# Patient Record
Sex: MALE | Race: WHITE | NOT HISPANIC OR LATINO | Employment: OTHER | URBAN - METROPOLITAN AREA
[De-identification: names, ages, dates, MRNs, and addresses within clinical notes are randomized per-mention and may not be internally consistent; named-entity substitution may affect disease eponyms.]

---

## 2017-01-12 ENCOUNTER — HOSPITAL ENCOUNTER (OUTPATIENT)
Dept: SLEEP MEDICINE | Facility: HOSPITAL | Age: 77
Discharge: HOME | End: 2017-01-12
Attending: INTERNAL MEDICINE | Admitting: INTERNAL MEDICINE

## 2017-01-30 ENCOUNTER — CONVERSION ENCOUNTER (OUTPATIENT)
Dept: CARDIOLOGY | Facility: CLINIC | Age: 77
End: 2017-01-30

## 2017-02-02 ENCOUNTER — HOSPITAL ENCOUNTER (OUTPATIENT)
Dept: ONCOLOGY | Facility: CLINIC | Age: 77
Discharge: HOME | End: 2017-02-02
Attending: NURSE PRACTITIONER | Admitting: NURSE PRACTITIONER

## 2017-02-02 LAB
ALBUMIN SERPL-MCNC: 3.3 G/DL (ref 3.5–4.8)
ALBUMIN/GLOB SERPL: 1.1 {RATIO} (ref 1–1.7)
ALP SERPL-CCNC: 56 IU/L (ref 32–91)
ALT SERPL-CCNC: 25 IU/L (ref 17–63)
ANION GAP SERPL CALC-SCNC: 11.2 MMOL/L (ref 10–20)
AST SERPL-CCNC: 24 IU/L (ref 15–41)
BILIRUB SERPL-MCNC: 0.6 MG/DL (ref 0.3–1.2)
BUN SERPL-MCNC: 25 MG/DL (ref 8–20)
BUN/CREAT SERPL: 20.8 (ref 6.2–20.3)
CALCIUM SERPL-MCNC: 9.6 MG/DL (ref 8.9–10.3)
CHLORIDE SERPL-SCNC: 110 MMOL/L (ref 101–111)
CONV CO2: 22 MMOL/L (ref 22–32)
CONV TOTAL PROTEIN: 6.3 G/DL (ref 6.1–7.9)
CREAT UR-MCNC: 1.2 MG/DL (ref 0.7–1.2)
GLOBULIN UR ELPH-MCNC: 3 G/DL (ref 2.5–3.8)
GLUCOSE SERPL-MCNC: 145 MG/DL (ref 65–99)
POTASSIUM SERPL-SCNC: 4.2 MMOL/L (ref 3.6–5.1)
SODIUM SERPL-SCNC: 139 MMOL/L (ref 136–144)

## 2017-04-05 ENCOUNTER — HOSPITAL ENCOUNTER (OUTPATIENT)
Dept: ONCOLOGY | Facility: CLINIC | Age: 77
Discharge: HOME | End: 2017-04-05
Attending: INTERNAL MEDICINE | Admitting: INTERNAL MEDICINE

## 2017-04-14 ENCOUNTER — HOSPITAL ENCOUNTER (OUTPATIENT)
Dept: GENERAL RADIOLOGY | Facility: HOSPITAL | Age: 77
Discharge: HOME | End: 2017-04-14
Attending: INTERNAL MEDICINE | Admitting: INTERNAL MEDICINE

## 2017-05-01 ENCOUNTER — CONVERSION ENCOUNTER (OUTPATIENT)
Dept: CARDIOLOGY | Facility: CLINIC | Age: 77
End: 2017-05-01

## 2017-07-06 ENCOUNTER — HOSPITAL ENCOUNTER (OUTPATIENT)
Dept: ONCOLOGY | Facility: CLINIC | Age: 77
Discharge: HOME | End: 2017-07-06
Attending: NURSE PRACTITIONER | Admitting: NURSE PRACTITIONER

## 2017-08-30 ENCOUNTER — HOSPITAL ENCOUNTER (OUTPATIENT)
Dept: SLEEP MEDICINE | Facility: HOSPITAL | Age: 77
Discharge: HOME | End: 2017-08-30
Attending: INTERNAL MEDICINE | Admitting: INTERNAL MEDICINE

## 2017-10-04 ENCOUNTER — HOSPITAL ENCOUNTER (OUTPATIENT)
Dept: ONCOLOGY | Facility: HOSPITAL | Age: 77
Discharge: HOME | End: 2017-10-04
Attending: INTERNAL MEDICINE | Admitting: INTERNAL MEDICINE

## 2017-10-04 ENCOUNTER — CLINICAL SUPPORT (OUTPATIENT)
Dept: ONCOLOGY | Facility: HOSPITAL | Age: 77
End: 2017-10-04

## 2017-10-04 ENCOUNTER — HOSPITAL ENCOUNTER (OUTPATIENT)
Dept: ONCOLOGY | Facility: CLINIC | Age: 77
Setting detail: INFUSION SERIES
Discharge: HOME | End: 2017-10-04
Attending: INTERNAL MEDICINE | Admitting: INTERNAL MEDICINE

## 2017-10-04 LAB
ALBUMIN SERPL-MCNC: 3.5 G/DL (ref 3.5–4.8)
ALBUMIN/GLOB SERPL: 1.4 {RATIO} (ref 1–1.7)
ALP SERPL-CCNC: 53 IU/L (ref 32–91)
ALT SERPL-CCNC: 25 IU/L (ref 17–63)
ANION GAP SERPL CALC-SCNC: 10.4 MMOL/L (ref 10–20)
AST SERPL-CCNC: 27 IU/L (ref 15–41)
BILIRUB SERPL-MCNC: 0.6 MG/DL (ref 0.3–1.2)
BUN SERPL-MCNC: 25 MG/DL (ref 8–20)
BUN/CREAT SERPL: 20.8 (ref 6.2–20.3)
CALCIUM SERPL-MCNC: 9.3 MG/DL (ref 8.9–10.3)
CHLORIDE SERPL-SCNC: 107 MMOL/L (ref 101–111)
CONV CO2: 25 MMOL/L (ref 22–32)
CONV TOTAL PROTEIN: 6 G/DL (ref 6.1–7.9)
CREAT UR-MCNC: 1.2 MG/DL (ref 0.7–1.2)
GLOBULIN UR ELPH-MCNC: 2.5 G/DL (ref 2.5–3.8)
GLUCOSE SERPL-MCNC: 156 MG/DL (ref 65–99)
POTASSIUM SERPL-SCNC: 4.4 MMOL/L (ref 3.6–5.1)
SODIUM SERPL-SCNC: 138 MMOL/L (ref 136–144)

## 2017-10-04 NOTE — PROGRESS NOTES
PATIENTS ONCOLOGY RECORD LOCATED IN [facility]      Subjective     Name:  PILI VARGHESE     Date:  10/04/2017  Address:  [address] [place] [address]  Home: [phone]  :  1940 AGE:  77 y.o.        RECORDS OBTAINED:  Patients Oncology Record is located in [facility]

## 2018-02-06 ENCOUNTER — CLINICAL SUPPORT (OUTPATIENT)
Dept: ONCOLOGY | Facility: HOSPITAL | Age: 78
End: 2018-02-06

## 2018-02-06 ENCOUNTER — HOSPITAL ENCOUNTER (OUTPATIENT)
Dept: ONCOLOGY | Facility: CLINIC | Age: 78
Setting detail: INFUSION SERIES
Discharge: HOME | End: 2018-02-06
Attending: NURSE PRACTITIONER | Admitting: NURSE PRACTITIONER

## 2018-02-06 NOTE — PROGRESS NOTES
PATIENTS ONCOLOGY RECORD LOCATED IN [facility]      Subjective     Name:  PILI VARGHESE     Date:  2018  Address:  [address] [place] [address]  Home: [phone]  :  1940 AGE:  78 y.o.        RECORDS OBTAINED:  Patients Oncology Record is located in [facility]

## 2018-03-15 ENCOUNTER — CONVERSION ENCOUNTER (OUTPATIENT)
Dept: CARDIOLOGY | Facility: CLINIC | Age: 78
End: 2018-03-15

## 2018-04-05 ENCOUNTER — HOSPITAL ENCOUNTER (OUTPATIENT)
Dept: CARDIOLOGY | Facility: HOSPITAL | Age: 78
Discharge: HOME | End: 2018-04-05
Attending: INTERNAL MEDICINE | Admitting: INTERNAL MEDICINE

## 2018-04-05 ENCOUNTER — CONVERSION ENCOUNTER (OUTPATIENT)
Dept: CARDIOLOGY | Facility: CLINIC | Age: 78
End: 2018-04-05

## 2018-04-09 ENCOUNTER — HOSPITAL ENCOUNTER (OUTPATIENT)
Dept: PREADMISSION TESTING | Facility: HOSPITAL | Age: 78
Discharge: HOME | End: 2018-04-09
Attending: INTERNAL MEDICINE | Admitting: INTERNAL MEDICINE

## 2018-04-09 LAB
ANION GAP SERPL CALC-SCNC: 12.3 MMOL/L (ref 10–20)
BASOPHILS # BLD AUTO: 0.1 10*3/UL (ref 0–0.2)
BASOPHILS NFR BLD AUTO: 1 % (ref 0–2)
BUN SERPL-MCNC: 24 MG/DL (ref 8–20)
BUN/CREAT SERPL: 20 (ref 6.2–20.3)
CALCIUM SERPL-MCNC: 9.2 MG/DL (ref 8.9–10.3)
CHLORIDE SERPL-SCNC: 108 MMOL/L (ref 101–111)
CHOLEST SERPL-MCNC: 141 MG/DL
CHOLEST/HDLC SERPL: 2.6 {RATIO}
CONV CO2: 23 MMOL/L (ref 22–32)
CONV LDL CHOLESTEROL DIRECT: 68 MG/DL (ref 0–100)
CREAT UR-MCNC: 1.2 MG/DL (ref 0.7–1.2)
DIFFERENTIAL METHOD BLD: (no result)
EOSINOPHIL # BLD AUTO: 0.1 10*3/UL (ref 0–0.3)
EOSINOPHIL # BLD AUTO: 2 % (ref 0–3)
ERYTHROCYTE [DISTWIDTH] IN BLOOD BY AUTOMATED COUNT: 13.3 % (ref 11.5–14.5)
GLUCOSE SERPL-MCNC: 149 MG/DL (ref 65–99)
HCT VFR BLD AUTO: 42.9 % (ref 40–54)
HDLC SERPL-MCNC: 54 MG/DL
HGB BLD-MCNC: 14.3 G/DL (ref 14–18)
INR PPP: 1.1
LDLC/HDLC SERPL: 1.3 {RATIO}
LIPID INTERPRETATION: NORMAL
LYMPHOCYTES # BLD AUTO: 1.3 10*3/UL (ref 0.8–4.8)
LYMPHOCYTES NFR BLD AUTO: 29 % (ref 18–42)
MCH RBC QN AUTO: 33.3 PG (ref 26–32)
MCHC RBC AUTO-ENTMCNC: 33.4 G/DL (ref 32–36)
MCV RBC AUTO: 99.8 FL (ref 80–94)
MONOCYTES # BLD AUTO: 0.4 10*3/UL (ref 0.1–1.3)
MONOCYTES NFR BLD AUTO: 9 % (ref 2–11)
NEUTROPHILS # BLD AUTO: 2.6 10*3/UL (ref 2.3–8.6)
NEUTROPHILS NFR BLD AUTO: 59 % (ref 50–75)
NRBC BLD AUTO-RTO: 0 /100{WBCS}
NRBC/RBC NFR BLD MANUAL: 0 10*3/UL
PLATELET # BLD AUTO: 200 10*3/UL (ref 150–450)
PMV BLD AUTO: 8.8 FL (ref 7.4–10.4)
POTASSIUM SERPL-SCNC: 4.3 MMOL/L (ref 3.6–5.1)
PROTHROMBIN TIME: 11.7 SEC (ref 9.6–11.7)
RBC # BLD AUTO: 4.3 10*6/UL (ref 4.6–6)
SODIUM SERPL-SCNC: 139 MMOL/L (ref 136–144)
TRIGL SERPL-MCNC: 78 MG/DL
VLDLC SERPL CALC-MCNC: 19.7 MG/DL
WBC # BLD AUTO: 4.4 10*3/UL (ref 4.5–11.5)

## 2018-04-25 ENCOUNTER — CONVERSION ENCOUNTER (OUTPATIENT)
Dept: CARDIOLOGY | Facility: CLINIC | Age: 78
End: 2018-04-25

## 2018-05-09 ENCOUNTER — HOSPITAL ENCOUNTER (OUTPATIENT)
Dept: RESPIRATORY THERAPY | Facility: HOSPITAL | Age: 78
Discharge: HOME | End: 2018-05-09
Attending: INTERNAL MEDICINE | Admitting: INTERNAL MEDICINE

## 2018-05-13 ENCOUNTER — HOSPITAL ENCOUNTER (OUTPATIENT)
Dept: SLEEP MEDICINE | Facility: HOSPITAL | Age: 78
Discharge: HOME | End: 2018-05-13
Attending: INTERNAL MEDICINE | Admitting: INTERNAL MEDICINE

## 2018-06-05 ENCOUNTER — CLINICAL SUPPORT (OUTPATIENT)
Dept: ONCOLOGY | Facility: HOSPITAL | Age: 78
End: 2018-06-05

## 2018-06-05 ENCOUNTER — HOSPITAL ENCOUNTER (OUTPATIENT)
Dept: ONCOLOGY | Facility: CLINIC | Age: 78
Setting detail: INFUSION SERIES
Discharge: HOME | End: 2018-06-05
Attending: INTERNAL MEDICINE | Admitting: INTERNAL MEDICINE

## 2018-06-05 ENCOUNTER — HOSPITAL ENCOUNTER (OUTPATIENT)
Dept: ONCOLOGY | Facility: HOSPITAL | Age: 78
Discharge: HOME | End: 2018-06-05
Attending: INTERNAL MEDICINE | Admitting: INTERNAL MEDICINE

## 2018-06-05 LAB
IRON SATN MFR SERPL: 17 % (ref 20–50)
IRON SERPL-MCNC: 66 UG/DL (ref 45–182)
IRON SERPL-MCNC: 66 UG/DL (ref 45–182)
MAGNESIUM UR-MCNC: 6.24 % (ref 0.5–1.5)
RETICS/RBC NFR MANUAL: 0.18 10*6/UL
TIBC SERPL-MCNC: 384 UG/DL (ref 228–428)

## 2018-06-06 LAB
ALBUMIN SERPL-MCNC: 3.1 G/DL (ref 3.5–4.8)
ALPHA1 GLOB FLD ELPH-MCNC: 0.2 GM/DL (ref 0.1–0.4)
ALPHA2 GLOB SERPL ELPH-MCNC: 0.7 GM/DL (ref 0.5–1)
B-GLOBULIN SERPL ELPH-MCNC: 0.9 GM/DL (ref 0.7–1.4)
CONV TOTAL PROTEIN: 5.7 G/DL (ref 6.1–7.9)
GAMMA GLOB SERPL ELPH-MCNC: 0.8 GM/DL (ref 0.6–1.6)
HAPTOGLOB SERPL-MCNC: 105 MG/DL (ref 36–195)
INSULIN SERPL-ACNC: ABNORMAL U[IU]/ML

## 2018-06-07 ENCOUNTER — HOSPITAL ENCOUNTER (OUTPATIENT)
Dept: RESPIRATORY THERAPY | Facility: HOSPITAL | Age: 78
Discharge: HOME | End: 2018-06-07
Attending: INTERNAL MEDICINE | Admitting: INTERNAL MEDICINE

## 2018-06-11 ENCOUNTER — HOSPITAL ENCOUNTER (OUTPATIENT)
Dept: ONCOLOGY | Facility: CLINIC | Age: 78
Setting detail: INFUSION SERIES
Discharge: HOME | End: 2018-06-11
Attending: INTERNAL MEDICINE | Admitting: INTERNAL MEDICINE

## 2018-06-11 ENCOUNTER — CLINICAL SUPPORT (OUTPATIENT)
Dept: ONCOLOGY | Facility: HOSPITAL | Age: 78
End: 2018-06-11

## 2018-06-11 NOTE — PROGRESS NOTES
PATIENTS ONCOLOGY RECORD LOCATED IN [facility]      Subjective     Name:  PILI VARGHESE     Date:  2018  Address:  [address] [place] [address]  Home: [phone]  :  1940 AGE:  78 y.o.        RECORDS OBTAINED:  Patients Oncology Record is located in [facility]

## 2018-06-14 ENCOUNTER — HOSPITAL ENCOUNTER (OUTPATIENT)
Dept: RESPIRATORY THERAPY | Facility: HOSPITAL | Age: 78
Discharge: HOME | End: 2018-06-14
Attending: INTERNAL MEDICINE | Admitting: INTERNAL MEDICINE

## 2018-07-05 ENCOUNTER — HOSPITAL ENCOUNTER (OUTPATIENT)
Dept: ONCOLOGY | Facility: CLINIC | Age: 78
Setting detail: INFUSION SERIES
Discharge: HOME | End: 2018-07-05
Attending: INTERNAL MEDICINE | Admitting: INTERNAL MEDICINE

## 2018-07-05 ENCOUNTER — HOSPITAL ENCOUNTER (OUTPATIENT)
Dept: ONCOLOGY | Facility: HOSPITAL | Age: 78
Discharge: HOME | End: 2018-07-05
Attending: NURSE PRACTITIONER | Admitting: NURSE PRACTITIONER

## 2018-07-05 ENCOUNTER — CLINICAL SUPPORT (OUTPATIENT)
Dept: ONCOLOGY | Facility: HOSPITAL | Age: 78
End: 2018-07-05

## 2018-07-05 LAB
ALBUMIN SERPL-MCNC: 3.3 G/DL (ref 3.5–4.8)
ALBUMIN/GLOB SERPL: 1.1 {RATIO} (ref 1–1.7)
ALP SERPL-CCNC: 52 IU/L (ref 32–91)
ALT SERPL-CCNC: 16 IU/L (ref 17–63)
ANION GAP SERPL CALC-SCNC: 11.9 MMOL/L (ref 10–20)
AST SERPL-CCNC: 21 IU/L (ref 15–41)
BILIRUB SERPL-MCNC: 0.4 MG/DL (ref 0.3–1.2)
BUN SERPL-MCNC: 22 MG/DL (ref 8–20)
BUN/CREAT SERPL: 18.3 (ref 6.2–20.3)
CALCIUM SERPL-MCNC: 9.2 MG/DL (ref 8.9–10.3)
CHLORIDE SERPL-SCNC: 108 MMOL/L (ref 101–111)
CONV CO2: 22 MMOL/L (ref 22–32)
CONV TOTAL PROTEIN: 6.4 G/DL (ref 6.1–7.9)
CREAT UR-MCNC: 1.2 MG/DL (ref 0.7–1.2)
GLOBULIN UR ELPH-MCNC: 3.1 G/DL (ref 2.5–3.8)
GLUCOSE SERPL-MCNC: 163 MG/DL (ref 65–99)
POTASSIUM SERPL-SCNC: 3.9 MMOL/L (ref 3.6–5.1)
SODIUM SERPL-SCNC: 138 MMOL/L (ref 136–144)

## 2018-07-05 NOTE — PROGRESS NOTES
PATIENTS ONCOLOGY RECORD LOCATED IN [facility]      Subjective     Name:  PILI VARGHESE     Date:  2018  Address:  [address] [place] [address]  Home: [phone]  :  1940 AGE:  78 y.o.        RECORDS OBTAINED:  Patients Oncology Record is located in [facility]

## 2018-08-01 ENCOUNTER — HOSPITAL ENCOUNTER (OUTPATIENT)
Dept: OTHER | Facility: HOSPITAL | Age: 78
Setting detail: SPECIMEN
Discharge: HOME | End: 2018-08-01
Attending: INTERNAL MEDICINE | Admitting: INTERNAL MEDICINE

## 2018-08-01 ENCOUNTER — OFFICE (AMBULATORY)
Dept: URBAN - METROPOLITAN AREA PATHOLOGY 4 | Facility: PATHOLOGY | Age: 78
End: 2018-08-01

## 2018-08-01 ENCOUNTER — ON CAMPUS - OUTPATIENT (AMBULATORY)
Dept: URBAN - METROPOLITAN AREA HOSPITAL 2 | Facility: HOSPITAL | Age: 78
End: 2018-08-01

## 2018-08-01 VITALS
SYSTOLIC BLOOD PRESSURE: 128 MMHG | RESPIRATION RATE: 18 BRPM | OXYGEN SATURATION: 97 % | SYSTOLIC BLOOD PRESSURE: 134 MMHG | OXYGEN SATURATION: 96 % | DIASTOLIC BLOOD PRESSURE: 94 MMHG | RESPIRATION RATE: 20 BRPM | HEART RATE: 60 BPM | HEART RATE: 68 BPM | OXYGEN SATURATION: 95 % | SYSTOLIC BLOOD PRESSURE: 174 MMHG | SYSTOLIC BLOOD PRESSURE: 152 MMHG | SYSTOLIC BLOOD PRESSURE: 172 MMHG | SYSTOLIC BLOOD PRESSURE: 120 MMHG | DIASTOLIC BLOOD PRESSURE: 64 MMHG | TEMPERATURE: 96.9 F | HEART RATE: 59 BPM | SYSTOLIC BLOOD PRESSURE: 129 MMHG | RESPIRATION RATE: 16 BRPM | RESPIRATION RATE: 15 BRPM | HEART RATE: 63 BPM | DIASTOLIC BLOOD PRESSURE: 68 MMHG | RESPIRATION RATE: 17 BRPM | DIASTOLIC BLOOD PRESSURE: 81 MMHG | DIASTOLIC BLOOD PRESSURE: 79 MMHG | DIASTOLIC BLOOD PRESSURE: 66 MMHG | OXYGEN SATURATION: 98 % | OXYGEN SATURATION: 94 % | HEART RATE: 67 BPM | SYSTOLIC BLOOD PRESSURE: 138 MMHG | DIASTOLIC BLOOD PRESSURE: 67 MMHG | HEIGHT: 66 IN | RESPIRATION RATE: 68 BRPM | HEART RATE: 69 BPM | OXYGEN SATURATION: 100 % | WEIGHT: 237.2 LBS | HEART RATE: 64 BPM

## 2018-08-01 DIAGNOSIS — D12.2 BENIGN NEOPLASM OF ASCENDING COLON: ICD-10-CM

## 2018-08-01 DIAGNOSIS — K62.1 RECTAL POLYP: ICD-10-CM

## 2018-08-01 DIAGNOSIS — K57.30 DIVERTICULOSIS OF LARGE INTESTINE WITHOUT PERFORATION OR ABS: ICD-10-CM

## 2018-08-01 DIAGNOSIS — K64.8 OTHER HEMORRHOIDS: ICD-10-CM

## 2018-08-01 DIAGNOSIS — D12.5 BENIGN NEOPLASM OF SIGMOID COLON: ICD-10-CM

## 2018-08-01 DIAGNOSIS — Z12.11 ENCOUNTER FOR SCREENING FOR MALIGNANT NEOPLASM OF COLON: ICD-10-CM

## 2018-08-01 LAB
GI HISTOLOGY: A. UNSPECIFIED: (no result)
GI HISTOLOGY: B. UNSPECIFIED: (no result)
GI HISTOLOGY: C. UNSPECIFIED: (no result)
GI HISTOLOGY: PDF REPORT: (no result)

## 2018-08-01 PROCEDURE — 88305 TISSUE EXAM BY PATHOLOGIST: CPT | Mod: 26 | Performed by: INTERNAL MEDICINE

## 2018-08-01 PROCEDURE — 45385 COLONOSCOPY W/LESION REMOVAL: CPT | Mod: PT | Performed by: INTERNAL MEDICINE

## 2018-08-01 RX ADMIN — PROPOFOL: 10 INJECTION, EMULSION INTRAVENOUS at 09:51

## 2018-08-02 ENCOUNTER — HOSPITAL ENCOUNTER (OUTPATIENT)
Dept: ONCOLOGY | Facility: CLINIC | Age: 78
Setting detail: INFUSION SERIES
Discharge: HOME | End: 2018-08-02
Attending: NURSE PRACTITIONER | Admitting: NURSE PRACTITIONER

## 2018-08-02 ENCOUNTER — CLINICAL SUPPORT (OUTPATIENT)
Dept: ONCOLOGY | Facility: HOSPITAL | Age: 78
End: 2018-08-02

## 2018-08-02 NOTE — PROGRESS NOTES
PATIENTS ONCOLOGY RECORD LOCATED IN [facility]      Subjective     Name:  PILI VARGHESE     Date:  2018  Address:  [address] [place] [address]  Home: [phone]  :  1940 AGE:  78 y.o.        RECORDS OBTAINED:  Patients Oncology Record is located in [facility]

## 2018-08-07 ENCOUNTER — HOSPITAL ENCOUNTER (OUTPATIENT)
Dept: CARDIOLOGY | Facility: HOSPITAL | Age: 78
Discharge: HOME | End: 2018-08-07
Attending: NURSE PRACTITIONER | Admitting: NURSE PRACTITIONER

## 2018-09-13 ENCOUNTER — HOSPITAL ENCOUNTER (OUTPATIENT)
Dept: RESPIRATORY THERAPY | Facility: HOSPITAL | Age: 78
Discharge: HOME | End: 2018-09-13
Attending: INTERNAL MEDICINE | Admitting: INTERNAL MEDICINE

## 2018-10-16 ENCOUNTER — CLINICAL SUPPORT (OUTPATIENT)
Dept: ONCOLOGY | Facility: HOSPITAL | Age: 78
End: 2018-10-16

## 2018-10-16 ENCOUNTER — HOSPITAL ENCOUNTER (OUTPATIENT)
Dept: ONCOLOGY | Facility: HOSPITAL | Age: 78
Discharge: HOME | End: 2018-10-16
Attending: INTERNAL MEDICINE | Admitting: INTERNAL MEDICINE

## 2018-10-16 ENCOUNTER — HOSPITAL ENCOUNTER (OUTPATIENT)
Dept: ONCOLOGY | Facility: CLINIC | Age: 78
Setting detail: INFUSION SERIES
Discharge: HOME | End: 2018-10-16
Attending: INTERNAL MEDICINE | Admitting: INTERNAL MEDICINE

## 2018-10-16 NOTE — PROGRESS NOTES
PATIENTS ONCOLOGY RECORD LOCATED IN [facility]      Subjective     Name:  PILI VARGHESE     Date:  10/16/2018  Address:  [address] [place] [address]  Home: [phone]  :  1940 AGE:  78 y.o.        RECORDS OBTAINED:  Patients Oncology Record is located in [facility]   Pt arrives to the ED cc of left arm tingling that began approximately 2 weeks ago. Pt deneis any numbness or tingling anywhere else in the body. Pt also reports seeing \"a brown, dark void\" out of the left eye. Pt reports that the visual changes are intermittent, denies any visual changes as of now. Pt was brought in by daughter's due to home health care nurse's recommendation. Pt is not happy to be here.

## 2018-10-17 LAB
ALBUMIN SERPL-MCNC: 3.2 G/DL (ref 3.5–4.8)
ALBUMIN/GLOB SERPL: 1 {RATIO} (ref 1–1.7)
ALP SERPL-CCNC: 51 IU/L (ref 32–91)
ALT SERPL-CCNC: 17 IU/L (ref 17–63)
ANION GAP SERPL CALC-SCNC: 13.8 MMOL/L (ref 10–20)
AST SERPL-CCNC: 24 IU/L (ref 15–41)
BILIRUB SERPL-MCNC: 0.7 MG/DL (ref 0.3–1.2)
BUN SERPL-MCNC: 26 MG/DL (ref 8–20)
BUN/CREAT SERPL: 16.3 (ref 6.2–20.3)
CALCIUM SERPL-MCNC: 9 MG/DL (ref 8.9–10.3)
CHLORIDE SERPL-SCNC: 106 MMOL/L (ref 101–111)
CONV CO2: 21 MMOL/L (ref 22–32)
CONV TOTAL PROTEIN: 6.4 G/DL (ref 6.1–7.9)
CREAT UR-MCNC: 1.6 MG/DL (ref 0.7–1.2)
FERRITIN SERPL-MCNC: 123 NG/ML (ref 24–336)
GLOBULIN UR ELPH-MCNC: 3.2 G/DL (ref 2.5–3.8)
GLUCOSE SERPL-MCNC: 137 MG/DL (ref 65–99)
POTASSIUM SERPL-SCNC: 4.8 MMOL/L (ref 3.6–5.1)
SODIUM SERPL-SCNC: 136 MMOL/L (ref 136–144)

## 2018-10-22 ENCOUNTER — CONVERSION ENCOUNTER (OUTPATIENT)
Dept: CARDIOLOGY | Facility: CLINIC | Age: 78
End: 2018-10-22

## 2018-12-04 ENCOUNTER — HOSPITAL ENCOUNTER (OUTPATIENT)
Dept: LAB | Facility: HOSPITAL | Age: 78
Discharge: HOME | End: 2018-12-04
Attending: FAMILY MEDICINE | Admitting: FAMILY MEDICINE

## 2018-12-04 LAB
INR PPP: 5.9 (ref 2–3)
PROTHROMBIN TIME: 58.5 SEC (ref 19.4–28.5)

## 2019-01-15 ENCOUNTER — HOSPITAL ENCOUNTER (OUTPATIENT)
Dept: ONCOLOGY | Facility: CLINIC | Age: 79
Setting detail: INFUSION SERIES
Discharge: HOME | End: 2019-01-15
Attending: NURSE PRACTITIONER | Admitting: NURSE PRACTITIONER

## 2019-01-15 ENCOUNTER — CLINICAL SUPPORT (OUTPATIENT)
Dept: ONCOLOGY | Facility: HOSPITAL | Age: 79
End: 2019-01-15

## 2019-01-15 NOTE — PROGRESS NOTES
PATIENTS ONCOLOGY RECORD LOCATED IN [facility]      Subjective     Name:  PILI VARGHESE     Date:  01/15/2019  Address:  [address] [place] [address] [place] [facility]  Home: [unfilled]  :  1940 AGE:  78 y.o.        RECORDS OBTAINED:  Patients Oncology Record is located in [facility]

## 2019-04-23 ENCOUNTER — CLINICAL SUPPORT (OUTPATIENT)
Dept: ONCOLOGY | Facility: HOSPITAL | Age: 79
End: 2019-04-23

## 2019-04-23 ENCOUNTER — CONVERSION ENCOUNTER (OUTPATIENT)
Dept: CARDIOLOGY | Facility: CLINIC | Age: 79
End: 2019-04-23

## 2019-04-23 ENCOUNTER — HOSPITAL ENCOUNTER (OUTPATIENT)
Dept: ONCOLOGY | Facility: HOSPITAL | Age: 79
Discharge: HOME | End: 2019-04-23
Attending: INTERNAL MEDICINE | Admitting: INTERNAL MEDICINE

## 2019-04-23 ENCOUNTER — HOSPITAL ENCOUNTER (OUTPATIENT)
Dept: ONCOLOGY | Facility: CLINIC | Age: 79
Setting detail: INFUSION SERIES
Discharge: HOME | End: 2019-04-23
Attending: INTERNAL MEDICINE | Admitting: INTERNAL MEDICINE

## 2019-04-23 NOTE — PROGRESS NOTES
PATIENTS ONCOLOGY RECORD LOCATED IN [facility]      Subjective     Name:  PILI VARGHESE     Date:  2019  Address:  [address] [place] [address] [place] [facility]  Home: [unfilled]  :  1940 AGE:  79 y.o.        RECORDS OBTAINED:  Patients Oncology Record is located in [facility]

## 2019-04-24 ENCOUNTER — HOSPITAL ENCOUNTER (OUTPATIENT)
Dept: ONCOLOGY | Facility: CLINIC | Age: 79
Setting detail: INFUSION SERIES
Discharge: HOME | End: 2019-04-24
Attending: INTERNAL MEDICINE | Admitting: INTERNAL MEDICINE

## 2019-04-24 ENCOUNTER — CLINICAL SUPPORT (OUTPATIENT)
Dept: ONCOLOGY | Facility: HOSPITAL | Age: 79
End: 2019-04-24

## 2019-04-24 NOTE — PROGRESS NOTES
PATIENTS ONCOLOGY RECORD LOCATED IN [facility]      Subjective     Name:  PILI VARGHESE     Date:  2019  Address:  [address] [place] [address] [place] [facility]  Home: [unfilled]  :  1940 AGE:  79 y.o.        RECORDS OBTAINED:  Patients Oncology Record is located in [facility]

## 2019-04-26 ENCOUNTER — CLINICAL SUPPORT (OUTPATIENT)
Dept: ONCOLOGY | Facility: HOSPITAL | Age: 79
End: 2019-04-26

## 2019-04-26 ENCOUNTER — HOSPITAL ENCOUNTER (OUTPATIENT)
Dept: ONCOLOGY | Facility: CLINIC | Age: 79
Setting detail: INFUSION SERIES
Discharge: HOME | End: 2019-04-26
Attending: INTERNAL MEDICINE | Admitting: INTERNAL MEDICINE

## 2019-04-26 NOTE — PROGRESS NOTES
PATIENTS ONCOLOGY RECORD LOCATED IN [facility]      Subjective     Name:  PILI VARGHESE     Date:  2019  Address:  [address] [place] [address] [place] [facility]  Home: [unfilled]  :  1940 AGE:  79 y.o.        RECORDS OBTAINED:  Patients Oncology Record is located in [facility]

## 2019-04-30 ENCOUNTER — HOSPITAL ENCOUNTER (OUTPATIENT)
Dept: RESPIRATORY THERAPY | Facility: HOSPITAL | Age: 79
Discharge: HOME | End: 2019-04-30
Attending: NURSE PRACTITIONER | Admitting: NURSE PRACTITIONER

## 2019-05-02 ENCOUNTER — CLINICAL SUPPORT (OUTPATIENT)
Dept: ONCOLOGY | Facility: HOSPITAL | Age: 79
End: 2019-05-02

## 2019-05-02 ENCOUNTER — HOSPITAL ENCOUNTER (OUTPATIENT)
Dept: ONCOLOGY | Facility: HOSPITAL | Age: 79
Discharge: HOME | End: 2019-05-02
Attending: INTERNAL MEDICINE | Admitting: INTERNAL MEDICINE

## 2019-05-02 ENCOUNTER — HOSPITAL ENCOUNTER (OUTPATIENT)
Dept: ONCOLOGY | Facility: CLINIC | Age: 79
Setting detail: INFUSION SERIES
Discharge: HOME | End: 2019-05-02
Attending: INTERNAL MEDICINE | Admitting: INTERNAL MEDICINE

## 2019-05-02 NOTE — PROGRESS NOTES
PATIENTS ONCOLOGY RECORD LOCATED IN [facility]      Subjective     Name:  PILI VARGHESE     Date:  2019  Address:  [address] [place] [address] [place] [facility]  Home: [unfilled]  :  1940 AGE:  79 y.o.        RECORDS OBTAINED:  Patients Oncology Record is located in [facility]

## 2019-05-09 ENCOUNTER — CLINICAL SUPPORT (OUTPATIENT)
Dept: ONCOLOGY | Facility: HOSPITAL | Age: 79
End: 2019-05-09

## 2019-05-09 ENCOUNTER — HOSPITAL ENCOUNTER (OUTPATIENT)
Dept: ONCOLOGY | Facility: CLINIC | Age: 79
Setting detail: INFUSION SERIES
Discharge: HOME | End: 2019-05-09
Attending: INTERNAL MEDICINE | Admitting: INTERNAL MEDICINE

## 2019-05-09 NOTE — PROGRESS NOTES
PATIENTS ONCOLOGY RECORD LOCATED IN [facility]      Subjective     Name:  PILI VARGHESE     Date:  2019  Address:  [address] [place] [address] [place] [facility]  Home: [unfilled]  :  1940 AGE:  79 y.o.        RECORDS OBTAINED:  Patients Oncology Record is located in [facility]

## 2019-05-15 ENCOUNTER — CLINICAL SUPPORT (OUTPATIENT)
Dept: ONCOLOGY | Facility: HOSPITAL | Age: 79
End: 2019-05-15

## 2019-05-15 ENCOUNTER — HOSPITAL ENCOUNTER (OUTPATIENT)
Dept: ONCOLOGY | Facility: HOSPITAL | Age: 79
Discharge: HOME | End: 2019-05-15
Attending: NURSE PRACTITIONER | Admitting: NURSE PRACTITIONER

## 2019-05-15 ENCOUNTER — HOSPITAL ENCOUNTER (OUTPATIENT)
Dept: ONCOLOGY | Facility: CLINIC | Age: 79
Setting detail: INFUSION SERIES
Discharge: HOME | End: 2019-05-15
Attending: NURSE PRACTITIONER | Admitting: NURSE PRACTITIONER

## 2019-05-15 LAB
INR PPP: 3.8
PROTHROMBIN TIME: 36.8 SEC (ref 9.6–11.7)

## 2019-05-15 NOTE — PROGRESS NOTES
PATIENTS ONCOLOGY RECORD LOCATED IN [facility]      Subjective     Name:  PILI VARGHESE     Date:  05/15/2019  Address:  [address] [place] [address] [place] [facility]  Home: [unfilled]  :  1940 AGE:  79 y.o.        RECORDS OBTAINED:  Patients Oncology Record is located in [facility]

## 2019-05-17 LAB
IGA1 MFR SER: 131 MG/DL (ref 50–400)
IGG1 SER-MCNC: 561 MG/DL (ref 600–1500)
IGM SERPL-MCNC: 48 MG/DL (ref 40–300)

## 2019-05-30 ENCOUNTER — CLINICAL SUPPORT (OUTPATIENT)
Dept: ONCOLOGY | Facility: HOSPITAL | Age: 79
End: 2019-05-30

## 2019-05-30 ENCOUNTER — HOSPITAL ENCOUNTER (OUTPATIENT)
Dept: ONCOLOGY | Facility: CLINIC | Age: 79
Setting detail: INFUSION SERIES
Discharge: HOME | End: 2019-05-30
Attending: INTERNAL MEDICINE | Admitting: INTERNAL MEDICINE

## 2019-05-30 NOTE — PROGRESS NOTES
PATIENTS ONCOLOGY RECORD LOCATED IN [facility]      Subjective     Name:  PILI VARGHESE     Date:  2019  Address:  [address] [place] [address] [place] [facility]  Home: [unfilled]  :  1940 AGE:  79 y.o.        RECORDS OBTAINED:  Patients Oncology Record is located in [facility]

## 2019-05-31 ENCOUNTER — HOSPITAL ENCOUNTER (OUTPATIENT)
Dept: ONCOLOGY | Facility: CLINIC | Age: 79
Setting detail: INFUSION SERIES
Discharge: HOME | End: 2019-05-31
Attending: INTERNAL MEDICINE | Admitting: INTERNAL MEDICINE

## 2019-05-31 ENCOUNTER — CLINICAL SUPPORT (OUTPATIENT)
Dept: ONCOLOGY | Facility: HOSPITAL | Age: 79
End: 2019-05-31

## 2019-05-31 NOTE — PROGRESS NOTES
PATIENTS ONCOLOGY RECORD LOCATED IN [facility]      Subjective     Name:  PILI VARGHESE     Date:  2019  Address:  [address] [place] [address] [place] [facility]  Home: [unfilled]  :  1940 AGE:  79 y.o.        RECORDS OBTAINED:  Patients Oncology Record is located in [facility]

## 2019-06-04 VITALS
SYSTOLIC BLOOD PRESSURE: 116 MMHG | DIASTOLIC BLOOD PRESSURE: 80 MMHG | HEART RATE: 76 BPM | OXYGEN SATURATION: 96 % | WEIGHT: 238 LBS | HEART RATE: 66 BPM | SYSTOLIC BLOOD PRESSURE: 187 MMHG | WEIGHT: 239.25 LBS | BODY MASS INDEX: 37.85 KG/M2 | SYSTOLIC BLOOD PRESSURE: 127 MMHG | DIASTOLIC BLOOD PRESSURE: 95 MMHG | DIASTOLIC BLOOD PRESSURE: 83 MMHG | DIASTOLIC BLOOD PRESSURE: 79 MMHG | WEIGHT: 234 LBS | WEIGHT: 232 LBS | SYSTOLIC BLOOD PRESSURE: 169 MMHG | BODY MASS INDEX: 39.22 KG/M2 | HEART RATE: 70 BPM | HEART RATE: 64 BPM | SYSTOLIC BLOOD PRESSURE: 149 MMHG | DIASTOLIC BLOOD PRESSURE: 71 MMHG | OXYGEN SATURATION: 98 % | OXYGEN SATURATION: 97 % | WEIGHT: 234.5 LBS | HEART RATE: 72 BPM | WEIGHT: 243 LBS | OXYGEN SATURATION: 94 % | BODY MASS INDEX: 37.45 KG/M2 | OXYGEN SATURATION: 96 % | SYSTOLIC BLOOD PRESSURE: 160 MMHG | DIASTOLIC BLOOD PRESSURE: 77 MMHG | WEIGHT: 236 LBS | HEART RATE: 76 BPM | DIASTOLIC BLOOD PRESSURE: 65 MMHG | SYSTOLIC BLOOD PRESSURE: 144 MMHG | BODY MASS INDEX: 38.41 KG/M2 | BODY MASS INDEX: 37.77 KG/M2 | BODY MASS INDEX: 38.62 KG/M2 | HEART RATE: 79 BPM | BODY MASS INDEX: 38.09 KG/M2

## 2019-06-07 ENCOUNTER — HOSPITAL ENCOUNTER (OUTPATIENT)
Dept: ONCOLOGY | Facility: CLINIC | Age: 79
Setting detail: INFUSION SERIES
Discharge: HOME | End: 2019-06-07
Attending: INTERNAL MEDICINE | Admitting: INTERNAL MEDICINE

## 2019-06-07 ENCOUNTER — HOSPITAL ENCOUNTER (OUTPATIENT)
Dept: ONCOLOGY | Facility: HOSPITAL | Age: 79
Discharge: HOME | End: 2019-06-07
Attending: INTERNAL MEDICINE | Admitting: INTERNAL MEDICINE

## 2019-06-07 LAB
ALBUMIN SERPL-MCNC: 2.7 G/DL (ref 3.5–4.8)
ALBUMIN/GLOB SERPL: 0.6 {RATIO} (ref 1–1.7)
ALP SERPL-CCNC: 43 IU/L (ref 32–91)
ALT SERPL-CCNC: 25 IU/L (ref 17–63)
ANION GAP SERPL CALC-SCNC: 9.8 MMOL/L (ref 10–20)
AST SERPL-CCNC: 22 IU/L (ref 15–41)
BILIRUB SERPL-MCNC: 0.8 MG/DL (ref 0.3–1.2)
BUN SERPL-MCNC: 41 MG/DL (ref 8–20)
BUN/CREAT SERPL: 37.3 (ref 6.2–20.3)
CALCIUM SERPL-MCNC: 9.6 MG/DL (ref 8.9–10.3)
CHLORIDE SERPL-SCNC: 109 MMOL/L (ref 101–111)
CONV CO2: 21 MMOL/L (ref 22–32)
CONV TOTAL PROTEIN: 7.6 G/DL (ref 6.1–7.9)
CREAT UR-MCNC: 1.1 MG/DL (ref 0.7–1.2)
GLOBULIN UR ELPH-MCNC: 4.9 G/DL (ref 2.5–3.8)
GLUCOSE SERPL-MCNC: 58 MG/DL (ref 65–99)
POTASSIUM SERPL-SCNC: 3.8 MMOL/L (ref 3.6–5.1)
SODIUM SERPL-SCNC: 136 MMOL/L (ref 136–144)

## 2019-06-21 ENCOUNTER — LAB (OUTPATIENT)
Dept: LAB | Facility: HOSPITAL | Age: 79
End: 2019-06-21

## 2019-06-21 DIAGNOSIS — IMO0001: Primary | ICD-10-CM

## 2019-06-21 DIAGNOSIS — IMO0001: ICD-10-CM

## 2019-06-21 LAB
BASOPHILS # BLD AUTO: 0.02 10*3/MM3 (ref 0–0.2)
BASOPHILS NFR BLD AUTO: 0.3 % (ref 0–1.5)
DEPRECATED RDW RBC AUTO: 51.2 FL (ref 37–54)
EOSINOPHIL # BLD AUTO: 0.08 10*3/MM3 (ref 0–0.4)
EOSINOPHIL NFR BLD AUTO: 1.1 % (ref 0.3–6.2)
ERYTHROCYTE [DISTWIDTH] IN BLOOD BY AUTOMATED COUNT: 14.3 % (ref 12.3–15.4)
HCT VFR BLD AUTO: 39 % (ref 37.5–51)
HGB BLD-MCNC: 12.7 G/DL (ref 13–17.7)
LYMPHOCYTES # BLD AUTO: 1.12 10*3/MM3 (ref 0.7–3.1)
LYMPHOCYTES NFR BLD AUTO: 15.1 % (ref 19.6–45.3)
MCH RBC QN AUTO: 32.7 PG (ref 26.6–33)
MCHC RBC AUTO-ENTMCNC: 32.6 G/DL (ref 31.5–35.7)
MCV RBC AUTO: 100.5 FL (ref 79–97)
MONOCYTES # BLD AUTO: 0.61 10*3/MM3 (ref 0.1–0.9)
MONOCYTES NFR BLD AUTO: 8.2 % (ref 5–12)
NEUTROPHILS # BLD AUTO: 5.58 10*3/MM3 (ref 1.7–7)
NEUTROPHILS NFR BLD AUTO: 75.3 % (ref 42.7–76)
PLATELET # BLD AUTO: 194 10*3/MM3 (ref 140–450)
PMV BLD AUTO: 10.3 FL (ref 6–12)
RBC # BLD AUTO: 3.88 10*6/MM3 (ref 4.14–5.8)
WBC NRBC COR # BLD: 7.41 10*3/MM3 (ref 3.4–10.8)

## 2019-06-21 PROCEDURE — 36415 COLL VENOUS BLD VENIPUNCTURE: CPT

## 2019-06-21 PROCEDURE — 85025 COMPLETE CBC W/AUTO DIFF WBC: CPT

## 2019-07-12 PROBLEM — C91.Z0: Status: ACTIVE | Noted: 2019-07-12

## 2019-07-18 ENCOUNTER — APPOINTMENT (OUTPATIENT)
Dept: LAB | Facility: HOSPITAL | Age: 79
End: 2019-07-18

## 2019-07-18 ENCOUNTER — OFFICE VISIT (OUTPATIENT)
Dept: ONCOLOGY | Facility: CLINIC | Age: 79
End: 2019-07-18

## 2019-07-18 VITALS
DIASTOLIC BLOOD PRESSURE: 77 MMHG | BODY MASS INDEX: 37.12 KG/M2 | HEART RATE: 91 BPM | SYSTOLIC BLOOD PRESSURE: 144 MMHG | TEMPERATURE: 98.2 F | WEIGHT: 231 LBS | RESPIRATION RATE: 18 BRPM | HEIGHT: 66 IN

## 2019-07-18 DIAGNOSIS — I26.99 BILATERAL PULMONARY EMBOLISM (HCC): ICD-10-CM

## 2019-07-18 DIAGNOSIS — C91.Z0 T-CELL CHRONIC LYMPHOCYTIC LEUKEMIA (HCC): ICD-10-CM

## 2019-07-18 DIAGNOSIS — J40 BRONCHITIS: ICD-10-CM

## 2019-07-18 DIAGNOSIS — N18.30 CKD (CHRONIC KIDNEY DISEASE), STAGE III (HCC): ICD-10-CM

## 2019-07-18 DIAGNOSIS — R79.1 ELEVATED FACTOR VIII LEVEL: ICD-10-CM

## 2019-07-18 DIAGNOSIS — D80.1 HYPOGAMMAGLOBULINEMIA (HCC): ICD-10-CM

## 2019-07-18 DIAGNOSIS — D69.3 CHRONIC ITP (IDIOPATHIC THROMBOCYTOPENIA) (HCC): Primary | ICD-10-CM

## 2019-07-18 DIAGNOSIS — D46.Z OTHER MYELODYSPLASTIC SYNDROMES (HCC): ICD-10-CM

## 2019-07-18 DIAGNOSIS — M06.9 RHEUMATOID ARTHRITIS INVOLVING MULTIPLE SITES, UNSPECIFIED RHEUMATOID FACTOR PRESENCE: ICD-10-CM

## 2019-07-18 LAB
BASOPHILS # BLD AUTO: 0.03 10*3/MM3 (ref 0–0.2)
BASOPHILS NFR BLD AUTO: 0.4 % (ref 0–1.5)
DEPRECATED RDW RBC AUTO: 52.7 FL (ref 37–54)
EOSINOPHIL # BLD AUTO: 0.12 10*3/MM3 (ref 0–0.4)
EOSINOPHIL NFR BLD AUTO: 1.5 % (ref 0.3–6.2)
ERYTHROCYTE [DISTWIDTH] IN BLOOD BY AUTOMATED COUNT: 14.7 % (ref 12.3–15.4)
HCT VFR BLD AUTO: 41.1 % (ref 37.5–51)
HGB BLD-MCNC: 13.7 G/DL (ref 13–17.7)
LYMPHOCYTES # BLD AUTO: 1.5 10*3/MM3 (ref 0.7–3.1)
LYMPHOCYTES NFR BLD AUTO: 18.6 % (ref 19.6–45.3)
MCH RBC QN AUTO: 33.4 PG (ref 26.6–33)
MCHC RBC AUTO-ENTMCNC: 33.3 G/DL (ref 31.5–35.7)
MCV RBC AUTO: 100.2 FL (ref 79–97)
MONOCYTES # BLD AUTO: 0.81 10*3/MM3 (ref 0.1–0.9)
MONOCYTES NFR BLD AUTO: 10.1 % (ref 5–12)
NEUTROPHILS # BLD AUTO: 5.59 10*3/MM3 (ref 1.7–7)
NEUTROPHILS NFR BLD AUTO: 69.4 % (ref 42.7–76)
PLATELET # BLD AUTO: 180 10*3/MM3 (ref 140–450)
PMV BLD AUTO: 10.7 FL (ref 6–12)
RBC # BLD AUTO: 4.1 10*6/MM3 (ref 4.14–5.8)
WBC NRBC COR # BLD: 8.05 10*3/MM3 (ref 3.4–10.8)

## 2019-07-18 PROCEDURE — 85025 COMPLETE CBC W/AUTO DIFF WBC: CPT | Performed by: INTERNAL MEDICINE

## 2019-07-18 PROCEDURE — 99214 OFFICE O/P EST MOD 30 MIN: CPT | Performed by: INTERNAL MEDICINE

## 2019-07-18 PROCEDURE — 36415 COLL VENOUS BLD VENIPUNCTURE: CPT | Performed by: INTERNAL MEDICINE

## 2019-07-18 RX ORDER — BUDESONIDE AND FORMOTEROL FUMARATE DIHYDRATE 160; 4.5 UG/1; UG/1
2 AEROSOL RESPIRATORY (INHALATION)
COMMUNITY

## 2019-07-18 RX ORDER — CHLORAL HYDRATE 500 MG
1200 CAPSULE ORAL
COMMUNITY
End: 2019-09-08 | Stop reason: HOSPADM

## 2019-07-18 RX ORDER — SPIRONOLACTONE 25 MG/1
25 TABLET ORAL DAILY
Refills: 3 | COMMUNITY
Start: 2019-04-19

## 2019-07-18 RX ORDER — MULTIVIT-MIN/FOLIC/VIT K/LYCOP 400-300MCG
TABLET ORAL
COMMUNITY
Start: 2018-03-15 | End: 2019-09-06

## 2019-07-18 RX ORDER — PREDNISONE 20 MG/1
10 TABLET ORAL
Refills: 1 | COMMUNITY
Start: 2019-06-13 | End: 2019-09-06

## 2019-07-18 RX ORDER — METFORMIN HYDROCHLORIDE 500 MG/1
1000 TABLET, EXTENDED RELEASE ORAL NIGHTLY
COMMUNITY
Start: 2019-07-17 | End: 2019-12-12

## 2019-07-18 RX ORDER — WARFARIN SODIUM 5 MG/1
5 TABLET ORAL 2 TIMES WEEKLY
Refills: 3 | COMMUNITY
Start: 2019-04-20 | End: 2019-09-08 | Stop reason: HOSPADM

## 2019-07-18 RX ORDER — FENOFIBRATE 160 MG/1
160 TABLET ORAL DAILY
Refills: 3 | COMMUNITY
Start: 2019-05-23

## 2019-07-18 RX ORDER — TRIAMCINOLONE ACETONIDE 1 MG/G
CREAM TOPICAL
COMMUNITY
Start: 2019-07-17 | End: 2019-09-06

## 2019-07-18 RX ORDER — IPRATROPIUM BROMIDE AND ALBUTEROL SULFATE 2.5; .5 MG/3ML; MG/3ML
3 SOLUTION RESPIRATORY (INHALATION) EVERY 4 HOURS PRN
COMMUNITY

## 2019-07-18 RX ORDER — ATORVASTATIN CALCIUM 20 MG/1
20 TABLET, FILM COATED ORAL DAILY
Refills: 3 | COMMUNITY
Start: 2019-06-13

## 2019-07-18 RX ORDER — GLIPIZIDE 5 MG/1
TABLET ORAL
Refills: 3 | COMMUNITY
Start: 2019-06-13 | End: 2019-09-06

## 2019-07-18 RX ORDER — PANTOPRAZOLE SODIUM 40 MG/1
40 TABLET, DELAYED RELEASE ORAL DAILY
Refills: 1 | COMMUNITY
Start: 2019-06-06

## 2019-07-18 RX ORDER — LOSARTAN POTASSIUM 100 MG/1
100 TABLET ORAL DAILY
Refills: 3 | COMMUNITY
Start: 2019-05-25

## 2019-07-18 RX ORDER — ACETAMINOPHEN 650 MG/1
SUPPOSITORY RECTAL 2 TIMES DAILY
COMMUNITY
Start: 2016-06-13 | End: 2019-09-06

## 2019-07-18 RX ORDER — PETROLATUM,WHITE/LANOLIN
OINTMENT (GRAM) TOPICAL
COMMUNITY
Start: 2016-06-13 | End: 2019-09-06

## 2019-07-18 RX ORDER — DOXYCYCLINE HYCLATE 100 MG
100 TABLET ORAL 2 TIMES DAILY
Refills: 0 | COMMUNITY
Start: 2019-07-10 | End: 2019-09-06

## 2019-07-18 RX ORDER — CIPROFLOXACIN 500 MG/1
500 TABLET, FILM COATED ORAL 2 TIMES DAILY
Qty: 14 TABLET | Refills: 0 | Status: SHIPPED | OUTPATIENT
Start: 2019-07-18 | End: 2019-09-06

## 2019-07-18 NOTE — PROGRESS NOTES
Hematology/Oncology Outpatient Follow Up    PATIENT NAME:Praneeth Nam  :1940  MRN: [id number]  PRIMARY CARE PHYSICIAN: Guilherme Jason MD  REFERRING PHYSICIAN: Guilherme Jason MD    Chief Complaint   Patient presents with   • Follow-up     for T-cell LGL leukemia        HISTORY OF PRESENT ILLNESS:   1. 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  • The patient has a history of rheumatoid arthritis, hypertension and diabetes mellitus and was admitted to the hospital with right lower extremity cellulitis in 2005.  He was found to have an absolute neutropenia and hematology consultation was called.  Review of hospital records reveal him to have had neutropenia and hematology dating back to 2003 and monocytosis dating back to 2002.  He was on methotrexate and Gold for his rheumatoid arthritis longtime ago, but had not taken anything prior to his hospitalization.  He saw Dr. Jason for a regular follow up and was feeling well, but the following day, five days prior to hospital admission, started feeling ill and run down and developed hyperemia of his right lower extremity with chills.  He was seen by Dr. Stein and given cephalosporin and his leg did not get better, so he was admitted to the hospital.  • 05 - Biopsy revealed normocellular bone marrow with suggestion of lymphoid Infiltrate.  Flow cytometry analysis, T-cell gene rearrangement indicates conal T-cell process consistent with leukemia of large granular lymphocytes (CD 3+), cytogenetics normal.  • 05 - Iron 40 (L), ferritin 385 (H), TIBC 215 (L), EBV IgG positive titer 1:160, EBV IgM negative.  Blood culture with no growth after five days.  B12 was 639 (N), folate 13.1 (N), CMV IgG and IgM were normal, LAP 75 (N), PNH normal.  • 8/10/05 - Patient started on treatment with Neupogen 300 mcg sub q Monday through Friday.  • 05 - Neupogen increased to 480 mcg.  • 05 - Neupogen  decreased to 480 mcg Monday and Thursday.  • 12/15/05 - Neupogen decreased to 480 mcg weekly.  • 7/13/06 - Peripheral blood T-cell gene rearrangement result of no clonal T-cell gene rearrangement detected.  • 8/10/06 - Bone marrow aspiration and biopsy with results of flow cytometry reveals residual population of large granular lymphocytes.  • 1/25/07 - Hold Neupogen whenever ANC > 3.5.  • 5/16/07 - Chest x-ray result of no active lung disease and thoracic degenerative change.  • 8/20/07 - Chest x-ray result of mild obstructive airway disease with scattered areas of minor parenchymal scarring and fibrosis.  • 8/23/07 - T-cell gene rearrangement, insufficient DNA extracted to perform Southern blot analysis.  Equivocal TCR gene rearrangement analysis by PCR.  • 11/1/07 - Sed rate (N) at 5.  • 1/14/08 - Peripheral blood T-cell receptor gene rearrangement analysis.  In both the PCR and South blot assays somewhat discreet bands are present in a polyclonal background.  The PCR banding pattern is somewhat similar to that seen in a previously analyzed specimen with equivocal TCR PCR results.  • 3/06/08 - Bone marrow aspiration and biopsy revealed normocellular bone marrow reduced 1+ iron stores, leukemia of large granular lymphocytes identified by flow cytometry.  Cytogenetics (N).  Flow cytometry immunophenotyping revealed a residual population of the patient’s leukemia of large granular lymphocytes.  This Immunophenotype is identical to what was detected in the previous study of bone marrow from 8/10/08.  • 4/3/08 - Neupogen discontinued.  The patient to start on methotrexate 20 mg p.o. weekly and Folate 2 mg p.o. q.d.  • 4/7/08 - Pulmonary function analysis showed mild restriction improved after dilatation.  • 7/3/08 - T-cell receptor gene rearrangement, equivocal TCR gene rearrangement analysis by PCR no clonal TCR gene rearrangement was detected by Southern blot analysis.  • 7/11/08 - Echocardiogram with ejection  fraction of 54%.  • 12/1/08 - T-cell gene rearrangement, negative.  • 3/2/09 - Bone marrow aspirate with residual leukemia of large granular lymphocytes, CD3+, identified by flow cytometric analysis.  Normal iron stores.  • 4/6/09 - Patient to discontinue methotrexate and folic acid due to normal counts.  • 6/1/09 - Chest x-ray with obscuration of the posterior margin of one or both hemidiaphragms, which could represent atelectasis or infiltrate.  • 6/9/09 - CT scan of the chest positive for pulmonary embolus in the left lower lobe.  Mild right basilar infiltrate.  • 6/10/09 - Venous duplex normal.  • 6/11/09 - Chest x-ray with mild density in the left lung base improved from 6/1/09.  Patient started on Biaxin 500 mg b.i.d. for 10 days and Omnicef 300 mg b.i.d. for 10 days by Dr. Thomas.  • 8/10/09 - T-cell gene rearrangement, PCR on peripheral blood, negative.  • 12/28/09 - Chest x-ray with increased bronchial markings.  Arthritic changes of the T-spine.  • 3/15/10 - Bone marrow aspirate smears, normal.  Bone marrow aspirate clot with aggregates of mononuclear cells suspicious for residual leukemia.  Flow cytometry with residual population of cells, which are present in the patient's leukemia of large granular lymphocytes (DD3 positive) and quantitate at approximately 1.3% of total events.  Cytogenetics normal.  • 5/17/10 - Hemoglobin 15.6.  • 11/30/10 - Hemoglobin 14.3.  • 2/2/11 - TCR gene rearrangement negative.  Chest x-ray no active lung disease.  • 9/11/11 - Immunofixation urine no monoclonal proteins seen in urine.  • 12/5/11 - TCR gene rearrangement negative.  • 7/10/12 - TCR gene rearrangement negative.  • 6/17/13 - Hemoglobin 15.0, WBC 4.80, platelet count is 176,000.  T-cell gene rearrangement negative.  No clonal T-cell gene arrangement is detected.  • 6/26/14 - Hemoglobin 14.7, WBC 6.39, platelet count 150,000.  T-cell gene rearrangement by PCR revealed inconclusive for a clonal T-cell gene  rearrangement.  • 10/22/14 - patient was started on methotrexate by Dr. Peter on for the arthritis.  He had ordered lab. SPEP showed the increase in albumin may be due to insufficient protein, interferential malabsorption, impaired synthesis, protein losing enteropathy, nephrotic syndrome, and various other disorders.  No monoclonal immunoglobulin detected.  NAIMA positive.  NAIMA titer 1:160 (H), CRP 2.66 (H)  • 2/23/15 - Chest x-ray with bilateral basilar densities and emphysema.   • 3/30/15 - hemoglobin 14.0, WBC 4.62, platelet count 180,000. T-cell gene rearrangement inconclusive.   • 7/27/15 - T-cell gene rearrangement by PCR positive for clonal T-cell gene rearrangement.   • 10/20/15 - Chest x-ray performed while patient in the hospital revealed asymmetric right apical opacity. CT of the abdomen and pelvis with dilated loops of jejunum, diverticulosis, cholelithiasis, small to moderate sized hernia.   • 11/25/15 - Comprehensive metabolic panel with glucose 183 (65-99), creatinine 1.4 (0.7-1.2).     • 3/23/16 - WBC 5.33, hemoglobin 14.8, platelet count 163,000 with 61% neutrophils, 22% lymphocytes, 13% monocytes, 5% eosinophils. Discussed T-cell gene rearrangement results with the patient. He claims not to be taking the Methotrexate weekly, but only as needed for his arthritis. I asked him to start taking it a dose of 20 mg weekly along with Folate 1 mg p.o. daily. T-cell gene rearrangement was inconclusive for clonal T-cell gene rearrangement.   • 3/24/16 - Chest x-ray revealed a right apical opacity which had cleared since the prior exam and likely represented atelectasis or positioning change.   • 5/4/16 - Pulmonary function studies performed revealed mild obstructive lung disease with good response to bronchodilators.   • 6/8/16 - WBC 5.1, ANC 3.2, hemoglobin 13.6, MCV 99.0 and platelet count 163,000.   • 7/5/16 - Comprehensive metabolic panel with no significant abnormality.   • 9/6/16 - Discussed use of  Methotrexate with patient. He is on it for his arthritis and I have told him that he can come off it as his counts are good and the gene rearrangement is still positive, so the Methotrexate may not be making a difference, but I will leave the decision to him and Dr. Peter. Methotrexate discontinued by Dr. Peter.   • 12/1/16 - WBC 6.2 with 66% neutrophils, 21% lymphocytes, 9% monocytes, hemoglobin 14.9, platelet count 172,000. T-cell gene rearrangement inconclusive as one or two distinct peaks were observed within the normal polyclonal size range but were below the peak cutoff for defined positivity.   • 2/2/17 - Comprehensive metabolic panel with no significant abnormality. Creatinine 1.2 (0.7-1.2)   • 4/14/17 - Chest x-ray with persistent basilar density suggestive of residual scar or atelectasis.   • 5/1/17 - Patient seen in followup by Dr. Peter with decision to observe for a period of time as the patient was asymptomatic and followup in the future to evaluate for further treatment.   • 7/6/17 - WBC 4.9 with 56% neutrophils, 25% lymphocytes, 11% monocytes, hemoglobin 14.6, platelet count 170,000.   • 10/4/17 - WBC 5.6 with 63% neutrophils, 21% lymphocytes, 10% monocytes, hemoglobin 14, .9, platelet count 166,000. Patient claims to have started back on Methotrexate for arthritis through Dr. Peter in July 2017. Peripheral blood flow cytometry negative for acute leukemia or lymphoproliferative disorder. T-cell gene rearrangement by PCR negative - no clonal T-cell gene rearrangement detected.   • 6/5/18 - T-cell gene rearrangement by PCR negative.   • 10/16/18 - Patient claims not to be taking Methotrexate at present.   • 4/23/19 - WBC 5.4 with 57% neutrophils, 26% lymphocytes, 12% monocytes, hemoglobin 13.7, platelet count 11,000.  Patient asked to hold Aspirin and Warfarin while scheduling bone marrow biopsy. Prescription written for Prednisone 100 mg p.o. daily. T-cell PCR performed on bone marrow  inconclusive for a clonal T-cell rearrangement. Cytogenetics were normal, 46 XY.  • 5/15/19 - IgA 131 (), IgG 561 (600-1500), IgM 48 ().       2. Thrombocytopenia diagnosis established 8/23/05 and coagulopathy diagnosis established July 2008. 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   • 8/1/05 - CMV IgG 42, positive, CMV IgM was negative, LAP 75 (N), vitamin B12 was 639 (N), folate 13.1 (N).  • 8/23/05 - Platelet count 90,000 (L).  • 2/7/08 - Platelet count 137,000 (L).  • 10/1/08 - Corrected.  • 4/23/19 - Platelet count 11,000. Patient started on Prednisone 100 mg p.o. daily. Sternal bone marrow aspiration with cellular marrow with maturing trilineage hematopoiesis, megakaryocytic hyperplasia with cytologic atypia, adequate storage iron with absent ringed sideroblasts. Flow cytometry with no evidence of an abnormal cell type. Immunohistochemistry with no significant lymphoid infiltrate or increase in blasts. Megakaryocytes increased in number. [employer] NGS MDS panel sequencing detected. 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   • 4/26/19 - Platelet count 77,000. Prednisone dose decreased to 80 mg p.o. daily with 20 mg weekly taper initiation. Aspirin 81 mg daily resumed.   • 5/2/19 - Platelets 92,000. Prednisone taper continued. PT 50.7 (H), INR 4.1 (H), PTT 33.3 seconds (23.9-36.9). PT INR faxed to Dr. Jason. Platelet antibody screen positive for group 2b/3A and group 1a/2a. EBV IgG 369 (H), IgM negative. CMV IgG 13.1 (H), IgM 0.6 negative. CMP remarkable for a BUN of 40 (H). Ordered to increase oral fluids. B12 of 323 (N),  (N), folic acid 17.9 (N).   • 5/10/19 - Platelet count 31,000. Patient taking Prednisone 40 mg daily. Dose was increased to 60 mg p.o. daily.   • 5/15/19 - IV IG 1 g/kg IV daily x2 ordered for persistent thrombocytopenia. Platelet count 47,000 on 60 mg Prednisone p.o.  daily. Prednisone was continued until followup. Weekly CBC’s continued.   • 5/30/19 - IV IgG 1 g/kg given on 5/30/19 and 5/31/19 with platelet count of 114,000 on 5/30/19.   • 6/7/19 - Patient on 40 mg of Prednisone daily. Platelet count 150,000. Asked to drop to 20 mg daily.  • 7/18/19 platelet count 180,000 on prednisone 10 mg every other day.  Asked him to decrease prednisone to 5 mg every other day.  3. 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  • 7/3/08 - Hemoglobin 12.5, platelet count 128,000.  RBC folate (N) at 300, retic count (H) at 2.33, vitamin B12 (N) at 281, iron (N) at 56, ferritin (N) 302, haptoglobin (N) 128, TIBC (N) at 419, iron saturation (L) at 13. Platelet count 128,000. PT (N) at 11.0, INR (N) 1.0, PTT (L) <21.0.   • 10/1/08 - Corrected.  • 12/5/11 - Retic count 2.4 (N), iron 83 (N), TIBC 436 (H), iron sat % 19 (L), ferritin 338 (N) vitamin B12 291 (N), folate 675 (N), haptoglobin 200 (N).  • 12/23/11 -  (N).  • 6/26/14 - hemoglobin 14.7  • 10/22/14 - patient was started on methotrexate by Dr. Peter on for the arthritis.  He had ordered lab. SPEP showed the increase in albumin may be due to insufficient protein, interferential malabsorption, impaired synthesis, protein losing enteropathy, nephrotic syndrome, and various other disorders.  No monoclonal immunoglobulin detected.  NAIMA positive.  NAIMA titer 1:160 (H), CRP 2.66 (H)  • 10/4/16 - Comprehensive metabolic panel with creatinine 1.3 (0.7-1.2).    • 7/6/17 - Hemoglobin 14.6, hematocrit 43.9, MCV 98.7, WBC 4.9 and platelet count 170,000.   • 6/5/18 - Patient has not taken Methotrexate dose for one month.   • 6/6/18 - Ferrous sulfate 325 mg p.o. two times a day ordered.   • 6/12/18 - Stool heme positive on stool cards.  Protonix 40 mg tablet take one tablet daily prescribed.  Referred to [facility].  Last colonoscopy in 2005.    • 8/1/18 - Colonoscopy at [facility] by Kishor Beard M.D. revealed mild  diverticulosis of descending and sigmoid colon, internal hemorrhoids and 3-5 mm polyps in the ascending sigmoid colon and rectum. Pathology on ascending colon polyp revealed fragments of tubular adenoma with a few fragments exhibiting a benign lymphoid aggregate. Pathology on sigmoid colon polyp revealed fragments of tubular adenoma with focal superficial acute inflammation and rectal polyp biopsy revealed a hyperplastic polyp. Repeat colonoscopy was recommended in five years.   • 8/2/18 - Hemoglobin 13.6, MCV 98.8. Patient tolerating Ferrous Sulfate 325 mg p.o. b.i.d. without noted side effects. Orders written to continue this at present and orders written to obtain colonoscopy report performed at [facility] 8/1/18.   • 10/16/18 - Patient asked to reduce Ferrous Sulfate to 325 mg p.o. daily. Ferritin 123 ().   • 1/15/19 - Hemoglobin 14.5, MCV 98.4. Ferrous Sulfate discontinued.    4.   Acute bilateral pulmonary emboli diagnosed May 2018.    • 5/21/18 - Patient hospitalized at [facility] between 5/21/18 and 5/23/18 with several week history of exertional shortness of breath increasing on the day of admission. He had had a recent left heart catheterization that was normal. CT chest PE protocol revealed bilateral acute pulmonary embolic disease. Hematology consultation was not obtained. Emphysema and mild interstitial basilar fibrotic changes, mild dependent atelectasis in the lower lobes, cholelithiasis, fatty infiltration of the liver and small hiatal hernia were noted also. His hemoglobin was 14.5 on admission and 12.9 on discharge. D-dimer was 4.8 (0.17-0.59). PT PTT were 12.4 and 22.7 on admission. Creatinine 1.2 (0.7-1.2). The patient was started on a Heparin drip and then switched to Xarelto 15 mg b.i.d. for 21 days then 20 mg daily.     • 6/5/18 - Anti-phosphatidylserine IgG 4 (N), IgM 25 (H).  Factor VIII activity 145 (H).  AT  (H).  Protein C 122 (N).  Protein S 173 (H).  Anti-cardiolipin antibody and  anti-beta-2 glycoprotein all normal.  Factor V Leiden gene mutation not detected.  Prothrombin gene mutation not detected.    • 8/2/18 - PT PTT 16.5 and 32.2 seconds. Lupus anticoagulant inconclusive. Serum homocysteine 12.5 (<15).    • 8/7/18 - Bilateral lower extremity venous Doppler report with no evidence of any deep venous thrombosis or SVT. Lymph node noted in the left groin and in the right groin.   • 10/16/18 - Factor VIII activity 136 ().   • 1/15/19 - Patient reports he is no longer taking Xarelto and is on Coumadin with INR’s followed by Dr. Jason. Blood pressure 188/78 with patient reporting elevated blood pressures at home. Patient instructed to contact Dr. Jason for adjustments in his medications. Antiphospholipid antibodies negative.   • 5/2/19 - Factor VIII level 235 (H).  PT 50.7, INR 4.1 (H). PT INR faxed to Dr. Jason.     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, Stage II   • Hypercholesterolemia    • Hypertension    • 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   • Neutropenia (CMS/HCC) 11/2003   • MITZI (obstructive sleep apnea) 2018   • Psoriasis 2000   • Renal cyst, left    • Rheumatoid arthritis (CMS/HCC)    • Thrombocytopenia (CMS/HCC) 08/23/2005       Past Surgical History:   Procedure Laterality Date   • SKIN LESION EXCISION      back and groin-benign         Current Outpatient Medications:   •  acetaminophen (TYLENOL) 650 MG suppository, 2 (Two) Times a Day., Disp: , Rfl:   •  aspirin  81 MG tablet, ASPIRIN 81 MG ORAL TABLET, Disp: , Rfl:   •  atorvastatin (LIPITOR) 20 MG tablet, Take  by mouth Daily., Disp: , Rfl: 3  •  budesonide-formoterol (SYMBICORT) 160-4.5 MCG/ACT inhaler, Inhale 2 puffs 2 (Two) Times a Day., Disp: , Rfl:   •  doxycycline (VIBRAMYICN) 100 MG tablet, Take 100 mg by mouth 2 (Two) Times a Day., Disp: , Rfl: 0  •  fenofibrate 160 MG tablet, Take  by mouth Daily., Disp: , Rfl: 3  •  Fexofenadine HCl (ALLEGRA ALLERGY PO), Take 10 mg by mouth., Disp: , Rfl:   •  glipiZIDE (GLUCOTROL) 5 MG tablet, TK 1 T PO BID B MEALS, Disp: , Rfl: 3  •  Glucosamine Sulfate 1000 MG capsule, GLUCOSAMINE SULFATE 1000 MG CAPS, Disp: , Rfl:   •  ipratropium-albuterol (DUO-NEB) 0.5-2.5 mg/3 ml nebulizer, Take 3 mL by nebulization Every 4 (Four) Hours As Needed for Wheezing., Disp: , Rfl:   •  losartan (COZAAR) 100 MG tablet, Take  by mouth Daily., Disp: , Rfl: 3  •  metFORMIN ER (GLUCOPHAGE-XR) 500 MG 24 hr tablet, , Disp: , Rfl:   •  Mirabegron ER (MYRBETRIQ) 50 MG tablet sustained-release 24 hour 24 hr tablet, Take 50 mg by mouth Daily., Disp: , Rfl:   •  Multiple Vitamins-Minerals (ONE DAILY MULTIVITAMIN ADULT) tablet, ONE DAILY MULTIVITAMIN ADULT TABS, Disp: , Rfl:   •  Omega-3 Fatty Acids (FISH OIL) 1000 MG capsule capsule, Take 1,200 mg by mouth Daily With Breakfast., Disp: , Rfl:   •  pantoprazole (PROTONIX) 40 MG EC tablet, Take  by mouth Daily., Disp: , Rfl: 1  •  predniSONE (DELTASONE) 20 MG tablet, 10 mg., Disp: , Rfl: 1  •  spironolactone (ALDACTONE) 25 MG tablet, Take  by mouth Daily., Disp: , Rfl: 3  •  triamcinolone (KENALOG) 0.1 % cream, , Disp: , Rfl:   •  warfarin (COUMADIN) 5 MG tablet, Take  by mouth Daily., Disp: , Rfl: 3    Allergies   Allergen Reactions   • Penicillin V Potassium Swelling       Family History   Problem Relation Age of Onset   • Lung cancer Brother         age unknown       Cancer-related family history includes Lung cancer in his brother.    Social History      Tobacco Use   • Smoking status: Former Smoker   • Tobacco comment: stopped smoking in 1995   Substance Use Topics   • Alcohol use: Yes     Comment: drinks one beer per month   • Drug use: No       I have reviewed the history of present illness, past medical history, family history, social history, lab results, all notes and other records since the patient was last seen on 6/7/19.    SUBJECTIVE: Patient is here for follow up of T-cell LGL leukemia, bilateral pulmonary emboli and elevated factor VIII. Reports that he has had congestion, wheezing, runny nose and cough. Started coughing up some clear to white phlegm. Had a fever the other day. Seen NP and Dr. Jason's office. Was started on Doxycycline and has couple days left of it, but does not feel any better.     Female accompanied patient today.    GABRIELA Sal present during office visit.         REVIEW OF SYSTEMS:  Review of Systems   Constitutional: Negative for chills and fever.   HENT: Positive for congestion. Negative for ear pain, mouth sores, nosebleeds and sore throat.         Runny nose.    Eyes: Negative for photophobia and visual disturbance.   Respiratory: Positive for cough ( clear to white phelgm production ) and wheezing. Negative for stridor.    Cardiovascular: Negative for chest pain and palpitations.   Gastrointestinal: Negative for abdominal pain, diarrhea, nausea and vomiting.   Endocrine: Negative for cold intolerance and heat intolerance.   Genitourinary: Negative for dysuria and hematuria.   Musculoskeletal: Negative for joint swelling and neck stiffness.   Skin: Negative for color change and rash.   Neurological: Negative for seizures and syncope.   Hematological: Negative for adenopathy.        No obvious bleeding   Psychiatric/Behavioral: Negative for agitation, confusion and hallucinations.       OBJECTIVE:    Vitals:    07/18/19 1202   BP: 144/77   Pulse: 91   Resp: 18   Temp: 98.2 °F (36.8 °C)   Weight: 105 kg (231 lb)  "  Height: 167.6 cm (66\")   PainSc:   8   PainLoc: Generalized  Comment: cold and congestion, and cough       ECOG  (0) Fully active, able to carry on all predisease performance without restriction    Physical Exam   Constitutional: He is oriented to person, place, and time. No distress.   HENT:   Head: Normocephalic and atraumatic.   Dentures.    Eyes: Conjunctivae and EOM are normal. Right eye exhibits no discharge. Left eye exhibits no discharge. No scleral icterus.   Neck: Normal range of motion. Neck supple. No thyromegaly present.   Cardiovascular: Normal rate, regular rhythm and normal heart sounds. Exam reveals no gallop and no friction rub.   Pulmonary/Chest: Effort normal. No stridor. No respiratory distress. He has no wheezes.   Some rhonchi in the bases.    Abdominal: Soft. Bowel sounds are normal. He exhibits no mass. There is no tenderness. There is no rebound and no guarding.   Obese.    Musculoskeletal: Normal range of motion. He exhibits no tenderness.   Arthritic changes to the joints.    Lymphadenopathy:     He has no cervical adenopathy.   Neurological: He is alert and oriented to person, place, and time. He exhibits normal muscle tone.   Skin: Skin is warm. No rash noted. He is not diaphoretic. No erythema.   Ecchymoses upper extremities.    Psychiatric: He has a normal mood and affect. His behavior is normal.   Nursing note and vitals reviewed.      RECENT LABS  WBC   Date Value Ref Range Status   07/18/2019 8.05 3.40 - 10.80 10*3/mm3 Final     RBC   Date Value Ref Range Status   07/18/2019 4.10 (L) 4.14 - 5.80 10*6/mm3 Final     Hemoglobin   Date Value Ref Range Status   07/18/2019 13.7 13.0 - 17.7 g/dL Final     Hematocrit   Date Value Ref Range Status   07/18/2019 41.1 37.5 - 51.0 % Final     MCV   Date Value Ref Range Status   07/18/2019 100.2 (H) 79.0 - 97.0 fL Final     MCH   Date Value Ref Range Status   07/18/2019 33.4 (H) 26.6 - 33.0 pg Final     MCHC   Date Value Ref Range Status "   07/18/2019 33.3 31.5 - 35.7 g/dL Final     RDW   Date Value Ref Range Status   07/18/2019 14.7 12.3 - 15.4 % Final     RDW-SD   Date Value Ref Range Status   07/18/2019 52.7 37.0 - 54.0 fl Final     MPV   Date Value Ref Range Status   07/18/2019 10.7 6.0 - 12.0 fL Final     Platelets   Date Value Ref Range Status   07/18/2019 180 140 - 450 10*3/mm3 Final     Neutrophil %   Date Value Ref Range Status   07/18/2019 69.4 42.7 - 76.0 % Final     Lymphocyte %   Date Value Ref Range Status   07/18/2019 18.6 (L) 19.6 - 45.3 % Final     Monocyte %   Date Value Ref Range Status   07/18/2019 10.1 5.0 - 12.0 % Final     Eosinophil %   Date Value Ref Range Status   07/18/2019 1.5 0.3 - 6.2 % Final     Basophil %   Date Value Ref Range Status   07/18/2019 0.4 0.0 - 1.5 % Final     Neutrophils, Absolute   Date Value Ref Range Status   07/18/2019 5.59 1.70 - 7.00 10*3/mm3 Final     Lymphocytes, Absolute   Date Value Ref Range Status   07/18/2019 1.50 0.70 - 3.10 10*3/mm3 Final     Monocytes, Absolute   Date Value Ref Range Status   07/18/2019 0.81 0.10 - 0.90 10*3/mm3 Final     Eosinophils, Absolute   Date Value Ref Range Status   07/18/2019 0.12 0.00 - 0.40 10*3/mm3 Final     Basophils, Absolute   Date Value Ref Range Status   07/18/2019 0.03 0.00 - 0.20 10*3/mm3 Final     nRBC   Date Value Ref Range Status   05/22/2018 0 0 /100[WBCs] Final       Lab Results   Component Value Date    GLUCOSE 58 (C) 06/07/2019    BUN 41 (H) 06/07/2019    CREATININE 1.1 06/07/2019    EGFRIFNONA 78 11/03/2016    EGFRIFAFRI 68 11/03/2016    BCR 37.3 (H) 06/07/2019    K 3.8 06/07/2019    CO2 21 (L) 06/07/2019    CALCIUM 9.6 06/07/2019    ALBUMIN 2.7 (L) 06/07/2019    LABIL2 0.6 (L) 06/07/2019    AST 22 06/07/2019    ALT 25 06/07/2019         Assessment/Plan     T-cell large granular lymphocytic leukemia   - CBC & Differential  - CBC Auto Differential  - Comprehensive Metabolic Panel    Chronic ITP (idiopathic thrombocytopenia)  (CMS/HCC)    Rheumatoid arthritis involving multiple sites, unspecified rheumatoid factor presence (CMS/HCC)    CKD (chronic kidney disease), stage III (CMS/HCC)    Bilateral pulmonary embolism (CMS/HCC)    Elevated factor VIII level    Other myelodysplastic syndromes (CMS/HCC)    Hypogammaglobulinemia (CMS/HCC)    Bronchitis  - ciprofloxacin (CIPRO) 500 MG tablet      ASSESSMENT:  Patient complains of congestion going on for about 3 weeks.  He got doxycycline from Dr. Jason but now is starting to cough with sputum production.  On examination he does have rhonchi in his lungs and I have prescribed him Cipro 500 mg p.o. twice daily for 1 week.  His platelet count is doing really good on 10 mg of prednisone every other day.  Dr. Jason has asked him to increase his prednisone and then cut down on a tapering fashion soon.  I told him to eventually go down to 5 mg every other day.  He continues on Coumadin with INR monitoring through Dr. Jason.  He is up-to-date on testing of his factor VIII and immunoglobulin levels.      PLAN:  After he completes Dr. Jason's recommendations on Prednisone he is to decrease Prednisone to 5 mg po every other day.   Cipro 500 mg 1 po BID for 7 days.            I have reviewed labs results, imaging, vitals, and medications with the patient today. Will follow up in 1 month with NP.     Patient verbalized understanding and is in agreement of the above plan.    I have reviewed and validated the information above.   Oli Helton M.D., F.A.C.P.        Much of the above report is an electronic transcription//translation of the spoken language to printed text using Dragon Software. As such, the subtleties and finesse of the spoken language may permit erroneous, or at times, nonsensical words or phrases to be inadvertently transcribed; thus changes may be made at a later date to rectify these errors.

## 2019-09-06 ENCOUNTER — APPOINTMENT (OUTPATIENT)
Dept: GENERAL RADIOLOGY | Facility: HOSPITAL | Age: 79
End: 2019-09-06

## 2019-09-06 ENCOUNTER — APPOINTMENT (OUTPATIENT)
Dept: MRI IMAGING | Facility: HOSPITAL | Age: 79
End: 2019-09-06

## 2019-09-06 ENCOUNTER — HOSPITAL ENCOUNTER (INPATIENT)
Facility: HOSPITAL | Age: 79
LOS: 1 days | Discharge: HOME | End: 2019-09-08
Attending: EMERGENCY MEDICINE | Admitting: INTERNAL MEDICINE

## 2019-09-06 ENCOUNTER — APPOINTMENT (OUTPATIENT)
Dept: CT IMAGING | Facility: HOSPITAL | Age: 79
End: 2019-09-06

## 2019-09-06 DIAGNOSIS — R47.01 APHASIA: Primary | ICD-10-CM

## 2019-09-06 DIAGNOSIS — R51.9 NONINTRACTABLE HEADACHE, UNSPECIFIED CHRONICITY PATTERN, UNSPECIFIED HEADACHE TYPE: ICD-10-CM

## 2019-09-06 DIAGNOSIS — R27.0 ATAXIA OF RIGHT UPPER EXTREMITY: ICD-10-CM

## 2019-09-06 DIAGNOSIS — I62.01 NONTRAUMATIC ACUTE SUBDURAL HEMORRHAGE (HCC): ICD-10-CM

## 2019-09-06 LAB
ABO GROUP BLD: NORMAL
ALBUMIN SERPL-MCNC: 3 G/DL (ref 3.5–4.8)
ALBUMIN/GLOB SERPL: 1.1 G/DL (ref 1–1.7)
ALP SERPL-CCNC: 40 U/L (ref 32–91)
ALT SERPL W P-5'-P-CCNC: 17 U/L (ref 17–63)
ANION GAP SERPL CALCULATED.3IONS-SCNC: 15 MMOL/L (ref 5–15)
AST SERPL-CCNC: 28 U/L (ref 15–41)
BASOPHILS # BLD AUTO: 0.1 10*3/MM3 (ref 0–0.2)
BASOPHILS NFR BLD AUTO: 1.3 % (ref 0–1.5)
BILIRUB SERPL-MCNC: 0.8 MG/DL (ref 0.3–1.2)
BLD GP AB SCN SERPL QL: NEGATIVE
BUN BLD-MCNC: 19 MG/DL (ref 8–20)
BUN/CREAT SERPL: 17.3 (ref 6.2–20.3)
CALCIUM SPEC-SCNC: 8.5 MG/DL (ref 8.9–10.3)
CHLORIDE SERPL-SCNC: 107 MMOL/L (ref 101–111)
CO2 SERPL-SCNC: 20 MMOL/L (ref 22–32)
CREAT BLD-MCNC: 1.1 MG/DL (ref 0.7–1.2)
DEPRECATED RDW RBC AUTO: 46.8 FL (ref 37–54)
EOSINOPHIL # BLD AUTO: 0.1 10*3/MM3 (ref 0–0.4)
EOSINOPHIL NFR BLD AUTO: 1.5 % (ref 0.3–6.2)
ERYTHROCYTE [DISTWIDTH] IN BLOOD BY AUTOMATED COUNT: 13.4 % (ref 12.3–15.4)
GFR SERPL CREATININE-BSD FRML MDRD: 65 ML/MIN/1.73
GLOBULIN UR ELPH-MCNC: 2.7 GM/DL (ref 2.5–3.8)
GLUCOSE BLD-MCNC: 139 MG/DL (ref 65–99)
GLUCOSE BLDC GLUCOMTR-MCNC: 126 MG/DL (ref 70–105)
GLUCOSE BLDC GLUCOMTR-MCNC: 133 MG/DL (ref 70–105)
GLUCOSE BLDC GLUCOMTR-MCNC: 154 MG/DL (ref 70–105)
HCT VFR BLD AUTO: 38.1 % (ref 37.5–51)
HGB BLD-MCNC: 12.9 G/DL (ref 13–17.7)
HOLD SPECIMEN: NORMAL
HOLD SPECIMEN: NORMAL
INR PPP: 1.69 (ref 2–3)
LYMPHOCYTES # BLD AUTO: 0.9 10*3/MM3 (ref 0.7–3.1)
LYMPHOCYTES NFR BLD AUTO: 14.3 % (ref 19.6–45.3)
MCH RBC QN AUTO: 33.7 PG (ref 26.6–33)
MCHC RBC AUTO-ENTMCNC: 33.9 G/DL (ref 31.5–35.7)
MCV RBC AUTO: 99.4 FL (ref 79–97)
MONOCYTES # BLD AUTO: 0.7 10*3/MM3 (ref 0.1–0.9)
MONOCYTES NFR BLD AUTO: 11.4 % (ref 5–12)
NEUTROPHILS # BLD AUTO: 4.4 10*3/MM3 (ref 1.7–7)
NEUTROPHILS NFR BLD AUTO: 71.5 % (ref 42.7–76)
NRBC BLD AUTO-RTO: 0 /100 WBC (ref 0–0.2)
PLATELET # BLD AUTO: 58 10*3/MM3 (ref 140–450)
PMV BLD AUTO: 8.6 FL (ref 6–12)
POTASSIUM BLD-SCNC: 4 MMOL/L (ref 3.6–5.1)
PROT SERPL-MCNC: 5.7 G/DL (ref 6.1–7.9)
PROTHROMBIN TIME: 16.4 SECONDS (ref 19.4–28.5)
RBC # BLD AUTO: 3.83 10*6/MM3 (ref 4.14–5.8)
RH BLD: POSITIVE
SODIUM BLD-SCNC: 138 MMOL/L (ref 136–144)
T&S EXPIRATION DATE: NORMAL
WBC NRBC COR # BLD: 6.1 10*3/MM3 (ref 3.4–10.8)
WHOLE BLOOD HOLD SPECIMEN: NORMAL
WHOLE BLOOD HOLD SPECIMEN: NORMAL

## 2019-09-06 PROCEDURE — 99285 EMERGENCY DEPT VISIT HI MDM: CPT

## 2019-09-06 PROCEDURE — 82962 GLUCOSE BLOOD TEST: CPT

## 2019-09-06 PROCEDURE — 99221 1ST HOSP IP/OBS SF/LOW 40: CPT | Performed by: PSYCHIATRY & NEUROLOGY

## 2019-09-06 PROCEDURE — 70496 CT ANGIOGRAPHY HEAD: CPT

## 2019-09-06 PROCEDURE — 86901 BLOOD TYPING SEROLOGIC RH(D): CPT | Performed by: EMERGENCY MEDICINE

## 2019-09-06 PROCEDURE — G0378 HOSPITAL OBSERVATION PER HR: HCPCS

## 2019-09-06 PROCEDURE — 99221 1ST HOSP IP/OBS SF/LOW 40: CPT | Performed by: PHYSICIAN ASSISTANT

## 2019-09-06 PROCEDURE — 86900 BLOOD TYPING SEROLOGIC ABO: CPT

## 2019-09-06 PROCEDURE — 71045 X-RAY EXAM CHEST 1 VIEW: CPT

## 2019-09-06 PROCEDURE — 70551 MRI BRAIN STEM W/O DYE: CPT

## 2019-09-06 PROCEDURE — 70450 CT HEAD/BRAIN W/O DYE: CPT

## 2019-09-06 PROCEDURE — 0 IOPAMIDOL PER 1 ML: Performed by: INTERNAL MEDICINE

## 2019-09-06 PROCEDURE — 30283B1 TRANSFUSION OF NONAUTOLOGOUS 4-FACTOR PROTHROMBIN COMPLEX CONCENTRATE INTO VEIN, PERCUTANEOUS APPROACH: ICD-10-PCS | Performed by: INTERNAL MEDICINE

## 2019-09-06 PROCEDURE — 86901 BLOOD TYPING SEROLOGIC RH(D): CPT

## 2019-09-06 PROCEDURE — C9132 KCENTRA, PER I.U.: HCPCS | Performed by: EMERGENCY MEDICINE

## 2019-09-06 PROCEDURE — 92610 EVALUATE SWALLOWING FUNCTION: CPT

## 2019-09-06 PROCEDURE — 93005 ELECTROCARDIOGRAM TRACING: CPT | Performed by: EMERGENCY MEDICINE

## 2019-09-06 PROCEDURE — 70498 CT ANGIOGRAPHY NECK: CPT

## 2019-09-06 PROCEDURE — 85025 COMPLETE CBC W/AUTO DIFF WBC: CPT | Performed by: EMERGENCY MEDICINE

## 2019-09-06 PROCEDURE — 85610 PROTHROMBIN TIME: CPT | Performed by: EMERGENCY MEDICINE

## 2019-09-06 PROCEDURE — 86900 BLOOD TYPING SEROLOGIC ABO: CPT | Performed by: EMERGENCY MEDICINE

## 2019-09-06 PROCEDURE — 25010000002 VITAMIN K1 PER 1 MG: Performed by: EMERGENCY MEDICINE

## 2019-09-06 PROCEDURE — 80053 COMPREHEN METABOLIC PANEL: CPT | Performed by: EMERGENCY MEDICINE

## 2019-09-06 PROCEDURE — 25010000002 PROTHROMBIN COMPLEX CONC HUMAN 1000 UNITS KIT: Performed by: EMERGENCY MEDICINE

## 2019-09-06 PROCEDURE — 86850 RBC ANTIBODY SCREEN: CPT | Performed by: EMERGENCY MEDICINE

## 2019-09-06 RX ORDER — NITROGLYCERIN 0.4 MG/1
0.4 TABLET SUBLINGUAL
Status: CANCELLED | OUTPATIENT
Start: 2019-09-06 | End: 2019-09-06

## 2019-09-06 RX ORDER — SODIUM CHLORIDE 0.9 % (FLUSH) 0.9 %
10 SYRINGE (ML) INJECTION AS NEEDED
Status: DISCONTINUED | OUTPATIENT
Start: 2019-09-06 | End: 2019-09-08 | Stop reason: HOSPADM

## 2019-09-06 RX ORDER — WARFARIN SODIUM 2.5 MG/1
2.5 TABLET ORAL
COMMUNITY
End: 2019-09-08 | Stop reason: HOSPADM

## 2019-09-06 RX ORDER — SODIUM CHLORIDE 0.9 % (FLUSH) 0.9 %
3 SYRINGE (ML) INJECTION EVERY 12 HOURS SCHEDULED
Status: CANCELLED | OUTPATIENT
Start: 2019-09-06

## 2019-09-06 RX ORDER — METOPROLOL TARTRATE 50 MG/1
50 TABLET, FILM COATED ORAL ONCE AS NEEDED
Status: CANCELLED | OUTPATIENT
Start: 2019-09-06

## 2019-09-06 RX ORDER — ASPIRIN 81 MG/1
81 TABLET, CHEWABLE ORAL DAILY
COMMUNITY
End: 2019-09-08 | Stop reason: HOSPADM

## 2019-09-06 RX ORDER — SODIUM CHLORIDE 0.9 % (FLUSH) 0.9 %
10 SYRINGE (ML) INJECTION AS NEEDED
Status: CANCELLED | OUTPATIENT
Start: 2019-09-06

## 2019-09-06 RX ORDER — GLIPIZIDE 5 MG/1
5 TABLET ORAL
COMMUNITY

## 2019-09-06 RX ORDER — SODIUM CHLORIDE 0.9 % (FLUSH) 0.9 %
10 SYRINGE (ML) INJECTION EVERY 12 HOURS SCHEDULED
Status: DISCONTINUED | OUTPATIENT
Start: 2019-09-06 | End: 2019-09-08 | Stop reason: HOSPADM

## 2019-09-06 RX ORDER — ONDANSETRON 4 MG/1
4 TABLET, FILM COATED ORAL EVERY 6 HOURS PRN
Status: DISCONTINUED | OUTPATIENT
Start: 2019-09-06 | End: 2019-09-08 | Stop reason: HOSPADM

## 2019-09-06 RX ORDER — ALUMINA, MAGNESIA, AND SIMETHICONE 2400; 2400; 240 MG/30ML; MG/30ML; MG/30ML
15 SUSPENSION ORAL EVERY 6 HOURS PRN
Status: DISCONTINUED | OUTPATIENT
Start: 2019-09-06 | End: 2019-09-08 | Stop reason: HOSPADM

## 2019-09-06 RX ORDER — FAMOTIDINE 10 MG/ML
20 INJECTION, SOLUTION INTRAVENOUS EVERY 12 HOURS SCHEDULED
Status: DISCONTINUED | OUTPATIENT
Start: 2019-09-06 | End: 2019-09-08 | Stop reason: HOSPADM

## 2019-09-06 RX ORDER — DEXTROSE MONOHYDRATE 25 G/50ML
25 INJECTION, SOLUTION INTRAVENOUS
Status: DISCONTINUED | OUTPATIENT
Start: 2019-09-06 | End: 2019-09-08 | Stop reason: HOSPADM

## 2019-09-06 RX ORDER — ONDANSETRON 2 MG/ML
4 INJECTION INTRAMUSCULAR; INTRAVENOUS EVERY 6 HOURS PRN
Status: DISCONTINUED | OUTPATIENT
Start: 2019-09-06 | End: 2019-09-08 | Stop reason: HOSPADM

## 2019-09-06 RX ORDER — MULTIPLE VITAMINS W/ MINERALS TAB 9MG-400MCG
1 TAB ORAL DAILY
COMMUNITY

## 2019-09-06 RX ORDER — SODIUM PHOSPHATE,MONO-DIBASIC 19G-7G/118
1 ENEMA (ML) RECTAL DAILY
COMMUNITY
End: 2021-07-13

## 2019-09-06 RX ORDER — LIDOCAINE HYDROCHLORIDE 10 MG/ML
5 INJECTION, SOLUTION EPIDURAL; INFILTRATION; INTRACAUDAL; PERINEURAL AS NEEDED
Status: CANCELLED | OUTPATIENT
Start: 2019-09-06

## 2019-09-06 RX ORDER — NICOTINE POLACRILEX 4 MG
15 LOZENGE BUCCAL
Status: DISCONTINUED | OUTPATIENT
Start: 2019-09-06 | End: 2019-09-08 | Stop reason: HOSPADM

## 2019-09-06 RX ORDER — BISACODYL 10 MG
10 SUPPOSITORY, RECTAL RECTAL DAILY PRN
Status: DISCONTINUED | OUTPATIENT
Start: 2019-09-06 | End: 2019-09-08 | Stop reason: HOSPADM

## 2019-09-06 RX ORDER — METOPROLOL TARTRATE 50 MG/1
100 TABLET, FILM COATED ORAL ONCE AS NEEDED
Status: CANCELLED | OUTPATIENT
Start: 2019-09-06

## 2019-09-06 RX ADMIN — Medication 10 ML: at 20:59

## 2019-09-06 RX ADMIN — Medication 2228 UNITS: at 13:50

## 2019-09-06 RX ADMIN — FAMOTIDINE 20 MG: 10 INJECTION, SOLUTION INTRAVENOUS at 20:57

## 2019-09-06 RX ADMIN — FAMOTIDINE 20 MG: 10 INJECTION, SOLUTION INTRAVENOUS at 14:50

## 2019-09-06 RX ADMIN — PHYTONADIONE 10 MG: 10 INJECTION, EMULSION INTRAMUSCULAR; INTRAVENOUS; SUBCUTANEOUS at 14:30

## 2019-09-06 RX ADMIN — IOPAMIDOL 100 ML: 755 INJECTION, SOLUTION INTRAVENOUS at 20:45

## 2019-09-07 ENCOUNTER — APPOINTMENT (OUTPATIENT)
Dept: CARDIOLOGY | Facility: HOSPITAL | Age: 79
End: 2019-09-07

## 2019-09-07 ENCOUNTER — HOSPITAL ENCOUNTER (INPATIENT)
Dept: CARDIOLOGY | Facility: HOSPITAL | Age: 79
Discharge: HOME | End: 2019-09-07

## 2019-09-07 VITALS
WEIGHT: 227 LBS | HEIGHT: 67 IN | DIASTOLIC BLOOD PRESSURE: 67 MMHG | BODY MASS INDEX: 35.63 KG/M2 | SYSTOLIC BLOOD PRESSURE: 143 MMHG

## 2019-09-07 DIAGNOSIS — C91.Z0 T-CELL CHRONIC LYMPHOCYTIC LEUKEMIA (HCC): ICD-10-CM

## 2019-09-07 LAB
ALBUMIN SERPL-MCNC: 2.7 G/DL (ref 3.5–4.8)
ALBUMIN/GLOB SERPL: 1.2 G/DL (ref 1–1.7)
ALP SERPL-CCNC: 42 U/L (ref 32–91)
ALT SERPL W P-5'-P-CCNC: 17 U/L (ref 17–63)
ANION GAP SERPL CALCULATED.3IONS-SCNC: 13.7 MMOL/L (ref 5–15)
ANION GAP SERPL CALCULATED.3IONS-SCNC: 14.6 MMOL/L (ref 5–15)
ARTICHOKE IGE QN: 82 MG/DL (ref 0–100)
AST SERPL-CCNC: 22 U/L (ref 15–41)
BASOPHILS # BLD AUTO: 0 10*3/MM3 (ref 0–0.2)
BASOPHILS # BLD AUTO: 0 10*3/MM3 (ref 0–0.2)
BASOPHILS NFR BLD AUTO: 0.6 % (ref 0–1.5)
BASOPHILS NFR BLD AUTO: 0.7 % (ref 0–1.5)
BH CV ECHO MEAS - AO MAX PG (FULL): 0.84 MMHG
BH CV ECHO MEAS - AO MAX PG: 3 MMHG
BH CV ECHO MEAS - AO MEAN PG (FULL): 1.1 MMHG
BH CV ECHO MEAS - AO MEAN PG: 2.1 MMHG
BH CV ECHO MEAS - AO ROOT AREA (BSA CORRECTED): 1.6
BH CV ECHO MEAS - AO ROOT AREA: 9.4 CM^2
BH CV ECHO MEAS - AO ROOT DIAM: 3.5 CM
BH CV ECHO MEAS - AO V2 MAX: 86.2 CM/SEC
BH CV ECHO MEAS - AO V2 MEAN: 69.7 CM/SEC
BH CV ECHO MEAS - AO V2 VTI: 17.5 CM
BH CV ECHO MEAS - AORTIC HR: 88.1 BPM
BH CV ECHO MEAS - AORTIC R-R: 0.68 SEC
BH CV ECHO MEAS - AVA(I,A): 3.7 CM^2
BH CV ECHO MEAS - AVA(I,D): 3.7 CM^2
BH CV ECHO MEAS - AVA(V,A): 4 CM^2
BH CV ECHO MEAS - AVA(V,D): 4 CM^2
BH CV ECHO MEAS - BSA(HAYCOCK): 2.2 M^2
BH CV ECHO MEAS - BSA: 2.1 M^2
BH CV ECHO MEAS - BZI_BMI: 36.6 KILOGRAMS/M^2
BH CV ECHO MEAS - BZI_METRIC_HEIGHT: 167.6 CM
BH CV ECHO MEAS - BZI_METRIC_WEIGHT: 103 KG
BH CV ECHO MEAS - CI(AO): 6.9 L/MIN/M^2
BH CV ECHO MEAS - CI(LVOT): 2.7 L/MIN/M^2
BH CV ECHO MEAS - CO(AO): 14.5 L/MIN
BH CV ECHO MEAS - CO(LVOT): 5.7 L/MIN
BH CV ECHO MEAS - EDV(CUBED): 95.3 ML
BH CV ECHO MEAS - EDV(MOD-SP4): 154.5 ML
BH CV ECHO MEAS - EDV(TEICH): 95.7 ML
BH CV ECHO MEAS - EF(CUBED): 67.6 %
BH CV ECHO MEAS - EF(MOD-BP): 65 %
BH CV ECHO MEAS - EF(MOD-SP4): 64.5 %
BH CV ECHO MEAS - EF(TEICH): 59.2 %
BH CV ECHO MEAS - ESV(CUBED): 30.8 ML
BH CV ECHO MEAS - ESV(MOD-SP4): 54.9 ML
BH CV ECHO MEAS - ESV(TEICH): 39 ML
BH CV ECHO MEAS - FS: 31.3 %
BH CV ECHO MEAS - IVS/LVPW: 1
BH CV ECHO MEAS - IVSD: 1.1 CM
BH CV ECHO MEAS - LA DIMENSION(2D): 3.7 CM
BH CV ECHO MEAS - LV DIASTOLIC VOL/BSA (35-75): 73.2 ML/M^2
BH CV ECHO MEAS - LV MASS(C)D: 168.3 GRAMS
BH CV ECHO MEAS - LV MASS(C)DI: 79.7 GRAMS/M^2
BH CV ECHO MEAS - LV MAX PG: 2.2 MMHG
BH CV ECHO MEAS - LV MEAN PG: 1.1 MMHG
BH CV ECHO MEAS - LV SYSTOLIC VOL/BSA (12-30): 26 ML/M^2
BH CV ECHO MEAS - LV V1 MAX: 74.3 CM/SEC
BH CV ECHO MEAS - LV V1 MEAN: 47.8 CM/SEC
BH CV ECHO MEAS - LV V1 VTI: 13.9 CM
BH CV ECHO MEAS - LVIDD: 4.6 CM
BH CV ECHO MEAS - LVIDS: 3.1 CM
BH CV ECHO MEAS - LVOT AREA: 4.6 CM^2
BH CV ECHO MEAS - LVOT DIAM: 2.4 CM
BH CV ECHO MEAS - LVPWD: 1 CM
BH CV ECHO MEAS - MV DEC TIME: 0.15 SEC
BH CV ECHO MEAS - MV E MAX VEL: 115.2 CM/SEC
BH CV ECHO MEAS - MV MAX PG: 5.7 MMHG
BH CV ECHO MEAS - MV MEAN PG: 2.6 MMHG
BH CV ECHO MEAS - MV V2 MAX: 119.6 CM/SEC
BH CV ECHO MEAS - MV V2 MEAN: 73.8 CM/SEC
BH CV ECHO MEAS - MV V2 VTI: 18.8 CM
BH CV ECHO MEAS - MVA(VTI): 3.4 CM^2
BH CV ECHO MEAS - PA MAX PG (FULL): -0.41 MMHG
BH CV ECHO MEAS - PA MAX PG: 3.4 MMHG
BH CV ECHO MEAS - PA V2 MAX: 91.8 CM/SEC
BH CV ECHO MEAS - RV MAX PG: 3.8 MMHG
BH CV ECHO MEAS - RV MEAN PG: 1.8 MMHG
BH CV ECHO MEAS - RV V1 MAX: 97.2 CM/SEC
BH CV ECHO MEAS - RV V1 MEAN: 62.1 CM/SEC
BH CV ECHO MEAS - RV V1 VTI: 18.7 CM
BH CV ECHO MEAS - SI(AO): 78.2 ML/M^2
BH CV ECHO MEAS - SI(CUBED): 30.5 ML/M^2
BH CV ECHO MEAS - SI(LVOT): 30.5 ML/M^2
BH CV ECHO MEAS - SI(MOD-SP4): 47.2 ML/M^2
BH CV ECHO MEAS - SI(TEICH): 26.9 ML/M^2
BH CV ECHO MEAS - SV(AO): 165 ML
BH CV ECHO MEAS - SV(CUBED): 64.4 ML
BH CV ECHO MEAS - SV(LVOT): 64.3 ML
BH CV ECHO MEAS - SV(MOD-SP4): 99.6 ML
BH CV ECHO MEAS - SV(TEICH): 56.7 ML
BILIRUB SERPL-MCNC: 0.6 MG/DL (ref 0.3–1.2)
BUN BLD-MCNC: 13 MG/DL (ref 8–20)
BUN BLD-MCNC: 14 MG/DL (ref 8–20)
BUN/CREAT SERPL: 11.8 (ref 6.2–20.3)
BUN/CREAT SERPL: 12.7 (ref 6.2–20.3)
CALCIUM SPEC-SCNC: 8.5 MG/DL (ref 8.9–10.3)
CALCIUM SPEC-SCNC: 8.6 MG/DL (ref 8.9–10.3)
CHLORIDE SERPL-SCNC: 105 MMOL/L (ref 101–111)
CHLORIDE SERPL-SCNC: 107 MMOL/L (ref 101–111)
CHOLEST SERPL-MCNC: 137 MG/DL
CO2 SERPL-SCNC: 21 MMOL/L (ref 22–32)
CO2 SERPL-SCNC: 22 MMOL/L (ref 22–32)
CREAT BLD-MCNC: 1.1 MG/DL (ref 0.7–1.2)
CREAT BLD-MCNC: 1.1 MG/DL (ref 0.7–1.2)
DEPRECATED RDW RBC AUTO: 45.5 FL (ref 37–54)
DEPRECATED RDW RBC AUTO: 46.4 FL (ref 37–54)
EOSINOPHIL # BLD AUTO: 0.1 10*3/MM3 (ref 0–0.4)
EOSINOPHIL # BLD AUTO: 0.1 10*3/MM3 (ref 0–0.4)
EOSINOPHIL NFR BLD AUTO: 1.7 % (ref 0.3–6.2)
EOSINOPHIL NFR BLD AUTO: 2.5 % (ref 0.3–6.2)
ERYTHROCYTE [DISTWIDTH] IN BLOOD BY AUTOMATED COUNT: 12.9 % (ref 12.3–15.4)
ERYTHROCYTE [DISTWIDTH] IN BLOOD BY AUTOMATED COUNT: 13 % (ref 12.3–15.4)
FOLATE SERPL-MCNC: 22.3 NG/ML (ref 5.9–24.8)
GFR SERPL CREATININE-BSD FRML MDRD: 65 ML/MIN/1.73
GFR SERPL CREATININE-BSD FRML MDRD: 65 ML/MIN/1.73
GLOBULIN UR ELPH-MCNC: 2.3 GM/DL (ref 2.5–3.8)
GLUCOSE BLD-MCNC: 144 MG/DL (ref 65–99)
GLUCOSE BLD-MCNC: 147 MG/DL (ref 65–99)
GLUCOSE BLDC GLUCOMTR-MCNC: 131 MG/DL (ref 70–105)
GLUCOSE BLDC GLUCOMTR-MCNC: 164 MG/DL (ref 70–105)
GLUCOSE BLDC GLUCOMTR-MCNC: 225 MG/DL (ref 70–105)
HCT VFR BLD AUTO: 38.1 % (ref 37.5–51)
HCT VFR BLD AUTO: 38.2 % (ref 37.5–51)
HDLC SERPL QL: 3.61
HDLC SERPL-MCNC: 38 MG/DL
HGB BLD-MCNC: 12.7 G/DL (ref 13–17.7)
HGB BLD-MCNC: 13 G/DL (ref 13–17.7)
LDLC/HDLC SERPL: 1.87 {RATIO}
LYMPHOCYTES # BLD AUTO: 0.8 10*3/MM3 (ref 0.7–3.1)
LYMPHOCYTES # BLD AUTO: 1 10*3/MM3 (ref 0.7–3.1)
LYMPHOCYTES NFR BLD AUTO: 14.6 % (ref 19.6–45.3)
LYMPHOCYTES NFR BLD AUTO: 14.6 % (ref 19.6–45.3)
MAGNESIUM SERPL-MCNC: 1.3 MG/DL (ref 1.8–2.5)
MCH RBC QN AUTO: 33.2 PG (ref 26.6–33)
MCH RBC QN AUTO: 33.7 PG (ref 26.6–33)
MCHC RBC AUTO-ENTMCNC: 33.2 G/DL (ref 31.5–35.7)
MCHC RBC AUTO-ENTMCNC: 34 G/DL (ref 31.5–35.7)
MCV RBC AUTO: 99.1 FL (ref 79–97)
MCV RBC AUTO: 99.8 FL (ref 79–97)
MONOCYTES # BLD AUTO: 0.5 10*3/MM3 (ref 0.1–0.9)
MONOCYTES # BLD AUTO: 0.6 10*3/MM3 (ref 0.1–0.9)
MONOCYTES NFR BLD AUTO: 10.4 % (ref 5–12)
MONOCYTES NFR BLD AUTO: 8.8 % (ref 5–12)
NEUTROPHILS # BLD AUTO: 3.8 10*3/MM3 (ref 1.7–7)
NEUTROPHILS # BLD AUTO: 4.9 10*3/MM3 (ref 1.7–7)
NEUTROPHILS NFR BLD AUTO: 71.8 % (ref 42.7–76)
NEUTROPHILS NFR BLD AUTO: 74.3 % (ref 42.7–76)
NRBC BLD AUTO-RTO: 0 /100 WBC (ref 0–0.2)
NRBC BLD AUTO-RTO: 0 /100 WBC (ref 0–0.2)
PLATELET # BLD AUTO: 65 10*3/MM3 (ref 140–450)
PLATELET # BLD AUTO: 70 10*3/MM3 (ref 140–450)
PMV BLD AUTO: 9.5 FL (ref 6–12)
PMV BLD AUTO: 9.7 FL (ref 6–12)
POTASSIUM BLD-SCNC: 3.6 MMOL/L (ref 3.6–5.1)
POTASSIUM BLD-SCNC: 3.7 MMOL/L (ref 3.6–5.1)
PROT SERPL-MCNC: 5 G/DL (ref 6.1–7.9)
RBC # BLD AUTO: 3.83 10*6/MM3 (ref 4.14–5.8)
RBC # BLD AUTO: 3.85 10*6/MM3 (ref 4.14–5.8)
SODIUM BLD-SCNC: 137 MMOL/L (ref 136–144)
SODIUM BLD-SCNC: 139 MMOL/L (ref 136–144)
TRIGL SERPL-MCNC: 140 MG/DL
VIT B12 BLD-MCNC: 623 PG/ML (ref 180–914)
VLDLC SERPL-MCNC: 28 MG/DL
WBC NRBC COR # BLD: 5.3 10*3/MM3 (ref 3.4–10.8)
WBC NRBC COR # BLD: 6.6 10*3/MM3 (ref 3.4–10.8)

## 2019-09-07 PROCEDURE — 99232 SBSQ HOSP IP/OBS MODERATE 35: CPT | Performed by: NEUROLOGICAL SURGERY

## 2019-09-07 PROCEDURE — 82962 GLUCOSE BLOOD TEST: CPT

## 2019-09-07 PROCEDURE — 99232 SBSQ HOSP IP/OBS MODERATE 35: CPT | Performed by: PSYCHIATRY & NEUROLOGY

## 2019-09-07 PROCEDURE — 63710000001 INSULIN LISPRO (HUMAN) PER 5 UNITS: Performed by: NURSE PRACTITIONER

## 2019-09-07 PROCEDURE — 80053 COMPREHEN METABOLIC PANEL: CPT | Performed by: NURSE PRACTITIONER

## 2019-09-07 PROCEDURE — 99222 1ST HOSP IP/OBS MODERATE 55: CPT | Performed by: INTERNAL MEDICINE

## 2019-09-07 PROCEDURE — 80061 LIPID PANEL: CPT | Performed by: NURSE PRACTITIONER

## 2019-09-07 PROCEDURE — 93306 TTE W/DOPPLER COMPLETE: CPT

## 2019-09-07 PROCEDURE — 83735 ASSAY OF MAGNESIUM: CPT | Performed by: NURSE PRACTITIONER

## 2019-09-07 PROCEDURE — 85025 COMPLETE CBC W/AUTO DIFF WBC: CPT

## 2019-09-07 PROCEDURE — 97166 OT EVAL MOD COMPLEX 45 MIN: CPT

## 2019-09-07 PROCEDURE — 82746 ASSAY OF FOLIC ACID SERUM: CPT | Performed by: PSYCHIATRY & NEUROLOGY

## 2019-09-07 PROCEDURE — 82607 VITAMIN B-12: CPT | Performed by: PSYCHIATRY & NEUROLOGY

## 2019-09-07 PROCEDURE — 85025 COMPLETE CBC W/AUTO DIFF WBC: CPT | Performed by: NURSE PRACTITIONER

## 2019-09-07 PROCEDURE — 93306 TTE W/DOPPLER COMPLETE: CPT | Performed by: INTERNAL MEDICINE

## 2019-09-07 PROCEDURE — 97535 SELF CARE MNGMENT TRAINING: CPT

## 2019-09-07 RX ORDER — LEVETIRACETAM 500 MG/1
500 TABLET ORAL EVERY 12 HOURS SCHEDULED
Status: DISCONTINUED | OUTPATIENT
Start: 2019-09-07 | End: 2019-09-08

## 2019-09-07 RX ORDER — ATORVASTATIN CALCIUM 40 MG/1
40 TABLET, FILM COATED ORAL NIGHTLY
Status: DISCONTINUED | OUTPATIENT
Start: 2019-09-07 | End: 2019-09-08 | Stop reason: HOSPADM

## 2019-09-07 RX ADMIN — INSULIN LISPRO 2 UNITS: 100 INJECTION, SOLUTION INTRAVENOUS; SUBCUTANEOUS at 12:22

## 2019-09-07 RX ADMIN — ATORVASTATIN CALCIUM 40 MG: 40 TABLET, FILM COATED ORAL at 21:29

## 2019-09-07 RX ADMIN — Medication 10 ML: at 21:31

## 2019-09-07 RX ADMIN — LEVETIRACETAM 500 MG: 500 TABLET, FILM COATED ORAL at 11:20

## 2019-09-07 RX ADMIN — Medication 10 ML: at 08:16

## 2019-09-07 RX ADMIN — LEVETIRACETAM 500 MG: 500 TABLET, FILM COATED ORAL at 21:30

## 2019-09-07 RX ADMIN — FAMOTIDINE 20 MG: 10 INJECTION, SOLUTION INTRAVENOUS at 08:16

## 2019-09-07 RX ADMIN — FAMOTIDINE 20 MG: 10 INJECTION, SOLUTION INTRAVENOUS at 21:29

## 2019-09-07 RX ADMIN — INSULIN LISPRO 2 UNITS: 100 INJECTION, SOLUTION INTRAVENOUS; SUBCUTANEOUS at 02:48

## 2019-09-08 VITALS
WEIGHT: 227.07 LBS | OXYGEN SATURATION: 100 % | BODY MASS INDEX: 36.49 KG/M2 | SYSTOLIC BLOOD PRESSURE: 145 MMHG | HEART RATE: 101 BPM | DIASTOLIC BLOOD PRESSURE: 76 MMHG | HEIGHT: 66 IN | RESPIRATION RATE: 20 BRPM | TEMPERATURE: 98.5 F

## 2019-09-08 PROBLEM — R47.01 APHASIA: Status: RESOLVED | Noted: 2019-09-06 | Resolved: 2019-09-08

## 2019-09-08 PROBLEM — I62.00 SUBDURAL HEMATOMA, NONTRAUMATIC: Status: ACTIVE | Noted: 2019-09-08

## 2019-09-08 LAB
ANION GAP SERPL CALCULATED.3IONS-SCNC: 12.8 MMOL/L (ref 5–15)
BACTERIA UR QL AUTO: ABNORMAL /HPF
BASOPHILS # BLD AUTO: 0 10*3/MM3 (ref 0–0.2)
BASOPHILS NFR BLD AUTO: 0.5 % (ref 0–1.5)
BILIRUB UR QL STRIP: NEGATIVE
BUN BLD-MCNC: 18 MG/DL (ref 8–20)
BUN/CREAT SERPL: 13.8 (ref 6.2–20.3)
CALCIUM SPEC-SCNC: 8.3 MG/DL (ref 8.9–10.3)
CHLORIDE SERPL-SCNC: 103 MMOL/L (ref 101–111)
CLARITY UR: CLEAR
CO2 SERPL-SCNC: 23 MMOL/L (ref 22–32)
COLOR UR: YELLOW
CREAT BLD-MCNC: 1.3 MG/DL (ref 0.7–1.2)
DEPRECATED RDW RBC AUTO: 45.1 FL (ref 37–54)
EOSINOPHIL # BLD AUTO: 0.2 10*3/MM3 (ref 0–0.4)
EOSINOPHIL NFR BLD AUTO: 3 % (ref 0.3–6.2)
ERYTHROCYTE [DISTWIDTH] IN BLOOD BY AUTOMATED COUNT: 12.9 % (ref 12.3–15.4)
GFR SERPL CREATININE-BSD FRML MDRD: 53 ML/MIN/1.73
GLUCOSE BLD-MCNC: 168 MG/DL (ref 65–99)
GLUCOSE BLDC GLUCOMTR-MCNC: 167 MG/DL (ref 70–105)
GLUCOSE BLDC GLUCOMTR-MCNC: 171 MG/DL (ref 70–105)
GLUCOSE BLDC GLUCOMTR-MCNC: 186 MG/DL (ref 70–105)
GLUCOSE UR STRIP-MCNC: NEGATIVE MG/DL
HCT VFR BLD AUTO: 36.6 % (ref 37.5–51)
HGB BLD-MCNC: 12.3 G/DL (ref 13–17.7)
HGB UR QL STRIP.AUTO: ABNORMAL
HYALINE CASTS UR QL AUTO: ABNORMAL /LPF
KETONES UR QL STRIP: NEGATIVE
LEUKOCYTE ESTERASE UR QL STRIP.AUTO: NEGATIVE
LYMPHOCYTES # BLD AUTO: 0.7 10*3/MM3 (ref 0.7–3.1)
LYMPHOCYTES NFR BLD AUTO: 12.2 % (ref 19.6–45.3)
MAGNESIUM SERPL-MCNC: 1.5 MG/DL (ref 1.8–2.5)
MCH RBC QN AUTO: 33.4 PG (ref 26.6–33)
MCHC RBC AUTO-ENTMCNC: 33.7 G/DL (ref 31.5–35.7)
MCV RBC AUTO: 99.2 FL (ref 79–97)
MONOCYTES # BLD AUTO: 0.5 10*3/MM3 (ref 0.1–0.9)
MONOCYTES NFR BLD AUTO: 8.9 % (ref 5–12)
NEUTROPHILS # BLD AUTO: 4.3 10*3/MM3 (ref 1.7–7)
NEUTROPHILS NFR BLD AUTO: 75.4 % (ref 42.7–76)
NITRITE UR QL STRIP: NEGATIVE
NRBC BLD AUTO-RTO: 0 /100 WBC (ref 0–0.2)
PH UR STRIP.AUTO: 5.5 [PH] (ref 5–8)
PLATELET # BLD AUTO: 72 10*3/MM3 (ref 140–450)
PMV BLD AUTO: 9.5 FL (ref 6–12)
POTASSIUM BLD-SCNC: 3.8 MMOL/L (ref 3.6–5.1)
PROT UR QL STRIP: NEGATIVE
RBC # BLD AUTO: 3.69 10*6/MM3 (ref 4.14–5.8)
RBC # UR: ABNORMAL /HPF
REF LAB TEST METHOD: ABNORMAL
SODIUM BLD-SCNC: 135 MMOL/L (ref 136–144)
SP GR UR STRIP: 1.02 (ref 1–1.03)
SQUAMOUS #/AREA URNS HPF: ABNORMAL /HPF
UROBILINOGEN UR QL STRIP: ABNORMAL
WBC NRBC COR # BLD: 5.8 10*3/MM3 (ref 3.4–10.8)
WBC UR QL AUTO: ABNORMAL /HPF

## 2019-09-08 PROCEDURE — 83735 ASSAY OF MAGNESIUM: CPT | Performed by: NURSE PRACTITIONER

## 2019-09-08 PROCEDURE — 97116 GAIT TRAINING THERAPY: CPT

## 2019-09-08 PROCEDURE — 85025 COMPLETE CBC W/AUTO DIFF WBC: CPT | Performed by: NURSE PRACTITIONER

## 2019-09-08 PROCEDURE — 81001 URINALYSIS AUTO W/SCOPE: CPT | Performed by: NURSE PRACTITIONER

## 2019-09-08 PROCEDURE — 80048 BASIC METABOLIC PNL TOTAL CA: CPT | Performed by: NURSE PRACTITIONER

## 2019-09-08 PROCEDURE — 63710000001 INSULIN LISPRO (HUMAN) PER 5 UNITS: Performed by: INTERNAL MEDICINE

## 2019-09-08 PROCEDURE — 99239 HOSP IP/OBS DSCHRG MGMT >30: CPT | Performed by: INTERNAL MEDICINE

## 2019-09-08 PROCEDURE — 82962 GLUCOSE BLOOD TEST: CPT

## 2019-09-08 PROCEDURE — 97162 PT EVAL MOD COMPLEX 30 MIN: CPT

## 2019-09-08 RX ORDER — LEVETIRACETAM 500 MG/1
1000 TABLET ORAL 2 TIMES DAILY
Status: COMPLETED | OUTPATIENT
Start: 2019-09-08 | End: 2019-09-08

## 2019-09-08 RX ORDER — LEVETIRACETAM 500 MG/1
500 TABLET ORAL EVERY 12 HOURS SCHEDULED
Qty: 60 TABLET | Refills: 0 | Status: SHIPPED | OUTPATIENT
Start: 2019-09-08 | End: 2019-10-08

## 2019-09-08 RX ADMIN — Medication 10 ML: at 08:57

## 2019-09-08 RX ADMIN — LEVETIRACETAM 500 MG: 500 TABLET, FILM COATED ORAL at 08:57

## 2019-09-08 RX ADMIN — LEVETIRACETAM 1000 MG: 500 TABLET ORAL at 16:53

## 2019-09-08 RX ADMIN — INSULIN LISPRO 2 UNITS: 100 INJECTION, SOLUTION INTRAVENOUS; SUBCUTANEOUS at 08:57

## 2019-09-08 RX ADMIN — FAMOTIDINE 20 MG: 10 INJECTION, SOLUTION INTRAVENOUS at 08:57

## 2019-09-08 RX ADMIN — INSULIN LISPRO 2 UNITS: 100 INJECTION, SOLUTION INTRAVENOUS; SUBCUTANEOUS at 11:55

## 2019-09-09 ENCOUNTER — READMISSION MANAGEMENT (OUTPATIENT)
Dept: CALL CENTER | Facility: HOSPITAL | Age: 79
End: 2019-09-09

## 2019-09-09 NOTE — OUTREACH NOTE
Prep Survey      Responses   Facility patient discharged from?  [facility]   Is patient eligible?  Yes   Discharge diagnosis  nontraumatic subdural hematoma, on cumadin   Does the patient have one of the following disease processes/diagnoses(primary or secondary)?  Other   Does the patient have Home health ordered?  Yes   What is the Home health agency?   [facility]   Is there a DME ordered?  No   Prep survey completed?  Yes          Felicitas Orozco RN

## 2019-09-10 ENCOUNTER — READMISSION MANAGEMENT (OUTPATIENT)
Dept: CALL CENTER | Facility: HOSPITAL | Age: 79
End: 2019-09-10

## 2019-09-10 NOTE — OUTREACH NOTE
Medical Week 1 Survey      Responses   Facility patient discharged from?  [facility]   Does the patient have one of the following disease processes/diagnoses(primary or secondary)?  Other   Is there a successful TCM telephone encounter documented?  No   Week 1 attempt successful?  No   Call start time  1437   Rescheduled  Revoked   Revoke  Other transitional program [PATIENT REPORTS THAT HE IS CURRENTLY IN ONE OF THE [facility] AND WAS ADMITTED THERE YESTERDAY. ]          Audra Kim LPN

## 2019-10-22 ENCOUNTER — OFFICE VISIT (OUTPATIENT)
Dept: CARDIOLOGY | Facility: CLINIC | Age: 79
End: 2019-10-22

## 2019-10-22 VITALS
SYSTOLIC BLOOD PRESSURE: 123 MMHG | WEIGHT: 212.75 LBS | OXYGEN SATURATION: 95 % | HEART RATE: 89 BPM | BODY MASS INDEX: 33.32 KG/M2 | DIASTOLIC BLOOD PRESSURE: 81 MMHG

## 2019-10-22 DIAGNOSIS — I10 ESSENTIAL HYPERTENSION: ICD-10-CM

## 2019-10-22 DIAGNOSIS — E78.5 DYSLIPIDEMIA: ICD-10-CM

## 2019-10-22 DIAGNOSIS — I45.10 RIGHT BUNDLE BRANCH BLOCK: Primary | ICD-10-CM

## 2019-10-22 PROCEDURE — 99213 OFFICE O/P EST LOW 20 MIN: CPT | Performed by: INTERNAL MEDICINE

## 2019-10-22 RX ORDER — CEFUROXIME AXETIL 500 MG/1
500 TABLET ORAL 2 TIMES DAILY
COMMUNITY
End: 2019-11-21

## 2019-10-22 RX ORDER — CIPROFLOXACIN 500 MG/1
TABLET, FILM COATED ORAL
Refills: 0 | COMMUNITY
Start: 2019-10-01 | End: 2019-11-21

## 2019-10-22 NOTE — PROGRESS NOTES
Encounter Date:10/22/2019  Last seen 4/3/2019      Patient ID: Praneeth Nam is a 79 y.o. male.    Chief Complaint:  Follow-up  Dilated aorta  Shortness of breath  Hypertension  Diabetes  Right bundle branch block    History of Present Illness  Since patient was seen patient had bronchitis pneumonia and stroke.  Doing better now.  Since then, the patient has been without any chest discomfort ,shortness of breath, palpitations, dizziness or syncope.  Denies having any headache ,abdominal pain ,nausea, vomiting , diarrhea constipation, loss of weight or loss of appetite.  Denies having any excessive bruising ,hematuria or blood in the stool.    Review of all systems negative except as indicated  Assessment and Plan     ]]]]]]]]]]]]]]]]]]   impression  ==========   - shortness of breath with exertion and extreme fatigue. -improved     Cardiac catheterization 04/11/2018 revealed normal left ventricular function normal coronary  arteries and no evidence for pulmonary hypertension was present.     - diffusely dilated ascending aorta     Echocardiogram showed left atrial and right ventricle enlargement prominent aorta.  Normal left ventricular function with ejection fraction of 60% 04/05/2018.  Stacy scan Cardiolite test is abnormal with significant inferior ischemia and apical hypokinesis on the gated SPECT images 04/05/2018.     -  Chronic right bundle-branch block     - diabetes hypertension LGL leukemia rheumatoid arthritis COPD CKD 3     - history of prostate carcinoma.  Status post radiation     - former smoker     - allergic to penicillin  ==========  Plan  ==========   patient is not having any angina pectoris or congestive heart failure  Recent hospitalization records were reviewed and summarized as above.  Medications were reviewed and updated.    Followup in the office in 6 months.  Further plan will depend on patient's progress.  [[[[[[[[[[[[[[[[[[[[             Diagnosis Plan   1. Right bundle branch block      2. Essential hypertension     3. Dyslipidemia     LAB RESULTS (LAST 7 DAYS)    CBC        BMP        CMP         BNP        TROPONIN        CoAg        Creatinine Clearance  CrCl cannot be calculated (Patient's most recent lab result is older than the maximum 30 days allowed.).    ABG        Radiology  No radiology results for the last day                The following portions of the patient's history were reviewed and updated as appropriate: allergies, current medications, past family history, past medical history, past social history, past surgical history and problem list.    Review of Systems   Constitution: Positive for malaise/fatigue.   Cardiovascular: Negative for chest pain, leg swelling, palpitations and syncope.   Respiratory: Positive for shortness of breath.    Skin: Negative for rash.   Gastrointestinal: Negative for nausea and vomiting.   Neurological: Positive for dizziness and light-headedness. Negative for numbness.         Current Outpatient Medications:   •  atorvastatin (LIPITOR) 20 MG tablet, Take 20 mg by mouth Daily., Disp: , Rfl: 3  •  budesonide-formoterol (SYMBICORT) 160-4.5 MCG/ACT inhaler, Inhale 2 puffs 2 (Two) Times a Day., Disp: , Rfl:   •  cefuroxime (CEFTIN) 500 MG tablet, Take 500 mg by mouth 2 (Two) Times a Day., Disp: , Rfl:   •  fenofibrate 160 MG tablet, Take 160 mg by mouth Daily., Disp: , Rfl: 3  •  glipiZIDE (GLUCOTROL) 5 MG tablet, Take 5 mg by mouth 2 (Two) Times a Day Before Meals., Disp: , Rfl:   •  glucosamine-chondroitin 500-400 MG capsule capsule, Take 1 capsule by mouth Daily., Disp: , Rfl:   •  metFORMIN ER (GLUCOPHAGE-XR) 500 MG 24 hr tablet, Take 1,000 mg by mouth Every Night., Disp: , Rfl:   •  Multiple Vitamins-Minerals (MULTIVITAMIN WITH MINERALS) tablet tablet, Take 1 tablet by mouth Daily., Disp: , Rfl:   •  pantoprazole (PROTONIX) 40 MG EC tablet, Take 40 mg by mouth Daily., Disp: , Rfl: 1  •  spironolactone (ALDACTONE) 25 MG tablet, Take 25 mg by mouth  Daily., Disp: , Rfl: 3  •  ciprofloxacin (CIPRO) 500 MG tablet, TK 1 T PO Q 12 H, Disp: , Rfl: 0  •  ipratropium-albuterol (DUO-NEB) 0.5-2.5 mg/3 ml nebulizer, Take 3 mL by nebulization Every 4 (Four) Hours As Needed for Wheezing., Disp: , Rfl:   •  losartan (COZAAR) 100 MG tablet, Take 100 mg by mouth Daily., Disp: , Rfl: 3  •  Mirabegron ER (MYRBETRIQ) 50 MG tablet sustained-release 24 hour 24 hr tablet, Take 50 mg by mouth Daily., Disp: , Rfl:     Allergies   Allergen Reactions   • Penicillin V Potassium Swelling   • Latex Hives       Family History   Problem Relation Age of Onset   • Lung cancer Brother         age unknown       Past Surgical History:   Procedure Laterality Date   • SKIN LESION EXCISION      back and groin-benign       Past Medical History:   Diagnosis Date   • Acute pulmonary embolism (CMS/HCC) 05/2018    bilateral   • Arthritis     bilateral knees and ankles   • CKD (chronic kidney disease) 03/02/2009   • Coagulopathy (CMS/[facility]) 07/2008   • COPD (chronic obstructive pulmonary disease) (CMS/[facility])    • Diabetes mellitus (CMS/[facility])    • 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   • History of pneumonia 02/2015    hospitalized with community-acquired pneumonia, possibly viral    • History of prostate cancer 10/2009    [place] 6, Stage II   • Hypercholesterolemia    • Hypertension    • 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   • Neutropenia (CMS/[facility]) 11/2003   • MITZI (obstructive sleep apnea) 2018   • Psoriasis 2000   • Renal cyst, left    • Rheumatoid arthritis (CMS/[facility])    • Thrombocytopenia (CMS/[facility]) 08/23/2005       Family History   Problem Relation Age of Onset   • Lung cancer Brother         age unknown       Social History     Socioeconomic History   • Marital status:      Spouse name: Not on file   • Number of children: Not on file   • Years of education: Not on file   • Highest education level: Not on file   Tobacco Use   • Smoking status:  Former Smoker   • Smokeless tobacco: Never Used   • Tobacco comment: stopped smoking in 1995   Substance and Sexual Activity   • Alcohol use: No     Frequency: Never     Comment: drinks one beer per month   • Drug use: No         Procedures      Objective:       Physical Exam    /81 (BP Location: Left arm, Patient Position: Sitting, Cuff Size: Adult)   Pulse 89   Wt 96.5 kg (212 lb 12 oz)   SpO2 95%   BMI 33.32 kg/m²   The patient is alert, oriented and in no distress.    Vital signs as noted above.    Head and neck revealed no carotid bruits or jugular venous distension.  No thyromegaly or lymphadenopathy is present.    Lungs clear.  No wheezing.  Breath sounds are normal bilaterally.    Heart normal first and second heart sounds.  No murmur..  No pericardial rub is present.  No gallop is present.    Abdomen soft and nontender.  No organomegaly is present.    Extremities revealed good peripheral pulses without any pedal edema.    Skin warm and dry.    Musculoskeletal system is grossly normal.    CNS grossly normal.

## 2019-10-29 ENCOUNTER — TELEPHONE (OUTPATIENT)
Dept: ONCOLOGY | Facility: CLINIC | Age: 79
End: 2019-10-29

## 2019-10-29 NOTE — TELEPHONE ENCOUNTER
Pt's daughter left message that Mr. Nam has a new diag of lung cancer and needed to be seen ASAP.  Returned call, scheduled patient for 10/30 at 8:15.

## 2019-10-29 NOTE — PROGRESS NOTES
Hematology/Oncology Outpatient Follow Up    PATIENT NAME:Praneeth Nam  :1940  MRN: [id number]  PRIMARY CARE PHYSICIAN: Guilherme Jason MD  REFERRING PHYSICIAN: Guilherme Jason MD    Chief Complaint   Patient presents with   • Follow-up     for T-cell LGL leukemia, bilateral pulmonary emboli and elevated factor VIII      HISTORY OF PRESENT ILLNESS:   1. 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  • The patient has a history of rheumatoid arthritis, hypertension and diabetes mellitus and was admitted to the hospital with right lower extremity cellulitis in 2005.  He was found to have an absolute neutropenia and hematology consultation was called.  Review of hospital records reveal him to have had neutropenia and hematology dating back to 2003 and monocytosis dating back to 2002.  He was on methotrexate and Gold for his rheumatoid arthritis longtime ago, but had not taken anything prior to his hospitalization.  He saw Dr. Jason for a regular follow up and was feeling well, but the following day, five days prior to hospital admission, started feeling ill and run down and developed hyperemia of his right lower extremity with chills.  He was seen by Dr. Stein and given cephalosporin and his leg did not get better, so he was admitted to the hospital.  • 05 - Biopsy revealed normocellular bone marrow with suggestion of lymphoid Infiltrate.  Flow cytometry analysis, T-cell gene rearrangement indicates conal T-cell process consistent with leukemia of large granular lymphocytes (CD 3+), cytogenetics normal.  • 05 - Iron 40 (L), ferritin 385 (H), TIBC 215 (L), EBV IgG positive titer 1:160, EBV IgM negative.  Blood culture with no growth after five days.  B12 was 639 (N), folate 13.1 (N), CMV IgG and IgM were normal, LAP 75 (N), PNH normal.  • 8/10/05 - Patient started on treatment with Neupogen 300 mcg sub q Monday through Friday.  • 05 -  Neupogen increased to 480 mcg.  • 9/20/05 - Neupogen decreased to 480 mcg Monday and Thursday.  • 12/15/05 - Neupogen decreased to 480 mcg weekly.  • 7/13/06 - Peripheral blood T-cell gene rearrangement result of no clonal T-cell gene rearrangement detected.  • 8/10/06 - Bone marrow aspiration and biopsy with results of flow cytometry reveals residual population of large granular lymphocytes.  • 1/25/07 - Hold Neupogen whenever ANC > 3.5.  • 5/16/07 - Chest x-ray result of no active lung disease and thoracic degenerative change.  • 8/20/07 - Chest x-ray result of mild obstructive airway disease with scattered areas of minor parenchymal scarring and fibrosis.  • 8/23/07 - T-cell gene rearrangement, insufficient DNA extracted to perform Southern blot analysis.  Equivocal TCR gene rearrangement analysis by PCR.  • 11/1/07 - Sed rate (N) at 5.  • 1/14/08 - Peripheral blood T-cell receptor gene rearrangement analysis.  In both the PCR and South blot assays somewhat discreet bands are present in a polyclonal background.  The PCR banding pattern is somewhat similar to that seen in a previously analyzed specimen with equivocal TCR PCR results.  • 3/06/08 - Bone marrow aspiration and biopsy revealed normocellular bone marrow reduced 1+ iron stores, leukemia of large granular lymphocytes identified by flow cytometry.  Cytogenetics (N).  Flow cytometry immunophenotyping revealed a residual population of the patient’s leukemia of large granular lymphocytes.  This Immunophenotype is identical to what was detected in the previous study of bone marrow from 8/10/08.  • 4/3/08 - Neupogen discontinued.  The patient to start on methotrexate 20 mg p.o. weekly and Folate 2 mg p.o. q.d.  • 4/7/08 - Pulmonary function analysis showed mild restriction improved after dilatation.  • 7/3/08 - T-cell receptor gene rearrangement, equivocal TCR gene rearrangement analysis by PCR no clonal TCR gene rearrangement was detected by Southern blot  analysis.  • 7/11/08 - Echocardiogram with ejection fraction of 54%.  • 12/1/08 - T-cell gene rearrangement, negative.  • 3/2/09 - Bone marrow aspirate with residual leukemia of large granular lymphocytes, CD3+, identified by flow cytometric analysis.  Normal iron stores.  • 4/6/09 - Patient to discontinue methotrexate and folic acid due to normal counts.  • 6/1/09 - Chest x-ray with obscuration of the posterior margin of one or both hemidiaphragms, which could represent atelectasis or infiltrate.  • 6/9/09 - CT scan of the chest positive for pulmonary embolus in the left lower lobe.  Mild right basilar infiltrate.  • 6/10/09 - Venous duplex normal.  • 6/11/09 - Chest x-ray with mild density in the left lung base improved from 6/1/09.  Patient started on Biaxin 500 mg b.i.d. for 10 days and Omnicef 300 mg b.i.d. for 10 days by Dr. Thomas.  • 8/10/09 - T-cell gene rearrangement, PCR on peripheral blood, negative.  • 12/28/09 - Chest x-ray with increased bronchial markings.  Arthritic changes of the T-spine.  • 3/15/10 - Bone marrow aspirate smears, normal.  Bone marrow aspirate clot with aggregates of mononuclear cells suspicious for residual leukemia.  Flow cytometry with residual population of cells, which are present in the patient's leukemia of large granular lymphocytes (DD3 positive) and quantitate at approximately 1.3% of total events.  Cytogenetics normal.  • 5/17/10 - Hemoglobin 15.6.  • 11/30/10 - Hemoglobin 14.3.  • 2/2/11 - TCR gene rearrangement negative.  Chest x-ray no active lung disease.  • 9/11/11 - Immunofixation urine no monoclonal proteins seen in urine.  • 12/5/11 - TCR gene rearrangement negative.  • 7/10/12 - TCR gene rearrangement negative.  • 6/17/13 - Hemoglobin 15.0, WBC 4.80, platelet count is 176,000.  T-cell gene rearrangement negative.  No clonal T-cell gene arrangement is detected.  • 6/26/14 - Hemoglobin 14.7, WBC 6.39, platelet count 150,000.  T-cell gene rearrangement by PCR  revealed inconclusive for a clonal T-cell gene rearrangement.  • 10/22/14 - patient was started on methotrexate by Dr. Peter on for the arthritis.  He had ordered lab. SPEP showed the increase in albumin may be due to insufficient protein, interferential malabsorption, impaired synthesis, protein losing enteropathy, nephrotic syndrome, and various other disorders.  No monoclonal immunoglobulin detected.  NAIMA positive.  NAIMA titer 1:160 (H), CRP 2.66 (H)  • 2/23/15 - Chest x-ray with bilateral basilar densities and emphysema.   • 3/30/15 - hemoglobin 14.0, WBC 4.62, platelet count 180,000. T-cell gene rearrangement inconclusive.   • 7/27/15 - T-cell gene rearrangement by PCR positive for clonal T-cell gene rearrangement.   • 10/20/15 - Chest x-ray performed while patient in the hospital revealed asymmetric right apical opacity. CT of the abdomen and pelvis with dilated loops of jejunum, diverticulosis, cholelithiasis, small to moderate sized hernia.   • 11/25/15 - Comprehensive metabolic panel with glucose 183 (65-99), creatinine 1.4 (0.7-1.2).     • 3/23/16 - WBC 5.33, hemoglobin 14.8, platelet count 163,000 with 61% neutrophils, 22% lymphocytes, 13% monocytes, 5% eosinophils. Discussed T-cell gene rearrangement results with the patient. He claims not to be taking the Methotrexate weekly, but only as needed for his arthritis. I asked him to start taking it a dose of 20 mg weekly along with Folate 1 mg p.o. daily. T-cell gene rearrangement was inconclusive for clonal T-cell gene rearrangement.   • 3/24/16 - Chest x-ray revealed a right apical opacity which had cleared since the prior exam and likely represented atelectasis or positioning change.   • 5/4/16 - Pulmonary function studies performed revealed mild obstructive lung disease with good response to bronchodilators.   • 6/8/16 - WBC 5.1, ANC 3.2, hemoglobin 13.6, MCV 99.0 and platelet count 163,000.   • 7/5/16 - Comprehensive metabolic panel with no significant  abnormality.   • 9/6/16 - Discussed use of Methotrexate with patient. He is on it for his arthritis and I have told him that he can come off it as his counts are good and the gene rearrangement is still positive, so the Methotrexate may not be making a difference, but I will leave the decision to him and Dr. Peter. Methotrexate discontinued by Dr. Peter.   • 12/1/16 - WBC 6.2 with 66% neutrophils, 21% lymphocytes, 9% monocytes, hemoglobin 14.9, platelet count 172,000. T-cell gene rearrangement inconclusive as one or two distinct peaks were observed within the normal polyclonal size range but were below the peak cutoff for defined positivity.   • 2/2/17 - Comprehensive metabolic panel with no significant abnormality. Creatinine 1.2 (0.7-1.2)   • 4/14/17 - Chest x-ray with persistent basilar density suggestive of residual scar or atelectasis.   • 5/1/17 - Patient seen in followup by Dr. Peter with decision to observe for a period of time as the patient was asymptomatic and followup in the future to evaluate for further treatment.   • 7/6/17 - WBC 4.9 with 56% neutrophils, 25% lymphocytes, 11% monocytes, hemoglobin 14.6, platelet count 170,000.   • 10/4/17 - WBC 5.6 with 63% neutrophils, 21% lymphocytes, 10% monocytes, hemoglobin 14, .9, platelet count 166,000. Patient claims to have started back on Methotrexate for arthritis through Dr. Peter in July 2017. Peripheral blood flow cytometry negative for acute leukemia or lymphoproliferative disorder. T-cell gene rearrangement by PCR negative - no clonal T-cell gene rearrangement detected.   • 6/5/18 - T-cell gene rearrangement by PCR negative.   • 10/16/18 - Patient claims not to be taking Methotrexate at present.   • 4/23/19 - WBC 5.4 with 57% neutrophils, 26% lymphocytes, 12% monocytes, hemoglobin 13.7, platelet count 11,000.  Patient asked to hold Aspirin and Warfarin while scheduling bone marrow biopsy. Prescription written for Prednisone 100 mg p.o. daily.  T-cell PCR performed on bone marrow inconclusive for a clonal T-cell rearrangement.  • 5/15/19 - IgA 131 (), IgG 561 (600-1500), IgM 48 ().       2.   Thrombocytopenia diagnosis established 8/23/05 and coagulopathy diagnosis        established July 2008. 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   • 8/1/05 - CMV IgG 42, positive, CMV IgM was negative, LAP 75 (N), vitamin B12 was 639 (N), folate 13.1 (N).  • 8/23/05 - Platelet count 90,000 (L).  • 2/7/08 - Platelet count 137,000 (L).  • 10/1/08 - Corrected.  • 4/23/19 - Platelet count 11,000. Patient started on Prednisone 100 mg p.o. daily. Sternal bone marrow aspiration with cellular marrow with maturing trilineage hematopoiesis, megakaryocytic hyperplasia with cytologic atypia, adequate storage iron with absent ringed sideroblasts. Flow cytometry with no evidence of an abnormal cell type. Immunohistochemistry with no significant lymphoid infiltrate or increase in blasts. Megakaryocytes increased in number. [employer] NGS MDS panel sequencing detected. 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  Cytogenetics were normal, 46 XY.  • 4/26/19 - Platelet count 77,000. Prednisone dose decreased to 80 mg p.o. daily with 20 mg weekly taper initiation. Aspirin 81 mg daily resumed.   • 5/2/19 - Platelets 92,000. Prednisone taper continued. PT 50.7 (H), INR 4.1 (H), PTT 33.3 seconds (23.9-36.9). PT INR faxed to Dr. Jason. Platelet antibody screen positive for group 2b/3A and group 1a/2a. EBV IgG 369 (H), IgM negative. CMV IgG 13.1 (H), IgM 0.6 negative. CMP remarkable for a BUN of 40 (H). Ordered to increase oral fluids. B12 of 323 (N),  (N), folic acid 17.9 (N).   • 5/10/19 - Platelet count 31,000. Patient taking Prednisone 40 mg daily. Dose was increased to 60 mg p.o. daily.   • 5/15/19 - IV IG 1 g/kg IV daily x2 ordered for persistent thrombocytopenia.  Platelet count 47,000 on 60 mg Prednisone p.o. daily. Prednisone was continued until followup. Weekly CBC’s continued.   • 5/30/19 - IV IgG 1 g/kg given on 5/30/19 and 5/31/19 with platelet count of 114,000 on 5/30/19.   • 6/7/19 - Patient on 40 mg of Prednisone daily. Platelet count 150,000. Asked to drop to 20 mg daily.  • 7/18/19 platelet count 180,000 on prednisone 10 mg every other day.  Asked him to decrease prednisone to 5 mg every other day.  • 10/28/2019 patient seen in the emergency room at [facility] with a persistent cough and CT chest revealing possible mass in left lower lobe.  Patient treated with Levaquin and prednisone 60 mg p.o. daily for 10 days as he was not on any prednisone at that time.  Platelet count was 183,000.  3. 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  • 7/3/08 - Hemoglobin 12.5, platelet count 128,000.  RBC folate (N) at 300, retic count (H) at 2.33, vitamin B12 (N) at 281, iron (N) at 56, ferritin (N) 302, haptoglobin (N) 128, TIBC (N) at 419, iron saturation (L) at 13. Platelet count 128,000. PT (N) at 11.0, INR (N) 1.0, PTT (L) <21.0.   • 10/1/08 - Corrected.  • 12/5/11 - Retic count 2.4 (N), iron 83 (N), TIBC 436 (H), iron sat % 19 (L), ferritin 338 (N) vitamin B12 291 (N), folate 675 (N), haptoglobin 200 (N).  • 12/23/11 -  (N).  • 6/26/14 - hemoglobin 14.7  • 10/22/14 - patient was started on methotrexate by Dr. Peter on for the arthritis.  He had ordered lab. SPEP showed the increase in albumin may be due to insufficient protein, interferential malabsorption, impaired synthesis, protein losing enteropathy, nephrotic syndrome, and various other disorders.  No monoclonal immunoglobulin detected.  NAIMA positive.  NAIMA titer 1:160 (H), CRP 2.66 (H)  • 10/4/16 - Comprehensive metabolic panel with creatinine 1.3 (0.7-1.2).    • 7/6/17 - Hemoglobin 14.6, hematocrit 43.9, MCV 98.7, WBC 4.9 and platelet count 170,000.    • 6/5/18 - Patient has not taken Methotrexate dose for one month.   • 6/6/18 - Ferrous sulfate 325 mg p.o. two times a day ordered.   • 6/12/18 - Stool heme positive on stool cards.  Protonix 40 mg tablet take one tablet daily prescribed.  Referred to [facility].  Last colonoscopy in 2005.    • 8/1/18 - Colonoscopy at [facility] by Kishor Beard M.D. revealed mild diverticulosis of descending and sigmoid colon, internal hemorrhoids and 3-5 mm polyps in the ascending sigmoid colon and rectum. Pathology on ascending colon polyp revealed fragments of tubular adenoma with a few fragments exhibiting a benign lymphoid aggregate. Pathology on sigmoid colon polyp revealed fragments of tubular adenoma with focal superficial acute inflammation and rectal polyp biopsy revealed a hyperplastic polyp. Repeat colonoscopy was recommended in five years.   • 8/2/18 - Hemoglobin 13.6, MCV 98.8. Patient tolerating Ferrous Sulfate 325 mg p.o. b.i.d. without noted side effects. Orders written to continue this at present and orders written to obtain colonoscopy report performed at [facility] 8/1/18.   • 10/16/18 - Patient asked to reduce Ferrous Sulfate to 325 mg p.o. daily. Ferritin 123 ().   • 1/15/19 - Hemoglobin 14.5, MCV 98.4. Ferrous Sulfate discontinued.    4.   Acute bilateral pulmonary emboli diagnosed May 2018.    • 5/21/18 - Patient hospitalized at [facility] between 5/21/18 and 5/23/18 with several week history of exertional shortness of breath increasing on the day of admission. He had had a recent left heart catheterization that was normal. CT chest PE protocol revealed bilateral acute pulmonary embolic disease. Hematology consultation was not obtained. Emphysema and mild interstitial basilar fibrotic changes, mild dependent atelectasis in the lower lobes, cholelithiasis, fatty infiltration of the liver and small hiatal hernia were noted also. His hemoglobin was 14.5 on admission and 12.9 on discharge. D-dimer was 4.8 (0.17-0.59). PT PTT were  12.4 and 22.7 on admission. Creatinine 1.2 (0.7-1.2). The patient was started on a Heparin drip and then switched to Xarelto 15 mg b.i.d. for 21 days then 20 mg daily.     • 6/5/18 - Anti-phosphatidylserine IgG 4 (N), IgM 25 (H).  Factor VIII activity 145 (H).  AT  (H).  Protein C 122 (N).  Protein S 173 (H).  Anti-cardiolipin antibody and anti-beta-2 glycoprotein all normal.  Factor V Leiden gene mutation not detected.  Prothrombin gene mutation not detected.    • 8/2/18 - PT PTT 16.5 and 32.2 seconds. Lupus anticoagulant inconclusive. Serum homocysteine 12.5 (<15).    • 8/7/18 - Bilateral lower extremity venous Doppler report with no evidence of any deep venous thrombosis or SVT. Lymph node noted in the left groin and in the right groin.   • 10/16/18 - Factor VIII activity 136 ().   • 1/15/19 - Patient reports he is no longer taking Xarelto and is on Coumadin with INR’s followed by Dr. Jason. Blood pressure 188/78 with patient reporting elevated blood pressures at home. Patient instructed to contact Dr. Jason for adjustments in his medications. Antiphospholipid antibodies negative.   • 5/2/19 - Factor VIII level 235 (H).  PT 50.7, INR 4.1 (H). PT INR faxed to Dr. Jason.  • 9/6/2019 patient was hospitalized at [facility] for 2 days with a nontraumatic subdural hematoma.  • 9/9/2019 patient hospitalized at [facility] with right facial droop with MRI brain revealing acute/subacute left cerebral hemisphere subdural hemorrhage and subarachnoid hemorrhage.  Atrophy and ischemic white matter disease was present.  Small punctate remote hemorrhage in the left temporal lobe was present.  Patient taken off warfarin and aspirin.  · 10/12/19 - Chest x-ray showed no significant changes compared to the previous study. Redemonstration of infiltrates within the bilateral lower lobes with a small left sided effusion likely related to pneumonia. No acute osseous abnormality.   · 10/28/19 - CT PE protocol showed no  definite pulmonary embolism identified. There is effacement of the proximal segmental branches of the left lower lobe pulmonary artery likely due to a mass or lymph node present within this region. The defect appears extraluminal and not definitely intraluminal to suggest a pulmonary embolism. Patchy airspace disease is present within the left lower lobe with a questionable cavitary lesion which may represent an underlying malignancy. There is a small left sided pleural effusion. Mild patchy airspace changes are also present within the lung bases bilaterally which may represent superimposed pneumonia.        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, Stage II   • Hypercholesterolemia    • Hypertension    • Large granular lymphocytic leukemia (CMS/[facility]) 08/2005    T-Cell Large granular lymphocytic leukemia   • Neutropenia (CMS/[facility]) 11/2003   • MITZI (obstructive sleep apnea) 2018   • Psoriasis 2000   • Renal cyst, left    • Rheumatoid arthritis (CMS/HCC)    • Thrombocytopenia (CMS/HCC) 08/23/2005       Past Surgical History:   Procedure Laterality Date   • SKIN LESION EXCISION      back and groin-benign         Current Outpatient Medications:   •  atorvastatin (LIPITOR) 20 MG tablet, Take 20 mg by mouth Daily., Disp: , Rfl: 3  •  budesonide-formoterol (SYMBICORT) 160-4.5 MCG/ACT inhaler, Inhale 2 puffs 2 (Two) Times a Day., Disp: , Rfl:   •  ciprofloxacin (CIPRO) 500 MG tablet, TK 1 T PO Q 12 H, Disp: , Rfl: 0  •  fenofibrate 160 MG tablet, Take 160 mg by mouth Daily., Disp:  , Rfl: 3  •  glipiZIDE (GLUCOTROL) 5 MG tablet, Take 5 mg by mouth 2 (Two) Times a Day Before Meals., Disp: , Rfl:   •  glucosamine-chondroitin 500-400 MG capsule capsule, Take 1 capsule by mouth Daily., Disp: , Rfl:   •  ipratropium-albuterol (DUO-NEB) 0.5-2.5 mg/3 ml nebulizer, Take 3 mL by nebulization Every 4 (Four) Hours As Needed for Wheezing., Disp: , Rfl:   •  levETIRAcetam (KEPPRA) 500 MG tablet, Take 500 mg by mouth 2 (Two) Times a Day., Disp: , Rfl:   •  losartan (COZAAR) 100 MG tablet, Take 100 mg by mouth Daily., Disp: , Rfl: 3  •  metFORMIN ER (GLUCOPHAGE-XR) 500 MG 24 hr tablet, Take 1,000 mg by mouth Every Night., Disp: , Rfl:   •  Multiple Vitamins-Minerals (MULTIVITAMIN WITH MINERALS) tablet tablet, Take 1 tablet by mouth Daily., Disp: , Rfl:   •  pantoprazole (PROTONIX) 40 MG EC tablet, Take 40 mg by mouth Daily., Disp: , Rfl: 1  •  pentoxifylline (TRENtal) 400 MG CR tablet, Take 400 mg by mouth 3 (Three) Times a Day With Meals., Disp: , Rfl:   •  predniSONE (DELTASONE) 20 MG tablet, Take 20 mg by mouth Daily., Disp: , Rfl:   •  spironolactone (ALDACTONE) 25 MG tablet, Take 25 mg by mouth Daily., Disp: , Rfl: 3  •  cefuroxime (CEFTIN) 500 MG tablet, Take 500 mg by mouth 2 (Two) Times a Day., Disp: , Rfl:   •  Mirabegron ER (MYRBETRIQ) 50 MG tablet sustained-release 24 hour 24 hr tablet, Take 50 mg by mouth Daily., Disp: , Rfl:     Allergies   Allergen Reactions   • Penicillin V Potassium Swelling   • Latex Hives       Family History   Problem Relation Age of Onset   • Lung cancer Brother         age unknown       Cancer-related family history includes Lung cancer in his brother.    Social History     Tobacco Use   • Smoking status: Former Smoker   • Smokeless tobacco: Never Used   • Tobacco comment: stopped smoking in 1995   Substance Use Topics   • Alcohol use: No     Frequency: Never     Comment: drinks one beer per month   • Drug use: No       I have reviewed the history of present illness,  "past medical history, family history, social history, lab results, all notes and other records since the patient was last seen on 19.    SUBJECTIVE: Patient is here for follow up of T-cell LGL leukemia, bilateral pulmonary emboli and elevated factor VIII. Reports that he has been in three different hospitals and a nursing home since he was last here. Had a stroke and has had pneumonia. Is not on any blood thinners now. Was just started on Prednisone 60 mg daily by doctor at [facility]. Has lost about 20 lbs. Has a cough and is SOA.     Two females accompanied patient today.   GABRIELA Sal present during office visit.       REVIEW OF SYSTEMS:  Review of Systems   Constitutional: Positive for unexpected weight change ( Has lost 20 lbs. ). Negative for chills and fever.   HENT: Negative for ear pain, mouth sores, nosebleeds and sore throat.    Eyes: Negative for photophobia and visual disturbance.   Respiratory: Positive for cough and shortness of breath. Negative for wheezing and stridor.    Cardiovascular: Negative for chest pain and palpitations.   Gastrointestinal: Negative for abdominal pain, diarrhea, nausea and vomiting.   Endocrine: Negative for cold intolerance and heat intolerance.   Genitourinary: Negative for dysuria and hematuria.   Musculoskeletal: Negative for joint swelling and neck stiffness.   Skin: Negative for color change and rash.   Neurological: Negative for seizures and syncope.   Hematological: Negative for adenopathy.        No obvious bleeding   Psychiatric/Behavioral: Negative for agitation, confusion and hallucinations.       OBJECTIVE:    Vitals:    10/30/19 1420   BP: 135/74   Pulse: 96   Resp: 20   Temp: 97.9 °F (36.6 °C)   Weight: 98.2 kg (216 lb 9.6 oz)   Height: 167.6 cm (66\")   PainSc: 0-No pain       ECO    Physical Exam   Constitutional: He is oriented to person, place, and time. No distress.   HENT:   Head: Normocephalic and atraumatic.   Dentures. " Male pattern baldness.    Eyes: Conjunctivae and EOM are normal. Right eye exhibits no discharge. Left eye exhibits no discharge. No scleral icterus.   Neck: Normal range of motion. Neck supple. No thyromegaly present.   Cardiovascular: Normal rate, regular rhythm and normal heart sounds. Exam reveals no gallop and no friction rub.   Pulmonary/Chest: Effort normal. No stridor. No respiratory distress. He has no wheezes.   Bilateral rhonchi.    Abdominal: Soft. Bowel sounds are normal. He exhibits no mass. There is no tenderness. There is no rebound and no guarding.   Obese.    Musculoskeletal: Normal range of motion. He exhibits no tenderness.   Arthritic changes to the joints.    Lymphadenopathy:     He has no cervical adenopathy.   Neurological: He is alert and oriented to person, place, and time. He exhibits normal muscle tone.   Skin: Skin is warm. No rash noted. He is not diaphoretic. No erythema.   Scattered ecchymoses.    Psychiatric: He has a normal mood and affect. His behavior is normal.   Nursing note and vitals reviewed.      RECENT LABS  WBC   Date Value Ref Range Status   10/30/2019 7.19 3.40 - 10.80 10*3/mm3 Final     RBC   Date Value Ref Range Status   10/30/2019 3.84 (L) 4.14 - 5.80 10*6/mm3 Final     Hemoglobin   Date Value Ref Range Status   10/30/2019 11.9 (L) 13.0 - 17.7 g/dL Final     Hematocrit   Date Value Ref Range Status   10/30/2019 36.8 (L) 37.5 - 51.0 % Final     MCV   Date Value Ref Range Status   10/30/2019 95.8 79.0 - 97.0 fL Final     MCH   Date Value Ref Range Status   10/30/2019 31.0 26.6 - 33.0 pg Final     MCHC   Date Value Ref Range Status   10/30/2019 32.3 31.5 - 35.7 g/dL Final     RDW   Date Value Ref Range Status   10/30/2019 14.4 12.3 - 15.4 % Final     RDW-SD   Date Value Ref Range Status   10/30/2019 48.6 37.0 - 54.0 fl Final     MPV   Date Value Ref Range Status   10/30/2019 11.5 6.0 - 12.0 fL Final     Platelets   Date Value Ref Range Status   10/30/2019 190 140 - 450  10*3/mm3 Final     Neutrophil %   Date Value Ref Range Status   10/30/2019 92.4 (H) 42.7 - 76.0 % Final     Lymphocyte %   Date Value Ref Range Status   10/30/2019 5.6 (L) 19.6 - 45.3 % Final     Monocyte %   Date Value Ref Range Status   10/30/2019 1.8 (L) 5.0 - 12.0 % Final     Eosinophil %   Date Value Ref Range Status   10/30/2019 0.1 (L) 0.3 - 6.2 % Final     Basophil %   Date Value Ref Range Status   10/30/2019 0.1 0.0 - 1.5 % Final     Neutrophils, Absolute   Date Value Ref Range Status   10/30/2019 6.64 1.70 - 7.00 10*3/mm3 Final     Lymphocytes, Absolute   Date Value Ref Range Status   10/30/2019 0.40 (L) 0.70 - 3.10 10*3/mm3 Final     Monocytes, Absolute   Date Value Ref Range Status   10/30/2019 0.13 0.10 - 0.90 10*3/mm3 Final     Eosinophils, Absolute   Date Value Ref Range Status   10/30/2019 0.01 0.00 - 0.40 10*3/mm3 Final     Basophils, Absolute   Date Value Ref Range Status   10/30/2019 0.01 0.00 - 0.20 10*3/mm3 Final     nRBC   Date Value Ref Range Status   09/08/2019 0.0 0.0 - 0.2 /100 WBC Final       Lab Results   Component Value Date    GLUCOSE 168 (H) 09/08/2019    BUN 28 (H) 09/12/2019    CREATININE 1.4 09/12/2019    EGFRIFNONA 53 (L) 09/08/2019    EGFRIFAFRI 68 11/03/2016    BCR 19.9 09/12/2019    K 4.1 09/12/2019    CO2 23 09/12/2019    CALCIUM 9.0 09/12/2019    ALBUMIN 3.7 09/09/2019    LABIL2 1.2 09/09/2019    AST 25 09/09/2019    ALT 20 09/09/2019         Assessment/Plan     T-cell large granular lymphocytic leukemia   - CBC & Differential  - CBC Auto Differential  - [facility] Requisition ([facility] Only)    Chronic ITP (idiopathic thrombocytopenia) (CMS/HCC)    Rheumatoid arthritis involving multiple sites, unspecified rheumatoid factor presence (CMS/HCC)    CKD (chronic kidney disease), stage III (CMS/HCC)    Bilateral pulmonary embolism (CMS/HCC)    Elevated factor VIII level    Other myelodysplastic syndromes (CMS/HCC)    Hypogammaglobulinemia (CMS/HCC)  - IgG  - IgM  - IgA    Subdural  hematoma, nontraumatic (CMS/HCC)    Cavitating mass of lower lobe of left lung  - NM Pet Skull Base To Mid Thigh    Other nonspecific abnormal finding of lung field   - NM Pet Skull Base To Mid Thigh      ASSESSMENT:  The patient claims to have gone to a rehab center after his subdural hemorrhage and was taken off warfarin and aspirin.  He claims to have developed a pneumonia and bronchitis after that, eventually leading to ER visit at [facility] 2 days ago where there was a question of a mass in the left lower lobe lung on CT chest PE protocol.      PLAN:  Schedule PET scan.    T-cell gene rearrangement and immunoglobulins next visit.         I have reviewed lab results, imaging, vitals and medications with the patient today. Will follow up in 3 weeks.     Patient verbalized understanding and is in agreement of the above plan.    I have reviewed and validated the information above.   Oli Helton M.D., F.A.C.P.

## 2019-10-30 ENCOUNTER — APPOINTMENT (OUTPATIENT)
Dept: LAB | Facility: HOSPITAL | Age: 79
End: 2019-10-30

## 2019-10-30 ENCOUNTER — OFFICE VISIT (OUTPATIENT)
Dept: ONCOLOGY | Facility: CLINIC | Age: 79
End: 2019-10-30

## 2019-10-30 VITALS
RESPIRATION RATE: 20 BRPM | HEIGHT: 66 IN | HEART RATE: 96 BPM | WEIGHT: 216.6 LBS | SYSTOLIC BLOOD PRESSURE: 135 MMHG | BODY MASS INDEX: 34.81 KG/M2 | DIASTOLIC BLOOD PRESSURE: 74 MMHG | TEMPERATURE: 97.9 F

## 2019-10-30 DIAGNOSIS — J98.4 CAVITATING MASS OF LOWER LOBE OF LEFT LUNG: ICD-10-CM

## 2019-10-30 DIAGNOSIS — N18.30 CKD (CHRONIC KIDNEY DISEASE), STAGE III (HCC): ICD-10-CM

## 2019-10-30 DIAGNOSIS — D69.3 CHRONIC ITP (IDIOPATHIC THROMBOCYTOPENIA) (HCC): ICD-10-CM

## 2019-10-30 DIAGNOSIS — I62.00 SUBDURAL HEMATOMA, NONTRAUMATIC (HCC): ICD-10-CM

## 2019-10-30 DIAGNOSIS — R79.1 ELEVATED FACTOR VIII LEVEL: ICD-10-CM

## 2019-10-30 DIAGNOSIS — M06.9 RHEUMATOID ARTHRITIS INVOLVING MULTIPLE SITES, UNSPECIFIED RHEUMATOID FACTOR PRESENCE: ICD-10-CM

## 2019-10-30 DIAGNOSIS — D80.1 HYPOGAMMAGLOBULINEMIA (HCC): ICD-10-CM

## 2019-10-30 DIAGNOSIS — I26.99 BILATERAL PULMONARY EMBOLISM (HCC): ICD-10-CM

## 2019-10-30 DIAGNOSIS — R91.8 OTHER NONSPECIFIC ABNORMAL FINDING OF LUNG FIELD: ICD-10-CM

## 2019-10-30 DIAGNOSIS — C91.Z0 T-CELL CHRONIC LYMPHOCYTIC LEUKEMIA (HCC): Primary | ICD-10-CM

## 2019-10-30 DIAGNOSIS — D46.Z OTHER MYELODYSPLASTIC SYNDROMES (HCC): ICD-10-CM

## 2019-10-30 LAB
BASOPHILS # BLD AUTO: 0.01 10*3/MM3 (ref 0–0.2)
BASOPHILS NFR BLD AUTO: 0.1 % (ref 0–1.5)
DEPRECATED RDW RBC AUTO: 48.6 FL (ref 37–54)
EOSINOPHIL # BLD AUTO: 0.01 10*3/MM3 (ref 0–0.4)
EOSINOPHIL NFR BLD AUTO: 0.1 % (ref 0.3–6.2)
ERYTHROCYTE [DISTWIDTH] IN BLOOD BY AUTOMATED COUNT: 14.4 % (ref 12.3–15.4)
HCT VFR BLD AUTO: 36.8 % (ref 37.5–51)
HGB BLD-MCNC: 11.9 G/DL (ref 13–17.7)
LYMPHOCYTES # BLD AUTO: 0.4 10*3/MM3 (ref 0.7–3.1)
LYMPHOCYTES NFR BLD AUTO: 5.6 % (ref 19.6–45.3)
MCH RBC QN AUTO: 31 PG (ref 26.6–33)
MCHC RBC AUTO-ENTMCNC: 32.3 G/DL (ref 31.5–35.7)
MCV RBC AUTO: 95.8 FL (ref 79–97)
MONOCYTES # BLD AUTO: 0.13 10*3/MM3 (ref 0.1–0.9)
MONOCYTES NFR BLD AUTO: 1.8 % (ref 5–12)
NEUTROPHILS # BLD AUTO: 6.64 10*3/MM3 (ref 1.7–7)
NEUTROPHILS NFR BLD AUTO: 92.4 % (ref 42.7–76)
PLATELET # BLD AUTO: 190 10*3/MM3 (ref 140–450)
PMV BLD AUTO: 11.5 FL (ref 6–12)
RBC # BLD AUTO: 3.84 10*6/MM3 (ref 4.14–5.8)
WBC NRBC COR # BLD: 7.19 10*3/MM3 (ref 3.4–10.8)

## 2019-10-30 PROCEDURE — 36415 COLL VENOUS BLD VENIPUNCTURE: CPT | Performed by: INTERNAL MEDICINE

## 2019-10-30 PROCEDURE — 85025 COMPLETE CBC W/AUTO DIFF WBC: CPT | Performed by: INTERNAL MEDICINE

## 2019-10-30 PROCEDURE — 99214 OFFICE O/P EST MOD 30 MIN: CPT | Performed by: INTERNAL MEDICINE

## 2019-10-30 RX ORDER — LEVETIRACETAM 500 MG/1
500 TABLET ORAL 2 TIMES DAILY
COMMUNITY
End: 2019-12-12

## 2019-10-30 RX ORDER — PENTOXIFYLLINE 400 MG/1
400 TABLET, EXTENDED RELEASE ORAL
COMMUNITY
End: 2021-07-13

## 2019-10-30 RX ORDER — PREDNISONE 20 MG/1
20 TABLET ORAL DAILY
COMMUNITY
End: 2019-11-21

## 2019-11-15 ENCOUNTER — HOSPITAL ENCOUNTER (OUTPATIENT)
Dept: OTHER | Facility: HOSPITAL | Age: 79
Discharge: HOME | End: 2019-11-15

## 2019-11-15 ENCOUNTER — HOSPITAL ENCOUNTER (OUTPATIENT)
Dept: PET IMAGING | Facility: HOSPITAL | Age: 79
Discharge: HOME | End: 2019-11-15

## 2019-11-15 DIAGNOSIS — C80.1 CARCINOMA (HCC): ICD-10-CM

## 2019-11-20 ENCOUNTER — RESULTS ENCOUNTER (OUTPATIENT)
Dept: ONCOLOGY | Facility: CLINIC | Age: 79
End: 2019-11-20

## 2019-11-20 ENCOUNTER — TELEPHONE (OUTPATIENT)
Dept: ONCOLOGY | Facility: CLINIC | Age: 79
End: 2019-11-20

## 2019-11-20 ENCOUNTER — HOSPITAL ENCOUNTER (OUTPATIENT)
Dept: PET IMAGING | Facility: HOSPITAL | Age: 79
Discharge: HOME | End: 2019-11-20
Admitting: INTERNAL MEDICINE

## 2019-11-20 DIAGNOSIS — J98.4 CAVITATING MASS OF LOWER LOBE OF LEFT LUNG: ICD-10-CM

## 2019-11-20 DIAGNOSIS — R91.8 OTHER NONSPECIFIC ABNORMAL FINDING OF LUNG FIELD: ICD-10-CM

## 2019-11-20 DIAGNOSIS — C91.Z0 T-CELL CHRONIC LYMPHOCYTIC LEUKEMIA (HCC): ICD-10-CM

## 2019-11-20 LAB — GLUCOSE BLDC GLUCOMTR-MCNC: 132 MG/DL (ref 70–105)

## 2019-11-20 PROCEDURE — 0 FLUDEOXYGLUCOSE F18 SOLUTION: Performed by: INTERNAL MEDICINE

## 2019-11-20 PROCEDURE — A9552 F18 FDG: HCPCS | Performed by: INTERNAL MEDICINE

## 2019-11-20 PROCEDURE — 78815 PET IMAGE W/CT SKULL-THIGH: CPT

## 2019-11-20 PROCEDURE — 82962 GLUCOSE BLOOD TEST: CPT

## 2019-11-20 RX ADMIN — FLUDEOXYGLUCOSE F18 1 DOSE: 300 INJECTION INTRAVENOUS at 13:55

## 2019-11-20 NOTE — TELEPHONE ENCOUNTER
Mr. Nam left message to be called.  Returned call, clarified appt times for today and tomorrow and who provider was he would see.

## 2019-11-21 ENCOUNTER — OFFICE VISIT (OUTPATIENT)
Dept: ONCOLOGY | Facility: CLINIC | Age: 79
End: 2019-11-21

## 2019-11-21 ENCOUNTER — RESULTS ENCOUNTER (OUTPATIENT)
Dept: ONCOLOGY | Facility: CLINIC | Age: 79
End: 2019-11-21

## 2019-11-21 ENCOUNTER — APPOINTMENT (OUTPATIENT)
Dept: LAB | Facility: HOSPITAL | Age: 79
End: 2019-11-21

## 2019-11-21 ENCOUNTER — APPOINTMENT (OUTPATIENT)
Dept: ONCOLOGY | Facility: CLINIC | Age: 79
End: 2019-11-21

## 2019-11-21 VITALS
SYSTOLIC BLOOD PRESSURE: 153 MMHG | BODY MASS INDEX: 35.23 KG/M2 | HEIGHT: 66 IN | RESPIRATION RATE: 16 BRPM | DIASTOLIC BLOOD PRESSURE: 75 MMHG | WEIGHT: 219.2 LBS | HEART RATE: 79 BPM | TEMPERATURE: 98 F

## 2019-11-21 DIAGNOSIS — C91.Z0 T-CELL CHRONIC LYMPHOCYTIC LEUKEMIA (HCC): Primary | ICD-10-CM

## 2019-11-21 DIAGNOSIS — D80.1 HYPOGAMMAGLOBULINEMIA (HCC): ICD-10-CM

## 2019-11-21 DIAGNOSIS — C91.Z0 T-CELL CHRONIC LYMPHOCYTIC LEUKEMIA (HCC): ICD-10-CM

## 2019-11-21 DIAGNOSIS — J98.4 CAVITATING MASS OF LOWER LOBE OF LEFT LUNG: Primary | ICD-10-CM

## 2019-11-21 LAB
BASOPHILS # BLD AUTO: 0.02 10*3/MM3 (ref 0–0.2)
BASOPHILS NFR BLD AUTO: 0.5 % (ref 0–1.5)
DEPRECATED RDW RBC AUTO: 54.5 FL (ref 37–54)
EOSINOPHIL # BLD AUTO: 0.32 10*3/MM3 (ref 0–0.4)
EOSINOPHIL NFR BLD AUTO: 7.5 % (ref 0.3–6.2)
ERYTHROCYTE [DISTWIDTH] IN BLOOD BY AUTOMATED COUNT: 16 % (ref 12.3–15.4)
HCT VFR BLD AUTO: 35.3 % (ref 37.5–51)
HGB BLD-MCNC: 11.3 G/DL (ref 13–17.7)
IGA1 MFR SER: 114 MG/DL (ref 70–400)
IGG1 SER-MCNC: 596 MG/DL (ref 700–1600)
IGM SERPL-MCNC: 44 MG/DL (ref 40–230)
LYMPHOCYTES # BLD AUTO: 0.76 10*3/MM3 (ref 0.7–3.1)
LYMPHOCYTES NFR BLD AUTO: 17.9 % (ref 19.6–45.3)
MCH RBC QN AUTO: 30.8 PG (ref 26.6–33)
MCHC RBC AUTO-ENTMCNC: 32 G/DL (ref 31.5–35.7)
MCV RBC AUTO: 96.2 FL (ref 79–97)
MONOCYTES # BLD AUTO: 0.64 10*3/MM3 (ref 0.1–0.9)
MONOCYTES NFR BLD AUTO: 15.1 % (ref 5–12)
NEUTROPHILS # BLD AUTO: 2.51 10*3/MM3 (ref 1.7–7)
NEUTROPHILS NFR BLD AUTO: 59 % (ref 42.7–76)
PLATELET # BLD AUTO: 204 10*3/MM3 (ref 140–450)
PMV BLD AUTO: 11 FL (ref 6–12)
RBC # BLD AUTO: 3.67 10*6/MM3 (ref 4.14–5.8)
WBC NRBC COR # BLD: 4.25 10*3/MM3 (ref 3.4–10.8)

## 2019-11-21 PROCEDURE — 82784 ASSAY IGA/IGD/IGG/IGM EACH: CPT | Performed by: INTERNAL MEDICINE

## 2019-11-21 PROCEDURE — 36415 COLL VENOUS BLD VENIPUNCTURE: CPT | Performed by: NURSE PRACTITIONER

## 2019-11-21 PROCEDURE — 85025 COMPLETE CBC W/AUTO DIFF WBC: CPT | Performed by: NURSE PRACTITIONER

## 2019-11-21 PROCEDURE — 99213 OFFICE O/P EST LOW 20 MIN: CPT | Performed by: NURSE PRACTITIONER

## 2019-11-21 NOTE — PROGRESS NOTES
Hematology/Oncology Outpatient Follow Up    PATIENT NAME:Praneeth Nam  :1940  MRN: [id number]  PRIMARY CARE PHYSICIAN: Guilherme Jason MD  REFERRING PHYSICIAN: Guilherme Jason MD    Chief Complaint   Patient presents with   • Follow-up     T-cell large granular lymphocytic (LGL) leukemia   • Follow-up     Lung mass      HISTORY OF PRESENT ILLNESS:   1. 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  • The patient has a history of rheumatoid arthritis, hypertension and diabetes mellitus and was admitted to the hospital with right lower extremity cellulitis in 2005.  He was found to have an absolute neutropenia and hematology consultation was called.  Review of hospital records reveal him to have had neutropenia and hematology dating back to 2003 and monocytosis dating back to 2002.  He was on methotrexate and Gold for his rheumatoid arthritis longtime ago, but had not taken anything prior to his hospitalization.  He saw Dr. Jason for a regular follow up and was feeling well, but the following day, five days prior to hospital admission, started feeling ill and run down and developed hyperemia of his right lower extremity with chills.  He was seen by Dr. Stein and given cephalosporin and his leg did not get better, so he was admitted to the hospital.  • 05 - Biopsy revealed normocellular bone marrow with suggestion of lymphoid Infiltrate.  Flow cytometry analysis, T-cell gene rearrangement indicates conal T-cell process consistent with leukemia of large granular lymphocytes (CD 3+), cytogenetics normal.  • 05 - Iron 40 (L), ferritin 385 (H), TIBC 215 (L), EBV IgG positive titer 1:160, EBV IgM negative.  Blood culture with no growth after five days.  B12 was 639 (N), folate 13.1 (N), CMV IgG and IgM were normal, LAP 75 (N), PNH normal.  • 8/10/05 - Patient started on treatment with Neupogen 300 mcg sub q Monday through Friday.  • 05 -  Neupogen increased to 480 mcg.  • 9/20/05 - Neupogen decreased to 480 mcg Monday and Thursday.  • 12/15/05 - Neupogen decreased to 480 mcg weekly.  • 7/13/06 - Peripheral blood T-cell gene rearrangement result of no clonal T-cell gene rearrangement detected.  • 8/10/06 - Bone marrow aspiration and biopsy with results of flow cytometry reveals residual population of large granular lymphocytes.  • 1/25/07 - Hold Neupogen whenever ANC > 3.5.  • 5/16/07 - Chest x-ray result of no active lung disease and thoracic degenerative change.  • 8/20/07 - Chest x-ray result of mild obstructive airway disease with scattered areas of minor parenchymal scarring and fibrosis.  • 8/23/07 - T-cell gene rearrangement, insufficient DNA extracted to perform Southern blot analysis.  Equivocal TCR gene rearrangement analysis by PCR.  • 11/1/07 - Sed rate (N) at 5.  • 1/14/08 - Peripheral blood T-cell receptor gene rearrangement analysis.  In both the PCR and South blot assays somewhat discreet bands are present in a polyclonal background.  The PCR banding pattern is somewhat similar to that seen in a previously analyzed specimen with equivocal TCR PCR results.  • 3/06/08 - Bone marrow aspiration and biopsy revealed normocellular bone marrow reduced 1+ iron stores, leukemia of large granular lymphocytes identified by flow cytometry.  Cytogenetics (N).  Flow cytometry immunophenotyping revealed a residual population of the patient’s leukemia of large granular lymphocytes.  This Immunophenotype is identical to what was detected in the previous study of bone marrow from 8/10/08.  • 4/3/08 - Neupogen discontinued.  The patient to start on methotrexate 20 mg p.o. weekly and Folate 2 mg p.o. q.d.  • 4/7/08 - Pulmonary function analysis showed mild restriction improved after dilatation.  • 7/3/08 - T-cell receptor gene rearrangement, equivocal TCR gene rearrangement analysis by PCR no clonal TCR gene rearrangement was detected by Southern blot  analysis.  • 7/11/08 - Echocardiogram with ejection fraction of 54%.  • 12/1/08 - T-cell gene rearrangement, negative.  • 3/2/09 - Bone marrow aspirate with residual leukemia of large granular lymphocytes, CD3+, identified by flow cytometric analysis.  Normal iron stores.  • 4/6/09 - Patient to discontinue methotrexate and folic acid due to normal counts.  • 6/1/09 - Chest x-ray with obscuration of the posterior margin of one or both hemidiaphragms, which could represent atelectasis or infiltrate.  • 6/9/09 - CT scan of the chest positive for pulmonary embolus in the left lower lobe.  Mild right basilar infiltrate.  • 6/10/09 - Venous duplex normal.  • 6/11/09 - Chest x-ray with mild density in the left lung base improved from 6/1/09.  Patient started on Biaxin 500 mg b.i.d. for 10 days and Omnicef 300 mg b.i.d. for 10 days by Dr. Thomas.  • 8/10/09 - T-cell gene rearrangement, PCR on peripheral blood, negative.  • 12/28/09 - Chest x-ray with increased bronchial markings.  Arthritic changes of the T-spine.  • 3/15/10 - Bone marrow aspirate smears, normal.  Bone marrow aspirate clot with aggregates of mononuclear cells suspicious for residual leukemia.  Flow cytometry with residual population of cells, which are present in the patient's leukemia of large granular lymphocytes (DD3 positive) and quantitate at approximately 1.3% of total events.  Cytogenetics normal.  • 5/17/10 - Hemoglobin 15.6.  • 11/30/10 - Hemoglobin 14.3.  • 2/2/11 - TCR gene rearrangement negative.  Chest x-ray no active lung disease.  • 9/11/11 - Immunofixation urine no monoclonal proteins seen in urine.  • 12/5/11 - TCR gene rearrangement negative.  • 7/10/12 - TCR gene rearrangement negative.  • 6/17/13 - Hemoglobin 15.0, WBC 4.80, platelet count is 176,000.  T-cell gene rearrangement negative.  No clonal T-cell gene arrangement is detected.  • 6/26/14 - Hemoglobin 14.7, WBC 6.39, platelet count 150,000.  T-cell gene rearrangement by PCR  revealed inconclusive for a clonal T-cell gene rearrangement.  • 10/22/14 - patient was started on methotrexate by Dr. Peter on for the arthritis.  He had ordered lab. SPEP showed the increase in albumin may be due to insufficient protein, interferential malabsorption, impaired synthesis, protein losing enteropathy, nephrotic syndrome, and various other disorders.  No monoclonal immunoglobulin detected.  NAIMA positive.  NAIMA titer 1:160 (H), CRP 2.66 (H)  • 2/23/15 - Chest x-ray with bilateral basilar densities and emphysema.   • 3/30/15 - hemoglobin 14.0, WBC 4.62, platelet count 180,000. T-cell gene rearrangement inconclusive.   • 7/27/15 - T-cell gene rearrangement by PCR positive for clonal T-cell gene rearrangement.   • 10/20/15 - Chest x-ray performed while patient in the hospital revealed asymmetric right apical opacity. CT of the abdomen and pelvis with dilated loops of jejunum, diverticulosis, cholelithiasis, small to moderate sized hernia.   • 11/25/15 - Comprehensive metabolic panel with glucose 183 (65-99), creatinine 1.4 (0.7-1.2).     • 3/23/16 - WBC 5.33, hemoglobin 14.8, platelet count 163,000 with 61% neutrophils, 22% lymphocytes, 13% monocytes, 5% eosinophils. Discussed T-cell gene rearrangement results with the patient. He claims not to be taking the Methotrexate weekly, but only as needed for his arthritis. I asked him to start taking it a dose of 20 mg weekly along with Folate 1 mg p.o. daily. T-cell gene rearrangement was inconclusive for clonal T-cell gene rearrangement.   • 3/24/16 - Chest x-ray revealed a right apical opacity which had cleared since the prior exam and likely represented atelectasis or positioning change.   • 5/4/16 - Pulmonary function studies performed revealed mild obstructive lung disease with good response to bronchodilators.   • 6/8/16 - WBC 5.1, ANC 3.2, hemoglobin 13.6, MCV 99.0 and platelet count 163,000.   • 7/5/16 - Comprehensive metabolic panel with no significant  abnormality.   • 9/6/16 - Discussed use of Methotrexate with patient. He is on it for his arthritis and I have told him that he can come off it as his counts are good and the gene rearrangement is still positive, so the Methotrexate may not be making a difference, but I will leave the decision to him and Dr. Peter. Methotrexate discontinued by Dr. Peter.   • 12/1/16 - WBC 6.2 with 66% neutrophils, 21% lymphocytes, 9% monocytes, hemoglobin 14.9, platelet count 172,000. T-cell gene rearrangement inconclusive as one or two distinct peaks were observed within the normal polyclonal size range but were below the peak cutoff for defined positivity.   • 2/2/17 - Comprehensive metabolic panel with no significant abnormality. Creatinine 1.2 (0.7-1.2)   • 4/14/17 - Chest x-ray with persistent basilar density suggestive of residual scar or atelectasis.   • 5/1/17 - Patient seen in followup by Dr. Peter with decision to observe for a period of time as the patient was asymptomatic and followup in the future to evaluate for further treatment.   • 7/6/17 - WBC 4.9 with 56% neutrophils, 25% lymphocytes, 11% monocytes, hemoglobin 14.6, platelet count 170,000.   • 10/4/17 - WBC 5.6 with 63% neutrophils, 21% lymphocytes, 10% monocytes, hemoglobin 14, .9, platelet count 166,000. Patient claims to have started back on Methotrexate for arthritis through Dr. Peter in July 2017. Peripheral blood flow cytometry negative for acute leukemia or lymphoproliferative disorder. T-cell gene rearrangement by PCR negative - no clonal T-cell gene rearrangement detected.   • 6/5/18 - T-cell gene rearrangement by PCR negative.   • 10/16/18 - Patient claims not to be taking Methotrexate at present.   • 4/23/19 - WBC 5.4 with 57% neutrophils, 26% lymphocytes, 12% monocytes, hemoglobin 13.7, platelet count 11,000.  Patient asked to hold Aspirin and Warfarin while scheduling bone marrow biopsy. Prescription written for Prednisone 100 mg p.o. daily.  T-cell PCR performed on bone marrow inconclusive for a clonal T-cell rearrangement.  • 5/15/19 - IgA 131 (), IgG 561 (600-1500), IgM 48 ().         2.   Thrombocytopenia diagnosis established 8/23/05 and coagulopathy diagnosis        established July 2008. 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   • 8/1/05 - CMV IgG 42, positive, CMV IgM was negative, LAP 75 (N), vitamin B12 was 639 (N), folate 13.1 (N).  • 8/23/05 - Platelet count 90,000 (L).  • 2/7/08 - Platelet count 137,000 (L).  • 10/1/08 - Corrected.  • 4/23/19 - Platelet count 11,000. Patient started on Prednisone 100 mg p.o. daily. Sternal bone marrow aspiration with cellular marrow with maturing trilineage hematopoiesis, megakaryocytic hyperplasia with cytologic atypia, adequate storage iron with absent ringed sideroblasts. Flow cytometry with no evidence of an abnormal cell type. Immunohistochemistry with no significant lymphoid infiltrate or increase in blasts. Megakaryocytes increased in number. [employer] NGS MDS panel sequencing detected. 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  Cytogenetics were normal, 46 XY.  • 4/26/19 - Platelet count 77,000. Prednisone dose decreased to 80 mg p.o. daily with 20 mg weekly taper initiation. Aspirin 81 mg daily resumed.   • 5/2/19 - Platelets 92,000. Prednisone taper continued. PT 50.7 (H), INR 4.1 (H), PTT 33.3 seconds (23.9-36.9). PT INR faxed to Dr. Jason. Platelet antibody screen positive for group 2b/3A and group 1a/2a. EBV IgG 369 (H), IgM negative. CMV IgG 13.1 (H), IgM 0.6 negative. CMP remarkable for a BUN of 40 (H). Ordered to increase oral fluids. B12 of 323 (N),  (N), folic acid 17.9 (N).   • 5/10/19 - Platelet count 31,000. Patient taking prednisone 40 mg daily. Dose was increased to 60 mg p.o. daily.   • 5/15/19 - IV IG 1 g/kg IV daily x2 ordered for persistent thrombocytopenia.  Platelet count 47,000 on 60 mg Prednisone p.o. daily. Prednisone was continued until followup. Weekly CBC’s continued.   • 5/30/19 - IV IgG 1 g/kg given on 5/30/19 and 5/31/19 with platelet count of 114,000 on 5/30/19.   • 6/7/19 - Patient on 40 mg of Prednisone daily. Platelet count 150,000. Asked to drop to 20 mg daily.  • 7/18/19 platelet count 180,000 on prednisone 10 mg every other day.  Asked him to decrease prednisone to 5 mg every other day.  • 10/28/2019 patient seen in the emergency room at [facility] with a persistent cough and CT chest revealing possible mass in left lower lobe.  Patient treated with Levaquin and prednisone 60 mg p.o. daily for 10 days as he was not on any prednisone at that time.  Platelet count was 183,000.    3. 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  • 7/3/08 - Hemoglobin 12.5, platelet count 128,000.  RBC folate (N) at 300, retic count (H) at 2.33, vitamin B12 (N) at 281, iron (N) at 56, ferritin (N) 302, haptoglobin (N) 128, TIBC (N) at 419, iron saturation (L) at 13. Platelet count 128,000. PT (N) at 11.0, INR (N) 1.0, PTT (L) <21.0.   • 10/1/08 - Corrected.  • 12/5/11 - Retic count 2.4 (N), iron 83 (N), TIBC 436 (H), iron sat % 19 (L), ferritin 338 (N) vitamin B12 291 (N), folate 675 (N), haptoglobin 200 (N).  • 12/23/11 -  (N).  • 6/26/14 - hemoglobin 14.7  • 10/22/14 - patient was started on methotrexate by Dr. Peter on for the arthritis.  He had ordered lab. SPEP showed the increase in albumin may be due to insufficient protein, interferential malabsorption, impaired synthesis, protein losing enteropathy, nephrotic syndrome, and various other disorders.  No monoclonal immunoglobulin detected.  NAIMA positive.  NAIMA titer 1:160 (H), CRP 2.66 (H)  • 10/4/16 - Comprehensive metabolic panel with creatinine 1.3 (0.7-1.2).    • 7/6/17 - Hemoglobin 14.6, hematocrit 43.9, MCV 98.7, WBC 4.9 and platelet count 170,000.    • 6/5/18 - Patient has not taken methotrexate dose for one month.   • 6/6/18 - Ferrous sulfate 325 mg p.o. two times a day ordered.   • 6/12/18 - Stool heme positive on stool cards.  Protonix 40 mg tablet take one tablet daily prescribed.  Referred to [facility].  Last colonoscopy in 2005.    • 8/1/18 - Colonoscopy at [facility] by Kishor Beard M.D. revealed mild diverticulosis of descending and sigmoid colon, internal hemorrhoids and 3-5 mm polyps in the ascending sigmoid colon and rectum. Pathology on ascending colon polyp revealed fragments of tubular adenoma with a few fragments exhibiting a benign lymphoid aggregate. Pathology on sigmoid colon polyp revealed fragments of tubular adenoma with focal superficial acute inflammation and rectal polyp biopsy revealed a hyperplastic polyp. Repeat colonoscopy was recommended in five years.   • 8/2/18 - Hemoglobin 13.6, MCV 98.8. Patient tolerating ferrous sulfate 325 mg p.o. b.i.d. without noted side effects. Orders written to continue this at present and orders written to obtain colonoscopy report performed at [facility] 8/1/18.   • 10/16/18 - Patient asked to reduce ferrous sulfate to 325 mg p.o. daily. Ferritin 123 ().   • 1/15/19 - Hemoglobin 14.5, MCV 98.4. ferrous sulfate discontinued.      4.   Acute bilateral pulmonary emboli diagnosed May 2018.    • 5/21/18 - Patient hospitalized at [facility] between 5/21/18 and 5/23/18 with several week history of exertional shortness of breath increasing on the day of admission. He had had a recent left heart catheterization that was normal. CT chest PE protocol revealed bilateral acute pulmonary embolic disease. Hematology consultation was not obtained. Emphysema and mild interstitial basilar fibrotic changes, mild dependent atelectasis in the lower lobes, cholelithiasis, fatty infiltration of the liver and small hiatal hernia were noted also. His hemoglobin was 14.5 on admission and 12.9 on discharge. D-dimer was 4.8 (0.17-0.59). PT PTT  were 12.4 and 22.7 on admission. Creatinine 1.2 (0.7-1.2). The patient was started on a Heparin drip and then switched to Xarelto 15 mg b.i.d. for 21 days then 20 mg daily.     • 6/5/18 - Anti-phosphatidylserine IgG 4 (N), IgM 25 (H).  Factor VIII activity 145 (H).  AT  (H).  Protein C 122 (N).  Protein S 173 (H).  Anti-cardiolipin antibody and anti-beta-2 glycoprotein all normal.  Factor V Leiden gene mutation not detected.  Prothrombin gene mutation not detected.    • 8/2/18 - PT PTT 16.5 and 32.2 seconds. Lupus anticoagulant inconclusive. Serum homocysteine 12.5 (<15).    • 8/7/18 - Bilateral lower extremity venous Doppler report with no evidence of any deep venous thrombosis or SVT. Lymph node noted in the left groin and in the right groin.   • 10/16/18 - Factor VIII activity 136 ().   • 1/15/19 - Patient reports he is no longer taking Xarelto and is on Coumadin with INRs followed by Dr. Jason. Blood pressure 188/78 with patient reporting elevated blood pressures at home. Patient instructed to contact Dr. Jason for adjustments in his medications. Antiphospholipid antibodies negative.   • 5/2/19 - Factor VIII level 235 (H).  PT 50.7, INR 4.1 (H). PT INR faxed to Dr. Jason.  • 9/6/2019 patient was hospitalized at [facility] for 2 days with a nontraumatic subdural hematoma.  • 9/9/2019 patient hospitalized at [facility] with right facial droop with MRI brain revealing acute/subacute left cerebral hemisphere subdural hemorrhage and subarachnoid hemorrhage.  Atrophy and ischemic white matter disease was present.  Small punctate remote hemorrhage in the left temporal lobe was present.  Patient taken off warfarin and aspirin.  · 10/12/19 - Chest x-ray showed no significant changes compared to the previous study. Redemonstration of infiltrates within the bilateral lower lobes with a small left sided effusion likely related to pneumonia. No acute osseous abnormality.     5. Lung nodule, left lower lobe:  diagnosed October 2019  · 10/28/19 - [facility] - CT PE protocol showed no definite pulmonary embolism identified. There is effacement of the proximal segmental branches of the left lower lobe pulmonary artery likely due to a mass or lymph node present within this region. The defect appears extraluminal and not definitely intraluminal to suggest a pulmonary embolism. Patchy airspace disease is present within the left lower lobe with a questionable cavitary lesion which may represent an underlying malignancy. There is a small left sided pleural effusion. Mild patchy airspace changes are also present within the lung bases bilaterally which may represent superimposed pneumonia.   · 11/20/19 PET/CT .  Mild hypermetabolic uptake within the larynx which is probably physiologic. No abnormal hypermetabolic activity seen within the chest, abdomen,or pelvis  2 cm noncalcified nodule within the left lower lobe which is not hypermetabolic.   Moderate left pleural effusion with bibasilar airspace disease favored to be secondary to atelectasis.  Cholelithiasis.   Colonic diverticulosis but no evidence of diverticulitis.       Past Medical History:   Diagnosis Date   • Acute pulmonary embolism (CMS/[facility]) 05/2018    bilateral   • Arthritis     bilateral knees and ankles   • CKD (chronic kidney disease) 03/02/2009   • Coagulopathy (CMS/[facility]) 07/2008   • COPD (chronic obstructive pulmonary disease) (CMS/[facility])    • Diabetes mellitus (CMS/[facility])    • 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   • History of pneumonia 02/2015    hospitalized with community-acquired pneumonia, possibly viral    • History of prostate cancer 10/2009    [place] 6, Stage II   • Hypercholesterolemia    • Hypertension    • Large granular lymphocytic leukemia (CMS/[facility]) 08/2005    T-Cell Large granular lymphocytic leukemia   • Neutropenia (CMS/[facility]) 11/2003   • MITZI (obstructive sleep apnea) 2018   • Psoriasis 2000   • Renal cyst, left    • Rheumatoid arthritis (CMS/[facility])    • Thrombocytopenia  (CMS/[facility]) 08/23/2005       Past Surgical History:   Procedure Laterality Date   • SKIN LESION EXCISION      back and groin-benign         Current Outpatient Medications:   •  atorvastatin (LIPITOR) 20 MG tablet, Take 20 mg by mouth Daily., Disp: , Rfl: 3  •  budesonide-formoterol (SYMBICORT) 160-4.5 MCG/ACT inhaler, Inhale 2 puffs 2 (Two) Times a Day., Disp: , Rfl:   •  fenofibrate 160 MG tablet, Take 160 mg by mouth Daily., Disp: , Rfl: 3  •  glipiZIDE (GLUCOTROL) 5 MG tablet, Take 5 mg by mouth 2 (Two) Times a Day Before Meals., Disp: , Rfl:   •  glucosamine-chondroitin 500-400 MG capsule capsule, Take 1 capsule by mouth Daily., Disp: , Rfl:   •  ipratropium-albuterol (DUO-NEB) 0.5-2.5 mg/3 ml nebulizer, Take 3 mL by nebulization Every 4 (Four) Hours As Needed for Wheezing., Disp: , Rfl:   •  losartan (COZAAR) 100 MG tablet, Take 100 mg by mouth Daily., Disp: , Rfl: 3  •  Multiple Vitamins-Minerals (MULTIVITAMIN WITH MINERALS) tablet tablet, Take 1 tablet by mouth Daily., Disp: , Rfl:   •  pantoprazole (PROTONIX) 40 MG EC tablet, Take 40 mg by mouth Daily., Disp: , Rfl: 1  •  pentoxifylline (TRENtal) 400 MG CR tablet, Take 400 mg by mouth 3 (Three) Times a Day With Meals., Disp: , Rfl:   •  spironolactone (ALDACTONE) 25 MG tablet, Take 25 mg by mouth Daily., Disp: , Rfl: 3  •  levETIRAcetam (KEPPRA) 500 MG tablet, Take 500 mg by mouth 2 (Two) Times a Day., Disp: , Rfl:   •  metFORMIN ER (GLUCOPHAGE-XR) 500 MG 24 hr tablet, Take 1,000 mg by mouth Every Night., Disp: , Rfl:   •  Mirabegron ER (MYRBETRIQ) 50 MG tablet sustained-release 24 hour 24 hr tablet, Take 50 mg by mouth Daily., Disp: , Rfl:   No current facility-administered medications for this visit.     Allergies   Allergen Reactions   • Penicillin V Potassium Swelling   • Latex Hives       Family History   Problem Relation Age of Onset   • Lung cancer Brother         age unknown       Cancer-related family history includes Lung cancer in his  brother.    Social History     Tobacco Use   • Smoking status: Former Smoker   • Smokeless tobacco: Never Used   • Tobacco comment: stopped smoking in 1995   Substance Use Topics   • Alcohol use: No     Frequency: Never     Comment: drinks one beer per month   • Drug use: No       I have reviewed the history of present illness, past medical history, family history, social history, lab results, all notes and other records since the patient was last seen on 10/30/19.    SUBJECTIVE:   The patient presented for a scheduled follow up appointment accompanied by his wife. He had some left chest pain which resolved. He follows with Dr. Dias for cardiac issues. He had recovered from a stroke and has had pneumonia. Is not on any blood thinners now. He was off prednisone.  Appetite was decreased.  He had a PET/ CT scan and he was here to review the results        REVIEW OF SYSTEMS:  Review of Systems   Constitutional: Positive for appetite change. Negative for chills, fever and unexpected weight change.   HENT: Negative for ear pain, mouth sores, nosebleeds and sore throat.    Eyes: Negative for photophobia and visual disturbance.   Respiratory: Positive for shortness of breath ( with exertion ). Negative for cough, wheezing and stridor.    Cardiovascular: Positive for chest pain ( yesterday ). Negative for palpitations.   Gastrointestinal: Negative for abdominal pain, diarrhea, nausea and vomiting.   Endocrine: Negative for cold intolerance and heat intolerance.   Genitourinary: Negative for dysuria and hematuria.   Musculoskeletal: Negative for joint swelling and neck stiffness.   Skin: Negative for color change and rash.   Neurological: Negative for dizziness, seizures, syncope and light-headedness.   Hematological: Negative for adenopathy.        No obvious bleeding   Psychiatric/Behavioral: Negative for agitation, confusion and hallucinations.     OBJECTIVE:    Vitals:    11/21/19 1321   BP: 153/75   Pulse: 79   Resp: 16  "  Temp: 98 °F (36.7 °C)   Weight: 99.4 kg (219 lb 3.2 oz)   Height: 167.6 cm (66\")   PainSc:   2   PainLoc: Generalized     ECOG:   Eastern Cooperative Oncology Group (ECOG, [place], World Health Organization) performance scale  0 Fully active; no performance restrictions.  1 Strenuous physical activity restricted; fully ambulatory and able to carry out light work.  2 Capable of all self-care but unable to carry out any work activities. Up and about >50% of waking hours.  3 Capable of only limited self-care; confined to bed or chair >50% of waking hours.  4 Completely disabled; cannot carry out any self-care; totally confined to bed or chair.    Physical Exam   Constitutional: He is oriented to person, place, and time. He appears well-developed and well-nourished. No distress.   obese   HENT:   Head: Normocephalic and atraumatic.   Mouth/Throat: He has dentures.   Male pattern baldness.    Eyes: Conjunctivae and EOM are normal. Right eye exhibits no discharge. Left eye exhibits no discharge. No scleral icterus.   Neck: Normal range of motion. Neck supple. No thyromegaly present.   Cardiovascular: Normal rate, regular rhythm and normal heart sounds. Exam reveals no gallop and no friction rub.   Pulmonary/Chest: Effort normal and breath sounds normal. No stridor. No respiratory distress. He has no wheezes.   Abdominal: Soft. Bowel sounds are normal. He exhibits no mass. There is no tenderness. There is no rebound and no guarding.   Musculoskeletal: Normal range of motion. He exhibits no tenderness.   Arthritic changes to the joints.    Lymphadenopathy:     He has no cervical adenopathy.   Neurological: He is alert and oriented to person, place, and time. He exhibits normal muscle tone.   Skin: Skin is warm and dry. Ecchymosis ( scattered) noted. No rash noted. He is not diaphoretic. No erythema.   Psychiatric: He has a normal mood and affect. His behavior is normal.   Nursing note and vitals reviewed.      RECENT " LABS  WBC   Date Value Ref Range Status   10/30/2019 7.19 3.40 - 10.80 10*3/mm3 Final     RBC   Date Value Ref Range Status   10/30/2019 3.84 (L) 4.14 - 5.80 10*6/mm3 Final     Hemoglobin   Date Value Ref Range Status   10/30/2019 11.9 (L) 13.0 - 17.7 g/dL Final     Hematocrit   Date Value Ref Range Status   10/30/2019 36.8 (L) 37.5 - 51.0 % Final     MCV   Date Value Ref Range Status   10/30/2019 95.8 79.0 - 97.0 fL Final     MCH   Date Value Ref Range Status   10/30/2019 31.0 26.6 - 33.0 pg Final     MCHC   Date Value Ref Range Status   10/30/2019 32.3 31.5 - 35.7 g/dL Final     RDW   Date Value Ref Range Status   10/30/2019 14.4 12.3 - 15.4 % Final     RDW-SD   Date Value Ref Range Status   10/30/2019 48.6 37.0 - 54.0 fl Final     MPV   Date Value Ref Range Status   10/30/2019 11.5 6.0 - 12.0 fL Final     Platelets   Date Value Ref Range Status   10/30/2019 190 140 - 450 10*3/mm3 Final     Neutrophil %   Date Value Ref Range Status   10/30/2019 92.4 (H) 42.7 - 76.0 % Final     Lymphocyte %   Date Value Ref Range Status   10/30/2019 5.6 (L) 19.6 - 45.3 % Final     Monocyte %   Date Value Ref Range Status   10/30/2019 1.8 (L) 5.0 - 12.0 % Final     Eosinophil %   Date Value Ref Range Status   10/30/2019 0.1 (L) 0.3 - 6.2 % Final     Basophil %   Date Value Ref Range Status   10/30/2019 0.1 0.0 - 1.5 % Final     Neutrophils, Absolute   Date Value Ref Range Status   10/30/2019 6.64 1.70 - 7.00 10*3/mm3 Final     Lymphocytes, Absolute   Date Value Ref Range Status   10/30/2019 0.40 (L) 0.70 - 3.10 10*3/mm3 Final     Monocytes, Absolute   Date Value Ref Range Status   10/30/2019 0.13 0.10 - 0.90 10*3/mm3 Final     Eosinophils, Absolute   Date Value Ref Range Status   10/30/2019 0.01 0.00 - 0.40 10*3/mm3 Final     Basophils, Absolute   Date Value Ref Range Status   10/30/2019 0.01 0.00 - 0.20 10*3/mm3 Final     nRBC   Date Value Ref Range Status   09/08/2019 0.0 0.0 - 0.2 /100 WBC Final       Lab Results   Component  Value Date    GLUCOSE 168 (H) 09/08/2019    BUN 28 (H) 09/12/2019    CREATININE 1.4 09/12/2019    EGFRIFNONA 53 (L) 09/08/2019    EGFRIFAFRI 68 11/03/2016    BCR 19.9 09/12/2019    K 4.1 09/12/2019    CO2 23 09/12/2019    CALCIUM 9.0 09/12/2019    ALBUMIN 3.7 09/09/2019    LABIL2 1.2 09/09/2019    AST 25 09/09/2019    ALT 20 09/09/2019     Assessment/Plan   ASSESSMENT:  1. Question of a mass in the left lower lobe lung on CT chest PE protocol. Not hypermetabolic on PET/CT scan.   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 Thrombocytopenia  4. ITP  5. History of bilateral pulmonary emboli    PLAN:  1. CBC, T-cell gene rearrangement, and immunoglobulins next visit  2. Refer to Dr. Drake to evaluate lung abnormality on PET/CT scan   3. RTC in 1 month with MD          I have reviewed lab results, imaging of PET/CT scan, vitals and medications with the patient today. Will follow up in 1 month with Dr. Anne.      Viewed results of PET/CT scan in detail.  The lung nodule is not hypermetabolic on PET/CT scan; however, needs additional work-up.  Patient has been referred to Dr. Rakan Jacobs for further evaluation for consideration of biopsy.     Patient verbalized understanding and is in agreement of the above plan.    I have reviewed and validated the information above.     Patient information and plan of care was reviewed with Dr. Anne.     Leola Garcia, APRN  11/21/19  1:39 PM

## 2019-12-05 ENCOUNTER — OFFICE VISIT (OUTPATIENT)
Dept: SURGERY | Facility: CLINIC | Age: 79
End: 2019-12-05

## 2019-12-05 VITALS
SYSTOLIC BLOOD PRESSURE: 177 MMHG | OXYGEN SATURATION: 97 % | DIASTOLIC BLOOD PRESSURE: 91 MMHG | WEIGHT: 220 LBS | BODY MASS INDEX: 35.36 KG/M2 | HEART RATE: 81 BPM | TEMPERATURE: 96.9 F | HEIGHT: 66 IN

## 2019-12-05 DIAGNOSIS — J43.1 PANLOBULAR EMPHYSEMA (HCC): ICD-10-CM

## 2019-12-05 DIAGNOSIS — R29.90 STROKE-LIKE SYMPTOMS: ICD-10-CM

## 2019-12-05 DIAGNOSIS — D46.Z OTHER MYELODYSPLASTIC SYNDROMES (HCC): ICD-10-CM

## 2019-12-05 DIAGNOSIS — J98.4 CAVITATING MASS OF LOWER LOBE OF LEFT LUNG: Primary | ICD-10-CM

## 2019-12-05 DIAGNOSIS — R06.02 EXERTIONAL SHORTNESS OF BREATH: ICD-10-CM

## 2019-12-05 DIAGNOSIS — M05.9 SEROPOSITIVE RHEUMATOID ARTHRITIS (HCC): ICD-10-CM

## 2019-12-05 DIAGNOSIS — C91.Z0 T-CELL CHRONIC LYMPHOCYTIC LEUKEMIA (HCC): ICD-10-CM

## 2019-12-05 DIAGNOSIS — I62.00 SUBDURAL HEMATOMA, NONTRAUMATIC (HCC): ICD-10-CM

## 2019-12-05 DIAGNOSIS — N18.30 CKD (CHRONIC KIDNEY DISEASE), STAGE III (HCC): ICD-10-CM

## 2019-12-05 PROBLEM — R94.39 ABNORMAL CARDIOVASCULAR STRESS TEST: Status: ACTIVE | Noted: 2018-04-05

## 2019-12-05 PROBLEM — E11.9 TYPE 2 DIABETES MELLITUS (HCC): Status: ACTIVE | Noted: 2017-05-01

## 2019-12-05 PROBLEM — Z86.711 HISTORY OF PULMONARY EMBOLISM: Status: ACTIVE | Noted: 2017-05-01

## 2019-12-05 PROBLEM — R42 DIZZINESS: Status: ACTIVE | Noted: 2018-03-15

## 2019-12-05 PROBLEM — I44.0 AV BLOCK, 1ST DEGREE: Status: ACTIVE | Noted: 2019-04-23

## 2019-12-05 PROBLEM — I26.99 BILATERAL PULMONARY EMBOLISM: Status: ACTIVE | Noted: 2018-01-01

## 2019-12-05 PROBLEM — E78.5 DYSLIPIDEMIA: Status: ACTIVE | Noted: 2018-03-15

## 2019-12-05 PROBLEM — G47.33 OSA (OBSTRUCTIVE SLEEP APNEA): Status: ACTIVE | Noted: 2019-12-05

## 2019-12-05 PROBLEM — R53.83 FATIGUE: Status: ACTIVE | Noted: 2018-03-15

## 2019-12-05 PROBLEM — I45.10 RBBB: Status: ACTIVE | Noted: 2019-04-23

## 2019-12-05 PROBLEM — I10 HYPERTENSION: Status: ACTIVE | Noted: 2018-03-15

## 2019-12-05 PROCEDURE — 99205 OFFICE O/P NEW HI 60 MIN: CPT | Performed by: THORACIC SURGERY (CARDIOTHORACIC VASCULAR SURGERY)

## 2019-12-05 NOTE — PROGRESS NOTES
Subjective   Chief Complaint   Patient presents with   • Lung Nodule     NEW PATIENT REFERRAL ONCOLOGY LUNG MASS   Praneeth Nam is a 79 y.o. male.      History of Present Illness  This patient presents with a small left-sided pleural effusion in association with a possibly cavitary nodule in the left lower lobe.  The patient presented in October with rapidly progressive shortness of breath sense of congestion left-sided pleuritic chest pain and a generalized feeling of illness.  He went to the emergency room where a CT scan was performed.  I personally examined the CT scan and the following PET CT scan.  I have reviewed the radiology results of both.  There is a small posterior pleural effusion and a non-PET active nodule within the left lower lobe.  There is no abnormal PET activity within the chest.  The patient has a history of T-cell large granular lymphocytic leukemia first diagnosed in 2005.  He has had extensive treatment for this in the past.  He also has rheumatoid arthritis.  His longstanding thrombocytopenia thought to be ITP.  He has longstanding anemia.  In May 2018 he was diagnosed with acute bilateral pulmonary emboli.  Of note the patient had a PE protocol CT scan in October and it was negative for pulmonary embolism.  The patient does not have any fevers chills or night sweats.  His recent medical history is also notable for subdural hematoma.  He was hospitalized and underwent rehabilitation.  He subsequently in the early fall developed a pneumonia.  I wonder whether the pneumonia has left this residual finding in the left chest.    The following portions of the patient's history were reviewed and updated as appropriate: allergies, current medications, past family history, past medical history, past social history, past surgical history and problem list.    I have personally reviewed the following radiographs and reviewed the radiology reports.  My independent finds are   see above           I  have reviewed the hospital record and reviewed the accompanying physician notes.  I have discussed the case with the following physicians: I reviewed the extensive medical oncology records and the results of which are reported in the history of present illness.    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, Stage II   • Hypercholesterolemia    • Hypertension    • Large granular lymphocytic leukemia (CMS/[facility]) 08/2005    T-Cell Large granular lymphocytic leukemia   • Neutropenia (CMS/[facility]) 11/2003   • MITZI (obstructive sleep apnea) 2018   • Psoriasis 2000   • Renal cyst, left    • Rheumatoid arthritis (CMS/[facility])    • Stroke (cerebrum) (CMS/[facility])    • Thrombocytopenia (CMS/[facility]) 08/23/2005     Social History     Social History Narrative   • Not on file     Social History     Tobacco Use   Smoking Status Former Smoker   Smokeless Tobacco Never Used   Tobacco Comment    stopped smoking in 1995     Family History   Problem Relation Age of Onset   • Lung cancer Brother         age unknown   • Heart disease Brother    • Stroke Mother    • Heart disease Father    • Heart disease Sister        Current Outpatient Medications:   •  atorvastatin (LIPITOR) 20 MG tablet, Take 20 mg by mouth Daily., Disp: , Rfl: 3  •  budesonide-formoterol (SYMBICORT) 160-4.5 MCG/ACT inhaler, Inhale 2 puffs 2 (Two) Times a Day., Disp: , Rfl:   •  fenofibrate 160 MG tablet, Take 160 mg by mouth Daily., Disp: , Rfl: 3  •  glipiZIDE (GLUCOTROL) 5 MG tablet, Take 5 mg by mouth 2 (Two) Times a Day Before Meals.,  "Disp: , Rfl:   •  glucosamine-chondroitin 500-400 MG capsule capsule, Take 1 capsule by mouth Daily., Disp: , Rfl:   •  ipratropium-albuterol (DUO-NEB) 0.5-2.5 mg/3 ml nebulizer, Take 3 mL by nebulization Every 4 (Four) Hours As Needed for Wheezing., Disp: , Rfl:   •  losartan (COZAAR) 100 MG tablet, Take 100 mg by mouth Daily., Disp: , Rfl: 3  •  Multiple Vitamins-Minerals (MULTIVITAMIN WITH MINERALS) tablet tablet, Take 1 tablet by mouth Daily., Disp: , Rfl:   •  pantoprazole (PROTONIX) 40 MG EC tablet, Take 40 mg by mouth Daily., Disp: , Rfl: 1  •  pentoxifylline (TRENtal) 400 MG CR tablet, Take 400 mg by mouth 3 (Three) Times a Day With Meals., Disp: , Rfl:   •  spironolactone (ALDACTONE) 25 MG tablet, Take 25 mg by mouth Daily., Disp: , Rfl: 3  •  levETIRAcetam (KEPPRA) 500 MG tablet, Take 500 mg by mouth 2 (Two) Times a Day., Disp: , Rfl:   •  metFORMIN ER (GLUCOPHAGE-XR) 500 MG 24 hr tablet, Take 1,000 mg by mouth Every Night., Disp: , Rfl:   •  Mirabegron ER (MYRBETRIQ) 50 MG tablet sustained-release 24 hour 24 hr tablet, Take 50 mg by mouth Daily., Disp: , Rfl:       REVIEW OF SYSTEMS  All systems have been reviewed.  Positive for shortness of breath and chest pain as described above.  Otherwise all other systems are negative.  Results are scanned into the chart.    Objective   /91   Pulse 81   Temp 96.9 °F (36.1 °C) (Oral)   Ht 167.6 cm (66\")   Wt 99.8 kg (220 lb)   SpO2 97%   BMI 35.51 kg/m²   Physical Exam   Constitutional: He is oriented to person, place, and time. No distress.   Elderly somewhat frail-appearing gentleman moderately obese in no distress.   HENT:   Head: Normocephalic and atraumatic.   Right Ear: External ear normal.   Left Ear: External ear normal.   Mouth/Throat: Oropharynx is clear and moist. No oropharyngeal exudate.   Multiple scalp seborrheic keratoses.  No malignancy noted.   Eyes: Conjunctivae and EOM are normal. Pupils are equal, round, and reactive to light. Right " eye exhibits no discharge. Left eye exhibits no discharge.   Neck: Normal range of motion. Neck supple. No JVD present. No tracheal deviation present. No thyromegaly present.   Cardiovascular: Normal rate.   Very distant heart sounds.  PMI not displaced.  No jugular venous distention.   Pulmonary/Chest: Effort normal.   Decreased breath sounds at the left base and dull to percussion at the left base consistent with pleural effusion.   Abdominal: Soft. Bowel sounds are normal. He exhibits no distension. There is no tenderness. There is no guarding.   Musculoskeletal: Normal range of motion. He exhibits no edema, tenderness or deformity.   Neurological: He is alert and oriented to person, place, and time. No cranial nerve deficit. He exhibits normal muscle tone.   Skin: Skin is warm. Capillary refill takes less than 2 seconds. No rash noted. He is not diaphoretic. No erythema.   Psychiatric: He has a normal mood and affect. His behavior is normal. Thought content normal.   Vitals reviewed.     Ulnar deviation of both hands thin skin subdermal bruising    Assessment/Plan   #1 left lower pleural effusion #2 pulmonary infiltrate-possible small nodule with cavitation #3 elderly and frail #4 rheumatoid arthritis with ulnar deviation of the hands  5.  History of pulmonary embolism  6.  History of leukemia  7.  History of anemia thrombocytopenia    Plan repeat CT scan in January  This patient has multiple comorbidities significant medical problems and is recently recovered from a subdural hematoma and recently recovered from pneumonia.  I think most likely the radiographic findings reflect recovery from pneumonia.  Although he has residual diminished breath sounds and dullness at the left base I think giving the patient a month to month and a half to recover is in order.  He is not symptomatic.  He is feeling better than he has in the last couple of months.  I recommend no intervention at this time.  Thoracentesis was  discussed with the patient but given the absolute PET negative findings and his asymptomatic status we will abstain  Procedures    Garett Drake MD  12/5/2019  2:48 PM

## 2019-12-07 ENCOUNTER — HOSPITAL ENCOUNTER (OUTPATIENT)
Dept: OTHER | Facility: HOSPITAL | Age: 79
Discharge: HOME | End: 2019-12-07

## 2019-12-07 DIAGNOSIS — Z00.6 EXAMINATION FOR NORMAL COMPARISON OR CONTROL IN CLINICAL RESEARCH: ICD-10-CM

## 2019-12-12 ENCOUNTER — OFFICE VISIT (OUTPATIENT)
Dept: ONCOLOGY | Facility: CLINIC | Age: 79
End: 2019-12-12

## 2019-12-12 ENCOUNTER — APPOINTMENT (OUTPATIENT)
Dept: LAB | Facility: HOSPITAL | Age: 79
End: 2019-12-12

## 2019-12-12 VITALS
TEMPERATURE: 97.5 F | WEIGHT: 218 LBS | BODY MASS INDEX: 35.03 KG/M2 | HEIGHT: 66 IN | DIASTOLIC BLOOD PRESSURE: 69 MMHG | RESPIRATION RATE: 20 BRPM | HEART RATE: 96 BPM | SYSTOLIC BLOOD PRESSURE: 119 MMHG

## 2019-12-12 DIAGNOSIS — R91.1 LUNG NODULE: ICD-10-CM

## 2019-12-12 DIAGNOSIS — R76.8 ELEVATED ANTINUCLEAR ANTIBODY (ANA) LEVEL: Primary | ICD-10-CM

## 2019-12-12 DIAGNOSIS — C91.Z0 T-CELL CHRONIC LYMPHOCYTIC LEUKEMIA (HCC): ICD-10-CM

## 2019-12-12 DIAGNOSIS — D64.9 ANEMIA, UNSPECIFIED TYPE: ICD-10-CM

## 2019-12-12 DIAGNOSIS — M06.9 RHEUMATOID ARTHRITIS INVOLVING MULTIPLE SITES, UNSPECIFIED RHEUMATOID FACTOR PRESENCE: ICD-10-CM

## 2019-12-12 LAB
BASOPHILS # BLD AUTO: 0.02 10*3/MM3 (ref 0–0.2)
BASOPHILS NFR BLD AUTO: 0.3 % (ref 0–1.5)
DEPRECATED RDW RBC AUTO: 53.3 FL (ref 37–54)
EOSINOPHIL # BLD AUTO: 0.64 10*3/MM3 (ref 0–0.4)
EOSINOPHIL NFR BLD AUTO: 10.4 % (ref 0.3–6.2)
ERYTHROCYTE [DISTWIDTH] IN BLOOD BY AUTOMATED COUNT: 15.8 % (ref 12.3–15.4)
HCT VFR BLD AUTO: 37.6 % (ref 37.5–51)
HGB BLD-MCNC: 12.2 G/DL (ref 13–17.7)
LYMPHOCYTES # BLD AUTO: 0.81 10*3/MM3 (ref 0.7–3.1)
LYMPHOCYTES NFR BLD AUTO: 13.1 % (ref 19.6–45.3)
MCH RBC QN AUTO: 30.6 PG (ref 26.6–33)
MCHC RBC AUTO-ENTMCNC: 32.4 G/DL (ref 31.5–35.7)
MCV RBC AUTO: 94.2 FL (ref 79–97)
MONOCYTES # BLD AUTO: 0.76 10*3/MM3 (ref 0.1–0.9)
MONOCYTES NFR BLD AUTO: 12.3 % (ref 5–12)
NEUTROPHILS # BLD AUTO: 3.95 10*3/MM3 (ref 1.7–7)
NEUTROPHILS NFR BLD AUTO: 63.9 % (ref 42.7–76)
PLATELET # BLD AUTO: 202 10*3/MM3 (ref 140–450)
PMV BLD AUTO: 11.2 FL (ref 6–12)
RBC # BLD AUTO: 3.99 10*6/MM3 (ref 4.14–5.8)
WBC NRBC COR # BLD: 6.18 10*3/MM3 (ref 3.4–10.8)

## 2019-12-12 PROCEDURE — 99215 OFFICE O/P EST HI 40 MIN: CPT | Performed by: INTERNAL MEDICINE

## 2019-12-12 PROCEDURE — 85025 COMPLETE CBC W/AUTO DIFF WBC: CPT | Performed by: INTERNAL MEDICINE

## 2019-12-12 PROCEDURE — 36415 COLL VENOUS BLD VENIPUNCTURE: CPT | Performed by: INTERNAL MEDICINE

## 2019-12-12 RX ORDER — CIPROFLOXACIN 500 MG/1
TABLET, FILM COATED ORAL
Refills: 0 | COMMUNITY
Start: 2019-12-09 | End: 2021-04-13

## 2019-12-12 RX ORDER — HYDROXYCHLOROQUINE SULFATE 200 MG/1
TABLET, FILM COATED ORAL
Refills: 0 | COMMUNITY
Start: 2019-12-09

## 2019-12-12 NOTE — PROGRESS NOTES
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  · The patient has a history of rheumatoid arthritis, hypertension and diabetes mellitus and was admitted to the hospital with right lower extremity cellulitis in 2005.  He was found to have an absolute neutropenia and hematology consultation was called.  Review of hospital records reveal him to have had neutropenia and hematology dating back to 2003 and monocytosis dating back to 2002.  He was on methotrexate and Gold for his rheumatoid arthritis longtime ago, but had not taken anything prior to his hospitalization.  He saw Dr. Jason for a regular follow up and was feeling well, but the following day, five days prior to hospital admission, started feeling ill and run down and developed hyperemia of his right lower extremity with chills.  He was seen by Dr. Stein and given cephalosporin and his leg did not get better, so he was admitted to the hospital.  · 05 - Biopsy revealed normocellular bone marrow with suggestion of lymphoid Infiltrate.  Flow cytometry analysis, T-cell gene rearrangement indicates conal T-cell process consistent with leukemia of large granular lymphocytes (CD 3+), cytogenetics normal.  · 05 - Iron 40 (L), ferritin 385 (H), TIBC 215 (L), EBV IgG positive titer 1:160, EBV IgM negative.  Blood culture with no growth after five days.  B12 was 639 (N), folate 13.1 (N), CMV IgG and IgM were normal, LAP 75 (N), PNH normal.  · 8/10/05 - Patient started on treatment with Neupogen 300 mcg sub q Monday through Friday.  · 05 - Neupogen increased to 480  mcg.  · 9/20/05 - Neupogen decreased to 480 mcg Monday and Thursday.  · 12/15/05 - Neupogen decreased to 480 mcg weekly.  · 7/13/06 - Peripheral blood T-cell gene rearrangement result of no clonal T-cell gene rearrangement detected.  · 8/10/06 - Bone marrow aspiration and biopsy with results of flow cytometry reveals residual population of large granular lymphocytes.  · 1/25/07 - Hold Neupogen whenever ANC > 3.5.  · 5/16/07 - Chest x-ray result of no active lung disease and thoracic degenerative change.  · 8/20/07 - Chest x-ray result of mild obstructive airway disease with scattered areas of minor parenchymal scarring and fibrosis.  · 8/23/07 - T-cell gene rearrangement, insufficient DNA extracted to perform Southern blot analysis.  Equivocal TCR gene rearrangement analysis by PCR.  · 11/1/07 - Sed rate (N) at 5.  · 1/14/08 - Peripheral blood T-cell receptor gene rearrangement analysis.  In both the PCR and [place] blot assays somewhat discreet bands are present in a polyclonal background.  The PCR banding pattern is somewhat similar to that seen in a previously analyzed specimen with equivocal TCR PCR results.  · 3/06/08 - Bone marrow aspiration and biopsy revealed normocellular bone marrow reduced 1+ iron stores, leukemia of large granular lymphocytes identified by flow cytometry.  Cytogenetics (N).  Flow cytometry immunophenotyping revealed a residual population of the patient’s leukemia of large granular lymphocytes.  This Immunophenotype is identical to what was detected in the previous study of bone marrow from 8/10/08.  · 4/3/08 - Neupogen discontinued.  The patient to start on methotrexate 20 mg p.o. weekly and Folate 2 mg p.o. q.d.  · 4/7/08 - Pulmonary function analysis showed mild restriction improved after dilatation.  · 7/3/08 - T-cell receptor gene rearrangement, equivocal TCR gene rearrangement analysis by PCR no clonal TCR gene rearrangement was detected by Southern blot analysis.  · 7/11/08 -  Echocardiogram with ejection fraction of 54%.  · 12/1/08 - T-cell gene rearrangement, negative.  · 3/2/09 - Bone marrow aspirate with residual leukemia of large granular lymphocytes, CD3+, identified by flow cytometric analysis.  Normal iron stores.  · 4/6/09 - Patient to discontinue methotrexate and folic acid due to normal counts.  · 6/1/09 - Chest x-ray with obscuration of the posterior margin of one or both hemidiaphragms, which could represent atelectasis or infiltrate.  · 6/9/09 - CT scan of the chest positive for pulmonary embolus in the left lower lobe.  Mild right basilar infiltrate.  · 6/10/09 - Venous duplex normal.  · 6/11/09 - Chest x-ray with mild density in the left lung base improved from 6/1/09.  Patient started on Biaxin 500 mg b.i.d. for 10 days and Omnicef 300 mg b.i.d. for 10 days by Dr. Thomas.  · 8/10/09 - T-cell gene rearrangement, PCR on peripheral blood, negative.  · 12/28/09 - Chest x-ray with increased bronchial markings.  Arthritic changes of the T-spine.  · 3/15/10 - Bone marrow aspirate smears, normal.  Bone marrow aspirate clot with aggregates of mononuclear cells suspicious for residual leukemia.  Flow cytometry with residual population of cells, which are present in the patient's leukemia of large granular lymphocytes (DD3 positive) and quantitate at approximately 1.3% of total events.  Cytogenetics normal.  · 5/17/10 - Hemoglobin 15.6.  · 11/30/10 - Hemoglobin 14.3.  · 2/2/11 - TCR gene rearrangement negative.  Chest x-ray no active lung disease.  · 9/11/11 - Immunofixation urine no monoclonal proteins seen in urine.  · 12/5/11 - TCR gene rearrangement negative.  · 7/10/12 - TCR gene rearrangement negative.  · 6/17/13 - Hemoglobin 15.0, WBC 4.80, platelet count is 176,000.  T-cell gene rearrangement negative.  No clonal T-cell gene arrangement is detected.  · 6/26/14 - Hemoglobin 14.7, WBC 6.39, platelet count 150,000.  T-cell gene rearrangement by PCR revealed inconclusive for a  clonal T-cell gene rearrangement.  · 10/22/14 - patient was started on methotrexate by Dr. Peter on for the arthritis.  He had ordered lab. SPEP showed the increase in albumin may be due to insufficient protein, interferential malabsorption, impaired synthesis, protein losing enteropathy, nephrotic syndrome, and various other disorders.  No monoclonal immunoglobulin detected.  NAIMA positive.  NAIMA titer 1:160 (H), CRP 2.66 (H)  · 2/23/15 - Chest x-ray with bilateral basilar densities and emphysema.   · 3/30/15 - hemoglobin 14.0, WBC 4.62, platelet count 180,000. T-cell gene rearrangement inconclusive.   · 7/27/15 - T-cell gene rearrangement by PCR positive for clonal T-cell gene rearrangement.   · 10/20/15 - Chest x-ray performed while patient in the hospital revealed asymmetric right apical opacity. CT of the abdomen and pelvis with dilated loops of jejunum, diverticulosis, cholelithiasis, small to moderate sized hernia.   · 11/25/15 - Comprehensive metabolic panel with glucose 183 (65-99), creatinine 1.4 (0.7-1.2).     · 3/23/16 - WBC 5.33, hemoglobin 14.8, platelet count 163,000 with 61% neutrophils, 22% lymphocytes, 13% monocytes, 5% eosinophils. Discussed T-cell gene rearrangement results with the patient. He claims not to be taking the Methotrexate weekly, but only as needed for his arthritis. I asked him to start taking it a dose of 20 mg weekly along with Folate 1 mg p.o. daily. T-cell gene rearrangement was inconclusive for clonal T-cell gene rearrangement.   · 3/24/16 - Chest x-ray revealed a right apical opacity which had cleared since the prior exam and likely represented atelectasis or positioning change.   · 5/4/16 - Pulmonary function studies performed revealed mild obstructive lung disease with good response to bronchodilators.   · 6/8/16 - WBC 5.1, ANC 3.2, hemoglobin 13.6, MCV 99.0 and platelet count 163,000.   · 7/5/16 - Comprehensive metabolic panel with no significant abnormality.   · 9/6/16 -  Discussed use of Methotrexate with patient. He is on it for his arthritis and I have told him that he can come off it as his counts are good and the gene rearrangement is still positive, so the Methotrexate may not be making a difference, but I will leave the decision to him and Dr. Peter. Methotrexate discontinued by Dr. Peter.   · 12/1/16 - WBC 6.2 with 66% neutrophils, 21% lymphocytes, 9% monocytes, hemoglobin 14.9, platelet count 172,000. T-cell gene rearrangement inconclusive as one or two distinct peaks were observed within the normal polyclonal size range but were below the peak cutoff for defined positivity.   · 2/2/17 - Comprehensive metabolic panel with no significant abnormality. Creatinine 1.2 (0.7-1.2)   · 4/14/17 - Chest x-ray with persistent basilar density suggestive of residual scar or atelectasis.   · 5/1/17 - Patient seen in followup by Dr. Peter with decision to observe for a period of time as the patient was asymptomatic and followup in the future to evaluate for further treatment.   · 7/6/17 - WBC 4.9 with 56% neutrophils, 25% lymphocytes, 11% monocytes, hemoglobin 14.6, platelet count 170,000.   · 10/4/17 - WBC 5.6 with 63% neutrophils, 21% lymphocytes, 10% monocytes, hemoglobin 14, .9, platelet count 166,000. Patient claims to have started back on Methotrexate for arthritis through Dr. Peter in July 2017. Peripheral blood flow cytometry negative for acute leukemia or lymphoproliferative disorder. T-cell gene rearrangement by PCR negative - no clonal T-cell gene rearrangement detected.   · 6/5/18 - T-cell gene rearrangement by PCR negative.   · 10/16/18 - Patient claims not to be taking Methotrexate at present.   · 4/23/19 - WBC 5.4 with 57% neutrophils, 26% lymphocytes, 12% monocytes, hemoglobin 13.7, platelet count 11,000.  Patient asked to hold Aspirin and Warfarin while scheduling bone marrow biopsy. Prescription written for Prednisone 100 mg p.o. daily. T-cell PCR performed on bone  marrow inconclusive for a clonal T-cell rearrangement.  · 5/15/19 - IgA 131 (), IgG 561 (600-1500), IgM 48 ().    · 11/21/2019 T-cell gene rearrangement testing by PCR: Positive.  Clonal T-cell gene rearrangement noted.  Clonal T-cell population is not synonymous with T-cell lymphoproliferative disorder.,  IgG 596 low, IgM 44 normal, IgA 114,  · 10/28/2019 WBC 5.2, hemoglobin 12, platelets 183, MCV 97  · 11/20/2019 PET/CT scan: Mild hypermetabolic uptake within the larynx which is probably physiologic.  No abnormal hypermetabolic activity seen within the chest abdomen and pelvis.  2 cm noncalcified nodule within the left lower lobe which is not hypermetabolic.  Moderate left pleural effusion with bibasilar airspace disease favored to be from atelectasis.  Cholelithiasis.     · 12/12/2019 WBC 6.1, hemoglobin 12.2, MCV 94.2, platelets 202   · 11/20/2019 PET/CT scan: Mild hypermetabolic uptake within the larynx which is physiologic.  No abnormal activity in the chest abdomen or pelvis.  2 cm noncalcified nodule within the left lower lobe is not hypermetabolic.  Moderate left pleural effusion with bibasilar airspace disease favored to be secondary to atelectasis.  Colonic diverticulosis.    2.   Thrombocytopenia diagnosis established 8/23/05 and coagulopathy diagnosis        established July 2008. 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   · 8/1/05 - CMV IgG 42, positive, CMV IgM was negative, LAP 75 (N), vitamin B12 was 639 (N), folate 13.1 (N).  · 8/23/05 - Platelet count 90,000 (L).  · 2/7/08 - Platelet count 137,000 (L).  · 10/1/08 - Corrected.  · 4/23/19 - Platelet count 11,000. Patient started on Prednisone 100 mg p.o. daily. Sternal bone marrow aspiration with cellular marrow with maturing trilineage hematopoiesis, megakaryocytic hyperplasia with cytologic atypia, adequate storage iron with absent ringed sideroblasts. Flow cytometry with no evidence of an abnormal cell type. Immunohistochemistry with no  significant lymphoid infiltrate or increase in blasts. Megakaryocytes increased in number. [employer] NGS MDS panel sequencing detected. 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  Cytogenetics were normal, 46 XY.  · 4/26/19 - Platelet count 77,000. Prednisone dose decreased to 80 mg p.o. daily with 20 mg weekly taper initiation. Aspirin 81 mg daily resumed.   · 5/2/19 - Platelets 92,000. Prednisone taper continued. PT 50.7 (H), INR 4.1 (H), PTT 33.3 seconds (23.9-36.9). PT INR faxed to Dr. Jason. Platelet antibody screen positive for group 2b/3A and group 1a/2a. EBV IgG 369 (H), IgM negative. CMV IgG 13.1 (H), IgM 0.6 negative. CMP remarkable for a BUN of 40 (H). Ordered to increase oral fluids. B12 of 323 (N),  (N), folic acid 17.9 (N).   · 5/10/19 - Platelet count 31,000. Patient taking Prednisone 40 mg daily. Dose was increased to 60 mg p.o. daily.   · 5/15/19 - IV IG 1 g/kg IV daily x2 ordered for persistent thrombocytopenia. Platelet count 47,000 on 60 mg Prednisone p.o. daily. Prednisone was continued until followup. Weekly CBC’s continued.   · 5/30/19 - IV IgG 1 g/kg given on 5/30/19 and 5/31/19 with platelet count of 114,000 on 5/30/19.   · 6/7/19 - Patient on 40 mg of Prednisone daily. Platelet count 150,000. Asked to drop to 20 mg daily.  · 7/18/19 platelet count 180,000 on prednisone 10 mg every other day.  Asked him to decrease prednisone to 5 mg every other day.  · 10/28/2019 patient seen in the emergency room at [facility] with a persistent cough and CT chest revealing possible mass in left lower lobe.  Patient treated with Levaquin and prednisone 60 mg p.o. daily for 10 days as he was not on any prednisone at that time.  Platelet count was 183,000.  3.         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  · 7/3/08 - Hemoglobin  12.5, platelet count 128,000.  RBC folate (N) at 300, retic count (H) at 2.33, vitamin B12 (N) at 281, iron (N) at 56, ferritin (N) 302, haptoglobin (N) 128, TIBC (N) at 419, iron saturation (L) at 13. Platelet count 128,000. PT (N) at 11.0, INR (N) 1.0, PTT (L) <21.0.   · 10/1/08 - Corrected.  · 12/5/11 - Retic count 2.4 (N), iron 83 (N), TIBC 436 (H), iron sat % 19 (L), ferritin 338 (N) vitamin B12 291 (N), folate 675 (N), haptoglobin 200 (N).  · 12/23/11 -  (N).  · 6/26/14 - hemoglobin 14.7  · 10/22/14 - patient was started on methotrexate by Dr. Peter on for the arthritis.  He had ordered lab. SPEP showed the increase in albumin may be due to insufficient protein, interferential malabsorption, impaired synthesis, protein losing enteropathy, nephrotic syndrome, and various other disorders.  No monoclonal immunoglobulin detected.  NAIMA positive.  NAIMA titer 1:160 (H), CRP 2.66 (H)  · 10/4/16 - Comprehensive metabolic panel with creatinine 1.3 (0.7-1.2).    · 7/6/17 - Hemoglobin 14.6, hematocrit 43.9, MCV 98.7, WBC 4.9 and platelet count 170,000.   · 6/5/18 - Patient has not taken Methotrexate dose for one month.   · 6/6/18 - Ferrous sulfate 325 mg p.o. two times a day ordered.   · 6/12/18 - Stool heme positive on stool cards.  Protonix 40 mg tablet take one tablet daily prescribed.  Referred to [facility].  Last colonoscopy in 2005.    · 8/1/18 - Colonoscopy at [facility] by Kishor Beard M.D. revealed mild diverticulosis of descending and sigmoid colon, internal hemorrhoids and 3-5 mm polyps in the ascending sigmoid colon and rectum. Pathology on ascending colon polyp revealed fragments of tubular adenoma with a few fragments exhibiting a benign lymphoid aggregate. Pathology on sigmoid colon polyp revealed fragments of tubular adenoma with focal superficial acute inflammation and rectal polyp biopsy revealed a hyperplastic polyp. Repeat colonoscopy was recommended in five years.   · 8/2/18 - Hemoglobin 13.6, MCV 98.8.  Patient tolerating Ferrous Sulfate 325 mg p.o. b.i.d. without noted side effects. Orders written to continue this at present and orders written to obtain colonoscopy report performed at [facility] 8/1/18.   · 10/16/18 - Patient asked to reduce Ferrous Sulfate to 325 mg p.o. daily. Ferritin 123 ().   · 1/15/19 - Hemoglobin 14.5, MCV 98.4. Ferrous Sulfate discontinued.    4.   Acute bilateral pulmonary emboli diagnosed May 2018.    · 5/21/18 - Patient hospitalized at [facility] between 5/21/18 and 5/23/18 with several week history of exertional shortness of breath increasing on the day of admission. He had had a recent left heart catheterization that was normal. CT chest PE protocol revealed bilateral acute pulmonary embolic disease. Hematology consultation was not obtained. Emphysema and mild interstitial basilar fibrotic changes, mild dependent atelectasis in the lower lobes, cholelithiasis, fatty infiltration of the liver and small hiatal hernia were noted also. His hemoglobin was 14.5 on admission and 12.9 on discharge. D-dimer was 4.8 (0.17-0.59). PT PTT were 12.4 and 22.7 on admission. Creatinine 1.2 (0.7-1.2). The patient was started on a Heparin drip and then switched to Xarelto 15 mg b.i.d. for 21 days then 20 mg daily.     · 6/5/18 - Anti-phosphatidylserine IgG 4 (N), IgM 25 (H).  Factor VIII activity 145 (H).  AT  (H).  Protein C 122 (N).  Protein S 173 (H).  Anti-cardiolipin antibody and anti-beta-2 glycoprotein all normal.  Factor V Leiden gene mutation not detected.  Prothrombin gene mutation not detected.    · 8/2/18 - PT PTT 16.5 and 32.2 seconds. Lupus anticoagulant inconclusive. Serum homocysteine 12.5 (<15).    · 8/7/18 - Bilateral lower extremity venous Doppler report with no evidence of any deep venous thrombosis or SVT. Lymph node noted in the left groin and in the right groin.   · 10/16/18 - Factor VIII activity 136 ().   · 1/15/19 - Patient reports he is no longer taking Xarelto and is on  Coumadin with INR’s followed by Dr. Jason. Blood pressure 188/78 with patient reporting elevated blood pressures at home. Patient instructed to contact Dr. Jason for adjustments in his medications. Antiphospholipid antibodies negative.   · 5/2/19 - Factor VIII level 235 (H).  PT 50.7, INR 4.1 (H). PT INR faxed to Dr. Jason.  · 9/6/2019 patient was hospitalized at [facility] for 2 days with a nontraumatic subdural hematoma.  · 9/9/2019 patient hospitalized at [facility] with right facial droop with MRI brain revealing acute/subacute left cerebral hemisphere subdural hemorrhage and subarachnoid hemorrhage.  Atrophy and ischemic white matter disease was present.  Small punctate remote hemorrhage in the left temporal lobe was present.  Patient taken off warfarin and aspirin.  · 10/12/19 - Chest x-ray showed no significant changes compared to the previous study. Redemonstration of infiltrates within the bilateral lower lobes with a small left sided effusion likely related to pneumonia. No acute osseous abnormality.   · 10/28/19 - CT PE protocol showed no definite pulmonary embolism identified. There is effacement of the proximal segmental branches of the left lower lobe pulmonary artery likely due to a mass or lymph node present within this region. The defect appears extraluminal and not definitely intraluminal to suggest a pulmonary embolism. Patchy airspace disease is present within the left lower lobe with a questionable cavitary lesion which may represent an underlying malignancy. There is a small left sided pleural effusion. Mild patchy airspace changes are also present within the lung bases bilaterally which may represent superimposed pneumonia.       5.  Left lower lobe pulmonary nodule 2 cm noncalcified: PET scan does not show hypermetabolic activity.          Subjective:12/12/19  Here for a follow up. He has Rheumatoid arthritis and take plaquenil for this. He does not take steroids at this time. He states that  his hands are swollen at this time.  He has seen Dr. Drake recently and he has ordered a CT scan. He states that.Dr. Drake does not want to do a biopsy at this time.  Does not report excessive bruising or bleeding.  Is not on any methotrexate at this time.  Has a history of CKD but has not followed up with any nephrologist recently        The following portions of the patient's history were reviewed and updated as appropriate: allergies, current medications, past family history, past medical history, past social history, past surgical history and problem list.         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, Stage II   • Hypercholesterolemia    • Hypertension    • Large granular lymphocytic leukemia (CMS/[facility]) 08/2005    T-Cell Large granular lymphocytic leukemia   • Neutropenia (CMS/[facility]) 11/2003   • MITZI (obstructive sleep apnea) 2018   • Psoriasis 2000   • Renal cyst, left    • Rheumatoid arthritis (CMS/[facility])    • Stroke (cerebrum) (CMS/[facility])    • Thrombocytopenia (CMS/[facility]) 08/23/2005       Past Surgical History:   Procedure Laterality Date   • SKIN LESION EXCISION      back and groin-benign         Current Outpatient Medications:   •  atorvastatin (LIPITOR) 20 MG tablet, Take 20 mg by mouth Daily., Disp: , Rfl: 3  •  budesonide-formoterol (SYMBICORT) 160-4.5 MCG/ACT inhaler, Inhale 2 puffs 2 (Two) Times a Day., Disp: , Rfl:   •  ciprofloxacin (CIPRO) 500 MG tablet, TK 1 T PO  Q 12 H, Disp: , Rfl: 0  •  fenofibrate 160 MG tablet, Take 160 mg by mouth Daily., Disp: ,  Rfl: 3  •  glipiZIDE (GLUCOTROL) 5 MG tablet, Take 5 mg by mouth 2 (Two) Times a Day Before Meals., Disp: , Rfl:   •  glucosamine-chondroitin 500-400 MG capsule capsule, Take 1 capsule by mouth Daily., Disp: , Rfl:   •  hydroxychloroquine (PLAQUENIL) 200 MG tablet, TK 2 TS PO ONCE DAILY, Disp: , Rfl: 0  •  ipratropium-albuterol (DUO-NEB) 0.5-2.5 mg/3 ml nebulizer, Take 3 mL by nebulization Every 4 (Four) Hours As Needed for Wheezing., Disp: , Rfl:   •  losartan (COZAAR) 100 MG tablet, Take 100 mg by mouth Daily., Disp: , Rfl: 3  •  Multiple Vitamins-Minerals (MULTIVITAMIN WITH MINERALS) tablet tablet, Take 1 tablet by mouth Daily., Disp: , Rfl:   •  pantoprazole (PROTONIX) 40 MG EC tablet, Take 40 mg by mouth Daily., Disp: , Rfl: 1  •  pentoxifylline (TRENtal) 400 MG CR tablet, Take 400 mg by mouth 3 (Three) Times a Day With Meals., Disp: , Rfl:   •  spironolactone (ALDACTONE) 25 MG tablet, Take 25 mg by mouth Daily., Disp: , Rfl: 3  •  levETIRAcetam (KEPPRA) 500 MG tablet, Take 500 mg by mouth 2 (Two) Times a Day., Disp: , Rfl:   •  metFORMIN ER (GLUCOPHAGE-XR) 500 MG 24 hr tablet, Take 1,000 mg by mouth Every Night., Disp: , Rfl:   •  Mirabegron ER (MYRBETRIQ) 50 MG tablet sustained-release 24 hour 24 hr tablet, Take 50 mg by mouth Daily., Disp: , Rfl:     Allergies   Allergen Reactions   • Penicillin V Potassium Swelling   • Latex Hives       Family History   Problem Relation Age of Onset   • Lung cancer Brother         age unknown   • Heart disease Brother    • Stroke Mother    • Heart disease Father    • Heart disease Sister        Cancer-related family history includes Lung cancer in his brother.    Social History     Tobacco Use   • Smoking status: Former Smoker   • Smokeless tobacco: Never Used   • Tobacco comment: stopped smoking in 1995   Substance Use Topics   • Alcohol use: No     Frequency: Never   • Drug use: No           I have reviewed the history of present illness, past medical history, family  "history, social history, lab results, all notes and other records since the patient was last seen on  11/21/2019.    ROS:   Review of Systems   Constitutional: Negative for chills and fever.   HENT: Negative for ear pain, mouth sores, nosebleeds and sore throat.    Eyes: Negative for photophobia and visual disturbance.   Respiratory: Positive for cough and shortness of breath. Negative for wheezing and stridor.    Cardiovascular: Negative for chest pain and palpitations.   Gastrointestinal: Negative for abdominal pain, diarrhea, nausea and vomiting.   Endocrine: Negative for cold intolerance and heat intolerance.   Genitourinary: Negative for dysuria and hematuria.   Musculoskeletal: Positive for joint swelling (bilateral hands swollen due to RA). Negative for neck stiffness.   Skin: Negative for color change and rash.   Neurological: Negative for seizures and syncope.   Hematological: Negative for adenopathy.        No obvious bleeding   Psychiatric/Behavioral: Negative for agitation, confusion and hallucinations.       Objective:  Vital signs:  Vitals:    12/12/19 1333   BP: 119/69   Pulse: 96   Resp: 20   Temp: 97.5 °F (36.4 °C)   Weight: 98.9 kg (218 lb)   Height: 167.6 cm (66\")   PainSc:   8   PainLoc: Back  Comment: All over body     ECOG  (2) Ambulatory and capable of self care, unable to carry out work activity, up and about > 50% or waking hours    Physical Exam:   Physical Exam   Constitutional: He is oriented to person, place, and time. He appears well-developed and well-nourished. No distress.   Obese male   HENT:   Head: Normocephalic and atraumatic.   Eyes: Conjunctivae and EOM are normal. Right eye exhibits no discharge. Left eye exhibits no discharge. No scleral icterus.   Neck: Normal range of motion. Neck supple. No thyromegaly present.   Cardiovascular: Normal rate, regular rhythm and normal heart sounds. Exam reveals no gallop and no friction rub.   Pulmonary/Chest: Effort normal. No stridor. No " respiratory distress. He has no wheezes. He has rales.   Abdominal: Soft. Bowel sounds are normal. He exhibits no mass. There is no tenderness. There is no rebound and no guarding.   Musculoskeletal: Normal range of motion. He exhibits tenderness and deformity.   Multiple joint deformities due to rheumatoid arthritis.  Especially in the hands   Lymphadenopathy:     He has no cervical adenopathy.   Neurological: He is alert and oriented to person, place, and time. He exhibits normal muscle tone.   Skin: Skin is warm. No rash noted. He is not diaphoretic. No erythema.   Psychiatric: He has a normal mood and affect. His behavior is normal.   Nursing note and vitals reviewed.            Lab Results - Last 18 Months   Lab Units 12/12/19  1338 11/21/19  1332 10/30/19  1430   WBC 10*3/mm3 6.18 4.25 7.19   HEMOGLOBIN g/dL 12.2* 11.3* 11.9*   HEMATOCRIT % 37.6 35.3* 36.8*   PLATELETS 10*3/mm3 202 204 190   MCV fL 94.2 96.2 95.8     Lab Results - Last 18 Months   Lab Units 09/12/19  1100 09/11/19  1321 09/09/19  1204 09/08/19  0428 09/07/19  1315 09/07/19  0341 09/06/19  1231   SODIUM mmol/L 142 141 141 135* 139 137 138   POTASSIUM mmol/L 4.1 4.1 4.1 3.8 3.7 3.6 4.0   CHLORIDE mmol/L 107 107 107 103 107 105 107   TOTAL CO2 mmol/L 23 23 23  --   --   --   --    CO2 mmol/L  --   --   --  23.0 22.0 21.0* 20.0*   BUN mg/dL 28* 30* 24* 18 13 14 19   CREATININE mg/dL 1.4 1.9* 1.1 1.30* 1.10 1.10 1.10   CALCIUM mg/dL 9.0 8.8 9.4 8.3* 8.6* 8.5* 8.5*   BILIRUBIN mg/dL  --   --  0.6  --  0.6  --  0.8   ALK PHOS U/L  --   --  61  --  42  --  40   ALT (SGPT) U/L  --   --  20  --  17  --  17   AST (SGOT) U/L  --   --  25  --  22  --  28   GLUCOSE mg/dL  --   --   --  168* 144* 147* 139*       Lab Results   Component Value Date    GLUCOSE 168 (H) 09/08/2019    BUN 28 (H) 09/12/2019    CREATININE 1.4 09/12/2019    EGFRIFNONA 53 (L) 09/08/2019    EGFRIFAFRI 68 11/03/2016    BCR 19.9 09/12/2019    K 4.1 09/12/2019    CO2 23 09/12/2019     CALCIUM 9.0 09/12/2019    ALBUMIN 3.7 09/09/2019    LABIL2 1.2 09/09/2019    AST 25 09/09/2019    ALT 20 09/09/2019       Lab Results - Last 18 Months   Lab Units 09/09/19  1204 09/06/19  1231 05/15/19  0847   INR INR 1.2 1.69* 3.8*   APTT s 27.9  --   --        Lab Results   Component Value Date    IRON 66 06/05/2018    IRON 66 06/05/2018    TIBC 384 06/05/2018    FERRITIN 123 10/16/2018       Lab Results   Component Value Date    FOLATE 22.30 09/07/2019       No results found for: OCCULTBLD    Lab Results   Component Value Date    RETICCTPCT 6.24 (H) 06/05/2018       Lab Results   Component Value Date    BNHXLHPU75 623 09/07/2019     No results found for: SPEP, UPEP  No results found for: LDH, URICACID  No results found for: NAIMA, RF, SEDRATE  Lab Results   Component Value Date    HAPTOGLOBIN 105 06/05/2018     Lab Results   Component Value Date    PTT 27.9 09/09/2019    INR 1.2 09/09/2019     No results found for:   No results found for: CEA  No components found for: CA-19-9  No results found for: PSA      Assessment/Plan     Assessment:  T-cell large granular lymphocytic leukemia   - CBC & Differential  - CBC Auto Differential  - [facility] Requisition ([facility] Only)     Chronic ITP (idiopathic thrombocytopenia) (CMS/HCC)     Rheumatoid arthritis involving multiple sites, unspecified rheumatoid factor presence (CMS/HCC)     CKD (chronic kidney disease), stage III (CMS/HCC)     Bilateral pulmonary embolism (CMS/HCC)     Elevated factor VIII level     Other myelodysplastic syndromes (CMS/HCC)     Hypogammaglobulinemia (CMS/HCC)  - IgG  - IgM  - IgA     Subdural hematoma, nontraumatic (CMS/HCC)     Cavitating mass of lower lobe of left lung  - NM Pet Skull Base To Mid Thigh     Other nonspecific abnormal finding of lung field   - NM Pet Skull Base To Mid Thigh        ASSESSMENT:     1. Left lower lobe lung nodule on CT chest PE protocol. Not hypermetabolic on PET/CT scan.   Likely it could be a resolving pneumonia  or could be rheumatoid nodule  2. T-cell large granular lymphocytic (LGL) leukemia: likely related to RA.  Previously was treated with methotrexate.  Now he is off.  He is on Plaquenil. His LGL leukemia is stable.  No evidence of any neutropenia or thrombocytopenia.  Reviewed the result of the T-cell gene rearrangement testing from #2019.  3. Intermittent Thrombocytopenia:-   4. ITP  5. Anemia of CKD  6. History of bilateral pulmonary emboli: Has a history of elevated factor VIII level.      PLAN:  1. Patient will need repeat CT scan to monitor the left lower lobe lung nodule.  He will get it done through Dr. Drake.   2. His LGL leukemia is stable.  No evidence of any neutropenia or thrombocytopenia.  3. Follow patient back in 2 and half months with repeat CBC, iron levels, ESR.                    T-cell large granular lymphocytic leukemia   - CBC & Differential  - CBC Auto Differential  - CBC & Differential  - Ferritin  - Iron Profile  - Sedimentation Rate    Elevated antinuclear antibody (NAIMA) level  - Sedimentation Rate    Rheumatoid arthritis involving multiple sites, unspecified rheumatoid factor presence (CMS/HCC)  - Sedimentation Rate    Anemia, unspecified type  - CBC & Differential  - Ferritin  - Iron Profile    Orders Placed This Encounter   Procedures   • CBC Auto Differential   • Ferritin     Standing Status:   Future     Scheduling Instructions:      Please draw these labs prior to the next visit.   • Iron Profile     Standing Status:   Future     Scheduling Instructions:      Please draw these labs prior to the next visit.   • Sedimentation Rate   • CBC & Differential     Please draw these labs prior to the next visit.     Standing Status:   Future     Scheduling Instructions:      Please draw these labs prior to the next visit.     Order Specific Question:   Manual Differential     Answer:   No                   Thank you very much for providing the opportunity to participate in this patient’s  care. Please do not hesitate to call if there are any other questions.    I have reviewed all relevant labs results, outside reports, imaging, notes, vital signs, medications, and plan with the patient today.    Portions of the note have been scribed by medical assistant/Nurse.  I have reviewed and made changes accordingly.

## 2020-01-21 ENCOUNTER — OFFICE VISIT (OUTPATIENT)
Dept: SURGERY | Facility: CLINIC | Age: 80
End: 2020-01-21

## 2020-01-21 VITALS
BODY MASS INDEX: 35.19 KG/M2 | SYSTOLIC BLOOD PRESSURE: 160 MMHG | DIASTOLIC BLOOD PRESSURE: 76 MMHG | OXYGEN SATURATION: 97 % | WEIGHT: 218 LBS | HEART RATE: 78 BPM | TEMPERATURE: 96.6 F

## 2020-01-21 DIAGNOSIS — R91.1 LUNG NODULE: Primary | ICD-10-CM

## 2020-01-21 DIAGNOSIS — J90 PLEURAL EFFUSION: ICD-10-CM

## 2020-01-21 PROCEDURE — 99214 OFFICE O/P EST MOD 30 MIN: CPT | Performed by: THORACIC SURGERY (CARDIOTHORACIC VASCULAR SURGERY)

## 2020-01-21 NOTE — PROGRESS NOTES
Thoracic surgery progress note    Chief complaint follow-up of left lower lobe pulmonary nodule and pleural effusion    Patient returns to the office today having undergone a CT scan at [facility] on January 17.  I personally examined the scan and reviewed the radiology report.  The left lower lobe pulmonary nodule is slightly smaller.  The pleural effusion is also decreased.  The patient had a history of left lower lobe pneumonia.  The patient also underwent a PET CT scan that showed minimal activity within this area.  Overall the findings are most consistent with a benign etiology.    The patient is feeling better.  His energy level is improved.  His appetite is improved he is gaining weight.  He has no fevers chills or night sweats no hemoptysis no purulent sputum production no rib pain no pleuritic chest pain.    Review of systems no head neck chest or abdominal pain    Former smoker staying smoke-free    On physical examination he is well-appearing and in no distress.  Appears his stated age.  Lung fields are clear bilaterally.  There is no dullness to percussion at the base.  Cardiac exam is regular rate and rhythm.  No supraclavicular adenopathy.    Impression #1 left lower lobe pulmonary nodule likely benign  2.  Associated left pleural effusion getting smaller likely benign  3.  Elderly somewhat frail clinical status  4.  Former smoker staying smoke-free

## 2020-03-09 ENCOUNTER — TELEPHONE (OUTPATIENT)
Dept: ONCOLOGY | Facility: CLINIC | Age: 80
End: 2020-03-09

## 2020-03-09 NOTE — TELEPHONE ENCOUNTER
Pt would like to schedule his cancelled lab appt from 3/5. I do not see lab notes to receive a week before Dr Anne's visit. I also see lab/md appt with Dr Anne for 3/12. Would he be needing labs before this visit or was this changed for the day of the appt.       Pt Contact: [phone]

## 2020-03-11 NOTE — PROGRESS NOTES
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  · The patient has a history of rheumatoid arthritis, hypertension and diabetes mellitus and was admitted to the hospital with right lower extremity cellulitis in 2005.  He was found to have an absolute neutropenia and hematology consultation was called.  Review of hospital records reveal him to have had neutropenia and hematology dating back to 2003 and monocytosis dating back to 2002.  He was on methotrexate and Gold for his rheumatoid arthritis longtime ago, but had not taken anything prior to his hospitalization.  He saw Dr. Jason for a regular follow up and was feeling well, but the following day, five days prior to hospital admission, started feeling ill and run down and developed hyperemia of his right lower extremity with chills.  He was seen by Dr. Stein and given cephalosporin and his leg did not get better, so he was admitted to the hospital.  · 05 - Biopsy revealed normocellular bone marrow with suggestion of lymphoid Infiltrate.  Flow cytometry analysis, T-cell gene rearrangement indicates conal T-cell process consistent with leukemia of large granular lymphocytes (CD 3+), cytogenetics normal.  · 05 - Iron 40 (L), ferritin 385 (H), TIBC 215 (L), EBV IgG positive titer 1:160, EBV IgM negative.  Blood culture with no growth after five days.  B12 was 639 (N), folate 13.1 (N), CMV IgG and IgM were normal, LAP 75 (N), PNH normal.  · 8/10/05 - Patient started on treatment with Neupogen 300 mcg sub q Monday through Friday.  · 05 - Neupogen  increased to 480 mcg.  · 9/20/05 - Neupogen decreased to 480 mcg Monday and Thursday.  · 12/15/05 - Neupogen decreased to 480 mcg weekly.  · 7/13/06 - Peripheral blood T-cell gene rearrangement result of no clonal T-cell gene rearrangement detected.  · 8/10/06 - Bone marrow aspiration and biopsy with results of flow cytometry reveals residual population of large granular lymphocytes.  · 1/25/07 - Hold Neupogen whenever ANC > 3.5.  · 5/16/07 - Chest x-ray result of no active lung disease and thoracic degenerative change.  · 8/20/07 - Chest x-ray result of mild obstructive airway disease with scattered areas of minor parenchymal scarring and fibrosis.  · 8/23/07 - T-cell gene rearrangement, insufficient DNA extracted to perform Southern blot analysis.  Equivocal TCR gene rearrangement analysis by PCR.  · 11/1/07 - Sed rate (N) at 5.  · 1/14/08 - Peripheral blood T-cell receptor gene rearrangement analysis.  In both the PCR and [place] blot assays somewhat discreet bands are present in a polyclonal background.  The PCR banding pattern is somewhat similar to that seen in a previously analyzed specimen with equivocal TCR PCR results.  · 3/06/08 - Bone marrow aspiration and biopsy revealed normocellular bone marrow reduced 1+ iron stores, leukemia of large granular lymphocytes identified by flow cytometry.  Cytogenetics (N).  Flow cytometry immunophenotyping revealed a residual population of the patient’s leukemia of large granular lymphocytes.  This Immunophenotype is identical to what was detected in the previous study of bone marrow from 8/10/08.  · 4/3/08 - Neupogen discontinued.  The patient to start on methotrexate 20 mg p.o. weekly and Folate 2 mg p.o. q.d.  · 4/7/08 - Pulmonary function analysis showed mild restriction improved after dilatation.  · 7/3/08 - T-cell receptor gene rearrangement, equivocal TCR gene rearrangement analysis by PCR no clonal TCR gene rearrangement was detected by Southern blot  analysis.  · 7/11/08 - Echocardiogram with ejection fraction of 54%.  · 12/1/08 - T-cell gene rearrangement, negative.  · 3/2/09 - Bone marrow aspirate with residual leukemia of large granular lymphocytes, CD3+, identified by flow cytometric analysis.  Normal iron stores.  · 4/6/09 - Patient to discontinue methotrexate and folic acid due to normal counts.  · 6/1/09 - Chest x-ray with obscuration of the posterior margin of one or both hemidiaphragms, which could represent atelectasis or infiltrate.  · 6/9/09 - CT scan of the chest positive for pulmonary embolus in the left lower lobe.  Mild right basilar infiltrate.  · 6/10/09 - Venous duplex normal.  · 6/11/09 - Chest x-ray with mild density in the left lung base improved from 6/1/09.  Patient started on Biaxin 500 mg b.i.d. for 10 days and Omnicef 300 mg b.i.d. for 10 days by Dr. Thomas.  · 8/10/09 - T-cell gene rearrangement, PCR on peripheral blood, negative.  · 12/28/09 - Chest x-ray with increased bronchial markings.  Arthritic changes of the T-spine.  · 3/15/10 - Bone marrow aspirate smears, normal.  Bone marrow aspirate clot with aggregates of mononuclear cells suspicious for residual leukemia.  Flow cytometry with residual population of cells, which are present in the patient's leukemia of large granular lymphocytes (DD3 positive) and quantitate at approximately 1.3% of total events.  Cytogenetics normal.  · 5/17/10 - Hemoglobin 15.6.  · 11/30/10 - Hemoglobin 14.3.  · 2/2/11 - TCR gene rearrangement negative.  Chest x-ray no active lung disease.  · 9/11/11 - Immunofixation urine no monoclonal proteins seen in urine.  · 12/5/11 - TCR gene rearrangement negative.  · 7/10/12 - TCR gene rearrangement negative.  · 6/17/13 - Hemoglobin 15.0, WBC 4.80, platelet count is 176,000.  T-cell gene rearrangement negative.  No clonal T-cell gene arrangement is detected.  · 6/26/14 - Hemoglobin 14.7, WBC 6.39, platelet count 150,000.  T-cell gene rearrangement by PCR  revealed inconclusive for a clonal T-cell gene rearrangement.  · 10/22/14 - patient was started on methotrexate by Dr. Peter on for the arthritis.  He had ordered lab. SPEP showed the increase in albumin may be due to insufficient protein, interferential malabsorption, impaired synthesis, protein losing enteropathy, nephrotic syndrome, and various other disorders.  No monoclonal immunoglobulin detected.  NAIMA positive.  NAIMA titer 1:160 (H), CRP 2.66 (H)  · 2/23/15 - Chest x-ray with bilateral basilar densities and emphysema.   · 3/30/15 - hemoglobin 14.0, WBC 4.62, platelet count 180,000. T-cell gene rearrangement inconclusive.   · 7/27/15 - T-cell gene rearrangement by PCR positive for clonal T-cell gene rearrangement.   · 10/20/15 - Chest x-ray performed while patient in the hospital revealed asymmetric right apical opacity. CT of the abdomen and pelvis with dilated loops of jejunum, diverticulosis, cholelithiasis, small to moderate sized hernia.   · 11/25/15 - Comprehensive metabolic panel with glucose 183 (65-99), creatinine 1.4 (0.7-1.2).     · 3/23/16 - WBC 5.33, hemoglobin 14.8, platelet count 163,000 with 61% neutrophils, 22% lymphocytes, 13% monocytes, 5% eosinophils. Discussed T-cell gene rearrangement results with the patient. He claims not to be taking the Methotrexate weekly, but only as needed for his arthritis. I asked him to start taking it a dose of 20 mg weekly along with Folate 1 mg p.o. daily. T-cell gene rearrangement was inconclusive for clonal T-cell gene rearrangement.   · 3/24/16 - Chest x-ray revealed a right apical opacity which had cleared since the prior exam and likely represented atelectasis or positioning change.   · 5/4/16 - Pulmonary function studies performed revealed mild obstructive lung disease with good response to bronchodilators.   · 6/8/16 - WBC 5.1, ANC 3.2, hemoglobin 13.6, MCV 99.0 and platelet count 163,000.   · 7/5/16 - Comprehensive metabolic panel with no significant  abnormality.   · 9/6/16 - Discussed use of Methotrexate with patient. He is on it for his arthritis and I have told him that he can come off it as his counts are good and the gene rearrangement is still positive, so the Methotrexate may not be making a difference, but I will leave the decision to him and Dr. Peter. Methotrexate discontinued by Dr. Peter.   · 12/1/16 - WBC 6.2 with 66% neutrophils, 21% lymphocytes, 9% monocytes, hemoglobin 14.9, platelet count 172,000. T-cell gene rearrangement inconclusive as one or two distinct peaks were observed within the normal polyclonal size range but were below the peak cutoff for defined positivity.   · 2/2/17 - Comprehensive metabolic panel with no significant abnormality. Creatinine 1.2 (0.7-1.2)   · 4/14/17 - Chest x-ray with persistent basilar density suggestive of residual scar or atelectasis.   · 5/1/17 - Patient seen in followup by Dr. Peter with decision to observe for a period of time as the patient was asymptomatic and followup in the future to evaluate for further treatment.   · 7/6/17 - WBC 4.9 with 56% neutrophils, 25% lymphocytes, 11% monocytes, hemoglobin 14.6, platelet count 170,000.   · 10/4/17 - WBC 5.6 with 63% neutrophils, 21% lymphocytes, 10% monocytes, hemoglobin 14, .9, platelet count 166,000. Patient claims to have started back on Methotrexate for arthritis through Dr. Peter in July 2017. Peripheral blood flow cytometry negative for acute leukemia or lymphoproliferative disorder. T-cell gene rearrangement by PCR negative - no clonal T-cell gene rearrangement detected.   · 6/5/18 - T-cell gene rearrangement by PCR negative.   · 10/16/18 - Patient claims not to be taking Methotrexate at present.   · 4/23/19 - WBC 5.4 with 57% neutrophils, 26% lymphocytes, 12% monocytes, hemoglobin 13.7, platelet count 11,000.  Patient asked to hold Aspirin and Warfarin while scheduling bone marrow biopsy. Prescription written for Prednisone 100 mg p.o. daily.  T-cell PCR performed on bone marrow inconclusive for a clonal T-cell rearrangement.  · 5/15/19 - IgA 131 (), IgG 561 (600-1500), IgM 48 ().    · 11/21/2019 T-cell gene rearrangement testing by PCR: Positive.  Clonal T-cell gene rearrangement noted.  Clonal T-cell population is not synonymous with T-cell lymphoproliferative disorder.,  IgG 596 low, IgM 44 normal, IgA 114,  · 10/28/2019 WBC 5.2, hemoglobin 12, platelets 183, MCV 97  · 11/20/2019 PET/CT scan: Mild hypermetabolic uptake within the larynx which is probably physiologic.  No abnormal hypermetabolic activity seen within the chest abdomen and pelvis.  2 cm noncalcified nodule within the left lower lobe which is not hypermetabolic.  Moderate left pleural effusion with bibasilar airspace disease favored to be from atelectasis.  Cholelithiasis.     · 12/12/2019 WBC 6.1, hemoglobin 12.2, MCV 94.2, platelets 202   · 11/20/2019 PET/CT scan: Mild hypermetabolic uptake within the larynx which is physiologic.  No abnormal activity in the chest abdomen or pelvis.  2 cm noncalcified nodule within the left lower lobe is not hypermetabolic.  Moderate left pleural effusion with bibasilar airspace disease favored to be secondary to atelectasis.  Colonic diverticulosis.  · 1/17/2020: CT chest without contrast: Mass in the left lower lobe measures 17 mm.  It remains indeterminate based on CT appearance.  Smaller than October 2019.  Small pleural effusion smaller than prior study.    2.   Thrombocytopenia diagnosis established 8/23/05 and coagulopathy diagnosis        established July 2008. 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   · 8/1/05 - CMV IgG 42, positive, CMV IgM was negative, LAP 75 (N), vitamin B12 was 639 (N), folate 13.1 (N).  · 8/23/05 - Platelet count 90,000 (L).  · 2/7/08 - Platelet count 137,000 (L).  · 10/1/08 - Corrected.  · 4/23/19 - Platelet count 11,000. Patient started on Prednisone 100 mg p.o. daily. Sternal bone marrow aspiration with  cellular marrow with maturing trilineage hematopoiesis, megakaryocytic hyperplasia with cytologic atypia, adequate storage iron with absent ringed sideroblasts. Flow cytometry with no evidence of an abnormal cell type. Immunohistochemistry with no significant lymphoid infiltrate or increase in blasts. Megakaryocytes increased in number. [employer] NGS MDS panel sequencing detected. 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  Cytogenetics were normal, 46 XY.  · 4/26/19 - Platelet count 77,000. Prednisone dose decreased to 80 mg p.o. daily with 20 mg weekly taper initiation. Aspirin 81 mg daily resumed.   · 5/2/19 - Platelets 92,000. Prednisone taper continued. PT 50.7 (H), INR 4.1 (H), PTT 33.3 seconds (23.9-36.9). PT INR faxed to Dr. Jason. Platelet antibody screen positive for group 2b/3A and group 1a/2a. EBV IgG 369 (H), IgM negative. CMV IgG 13.1 (H), IgM 0.6 negative. CMP remarkable for a BUN of 40 (H). Ordered to increase oral fluids. B12 of 323 (N),  (N), folic acid 17.9 (N).   · 5/10/19 - Platelet count 31,000. Patient taking Prednisone 40 mg daily. Dose was increased to 60 mg p.o. daily.   · 5/15/19 - IV IG 1 g/kg IV daily x2 ordered for persistent thrombocytopenia. Platelet count 47,000 on 60 mg Prednisone p.o. daily. Prednisone was continued until followup. Weekly CBC’s continued.   · 5/30/19 - IV IgG 1 g/kg given on 5/30/19 and 5/31/19 with platelet count of 114,000 on 5/30/19.   · 6/7/19 - Patient on 40 mg of Prednisone daily. Platelet count 150,000. Asked to drop to 20 mg daily.  · 7/18/19 platelet count 180,000 on prednisone 10 mg every other day.  Asked him to decrease prednisone to 5 mg every other day.  · 10/28/2019 patient seen in the emergency room at [facility] with a persistent cough and CT chest revealing possible mass in left lower lobe.  Patient treated with Levaquin  and prednisone 60 mg p.o. daily for 10 days as he was not on any prednisone at that time.  Platelet count was 183,000.  3.         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  · 7/3/08 - Hemoglobin 12.5, platelet count 128,000.  RBC folate (N) at 300, retic count (H) at 2.33, vitamin B12 (N) at 281, iron (N) at 56, ferritin (N) 302, haptoglobin (N) 128, TIBC (N) at 419, iron saturation (L) at 13. Platelet count 128,000. PT (N) at 11.0, INR (N) 1.0, PTT (L) <21.0.   · 10/1/08 - Corrected.  · 12/5/11 - Retic count 2.4 (N), iron 83 (N), TIBC 436 (H), iron sat % 19 (L), ferritin 338 (N) vitamin B12 291 (N), folate 675 (N), haptoglobin 200 (N).  · 12/23/11 -  (N).  · 6/26/14 - hemoglobin 14.7  · 10/22/14 - patient was started on methotrexate by Dr. Peter on for the arthritis.  He had ordered lab. SPEP showed the increase in albumin may be due to insufficient protein, interferential malabsorption, impaired synthesis, protein losing enteropathy, nephrotic syndrome, and various other disorders.  No monoclonal immunoglobulin detected.  NAIMA positive.  NAIMA titer 1:160 (H), CRP 2.66 (H)  · 10/4/16 - Comprehensive metabolic panel with creatinine 1.3 (0.7-1.2).    · 7/6/17 - Hemoglobin 14.6, hematocrit 43.9, MCV 98.7, WBC 4.9 and platelet count 170,000.   · 6/5/18 - Patient has not taken Methotrexate dose for one month.   · 6/6/18 - Ferrous sulfate 325 mg p.o. two times a day ordered.   · 6/12/18 - Stool heme positive on stool cards.  Protonix 40 mg tablet take one tablet daily prescribed.  Referred to [facility].  Last colonoscopy in 2005.    · 8/1/18 - Colonoscopy at [facility] by Kishor Beard M.D. revealed mild diverticulosis of descending and sigmoid colon, internal hemorrhoids and 3-5 mm polyps in the ascending sigmoid colon and rectum. Pathology on ascending colon polyp revealed fragments of tubular adenoma with a few fragments exhibiting a benign lymphoid aggregate. Pathology on  sigmoid colon polyp revealed fragments of tubular adenoma with focal superficial acute inflammation and rectal polyp biopsy revealed a hyperplastic polyp. Repeat colonoscopy was recommended in five years.   · 8/2/18 - Hemoglobin 13.6, MCV 98.8. Patient tolerating Ferrous Sulfate 325 mg p.o. b.i.d. without noted side effects. Orders written to continue this at present and orders written to obtain colonoscopy report performed at [facility] 8/1/18.   · 10/16/18 - Patient asked to reduce Ferrous Sulfate to 325 mg p.o. daily. Ferritin 123 ().   · 1/15/19 - Hemoglobin 14.5, MCV 98.4. Ferrous Sulfate discontinued.    · 3/12/2020 iron 35 low, ferritin 186, iron saturation 8% low, TIBC 422, WBC 6.7, hemoglobin 14.2, MCV 93.2, platelets 197  4.   Acute bilateral pulmonary emboli diagnosed May 2018.    · 5/21/18 - Patient hospitalized at [facility] between 5/21/18 and 5/23/18 with several week history of exertional shortness of breath increasing on the day of admission. He had had a recent left heart catheterization that was normal. CT chest PE protocol revealed bilateral acute pulmonary embolic disease. Hematology consultation was not obtained. Emphysema and mild interstitial basilar fibrotic changes, mild dependent atelectasis in the lower lobes, cholelithiasis, fatty infiltration of the liver and small hiatal hernia were noted also. His hemoglobin was 14.5 on admission and 12.9 on discharge. D-dimer was 4.8 (0.17-0.59). PT PTT were 12.4 and 22.7 on admission. Creatinine 1.2 (0.7-1.2). The patient was started on a Heparin drip and then switched to Xarelto 15 mg b.i.d. for 21 days then 20 mg daily.     · 6/5/18 - Anti-phosphatidylserine IgG 4 (N), IgM 25 (H).  Factor VIII activity 145 (H).  AT  (H).  Protein C 122 (N).  Protein S 173 (H).  Anti-cardiolipin antibody and anti-beta-2 glycoprotein all normal.  Factor V Leiden gene mutation not detected.  Prothrombin gene mutation not detected.    · 8/2/18 - PT PTT 16.5 and 32.2  seconds. Lupus anticoagulant inconclusive. Serum homocysteine 12.5 (<15).    · 8/7/18 - Bilateral lower extremity venous Doppler report with no evidence of any deep venous thrombosis or SVT. Lymph node noted in the left groin and in the right groin.   · 10/16/18 - Factor VIII activity 136 ().   · 1/15/19 - Patient reports he is no longer taking Xarelto and is on Coumadin with INR’s followed by Dr. Jason. Blood pressure 188/78 with patient reporting elevated blood pressures at home. Patient instructed to contact Dr. Jason for adjustments in his medications. Antiphospholipid antibodies negative.   · 5/2/19 - Factor VIII level 235 (H).  PT 50.7, INR 4.1 (H). PT INR faxed to Dr. Jason.  · 9/6/2019 patient was hospitalized at [facility] for 2 days with a nontraumatic subdural hematoma.  · 9/9/2019 patient hospitalized at [facility] with right facial droop with MRI brain revealing acute/subacute left cerebral hemisphere subdural hemorrhage and subarachnoid hemorrhage.  Atrophy and ischemic white matter disease was present.  Small punctate remote hemorrhage in the left temporal lobe was present.  Patient taken off warfarin and aspirin.  · 10/12/19 - Chest x-ray showed no significant changes compared to the previous study. Redemonstration of infiltrates within the bilateral lower lobes with a small left sided effusion likely related to pneumonia. No acute osseous abnormality.   · 10/28/19 - CT PE protocol showed no definite pulmonary embolism identified. There is effacement of the proximal segmental branches of the left lower lobe pulmonary artery likely due to a mass or lymph node present within this region. The defect appears extraluminal and not definitely intraluminal to suggest a pulmonary embolism. Patchy airspace disease is present within the left lower lobe with a questionable cavitary lesion which may represent an underlying malignancy. There is a small left sided pleural effusion. Mild patchy airspace  changes are also present within the lung bases bilaterally which may represent superimposed pneumonia.       5.  Left lower lobe pulmonary nodule 2 cm noncalcified: PET scan does not show hypermetabolic activity.        Subjective:03/12/20  Here for a follow up lung nodule and ITP. He states he is feeling well at today's visit. He complains of some congestion X 2 months with yellow phlegm.  He currently takes plaquenil daily.  He has seen Dr. Drake recently.  Does not have any weight loss hemoptysis.  Does not report any bleeding issues.    The following portions of the patient's history were reviewed and updated as appropriate: allergies, current medications, past family history, past medical history, past social history, past surgical history and problem list.         Past Medical History:   Diagnosis Date   • Acute pulmonary embolism (CMS/HCC) 05/2018    bilateral   • Arthritis     bilateral knees and ankles   • CKD (chronic kidney disease) 03/02/2009   • Coagulopathy (CMS/[facility]) 07/2008   • COPD (chronic obstructive pulmonary disease) (CMS/[facility])    • Diabetes mellitus (CMS/[facility])    • 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   • History of pneumonia 02/2015    hospitalized with community-acquired pneumonia, possibly viral    • History of prostate cancer 10/2009    [place] 6, Stage II   • Hypercholesterolemia    • Hypertension    • 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   • Neutropenia (CMS/[facility]) 11/2003   • MITZI (obstructive sleep apnea) 2018   • Psoriasis 2000   • Renal cyst, left    • Rheumatoid arthritis (CMS/[facility])    • Stroke (cerebrum) (CMS/[facility])    • Thrombocytopenia (CMS/[facility]) 08/23/2005       Past Surgical History:   Procedure Laterality Date   • SKIN LESION EXCISION      back and groin-benign         Current Outpatient Medications:   •  atorvastatin (LIPITOR) 20 MG tablet, Take 20 mg by mouth Daily., Disp: , Rfl: 3  •  budesonide-formoterol (SYMBICORT) 160-4.5 MCG/ACT  inhaler, Inhale 2 puffs 2 (Two) Times a Day., Disp: , Rfl:   •  fenofibrate 160 MG tablet, Take 160 mg by mouth Daily., Disp: , Rfl: 3  •  glipiZIDE (GLUCOTROL) 5 MG tablet, Take 5 mg by mouth 2 (Two) Times a Day Before Meals., Disp: , Rfl:   •  glucosamine-chondroitin 500-400 MG capsule capsule, Take 1 capsule by mouth Daily., Disp: , Rfl:   •  hydroxychloroquine (PLAQUENIL) 200 MG tablet, TK 2 TS PO ONCE DAILY, Disp: , Rfl: 0  •  ipratropium-albuterol (DUO-NEB) 0.5-2.5 mg/3 ml nebulizer, Take 3 mL by nebulization Every 4 (Four) Hours As Needed for Wheezing., Disp: , Rfl:   •  losartan (COZAAR) 100 MG tablet, Take 100 mg by mouth Daily., Disp: , Rfl: 3  •  Multiple Vitamins-Minerals (MULTIVITAMIN WITH MINERALS) tablet tablet, Take 1 tablet by mouth Daily., Disp: , Rfl:   •  pantoprazole (PROTONIX) 40 MG EC tablet, Take 40 mg by mouth Daily., Disp: , Rfl: 1  •  pentoxifylline (TRENtal) 400 MG CR tablet, Take 400 mg by mouth 3 (Three) Times a Day With Meals., Disp: , Rfl:   •  spironolactone (ALDACTONE) 25 MG tablet, Take 25 mg by mouth Daily., Disp: , Rfl: 3  •  azithromycin (ZITHROMAX) 250 MG tablet, Take 2 tablets the first day, then 1 tablet daily for 4 days., Disp: 5 tablet, Rfl: 0  •  ciprofloxacin (CIPRO) 500 MG tablet, TK 1 T PO  Q 12 H, Disp: , Rfl: 0    Allergies   Allergen Reactions   • Penicillin V Potassium Swelling   • Latex Hives       Family History   Problem Relation Age of Onset   • Lung cancer Brother         age unknown   • Heart disease Brother    • Stroke Mother    • Heart disease Father    • Heart disease Sister        Cancer-related family history includes Lung cancer in his brother.    Social History     Tobacco Use   • Smoking status: Former Smoker   • Smokeless tobacco: Never Used   • Tobacco comment: stopped smoking in 1995   Substance Use Topics   • Alcohol use: No     Frequency: Never   • Drug use: No           I have reviewed the history of present illness, past medical history, family  "history, social history, lab results, all notes and other records since the patient was last seen on  11/21/2019.    ROS:   Review of Systems   Constitutional: Negative for chills and fever.   HENT: Positive for congestion. Negative for ear pain, mouth sores, nosebleeds and sore throat.    Eyes: Negative for photophobia and visual disturbance.   Respiratory: Positive for shortness of breath. Negative for cough, wheezing and stridor.    Cardiovascular: Negative for chest pain and palpitations.   Gastrointestinal: Negative for abdominal pain, diarrhea, nausea and vomiting.   Endocrine: Negative for cold intolerance and heat intolerance.   Genitourinary: Negative for dysuria and hematuria.   Musculoskeletal: Positive for joint swelling (bilateral hands swollen due to RA). Negative for neck stiffness.   Skin: Negative for color change and rash.   Neurological: Negative for seizures and syncope.   Hematological: Negative for adenopathy.        No obvious bleeding   Psychiatric/Behavioral: Negative for agitation, confusion and hallucinations.       Objective:  Vital signs:  Vitals:    03/12/20 1432   BP: 136/70   Pulse: 84   Resp: 18   Temp: 98 °F (36.7 °C)   Weight: 98.7 kg (217 lb 9.6 oz)   Height: 167.6 cm (66\")   PainSc: 0-No pain     ECOG  (2) Ambulatory and capable of self care, unable to carry out work activity, up and about > 50% or waking hours    Physical Exam:   Physical Exam   Constitutional: He is oriented to person, place, and time. He appears well-developed and well-nourished. No distress.   Obese male   HENT:   Head: Normocephalic and atraumatic.   Eyes: Conjunctivae and EOM are normal. Right eye exhibits no discharge. Left eye exhibits no discharge. No scleral icterus.   Neck: Normal range of motion. Neck supple. No thyromegaly present.   Cardiovascular: Normal rate, regular rhythm and normal heart sounds. Exam reveals no gallop and no friction rub.   Pulmonary/Chest: Effort normal. No stridor. No " respiratory distress. He has no wheezes. He has rales.   Abdominal: Soft. Bowel sounds are normal. He exhibits no mass. There is no tenderness. There is no rebound and no guarding.   Musculoskeletal: Normal range of motion. He exhibits tenderness and deformity.   Multiple joint deformities due to rheumatoid arthritis.  Especially in the hands   Lymphadenopathy:     He has no cervical adenopathy.   Neurological: He is alert and oriented to person, place, and time. He exhibits normal muscle tone.   Skin: Skin is warm. No rash noted. He is not diaphoretic. No erythema.   Psychiatric: He has a normal mood and affect. His behavior is normal.   Nursing note and vitals reviewed.            Lab Results - Last 18 Months   Lab Units 03/12/20  1343 12/12/19  1338 11/21/19  1332   WBC 10*3/mm3 6.73 6.18 4.25   HEMOGLOBIN g/dL 14.2 12.2* 11.3*   HEMATOCRIT % 42.8 37.6 35.3*   PLATELETS 10*3/mm3 197 202 204   MCV fL 93.2 94.2 96.2     Lab Results - Last 18 Months   Lab Units 09/12/19  1100 09/11/19  1321 09/09/19  1204 09/08/19  0428 09/07/19  1315 09/07/19  0341 09/06/19  1231   SODIUM mmol/L 142 141 141 135* 139 137 138   POTASSIUM mmol/L 4.1 4.1 4.1 3.8 3.7 3.6 4.0   CHLORIDE mmol/L 107 107 107 103 107 105 107   TOTAL CO2 mmol/L 23 23 23  --   --   --   --    CO2 mmol/L  --   --   --  23.0 22.0 21.0* 20.0*   BUN mg/dL 28* 30* 24* 18 13 14 19   CREATININE mg/dL 1.4 1.9* 1.1 1.30* 1.10 1.10 1.10   CALCIUM mg/dL 9.0 8.8 9.4 8.3* 8.6* 8.5* 8.5*   BILIRUBIN mg/dL  --   --  0.6  --  0.6  --  0.8   ALK PHOS U/L  --   --  61  --  42  --  40   ALT (SGPT) U/L  --   --  20  --  17  --  17   AST (SGOT) U/L  --   --  25  --  22  --  28   GLUCOSE mg/dL  --   --   --  168* 144* 147* 139*       Lab Results   Component Value Date    GLUCOSE 168 (H) 09/08/2019    BUN 28 (H) 09/12/2019    CREATININE 1.4 09/12/2019    EGFRIFNONA 53 (L) 09/08/2019    EGFRIFAFRI 68 11/03/2016    BCR 19.9 09/12/2019    K 4.1 09/12/2019    CO2 23 09/12/2019     CALCIUM 9.0 09/12/2019    ALBUMIN 3.7 09/09/2019    LABIL2 1.2 09/09/2019    AST 25 09/09/2019    ALT 20 09/09/2019       Lab Results - Last 18 Months   Lab Units 09/09/19  1204 09/06/19  1231 05/15/19  0847   INR INR 1.2 1.69* 3.8*   APTT s 27.9  --   --        Lab Results   Component Value Date    IRON 35 (L) 03/12/2020    TIBC 422 03/12/2020    FERRITIN 123 10/16/2018       Lab Results   Component Value Date    FOLATE 22.30 09/07/2019       No results found for: OCCULTBLD    Lab Results   Component Value Date    RETICCTPCT 6.24 (H) 06/05/2018       Lab Results   Component Value Date    IATGGAGK32 623 09/07/2019     No results found for: SPEP, UPEP  No results found for: LDH, URICACID  No results found for: NAIMA, RF, SEDRATE  Lab Results   Component Value Date    HAPTOGLOBIN 105 06/05/2018     Lab Results   Component Value Date    PTT 27.9 09/09/2019    INR 1.2 09/09/2019     No results found for:   No results found for: CEA  No components found for: CA-19-9  No results found for: PSA      Assessment/Plan     Assessment:  T-cell large granular lymphocytic leukemia   - CBC & Differential  - CBC Auto Differential  - [facility] Requisition ([facility] Only)     Chronic ITP (idiopathic thrombocytopenia) (CMS/HCC)     Rheumatoid arthritis involving multiple sites, unspecified rheumatoid factor presence (CMS/HCC)     CKD (chronic kidney disease), stage III (CMS/HCC)     Bilateral pulmonary embolism (CMS/HCC)     Elevated factor VIII level     Other myelodysplastic syndromes (CMS/HCC)     Hypogammaglobulinemia (CMS/HCC)  - IgG  - IgM  - IgA     Subdural hematoma, nontraumatic (CMS/HCC)     Cavitating mass of lower lobe of left lung  - NM Pet Skull Base To Mid Thigh     Other nonspecific abnormal finding of lung field   - NM Pet Skull Base To Mid Thigh        ASSESSMENT:     1. Left lower lobe lung nodule on CT chest PE protocol. Not hypermetabolic on PET/CT scan.   Likely it could be a resolving pneumonia or could be  rheumatoid nodule  2. T-cell large granular lymphocytic (LGL) leukemia: likely related to RA.  Previously was treated with methotrexate.  Now he is off.  He is on Plaquenil. His LGL leukemia is stable.  No evidence of any neutropenia or thrombocytopenia.  Reviewed the result of the T-cell gene rearrangement testing from #2019.  3. Intermittent Thrombocytopenia:-Platelets normal today  4. ITP: Resolved  5. History of anemia of CKD  6. History of bilateral pulmonary emboli: Has a history of elevated factor VIII level.      PLAN:  1. Lung nodule has remained stable.  Does not show any signs of malignancy.  2. Will check iron levels today.   3. White count and platelets remain stable.  4. His LGL leukemia is stable.  No evidence of any neutropenia or thrombocytopenia.  5. Follow patient back in 4 months with iron levels before visit.                    Upper respiratory tract infection, unspecified type  - azithromycin (ZITHROMAX) 250 MG tablet    Anemia, unspecified type  - CBC & Differential  - Ferritin  - Iron Profile    Orders Placed This Encounter   Procedures   • Ferritin     1 week prior tonext appt     Standing Status:   Future     Scheduling Instructions:      Please draw these labs prior to the next visit.   • Iron Profile     1 week prior tonext appt     Standing Status:   Future     Scheduling Instructions:      Please draw these labs prior to the next visit.   • CBC & Differential     Please draw these labs prior to the next visit.  1 week prior tonext appt     Standing Status:   Future     Scheduling Instructions:      Please draw these labs prior to the next visit.     Order Specific Question:   Manual Differential     Answer:   No                   Thank you very much for providing the opportunity to participate in this patient’s care. Please do not hesitate to call if there are any other questions.    I have reviewed all relevant labs results, outside reports, imaging, notes, vital signs, medications,  and plan with the patient today.    Portions of the note have been scribed by medical assistant/Nurse.  I have reviewed and made changes accordingly.

## 2020-03-12 ENCOUNTER — OFFICE VISIT (OUTPATIENT)
Dept: LAB | Facility: HOSPITAL | Age: 80
End: 2020-03-12

## 2020-03-12 ENCOUNTER — OFFICE VISIT (OUTPATIENT)
Dept: ONCOLOGY | Facility: CLINIC | Age: 80
End: 2020-03-12

## 2020-03-12 VITALS
BODY MASS INDEX: 34.97 KG/M2 | HEART RATE: 84 BPM | DIASTOLIC BLOOD PRESSURE: 70 MMHG | TEMPERATURE: 98 F | RESPIRATION RATE: 18 BRPM | WEIGHT: 217.6 LBS | SYSTOLIC BLOOD PRESSURE: 136 MMHG | HEIGHT: 66 IN

## 2020-03-12 DIAGNOSIS — D64.9 ANEMIA, UNSPECIFIED TYPE: ICD-10-CM

## 2020-03-12 DIAGNOSIS — C91.Z0 T-CELL CHRONIC LYMPHOCYTIC LEUKEMIA (HCC): ICD-10-CM

## 2020-03-12 DIAGNOSIS — J06.9 UPPER RESPIRATORY TRACT INFECTION, UNSPECIFIED TYPE: Primary | ICD-10-CM

## 2020-03-12 LAB
BASOPHILS # BLD AUTO: 0.03 10*3/MM3 (ref 0–0.2)
BASOPHILS NFR BLD AUTO: 0.4 % (ref 0–1.5)
DEPRECATED RDW RBC AUTO: 49.3 FL (ref 37–54)
EOSINOPHIL # BLD AUTO: 0.34 10*3/MM3 (ref 0–0.4)
EOSINOPHIL NFR BLD AUTO: 5.1 % (ref 0.3–6.2)
ERYTHROCYTE [DISTWIDTH] IN BLOOD BY AUTOMATED COUNT: 14.8 % (ref 12.3–15.4)
FERRITIN SERPL-MCNC: 186.5 NG/ML (ref 30–400)
HCT VFR BLD AUTO: 42.8 % (ref 37.5–51)
HGB BLD-MCNC: 14.2 G/DL (ref 13–17.7)
IRON 24H UR-MRATE: 35 MCG/DL (ref 59–158)
IRON SATN MFR SERPL: 8 % (ref 20–50)
LYMPHOCYTES # BLD AUTO: 1.21 10*3/MM3 (ref 0.7–3.1)
LYMPHOCYTES NFR BLD AUTO: 18 % (ref 19.6–45.3)
MCH RBC QN AUTO: 30.9 PG (ref 26.6–33)
MCHC RBC AUTO-ENTMCNC: 33.2 G/DL (ref 31.5–35.7)
MCV RBC AUTO: 93.2 FL (ref 79–97)
MONOCYTES # BLD AUTO: 0.82 10*3/MM3 (ref 0.1–0.9)
MONOCYTES NFR BLD AUTO: 12.2 % (ref 5–12)
NEUTROPHILS # BLD AUTO: 4.33 10*3/MM3 (ref 1.7–7)
NEUTROPHILS NFR BLD AUTO: 64.3 % (ref 42.7–76)
PLATELET # BLD AUTO: 197 10*3/MM3 (ref 140–450)
PMV BLD AUTO: 11.3 FL (ref 6–12)
RBC # BLD AUTO: 4.59 10*6/MM3 (ref 4.14–5.8)
TIBC SERPL-MCNC: 422 MCG/DL (ref 298–536)
TRANSFERRIN SERPL-MCNC: 283 MG/DL (ref 200–360)
WBC NRBC COR # BLD: 6.73 10*3/MM3 (ref 3.4–10.8)

## 2020-03-12 PROCEDURE — 36415 COLL VENOUS BLD VENIPUNCTURE: CPT

## 2020-03-12 PROCEDURE — 83540 ASSAY OF IRON: CPT | Performed by: NURSE PRACTITIONER

## 2020-03-12 PROCEDURE — 82728 ASSAY OF FERRITIN: CPT | Performed by: NURSE PRACTITIONER

## 2020-03-12 PROCEDURE — 84466 ASSAY OF TRANSFERRIN: CPT | Performed by: NURSE PRACTITIONER

## 2020-03-12 PROCEDURE — 85025 COMPLETE CBC W/AUTO DIFF WBC: CPT

## 2020-03-12 PROCEDURE — 99214 OFFICE O/P EST MOD 30 MIN: CPT | Performed by: INTERNAL MEDICINE

## 2020-03-12 RX ORDER — AZITHROMYCIN 250 MG/1
TABLET, FILM COATED ORAL
Qty: 5 TABLET | Refills: 0 | Status: SHIPPED | OUTPATIENT
Start: 2020-03-12 | End: 2021-04-13

## 2020-03-25 ENCOUNTER — TELEPHONE (OUTPATIENT)
Dept: ONCOLOGY | Facility: CLINIC | Age: 80
End: 2020-03-25

## 2020-03-25 DIAGNOSIS — D50.9 IRON DEFICIENCY ANEMIA, UNSPECIFIED IRON DEFICIENCY ANEMIA TYPE: Primary | ICD-10-CM

## 2020-03-25 RX ORDER — FERROUS GLUCONATE 324(37.5)
324 TABLET ORAL
Qty: 30 TABLET | Refills: 5 | Status: SHIPPED | OUTPATIENT
Start: 2020-03-25 | End: 2021-07-13

## 2020-03-25 NOTE — TELEPHONE ENCOUNTER
I called patient and informed him of low iron level and to start taking the rx of iron that I sent to [facility] pharmacy in [place]. Patient v/u.

## 2020-07-02 ENCOUNTER — LAB (OUTPATIENT)
Dept: LAB | Facility: HOSPITAL | Age: 80
End: 2020-07-02

## 2020-07-02 DIAGNOSIS — D64.9 ANEMIA, UNSPECIFIED TYPE: ICD-10-CM

## 2020-07-02 DIAGNOSIS — C91.Z0 T-CELL CHRONIC LYMPHOCYTIC LEUKEMIA (HCC): Primary | ICD-10-CM

## 2020-07-02 LAB
BASOPHILS # BLD AUTO: 0.03 10*3/MM3 (ref 0–0.2)
BASOPHILS NFR BLD AUTO: 0.3 % (ref 0–1.5)
DEPRECATED RDW RBC AUTO: 46.4 FL (ref 37–54)
EOSINOPHIL # BLD AUTO: 0.11 10*3/MM3 (ref 0–0.4)
EOSINOPHIL NFR BLD AUTO: 1.2 % (ref 0.3–6.2)
ERYTHROCYTE [DISTWIDTH] IN BLOOD BY AUTOMATED COUNT: 13 % (ref 12.3–15.4)
ERYTHROCYTE [SEDIMENTATION RATE] IN BLOOD: 7 MM/HR (ref 0–20)
FERRITIN SERPL-MCNC: 268.7 NG/ML (ref 30–400)
HCT VFR BLD AUTO: 44.8 % (ref 37.5–51)
HGB BLD-MCNC: 14.8 G/DL (ref 13–17.7)
IRON 24H UR-MRATE: 110 MCG/DL (ref 59–158)
IRON SATN MFR SERPL: 26 % (ref 20–50)
LYMPHOCYTES # BLD AUTO: 0.99 10*3/MM3 (ref 0.7–3.1)
LYMPHOCYTES NFR BLD AUTO: 11.2 % (ref 19.6–45.3)
MCH RBC QN AUTO: 32.7 PG (ref 26.6–33)
MCHC RBC AUTO-ENTMCNC: 33 G/DL (ref 31.5–35.7)
MCV RBC AUTO: 98.9 FL (ref 79–97)
MONOCYTES # BLD AUTO: 0.83 10*3/MM3 (ref 0.1–0.9)
MONOCYTES NFR BLD AUTO: 9.4 % (ref 5–12)
NEUTROPHILS NFR BLD AUTO: 6.9 10*3/MM3 (ref 1.7–7)
NEUTROPHILS NFR BLD AUTO: 77.9 % (ref 42.7–76)
PLATELET # BLD AUTO: 182 10*3/MM3 (ref 140–450)
PMV BLD AUTO: 11.3 FL (ref 6–12)
RBC # BLD AUTO: 4.53 10*6/MM3 (ref 4.14–5.8)
TIBC SERPL-MCNC: 419 MCG/DL (ref 298–536)
TRANSFERRIN SERPL-MCNC: 281 MG/DL (ref 200–360)
WBC # BLD AUTO: 8.86 10*3/MM3 (ref 3.4–10.8)

## 2020-07-02 PROCEDURE — 85025 COMPLETE CBC W/AUTO DIFF WBC: CPT

## 2020-07-02 PROCEDURE — 83540 ASSAY OF IRON: CPT

## 2020-07-02 PROCEDURE — 82728 ASSAY OF FERRITIN: CPT

## 2020-07-02 PROCEDURE — 85652 RBC SED RATE AUTOMATED: CPT

## 2020-07-02 PROCEDURE — 84466 ASSAY OF TRANSFERRIN: CPT

## 2020-07-02 PROCEDURE — 36415 COLL VENOUS BLD VENIPUNCTURE: CPT

## 2020-07-08 NOTE — PROGRESS NOTES
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  · The patient has a history of rheumatoid arthritis, hypertension and diabetes mellitus and was admitted to the hospital with right lower extremity cellulitis in 2005.  He was found to have an absolute neutropenia and hematology consultation was called.  Review of hospital records reveal him to have had neutropenia and hematology dating back to 2003 and monocytosis dating back to 2002.  He was on methotrexate and Gold for his rheumatoid arthritis longtime ago, but had not taken anything prior to his hospitalization.  He saw Dr. Jason for a regular follow up and was feeling well, but the following day, five days prior to hospital admission, started feeling ill and run down and developed hyperemia of his right lower extremity with chills.  He was seen by Dr. Stein and given cephalosporin and his leg did not get better, so he was admitted to the hospital.  · 05 - Biopsy revealed normocellular bone marrow with suggestion of lymphoid Infiltrate.  Flow cytometry analysis, T-cell gene rearrangement indicates conal T-cell process consistent with leukemia of large granular lymphocytes (CD 3+), cytogenetics normal.  · 05 - Iron 40 (L), ferritin 385 (H), TIBC 215 (L), EBV IgG positive titer 1:160, EBV IgM negative.  Blood culture with no growth after five days.  B12 was 639 (N), folate 13.1 (N), CMV IgG and IgM were normal, LAP 75 (N), PNH normal.  · 8/10/05 - Patient started on treatment with Neupogen 300 mcg sub q Monday through Friday.  · 05 -  Neupogen increased to 480 mcg.  · 9/20/05 - Neupogen decreased to 480 mcg Monday and Thursday.  · 12/15/05 - Neupogen decreased to 480 mcg weekly.  · 7/13/06 - Peripheral blood T-cell gene rearrangement result of no clonal T-cell gene rearrangement detected.  · 8/10/06 - Bone marrow aspiration and biopsy with results of flow cytometry reveals residual population of large granular lymphocytes.  · 1/25/07 - Hold Neupogen whenever ANC > 3.5.  · 5/16/07 - Chest x-ray result of no active lung disease and thoracic degenerative change.  · 8/20/07 - Chest x-ray result of mild obstructive airway disease with scattered areas of minor parenchymal scarring and fibrosis.  · 8/23/07 - T-cell gene rearrangement, insufficient DNA extracted to perform Southern blot analysis.  Equivocal TCR gene rearrangement analysis by PCR.  · 11/1/07 - Sed rate (N) at 5.  · 1/14/08 - Peripheral blood T-cell receptor gene rearrangement analysis.  In both the PCR and [place] blot assays somewhat discreet bands are present in a polyclonal background.  The PCR banding pattern is somewhat similar to that seen in a previously analyzed specimen with equivocal TCR PCR results.  · 3/06/08 - Bone marrow aspiration and biopsy revealed normocellular bone marrow reduced 1+ iron stores, leukemia of large granular lymphocytes identified by flow cytometry.  Cytogenetics (N).  Flow cytometry immunophenotyping revealed a residual population of the patient’s leukemia of large granular lymphocytes.  This Immunophenotype is identical to what was detected in the previous study of bone marrow from 8/10/08.  · 4/3/08 - Neupogen discontinued.  The patient to start on methotrexate 20 mg p.o. weekly and Folate 2 mg p.o. q.d.  · 4/7/08 - Pulmonary function analysis showed mild restriction improved after dilatation.  · 7/3/08 - T-cell receptor gene rearrangement, equivocal TCR gene rearrangement analysis by PCR no clonal TCR gene rearrangement was detected by Southern blot  analysis.  · 7/11/08 - Echocardiogram with ejection fraction of 54%.  · 12/1/08 - T-cell gene rearrangement, negative.  · 3/2/09 - Bone marrow aspirate with residual leukemia of large granular lymphocytes, CD3+, identified by flow cytometric analysis.  Normal iron stores.  · 4/6/09 - Patient to discontinue methotrexate and folic acid due to normal counts.  · 6/1/09 - Chest x-ray with obscuration of the posterior margin of one or both hemidiaphragms, which could represent atelectasis or infiltrate.  · 6/9/09 - CT scan of the chest positive for pulmonary embolus in the left lower lobe.  Mild right basilar infiltrate.  · 6/10/09 - Venous duplex normal.  · 6/11/09 - Chest x-ray with mild density in the left lung base improved from 6/1/09.  Patient started on Biaxin 500 mg b.i.d. for 10 days and Omnicef 300 mg b.i.d. for 10 days by Dr. Thomas.  · 8/10/09 - T-cell gene rearrangement, PCR on peripheral blood, negative.  · 12/28/09 - Chest x-ray with increased bronchial markings.  Arthritic changes of the T-spine.  · 3/15/10 - Bone marrow aspirate smears, normal.  Bone marrow aspirate clot with aggregates of mononuclear cells suspicious for residual leukemia.  Flow cytometry with residual population of cells, which are present in the patient's leukemia of large granular lymphocytes (DD3 positive) and quantitate at approximately 1.3% of total events.  Cytogenetics normal.  · 5/17/10 - Hemoglobin 15.6.  · 11/30/10 - Hemoglobin 14.3.  · 2/2/11 - TCR gene rearrangement negative.  Chest x-ray no active lung disease.  · 9/11/11 - Immunofixation urine no monoclonal proteins seen in urine.  · 12/5/11 - TCR gene rearrangement negative.  · 7/10/12 - TCR gene rearrangement negative.  · 6/17/13 - Hemoglobin 15.0, WBC 4.80, platelet count is 176,000.  T-cell gene rearrangement negative.  No clonal T-cell gene arrangement is detected.  · 6/26/14 - Hemoglobin 14.7, WBC 6.39, platelet count 150,000.  T-cell gene rearrangement by PCR  revealed inconclusive for a clonal T-cell gene rearrangement.  · 10/22/14 - patient was started on methotrexate by Dr. Peter on for the arthritis.  He had ordered lab. SPEP showed the increase in albumin may be due to insufficient protein, interferential malabsorption, impaired synthesis, protein losing enteropathy, nephrotic syndrome, and various other disorders.  No monoclonal immunoglobulin detected.  NAIMA positive.  NAIMA titer 1:160 (H), CRP 2.66 (H)  · 2/23/15 - Chest x-ray with bilateral basilar densities and emphysema.   · 3/30/15 - hemoglobin 14.0, WBC 4.62, platelet count 180,000. T-cell gene rearrangement inconclusive.   · 7/27/15 - T-cell gene rearrangement by PCR positive for clonal T-cell gene rearrangement.   · 10/20/15 - Chest x-ray performed while patient in the hospital revealed asymmetric right apical opacity. CT of the abdomen and pelvis with dilated loops of jejunum, diverticulosis, cholelithiasis, small to moderate sized hernia.   · 11/25/15 - Comprehensive metabolic panel with glucose 183 (65-99), creatinine 1.4 (0.7-1.2).     · 3/23/16 - WBC 5.33, hemoglobin 14.8, platelet count 163,000 with 61% neutrophils, 22% lymphocytes, 13% monocytes, 5% eosinophils. Discussed T-cell gene rearrangement results with the patient. He claims not to be taking the Methotrexate weekly, but only as needed for his arthritis. I asked him to start taking it a dose of 20 mg weekly along with Folate 1 mg p.o. daily. T-cell gene rearrangement was inconclusive for clonal T-cell gene rearrangement.   · 3/24/16 - Chest x-ray revealed a right apical opacity which had cleared since the prior exam and likely represented atelectasis or positioning change.   · 5/4/16 - Pulmonary function studies performed revealed mild obstructive lung disease with good response to bronchodilators.   · 6/8/16 - WBC 5.1, ANC 3.2, hemoglobin 13.6, MCV 99.0 and platelet count 163,000.   · 7/5/16 - Comprehensive metabolic panel with no significant  abnormality.   · 9/6/16 - Discussed use of Methotrexate with patient. He is on it for his arthritis and I have told him that he can come off it as his counts are good and the gene rearrangement is still positive, so the Methotrexate may not be making a difference, but I will leave the decision to him and Dr. Peter. Methotrexate discontinued by Dr. Peter.   · 12/1/16 - WBC 6.2 with 66% neutrophils, 21% lymphocytes, 9% monocytes, hemoglobin 14.9, platelet count 172,000. T-cell gene rearrangement inconclusive as one or two distinct peaks were observed within the normal polyclonal size range but were below the peak cutoff for defined positivity.   · 2/2/17 - Comprehensive metabolic panel with no significant abnormality. Creatinine 1.2 (0.7-1.2)   · 4/14/17 - Chest x-ray with persistent basilar density suggestive of residual scar or atelectasis.   · 5/1/17 - Patient seen in followup by Dr. Peter with decision to observe for a period of time as the patient was asymptomatic and followup in the future to evaluate for further treatment.   · 7/6/17 - WBC 4.9 with 56% neutrophils, 25% lymphocytes, 11% monocytes, hemoglobin 14.6, platelet count 170,000.   · 10/4/17 - WBC 5.6 with 63% neutrophils, 21% lymphocytes, 10% monocytes, hemoglobin 14, .9, platelet count 166,000. Patient claims to have started back on Methotrexate for arthritis through Dr. Peter in July 2017. Peripheral blood flow cytometry negative for acute leukemia or lymphoproliferative disorder. T-cell gene rearrangement by PCR negative - no clonal T-cell gene rearrangement detected.   · 6/5/18 - T-cell gene rearrangement by PCR negative.   · 10/16/18 - Patient claims not to be taking Methotrexate at present.   · 4/23/19 - WBC 5.4 with 57% neutrophils, 26% lymphocytes, 12% monocytes, hemoglobin 13.7, platelet count 11,000.  Patient asked to hold Aspirin and Warfarin while scheduling bone marrow biopsy. Prescription written for Prednisone 100 mg p.o. daily.  T-cell PCR performed on bone marrow inconclusive for a clonal T-cell rearrangement.  · 5/15/19 - IgA 131 (), IgG 561 (600-1500), IgM 48 ().    · 11/21/2019 T-cell gene rearrangement testing by PCR: Positive.  Clonal T-cell gene rearrangement noted.  Clonal T-cell population is not synonymous with T-cell lymphoproliferative disorder.,  IgG 596 low, IgM 44 normal, IgA 114,  · 10/28/2019 WBC 5.2, hemoglobin 12, platelets 183, MCV 97  · 11/20/2019 PET/CT scan: Mild hypermetabolic uptake within the larynx which is probably physiologic.  No abnormal hypermetabolic activity seen within the chest abdomen and pelvis.  2 cm noncalcified nodule within the left lower lobe which is not hypermetabolic.  Moderate left pleural effusion with bibasilar airspace disease favored to be from atelectasis.  Cholelithiasis.     · 12/12/2019 WBC 6.1, hemoglobin 12.2, MCV 94.2, platelets 202   · 11/20/2019 PET/CT scan: Mild hypermetabolic uptake within the larynx which is physiologic.  No abnormal activity in the chest abdomen or pelvis.  2 cm noncalcified nodule within the left lower lobe is not hypermetabolic.  Moderate left pleural effusion with bibasilar airspace disease favored to be secondary to atelectasis.  Colonic diverticulosis.  · 1/17/2020: CT chest without contrast: Mass in the left lower lobe measures 17 mm.  It remains indeterminate based on CT appearance.  Smaller than October 2019.  Small pleural effusion smaller than prior study.  · 7/7/2020: CT chest without contrast: Stable nodule is seen involving the left lung base which is slightly smaller compared to prior exam.  This may represent some residual nodular scarring.  Continued follow-up recommended.  Once this lesion has been stable over 2-year.  No further imaging would be recommended.  Interval decrease in size of the pleural thickening noted..  Emphysema noted.    2.   Thrombocytopenia diagnosis established 8/23/05 and coagulopathy diagnosis         established July 2008. 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   · 8/1/05 - CMV IgG 42, positive, CMV IgM was negative, LAP 75 (N), vitamin B12 was 639 (N), folate 13.1 (N).  · 8/23/05 - Platelet count 90,000 (L).  · 2/7/08 - Platelet count 137,000 (L).  · 10/1/08 - Corrected.  · 4/23/19 - Platelet count 11,000. Patient started on Prednisone 100 mg p.o. daily. Sternal bone marrow aspiration with cellular marrow with maturing trilineage hematopoiesis, megakaryocytic hyperplasia with cytologic atypia, adequate storage iron with absent ringed sideroblasts. Flow cytometry with no evidence of an abnormal cell type. Immunohistochemistry with no significant lymphoid infiltrate or increase in blasts. Megakaryocytes increased in number. [employer] NGS MDS panel sequencing detected. 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  Cytogenetics were normal, 46 XY.  · 4/26/19 - Platelet count 77,000. Prednisone dose decreased to 80 mg p.o. daily with 20 mg weekly taper initiation. Aspirin 81 mg daily resumed.   · 5/2/19 - Platelets 92,000. Prednisone taper continued. PT 50.7 (H), INR 4.1 (H), PTT 33.3 seconds (23.9-36.9). PT INR faxed to Dr. Jason. Platelet antibody screen positive for group 2b/3A and group 1a/2a. EBV IgG 369 (H), IgM negative. CMV IgG 13.1 (H), IgM 0.6 negative. CMP remarkable for a BUN of 40 (H). Ordered to increase oral fluids. B12 of 323 (N),  (N), folic acid 17.9 (N).   · 5/10/19 - Platelet count 31,000. Patient taking Prednisone 40 mg daily. Dose was increased to 60 mg p.o. daily.   · 5/15/19 - IV IG 1 g/kg IV daily x2 ordered for persistent thrombocytopenia. Platelet count 47,000 on 60 mg Prednisone p.o. daily. Prednisone was continued until followup. Weekly CBC’s continued.   · 5/30/19 - IV IgG 1 g/kg given on 5/30/19 and 5/31/19 with platelet count of 114,000 on 5/30/19.   · 6/7/19 - Patient on 40 mg  of Prednisone daily. Platelet count 150,000. Asked to drop to 20 mg daily.  · 7/18/19 platelet count 180,000 on prednisone 10 mg every other day.  Asked him to decrease prednisone to 5 mg every other day.  · 10/28/2019 patient seen in the emergency room at [facility] with a persistent cough and CT chest revealing possible mass in left lower lobe.  Patient treated with Levaquin and prednisone 60 mg p.o. daily for 10 days as he was not on any prednisone at that time.  Platelet count was 183,000.  3.         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  · 7/3/08 - Hemoglobin 12.5, platelet count 128,000.  RBC folate (N) at 300, retic count (H) at 2.33, vitamin B12 (N) at 281, iron (N) at 56, ferritin (N) 302, haptoglobin (N) 128, TIBC (N) at 419, iron saturation (L) at 13. Platelet count 128,000. PT (N) at 11.0, INR (N) 1.0, PTT (L) <21.0.   · 10/1/08 - Corrected.  · 12/5/11 - Retic count 2.4 (N), iron 83 (N), TIBC 436 (H), iron sat % 19 (L), ferritin 338 (N) vitamin B12 291 (N), folate 675 (N), haptoglobin 200 (N).  · 12/23/11 -  (N).  · 6/26/14 - hemoglobin 14.7  · 10/22/14 - patient was started on methotrexate by Dr. Peter on for the arthritis.  He had ordered lab. SPEP showed the increase in albumin may be due to insufficient protein, interferential malabsorption, impaired synthesis, protein losing enteropathy, nephrotic syndrome, and various other disorders.  No monoclonal immunoglobulin detected.  NAIMA positive.  NAIMA titer 1:160 (H), CRP 2.66 (H)  · 10/4/16 - Comprehensive metabolic panel with creatinine 1.3 (0.7-1.2).    · 7/6/17 - Hemoglobin 14.6, hematocrit 43.9, MCV 98.7, WBC 4.9 and platelet count 170,000.   · 6/5/18 - Patient has not taken Methotrexate dose for one month.   · 6/6/18 - Ferrous sulfate 325 mg p.o. two times a day ordered.   · 6/12/18 - Stool heme positive on stool cards.  Protonix 40 mg tablet take one tablet daily  prescribed.  Referred to [facility].  Last colonoscopy in 2005.    · 8/1/18 - Colonoscopy at [facility] by Kishor Beard M.D. revealed mild diverticulosis of descending and sigmoid colon, internal hemorrhoids and 3-5 mm polyps in the ascending sigmoid colon and rectum. Pathology on ascending colon polyp revealed fragments of tubular adenoma with a few fragments exhibiting a benign lymphoid aggregate. Pathology on sigmoid colon polyp revealed fragments of tubular adenoma with focal superficial acute inflammation and rectal polyp biopsy revealed a hyperplastic polyp. Repeat colonoscopy was recommended in five years.   · 8/2/18 - Hemoglobin 13.6, MCV 98.8. Patient tolerating Ferrous Sulfate 325 mg p.o. b.i.d. without noted side effects. Orders written to continue this at present and orders written to obtain colonoscopy report performed at [facility] 8/1/18.   · 10/16/18 - Patient asked to reduce Ferrous Sulfate to 325 mg p.o. daily. Ferritin 123 ().   · 1/15/19 - Hemoglobin 14.5, MCV 98.4. Ferrous Sulfate discontinued.    · 3/12/2020 iron 35 low, ferritin 186, iron saturation 8% low, TIBC 422, WBC 6.7, hemoglobin 14.2, MCV 93.2, platelets 197  · 3/12/2020: Iron 35 low, ferritin 186, iron saturation 8% low, TIBC 422, WBC 6.7, hemoglobin 14.2, platelets 197,  · 7/2/2020: Iron 110, ferritin 268, iron saturation 26%, TIBC 419, WBC 8.8, hemoglobin 14.8, platelets 182, MCV 98.9, ESR 7,  4.   Acute bilateral pulmonary emboli diagnosed May 2018.    · 5/21/18 - Patient hospitalized at [facility] between 5/21/18 and 5/23/18 with several week history of exertional shortness of breath increasing on the day of admission. He had had a recent left heart catheterization that was normal. CT chest PE protocol revealed bilateral acute pulmonary embolic disease. Hematology consultation was not obtained. Emphysema and mild interstitial basilar fibrotic changes, mild dependent atelectasis in the lower lobes, cholelithiasis, fatty infiltration of the liver and  small hiatal hernia were noted also. His hemoglobin was 14.5 on admission and 12.9 on discharge. D-dimer was 4.8 (0.17-0.59). PT PTT were 12.4 and 22.7 on admission. Creatinine 1.2 (0.7-1.2). The patient was started on a Heparin drip and then switched to Xarelto 15 mg b.i.d. for 21 days then 20 mg daily.     · 6/5/18 - Anti-phosphatidylserine IgG 4 (N), IgM 25 (H).  Factor VIII activity 145 (H).  AT  (H).  Protein C 122 (N).  Protein S 173 (H).  Anti-cardiolipin antibody and anti-beta-2 glycoprotein all normal.  Factor V Leiden gene mutation not detected.  Prothrombin gene mutation not detected.    · 8/2/18 - PT PTT 16.5 and 32.2 seconds. Lupus anticoagulant inconclusive. Serum homocysteine 12.5 (<15).    · 8/7/18 - Bilateral lower extremity venous Doppler report with no evidence of any deep venous thrombosis or SVT. Lymph node noted in the left groin and in the right groin.   · 10/16/18 - Factor VIII activity 136 ().   · 1/15/19 - Patient reports he is no longer taking Xarelto and is on Coumadin with INR’s followed by Dr. Jason. Blood pressure 188/78 with patient reporting elevated blood pressures at home. Patient instructed to contact Dr. Jason for adjustments in his medications. Antiphospholipid antibodies negative.   · 5/2/19 - Factor VIII level 235 (H).  PT 50.7, INR 4.1 (H). PT INR faxed to Dr. Jason.  · 9/6/2019 patient was hospitalized at [facility] for 2 days with a nontraumatic subdural hematoma.  · 9/9/2019 patient hospitalized at [facility] with right facial droop with MRI brain revealing acute/subacute left cerebral hemisphere subdural hemorrhage and subarachnoid hemorrhage.  Atrophy and ischemic white matter disease was present.  Small punctate remote hemorrhage in the left temporal lobe was present.  Patient taken off warfarin and aspirin.  · 10/12/19 - Chest x-ray showed no significant changes compared to the previous study. Redemonstration of infiltrates within the bilateral lower lobes  with a small left sided effusion likely related to pneumonia. No acute osseous abnormality.   · 10/28/19 - CT PE protocol showed no definite pulmonary embolism identified. There is effacement of the proximal segmental branches of the left lower lobe pulmonary artery likely due to a mass or lymph node present within this region. The defect appears extraluminal and not definitely intraluminal to suggest a pulmonary embolism. Patchy airspace disease is present within the left lower lobe with a questionable cavitary lesion which may represent an underlying malignancy. There is a small left sided pleural effusion. Mild patchy airspace changes are also present within the lung bases bilaterally which may represent superimposed pneumonia.       5.  Left lower lobe pulmonary nodule 2 cm noncalcified: PET scan does not show hypermetabolic activity.         Subjective:    Here for a follow up lung nodule and ITP. He states he is feeling well at today's visit.  He denies any flaring up of his rheumatoid arthritis.  He does not take any methotrexate..  He had a CT scan.  He currently takes plaquenil daily.    Does not have any weight loss or new stool here hemoptysis.  Does not report any bleeding issues.     The following portions of the patient's history were reviewed and updated as appropriate: allergies, current medications, past family history, past medical history, past social history, past surgical history and problem list.         Past Medical History:   Diagnosis Date   • Acute pulmonary embolism (CMS/Formerly [facility]) 05/2018    bilateral   • Arthritis     bilateral knees and ankles   • CKD (chronic kidney disease) 03/02/2009   • Coagulopathy (CMS/HCC) 07/2008   • COPD (chronic obstructive pulmonary disease) (CMS/Formerly [facility])    • Diabetes mellitus (CMS/Formerly [facility])    • 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   • History of pneumonia 02/2015    hospitalized with community-acquired pneumonia, possibly viral    • History of prostate cancer 10/2009    [place] 6,  Stage II   • Hypercholesterolemia    • Hypertension    • 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   • Neutropenia (CMS/HCC) 11/2003   • MITZI (obstructive sleep apnea) 2018   • Psoriasis 2000   • Renal cyst, left    • Rheumatoid arthritis (CMS/HCC)    • Stroke (cerebrum) (CMS/HCC)    • Thrombocytopenia (CMS/HCC) 08/23/2005       Past Surgical History:   Procedure Laterality Date   • SKIN LESION EXCISION      back and groin-benign         Current Outpatient Medications:   •  atorvastatin (LIPITOR) 20 MG tablet, Take 20 mg by mouth Daily., Disp: , Rfl: 3  •  azithromycin (ZITHROMAX) 250 MG tablet, Take 2 tablets the first day, then 1 tablet daily for 4 days., Disp: 5 tablet, Rfl: 0  •  budesonide-formoterol (SYMBICORT) 160-4.5 MCG/ACT inhaler, Inhale 2 puffs 2 (Two) Times a Day., Disp: , Rfl:   •  ciprofloxacin (CIPRO) 500 MG tablet, TK 1 T PO  Q 12 H, Disp: , Rfl: 0  •  fenofibrate 160 MG tablet, Take 160 mg by mouth Daily., Disp: , Rfl: 3  •  ferrous gluconate 324 (37.5 Fe) MG tablet tablet, Take 1 tablet by mouth Daily With Breakfast., Disp: 30 tablet, Rfl: 5  •  glipiZIDE (GLUCOTROL) 5 MG tablet, Take 5 mg by mouth 2 (Two) Times a Day Before Meals., Disp: , Rfl:   •  glucosamine-chondroitin 500-400 MG capsule capsule, Take 1 capsule by mouth Daily., Disp: , Rfl:   •  hydroxychloroquine (PLAQUENIL) 200 MG tablet, TK 2 TS PO ONCE DAILY, Disp: , Rfl: 0  •  ipratropium-albuterol (DUO-NEB) 0.5-2.5 mg/3 ml nebulizer, Take 3 mL by nebulization Every 4 (Four) Hours As Needed for Wheezing., Disp: , Rfl:   •  losartan (COZAAR) 100 MG tablet, Take 100 mg by mouth Daily., Disp: , Rfl: 3  •  Multiple Vitamins-Minerals (MULTIVITAMIN WITH MINERALS) tablet tablet, Take 1 tablet by mouth Daily., Disp: , Rfl:   •  pantoprazole (PROTONIX) 40 MG EC tablet, Take 40 mg by mouth Daily., Disp: , Rfl: 1  •  pentoxifylline (TRENtal) 400 MG CR tablet, Take 400 mg by mouth 3 (Three)  "Times a Day With Meals., Disp: , Rfl:   •  spironolactone (ALDACTONE) 25 MG tablet, Take 25 mg by mouth Daily., Disp: , Rfl: 3    Allergies   Allergen Reactions   • Penicillin V Potassium Swelling   • Latex Hives       Family History   Problem Relation Age of Onset   • Lung cancer Brother         age unknown   • Heart disease Brother    • Stroke Mother    • Heart disease Father    • Heart disease Sister        Cancer-related family history includes Lung cancer in his brother.    Social History     Tobacco Use   • Smoking status: Former Smoker   • Smokeless tobacco: Never Used   • Tobacco comment: stopped smoking in 1995   Substance Use Topics   • Alcohol use: No     Frequency: Never   • Drug use: No           I have reviewed the history of present illness, past medical history, family history, social history, lab results, all notes and other records since the patient was last seen on  11/21/2019.    ROS:   Review of Systems   Constitutional: Negative for activity change, chills and fever.   HENT: Negative for drooling, ear discharge, mouth sores, nosebleeds and sore throat.    Eyes: Negative for photophobia, pain, redness and visual disturbance.   Respiratory: Negative for cough, choking, shortness of breath and wheezing.    Cardiovascular: Negative for chest pain and palpitations.   Gastrointestinal: Negative for abdominal pain, diarrhea, nausea and vomiting.   Genitourinary: Negative for dysuria, flank pain, frequency and urgency.   Musculoskeletal: Negative for neck stiffness.   Skin: Negative for rash.   Neurological: Negative for seizures, syncope, speech difficulty and weakness.   Hematological: Negative for adenopathy.   Psychiatric/Behavioral: Negative for agitation, confusion and hallucinations.       Objective:  Vital signs:  Vitals:    07/09/20 1356   BP: 118/66   Pulse: 77   Resp: 16   Temp: 97.8 °F (36.6 °C)   Weight: 99.8 kg (220 lb)   Height: 167.6 cm (66\")   PainSc: 0-No pain     ECOG  (2) Ambulatory " and capable of self care, unable to carry out work activity, up and about > 50% or waking hours    Physical Exam:   Physical Exam   Constitutional: He is oriented to person, place, and time. He appears well-developed and well-nourished. No distress.   Obese male   HENT:   Head: Normocephalic and atraumatic.   Nose: Nose normal.   Eyes: Conjunctivae and EOM are normal. Right eye exhibits no discharge. Left eye exhibits no discharge. No scleral icterus.   Neck: Normal range of motion. Neck supple. No thyromegaly present.   Cardiovascular: Normal rate, regular rhythm and normal heart sounds. Exam reveals no gallop and no friction rub.   Pulmonary/Chest: Effort normal. No stridor. No respiratory distress. He has no wheezes. He has rales.   Abdominal: Soft. Bowel sounds are normal. He exhibits no mass. There is no tenderness. There is no rebound and no guarding.   Musculoskeletal: Normal range of motion. He exhibits deformity. He exhibits no tenderness.   Multiple joint deformities due to rheumatoid arthritis.  Especially in the hands   Lymphadenopathy:     He has no cervical adenopathy.   Neurological: He is alert and oriented to person, place, and time. He displays normal reflexes. No sensory deficit. He exhibits normal muscle tone.   Skin: Skin is warm. No rash noted. He is not diaphoretic. No erythema.   Psychiatric: He has a normal mood and affect. His behavior is normal.   Nursing note and vitals reviewed.            Lab Results - Last 18 Months   Lab Units 07/02/20  1351 03/12/20  1343 12/12/19  1338   WBC 10*3/mm3 8.86 6.73 6.18   HEMOGLOBIN g/dL 14.8 14.2 12.2*   HEMATOCRIT % 44.8 42.8 37.6   PLATELETS 10*3/mm3 182 197 202   MCV fL 98.9* 93.2 94.2     Lab Results - Last 18 Months   Lab Units 09/12/19  1100 09/11/19  1321 09/09/19  1204 09/08/19  0428 09/07/19  1315 09/07/19  0341 09/06/19  1231   SODIUM mmol/L 142 141 141 135* 139 137 138   POTASSIUM mmol/L 4.1 4.1 4.1 3.8 3.7 3.6 4.0   CHLORIDE mmol/L 107 107  107 103 107 105 107   TOTAL CO2 mmol/L 23 23 23  --   --   --   --    CO2 mmol/L  --   --   --  23.0 22.0 21.0* 20.0*   BUN mg/dL 28* 30* 24* 18 13 14 19   CREATININE mg/dL 1.4 1.9* 1.1 1.30* 1.10 1.10 1.10   CALCIUM mg/dL 9.0 8.8 9.4 8.3* 8.6* 8.5* 8.5*   BILIRUBIN mg/dL  --   --  0.6  --  0.6  --  0.8   ALK PHOS U/L  --   --  61  --  42  --  40   ALT (SGPT) U/L  --   --  20  --  17  --  17   AST (SGOT) U/L  --   --  25  --  22  --  28   GLUCOSE mg/dL  --   --   --  168* 144* 147* 139*       Lab Results   Component Value Date    GLUCOSE 168 (H) 09/08/2019    BUN 28 (H) 09/12/2019    CREATININE 1.4 09/12/2019    EGFRIFNONA 53 (L) 09/08/2019    EGFRIFAFRI 68 11/03/2016    BCR 19.9 09/12/2019    K 4.1 09/12/2019    CO2 23 09/12/2019    CALCIUM 9.0 09/12/2019    ALBUMIN 3.7 09/09/2019    LABIL2 1.2 09/09/2019    AST 25 09/09/2019    ALT 20 09/09/2019       Lab Results - Last 18 Months   Lab Units 09/09/19  1204 09/06/19  1231 05/15/19  0847   INR INR 1.2 1.69* 3.8*   APTT s 27.9  --   --        Lab Results   Component Value Date    IRON 110 07/02/2020    TIBC 419 07/02/2020    FERRITIN 268.70 07/02/2020       Lab Results   Component Value Date    FOLATE 22.30 09/07/2019       No results found for: OCCULTBLD    Lab Results   Component Value Date    RETICCTPCT 6.24 (H) 06/05/2018       Lab Results   Component Value Date    BHRUTTWC77 623 09/07/2019     No results found for: SPEP, UPEP  No results found for: LDH, URICACID  Lab Results   Component Value Date    SEDRATE 7 07/02/2020     Lab Results   Component Value Date    HAPTOGLOBIN 105 06/05/2018     Lab Results   Component Value Date    PTT 27.9 09/09/2019    INR 1.2 09/09/2019     No results found for:   No results found for: CEA  No components found for: CA-19-9  No results found for: PSA      Assessment/Plan     Assessment:  T-cell large granular lymphocytic leukemia   - CBC & Differential  - CBC Auto Differential  - [facility] Requisition ([facility] Only)     Chronic  ITP (idiopathic thrombocytopenia) (CMS/HCC)     Rheumatoid arthritis involving multiple sites, unspecified rheumatoid factor presence (CMS/HCC)     CKD (chronic kidney disease), stage III (CMS/HCC)     Bilateral pulmonary embolism (CMS/HCC)     Elevated factor VIII level     Other myelodysplastic syndromes (CMS/HCC)     Hypogammaglobulinemia (CMS/HCC)  - IgG  - IgM  - IgA     Subdural hematoma, nontraumatic (CMS/HCC)     Cavitating mass of lower lobe of left lung  - NM Pet Skull Base To Mid Thigh     Other nonspecific abnormal finding of lung field   - NM Pet Skull Base To Mid Thigh        ASSESSMENT:     1. Left lower lobe lung nodule on CT chest PE protocol. Not hypermetabolic on PET/CT scan.   Likely it could be a resolving pneumonia or could be rheumatoid nodule  2. T-cell large granular lymphocytic (LGL) leukemia: likely related to RA.  Previously was treated with methotrexate.  Now he is off.  He is on Plaquenil. His LGL leukemia is stable.  No evidence of any neutropenia or thrombocytopenia.  Reviewed the result of the T-cell gene rearrangement testing from November 2019.  3. Intermittent Thrombocytopenia:-Platelets normal today  4. ITP: Resolved  5. History of anemia of CKD  6. History of bilateral pulmonary emboli: Has a history of elevated factor VIII level.      PLAN:  1. Lung nodule has remained stable.  Does not show any signs of malignancy.  May need a follow-up in 1 year.  2. Continue oral iron  3. Follow-up in 6 months with repeat CBC and iron levels  4. His LGL leukemia is stable.  No evidence of any neutropenia or thrombocytopenia.            There are no diagnoses linked to this encounter.  No orders of the defined types were placed in this encounter.           Thank you very much for providing the opportunity to participate in this patient’s care. Please do not hesitate to call if there are any other questions.    I have reviewed all relevant labs results, outside reports, imaging, notes, vital  signs, medications, and plan with the patient today.    Portions of the note have been scribed by medical assistant/Nurse.  I have reviewed and made changes accordingly.        Electronically signed by Yogesh Anne MD, 07/09/20, 2:35 PM.

## 2020-07-09 ENCOUNTER — APPOINTMENT (OUTPATIENT)
Dept: LAB | Facility: HOSPITAL | Age: 80
End: 2020-07-09

## 2020-07-09 ENCOUNTER — OFFICE VISIT (OUTPATIENT)
Dept: SURGERY | Facility: CLINIC | Age: 80
End: 2020-07-09

## 2020-07-09 ENCOUNTER — OFFICE VISIT (OUTPATIENT)
Dept: ONCOLOGY | Facility: CLINIC | Age: 80
End: 2020-07-09

## 2020-07-09 VITALS
WEIGHT: 220 LBS | HEIGHT: 66 IN | BODY MASS INDEX: 35.36 KG/M2 | TEMPERATURE: 97.8 F | HEART RATE: 77 BPM | DIASTOLIC BLOOD PRESSURE: 66 MMHG | SYSTOLIC BLOOD PRESSURE: 118 MMHG | RESPIRATION RATE: 16 BRPM

## 2020-07-09 VITALS
SYSTOLIC BLOOD PRESSURE: 132 MMHG | WEIGHT: 219 LBS | HEIGHT: 66 IN | DIASTOLIC BLOOD PRESSURE: 81 MMHG | OXYGEN SATURATION: 96 % | BODY MASS INDEX: 35.2 KG/M2 | TEMPERATURE: 100 F | HEART RATE: 99 BPM

## 2020-07-09 DIAGNOSIS — D69.3 CHRONIC ITP (IDIOPATHIC THROMBOCYTOPENIA) (HCC): ICD-10-CM

## 2020-07-09 DIAGNOSIS — D63.0 ANEMIA IN NEOPLASTIC DISEASE: Primary | ICD-10-CM

## 2020-07-09 DIAGNOSIS — R91.1 LUNG NODULE: Primary | ICD-10-CM

## 2020-07-09 DIAGNOSIS — G47.33 OSA (OBSTRUCTIVE SLEEP APNEA): ICD-10-CM

## 2020-07-09 DIAGNOSIS — R06.02 EXERTIONAL SHORTNESS OF BREATH: ICD-10-CM

## 2020-07-09 DIAGNOSIS — I26.99 BILATERAL PULMONARY EMBOLISM (HCC): ICD-10-CM

## 2020-07-09 DIAGNOSIS — R91.1 LUNG NODULE: ICD-10-CM

## 2020-07-09 DIAGNOSIS — C91.Z0 T-CELL CHRONIC LYMPHOCYTIC LEUKEMIA (HCC): ICD-10-CM

## 2020-07-09 PROCEDURE — 99213 OFFICE O/P EST LOW 20 MIN: CPT | Performed by: THORACIC SURGERY (CARDIOTHORACIC VASCULAR SURGERY)

## 2020-07-09 PROCEDURE — 99214 OFFICE O/P EST MOD 30 MIN: CPT | Performed by: INTERNAL MEDICINE

## 2020-07-09 NOTE — PROGRESS NOTES
Thoracic surgery progress note    Chief complaint follow-up of left lower lobe pulmonary nodule    The patient returns with a follow-up CT scan 6 months after his last one.  I personally examined the scan and reviewed the radiology report.  I concur with the reading there is no change in the lower lobe pulmonary nodule present at the base of the lung.    Patient has no new fevers chills night sweats cough hemoptysis.  He has baseline shortness of breath  Unchanged.    On all systems review he notes loss of hearing erectile difficulties otherwise no other symptoms.    Symmetrical chest expansion.  No central or peripheral cyanosis.    Impression stable left lower lobe pulmonary nodule plan follow-up CT scan in 9 months  Patient has multiple coexisting problems that argue against an aggressive approach to this nodule he has obstructive sleep apnea history of pulmonary emboli and a history of COPD.  In addition his advancing age and high frailty index further argue against attempted biopsy.  Stable nodular configuration although greater than a centimeter still implies likely benign nature

## 2021-04-13 ENCOUNTER — OFFICE VISIT (OUTPATIENT)
Dept: SURGERY | Facility: CLINIC | Age: 81
End: 2021-04-13

## 2021-04-13 VITALS
BODY MASS INDEX: 37.45 KG/M2 | WEIGHT: 233 LBS | OXYGEN SATURATION: 97 % | HEIGHT: 66 IN | DIASTOLIC BLOOD PRESSURE: 89 MMHG | HEART RATE: 63 BPM | SYSTOLIC BLOOD PRESSURE: 152 MMHG | TEMPERATURE: 97.8 F

## 2021-04-13 DIAGNOSIS — R91.1 LEFT LOWER LOBE PULMONARY NODULE: Primary | ICD-10-CM

## 2021-04-13 PROCEDURE — 99214 OFFICE O/P EST MOD 30 MIN: CPT | Performed by: NURSE PRACTITIONER

## 2021-04-13 NOTE — PROGRESS NOTES
"Chief Complaint  Lung Nodule (9 month follow up ct chest)    Subjective          Praneeth Nam presents to [facility] THORACIC SURGERY  History of Present Illness  Mr. Nam is a pleasant 81-year-old gentleman who presents today for continued pulmonary surveillance for a left lower lobe pulmonary nodule detected in 2019.  He has a complex medical history including rheumatoid arthritis, leukemia, pulmonary emboli, COPD and former smoker. He does not wear supplemental oxygen at home.  He states he is able to complete his activities of daily living without difficulty.  He denies cough, dyspnea, hemoptysis, pleuritic pain, unintentional weight loss, fever, fatigue and hoarseness.      Objective   Vital Signs:   /89 (BP Location: Left arm, Patient Position: Sitting, Cuff Size: Adult)   Pulse 63   Temp 97.8 °F (36.6 °C) (Temporal)   Ht 167.6 cm (66\")   Wt 106 kg (233 lb)   SpO2 97%   BMI 37.61 kg/m²     Physical Exam  Constitutional:       Appearance: Normal appearance.   HENT:      Head: Normocephalic and atraumatic.      Nose: Nose normal.   Eyes:      Pupils: Pupils are equal, round, and reactive to light.   Cardiovascular:      Rate and Rhythm: Normal rate and regular rhythm.      Pulses: Normal pulses.   Pulmonary:      Effort: Pulmonary effort is normal. No respiratory distress.      Breath sounds: Decreased breath sounds present.   Musculoskeletal:         General: Normal range of motion.      Cervical back: Neck supple.   Skin:     General: Skin is warm and dry.   Neurological:      General: No focal deficit present.      Mental Status: He is alert and oriented to person, place, and time.   Psychiatric:         Mood and Affect: Mood normal.         Behavior: Behavior normal.         Thought Content: Thought content normal.        Result Review :   The following data was reviewed by: Nora Kenyon, BRIAN, APRN on 04/13/2021:    Data reviewed: Radiologic studies Chest CT           I " personally reviewed the chest CT dated 4/2/2021 which demonstrates a stable nodule to the left lower lobe measuring approximately 1 cm, decreased from 1.4 cm from CT chest on 7/7/2020.  A calcified granuloma adjacent to nodular density is seen. Pleural thickening to the left lung suggesting chronic scarring is also noted. No new infiltrates or masses are seen.  No suspicious mediastinal lymphadenopathy and no pleural effusion.      Assessment and Plan    Diagnoses and all orders for this visit:    1. Left lower lobe pulmonary nodule (Primary)  -     Cancel: CT Chest Without Contrast; Future  -     CT Chest Without Contrast; Future      Mr. Nam presents today for continued pulmonary surveillance regarding left lower lobe pulmonary nodule originally detected in November 2019.  Most recent chest CT indicates this nodule stable and is decreased in size compared to scan completed in July 2020.  A PET from 10/30/2019 indicates no hypermetabolic activity to this area.  He is in his usual state of health and has no new complaints today.  We will continue with pulmonary surveillance with a chest CT in 9 months with an office visit to follow-up to review the results.      I spent 25 minutes caring for Praneeth on this date of service. This time includes time spent by me in the following activities:preparing for the visit, reviewing tests, obtaining and/or reviewing a separately obtained history, performing a medically appropriate examination and/or evaluation , ordering medications, tests, or procedures, documenting information in the medical record, independently interpreting results and communicating that information with the patient/family/caregiver and care coordination  Follow Up   No follow-ups on file.  Patient was given instructions and counseling regarding his condition or for health maintenance advice. Please see specific information pulled into the AVS if appropriate.

## 2021-07-13 ENCOUNTER — OFFICE VISIT (OUTPATIENT)
Dept: CARDIOLOGY | Facility: CLINIC | Age: 81
End: 2021-07-13

## 2021-07-13 VITALS
SYSTOLIC BLOOD PRESSURE: 160 MMHG | WEIGHT: 235.5 LBS | HEIGHT: 66 IN | BODY MASS INDEX: 37.85 KG/M2 | OXYGEN SATURATION: 97 % | DIASTOLIC BLOOD PRESSURE: 70 MMHG | HEART RATE: 67 BPM

## 2021-07-13 DIAGNOSIS — I45.10 RIGHT BUNDLE BRANCH BLOCK: ICD-10-CM

## 2021-07-13 DIAGNOSIS — R06.02 SHORTNESS OF BREATH: Primary | ICD-10-CM

## 2021-07-13 DIAGNOSIS — I48.0 AF (PAROXYSMAL ATRIAL FIBRILLATION) (HCC): ICD-10-CM

## 2021-07-13 DIAGNOSIS — I10 ESSENTIAL HYPERTENSION: ICD-10-CM

## 2021-07-13 PROCEDURE — 93000 ELECTROCARDIOGRAM COMPLETE: CPT | Performed by: INTERNAL MEDICINE

## 2021-07-13 PROCEDURE — 99214 OFFICE O/P EST MOD 30 MIN: CPT | Performed by: INTERNAL MEDICINE

## 2021-07-13 RX ORDER — ASPIRIN 81 MG/1
81 TABLET ORAL DAILY
COMMUNITY

## 2021-07-13 RX ORDER — CHLORAL HYDRATE 500 MG
1000 CAPSULE ORAL
COMMUNITY
End: 2022-01-13

## 2021-07-13 RX ORDER — CETIRIZINE HCL 10 MG/1
1 CAPSULE ORAL DAILY
COMMUNITY

## 2021-07-13 NOTE — PROGRESS NOTES
Encounter Date:07/13/2021  Last seen 10/22/2019      Patient ID: Praneeth Nam is a 81 y.o. male.    Chief Complaint:  Dilated aorta  Shortness of breath  Hypertension  Diabetes  Right bundle branch block  Recent shortness of breath fatigue and lightheadedness     History of Present Illness  Recent shortness of breath fatigue and lightheadedness.  Denies having any chest discomfort.  Patient was last seen in the office 10/22/2019.    Since I have last seen, the patient has been without any chest discomfort, palpitations, dizziness or syncope.  Denies having any headache ,abdominal pain ,nausea, vomiting , diarrhea constipation, loss of weight or loss of appetite.  Denies having any excessive bruising ,hematuria or blood in the stool.    Review of all systems negative except as indicated.    Reviewed ROS.  Assessment and Plan      ]]]]]]]]]]]]]]]]]]   impression  ==========   - shortness of breath fatigue and lightheadedness.    -New onset atrial fibrillation with controlled rate.    -Chronic right bundle branch block cardiac catheterization 04/11/2018 revealed normal left ventricular function normal coronary  arteries and no evidence for pulmonary hypertension was present.     -History of diffusely dilated ascending aorta     Echocardiogram showed left atrial and right ventricle enlargement prominent aorta.  Normal left ventricular function with ejection fraction of 60% 04/05/2018.  Stacy scan Cardiolite test is abnormal with significant inferior ischemia and apical hypokinesis on the gated SPECT images 04/05/2018.     - diabetes hypertension LGL leukemia rheumatoid arthritis COPD CKD 3     - history of prostate carcinoma.  Status post radiation     - former smoker     - allergic to penicillin  ==========  Plan  ==========  Recent shortness of breath fatigue and lightheadedness.  EKG showed atrial fibrillation with right bundle branch block.  New onset atrial fibrillation probably causing the symptoms.  Rate is  well controlled.  Stress Cardiolite test.  Echocardiogram.  Patient has multiple risk factors.  Patient is not on any rate controlling drugs although her rate is well controlled.    Follow-up in the office on the same day.  Consideration would be given for anticoagulation and cardioversion if patient has persistent atrial fibrillation.    Medications were reviewed and updated.    Further plan will depend on patient's progress.  [[[[[[[[[[[[[[[[[[[[                Diagnosis Plan   1. Shortness of breath  Adult Transthoracic Echo Complete W/ Cont if Necessary Per Protocol    Stress Test With Myocardial Perfusion One Day   2. Right bundle branch block  Adult Transthoracic Echo Complete W/ Cont if Necessary Per Protocol    Stress Test With Myocardial Perfusion One Day   3. Essential hypertension  Adult Transthoracic Echo Complete W/ Cont if Necessary Per Protocol    Stress Test With Myocardial Perfusion One Day   4. AF (paroxysmal atrial fibrillation) (CMS/HCC)  Adult Transthoracic Echo Complete W/ Cont if Necessary Per Protocol    Stress Test With Myocardial Perfusion One Day   LAB RESULTS (LAST 7 DAYS)    CBC        BMP        CMP         BNP        TROPONIN        CoAg        Creatinine Clearance  CrCl cannot be calculated (Patient's most recent lab result is older than the maximum 30 days allowed.).    ABG        Radiology  No radiology results for the last day                The following portions of the patient's history were reviewed and updated as appropriate: allergies, current medications, past family history, past medical history, past social history, past surgical history and problem list.    Review of Systems   Constitutional: Positive for malaise/fatigue. Negative for fever.   HENT: Negative for congestion and hearing loss.    Eyes: Negative for double vision and visual disturbance.   Cardiovascular: Negative for chest pain, claudication, dyspnea on exertion, leg swelling and syncope.   Respiratory:  Positive for shortness of breath. Negative for cough.    Endocrine: Negative for cold intolerance.   Skin: Negative for color change and rash.   Musculoskeletal: Negative for arthritis and joint pain.   Gastrointestinal: Negative for abdominal pain and heartburn.   Genitourinary: Negative for hematuria.   Neurological: Positive for light-headedness. Negative for excessive daytime sleepiness and dizziness.   Psychiatric/Behavioral: Negative for depression. The patient is not nervous/anxious.    All other systems reviewed and are negative.        Current Outpatient Medications:   •  aspirin (aspirin) 81 MG EC tablet, Take 81 mg by mouth Daily., Disp: , Rfl:   •  atorvastatin (LIPITOR) 20 MG tablet, Take 20 mg by mouth Daily., Disp: , Rfl: 3  •  budesonide-formoterol (SYMBICORT) 160-4.5 MCG/ACT inhaler, Inhale 2 puffs 2 (Two) Times a Day., Disp: , Rfl:   •  Cetirizine HCl (Allergy Relief) 10 MG capsule, Take 1 capsule by mouth Daily., Disp: , Rfl:   •  fenofibrate 160 MG tablet, Take 160 mg by mouth Daily., Disp: , Rfl: 3  •  glipiZIDE (GLUCOTROL) 5 MG tablet, Take 5 mg by mouth 2 (Two) Times a Day Before Meals., Disp: , Rfl:   •  Glucosamine Sulfate 1000 MG tablet, Take 1 tablet by mouth Daily., Disp: , Rfl:   •  hydroxychloroquine (PLAQUENIL) 200 MG tablet, TK 2 TS PO ONCE DAILY, Disp: , Rfl: 0  •  ipratropium-albuterol (DUO-NEB) 0.5-2.5 mg/3 ml nebulizer, Take 3 mL by nebulization Every 4 (Four) Hours As Needed for Wheezing., Disp: , Rfl:   •  losartan (COZAAR) 100 MG tablet, Take 100 mg by mouth Daily., Disp: , Rfl: 3  •  Multiple Vitamins-Minerals (MULTIVITAMIN WITH MINERALS) tablet tablet, Take 1 tablet by mouth Daily., Disp: , Rfl:   •  Omega-3 Fatty Acids (fish oil) 1000 MG capsule capsule, Take 1,000 mg by mouth Daily With Breakfast., Disp: , Rfl:   •  pantoprazole (PROTONIX) 40 MG EC tablet, Take 40 mg by mouth Daily., Disp: , Rfl: 1  •  spironolactone (ALDACTONE) 25 MG tablet, Take 25 mg by mouth Daily.,  Disp: , Rfl: 3    Allergies   Allergen Reactions   • Penicillin V Potassium Swelling   • Latex Hives       Family History   Problem Relation Age of Onset   • Lung cancer Brother         age unknown   • Heart disease Brother    • Stroke Mother    • Heart disease Father    • Heart disease Sister        Past Surgical History:   Procedure Laterality Date   • SKIN LESION EXCISION      back and groin-benign       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, Stage II   • Hypercholesterolemia    • Hypertension    • 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   • Neutropenia (CMS/HCC) 11/2003   • MITZI (obstructive sleep apnea) 2018   • Psoriasis 2000   • Renal cyst, left    • Rheumatoid arthritis (CMS/HCC)    • Stroke (cerebrum) (CMS/HCC)    • Thrombocytopenia (CMS/HCC) 08/23/2005       Family History   Problem Relation Age of Onset   • Lung cancer Brother         age unknown   • Heart disease Brother    • Stroke Mother    • Heart disease Father    • Heart disease Sister        Social History     Socioeconomic History   • Marital status:      Spouse name: Not on file   • Number of children: Not on file   • Years of education: Not on file   • Highest education level: Not on file   Tobacco Use   • Smoking status: Former Smoker   • Smokeless tobacco: Never Used   • Tobacco comment: stopped smoking in 1995   Vaping Use   • Vaping Use: Never used   Substance and Sexual Activity   • Alcohol use: No   • Drug use: No  "          ECG 12 Lead    Date/Time: 7/13/2021 2:49 PM  Performed by: Misty Dias MD  Authorized by: Misty Dias MD   Comparison: compared with previous ECG   Comparison to previous ECG: Atrial fibrillation right bundle branch block 70/min nonspecific ST-T wave changes no ectopy compared to 9/6/2019 significant changes seen patient was in sinus rhythm                Objective:       Physical Exam    /70 (BP Location: Left arm, Patient Position: Sitting, Cuff Size: Large Adult)   Pulse 67   Ht 167.6 cm (66\")   Wt 107 kg (235 lb 8 oz)   SpO2 97%   BMI 38.01 kg/m²   The patient is alert, oriented and in no distress.    Vital signs as noted above.    Head and neck revealed no carotid bruits or jugular venous distension.  No thyromegaly or lymphadenopathy is present.    Lungs clear.  No wheezing.  Breath sounds are normal bilaterally.    Heart normal first and second heart sounds.  No murmur..  No pericardial rub is present.  No gallop is present.    Abdomen soft and nontender.  No organomegaly is present.    Extremities revealed good peripheral pulses without any pedal edema.    Skin warm and dry.    Musculoskeletal system is grossly normal.    CNS grossly normal.    Reviewed and unchanged from last visit.        "

## 2021-07-28 ENCOUNTER — HOSPITAL ENCOUNTER (OUTPATIENT)
Dept: CARDIOLOGY | Facility: HOSPITAL | Age: 81
Discharge: HOME | End: 2021-07-28

## 2021-07-28 ENCOUNTER — OFFICE VISIT (OUTPATIENT)
Dept: CARDIOLOGY | Facility: CLINIC | Age: 81
End: 2021-07-28

## 2021-07-28 VITALS
SYSTOLIC BLOOD PRESSURE: 130 MMHG | DIASTOLIC BLOOD PRESSURE: 72 MMHG | WEIGHT: 235 LBS | BODY MASS INDEX: 37.77 KG/M2 | HEIGHT: 66 IN | HEART RATE: 80 BPM

## 2021-07-28 VITALS
HEIGHT: 66 IN | WEIGHT: 235 LBS | SYSTOLIC BLOOD PRESSURE: 122 MMHG | DIASTOLIC BLOOD PRESSURE: 55 MMHG | BODY MASS INDEX: 37.77 KG/M2

## 2021-07-28 DIAGNOSIS — R06.02 SHORTNESS OF BREATH: ICD-10-CM

## 2021-07-28 DIAGNOSIS — I45.10 RIGHT BUNDLE BRANCH BLOCK: ICD-10-CM

## 2021-07-28 DIAGNOSIS — I10 ESSENTIAL HYPERTENSION: ICD-10-CM

## 2021-07-28 DIAGNOSIS — I48.0 AF (PAROXYSMAL ATRIAL FIBRILLATION) (HCC): ICD-10-CM

## 2021-07-28 DIAGNOSIS — I45.10 RIGHT BUNDLE BRANCH BLOCK: Primary | ICD-10-CM

## 2021-07-28 LAB
BH CV ECHO MEAS - ACS: 2.3 CM
BH CV ECHO MEAS - AI DEC SLOPE: 276.4 CM/SEC^2
BH CV ECHO MEAS - AI DEC TIME: 1.2 SEC
BH CV ECHO MEAS - AI MAX PG: 45.6 MMHG
BH CV ECHO MEAS - AI MAX VEL: 337.6 CM/SEC
BH CV ECHO MEAS - AI P1/2T: 357.8 MSEC
BH CV ECHO MEAS - AO MAX PG (FULL): 5.1 MMHG
BH CV ECHO MEAS - AO MAX PG: 6.9 MMHG
BH CV ECHO MEAS - AO MEAN PG (FULL): 2.1 MMHG
BH CV ECHO MEAS - AO MEAN PG: 3.1 MMHG
BH CV ECHO MEAS - AO ROOT AREA (BSA CORRECTED): 1.9
BH CV ECHO MEAS - AO ROOT AREA: 13.5 CM^2
BH CV ECHO MEAS - AO ROOT DIAM: 4.2 CM
BH CV ECHO MEAS - AO V2 MAX: 131.4 CM/SEC
BH CV ECHO MEAS - AO V2 MEAN: 83.8 CM/SEC
BH CV ECHO MEAS - AO V2 VTI: 23.8 CM
BH CV ECHO MEAS - ASC AORTA: 4.1 CM
BH CV ECHO MEAS - AVA(I,A): 2 CM^2
BH CV ECHO MEAS - AVA(I,D): 2 CM^2
BH CV ECHO MEAS - AVA(V,A): 1.9 CM^2
BH CV ECHO MEAS - AVA(V,D): 1.9 CM^2
BH CV ECHO MEAS - BSA(HAYCOCK): 2.3 M^2
BH CV ECHO MEAS - BSA: 2.1 M^2
BH CV ECHO MEAS - BZI_BMI: 37.9 KILOGRAMS/M^2
BH CV ECHO MEAS - BZI_METRIC_HEIGHT: 167.6 CM
BH CV ECHO MEAS - BZI_METRIC_WEIGHT: 106.6 KG
BH CV ECHO MEAS - EDV(CUBED): 124.7 ML
BH CV ECHO MEAS - EDV(MOD-SP2): 105.6 ML
BH CV ECHO MEAS - EDV(MOD-SP4): 118.9 ML
BH CV ECHO MEAS - EDV(TEICH): 118 ML
BH CV ECHO MEAS - EF(CUBED): 56.7 %
BH CV ECHO MEAS - EF(MOD-SP2): 45.4 %
BH CV ECHO MEAS - EF(MOD-SP4): 59.1 %
BH CV ECHO MEAS - EF(TEICH): 48.2 %
BH CV ECHO MEAS - ESV(CUBED): 53.9 ML
BH CV ECHO MEAS - ESV(MOD-SP2): 57.7 ML
BH CV ECHO MEAS - ESV(MOD-SP4): 48.7 ML
BH CV ECHO MEAS - ESV(TEICH): 61.1 ML
BH CV ECHO MEAS - FS: 24.4 %
BH CV ECHO MEAS - IVS/LVPW: 1.2
BH CV ECHO MEAS - IVSD: 1 CM
BH CV ECHO MEAS - LA DIMENSION: 4.4 CM
BH CV ECHO MEAS - LA/AO: 1.1
BH CV ECHO MEAS - LV DIASTOLIC VOL/BSA (35-75): 55.5 ML/M^2
BH CV ECHO MEAS - LV MASS(C)D: 165.7 GRAMS
BH CV ECHO MEAS - LV MASS(C)DI: 77.4 GRAMS/M^2
BH CV ECHO MEAS - LV MAX PG: 1.8 MMHG
BH CV ECHO MEAS - LV MEAN PG: 1 MMHG
BH CV ECHO MEAS - LV SYSTOLIC VOL/BSA (12-30): 22.7 ML/M^2
BH CV ECHO MEAS - LV V1 MAX: 66.4 CM/SEC
BH CV ECHO MEAS - LV V1 MEAN: 48.4 CM/SEC
BH CV ECHO MEAS - LV V1 VTI: 13.1 CM
BH CV ECHO MEAS - LVIDD: 5 CM
BH CV ECHO MEAS - LVIDS: 3.8 CM
BH CV ECHO MEAS - LVOT AREA: 3.7 CM^2
BH CV ECHO MEAS - LVOT DIAM: 2.2 CM
BH CV ECHO MEAS - LVPWD: 0.84 CM
BH CV ECHO MEAS - MV E MAX VEL: 154.6 CM/SEC
BH CV ECHO MEAS - MV MAX PG: 6.4 MMHG
BH CV ECHO MEAS - MV MEAN PG: 2.9 MMHG
BH CV ECHO MEAS - MV V2 MAX: 126.4 CM/SEC
BH CV ECHO MEAS - MV V2 MEAN: 79.4 CM/SEC
BH CV ECHO MEAS - MV V2 VTI: 27.2 CM
BH CV ECHO MEAS - MVA(VTI): 1.8 CM^2
BH CV ECHO MEAS - PA ACC TIME: 0.08 SEC
BH CV ECHO MEAS - PA MAX PG (FULL): 2.2 MMHG
BH CV ECHO MEAS - PA MAX PG: 4.6 MMHG
BH CV ECHO MEAS - PA PR(ACCEL): 44.7 MMHG
BH CV ECHO MEAS - PA V2 MAX: 107.6 CM/SEC
BH CV ECHO MEAS - PI END-D VEL: 171.7 CM/SEC
BH CV ECHO MEAS - PI MAX PG: 20.9 MMHG
BH CV ECHO MEAS - PI MAX VEL: 228.8 CM/SEC
BH CV ECHO MEAS - RAP SYSTOLE: 3 MMHG
BH CV ECHO MEAS - RV MAX PG: 2.4 MMHG
BH CV ECHO MEAS - RV MEAN PG: 1.2 MMHG
BH CV ECHO MEAS - RV V1 MAX: 77.5 CM/SEC
BH CV ECHO MEAS - RV V1 MEAN: 51.3 CM/SEC
BH CV ECHO MEAS - RV V1 VTI: 12.8 CM
BH CV ECHO MEAS - RVDD: 3.4 CM
BH CV ECHO MEAS - RVSP: 27.9 MMHG
BH CV ECHO MEAS - SI(AO): 150.3 ML/M^2
BH CV ECHO MEAS - SI(CUBED): 33 ML/M^2
BH CV ECHO MEAS - SI(LVOT): 22.4 ML/M^2
BH CV ECHO MEAS - SI(MOD-SP2): 22.4 ML/M^2
BH CV ECHO MEAS - SI(MOD-SP4): 32.8 ML/M^2
BH CV ECHO MEAS - SI(TEICH): 26.5 ML/M^2
BH CV ECHO MEAS - SV(AO): 321.9 ML
BH CV ECHO MEAS - SV(CUBED): 70.7 ML
BH CV ECHO MEAS - SV(LVOT): 48 ML
BH CV ECHO MEAS - SV(MOD-SP2): 47.9 ML
BH CV ECHO MEAS - SV(MOD-SP4): 70.2 ML
BH CV ECHO MEAS - SV(TEICH): 56.9 ML
BH CV ECHO MEAS - TR MAX VEL: 249.3 CM/SEC
BH CV REST NUCLEAR ISOTOPE DOSE: 10.9 MCI
BH CV STRESS COMMENTS STAGE 1: NORMAL
BH CV STRESS DOSE REGADENOSON STAGE 1: 0.4
BH CV STRESS DURATION MIN STAGE 1: 0
BH CV STRESS DURATION SEC STAGE 1: 10
BH CV STRESS NUCLEAR ISOTOPE DOSE: 32.5 MCI
BH CV STRESS PROTOCOL 1: NORMAL
BH CV STRESS RECOVERY BP: NORMAL MMHG
BH CV STRESS RECOVERY HR: 94 BPM
BH CV STRESS STAGE 1: 1
LV EF 2D ECHO EST: 60 %
MAXIMAL PREDICTED HEART RATE: 140 BPM
MAXIMAL PREDICTED HEART RATE: 140 BPM
STRESS BASELINE BP: NORMAL MMHG
STRESS BASELINE HR: 80 BPM
STRESS TARGET HR: 119 BPM
STRESS TARGET HR: 119 BPM

## 2021-07-28 PROCEDURE — 99214 OFFICE O/P EST MOD 30 MIN: CPT | Performed by: INTERNAL MEDICINE

## 2021-07-28 PROCEDURE — 78452 HT MUSCLE IMAGE SPECT MULT: CPT | Performed by: INTERNAL MEDICINE

## 2021-07-28 PROCEDURE — 0 TECHNETIUM SESTAMIBI: Performed by: INTERNAL MEDICINE

## 2021-07-28 PROCEDURE — 93306 TTE W/DOPPLER COMPLETE: CPT | Performed by: INTERNAL MEDICINE

## 2021-07-28 PROCEDURE — A9500 TC99M SESTAMIBI: HCPCS | Performed by: INTERNAL MEDICINE

## 2021-07-28 PROCEDURE — 93017 CV STRESS TEST TRACING ONLY: CPT

## 2021-07-28 PROCEDURE — 93018 CV STRESS TEST I&R ONLY: CPT | Performed by: INTERNAL MEDICINE

## 2021-07-28 PROCEDURE — 93306 TTE W/DOPPLER COMPLETE: CPT

## 2021-07-28 PROCEDURE — 93016 CV STRESS TEST SUPVJ ONLY: CPT | Performed by: INTERNAL MEDICINE

## 2021-07-28 PROCEDURE — 25010000002 REGADENOSON 0.4 MG/5ML SOLUTION: Performed by: INTERNAL MEDICINE

## 2021-07-28 PROCEDURE — 78452 HT MUSCLE IMAGE SPECT MULT: CPT

## 2021-07-28 RX ADMIN — TECHNETIUM TC 99M SESTAMIBI 1 DOSE: 1 INJECTION INTRAVENOUS at 12:41

## 2021-07-28 RX ADMIN — TECHNETIUM TC 99M SESTAMIBI 1 DOSE: 1 INJECTION INTRAVENOUS at 14:00

## 2021-07-28 RX ADMIN — REGADENOSON 0.4 MG: 0.08 INJECTION, SOLUTION INTRAVENOUS at 14:00

## 2021-07-28 NOTE — PROGRESS NOTES
Encounter Date:07/28/2021  Last seen 713 2021      Patient ID: Praneeth Nam is a 81 y.o. male.    Chief Complaint:  Dilated aorta  Shortness of breath  Hypertension  Diabetes  Right bundle branch block  Recent shortness of breath fatigue and lightheadedness     History of Present Illness  Patient recently was seen with following history.  Reviewed and updated.    Recent shortness of breath fatigue and lightheadedness.  Denies having any chest discomfort.  Patient was last seen in the office 10/22/2019.     Since I have last seen, the patient has been without any chest discomfort, palpitations, dizziness or syncope.  Denies having any headache ,abdominal pain ,nausea, vomiting , diarrhea constipation, loss of weight or loss of appetite.  Denies having any excessive bruising ,hematuria or blood in the stool.     Review of all systems negative except as indicated.     Reviewed ROS.  Assessment and Plan      ]]]]]]]]]]]]]]]]]]   impression  ==========   - shortness of breath fatigue and lightheadedness.  Echocardiogram-normal with ejection fraction of 60%-7/28/2021  Lexiscan Cardiolite test-mild inferior ischemia-7/28/2021     -New onset atrial fibrillation with controlled rate. Appears to be in sinus rhythm 7/28/2021     -Chronic right bundle branch block     cardiac catheterization 04/11/2018 revealed normal left ventricular function normal coronary  arteries and no evidence for pulmonary hypertension was present.     -History of diffusely dilated ascending aorta     Echocardiogram showed left atrial and right ventricle enlargement prominent aorta.  Normal left ventricular function with ejection fraction of 60% 04/05/2018.  Stacy scan Cardiolite test is abnormal with significant inferior ischemia and apical hypokinesis on the gated SPECT images 04/05/2018.     - diabetes hypertension LGL leukemia rheumatoid arthritis COPD CKD 3     - history of prostate carcinoma.  Status post radiation     - former smoker     -  allergic to penicillin  ==========  Plan  ==========  Recent shortness of breath fatigue and lightheadedness.  EKG showed atrial fibrillation with right bundle branch block.  New onset atrial fibrillation probably causing the symptoms.  Rate is well controlled.  Stress Cardiolite test.-Mild inferior ischemia as above  Echocardiogram.-Normal as above  Patient has multiple risk factors.  Patient is not on any rate controlling drugs although her rate is well controlled.     Patient appears to be in sinus rhythm with first-degree AV block.  No need for anticoagulation at this time.  Follow-up in the office in 6 weeks with EKG.  Consideration would be given for cardiac catheterization if patient has continued symptoms.    Medications were reviewed and updated.     Further plan will depend on patient's progress.  [[[[[[[[[[[[[[[[[[[[                               Diagnosis Plan   1. Right bundle branch block     2. Shortness of breath     3. AF (paroxysmal atrial fibrillation) (CMS/[facility])     LAB RESULTS (LAST 7 DAYS)    CBC        BMP        CMP         BNP        TROPONIN        CoAg        Creatinine Clearance  CrCl cannot be calculated (Patient's most recent lab result is older than the maximum 30 days allowed.).    ABG        Radiology  No radiology results for the last day                The following portions of the patient's history were reviewed and updated as appropriate: allergies, current medications, past family history, past medical history, past social history, past surgical history and problem list.    Review of Systems   Constitutional: Negative for malaise/fatigue.   Cardiovascular: Negative for chest pain, leg swelling, palpitations and syncope.   Respiratory: Negative for shortness of breath.    Skin: Negative for rash.   Gastrointestinal: Negative for nausea and vomiting.   Neurological: Negative for dizziness, light-headedness and numbness.         Current Outpatient Medications:   •  aspirin (aspirin)  81 MG EC tablet, Take 81 mg by mouth Daily., Disp: , Rfl:   •  atorvastatin (LIPITOR) 20 MG tablet, Take 20 mg by mouth Daily., Disp: , Rfl: 3  •  budesonide-formoterol (SYMBICORT) 160-4.5 MCG/ACT inhaler, Inhale 2 puffs 2 (Two) Times a Day., Disp: , Rfl:   •  Cetirizine HCl (Allergy Relief) 10 MG capsule, Take 1 capsule by mouth Daily., Disp: , Rfl:   •  fenofibrate 160 MG tablet, Take 160 mg by mouth Daily., Disp: , Rfl: 3  •  glipiZIDE (GLUCOTROL) 5 MG tablet, Take 5 mg by mouth 2 (Two) Times a Day Before Meals., Disp: , Rfl:   •  Glucosamine Sulfate 1000 MG tablet, Take 1 tablet by mouth Daily., Disp: , Rfl:   •  hydroxychloroquine (PLAQUENIL) 200 MG tablet, TK 2 TS PO ONCE DAILY, Disp: , Rfl: 0  •  ipratropium-albuterol (DUO-NEB) 0.5-2.5 mg/3 ml nebulizer, Take 3 mL by nebulization Every 4 (Four) Hours As Needed for Wheezing., Disp: , Rfl:   •  losartan (COZAAR) 100 MG tablet, Take 100 mg by mouth Daily., Disp: , Rfl: 3  •  Multiple Vitamins-Minerals (MULTIVITAMIN WITH MINERALS) tablet tablet, Take 1 tablet by mouth Daily., Disp: , Rfl:   •  Omega-3 Fatty Acids (fish oil) 1000 MG capsule capsule, Take 1,000 mg by mouth Daily With Breakfast., Disp: , Rfl:   •  pantoprazole (PROTONIX) 40 MG EC tablet, Take 40 mg by mouth Daily., Disp: , Rfl: 1  •  spironolactone (ALDACTONE) 25 MG tablet, Take 25 mg by mouth Daily., Disp: , Rfl: 3  No current facility-administered medications for this visit.    Allergies   Allergen Reactions   • Penicillin V Potassium Swelling   • Latex Hives       Family History   Problem Relation Age of Onset   • Lung cancer Brother         age unknown   • Heart disease Brother    • Stroke Mother    • Heart disease Father    • Heart disease Sister        Past Surgical History:   Procedure Laterality Date   • SKIN LESION EXCISION      back and groin-benign       Past Medical History:   Diagnosis Date   • Acute pulmonary embolism (CMS/HCC) 05/2018    bilateral   • Arthritis     bilateral knees and  "ankles   • CKD (chronic kidney disease) 03/02/2009   • Coagulopathy (CMS/[facility]) 07/2008   • COPD (chronic obstructive pulmonary disease) (CMS/[facility])    • Diabetes mellitus (CMS/[facility])    • 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   • History of pneumonia 02/2015    hospitalized with community-acquired pneumonia, possibly viral    • History of prostate cancer 10/2009    Glen 6, Stage II   • Hypercholesterolemia    • Hypertension    • 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   • Neutropenia (CMS/[facility]) 11/2003   • MITZI (obstructive sleep apnea) 2018   • Psoriasis 2000   • Renal cyst, left    • Rheumatoid arthritis (CMS/[facility])    • Stroke (cerebrum) (CMS/[facility])    • Thrombocytopenia (CMS/[facility]) 08/23/2005       Family History   Problem Relation Age of Onset   • Lung cancer Brother         age unknown   • Heart disease Brother    • Stroke Mother    • Heart disease Father    • Heart disease Sister        Social History     Socioeconomic History   • Marital status:      Spouse name: Not on file   • Number of children: Not on file   • Years of education: Not on file   • Highest education level: Not on file   Tobacco Use   • Smoking status: Former Smoker   • Smokeless tobacco: Never Used   • Tobacco comment: stopped smoking in 1995   Vaping Use   • Vaping Use: Never used   Substance and Sexual Activity   • Alcohol use: No   • Drug use: No         Procedures      Objective:       Physical Exam    /72   Pulse 80   Ht 167.6 cm (66\")   Wt 107 kg (235 lb)   BMI 37.93 kg/m²   The patient is alert, oriented and in no distress.    Vital signs as noted above. Exogenous obesity.    Head and neck revealed no carotid bruits or jugular venous distension.  No thyromegaly or lymphadenopathy is present.    Lungs clear.  No wheezing.  Breath sounds are normal bilaterally.    Heart normal first and second heart sounds.  No murmur..  No pericardial rub is present.  No gallop is " present.    Abdomen soft and nontender.  No organomegaly is present.    Extremities revealed good peripheral pulses without any pedal edema.    Skin warm and dry.    Musculoskeletal system is grossly normal.    CNS grossly normal.

## 2021-09-13 ENCOUNTER — OFFICE VISIT (OUTPATIENT)
Dept: CARDIOLOGY | Facility: CLINIC | Age: 81
End: 2021-09-13

## 2021-09-13 VITALS
HEIGHT: 66 IN | BODY MASS INDEX: 38.41 KG/M2 | SYSTOLIC BLOOD PRESSURE: 157 MMHG | HEART RATE: 64 BPM | WEIGHT: 239 LBS | OXYGEN SATURATION: 96 % | DIASTOLIC BLOOD PRESSURE: 85 MMHG

## 2021-09-13 DIAGNOSIS — I10 ESSENTIAL HYPERTENSION: ICD-10-CM

## 2021-09-13 DIAGNOSIS — R06.02 SHORTNESS OF BREATH: ICD-10-CM

## 2021-09-13 DIAGNOSIS — E78.5 DYSLIPIDEMIA: ICD-10-CM

## 2021-09-13 DIAGNOSIS — I45.10 RIGHT BUNDLE BRANCH BLOCK: Primary | ICD-10-CM

## 2021-09-13 DIAGNOSIS — I48.0 AF (PAROXYSMAL ATRIAL FIBRILLATION) (HCC): ICD-10-CM

## 2021-09-13 PROCEDURE — 93000 ELECTROCARDIOGRAM COMPLETE: CPT | Performed by: INTERNAL MEDICINE

## 2021-09-13 PROCEDURE — 99214 OFFICE O/P EST MOD 30 MIN: CPT | Performed by: INTERNAL MEDICINE

## 2021-09-13 NOTE — PROGRESS NOTES
Encounter Date:09/13/2021  Last seen 7/28/2021      Patient ID: Praneeth Nam is a 81 y.o. male.    Chief Complaint:  Dilated aorta  Shortness of breath  Hypertension  Diabetes  Right bundle branch block  Shortness of breath fatigue and lightheadedness     History of Present Illness  Since I have last seen, the patient has been without any chest discomfort ,shortness of breath, palpitations, dizziness or syncope.  Denies having any headache ,abdominal pain ,nausea, vomiting , diarrhea constipation, loss of weight or loss of appetite.  Denies having any excessive bruising ,hematuria or blood in the stool.    Review of all systems negative except as indicated.    Reviewed ROS.    Assessment and Plan      ]]]]]]]]]]]]]]]]]]   impression  ==========   - shortness of breath fatigue and lightheadedness.  Echocardiogram-normal with ejection fraction of 60%-7/28/2021  Lexiscan Cardiolite test-mild inferior ischemia-7/28/2021     -New onset atrial fibrillation with controlled rate. Appears to be in sinus rhythm 7/28/2021-9/13/2021     -Chronic right bundle branch block      cardiac catheterization 04/11/2018 revealed normal left ventricular function normal coronary  arteries and no evidence for pulmonary hypertension was present.     -History of diffusely dilated ascending aorta     Echocardiogram showed left atrial and right ventricle enlargement prominent aorta.  Normal left ventricular function with ejection fraction of 60% 04/05/2018.  Stacy scan Cardiolite test is abnormal with significant inferior ischemia and apical hypokinesis on the gated SPECT images 04/05/2018.     - diabetes hypertension LGL leukemia rheumatoid arthritis COPD CKD 3     - history of prostate carcinoma.  Status post radiation     - former smoker     - allergic to penicillin  ==========  Plan  ==========  Recent shortness of breath fatigue and lightheadedness.-Improved.    History of atrial fibrillation  Patient is in sinus rhythm with  first-degree AV block and premature atrial contractions.  Observe for any advanced AV block's.    Chronic right bundle branch block-asymptomatic.    Patient appears to be in sinus rhythm with first-degree AV block.  No need for anticoagulation at this time.    Follow-up in the office in 3 months with EKG.  Consideration would be given for cardiac catheterization if patient has continued symptoms.     Medications were reviewed and updated.     Further plan will depend on patient's progress.  [[[[[[[[[[[[[[[[[[[[                      Diagnosis Plan   1. Right bundle branch block     2. Essential hypertension     3. Shortness of breath     4. Dyslipidemia     5. AF (paroxysmal atrial fibrillation) (CMS/[facility])     LAB RESULTS (LAST 7 DAYS)    CBC        BMP        CMP         BNP        TROPONIN        CoAg        Creatinine Clearance  CrCl cannot be calculated (Patient's most recent lab result is older than the maximum 30 days allowed.).    ABG        Radiology  No radiology results for the last day                The following portions of the patient's history were reviewed and updated as appropriate: allergies, current medications, past family history, past medical history, past social history, past surgical history and problem list.    Review of Systems   Constitutional: Negative for malaise/fatigue.   Cardiovascular: Negative for chest pain, leg swelling, palpitations and syncope.   Respiratory: Positive for shortness of breath.    Skin: Negative for rash.   Gastrointestinal: Negative for nausea and vomiting.   Neurological: Negative for dizziness, light-headedness and numbness.   All other systems reviewed and are negative.        Current Outpatient Medications:   •  aspirin (aspirin) 81 MG EC tablet, Take 81 mg by mouth Daily., Disp: , Rfl:   •  atorvastatin (LIPITOR) 20 MG tablet, Take 20 mg by mouth Daily., Disp: , Rfl: 3  •  budesonide-formoterol (SYMBICORT) 160-4.5 MCG/ACT inhaler, Inhale 2 puffs 2 (Two) Times  a Day., Disp: , Rfl:   •  Cetirizine HCl (Allergy Relief) 10 MG capsule, Take 1 capsule by mouth Daily., Disp: , Rfl:   •  fenofibrate 160 MG tablet, Take 160 mg by mouth Daily., Disp: , Rfl: 3  •  glipiZIDE (GLUCOTROL) 5 MG tablet, Take 5 mg by mouth 2 (Two) Times a Day Before Meals., Disp: , Rfl:   •  Glucosamine Sulfate 1000 MG tablet, Take 1 tablet by mouth Daily., Disp: , Rfl:   •  hydroxychloroquine (PLAQUENIL) 200 MG tablet, TK 2 TS PO ONCE DAILY, Disp: , Rfl: 0  •  ipratropium-albuterol (DUO-NEB) 0.5-2.5 mg/3 ml nebulizer, Take 3 mL by nebulization Every 4 (Four) Hours As Needed for Wheezing., Disp: , Rfl:   •  losartan (COZAAR) 100 MG tablet, Take 100 mg by mouth Daily., Disp: , Rfl: 3  •  Multiple Vitamins-Minerals (MULTIVITAMIN WITH MINERALS) tablet tablet, Take 1 tablet by mouth Daily., Disp: , Rfl:   •  Omega-3 Fatty Acids (fish oil) 1000 MG capsule capsule, Take 1,000 mg by mouth Daily With Breakfast., Disp: , Rfl:   •  pantoprazole (PROTONIX) 40 MG EC tablet, Take 40 mg by mouth Daily., Disp: , Rfl: 1  •  spironolactone (ALDACTONE) 25 MG tablet, Take 25 mg by mouth Daily., Disp: , Rfl: 3    Allergies   Allergen Reactions   • Penicillin V Potassium Swelling   • Latex Hives       Family History   Problem Relation Age of Onset   • Lung cancer Brother         age unknown   • Heart disease Brother    • Stroke Mother    • Heart disease Father    • Heart disease Sister        Past Surgical History:   Procedure Laterality Date   • SKIN LESION EXCISION      back and groin-benign       Past Medical History:   Diagnosis Date   • Acute pulmonary embolism (CMS/HCC) 05/2018    bilateral   • Arthritis     bilateral knees and ankles   • CKD (chronic kidney disease) 03/02/2009   • Coagulopathy (CMS/HCC) 07/2008   • COPD (chronic obstructive pulmonary disease) (CMS/[facility])    • Diabetes mellitus (CMS/[facility])    • 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   • History of pneumonia 02/2015    hospitalized with community-acquired pneumonia,  "possibly viral    • History of prostate cancer 10/2009    Glen 6, Stage II   • Hypercholesterolemia    • Hypertension    • 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   • Neutropenia (CMS/HCC) 11/2003   • MITZI (obstructive sleep apnea) 2018   • Psoriasis 2000   • Renal cyst, left    • Rheumatoid arthritis (CMS/HCC)    • Stroke (cerebrum) (CMS/[facility])    • Thrombocytopenia (CMS/HCC) 08/23/2005       Family History   Problem Relation Age of Onset   • Lung cancer Brother         age unknown   • Heart disease Brother    • Stroke Mother    • Heart disease Father    • Heart disease Sister        Social History     Socioeconomic History   • Marital status:      Spouse name: Not on file   • Number of children: Not on file   • Years of education: Not on file   • Highest education level: Not on file   Tobacco Use   • Smoking status: Former Smoker   • Smokeless tobacco: Never Used   • Tobacco comment: stopped smoking in 1995   Vaping Use   • Vaping Use: Never used   Substance and Sexual Activity   • Alcohol use: No   • Drug use: No           ECG 12 Lead    Date/Time: 9/13/2021 2:45 PM  Performed by: Misty Dias MD  Authorized by: Misty Dias MD   Comparison: compared with previous ECG   Similar to previous ECG  Comparison to previous ECG: Sinus rhythm first-degree AV block right bundle branch block premature atrial contractions nonspecific ST-T wave changes compared to 7/13/2021 no significant changes seen                Objective:       Physical Exam    /85 (BP Location: Right arm, Patient Position: Sitting, Cuff Size: Large Adult)   Pulse 64   Ht 167.6 cm (66\")   Wt 108 kg (239 lb)   SpO2 96%   BMI 38.58 kg/m²   The patient is alert, oriented and in no distress.    Vital signs as noted above.    Head and neck revealed no carotid bruits or jugular venous distension.  No thyromegaly or lymphadenopathy is present.    Lungs clear.  No wheezing. "  Breath sounds are normal bilaterally.    Heart normal first and second heart sounds.  No murmur..  No pericardial rub is present.  No gallop is present.    Abdomen soft and nontender.  No organomegaly is present.    Extremities revealed good peripheral pulses without any pedal edema.    Skin warm and dry.    Musculoskeletal system is grossly normal.    CNS grossly normal.    Reviewed and unchanged from last visit.

## 2021-12-15 ENCOUNTER — OFFICE VISIT (OUTPATIENT)
Dept: CARDIOLOGY | Facility: CLINIC | Age: 81
End: 2021-12-15

## 2021-12-15 VITALS
HEIGHT: 66 IN | SYSTOLIC BLOOD PRESSURE: 130 MMHG | OXYGEN SATURATION: 98 % | WEIGHT: 229.75 LBS | DIASTOLIC BLOOD PRESSURE: 77 MMHG | HEART RATE: 81 BPM | BODY MASS INDEX: 36.92 KG/M2

## 2021-12-15 DIAGNOSIS — I45.10 RIGHT BUNDLE BRANCH BLOCK: Primary | ICD-10-CM

## 2021-12-15 DIAGNOSIS — R91.8 GROUND GLASS OPACITY PRESENT ON IMAGING OF LUNG: ICD-10-CM

## 2021-12-15 DIAGNOSIS — R06.02 SHORTNESS OF BREATH: ICD-10-CM

## 2021-12-15 DIAGNOSIS — I48.0 AF (PAROXYSMAL ATRIAL FIBRILLATION) (HCC): ICD-10-CM

## 2021-12-15 DIAGNOSIS — I10 ESSENTIAL HYPERTENSION: ICD-10-CM

## 2021-12-15 DIAGNOSIS — E78.5 DYSLIPIDEMIA: ICD-10-CM

## 2021-12-15 DIAGNOSIS — R91.1 LEFT LOWER LOBE PULMONARY NODULE: ICD-10-CM

## 2021-12-15 PROCEDURE — 99214 OFFICE O/P EST MOD 30 MIN: CPT | Performed by: INTERNAL MEDICINE

## 2021-12-15 PROCEDURE — 93000 ELECTROCARDIOGRAM COMPLETE: CPT | Performed by: INTERNAL MEDICINE

## 2021-12-15 RX ORDER — ALBUTEROL SULFATE 90 UG/1
AEROSOL, METERED RESPIRATORY (INHALATION)
COMMUNITY
Start: 2021-12-07

## 2021-12-15 NOTE — PROGRESS NOTES
Encounter Date:12/15/2021  Last seen 9/13/2021      Patient ID: Praneeth Nam is a 81 y.o. male.    Chief Complaint:  Dilated aorta  Shortness of breath  Hypertension  Diabetes  Right bundle branch block  Shortness of breath fatigue and lightheadedness     History of Present Illness  Patient has shortness of breath and is on oxygen.  Day.    Since I have last seen, the patient has been without any chest discomfort , visual shortness of breath, palpitations, dizziness or syncope.  Denies having any headache ,abdominal pain ,nausea, vomiting , diarrhea constipation, loss of weight or loss of appetite.  Denies having any excessive bruising ,hematuria or blood in the stool.    Review of all systems negative except as indicated.    Reviewed ROS.    Assessment and Plan      ]]]]]]]]]]]]]]]]]]   impression  ==========   - shortness of breath fatigue and lightheadedness.  Echocardiogram-normal with ejection fraction of 60%-7/28/2021  Lexiscan Cardiolite test-mild inferior ischemia-7/28/2021     -New onset atrial fibrillation with controlled rate. Appears to be in sinus rhythm 7/28/2021-9/13/2021     -Chronic right bundle branch block      cardiac catheterization 04/11/2018 revealed normal left ventricular function normal coronary  arteries and no evidence for pulmonary hypertension was present.     -History of diffusely dilated ascending aorta     Echocardiogram showed left atrial and right ventricle enlargement prominent aorta.  Normal left ventricular function with ejection fraction of 60% 04/05/2018.  Stacy scan Cardiolite test is abnormal with significant inferior ischemia and apical hypokinesis on the gated SPECT images 04/05/2018.     - diabetes hypertension LGL leukemia rheumatoid arthritis COPD CKD 3  Patient is on home oxygen.     - history of prostate carcinoma.  Status post radiation     - former smoker     - allergic to penicillin  ==========  Plan  ==========  We will shortness of breath fatigue and  lightheadedness.  COPD  Patient is on home oxygen.    History of atrial fibrillation  Patient is in sinus rhythm with first-degree AV block right bundle branch block-12/15/2021.     Chronic right bundle branch block-asymptomatic.     Patient is in sinus rhythm with first-degree AV block.  No need for anticoagulation at this time.     Follow-up in the office in  6 months with EKG.  At that     Medications were reviewed and updated.     Further plan will depend on patient's progress.  [[[[[[[[[[[[[[[[[[[[                  Diagnosis Plan   1. Right bundle branch block  ECG 12 Lead   2. Essential hypertension  ECG 12 Lead   3. Shortness of breath  ECG 12 Lead   4. Dyslipidemia  ECG 12 Lead   5. AF (paroxysmal atrial fibrillation) (HCC)  ECG 12 Lead   LAB RESULTS (LAST 7 DAYS)    CBC        BMP        CMP         BNP        TROPONIN        CoAg        Creatinine Clearance  CrCl cannot be calculated (Patient's most recent lab result is older than the maximum 30 days allowed.).    ABG        Radiology  No radiology results for the last day                The following portions of the patient's history were reviewed and updated as appropriate: allergies, current medications, past family history, past medical history, past social history, past surgical history and problem list.    Review of Systems   Constitutional: Positive for malaise/fatigue. Negative for fever.   HENT: Negative for ear pain and nosebleeds.    Eyes: Negative for blurred vision and double vision.   Cardiovascular: Negative for chest pain, dyspnea on exertion and palpitations.   Respiratory: Positive for shortness of breath. Negative for cough.    Skin: Negative for rash.   Musculoskeletal: Negative for joint pain.   Gastrointestinal: Negative for abdominal pain, nausea and vomiting.   Neurological: Positive for focal weakness. Negative for headaches.   Psychiatric/Behavioral: Negative for depression. The patient is not nervous/anxious.    All other systems  reviewed and are negative.        Current Outpatient Medications:   •  albuterol sulfate  (90 Base) MCG/ACT inhaler, , Disp: , Rfl:   •  aspirin (aspirin) 81 MG EC tablet, Take 81 mg by mouth Daily., Disp: , Rfl:   •  atorvastatin (LIPITOR) 20 MG tablet, Take 20 mg by mouth Daily., Disp: , Rfl: 3  •  budesonide-formoterol (SYMBICORT) 160-4.5 MCG/ACT inhaler, Inhale 2 puffs 2 (Two) Times a Day., Disp: , Rfl:   •  Cetirizine HCl (Allergy Relief) 10 MG capsule, Take 1 capsule by mouth Daily., Disp: , Rfl:   •  fenofibrate 160 MG tablet, Take 160 mg by mouth Daily., Disp: , Rfl: 3  •  glipiZIDE (GLUCOTROL) 5 MG tablet, Take 5 mg by mouth 2 (Two) Times a Day Before Meals., Disp: , Rfl:   •  Glucosamine Sulfate 1000 MG tablet, Take 1 tablet by mouth Daily., Disp: , Rfl:   •  hydroxychloroquine (PLAQUENIL) 200 MG tablet, TK 2 TS PO ONCE DAILY, Disp: , Rfl: 0  •  ipratropium-albuterol (DUO-NEB) 0.5-2.5 mg/3 ml nebulizer, Take 3 mL by nebulization Every 4 (Four) Hours As Needed for Wheezing., Disp: , Rfl:   •  losartan (COZAAR) 100 MG tablet, Take 100 mg by mouth Daily., Disp: , Rfl: 3  •  Multiple Vitamins-Minerals (MULTIVITAMIN WITH MINERALS) tablet tablet, Take 1 tablet by mouth Daily., Disp: , Rfl:   •  O2 (OXYGEN), Inhale 2 L/min Daily., Disp: , Rfl:   •  Omega-3 Fatty Acids (fish oil) 1000 MG capsule capsule, Take 1,000 mg by mouth Daily With Breakfast., Disp: , Rfl:   •  pantoprazole (PROTONIX) 40 MG EC tablet, Take 40 mg by mouth Daily., Disp: , Rfl: 1  •  spironolactone (ALDACTONE) 25 MG tablet, Take 25 mg by mouth Daily., Disp: , Rfl: 3    Allergies   Allergen Reactions   • Penicillin V Potassium Swelling   • Latex Hives       Family History   Problem Relation Age of Onset   • Lung cancer Brother         age unknown   • Heart disease Brother    • Stroke Mother    • Heart disease Father    • Heart disease Sister        Past Surgical History:   Procedure Laterality Date   • SKIN LESION EXCISION      back and  "groin-benign       Past Medical History:   Diagnosis Date   • Acute pulmonary embolism (HCC) 05/2018    bilateral   • Arthritis     bilateral knees and ankles   • CKD (chronic kidney disease) 03/02/2009   • Coagulopathy (HCC) 07/2008   • COPD (chronic obstructive pulmonary disease) (HCC)    • Diabetes mellitus (HCC)    • 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   • History of pneumonia 02/2015    hospitalized with community-acquired pneumonia, possibly viral    • History of prostate cancer 10/2009    [place] 6, Stage II   • Hypercholesterolemia    • Hypertension    • 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   • Neutropenia (HCC) 11/2003   • MITZI (obstructive sleep apnea) 2018   • Psoriasis 2000   • Renal cyst, left    • Rheumatoid arthritis (HCC)    • Stroke (cerebrum) (HCC)    • Thrombocytopenia (HCC) 08/23/2005       Family History   Problem Relation Age of Onset   • Lung cancer Brother         age unknown   • Heart disease Brother    • Stroke Mother    • Heart disease Father    • Heart disease Sister        Social History     Socioeconomic History   • Marital status:    Tobacco Use   • Smoking status: Former Smoker   • Smokeless tobacco: Never Used   • Tobacco comment: stopped smoking in 1995   Vaping Use   • Vaping Use: Never used   Substance and Sexual Activity   • Alcohol use: No   • Drug use: No   • Sexual activity: Defer           ECG 12 Lead    Date/Time: 12/15/2021 3:02 PM  Performed by: Misty Dias MD  Authorized by: Misty Dias MD   Comparison: compared with previous ECG   Similar to previous ECG  Comparison to previous ECG: Sinus rhythm right bundle branch block first-degree AV block 78/min nonspecific ST-T wave changes no ectopy no significant change from 9/13/2021                Objective:       Physical Exam    /77   Pulse 81   Ht 167.6 cm (66\")   Wt 104 kg (229 lb 12 oz)   SpO2 98%   BMI 37.08 kg/m²   The patient is alert, oriented " and in no distress.    Vital signs as noted above.    Head and neck revealed no carotid bruits or jugular venous distension.  No thyromegaly or lymphadenopathy is present.    Lungs clear.  No wheezing.  Breath sounds are normal bilaterally.    Heart normal first and second heart sounds.  No murmur..  No pericardial rub is present.  No gallop is present.    Abdomen soft and nontender.  No organomegaly is present.    Extremities revealed good peripheral pulses without any pedal edema.    Skin warm and dry.  Bruising.    Musculoskeletal system is grossly normal.    CNS grossly normal.    Reviewed and unchanged from last visit.

## 2022-01-13 ENCOUNTER — OFFICE VISIT (OUTPATIENT)
Dept: SURGERY | Facility: CLINIC | Age: 82
End: 2022-01-13

## 2022-01-13 VITALS
BODY MASS INDEX: 36 KG/M2 | WEIGHT: 224 LBS | TEMPERATURE: 98 F | HEART RATE: 88 BPM | DIASTOLIC BLOOD PRESSURE: 76 MMHG | SYSTOLIC BLOOD PRESSURE: 116 MMHG | HEIGHT: 66 IN | OXYGEN SATURATION: 99 %

## 2022-01-13 DIAGNOSIS — J43.1 PANLOBULAR EMPHYSEMA: ICD-10-CM

## 2022-01-13 DIAGNOSIS — R91.8 GROUND GLASS OPACITY PRESENT ON IMAGING OF LUNG: ICD-10-CM

## 2022-01-13 DIAGNOSIS — R91.1 LEFT LOWER LOBE PULMONARY NODULE: Primary | ICD-10-CM

## 2022-01-13 PROCEDURE — 99213 OFFICE O/P EST LOW 20 MIN: CPT | Performed by: THORACIC SURGERY (CARDIOTHORACIC VASCULAR SURGERY)

## 2022-01-13 RX ORDER — REVEFENACIN 175 UG/3ML
SOLUTION RESPIRATORY (INHALATION)
COMMUNITY

## 2022-01-14 NOTE — PROGRESS NOTES
"Chief Complaint  Lung Nodule (9 month follow up CT chest )    Subjective          Praneeth Nam presents to [facility] THORACIC SURGERY  History of Present Illness  Verbal consent obtained for recording.    Mr. Nam is a pleasant 81-year-old gentleman with a left lower lobe lung nodule who presents for surveillance.     Praneeth notes that he is doing well at this time. He reports dyspnea which has been present for 2 to 3 years, progressively worsening. He was started on oxygen, by his primary provider, but he does not feel much benefit from this. Antonio is not currently under the care of a pulmonologist. Dr. Jason is his primary care physician, and he is scheduled to follow up with him in 02/2022. Antonio is also experiencing increased lung congestion. He notes that this is slightly improved, but he was previously producing yellow and brown mucus with his cough.     He is also experiencing difficulty with ambulation. He attributes this to his age.     Antonio is COVID-19 vaccinated, including the booster.     Objective   Vital Signs:   /76 (BP Location: Left arm, Patient Position: Sitting, Cuff Size: Adult)   Pulse 88   Temp 98 °F (36.7 °C) (Infrared)   Ht 167.6 cm (66\")   Wt 102 kg (224 lb)   SpO2 99% Comment: 3 liters  BMI 36.15 kg/m²     Physical Exam  Vitals and nursing note reviewed.   Constitutional:       Appearance: He is well-developed.   HENT:      Head: Normocephalic and atraumatic.      Nose: Nose normal.   Eyes:      Conjunctiva/sclera: Conjunctivae normal.   Pulmonary:      Effort: Pulmonary effort is normal.   Musculoskeletal:         General: Normal range of motion.      Cervical back: Normal range of motion and neck supple.   Skin:     General: Skin is warm and dry.   Neurological:      Mental Status: He is alert and oriented to person, place, and time.   Psychiatric:         Behavior: Behavior normal.         Thought Content: Thought content normal.         Judgment: " Judgment normal.        Result Review :       Data reviewed: Radiologic studies I have independently reviewed the CT scan of the chest, performed on 01/04/2022, which demonstrates a stable, 1 cm, left, lower lobe lung nodule. There is a new, small, ground glass opacity in the left lower lobe. There is stable scarring bilaterally. There is mild-to-moderate emphysema. No mediastinal or hilar lymphadenopathy. No pleural pericardial effusion.          Assessment and Plan    Diagnoses and all orders for this visit:  Mr. Nam is a pleasant 82 yo gentleman with a stable lung nodule, new ground glass opacity and COPD.  Given the new ground glass opacities on his recent CT scan, he will need a short interval surveillance.  We will repeat the chest CT scan and follow up with those results in 3 months. I suspect that his increase mucus production is related to the ground glass opacities, but he has no other signs of an infection, so I do not think that he will benefit from antibiotic therapy.  I will refer him to Dr. Martínez for management of his oxygen dependent COPD.    1. Left lower lobe pulmonary nodule (Primary)  -     CT Chest Without Contrast; Future    2. Ground glass opacity present on imaging of lung  -     CT Chest Without Contrast; Future    3. Panlobular emphysema (HCC)  -     Ambulatory Referral to Pulmonology      I spent 20 minutes caring for Praneeth on this date of service. This time includes time spent by me in the following activities:preparing for the visit, reviewing tests, obtaining and/or reviewing a separately obtained history, performing a medically appropriate examination and/or evaluation , counseling and educating the patient/family/caregiver, ordering medications, tests, or procedures, referring and communicating with other health care professionals , documenting information in the medical record and independently interpreting results and communicating that information with the  patient/family/caregiver  Follow Up   No follow-ups on file.  Patient was given instructions and counseling regarding his condition or for health maintenance advice. Please see specific information pulled into the AVS if appropriate.     Transcribed from ambient dictation for Camille Nash MD by Flora Brice/Deysi Medel.  01/14/22   10:30 EST    Patient verbalized consent to the visit recording.

## 2022-04-05 ENCOUNTER — OFFICE VISIT (OUTPATIENT)
Dept: PULMONOLOGY | Facility: HOSPITAL | Age: 82
End: 2022-04-05

## 2022-04-05 VITALS
RESPIRATION RATE: 16 BRPM | SYSTOLIC BLOOD PRESSURE: 180 MMHG | HEART RATE: 70 BPM | BODY MASS INDEX: 36.96 KG/M2 | OXYGEN SATURATION: 98 % | DIASTOLIC BLOOD PRESSURE: 76 MMHG | WEIGHT: 230 LBS | HEIGHT: 66 IN

## 2022-04-05 DIAGNOSIS — J43.9 PULMONARY EMPHYSEMA, UNSPECIFIED EMPHYSEMA TYPE: Primary | ICD-10-CM

## 2022-04-05 PROCEDURE — G0463 HOSPITAL OUTPT CLINIC VISIT: HCPCS

## 2022-04-05 NOTE — PROGRESS NOTES
PULMONARY/ CRITICAL CARE/ SLEEP MEDICINE OUTPATIENT CONSULT/ FOLLOW UP NOTE        Patient Name:  Praneeth Nam    :  1940    Medical Record:  [id number]    PRIMARY CARE PHYSICIAN     Guilherme Jason MD    REASON FOR CONSULTATION    Praneeth Nam is a 82 y.o. male who is referred for consultation for COPD  REVIEW OF SYSTEMS    Constitutional:  Denies fever or chills   Eyes:  Denies change in visual acuity   HENT:  Denies nasal congestion or sore throat   Respiratory:  Denies cough or shortness of breath   Cardiovascular:  Denies chest pain or edema   GI:  Denies abdominal pain, nausea, vomiting, bloody stools or diarrhea   :  Denies dysuria   Musculoskeletal:  Denies back pain or joint pain   Integument:  Denies rash   Neurologic:  Denies headache, focal weakness or sensory changes   Endocrine:  Denies polyuria or polydipsia   Lymphatic:  Denies swollen glands   Psychiatric:  Denies depression or anxiety     MEDICAL HISTORY    Past Medical History:   Diagnosis Date   • Acute pulmonary embolism (HCC) 2018    bilateral   • Arthritis     bilateral knees and ankles   • CKD (chronic kidney disease) 2009   • Coagulopathy (HCC) 2008   • COPD (chronic obstructive pulmonary disease) ([facility])    • Diabetes mellitus ([facility])    • History of ITP 2019   • History of pneumonia 2015    hospitalized with community-acquired pneumonia, possibly viral    • History of prostate cancer 10/2009    [place] 6, Stage II   • Hypercholesterolemia    • Hypertension    • 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   • Neutropenia (HCC) 2003   • MITZI (obstructive sleep apnea) 2018   • Psoriasis    • Renal cyst, left    • Rheumatoid arthritis (HCC)    • Stroke (cerebrum) ([facility])    • Thrombocytopenia (HCC) 2005        SURGICAL HISTORY    Past Surgical History:   Procedure Laterality Date   • SKIN LESION EXCISION      back and groin-benign        FAMILY HISTORY     Family History   Problem Relation Age of Onset   • Lung cancer Brother         age unknown   • Heart disease Brother    • Stroke Mother    • Heart disease Father    • Heart disease Sister        SOCIAL HISTORY    Social History     Tobacco Use   • Smoking status: Former Smoker     Quit date:      Years since quittin.2   • Smokeless tobacco: Never Used   • Tobacco comment: stopped smoking in    Substance Use Topics   • Alcohol use: No        ALLERGIES    Allergies   Allergen Reactions   • Penicillin V Potassium Swelling   • Latex Hives         MEDICATIONS    Current Outpatient Medications on File Prior to Visit   Medication Sig Dispense Refill   • albuterol sulfate  (90 Base) MCG/ACT inhaler      • aspirin 81 MG EC tablet Take 81 mg by mouth Daily.     • atorvastatin (LIPITOR) 20 MG tablet Take 20 mg by mouth Daily.  3   • budesonide-formoterol (SYMBICORT) 160-4.5 MCG/ACT inhaler Inhale 2 puffs 2 (Two) Times a Day.     • Cetirizine HCl (Allergy Relief) 10 MG capsule Take 1 capsule by mouth Daily.     • fenofibrate 160 MG tablet Take 160 mg by mouth Daily.  3   • glipiZIDE (GLUCOTROL) 5 MG tablet Take 5 mg by mouth 2 (Two) Times a Day Before Meals.     • hydroxychloroquine (PLAQUENIL) 200 MG tablet TK 2 TS PO ONCE DAILY  0   • ipratropium-albuterol (DUO-NEB) 0.5-2.5 mg/3 ml nebulizer Take 3 mL by nebulization Every 4 (Four) Hours As Needed for Wheezing.     • losartan (COZAAR) 100 MG tablet Take 100 mg by mouth Daily.  3   • Multiple Vitamins-Minerals (MULTIVITAMIN WITH MINERALS) tablet tablet Take 1 tablet by mouth Daily.     • O2 (OXYGEN) Inhale 2 L/min Daily.     • pantoprazole (PROTONIX) 40 MG EC tablet Take 40 mg by mouth Daily.  1   • revefenacin (Yupelri) 175 MCG/3ML nebulizer solution Take  by nebulization Daily.     • spironolactone (ALDACTONE) 25 MG tablet Take 25 mg by mouth Daily.  3     No current facility-administered medications on file prior to visit.       PHYSICAL EXAM   "  Vitals:    04/05/22 1451   BP: 180/76   BP Location: Left arm   Patient Position: Sitting   Cuff Size: Adult   Pulse: 70   Resp: 16   SpO2: 98%   Weight: 104 kg (230 lb)   Height: 167.6 cm (66\")        Constitutional:  Well developed, well nourished, no acute distress, non-toxic appearance   Eyes:  PERRL, conjunctiva normal   HENT:  Atraumatic, external ears normal, nose normal, oropharynx moist, no pharyngeal exudates. mallampatti   Neck- normal range of motion, no tenderness, supple   Respiratory:  No respiratory distress, normal breath sounds, no rales, no wheezing   Cardiovascular:  Normal rate, normal rhythm, no murmurs, no gallops, no rubs   GI:  Soft, nondistended, normal bowel sounds, nontender, no organomegaly, no mass, no rebound, no guarding   :  No costovertebral angle tenderness   Musculoskeletal:  No edema, no tenderness, no deformities. Back- no tenderness  Integument:  Well hydrated, no rash   Lymphatic:  No lymphadenopathy noted   Neurologic:  Alert & oriented x 3, CN 2-12 normal, normal motor function, normal sensory function, no focal deficits noted   Psychiatric:  Speech and behavior appropriate     No radiology results for the last 90 days.   Results for orders placed during the hospital encounter of 07/28/21    Adult Transthoracic Echo Complete W/ Cont if Necessary Per Protocol    Interpretation Summary  · Estimated left ventricular EF = 60% Left ventricular systolic function is normal.    Indications  Shortness of breath    Technically satisfactory study.  Mitral valve is structurally normal.  Mild mitral regurgitation.  Tricuspid valve is structurally normal.  Aortic valve is structurally normal.  Pulmonic valve could not be well visualized.  No evidence for tricuspid or aortic regurgitation is seen by Doppler study.  Left atrium is normal in size.  Right atrium is normal in size.  Left ventricle is normal in size and contractility with ejection fraction of 60%.  Right ventricle is normal " in size.  Atrial septum is intact.  Aorta is normal.  No pericardial effusion or intracardiac thrombus is seen.    Impression  Structurally and functionally normal cardiac valves except for mild mitral regurgitation.  Left ventricular size and contractility is normal with ejection fraction of 60%.      ASSESSMENT & PLAN:      82-year-old male with history of COPD quit smoking 40 years ago     PFTs 614 12,018, FEV1 1.8 L which is 73% improved to 84% postbronchodilator, FEV1/FVC ratio 70, total lung capacity 170%, residual volume 193% diffusion capacity 53%    Nontraumatic left cerebral subdural hemorrhage    History of PE currently not on anticoagulation    Obstructive sleep apnea, currently on trilogy machine  Diabetes mellitus  Chronic kidney disease  Rheumatoid arthritis  Idiopathic thrombocytopenia  Hypertension  Myelodysplastic syndrome  Elevated factor VIII level    Plan    Patient scheduled for repeat CAT scan next week as a 3-month follow-up from January 2022 CT scan which showed left lower lobe 1 cm nodule  bronchodilators nebulizer  Oxygen continuous on 2 L  Trilogy machine during sleep and as needed during the day      This document has been electronically signed by  Sander Martínez MD  15:22 EDT

## 2022-04-14 ENCOUNTER — OFFICE VISIT (OUTPATIENT)
Dept: SURGERY | Facility: CLINIC | Age: 82
End: 2022-04-14

## 2022-04-14 VITALS
DIASTOLIC BLOOD PRESSURE: 83 MMHG | HEART RATE: 84 BPM | HEIGHT: 66 IN | BODY MASS INDEX: 36.96 KG/M2 | WEIGHT: 230 LBS | SYSTOLIC BLOOD PRESSURE: 147 MMHG | TEMPERATURE: 98.4 F | OXYGEN SATURATION: 99 %

## 2022-04-14 DIAGNOSIS — R91.1 LEFT LOWER LOBE PULMONARY NODULE: Primary | ICD-10-CM

## 2022-04-14 PROCEDURE — 99212 OFFICE O/P EST SF 10 MIN: CPT | Performed by: THORACIC SURGERY (CARDIOTHORACIC VASCULAR SURGERY)

## 2022-04-14 NOTE — PROGRESS NOTES
"Chief Complaint  Lung Nodule (3 month follow up CT chest )    Subjective          Praneeth Nam presents to [facility] THORACIC SURGERY  History of Present Illness     Mr. Nam is a very pleasant 82-year-old gentleman with a left lower lobe lung nodule who presents for surveillance.     He states his breathing has not gotten any better since his last visit. He reports he is on the same amount of oxygen. He states he still sees Dr. Martínez for pulmonary.     Objective   Vital Signs:   /83 (BP Location: Left arm, Patient Position: Sitting, Cuff Size: Adult)   Pulse 84   Temp 98.4 °F (36.9 °C) (Infrared)   Ht 167.6 cm (66\")   Wt 104 kg (230 lb)   SpO2 99%   BMI 37.12 kg/m²            Physical Exam  Vitals and nursing note reviewed.   Constitutional:       Appearance: He is well-developed.   HENT:      Head: Normocephalic and atraumatic.      Nose: Nose normal.   Eyes:      Conjunctiva/sclera: Conjunctivae normal.   Pulmonary:      Effort: Pulmonary effort is normal.   Musculoskeletal:         General: Normal range of motion.      Cervical back: Normal range of motion and neck supple.   Skin:     General: Skin is warm and dry.   Neurological:      Mental Status: He is alert and oriented to person, place, and time.   Psychiatric:         Behavior: Behavior normal.         Thought Content: Thought content normal.         Judgment: Judgment normal.        Result Review :              I have independently reviewed the CT of the chest performed on 04/11/2022, which demonstrates a stable 1 cm left lower lobe lung base nodule with scarring and pleural thickening. There are no new lung nodules. There is moderate emphysema. There is no pleural or pericardial effusion. There is no mediastinal or hilar or lymphadenopathy.     Assessment and Plan    Diagnoses and all orders for this visit:    1. Left lower lobe pulmonary nodule (Primary)  -     CT Chest Without Contrast; Future    Mr. Nam is a very " pleasant 82-year-old gentleman with a 1 cm left lower lobe lung nodule that is stable in size since 2020. The ground glass opacity that was seen on his last imagine has resolved, which is consistent with inflammation. Given he has a 1 cm nodule, he will need continued surveillance with a CT of the chest in 1 year.     I spent 15 minutes caring for Praneeth on this date of service. This time includes time spent by me in the following activities:preparing for the visit, reviewing tests, obtaining and/or reviewing a separately obtained history, performing a medically appropriate examination and/or evaluation , counseling and educating the patient/family/caregiver, ordering medications, tests, or procedures, documenting information in the medical record and independently interpreting results and communicating that information with the patient/family/caregiver  Follow Up   No follow-ups on file.  Patient was given instructions and counseling regarding his condition or for health maintenance advice. Please see specific information pulled into the AVS if appropriate.     Transcribed from ambient dictation for Camille Nash MD by Pearl Medina.  04/14/22   17:15 EDT    Patient verbalized consent to the visit recording.  I have personally performed the services described in this document as transcribed by the above individual, and it is both accurate and complete.  Pearl Medina  4/14/2022  17:15 EDT

## 2022-06-22 ENCOUNTER — OFFICE VISIT (OUTPATIENT)
Dept: CARDIOLOGY | Facility: CLINIC | Age: 82
End: 2022-06-22

## 2022-06-22 VITALS
BODY MASS INDEX: 36.32 KG/M2 | HEART RATE: 79 BPM | HEIGHT: 66 IN | OXYGEN SATURATION: 97 % | DIASTOLIC BLOOD PRESSURE: 66 MMHG | SYSTOLIC BLOOD PRESSURE: 128 MMHG | WEIGHT: 226 LBS

## 2022-06-22 DIAGNOSIS — I10 ESSENTIAL HYPERTENSION: ICD-10-CM

## 2022-06-22 DIAGNOSIS — I45.10 RIGHT BUNDLE BRANCH BLOCK: Primary | ICD-10-CM

## 2022-06-22 DIAGNOSIS — I48.0 AF (PAROXYSMAL ATRIAL FIBRILLATION): ICD-10-CM

## 2022-06-22 DIAGNOSIS — R06.02 SHORTNESS OF BREATH: ICD-10-CM

## 2022-06-22 DIAGNOSIS — E78.5 DYSLIPIDEMIA: ICD-10-CM

## 2022-06-22 PROCEDURE — 93000 ELECTROCARDIOGRAM COMPLETE: CPT | Performed by: INTERNAL MEDICINE

## 2022-06-22 PROCEDURE — 99214 OFFICE O/P EST MOD 30 MIN: CPT | Performed by: INTERNAL MEDICINE

## 2022-06-22 NOTE — PROGRESS NOTES
Encounter Date:06/22/2022  Last seen 12/15/2021      Patient ID: Praneeth Nam is a 82 y.o. male.    Chief Complaint:    Dilated aorta  Shortness of breath  Hypertension  Diabetes  Right bundle branch block  Shortness of breath fatigue and lightheadedness     History of Present Illness  Since I have last seen, the patient has been without any chest discomfort , unusual shortness of breath, palpitations, dizziness or syncope.  Denies having any headache ,abdominal pain ,nausea, vomiting , diarrhea constipation, loss of weight or loss of appetite.  Denies having any excessive bruising ,hematuria or blood in the stool.    Review of all systems negative except as indicated.    Reviewed ROS.    Assessment and Plan      ]]]]]]]]]]]]]]]]]]   impression  ==========   - shortness of breath fatigue and lightheadedness.  Echocardiogram-normal with ejection fraction of 60%-7/28/2021  Lexiscan Cardiolite test-mild inferior ischemia-7/28/2021     -New onset atrial fibrillation with controlled rate. Appears to be in sinus rhythm 7/28/2021-9/13/2021     -Chronic right bundle branch block      cardiac catheterization 04/11/2018 revealed normal left ventricular function normal coronary  arteries and no evidence for pulmonary hypertension was present.     -History of diffusely dilated ascending aorta     Echocardiogram showed left atrial and right ventricle enlargement prominent aorta.  Normal left ventricular function with ejection fraction of 60% 04/05/2018.  Stacy scan Cardiolite test is abnormal with significant inferior ischemia and apical hypokinesis on the gated SPECT images 04/05/2018.     - diabetes hypertension LGL leukemia rheumatoid arthritis COPD CKD 3  Patient is on home oxygen.     - history of prostate carcinoma.  Status post radiation     - former smoker     - allergic to penicillin  ==========  Plan  ==========  Shortness of breath fatigue and lightheadedness.-Stable  COPD  Patient is on home oxygen.     History of  atrial fibrillation  Patient is in sinus rhythm with first-degree AV block right bundle branch block-12/15/2021.  Sinus rhythm first-degree AV block right bundle branch block- 6/22/2022     Chronic right bundle branch block-asymptomatic.     Patient is in sinus rhythm with first-degree AV block.  No need for anticoagulation at this time.     Follow-up in the office in  6 months with EKG.     Medications were reviewed and updated.     Further plan will depend on patient's progress.  [[[[[[[[[[[[[[[[[[[[                  Diagnosis Plan   1. Right bundle branch block  ECG 12 Lead   2. Essential hypertension  ECG 12 Lead   3. Dyslipidemia  ECG 12 Lead   4. Shortness of breath  ECG 12 Lead   5. AF (paroxysmal atrial fibrillation) (HCC)  ECG 12 Lead   LAB RESULTS (LAST 7 DAYS)    CBC        BMP        CMP         BNP        TROPONIN        CoAg        Creatinine Clearance  CrCl cannot be calculated (Patient's most recent lab result is older than the maximum 30 days allowed.).    ABG        Radiology  No radiology results for the last day                The following portions of the patient's history were reviewed and updated as appropriate: allergies, current medications, past family history, past medical history, past social history, past surgical history and problem list.    Review of Systems   Constitutional: Negative for malaise/fatigue.   Cardiovascular: Negative for chest pain, leg swelling, palpitations and syncope.   Respiratory: Negative for shortness of breath.    Skin: Negative for rash.   Gastrointestinal: Negative for nausea and vomiting.   Neurological: Negative for dizziness, light-headedness and numbness.   All other systems reviewed and are negative.        Current Outpatient Medications:   •  albuterol sulfate  (90 Base) MCG/ACT inhaler, , Disp: , Rfl:   •  aspirin 81 MG EC tablet, Take 81 mg by mouth Daily., Disp: , Rfl:   •  atorvastatin (LIPITOR) 20 MG tablet, Take 20 mg by mouth Daily., Disp:  , Rfl: 3  •  budesonide-formoterol (SYMBICORT) 160-4.5 MCG/ACT inhaler, Inhale 2 puffs 2 (Two) Times a Day., Disp: , Rfl:   •  Cetirizine HCl (Allergy Relief) 10 MG capsule, Take 1 capsule by mouth Daily., Disp: , Rfl:   •  fenofibrate 160 MG tablet, Take 160 mg by mouth Daily., Disp: , Rfl: 3  •  glipiZIDE (GLUCOTROL) 5 MG tablet, Take 5 mg by mouth 2 (Two) Times a Day Before Meals., Disp: , Rfl:   •  hydroxychloroquine (PLAQUENIL) 200 MG tablet, TK 2 TS PO ONCE DAILY, Disp: , Rfl: 0  •  ipratropium-albuterol (DUO-NEB) 0.5-2.5 mg/3 ml nebulizer, Take 3 mL by nebulization Every 4 (Four) Hours As Needed for Wheezing., Disp: , Rfl:   •  losartan (COZAAR) 100 MG tablet, Take 100 mg by mouth Daily., Disp: , Rfl: 3  •  Multiple Vitamins-Minerals (MULTIVITAMIN WITH MINERALS) tablet tablet, Take 1 tablet by mouth Daily., Disp: , Rfl:   •  O2 (OXYGEN), Inhale 2 L/min Daily., Disp: , Rfl:   •  pantoprazole (PROTONIX) 40 MG EC tablet, Take 40 mg by mouth Daily., Disp: , Rfl: 1  •  revefenacin (Yupelri) 175 MCG/3ML nebulizer solution, Take  by nebulization Daily., Disp: , Rfl:   •  spironolactone (ALDACTONE) 25 MG tablet, Take 25 mg by mouth Daily., Disp: , Rfl: 3    Allergies   Allergen Reactions   • Penicillin V Potassium Swelling   • Latex Hives       Family History   Problem Relation Age of Onset   • Lung cancer Brother         age unknown   • Heart disease Brother    • Stroke Mother    • Heart disease Father    • Heart disease Sister        Past Surgical History:   Procedure Laterality Date   • SKIN LESION EXCISION      back and groin-benign       Past Medical History:   Diagnosis Date   • Acute pulmonary embolism (HCC) 05/2018    bilateral   • Arthritis     bilateral knees and ankles   • CKD (chronic kidney disease) 03/02/2009   • Coagulopathy (HCC) 07/2008   • COPD (chronic obstructive pulmonary disease) ([facility])    • Diabetes mellitus ([facility])    • 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   • History of pneumonia 02/2015    hospitalized with  "community-acquired pneumonia, possibly viral    • History of prostate cancer 10/2009    [place] 6, Stage II   • Hypercholesterolemia    • Hypertension    • 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   • Neutropenia (HCC) 2003   • MITZI (obstructive sleep apnea) 2018   • Psoriasis    • Renal cyst, left    • Rheumatoid arthritis (HCC)    • Stroke (cerebrum) (HCC)    • Thrombocytopenia (HCC) 2005       Family History   Problem Relation Age of Onset   • Lung cancer Brother         age unknown   • Heart disease Brother    • Stroke Mother    • Heart disease Father    • Heart disease Sister        Social History     Socioeconomic History   • Marital status:    Tobacco Use   • Smoking status: Former Smoker     Quit date:      Years since quittin.4   • Smokeless tobacco: Never Used   • Tobacco comment: stopped smoking in    Vaping Use   • Vaping Use: Never used   Substance and Sexual Activity   • Alcohol use: No   • Drug use: No   • Sexual activity: Defer           ECG 12 Lead    Date/Time: 2022 1:45 PM  Performed by: Misty Dias MD  Authorized by: Misty Dias MD   Comparison: compared with previous ECG   Similar to previous ECG  Comparison to previous ECG: Sinus rhythm right bundle branch block first-degree AV block nonspecific ST-T wave changes 75/min no ectopy no significant change from 12/15/2021                Objective:       Physical Exam    /66 (BP Location: Left arm, Patient Position: Sitting, Cuff Size: Large Adult)   Pulse 79   Ht 167.6 cm (66\")   Wt 103 kg (226 lb)   SpO2 97%   BMI 36.48 kg/m²   The patient is alert, oriented and in no distress.    Vital signs as noted above.    Head and neck revealed no carotid bruits or jugular venous distension.  No thyromegaly or lymphadenopathy is present.    Lungs clear.  No wheezing.  Breath sounds are normal bilaterally.    Heart normal first and second heart " sounds.  No murmur..  No pericardial rub is present.  No gallop is present.    Abdomen soft and nontender.  No organomegaly is present.    Extremities revealed good peripheral pulses without any pedal edema.    Skin warm and dry.    Musculoskeletal system is grossly normal.    CNS grossly normal.    Reviewed and updated.

## 2022-10-06 ENCOUNTER — TRANSCRIBE ORDERS (OUTPATIENT)
Dept: ADMINISTRATIVE | Facility: HOSPITAL | Age: 82
End: 2022-10-06

## 2022-10-07 ENCOUNTER — TRANSCRIBE ORDERS (OUTPATIENT)
Dept: ADMINISTRATIVE | Facility: HOSPITAL | Age: 82
End: 2022-10-07

## 2022-10-07 DIAGNOSIS — R06.00 DYSPNEA, UNSPECIFIED TYPE: Primary | ICD-10-CM

## 2022-10-12 ENCOUNTER — HOSPITAL ENCOUNTER (OUTPATIENT)
Dept: RESPIRATORY THERAPY | Facility: HOSPITAL | Age: 82
Discharge: HOME | End: 2022-10-12
Admitting: FAMILY MEDICINE

## 2022-10-12 DIAGNOSIS — R06.00 DYSPNEA, UNSPECIFIED TYPE: ICD-10-CM

## 2022-10-12 PROCEDURE — 93225 XTRNL ECG REC<48 HRS REC: CPT

## 2022-10-19 PROCEDURE — 93227 XTRNL ECG REC<48 HR R&I: CPT | Performed by: INTERNAL MEDICINE

## 2022-12-08 ENCOUNTER — TELEPHONE (OUTPATIENT)
Dept: CARDIOLOGY | Facility: CLINIC | Age: 82
End: 2022-12-08

## 2022-12-08 NOTE — TELEPHONE ENCOUNTER
[facility]  DR.ANNE GARCIA  PHONE: [phone]  FAX: [phone]  EYE SURGERY  SCHEDULED 12/29/2022    ASKING TO HOLD ASPIRIN FOR 14 DAYS PRIOR.

## 2022-12-15 NOTE — TELEPHONE ENCOUNTER
JANET CALLED OVER WITH REQUESTING OFFICE ON [facility] [place]. SURGERY 12/29. THEY NEED PATIENT TO HOLD ASPIRIN FOR 14 DAYS.   [facility] DID TELL THEM DR. VELÁSQUEZ OUT OF OFFICE. THEY ARE REQUESTING WE HAVE ANOTHER DOCTOR LOOK AT IT IF POSSIBLE.

## 2022-12-16 NOTE — TELEPHONE ENCOUNTER
Per Dr. Harris pt can hold for 10 days. Called office [phone] LM for Eva to advise.   Faxed and placed to be scanned.

## 2022-12-22 ENCOUNTER — OFFICE VISIT (OUTPATIENT)
Dept: CARDIOLOGY | Facility: CLINIC | Age: 82
End: 2022-12-22

## 2022-12-22 VITALS
SYSTOLIC BLOOD PRESSURE: 129 MMHG | HEART RATE: 79 BPM | OXYGEN SATURATION: 97 % | WEIGHT: 230 LBS | HEIGHT: 66 IN | DIASTOLIC BLOOD PRESSURE: 72 MMHG | BODY MASS INDEX: 36.96 KG/M2

## 2022-12-22 DIAGNOSIS — R06.02 SHORTNESS OF BREATH: ICD-10-CM

## 2022-12-22 DIAGNOSIS — I10 ESSENTIAL HYPERTENSION: ICD-10-CM

## 2022-12-22 DIAGNOSIS — I48.0 AF (PAROXYSMAL ATRIAL FIBRILLATION): ICD-10-CM

## 2022-12-22 DIAGNOSIS — I45.10 RIGHT BUNDLE BRANCH BLOCK: Primary | ICD-10-CM

## 2022-12-22 DIAGNOSIS — E78.5 DYSLIPIDEMIA: ICD-10-CM

## 2022-12-22 PROCEDURE — 93000 ELECTROCARDIOGRAM COMPLETE: CPT | Performed by: INTERNAL MEDICINE

## 2022-12-22 PROCEDURE — 99214 OFFICE O/P EST MOD 30 MIN: CPT | Performed by: INTERNAL MEDICINE

## 2022-12-22 NOTE — PROGRESS NOTES
Encounter Date:12/22/2022  Last seen 6/22/2022      Patient ID: Praneeth Nam is a 82 y.o. male.    Chief Complaint:  Dilated aorta  Shortness of breath  Hypertension  Diabetes  Right bundle branch block  Shortness of breath fatigue and lightheadedness.  Atrial fibrillation     History of Present Illness  Since I have last seen, the patient has been without any chest discomfort ,shortness of breath, palpitations, dizziness or syncope.  Denies having any headache ,abdominal pain ,nausea, vomiting , diarrhea constipation, loss of weight or loss of appetite.  Denies having any excessive bruising ,hematuria or blood in the stool.    Review of all systems negative except as indicated.    Reviewed ROS.    Assessment and Plan      ]]]]]]]]]]]]]]]]]]   impression  ==========   - shortness of breath fatigue and lightheadedness.    Echocardiogram-normal with ejection fraction of 60%-7/28/2021  Lexiscan Cardiolite test-mild inferior ischemia-7/28/2021     -New onset atrial fibrillation with controlled rate. Appears to be in sinus rhythm 7/28/2021-9/13/2021     -Chronic right bundle branch block      cardiac catheterization 04/11/2018 revealed normal left ventricular function normal coronary  arteries and no evidence for pulmonary hypertension was present.     -History of diffusely dilated ascending aorta     Echocardiogram showed left atrial and right ventricle enlargement prominent aorta.  Normal left ventricular function with ejection fraction of 60% 04/05/2018.  Stacy scan Cardiolite test is abnormal with significant inferior ischemia and apical hypokinesis on the gated SPECT images 04/05/2018.     - diabetes hypertension LGL leukemia rheumatoid arthritis COPD CKD 3  Patient is on home oxygen.     - history of prostate carcinoma.  Status post radiation     - former smoker     - allergic to penicillin  ==========  Plan  ==========  Shortness of breath fatigue and lightheadedness.-Stable  COPD  Patient is on home  oxygen.     History of atrial fibrillation-patient is maintaining sinus rhythm.  Patient is in sinus rhythm with first-degree AV block right bundle branch block-12/15/2021.  Sinus rhythm first-degree AV block right bundle branch block- 6/22/2022  EKG 12/22/2022-sinus rhythm right bundle branch block     Chronic right bundle branch block-asymptomatic.     No need for anticoagulation at this time.     Follow-up in the office in  6 months with EKG.     Medications were reviewed and updated.     Further plan will depend on patient's progress.  [[[[[[[[[[[[[[[[[[[[                      Diagnosis Plan   1. Right bundle branch block        2. AF (paroxysmal atrial fibrillation) (HCC)        3. Essential hypertension        4. Dyslipidemia        5. Shortness of breath        LAB RESULTS (LAST 7 DAYS)    CBC        BMP        CMP         BNP        TROPONIN        CoAg        Creatinine Clearance  CrCl cannot be calculated (Patient's most recent lab result is older than the maximum 30 days allowed.).    ABG        Radiology  No radiology results for the last day                The following portions of the patient's history were reviewed and updated as appropriate: allergies, current medications, past family history, past medical history, past social history, past surgical history and problem list.    Review of Systems   Constitutional: Negative for malaise/fatigue.   Cardiovascular: Negative for chest pain, leg swelling, palpitations and syncope.   Respiratory: Negative for shortness of breath.    Skin: Negative for rash.   Gastrointestinal: Negative for nausea and vomiting.   Neurological: Negative for dizziness, light-headedness and numbness.   All other systems reviewed and are negative.        Current Outpatient Medications:   •  albuterol sulfate  (90 Base) MCG/ACT inhaler, , Disp: , Rfl:   •  aspirin 81 MG EC tablet, Take 81 mg by mouth Daily., Disp: , Rfl:   •  atorvastatin (LIPITOR) 20 MG tablet, Take 20 mg  by mouth Daily., Disp: , Rfl: 3  •  budesonide-formoterol (SYMBICORT) 160-4.5 MCG/ACT inhaler, Inhale 2 puffs 2 (Two) Times a Day., Disp: , Rfl:   •  Cetirizine HCl (Allergy Relief) 10 MG capsule, Take 1 capsule by mouth Daily., Disp: , Rfl:   •  fenofibrate 160 MG tablet, Take 160 mg by mouth Daily., Disp: , Rfl: 3  •  glipiZIDE (GLUCOTROL) 5 MG tablet, Take 5 mg by mouth 2 (Two) Times a Day Before Meals., Disp: , Rfl:   •  hydroxychloroquine (PLAQUENIL) 200 MG tablet, TK 2 TS PO ONCE DAILY, Disp: , Rfl: 0  •  ipratropium-albuterol (DUO-NEB) 0.5-2.5 mg/3 ml nebulizer, Take 3 mL by nebulization Every 4 (Four) Hours As Needed for Wheezing., Disp: , Rfl:   •  losartan (COZAAR) 100 MG tablet, Take 100 mg by mouth Daily., Disp: , Rfl: 3  •  Multiple Vitamins-Minerals (MULTIVITAMIN WITH MINERALS) tablet tablet, Take 1 tablet by mouth Daily., Disp: , Rfl:   •  O2 (OXYGEN), Inhale 2 L/min Daily., Disp: , Rfl:   •  pantoprazole (PROTONIX) 40 MG EC tablet, Take 40 mg by mouth Daily., Disp: , Rfl: 1  •  revefenacin (Yupelri) 175 MCG/3ML nebulizer solution, Take  by nebulization Daily., Disp: , Rfl:   •  spironolactone (ALDACTONE) 25 MG tablet, Take 25 mg by mouth Daily., Disp: , Rfl: 3    Allergies   Allergen Reactions   • Penicillin V Potassium Swelling   • Latex Hives       Family History   Problem Relation Age of Onset   • Lung cancer Brother         age unknown   • Heart disease Brother    • Stroke Mother    • Heart disease Father    • Heart disease Sister        Past Surgical History:   Procedure Laterality Date   • SKIN LESION EXCISION      back and groin-benign       Past Medical History:   Diagnosis Date   • Acute pulmonary embolism (HCC) 05/2018    bilateral   • Arthritis     bilateral knees and ankles   • CKD (chronic kidney disease) 03/02/2009   • Coagulopathy (HCC) 07/2008   • COPD [facility]ch[facility]    • Diabetes mellitus ([facility])    • 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   • History of pneumonia  "2015    hospitalized with community-acquired pneumonia, possibly viral    • History of prostate cancer 10/2009    [place] 6, Stage II   • Hypercholesterolemia    • Hypertension    • 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   • Neutropenia (HCC) 2003   • MITZI (obstructive sleep apnea) 2018   • Psoriasis    • Renal cyst, left    • Rheumatoid arthritis (HCC)    • Stroke (cerebrum) (HCC)    • Thrombocytopenia (HCC) 2005       Family History   Problem Relation Age of Onset   • Lung cancer Brother         age unknown   • Heart disease Brother    • Stroke Mother    • Heart disease Father    • Heart disease Sister        Social History     Socioeconomic History   • Marital status:    Tobacco Use   • Smoking status: Former     Types: Cigarettes     Quit date:      Years since quittin.9   • Smokeless tobacco: Never   • Tobacco comments:     stopped smoking in    Vaping Use   • Vaping Use: Never used   Substance and Sexual Activity   • Alcohol use: No   • Drug use: No   • Sexual activity: Defer           ECG 12 Lead    Date/Time: 2022 3:28 PM  Performed by: Misty Dias MD  Authorized by: Misty Dias MD   Comparison: compared with previous ECG   Similar to previous ECG  Comparison to previous ECG: Sinus rhythm right bundle branch block left anterior fascicular block 76/min no ectopy no significant change from 2022                Objective:       Physical Exam    /72 (BP Location: Right arm, Patient Position: Sitting, Cuff Size: Large Adult)   Pulse 79   Ht 167.6 cm (66\")   Wt 104 kg (230 lb)   SpO2 97%   BMI 37.12 kg/m²   The patient is alert, oriented and in no distress.    Vital signs as noted above.  Exogenous obesity (BMI 37)    Head and neck revealed no carotid bruits or jugular venous distension.  No thyromegaly or lymphadenopathy is present.    Lungs clear.  No wheezing.  Breath sounds are normal " bilaterally.    Heart normal first and second heart sounds.  No murmur..  No pericardial rub is present.  No gallop is present.    Abdomen soft and nontender.  No organomegaly is present.    Extremities revealed good peripheral pulses without any pedal edema.    Skin warm and dry.    Musculoskeletal system is grossly normal.    CNS grossly normal.    Reviewed and updated.

## 2023-01-12 ENCOUNTER — TELEPHONE (OUTPATIENT)
Dept: CARDIOLOGY | Facility: CLINIC | Age: 83
End: 2023-01-12
Payer: MEDICARE

## 2023-01-12 NOTE — TELEPHONE ENCOUNTER
[facility]  Dr. Hanna  Eye surgery  Scheduled 2/9/23  Phone# [phone]  Fax# [phone]          Placed on Dr. Dias desvasu   Attending Attestation (For Attendings USE Only)...

## 2023-02-16 ENCOUNTER — TELEPHONE (OUTPATIENT)
Dept: SURGERY | Facility: CLINIC | Age: 83
End: 2023-02-16
Payer: MEDICARE

## 2023-02-16 DIAGNOSIS — R91.1 LEFT LOWER LOBE PULMONARY NODULE: ICD-10-CM

## 2023-02-16 NOTE — TELEPHONE ENCOUNTER
Caller: Praneeth Nam    Relationship to patient: Self    Best call back number: [phone]    Chief complaint: REQUESTS LATER APPOINTMENT TIME    Type of visit: CT AT [facility]     Requested date: SAME DAY IS FINE BUT WOULD LIKE AN AFTERNOON APPOINTMENT    If rescheduling, when is the original appointment: 2/28/23 @8AM    Additional notes: PATIENT WOULD LIKE A CALL BACK.

## 2023-04-18 ENCOUNTER — OFFICE VISIT (OUTPATIENT)
Dept: SURGERY | Facility: CLINIC | Age: 83
End: 2023-04-18
Payer: MEDICARE

## 2023-04-18 VITALS
HEART RATE: 72 BPM | DIASTOLIC BLOOD PRESSURE: 78 MMHG | TEMPERATURE: 99.3 F | WEIGHT: 221 LBS | BODY MASS INDEX: 35.52 KG/M2 | SYSTOLIC BLOOD PRESSURE: 139 MMHG | OXYGEN SATURATION: 99 % | HEIGHT: 66 IN

## 2023-04-18 DIAGNOSIS — Z87.891 FORMER SMOKER: ICD-10-CM

## 2023-04-18 DIAGNOSIS — R91.1 LEFT LOWER LOBE PULMONARY NODULE: Primary | ICD-10-CM

## 2023-04-18 PROCEDURE — 3078F DIAST BP <80 MM HG: CPT | Performed by: NURSE PRACTITIONER

## 2023-04-18 PROCEDURE — 3075F SYST BP GE 130 - 139MM HG: CPT | Performed by: NURSE PRACTITIONER

## 2023-04-18 PROCEDURE — 99214 OFFICE O/P EST MOD 30 MIN: CPT | Performed by: NURSE PRACTITIONER

## 2023-04-18 RX ORDER — OXYBUTYNIN CHLORIDE 10 MG/1
1 TABLET, EXTENDED RELEASE ORAL DAILY
COMMUNITY
Start: 2023-03-29

## 2023-04-18 NOTE — PROGRESS NOTES
"Chief Complaint  Lung Nodule (1 year follow up CT chest lung nodule )    Subjective        Praneeth Nam presents to [facility] THORACIC SURGERY  History of Present Illness    Mr. Nam is a pleasant 83-year-old gentleman who is seen today in follow-up for continued surveillance of a left lower lobe lung nodule.  He is a former smoker with a greater than 40-pack-year history, quitting in 1995.  He also reports a history of prostate cancer status post radiation as well as leukemia status posttreatment with Dr. Helton.  He has significant rheumatoid arthritis and is on supplemental oxygen with exertion.  He is able to care for himself but is not very active at baseline.  He presents today feeling well overall with CT chest.    Objective   Vital Signs:  /78 (BP Location: Left arm, Patient Position: Sitting, Cuff Size: Adult)   Pulse 72   Temp 99.3 °F (37.4 °C) (Infrared)   Ht 167.6 cm (66\")   Wt 100 kg (221 lb)   SpO2 99%   BMI 35.67 kg/m²   Estimated body mass index is 35.67 kg/m² as calculated from the following:    Height as of this encounter: 167.6 cm (66\").    Weight as of this encounter: 100 kg (221 lb).             Physical Exam  Constitutional:       General: He is not in acute distress.     Appearance: Normal appearance. He is not ill-appearing.   HENT:      Head: Normocephalic and atraumatic.   Cardiovascular:      Rate and Rhythm: Normal rate.      Pulses: Normal pulses.   Pulmonary:      Effort: Pulmonary effort is normal.   Musculoskeletal:         General: Deformity (Consistent with rheumatoid arthritis) present. Normal range of motion.      Cervical back: Normal range of motion and neck supple.   Skin:     General: Skin is warm and dry.      Findings: Bruising present.   Neurological:      General: No focal deficit present.      Mental Status: He is alert.      Motor: Weakness present.   Psychiatric:         Mood and Affect: Mood normal.        Result Review " :          Independently reviewed the CT of the chest performed on 2/28/2023 which demonstrates a new pleural-based right lower lobe nodule measuring 13 x 10 mm.  There is stable scarring throughout the left lung.  No pleural or pericardial effusion.  No mediastinal or hilar lymphadenopathy.           Assessment and Plan   Diagnoses and all orders for this visit:    1. Left lower lobe pulmonary nodule (Primary)  -     CT Chest Without Contrast; Future    2. Former smoker    Mr. Nam's most recent CT chest demonstrates a new 13 mm lung nodule in the right lower lobe.  While this could be consistent with a inflammatory change, this could also represent a bronchogenic malignancy.  We discussed the option of PET scan versus short interval CT chest.  Patient would like to proceed with CT chest.  It was explained to him that if this nodule increases in size, we will likely plan for CT and referral to radiation oncology as he is not a surgical candidate given advanced emphysema, need for supplemental oxygen and poor functional status.  He would also be at high risk for pneumothorax with needle biopsy.  I will call him once the CT chest results.       I spent 38 minutes caring for Praneeth on this date of service. This time includes time spent by me in the following activities:preparing for the visit, reviewing tests, performing a medically appropriate examination and/or evaluation , counseling and educating the patient/family/caregiver, ordering medications, tests, or procedures, documenting information in the medical record, independently interpreting results and communicating that information with the patient/family/caregiver and care coordination     Follow Up   Return in about 8 weeks (around 6/13/2023) for Next scheduled follow up.  Patient was given instructions and counseling regarding his condition or for health maintenance advice. Please see specific information pulled into the AVS if appropriate.

## 2023-10-17 ENCOUNTER — OFFICE VISIT (OUTPATIENT)
Dept: SURGERY | Facility: CLINIC | Age: 83
End: 2023-10-17
Payer: MEDICARE

## 2023-10-17 VITALS
TEMPERATURE: 98.4 F | BODY MASS INDEX: 31.02 KG/M2 | OXYGEN SATURATION: 99 % | HEART RATE: 53 BPM | HEIGHT: 66 IN | WEIGHT: 193 LBS | DIASTOLIC BLOOD PRESSURE: 55 MMHG | SYSTOLIC BLOOD PRESSURE: 143 MMHG

## 2023-10-17 DIAGNOSIS — Z87.891 FORMER SMOKER: ICD-10-CM

## 2023-10-17 DIAGNOSIS — J90 PLEURAL EFFUSION, RIGHT: ICD-10-CM

## 2023-10-17 DIAGNOSIS — R91.8 LUNG NODULES: Primary | ICD-10-CM

## 2023-10-17 PROCEDURE — 1159F MED LIST DOCD IN RCRD: CPT | Performed by: NURSE PRACTITIONER

## 2023-10-17 PROCEDURE — 3078F DIAST BP <80 MM HG: CPT | Performed by: NURSE PRACTITIONER

## 2023-10-17 PROCEDURE — 1160F RVW MEDS BY RX/DR IN RCRD: CPT | Performed by: NURSE PRACTITIONER

## 2023-10-17 PROCEDURE — 99214 OFFICE O/P EST MOD 30 MIN: CPT | Performed by: NURSE PRACTITIONER

## 2023-10-17 PROCEDURE — 3077F SYST BP >= 140 MM HG: CPT | Performed by: NURSE PRACTITIONER

## 2023-12-04 NOTE — PROGRESS NOTES
Encounter Date:12/27/2023  Last seen 6/22/2023      Patient ID: Praneeth Nam is a 83 y.o. male.      Chief Complaint:     Dilated aorta  Shortness of breath  Hypertension  Diabetes  Right bundle branch block  Shortness of breath fatigue and lightheadedness.  Atrial fibrillation     History of Present Illness  Since I have last seen, the patient has been without any chest discomfort ,shortness of breath, palpitations, dizziness or syncope.  Denies having any headache ,abdominal pain ,nausea, vomiting , diarrhea constipation, loss of weight or loss of appetite.  Denies having any excessive bruising ,hematuria or blood in the stool.    Review of all systems negative except as indicated.    Reviewed ROS.    Assessment and Plan      ]]]]]]]]]]]]]]]]]]  History  ==========  -History of atrial fibrillation  Patient has converted and was maintaining sinus rhythm.  EKG 12/27/2023-atrial fibrillation      - shortness of breath fatigue and lightheadedness.  Improved     Echocardiogram-normal with ejection fraction of 60%-7/28/2021  Lexiscan Cardiolite test-mild inferior ischemia-7/28/2021      -Chronic right bundle branch block      cardiac catheterization 04/11/2018 revealed normal left ventricular function normal coronary  arteries and no evidence for pulmonary hypertension was present.     -History of diffusely dilated ascending aorta     Echocardiogram showed left atrial and right ventricle enlargement prominent aorta.  Normal left ventricular function with ejection fraction of 60% 04/05/2018.  Stacy scan Cardiolite test is abnormal with significant inferior ischemia and apical hypokinesis on the gated SPECT images 04/05/2018.     - diabetes hypertension LGL leukemia rheumatoid arthritis COPD CKD 3  Patient is on home oxygen.     - history of prostate carcinoma.  Status post radiation     - former smoker     - allergic to penicillin  ==========  Plan  ==========  History of atrial fibrillation  Patient is maintaining  sinus rhythm.  Patient is in sinus rhythm with first-degree AV block right bundle branch block-12/15/2021.  Sinus rhythm first-degree AV block right bundle branch block- 6/22/2022  EKG 6/22/2023 sinus bradycardia right bundle branch block  EKG 12/22/2022-sinus rhythm right bundle branch block    Patient is in atrial fibrillation 12/27/2023  Atrial fibrillation nonspecific intraventricular conduction delay    Anticoagulation was discussed.  Discontinue aspirin.  Patient was started on low-dose Eliquis 2.5 mg twice daily  Consideration may be given for Watchman procedure.    Chronic right bundle branch block-asymptomatic     Hypertension  Blood pressure 140/57  Maintain blood pressure log.  Blood pressure running normal at home.     Shortness of breath fatigue and lightheadedness.-Stable  COPD  Patient is on home oxygen.     Follow-up in the office in  6 weeks with EKG.     Medications were reviewed and updated.     Further plan will depend on patient's progress.    Reviewed and updated.  12/27/2023  [[[[[[[[[[[[[[[[[[[[               Diagnosis Plan   1. Right bundle branch block        2. AF (paroxysmal atrial fibrillation)        3. Essential hypertension        4. Shortness of breath        5. Dyslipidemia        LAB RESULTS (LAST 7 DAYS)    CBC        BMP        CMP         BNP        TROPONIN        CoAg        Creatinine Clearance  CrCl cannot be calculated (Patient's most recent lab result is older than the maximum 30 days allowed.).    ABG        Radiology  No radiology results for the last day                The following portions of the patient's history were reviewed and updated as appropriate: allergies, current medications, past family history, past medical history, past social history, past surgical history, and problem list.    Review of Systems   Constitutional: Positive for malaise/fatigue.   Cardiovascular:  Negative for chest pain, dyspnea on exertion, leg swelling and palpitations.   Respiratory:   Positive for shortness of breath. Negative for cough.    Gastrointestinal:  Negative for abdominal pain, nausea and vomiting.   Neurological:  Positive for weakness. Negative for dizziness, focal weakness, headaches, light-headedness and numbness.   All other systems reviewed and are negative.      Current Outpatient Medications:   •  aspirin 81 MG EC tablet, Take 1 tablet by mouth Daily., Disp: , Rfl:   •  atorvastatin (LIPITOR) 20 MG tablet, Take 1 tablet by mouth Daily., Disp: , Rfl: 3  •  fenofibrate 160 MG tablet, Take 1 tablet by mouth Daily., Disp: , Rfl: 3  •  glipiZIDE (GLUCOTROL) 5 MG tablet, Take 1 tablet by mouth 2 (Two) Times a Day Before Meals., Disp: , Rfl:   •  hydroxychloroquine (PLAQUENIL) 200 MG tablet, TK 2 TS PO ONCE DAILY, Disp: , Rfl: 0  •  ipratropium-albuterol (DUO-NEB) 0.5-2.5 mg/3 ml nebulizer, Take 3 mL by nebulization Every 4 (Four) Hours As Needed for Wheezing., Disp: , Rfl:   •  losartan (COZAAR) 100 MG tablet, Take 1 tablet by mouth Daily., Disp: , Rfl: 3  •  Multiple Vitamins-Minerals (MULTIVITAMIN WITH MINERALS) tablet tablet, Take 1 tablet by mouth Daily., Disp: , Rfl:   •  O2 (OXYGEN), Inhale 4 L/min Continuous., Disp: , Rfl:   •  oxybutynin XL (DITROPAN-XL) 10 MG 24 hr tablet, Take 1 tablet by mouth Daily., Disp: , Rfl:   •  pantoprazole (PROTONIX) 40 MG EC tablet, Take 1 tablet by mouth Daily., Disp: , Rfl:   •  revefenacin (Yupelri) 175 MCG/3ML nebulizer solution, Take  by nebulization Daily., Disp: , Rfl:   •  spironolactone (ALDACTONE) 25 MG tablet, Take 1 tablet by mouth Daily., Disp: , Rfl: 3  •  tamsulosin (FLOMAX) 0.4 MG capsule 24 hr capsule, TAKE 1 CAPSULE BY MOUTH EVERY DAY 1/2 HOUR FOLLOWING THE SAME MEAL EACH DAY, Disp: , Rfl:     Allergies   Allergen Reactions   • Penicillin V Potassium Swelling   • Latex Hives       Family History   Problem Relation Age of Onset   • Lung cancer Brother         age unknown   • Heart disease Brother    • Stroke Mother    • Heart  disease Father    • Heart disease Sister        Past Surgical History:   Procedure Laterality Date   • SKIN LESION EXCISION      back and groin-benign       Past Medical History:   Diagnosis Date   • Acute pulmonary embolism 2018    bilateral   • Arthritis     bilateral knees and ankles   • CKD (chronic kidney disease) 2009   • Coagulopathy 2008   • COPD (chronic obstructive pulmonary disease)    • Diabetes mellitus    • History of ITP 2019   • History of pneumonia 2015    hospitalized with community-acquired pneumonia, possibly viral    • History of prostate cancer 10/2009    [place] 6, Stage II   • Hypercholesterolemia    • Hypertension    • 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   • Neutropenia 2003   • MITZI (obstructive sleep apnea)    • Psoriasis    • Renal cyst, left    • Rheumatoid arthritis    • Stroke (cerebrum)    • Thrombocytopenia 2005       Family History   Problem Relation Age of Onset   • Lung cancer Brother         age unknown   • Heart disease Brother    • Stroke Mother    • Heart disease Father    • Heart disease Sister        Social History     Socioeconomic History   • Marital status:    Tobacco Use   • Smoking status: Former     Types: Cigarettes     Quit date:      Years since quittin.9     Passive exposure: Never   • Smokeless tobacco: Never   • Tobacco comments:     stopped smoking in    Vaping Use   • Vaping Use: Never used   Substance and Sexual Activity   • Alcohol use: No   • Drug use: No   • Sexual activity: Defer           ECG 12 Lead    Date/Time: 2023 1:52 PM  Performed by: Misty Dias MD    Authorized by: Misty Dias MD  Comparison: compared with previous ECG   Similar to previous ECG  Comparison to previous ECG: Atrial fibrillation with controlled ventricular response left anterior fascicular block nonspecific intraventricular conduction delay.  Compared to  previous EKG atrial fibrillation is new.        Objective:       Physical Exam    There were no vitals taken for this visit.  The patient is alert, oriented and in no distress.    Vital signs as noted above.    Head and neck revealed no carotid bruits or jugular venous distension.  No thyromegaly or lymphadenopathy is present.    Lungs clear.  No wheezing.  Breath sounds are normal bilaterally.    Heart normal first and second heart sounds.  No murmur..  No pericardial rub is present.  No gallop is present.    Abdomen soft and nontender.  No organomegaly is present.    Extremities revealed good peripheral pulses without any pedal edema.    Skin warm and dry.    Musculoskeletal system is grossly normal.    CNS grossly normal.    Reviewed and updated.

## 2023-12-27 ENCOUNTER — TELEPHONE (OUTPATIENT)
Dept: CARDIOLOGY | Facility: CLINIC | Age: 83
End: 2023-12-27

## 2023-12-27 ENCOUNTER — OFFICE VISIT (OUTPATIENT)
Dept: CARDIOLOGY | Facility: CLINIC | Age: 83
End: 2023-12-27
Payer: MEDICARE

## 2023-12-27 VITALS
DIASTOLIC BLOOD PRESSURE: 57 MMHG | WEIGHT: 189 LBS | OXYGEN SATURATION: 98 % | BODY MASS INDEX: 30.37 KG/M2 | HEIGHT: 66 IN | HEART RATE: 55 BPM | SYSTOLIC BLOOD PRESSURE: 140 MMHG

## 2023-12-27 DIAGNOSIS — I48.0 AF (PAROXYSMAL ATRIAL FIBRILLATION): ICD-10-CM

## 2023-12-27 DIAGNOSIS — R06.02 SHORTNESS OF BREATH: ICD-10-CM

## 2023-12-27 DIAGNOSIS — I45.10 RIGHT BUNDLE BRANCH BLOCK: Primary | ICD-10-CM

## 2023-12-27 DIAGNOSIS — E78.5 DYSLIPIDEMIA: ICD-10-CM

## 2023-12-27 DIAGNOSIS — I10 ESSENTIAL HYPERTENSION: ICD-10-CM

## 2023-12-27 PROCEDURE — 99214 OFFICE O/P EST MOD 30 MIN: CPT | Performed by: INTERNAL MEDICINE

## 2023-12-27 PROCEDURE — 1160F RVW MEDS BY RX/DR IN RCRD: CPT | Performed by: INTERNAL MEDICINE

## 2023-12-27 PROCEDURE — 1159F MED LIST DOCD IN RCRD: CPT | Performed by: INTERNAL MEDICINE

## 2023-12-27 PROCEDURE — 3077F SYST BP >= 140 MM HG: CPT | Performed by: INTERNAL MEDICINE

## 2023-12-27 PROCEDURE — 3078F DIAST BP <80 MM HG: CPT | Performed by: INTERNAL MEDICINE

## 2023-12-27 PROCEDURE — 93000 ELECTROCARDIOGRAM COMPLETE: CPT | Performed by: INTERNAL MEDICINE

## 2023-12-27 NOTE — TELEPHONE ENCOUNTER
Rx Refill Note  Requested Prescriptions     Pending Prescriptions Disp Refills    apixaban (ELIQUIS) 2.5 MG tablet tablet 56 tablet 0     Sig: Take 1 tablet by mouth 2 (Two) Times a Day.      Last office visit with prescribing clinician: 12/27/2023   Last telemedicine visit with prescribing clinician: Visit date not found   Next office visit with prescribing clinician: 2/7/2024                         Would you like a call back once the refill request has been completed: [] Yes [] No    If the office needs to give you a call back, can they leave a voicemail: [] Yes [] No    Em Hopkins MA  12/27/23, 16:43 EST

## 2023-12-27 NOTE — TELEPHONE ENCOUNTER
Patient was just in today and dr bustamante sent in eliquis and they said it was over$  600.00. they would like a call back to see if there was something else he can take or if there was samples or a discount card.

## 2023-12-27 NOTE — TELEPHONE ENCOUNTER
Left voicemail for patient to return call. Samples for Eliquis and a copay card are up front for patient to  at any time.

## 2024-01-03 ENCOUNTER — TELEPHONE (OUTPATIENT)
Dept: CARDIOLOGY | Facility: CLINIC | Age: 84
End: 2024-01-03
Payer: MEDICARE

## 2024-01-03 NOTE — TELEPHONE ENCOUNTER
CAN WE CALL IN 30 DAY SUPPLY OF ELIQUIS WITH 11 REFILLS TO [facility]?  HE HAS A COPAY CARD AND PHARMACY NEEDS IT CALLED IN LIKE THIS.

## 2024-01-03 NOTE — TELEPHONE ENCOUNTER
Rx Refill Note  Requested Prescriptions     Signed Prescriptions Disp Refills    apixaban (ELIQUIS) 2.5 MG tablet tablet 60 tablet 11     Sig: Take 1 tablet by mouth 2 (Two) Times a Day.     Authorizing Provider: SOURAV VELÁSQUEZ     Ordering User: LARA CUNHA      Last office visit with prescribing clinician: 12/27/2023   Last telemedicine visit with prescribing clinician: Visit date not found   Next office visit with prescribing clinician: 2/7/2024                         Would you like a call back once the refill request has been completed: [] Yes [] No    If the office needs to give you a call back, can they leave a voicemail: [] Yes [] No    Lara Cunha MA  01/03/24, 15:04 EST

## 2024-01-03 NOTE — TELEPHONE ENCOUNTER
Incoming Refill Request  {Tip  DIRECTIONS Type Rx details below. Pend Rx from patient's med list if dosage and other details match request. Jump to Medication Section:23}    Medication requested (name and dose): ***    Pharmacy where request should be sent: ***    Additional details provided by patient: ***    Best call back number: ***    Does the patient have less than a 3 day supply:  [] Yes  [] No    Al SteinerJose Rep  01/03/24, 14:52 EST    {Tip  INFORM PATIENT I will send a message to the clinical team. Please allow 48 hours for the clinical staff to follow up on this request:[id number]}    {Tip  DIRECTIONS Send the encounter HIGH priority, If patient has less than a 3 day supply. If the patient will run out of medication over the weekend add that information to the additional details line. Send this encounter to the clinical pool:[id number]}

## 2024-01-19 NOTE — PROGRESS NOTES
Encounter Date:02/07/2024  Last seen 12/27/2023      Patient ID: Praneeth Nam is a 83 y.o. male.    Chief Complaint:     Dilated aorta  Shortness of breath  Hypertension  Diabetes  Right bundle branch block  Shortness of breath fatigue and lightheadedness.  Atrial fibrillation     History of Present Illness  Since I have last seen, the patient has been without any chest discomfort ,shortness of breath, palpitations, dizziness or syncope.  Denies having any headache ,abdominal pain ,nausea, vomiting , diarrhea constipation, loss of weight or loss of appetite.  Denies having any excessive bruising ,hematuria or blood in the stool.    Review of all systems negative except as indicated.    Reviewed ROS.  Assessment and Plan      ]]]]]]]]]]]]]]]]]]  History  ==========  -History of atrial fibrillation  Patient has converted and was maintaining sinus rhythm.  EKG 12/27/2023-atrial fibrillation  EKG 2/7/2024-atrial fibrillation right bundle branch block     - shortness of breath fatigue and lightheadedness.  Improved     Echocardiogram-normal with ejection fraction of 60%-7/28/2021  Lexiscan Cardiolite test-mild inferior ischemia-7/28/2021      -Chronic right bundle branch block      cardiac catheterization 04/11/2018 revealed normal left ventricular function normal coronary  arteries and no evidence for pulmonary hypertension was present.     -History of diffusely dilated ascending aorta     Echocardiogram showed left atrial and right ventricle enlargement prominent aorta.  Normal left ventricular function with ejection fraction of 60% 04/05/2018.  Stacy scan Cardiolite test is abnormal with significant inferior ischemia and apical hypokinesis on the gated SPECT images 04/05/2018.     - diabetes hypertension LGL leukemia rheumatoid arthritis COPD CKD 3  Patient is on home oxygen.     - history of prostate carcinoma.  Status post radiation     - former smoker     - allergic to  penicillin  ==========  Plan  ==========  History of atrial fibrillation  Patient is maintaining sinus rhythm.  Patient is in sinus rhythm with first-degree AV block right bundle branch block-12/15/2021.  Sinus rhythm first-degree AV block right bundle branch block- 6/22/2022  EKG 6/22/2023 sinus bradycardia right bundle branch block  EKG 12/22/2022-sinus rhythm right bundle branch block  EKG 12/27/2023-atrial fibrillation  EKG 2/7/2024-atrial fibrillation right bundle branch block left anterior fascicular block.  Rate is well-controlled.      Anticoagulation was discussed.  Patient is off aspirin.  Continue Eliquis 2.5 mg twice daily.  Patient is concerned about cost of medication.  Consideration may be given for Watchman procedure.     Chronic right bundle branch block-asymptomatic     Hypertension  Blood pressure 133/80  Maintain blood pressure log.  Blood pressure running normal at home.     Shortness of breath fatigue and lightheadedness.-Stable  COPD  Patient is on home oxygen.     Follow-up in the office in 6 months with EKG     Medications were reviewed and updated.     Further plan will depend on patient's progress.     Reviewed and updated-2/7/2024  [[[[[[[[[[[[[[[[[[[[               Diagnosis Plan   1. Right bundle branch block        2. Dyslipidemia        3. AF (paroxysmal atrial fibrillation)        4. Essential hypertension        5. Shortness of breath        LAB RESULTS (LAST 7 DAYS)    CBC        BMP        CMP         BNP        TROPONIN        CoAg        Creatinine Clearance  CrCl cannot be calculated (Patient's most recent lab result is older than the maximum 30 days allowed.).    ABG        Radiology  No radiology results for the last day                The following portions of the patient's history were reviewed and updated as appropriate: allergies, current medications, past family history, past medical history, past social history, past surgical history, and problem list.    Review of  Systems   Constitutional: Negative for malaise/fatigue.   Cardiovascular:  Negative for chest pain, dyspnea on exertion, leg swelling and palpitations.   Respiratory:  Negative for cough and shortness of breath.    Gastrointestinal:  Negative for abdominal pain, nausea and vomiting.   Neurological:  Negative for dizziness, focal weakness, headaches, light-headedness and numbness.   All other systems reviewed and are negative.      Current Outpatient Medications:     apixaban (ELIQUIS) 2.5 MG tablet tablet, Take 1 tablet by mouth 2 (Two) Times a Day., Disp: 60 tablet, Rfl: 11    aspirin 81 MG EC tablet, Take 1 tablet by mouth Daily., Disp: , Rfl:     atorvastatin (LIPITOR) 20 MG tablet, Take 1 tablet by mouth Daily., Disp: , Rfl: 3    fenofibrate 160 MG tablet, Take 1 tablet by mouth Daily., Disp: , Rfl: 3    glipiZIDE (GLUCOTROL) 5 MG tablet, Take 1 tablet by mouth 2 (Two) Times a Day Before Meals., Disp: , Rfl:     hydroxychloroquine (PLAQUENIL) 200 MG tablet, TK 2 TS PO ONCE DAILY, Disp: , Rfl: 0    ipratropium-albuterol (DUO-NEB) 0.5-2.5 mg/3 ml nebulizer, Take 3 mL by nebulization Every 4 (Four) Hours As Needed for Wheezing., Disp: , Rfl:     losartan (COZAAR) 100 MG tablet, Take 1 tablet by mouth Daily., Disp: , Rfl: 3    Multiple Vitamins-Minerals (MULTIVITAMIN WITH MINERALS) tablet tablet, Take 1 tablet by mouth Daily., Disp: , Rfl:     O2 (OXYGEN), Inhale 4 L/min Continuous., Disp: , Rfl:     oxybutynin XL (DITROPAN-XL) 10 MG 24 hr tablet, Take 1 tablet by mouth Daily., Disp: , Rfl:     pantoprazole (PROTONIX) 40 MG EC tablet, Take 1 tablet by mouth Daily., Disp: , Rfl:     revefenacin (Yupelri) 175 MCG/3ML nebulizer solution, Take  by nebulization Daily., Disp: , Rfl:     spironolactone (ALDACTONE) 25 MG tablet, Take 1 tablet by mouth Daily., Disp: , Rfl: 3    tamsulosin (FLOMAX) 0.4 MG capsule 24 hr capsule, TAKE 1 CAPSULE BY MOUTH EVERY DAY 1/2 HOUR FOLLOWING THE SAME MEAL EACH DAY, Disp: , Rfl:      Allergies   Allergen Reactions    Penicillin V Potassium Swelling    Latex Hives       Family History   Problem Relation Age of Onset    Lung cancer Brother         age unknown    Heart disease Brother     Stroke Mother     Heart disease Father     Heart disease Sister        Past Surgical History:   Procedure Laterality Date    SKIN LESION EXCISION      back and groin-benign       Past Medical History:   Diagnosis Date    Acute pulmonary embolism 2018    bilateral    Arthritis     bilateral knees and ankles    CKD (chronic kidney disease) 2009    Coagulopathy 2008    COPD (chronic obstructive pulmonary disease)     Diabetes mellitus     History of ITP 2019    History of pneumonia 2015    hospitalized with community-acquired pneumonia, possibly viral     History of prostate cancer 10/2009    [place] 6, Stage II    Hypercholesterolemia     Hypertension     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    Neutropenia 2003    MITZI (obstructive sleep apnea)     Psoriasis 2000    Renal cyst, left     Rheumatoid arthritis     Stroke (cerebrum)     Thrombocytopenia 2005       Family History   Problem Relation Age of Onset    Lung cancer Brother         age unknown    Heart disease Brother     Stroke Mother     Heart disease Father     Heart disease Sister        Social History     Socioeconomic History    Marital status:    Tobacco Use    Smoking status: Former     Types: Cigarettes     Quit date:      Years since quittin.0     Passive exposure: Never    Smokeless tobacco: Never    Tobacco comments:     stopped smoking in    Vaping Use    Vaping Use: Never used   Substance and Sexual Activity    Alcohol use: No    Drug use: No    Sexual activity: Defer           ECG 12 Lead    Date/Time: 2024 3:33 PM  Performed by: Misty Dias MD    Authorized by: Misty Dias MD  Comparison: compared with previous ECG    Similar to previous ECG  Comparison to previous ECG: Atrial fibrillation with controlled ventricular response right bundle branch block left anterior fascicle block nonspecific ST-T wave changes no ectopy no significant change from previous EKG.        Objective:       Physical Exam    There were no vitals taken for this visit.  The patient is alert, oriented and in no distress.    Vital signs as noted above.    Head and neck revealed no carotid bruits or jugular venous distension.  No thyromegaly or lymphadenopathy is present.    Lungs clear.  No wheezing.  Breath sounds are normal bilaterally.    Heart normal first and second heart sounds.  No murmur..  No pericardial rub is present.  No gallop is present.    Abdomen soft and nontender.  No organomegaly is present.    Extremities revealed good peripheral pulses without any pedal edema.    Skin warm and dry.    Musculoskeletal system is grossly normal.    CNS grossly normal.    Reviewed and updated.

## 2024-02-07 ENCOUNTER — OFFICE VISIT (OUTPATIENT)
Dept: CARDIOLOGY | Facility: CLINIC | Age: 84
End: 2024-02-07
Payer: MEDICARE

## 2024-02-07 VITALS
WEIGHT: 184 LBS | HEART RATE: 54 BPM | BODY MASS INDEX: 29.57 KG/M2 | SYSTOLIC BLOOD PRESSURE: 133 MMHG | DIASTOLIC BLOOD PRESSURE: 80 MMHG | HEIGHT: 66 IN | OXYGEN SATURATION: 96 %

## 2024-02-07 DIAGNOSIS — E78.5 DYSLIPIDEMIA: ICD-10-CM

## 2024-02-07 DIAGNOSIS — I10 ESSENTIAL HYPERTENSION: ICD-10-CM

## 2024-02-07 DIAGNOSIS — R06.02 SHORTNESS OF BREATH: ICD-10-CM

## 2024-02-07 DIAGNOSIS — I48.0 AF (PAROXYSMAL ATRIAL FIBRILLATION): ICD-10-CM

## 2024-02-07 DIAGNOSIS — I45.10 RIGHT BUNDLE BRANCH BLOCK: Primary | ICD-10-CM

## 2024-02-07 PROCEDURE — 1159F MED LIST DOCD IN RCRD: CPT | Performed by: INTERNAL MEDICINE

## 2024-02-07 PROCEDURE — 93000 ELECTROCARDIOGRAM COMPLETE: CPT | Performed by: INTERNAL MEDICINE

## 2024-02-07 PROCEDURE — 1160F RVW MEDS BY RX/DR IN RCRD: CPT | Performed by: INTERNAL MEDICINE

## 2024-02-07 PROCEDURE — 3075F SYST BP GE 130 - 139MM HG: CPT | Performed by: INTERNAL MEDICINE

## 2024-02-07 PROCEDURE — 99214 OFFICE O/P EST MOD 30 MIN: CPT | Performed by: INTERNAL MEDICINE

## 2024-02-07 PROCEDURE — 3079F DIAST BP 80-89 MM HG: CPT | Performed by: INTERNAL MEDICINE

## 2024-02-26 NOTE — TELEPHONE ENCOUNTER
Rx Refill Note  Requested Prescriptions     Pending Prescriptions Disp Refills    apixaban (ELIQUIS) 2.5 MG tablet tablet 60 tablet 11     Sig: Take 1 tablet by mouth 2 (Two) Times a Day.      Last office visit with prescribing clinician: 2/7/2024   Last telemedicine visit with prescribing clinician: Visit date not found   Next office visit with prescribing clinician: 8/8/2024                         Would you like a call back once the refill request has been completed: [] Yes [] No    If the office needs to give you a call back, can they leave a voicemail: [] Yes [] No    Em Hopkins MA  02/26/24, 09:03 EST

## 2024-04-23 ENCOUNTER — APPOINTMENT (OUTPATIENT)
Dept: CARDIOLOGY | Facility: HOSPITAL | Age: 84
DRG: 871 | End: 2024-04-23
Payer: MEDICARE

## 2024-04-23 ENCOUNTER — APPOINTMENT (OUTPATIENT)
Dept: ULTRASOUND IMAGING | Facility: HOSPITAL | Age: 84
DRG: 871 | End: 2024-04-23
Payer: MEDICARE

## 2024-04-23 ENCOUNTER — HOSPITAL ENCOUNTER (INPATIENT)
Facility: HOSPITAL | Age: 84
LOS: 7 days | Discharge: HOME HEALTH | DRG: 871 | End: 2024-04-30
Attending: INTERNAL MEDICINE | Admitting: INTERNAL MEDICINE
Payer: MEDICARE

## 2024-04-23 DIAGNOSIS — D64.9 ANEMIA, UNSPECIFIED TYPE: Primary | ICD-10-CM

## 2024-04-23 PROBLEM — A41.9 SEPSIS: Status: ACTIVE | Noted: 2024-04-23

## 2024-04-23 LAB
ALBUMIN SERPL-MCNC: 2.7 G/DL (ref 3.5–5.2)
ALBUMIN/GLOB SERPL: 1 G/DL
ALP SERPL-CCNC: 192 U/L (ref 39–117)
ALT SERPL W P-5'-P-CCNC: 67 U/L (ref 1–41)
AMYLASE SERPL-CCNC: 29 U/L (ref 28–100)
ANION GAP SERPL CALCULATED.3IONS-SCNC: 14 MMOL/L (ref 5–15)
AST SERPL-CCNC: 154 U/L (ref 1–40)
B PARAPERT DNA SPEC QL NAA+PROBE: NOT DETECTED
B PERT DNA SPEC QL NAA+PROBE: NOT DETECTED
BACTERIA UR QL AUTO: ABNORMAL /HPF
BILIRUB SERPL-MCNC: 2.3 MG/DL (ref 0–1.2)
BILIRUB UR QL STRIP: ABNORMAL
BUN SERPL-MCNC: 34 MG/DL (ref 8–23)
BUN/CREAT SERPL: 22.2 (ref 7–25)
C PNEUM DNA NPH QL NAA+NON-PROBE: NOT DETECTED
C3 FRG RBC-MCNC: ABNORMAL
CALCIUM SPEC-SCNC: 8.1 MG/DL (ref 8.6–10.5)
CHLORIDE SERPL-SCNC: 113 MMOL/L (ref 98–107)
CHOLEST SERPL-MCNC: 64 MG/DL (ref 0–200)
CK SERPL-CCNC: 117 U/L (ref 20–200)
CLARITY UR: ABNORMAL
CO2 SERPL-SCNC: 16 MMOL/L (ref 22–29)
COLOR UR: ABNORMAL
CREAT SERPL-MCNC: 1.53 MG/DL (ref 0.76–1.27)
CRP SERPL-MCNC: 10.13 MG/DL (ref 0–0.5)
D-LACTATE SERPL-SCNC: 0.9 MMOL/L (ref 0.5–2)
DEPRECATED RDW RBC AUTO: 54.8 FL (ref 37–54)
EGFRCR SERPLBLD CKD-EPI 2021: 44.8 ML/MIN/1.73
ERYTHROCYTE [DISTWIDTH] IN BLOOD BY AUTOMATED COUNT: 14.6 % (ref 12.3–15.4)
FLUAV SUBTYP SPEC NAA+PROBE: NOT DETECTED
FLUBV RNA ISLT QL NAA+PROBE: NOT DETECTED
GEN 5 2HR TROPONIN T REFLEX: 219 NG/L
GLOBULIN UR ELPH-MCNC: 2.6 GM/DL
GLUCOSE BLDC GLUCOMTR-MCNC: 114 MG/DL (ref 70–105)
GLUCOSE BLDC GLUCOMTR-MCNC: 144 MG/DL (ref 70–105)
GLUCOSE BLDC GLUCOMTR-MCNC: 98 MG/DL (ref 70–105)
GLUCOSE SERPL-MCNC: 98 MG/DL (ref 65–99)
GLUCOSE UR STRIP-MCNC: NEGATIVE MG/DL
HADV DNA SPEC NAA+PROBE: NOT DETECTED
HBA1C MFR BLD: 4.7 % (ref 4.8–5.6)
HCOV 229E RNA SPEC QL NAA+PROBE: NOT DETECTED
HCOV HKU1 RNA SPEC QL NAA+PROBE: NOT DETECTED
HCOV NL63 RNA SPEC QL NAA+PROBE: NOT DETECTED
HCOV OC43 RNA SPEC QL NAA+PROBE: NOT DETECTED
HCT VFR BLD AUTO: 28.4 % (ref 37.5–51)
HDLC SERPL-MCNC: 30 MG/DL (ref 40–60)
HGB BLD-MCNC: 8.8 G/DL (ref 13–17.7)
HGB UR QL STRIP.AUTO: ABNORMAL
HMPV RNA NPH QL NAA+NON-PROBE: NOT DETECTED
HPIV1 RNA ISLT QL NAA+PROBE: NOT DETECTED
HPIV2 RNA SPEC QL NAA+PROBE: NOT DETECTED
HPIV3 RNA NPH QL NAA+PROBE: NOT DETECTED
HPIV4 P GENE NPH QL NAA+PROBE: NOT DETECTED
HYALINE CASTS UR QL AUTO: ABNORMAL /LPF
KETONES UR QL STRIP: NEGATIVE
L PNEUMO1 AG UR QL IA: NEGATIVE
LDLC SERPL CALC-MCNC: 21 MG/DL (ref 0–100)
LDLC/HDLC SERPL: 0.82 {RATIO}
LEUKOCYTE ESTERASE UR QL STRIP.AUTO: ABNORMAL
LIPASE SERPL-CCNC: 16 U/L (ref 13–60)
LYMPHOCYTES # BLD MANUAL: 0 10*3/MM3 (ref 0.7–3.1)
M PNEUMO IGG SER IA-ACNC: NOT DETECTED
MAGNESIUM SERPL-MCNC: 1.6 MG/DL (ref 1.6–2.4)
MCH RBC QN AUTO: 31.8 PG (ref 26.6–33)
MCHC RBC AUTO-ENTMCNC: 31 G/DL (ref 31.5–35.7)
MCV RBC AUTO: 102.5 FL (ref 79–97)
METAMYELOCYTES NFR BLD MANUAL: 1 % (ref 0–0)
MRSA DNA SPEC QL NAA+PROBE: NORMAL
NEUTROPHILS # BLD AUTO: 12.93 10*3/MM3 (ref 1.7–7)
NEUTROPHILS NFR BLD MANUAL: 80 % (ref 42.7–76)
NEUTS BAND NFR BLD MANUAL: 19 % (ref 0–5)
NEUTS VAC BLD QL SMEAR: ABNORMAL
NITRITE UR QL STRIP: POSITIVE
PH UR STRIP.AUTO: <=5 [PH] (ref 5–8)
PHOSPHATE SERPL-MCNC: 2.5 MG/DL (ref 2.5–4.5)
PLAT MORPH BLD: NORMAL
PLATELET # BLD AUTO: 183 10*3/MM3 (ref 140–450)
PMV BLD AUTO: 12.2 FL (ref 6–12)
POTASSIUM SERPL-SCNC: 4.1 MMOL/L (ref 3.5–5.2)
PROCALCITONIN SERPL-MCNC: 8.46 NG/ML (ref 0–0.25)
PROT SERPL-MCNC: 5.3 G/DL (ref 6–8.5)
PROT UR QL STRIP: ABNORMAL
RBC # BLD AUTO: 2.77 10*6/MM3 (ref 4.14–5.8)
RBC # UR STRIP: ABNORMAL /HPF
REF LAB TEST METHOD: ABNORMAL
RHINOVIRUS RNA SPEC NAA+PROBE: NOT DETECTED
RSV RNA NPH QL NAA+NON-PROBE: NOT DETECTED
S PNEUM AG SPEC QL LA: NEGATIVE
SARS-COV-2 RNA NPH QL NAA+NON-PROBE: NOT DETECTED
SCAN SLIDE: NORMAL
SODIUM SERPL-SCNC: 143 MMOL/L (ref 136–145)
SP GR UR STRIP: 1.02 (ref 1–1.03)
SQUAMOUS #/AREA URNS HPF: ABNORMAL /HPF
TOXIC GRANULATION: ABNORMAL
TRIGL SERPL-MCNC: 47 MG/DL (ref 0–150)
TROPONIN T DELTA: -35 NG/L
TROPONIN T SERPL HS-MCNC: 254 NG/L
UROBILINOGEN UR QL STRIP: ABNORMAL
VARIANT LYMPHS NFR BLD MANUAL: 0 % (ref 19.6–45.3)
VLDLC SERPL-MCNC: 13 MG/DL (ref 5–40)
WBC # UR STRIP: ABNORMAL /HPF
WBC NRBC COR # BLD AUTO: 13.06 10*3/MM3 (ref 3.4–10.8)

## 2024-04-23 PROCEDURE — 80061 LIPID PANEL: CPT | Performed by: NURSE PRACTITIONER

## 2024-04-23 PROCEDURE — 82948 REAGENT STRIP/BLOOD GLUCOSE: CPT

## 2024-04-23 PROCEDURE — 0202U NFCT DS 22 TRGT SARS-COV-2: CPT | Performed by: NURSE PRACTITIONER

## 2024-04-23 PROCEDURE — 93356 MYOCRD STRAIN IMG SPCKL TRCK: CPT

## 2024-04-23 PROCEDURE — 87070 CULTURE OTHR SPECIMN AEROBIC: CPT | Performed by: NURSE PRACTITIONER

## 2024-04-23 PROCEDURE — 87205 SMEAR GRAM STAIN: CPT | Performed by: NURSE PRACTITIONER

## 2024-04-23 PROCEDURE — 82948 REAGENT STRIP/BLOOD GLUCOSE: CPT | Performed by: NURSE PRACTITIONER

## 2024-04-23 PROCEDURE — 25010000002 HYDROCORTISONE SOD SUC (PF) 100 MG RECONSTITUTED SOLUTION: Performed by: NURSE PRACTITIONER

## 2024-04-23 PROCEDURE — 25010000002 CEFTRIAXONE PER 250 MG: Performed by: NURSE PRACTITIONER

## 2024-04-23 PROCEDURE — 80053 COMPREHEN METABOLIC PANEL: CPT | Performed by: NURSE PRACTITIONER

## 2024-04-23 PROCEDURE — 85007 BL SMEAR W/DIFF WBC COUNT: CPT | Performed by: NURSE PRACTITIONER

## 2024-04-23 PROCEDURE — 86140 C-REACTIVE PROTEIN: CPT | Performed by: NURSE PRACTITIONER

## 2024-04-23 PROCEDURE — 93356 MYOCRD STRAIN IMG SPCKL TRCK: CPT | Performed by: INTERNAL MEDICINE

## 2024-04-23 PROCEDURE — 83690 ASSAY OF LIPASE: CPT | Performed by: NURSE PRACTITIONER

## 2024-04-23 PROCEDURE — 85025 COMPLETE CBC W/AUTO DIFF WBC: CPT | Performed by: NURSE PRACTITIONER

## 2024-04-23 PROCEDURE — 25810000003 LACTATED RINGERS PER 1000 ML: Performed by: NURSE PRACTITIONER

## 2024-04-23 PROCEDURE — 81001 URINALYSIS AUTO W/SCOPE: CPT | Performed by: NURSE PRACTITIONER

## 2024-04-23 PROCEDURE — 87040 BLOOD CULTURE FOR BACTERIA: CPT | Performed by: NURSE PRACTITIONER

## 2024-04-23 PROCEDURE — 87641 MR-STAPH DNA AMP PROBE: CPT | Performed by: NURSE PRACTITIONER

## 2024-04-23 PROCEDURE — 93306 TTE W/DOPPLER COMPLETE: CPT

## 2024-04-23 PROCEDURE — 94761 N-INVAS EAR/PLS OXIMETRY MLT: CPT

## 2024-04-23 PROCEDURE — 93306 TTE W/DOPPLER COMPLETE: CPT | Performed by: INTERNAL MEDICINE

## 2024-04-23 PROCEDURE — 84484 ASSAY OF TROPONIN QUANT: CPT | Performed by: NURSE PRACTITIONER

## 2024-04-23 PROCEDURE — 83036 HEMOGLOBIN GLYCOSYLATED A1C: CPT | Performed by: NURSE PRACTITIONER

## 2024-04-23 PROCEDURE — 99222 1ST HOSP IP/OBS MODERATE 55: CPT | Performed by: INTERNAL MEDICINE

## 2024-04-23 PROCEDURE — 84100 ASSAY OF PHOSPHORUS: CPT | Performed by: NURSE PRACTITIONER

## 2024-04-23 PROCEDURE — 84145 PROCALCITONIN (PCT): CPT | Performed by: NURSE PRACTITIONER

## 2024-04-23 PROCEDURE — 76705 ECHO EXAM OF ABDOMEN: CPT

## 2024-04-23 PROCEDURE — 82150 ASSAY OF AMYLASE: CPT | Performed by: NURSE PRACTITIONER

## 2024-04-23 PROCEDURE — 83735 ASSAY OF MAGNESIUM: CPT | Performed by: NURSE PRACTITIONER

## 2024-04-23 PROCEDURE — 82550 ASSAY OF CK (CPK): CPT | Performed by: NURSE PRACTITIONER

## 2024-04-23 PROCEDURE — 87449 NOS EACH ORGANISM AG IA: CPT | Performed by: NURSE PRACTITIONER

## 2024-04-23 PROCEDURE — 83605 ASSAY OF LACTIC ACID: CPT | Performed by: NURSE PRACTITIONER

## 2024-04-23 PROCEDURE — 25810000003 SODIUM CHLORIDE 0.9 % SOLUTION: Performed by: INTERNAL MEDICINE

## 2024-04-23 RX ORDER — NICOTINE POLACRILEX 4 MG
15 LOZENGE BUCCAL
Status: DISCONTINUED | OUTPATIENT
Start: 2024-04-23 | End: 2024-04-30 | Stop reason: HOSPADM

## 2024-04-23 RX ORDER — SODIUM CHLORIDE, SODIUM LACTATE, POTASSIUM CHLORIDE, CALCIUM CHLORIDE 600; 310; 30; 20 MG/100ML; MG/100ML; MG/100ML; MG/100ML
100 INJECTION, SOLUTION INTRAVENOUS CONTINUOUS
Status: DISCONTINUED | OUTPATIENT
Start: 2024-04-23 | End: 2024-04-24

## 2024-04-23 RX ORDER — ONDANSETRON 4 MG/1
4 TABLET, ORALLY DISINTEGRATING ORAL EVERY 6 HOURS PRN
Status: DISCONTINUED | OUTPATIENT
Start: 2024-04-23 | End: 2024-04-30 | Stop reason: HOSPADM

## 2024-04-23 RX ORDER — BISACODYL 10 MG
10 SUPPOSITORY, RECTAL RECTAL DAILY PRN
Status: DISCONTINUED | OUTPATIENT
Start: 2024-04-23 | End: 2024-04-30 | Stop reason: HOSPADM

## 2024-04-23 RX ORDER — NOREPINEPHRINE BITARTRATE 0.03 MG/ML
INJECTION, SOLUTION INTRAVENOUS
Status: COMPLETED
Start: 2024-04-23 | End: 2024-04-23

## 2024-04-23 RX ORDER — ONDANSETRON 2 MG/ML
4 INJECTION INTRAMUSCULAR; INTRAVENOUS EVERY 6 HOURS PRN
Status: DISCONTINUED | OUTPATIENT
Start: 2024-04-23 | End: 2024-04-30 | Stop reason: HOSPADM

## 2024-04-23 RX ORDER — ALUMINA, MAGNESIA, AND SIMETHICONE 2400; 2400; 240 MG/30ML; MG/30ML; MG/30ML
15 SUSPENSION ORAL EVERY 6 HOURS PRN
Status: DISCONTINUED | OUTPATIENT
Start: 2024-04-23 | End: 2024-04-25

## 2024-04-23 RX ORDER — SODIUM CHLORIDE 0.9 % (FLUSH) 0.9 %
10 SYRINGE (ML) INJECTION AS NEEDED
Status: DISCONTINUED | OUTPATIENT
Start: 2024-04-23 | End: 2024-04-30 | Stop reason: HOSPADM

## 2024-04-23 RX ORDER — SODIUM CHLORIDE 9 MG/ML
40 INJECTION, SOLUTION INTRAVENOUS AS NEEDED
Status: DISCONTINUED | OUTPATIENT
Start: 2024-04-23 | End: 2024-04-30 | Stop reason: HOSPADM

## 2024-04-23 RX ORDER — TAMSULOSIN HYDROCHLORIDE 0.4 MG/1
1 CAPSULE ORAL DAILY
COMMUNITY

## 2024-04-23 RX ORDER — POLYETHYLENE GLYCOL 3350 17 G/17G
17 POWDER, FOR SOLUTION ORAL DAILY PRN
Status: DISCONTINUED | OUTPATIENT
Start: 2024-04-23 | End: 2024-04-30 | Stop reason: HOSPADM

## 2024-04-23 RX ORDER — INSULIN LISPRO 100 [IU]/ML
2-7 INJECTION, SOLUTION INTRAVENOUS; SUBCUTANEOUS
Status: DISCONTINUED | OUTPATIENT
Start: 2024-04-23 | End: 2024-04-23

## 2024-04-23 RX ORDER — NOREPINEPHRINE BITARTRATE 0.03 MG/ML
.02-.5 INJECTION, SOLUTION INTRAVENOUS
Status: DISCONTINUED | OUTPATIENT
Start: 2024-04-23 | End: 2024-04-24

## 2024-04-23 RX ORDER — ACETAMINOPHEN 325 MG/1
650 TABLET ORAL EVERY 4 HOURS PRN
Status: DISCONTINUED | OUTPATIENT
Start: 2024-04-23 | End: 2024-04-30 | Stop reason: HOSPADM

## 2024-04-23 RX ORDER — PANTOPRAZOLE SODIUM 40 MG/1
40 TABLET, DELAYED RELEASE ORAL
Status: DISCONTINUED | OUTPATIENT
Start: 2024-04-24 | End: 2024-04-30 | Stop reason: HOSPADM

## 2024-04-23 RX ORDER — ACETAMINOPHEN 650 MG/1
650 SUPPOSITORY RECTAL EVERY 4 HOURS PRN
Status: DISCONTINUED | OUTPATIENT
Start: 2024-04-23 | End: 2024-04-30 | Stop reason: HOSPADM

## 2024-04-23 RX ORDER — SODIUM CHLORIDE 0.9 % (FLUSH) 0.9 %
10 SYRINGE (ML) INJECTION EVERY 12 HOURS SCHEDULED
Status: DISCONTINUED | OUTPATIENT
Start: 2024-04-23 | End: 2024-04-30 | Stop reason: HOSPADM

## 2024-04-23 RX ORDER — AMOXICILLIN 250 MG
2 CAPSULE ORAL 2 TIMES DAILY PRN
Status: DISCONTINUED | OUTPATIENT
Start: 2024-04-23 | End: 2024-04-30 | Stop reason: HOSPADM

## 2024-04-23 RX ORDER — IBUPROFEN 600 MG/1
1 TABLET ORAL
Status: DISCONTINUED | OUTPATIENT
Start: 2024-04-23 | End: 2024-04-30 | Stop reason: HOSPADM

## 2024-04-23 RX ORDER — NOREPINEPHRINE BITARTRATE 0.03 MG/ML
.02-.5 INJECTION, SOLUTION INTRAVENOUS
Status: DISCONTINUED | OUTPATIENT
Start: 2024-04-23 | End: 2024-04-23

## 2024-04-23 RX ORDER — BISACODYL 5 MG/1
5 TABLET, DELAYED RELEASE ORAL DAILY PRN
Status: DISCONTINUED | OUTPATIENT
Start: 2024-04-23 | End: 2024-04-30 | Stop reason: HOSPADM

## 2024-04-23 RX ORDER — NITROGLYCERIN 0.4 MG/1
0.4 TABLET SUBLINGUAL
Status: DISCONTINUED | OUTPATIENT
Start: 2024-04-23 | End: 2024-04-30 | Stop reason: HOSPADM

## 2024-04-23 RX ORDER — DEXTROSE MONOHYDRATE 25 G/50ML
25 INJECTION, SOLUTION INTRAVENOUS
Status: DISCONTINUED | OUTPATIENT
Start: 2024-04-23 | End: 2024-04-30 | Stop reason: HOSPADM

## 2024-04-23 RX ADMIN — SODIUM CHLORIDE, POTASSIUM CHLORIDE, SODIUM LACTATE AND CALCIUM CHLORIDE 100 ML/HR: 600; 310; 30; 20 INJECTION, SOLUTION INTRAVENOUS at 14:11

## 2024-04-23 RX ADMIN — NOREPINEPHRINE BITARTRATE 0.05 MCG/KG/MIN: 0.03 INJECTION, SOLUTION INTRAVENOUS at 12:10

## 2024-04-23 RX ADMIN — SODIUM CHLORIDE 2000 ML: 9 INJECTION, SOLUTION INTRAVENOUS at 12:33

## 2024-04-23 RX ADMIN — CEFTRIAXONE 1000 MG: 1 INJECTION, POWDER, FOR SOLUTION INTRAMUSCULAR; INTRAVENOUS at 14:51

## 2024-04-23 RX ADMIN — Medication 10 ML: at 12:40

## 2024-04-23 RX ADMIN — HYDROCORTISONE SODIUM SUCCINATE 50 MG: 100 INJECTION, POWDER, FOR SOLUTION INTRAMUSCULAR; INTRAVENOUS at 20:32

## 2024-04-23 RX ADMIN — Medication 10 ML: at 20:33

## 2024-04-23 RX ADMIN — HYDROCORTISONE SODIUM SUCCINATE 50 MG: 100 INJECTION, POWDER, FOR SOLUTION INTRAMUSCULAR; INTRAVENOUS at 14:55

## 2024-04-23 NOTE — NURSING NOTE
Received report from [facility] ER nurse regarding patient ER visit. Being treated for PNA, sepsis, and currently on levophed. [facility] to transport patient to [facility] ICU 2301.

## 2024-04-23 NOTE — CONSULTS
Referring Provider: Kevin Hanks DO  Reason for Consultation:  Elevated troponin level  Atrial fibrillation    Patient Care Team:  Guilherme Jason MD as PCP - General (Family Medicine)  Misty Dias MD as Consulting Physician (Cardiology)  Nora Kenyon DNP, APRN as Nurse Practitioner (Thoracic Surgery)    Chief complaint  Weakness    Subjective .     History of present illness:  Praneeth Nam is a 84 y.o. male who presents with history of multiple cardiac and noncardiac problems was transferred from [facility] with history of generalized weakness productive cough.  Patient was noted to have elevated troponin and cardiology consultation was requested.  Patient denies having any chest discomfort heaviness or tightness in the chest.  Patient was noted to be septic.    ROS      The patient has been without any chest discomfort, unusual shortness of breath, palpitations, dizziness or syncope.  Denies having any headache, abdominal pain, nausea, vomiting, diarrhea, constipation, loss of weight or loss of appetite.  Denies having any excessive bruising, hematuria or blood in the stool.    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    Neutropenia 11/2003    MITZI (obstructive sleep apnea) 2018    Psoriasis 2000    Renal cyst, left     Rheumatoid arthritis     Stroke (cerebrum)      Thrombocytopenia 2005       Past Surgical History:   Procedure Laterality Date    SKIN LESION EXCISION      back and groin-benign       Family History   Problem Relation Age of Onset    Lung cancer Brother         age unknown    Heart disease Brother     Stroke Mother     Heart disease Father     Heart disease Sister        Social History     Tobacco Use    Smoking status: Former     Current packs/day: 0.00     Types: Cigarettes     Quit date:      Years since quittin.3     Passive exposure: Never    Smokeless tobacco: Never    Tobacco comments:     stopped smoking in    Vaping Use    Vaping status: Never Used   Substance Use Topics    Alcohol use: No    Drug use: No        Medications Prior to Admission   Medication Sig Dispense Refill Last Dose    apixaban (ELIQUIS) 2.5 MG tablet tablet Take 1 tablet by mouth 2 (Two) Times a Day. 60 tablet 11     aspirin 81 MG EC tablet Take 1 tablet by mouth Daily. (Patient not taking: Reported on 2024)       atorvastatin (LIPITOR) 20 MG tablet Take 1 tablet by mouth Daily.  3     fenofibrate 160 MG tablet Take 1 tablet by mouth Daily.  3     glipiZIDE (GLUCOTROL) 5 MG tablet Take 1 tablet by mouth 2 (Two) Times a Day Before Meals. (Patient not taking: Reported on 2024)       hydroxychloroquine (PLAQUENIL) 200 MG tablet TK 2 TS PO ONCE DAILY  0     ipratropium-albuterol (DUO-NEB) 0.5-2.5 mg/3 ml nebulizer Take 3 mL by nebulization Every 4 (Four) Hours As Needed for Wheezing.       losartan (COZAAR) 100 MG tablet Take 1 tablet by mouth Daily.  3     Multiple Vitamins-Minerals (MULTIVITAMIN WITH MINERALS) tablet tablet Take 1 tablet by mouth Daily.       O2 (OXYGEN) Inhale 4 L/min Continuous.       oxybutynin XL (DITROPAN-XL) 10 MG 24 hr tablet Take 1 tablet by mouth Daily.       pantoprazole (PROTONIX) 40 MG EC tablet Take 1 tablet by mouth Daily.       revefenacin (Yupelri) 175 MCG/3ML nebulizer solution Take  by nebulization Daily.       spironolactone  "(ALDACTONE) 25 MG tablet Take 1 tablet by mouth Daily.  3     tamsulosin (FLOMAX) 0.4 MG capsule 24 hr capsule TAKE 1 CAPSULE BY MOUTH EVERY DAY 1/2 HOUR FOLLOWING THE SAME MEAL EACH DAY            Penicillin v potassium and Latex    Scheduled Meds:[START ON 4/24/2024] cefTRIAXone, 2,000 mg, Intravenous, Q24H  hydrocortisone sodium succinate, 50 mg, Intravenous, Q6H  [START ON 4/24/2024] pantoprazole, 40 mg, Oral, Q AM  sodium chloride, 2,000 mL, Intravenous, Once  sodium chloride, 10 mL, Intravenous, Q12H      Continuous Infusions:lactated ringers, 100 mL/hr, Last Rate: 100 mL/hr (04/23/24 1411)  norepinephrine, 0.02-0.5 mcg/kg/min, Last Rate: Stopped (04/23/24 1415)      PRN Meds:.  [Held by provider] acetaminophen **OR** [Held by provider] acetaminophen    aluminum-magnesium hydroxide-simethicone    senna-docusate sodium **AND** polyethylene glycol **AND** bisacodyl **AND** bisacodyl    dextrose    dextrose    glucagon (human recombinant)    nitroglycerin    ondansetron ODT **OR** ondansetron    sodium chloride    sodium chloride    Objective     VITAL SIGNS  Vitals:    04/23/24 1210 04/23/24 1239 04/23/24 1300 04/23/24 1400   BP: 110/54 112/51 112/52 125/55   BP Location: Left arm      Patient Position: Lying      Pulse: 66 64 64 64   Resp: 26      Temp: 97.3 °F (36.3 °C)      TempSrc: Oral      SpO2: 100%  98% 99%   Weight:       Height:           Flowsheet Rows      Flowsheet Row First Filed Value   Admission Height 167.6 cm (66\") Documented at 04/23/2024 1200   Admission Weight 81.9 kg (180 lb 8.9 oz) Documented at 04/23/2024 1200            No intake or output data in the 24 hours ending 04/23/24 1525     TELEMETRY: Atrial fibrillation    Physical Exam:  The patient is alert, oriented and in no distress.  Vital signs as noted above.  Exogenous obesity.  Head and neck revealed no carotid bruits or jugular venous distention.  No thyromegaly or lymphadenopathy is present  Lungs clear.  No wheezing.  Breath " sounds are normal bilaterally.  Heart normal first and second heart sounds. No murmur.  No precordial rub is present.  No gallop is present.  Abdomen soft and nontender.  No organomegaly is present.  Extremities with good peripheral pulses without any pedal edema.  Upper and lower extremity bruising.  Skin warm and dry.  Musculoskeletal system is grossly normal  CNS grossly normal    Reviewed and updated.    Results Review:   I reviewed the patient's new clinical results.  Lab Results (last 24 hours)       Procedure Component Value Units Date/Time    High Sensitivity Troponin T 2Hr [[id number]]  (Abnormal) Collected: 04/23/24 1433    Specimen: Blood Updated: 04/23/24 1507     HS Troponin T 219 ng/L      Troponin T Delta -35 ng/L     Narrative:      High Sensitive Troponin T Reference Range:  <14.0 ng/L- Negative Female for AMI  <22.0 ng/L- Negative Male for AMI  >=14 - Abnormal Female indicating possible myocardial injury.  >=22 - Abnormal Male indicating possible myocardial injury.   Clinicians would have to utilize clinical acumen, EKG, Troponin, and serial changes to determine if it is an Acute Myocardial Infarction or myocardial injury due to an underlying chronic condition.         POC Glucose Once [[id number]]  (Normal) Collected: 04/23/24 1433    Specimen: Blood Updated: 04/23/24 1454     Glucose 98 mg/dL      Comment: Serial Number: [id number]:  [id number]       Hemoglobin A1c [[id number]]  (Abnormal) Collected: 04/23/24 1235    Specimen: Blood Updated: 04/23/24 1429     Hemoglobin A1C 4.70 %     MRSA Screen, PCR (Inpatient) - Swab, Nares [[id number]]  (Normal) Collected: 04/23/24 1237    Specimen: Swab from Nares Updated: 04/23/24 1356     MRSA PCR No MRSA Detected    Narrative:      The negative predictive value of this diagnostic test is high and should only be used to consider de-escalating anti-MRSA therapy. A positive result may indicate colonization with MRSA and must be correlated clinically.    CK  [[id number]]  (Normal) Collected: 04/23/24 1235    Specimen: Blood Updated: 04/23/24 1349     Creatine Kinase 117 U/L     Lipase [[id number]]  (Normal) Collected: 04/23/24 1235    Specimen: Blood Updated: 04/23/24 1349     Lipase 16 U/L     Amylase [[id number]]  (Normal) Collected: 04/23/24 1235    Specimen: Blood Updated: 04/23/24 1349     Amylase 29 U/L     High Sensitivity Troponin T [[id number]]  (Abnormal) Collected: 04/23/24 1235    Specimen: Blood Updated: 04/23/24 1333     HS Troponin T 254 ng/L     Narrative:      High Sensitive Troponin T Reference Range:  <14.0 ng/L- Negative Female for AMI  <22.0 ng/L- Negative Male for AMI  >=14 - Abnormal Female indicating possible myocardial injury.  >=22 - Abnormal Male indicating possible myocardial injury.   Clinicians would have to utilize clinical acumen, EKG, Troponin, and serial changes to determine if it is an Acute Myocardial Infarction or myocardial injury due to an underlying chronic condition.         Respiratory Panel PCR w/COVID-19(SARS-CoV-2) KEN/HUBERT/CHIKA/PAD/COR/FLORECITA In-House, NP Swab in UTM/VTM, 2 HR TAT - Swab, Nasopharynx [[id number]]  (Normal) Collected: 04/23/24 1237    Specimen: Swab from Nasopharynx Updated: 04/23/24 1331     ADENOVIRUS, PCR Not Detected     Coronavirus 229E Not Detected     Coronavirus HKU1 Not Detected     Coronavirus NL63 Not Detected     Coronavirus OC43 Not Detected     COVID19 Not Detected     Human Metapneumovirus Not Detected     Human Rhinovirus/Enterovirus Not Detected     Influenza A PCR Not Detected     Influenza B PCR Not Detected     Parainfluenza Virus 1 Not Detected     Parainfluenza Virus 2 Not Detected     Parainfluenza Virus 3 Not Detected     Parainfluenza Virus 4 Not Detected     RSV, PCR Not Detected     Bordetella pertussis pcr Not Detected     Bordetella parapertussis PCR Not Detected     Chlamydophila pneumoniae PCR Not Detected     Mycoplasma pneumo by PCR Not Detected    Narrative:      In the setting of a  positive respiratory panel with a viral infection PLUS a negative procalcitonin without other underlying concern for bacterial infection, consider observing off antibiotics or discontinuation of antibiotics and continue supportive care. If the respiratory panel is positive for atypical bacterial infection (Bordetella pertussis, Chlamydophila pneumoniae, or Mycoplasma pneumoniae), consider antibiotic de-escalation to target atypical bacterial infection.    Comprehensive Metabolic Panel [[id number]]  (Abnormal) Collected: 04/23/24 1235    Specimen: Blood Updated: 04/23/24 1328     Glucose 98 mg/dL      BUN 34 mg/dL      Creatinine 1.53 mg/dL      Sodium 143 mmol/L      Potassium 4.1 mmol/L      Chloride 113 mmol/L      CO2 16.0 mmol/L      Calcium 8.1 mg/dL      Total Protein 5.3 g/dL      Albumin 2.7 g/dL      ALT (SGPT) 67 U/L      AST (SGOT) 154 U/L      Alkaline Phosphatase 192 U/L      Total Bilirubin 2.3 mg/dL      Globulin 2.6 gm/dL      A/G Ratio 1.0 g/dL      BUN/Creatinine Ratio 22.2     Anion Gap 14.0 mmol/L      eGFR 44.8 mL/min/1.73     Narrative:      GFR Normal >60  Chronic Kidney Disease <60  Kidney Failure <15    The GFR formula is only valid for adults with stable renal function between ages 18 and 70.    C-reactive Protein [[id number]]  (Abnormal) Collected: 04/23/24 1235    Specimen: Blood Updated: 04/23/24 1328     C-Reactive Protein 10.13 mg/dL     Magnesium [[id number]]  (Normal) Collected: 04/23/24 1235    Specimen: Blood Updated: 04/23/24 1328     Magnesium 1.6 mg/dL     Phosphorus [[id number]]  (Normal) Collected: 04/23/24 1235    Specimen: Blood Updated: 04/23/24 1328     Phosphorus 2.5 mg/dL     Lipid Panel [[id number]]  (Abnormal) Collected: 04/23/24 1235    Specimen: Blood Updated: 04/23/24 1328     Total Cholesterol 64 mg/dL      Triglycerides 47 mg/dL      HDL Cholesterol 30 mg/dL      LDL Cholesterol  21 mg/dL      VLDL Cholesterol 13 mg/dL      LDL/HDL Ratio 0.82    Narrative:       "Cholesterol Reference Ranges  (U.S. Department of Health and Human Services ATP III Classifications)    Desirable          <200 mg/dL  Borderline High    200-239 mg/dL  High Risk          >240 mg/dL      Triglyceride Reference Ranges  (U.S. Department of Health and Human Services ATP III Classifications)    Normal           <150 mg/dL  Borderline High  150-199 mg/dL  High             200-499 mg/dL  Very High        >500 mg/dL    HDL Reference Ranges  (U.S. Department of Health and Human Services ATP III Classifications)    Low     <40 mg/dl (major risk factor for CHD)  High    >60 mg/dl ('negative' risk factor for CHD)        LDL Reference Ranges  (U.S. Department of Health and Human Services ATP III Classifications)    Optimal          <100 mg/dL  Near Optimal     100-129 mg/dL  Borderline High  130-159 mg/dL  High             160-189 mg/dL  Very High        >189 mg/dL    Procalcitonin [[id number]]  (Abnormal) Collected: 04/23/24 1235    Specimen: Blood Updated: 04/23/24 1327     Procalcitonin 8.46 ng/mL     Narrative:      As a Marker for Sepsis (Non-Neonates):    1. <0.5 ng/mL represents a low risk of severe sepsis and/or septic shock.  2. >2 ng/mL represents a high risk of severe sepsis and/or septic shock.    As a Marker for Lower Respiratory Tract Infections that require antibiotic therapy:    PCT on Admission    Antibiotic Therapy       6-12 Hrs later    >0.5                Strongly Recommended  >0.25 - <0.5        Recommended   0.1 - 0.25          Discouraged              Remeasure/reassess PCT  <0.1                Strongly Discouraged     Remeasure/reassess PCT    As 28 day mortality risk marker: \"Change in Procalcitonin Result\" (>80% or <=80%) if Day 0 (or Day 1) and Day 4 values are available. Refer to http://www.[employer]-pct-calculator.com    Change in PCT <=80%  A decrease of PCT levels below or equal to 80% defines a positive change in PCT test result representing a higher risk for 28-day all-cause " mortality of patients diagnosed with severe sepsis for septic shock.    Change in PCT >80%  A decrease of PCT levels of more than 80% defines a negative change in PCT result representing a lower risk for 28-day all-cause mortality of patients diagnosed with severe sepsis or septic shock.       CBC & Differential [[id number]]  (Abnormal) Collected: 04/23/24 1235    Specimen: Blood Updated: 04/23/24 1313    Narrative:      The following orders were created for panel order CBC & Differential.  Procedure                               Abnormality         Status                     ---------                               -----------         ------                     CBC Auto Differential[[id number]]        Abnormal            Final result               Scan Slide[[id number]]                                       Final result                 Please view results for these tests on the individual orders.    CBC Auto Differential [[id number]]  (Abnormal) Collected: 04/23/24 1235    Specimen: Blood Updated: 04/23/24 1313     WBC 13.06 10*3/mm3      RBC 2.77 10*6/mm3      Hemoglobin 8.8 g/dL      Hematocrit 28.4 %      .5 fL      MCH 31.8 pg      MCHC 31.0 g/dL      RDW 14.6 %      RDW-SD 54.8 fl      MPV 12.2 fL      Platelets 183 10*3/mm3     Narrative:      The previously reported component NRBC is no longer being reported. Previous result was 0.0 /100 WBC (Reference Range: 0.0-0.2 /100 WBC) on 4/23/2024 at 1249 EDT.    Scan Slide [[id number]] Collected: 04/23/24 1235    Specimen: Blood Updated: 04/23/24 1313     Scan Slide --     Comment: See Manual Differential Results       Manual Differential [[id number]]  (Abnormal) Collected: 04/23/24 1235    Specimen: Blood Updated: 04/23/24 1313     Neutrophil % 80.0 %      Lymphocyte % 0.0 %      Bands %  19.0 %      Metamyelocyte % 1.0 %      Neutrophils Absolute 12.93 10*3/mm3      Lymphocytes Absolute 0.00 10*3/mm3      RBC Fragments Slight/1+     Toxic Granulation Slight/1+      Vacuolated Neutrophils Slight/1+     Platelet Morphology Normal    Lactic Acid, Plasma [[id number]]  (Normal) Collected: 04/23/24 1235    Specimen: Blood Updated: 04/23/24 1310     Lactate 0.9 mmol/L     Blood Culture - Blood, Hand, Left [[id number]] Collected: 04/23/24 1245    Specimen: Blood from Hand, Left Updated: 04/23/24 1248    Blood Culture - Blood, Arm, Right [[id number]] Collected: 04/23/24 1235    Specimen: Blood from Arm, Right Updated: 04/23/24 1243            Imaging Results (Last 24 Hours)       Procedure Component Value Units Date/Time    US Liver [[id number]] Resulted: 04/23/24 1409     Updated: 04/23/24 1505        LAB RESULTS (LAST 7 DAYS)    CBC  Results from last 7 days   Lab Units 04/23/24  1235   WBC 10*3/mm3 13.06*   RBC 10*6/mm3 2.77*   HEMOGLOBIN g/dL 8.8*   HEMATOCRIT % 28.4*   MCV fL 102.5*   PLATELETS 10*3/mm3 183       BMP  Results from last 7 days   Lab Units 04/23/24  1235   SODIUM mmol/L 143   POTASSIUM mmol/L 4.1   CHLORIDE mmol/L 113*   CO2 mmol/L 16.0*   BUN mg/dL 34*   CREATININE mg/dL 1.53*   GLUCOSE mg/dL 98   MAGNESIUM mg/dL 1.6   PHOSPHORUS mg/dL 2.5       CMP   Results from last 7 days   Lab Units 04/23/24  1235   SODIUM mmol/L 143   POTASSIUM mmol/L 4.1   CHLORIDE mmol/L 113*   CO2 mmol/L 16.0*   BUN mg/dL 34*   CREATININE mg/dL 1.53*   GLUCOSE mg/dL 98   ALBUMIN g/dL 2.7*   BILIRUBIN mg/dL 2.3*   ALK PHOS U/L 192*   AST (SGOT) U/L 154*   ALT (SGPT) U/L 67*   AMYLASE U/L 29   LIPASE U/L 16         BNP        TROPONIN  Results from last 7 days   Lab Units 04/23/24  1433 04/23/24  1235   CK TOTAL U/L  --  117   HSTROP T ng/L 219* 254*       CoAg        Creatinine Clearance  Estimated Creatinine Clearance: 36.7 mL/min (A) (by C-G formula based on SCr of 1.53 mg/dL (H)).    ABG        Radiology  No radiology results for the last day      EKG      I personally viewed and interpreted the patient's EKG/Telemetry data:    ECHOCARDIOGRAM:    Results for orders placed during the  hospital encounter of 07/28/21    Adult Transthoracic Echo Complete W/ Cont if Necessary Per Protocol    Interpretation Summary  · Estimated left ventricular EF = 60% Left ventricular systolic function is normal.    Indications  Shortness of breath    Technically satisfactory study.  Mitral valve is structurally normal.  Mild mitral regurgitation.  Tricuspid valve is structurally normal.  Aortic valve is structurally normal.  Pulmonic valve could not be well visualized.  No evidence for tricuspid or aortic regurgitation is seen by Doppler study.  Left atrium is normal in size.  Right atrium is normal in size.  Left ventricle is normal in size and contractility with ejection fraction of 60%.  Right ventricle is normal in size.  Atrial septum is intact.  Aorta is normal.  No pericardial effusion or intracardiac thrombus is seen.    Impression  Structurally and functionally normal cardiac valves except for mild mitral regurgitation.  Left ventricular size and contractility is normal with ejection fraction of 60%.      STRESS TEST  Results for orders placed during the hospital encounter of 07/28/21    Stress Test With Myocardial Perfusion One Day    Interpretation Summary  Indications  Chest pain    This study was performed under my direct supervision.    Resting ECG  Sinus rhythm right bundle branch block first-degree AV block    The patient was injected with Lexiscan intravenously while constantly monitoring electrocardiogram and vital signs.  Patient did not have any chest discomfort ST abnormalities or ectopy with injection of Lexiscan.    Cardiolite was used as an imaging agent.    Cardiolite images showed mild inferior ischemia.    Gated SPECT images revealed normal left ventricle size and contractility with ejection fraction of 73%.    Impression  ========  Lexiscan Cardiolite test is negative for myocardial ischemia.    Gated SPECT images revealed normal left ventricular size and contractility with ejection  fraction of 73%.        Cardiolite (Tc-99m sestamibi) stress test    HEART CATHETERIZATION  No results found for this or any previous visit.      OTHER:     Assessment & Plan     Principal Problem:    Sepsis    ]]]]]]]]]]]]]]]]]]  History  ==========  -Sepsis    - Elevated troponin.    -History of atrial fibrillation  Patient has converted and was maintaining sinus rhythm.  EKG 12/27/2023-atrial fibrillation  EKG 2/7/2024-atrial fibrillation right bundle branch block      - shortness of breath fatigue and lightheadedness.  Improved     Echocardiogram-normal with ejection fraction of 60%-7/28/2021  Lexiscan Cardiolite test-mild inferior ischemia-7/28/2021      -Chronic right bundle branch block      cardiac catheterization 04/11/2018 revealed normal left ventricular function normal coronary  arteries and no evidence for pulmonary hypertension was present.     -History of diffusely dilated ascending aorta     Echocardiogram showed left atrial and right ventricle enlargement prominent aorta.  Normal left ventricular function with ejection fraction of 60% 04/05/2018.  Stacy scan Cardiolite test is abnormal with significant inferior ischemia and apical hypokinesis on the gated SPECT images 04/05/2018.     - diabetes hypertension LGL leukemia rheumatoid arthritis COPD CKD 3  Patient is on home oxygen.     - history of prostate carcinoma.  Status post radiation     - former smoker     - allergic to penicillin  ==========  Plan  ==========  Sepsis.  Elevated troponin.  Likely due to renal dysfunction and sepsis.  Obtain an echocardiogram to assess left ventricular function..    History of atrial fibrillation  Patient is maintaining sinus rhythm.  Patient is in sinus rhythm with first-degree AV block right bundle branch block-12/15/2021.  Sinus rhythm first-degree AV block right bundle branch block- 6/22/2022  EKG 6/22/2023 sinus bradycardia right bundle branch block  EKG 12/22/2022-sinus rhythm right bundle branch  block  EKG 12/27/2023-atrial fibrillation  EKG 2/7/2024-atrial fibrillation right bundle branch block left anterior fascicular block.  Rate is well-controlled.  EKG-not available  Will request.  Patient is in atrial fibrillation on cardiac monitoring.      Anticoagulation was discussed.  Patient was on low-dose Eliquis.  Consideration may be given for Watchman procedure.     Chronic right bundle branch block-asymptomatic     Hypertension-stable.    Shortness of breath fatigue and lightheadedness.-Stable  COPD  Patient is on home oxygen.     Medications were reviewed and updated.     Further plan will depend on patient's progress.     Reviewed and updated-4/23/2024.  [[[[[[[[[[[[[[[[[[[[             Misty Dias MD  04/23/24  15:25 EDT

## 2024-04-23 NOTE — H&P
Critical Care History and Physical     Praneeth Nam : 1940 MRN:[id number] LOS:0 ROOM: 2301/1     Reason for admission: Sepsis     Assessment / Plan     Shock, likely combination of hypovolemic shock and septic shock  Etiology not yet determined, rule out pneumonia.  Fever at [facility], T. Max 103, resolved with acetaminophen.  Follow blood cultures.  Check UA.  Check urine antigens.  Check sputum culture.  Bandemia of 19, CRP 10.13, Procal 8.46 on admission.  Give an additional 1 G of Rocephin, continue Rocephin 2 G daily.    Persistent hypotension in spite of adequate fluid resuscitation of 3 L.  Additional 2 L ordered at this facility.  C/w additional fluids as ordered. Avoid fluid overload.   Arrived on Levophed gtt, titrate vasopressors for a target MAP of 65.   Stress based steroids added.    Elevated troponin  Patient stated he had no chest pain, presented with abdominal pain.  No history of CAD, previous cardiac cath without occlusive disease.    EKG at [facility], independently reviewed with sinus rhythm, right bundle branch block, LASB, without QT or T wave abnormalities, heart rate 77, QTc 518.  Troponin 254 on admission, consult cardiology.  Received Lovenox at [facility].      Transaminitis  On admission, Alk phos 192, , ALT 67.   Amylase, lipase normal.  US Liver ordered and pending.  Hold further tylenol.    Acute Kidney Injury, superimposed on CKD  Remains nonoliguric. Baseline creatinine 1.1, 1.53 on admission.  Likely prerenal. Possible intrinsic component due to ATN from infection, drugs and hypotension.   C/w IV fluids as ordered.  Monitor Input/Output very closely.   Avoids NSAIDs, nephrotoxic medications, and hypotension.    Chronic / Paroxysmal atrial fibrillation   On no antidysrhythmics or rate controlling agents per home regimen.   Cardiology consulted.  [id number] Score of at least 7. Currently on anticoagulation with apixaban as outpatient, but reports that he finds it too expensive.   Received Lovenox 80 mg at [facility], with renal function, no anticoagulation needed for 24 hours.  Consideration previously stated for potential Watchman procedure, recommend to follow up with cardiology as outpatient.      Essential hypertension: well controlled.   Hold home antihypertensives due to hypotension.  Titrate medications as needed.    Diabetes mellitus Type 2, not insulin-dependent : well controlled.   Diet controlled.  Accu checks ACHS or Every 6 Hours, based on diet, but will hold sliding scale to avoid hypoglycemia.    COPD / Chronic oxygen use  Reportedly wears 6 LPM N/C at home, currently tolerating room air.  No in exacerbation.  May us oxygen if needed, keep oxygen saturation > 90%.    GERD: Continue PPI.  Dyslipidemia: Lipid panel reviewed.  Holding statin therapy due to elevated LFTs.  History of chronic ITP: Platelets stable, monitor and trend labs  History SDH/SAH (2019): Seen at [facility], did not require surgical intervention, no residual deficits.  Has since resume anticoagulation.    History of Leukemia / Prostate cancer, in remission  History of bilateral PE / History of elevated Factor VIII level: Continue anticoagulation  Rheumatoid arthritis: Continue Plaquenil once medication reconciliation completed.    MITZI: May use home CPAP.    Code Status (Patient has no pulse and is not breathing): CPR (Attempt to Resuscitate)  Medical Interventions (Patient has pulse or is breathing): Full Support       Nutrition:   NPO Diet NPO Type: Strict NPO     DVT prophylaxis:  Mechanical DVT prophylaxis orders are present.         History of Present illness     Praneeth Nam is a 84 y.o. male with PMH of CKD, MITZI, COPD, GERD, history of SDH/SAH, history of leukemia/prostate cancer in remission, PAF on apixaban, and presented to [facility] via EMS for chills, generalized weakness, productive cough, and abdominal pain, and was admitted with a principal diagnosis of Sepsis.  ED provider  stated that patient complained of chest pain, but on admission to this facility, patient denied having any type of chest pain.  He endorsed an approximate 200 lb weight loss over the past few years.  He stated that he has had a decreased appetite.  His spouse recently was at an F and was being treated for pneumonia.  Patient's daughter has also experienced some upper respiratory symptoms.  Patient reported chronic congestion in his nose.  He wears 4-6 LPM nasal cannula at baseline and is compliance with CPAP.  He denies recent antibiotic use.  He endorses fatigue, activity intolerance, shortness of breath with exertion.    Labs at [facility] were obtained with the following results: WBC 5.2, hemoglobin 9.2, hematocrit 29.1, platelets 202, sodium 140, potassium 4.6, chloride 113, CO2 17, anion gap 10, BUN 38, creatinine 1.6, total calcium 8.5, total protein 5.7, albumin 2.5, alk phos 178, ALT 79, , lipase 31, total bilirubin 2.4, osmolality 288, lactate 1.9, troponin 0.21, .  Chest x-ray with blunting of the bilateral costophrenic sulci, which may represent pleural effusions or scarring; no significant interval change from prior study as well as no new airspace consolidation.  At [facility], patient received 3 L of normal saline, 125 mg Solu-Medrol, 1 g ceftriaxone, acetaminophen, & DuoNeb.    Patient received 80 mg of Lovenox at that  a.m. on 4/23/thousand 24.  Patient remained hypotensive, and was started on Levophed drip.  Patient was noted to be febrile on arrival with a temperature of 103.2 °F.  Patient was accepted from transfer to [facility] based on patient request and due to a mildly elevated troponin and the fact that they do not have cardiology services at [facility].  Critical care team accepted patient for admission.      ACP: No advanced care planning on file, in the event patient is unable to make his own decisions, his spouse would be next of kin and decision maker.    Patient was seen  and examined on 04/23/24 at 12:05 EDT .    Subjective / Review of systems     Review of Systems   Constitutional:  Positive for activity change, appetite change, chills, fatigue and fever. Negative for diaphoresis.   HENT:  Positive for congestion. Negative for sinus pressure and sore throat.    Respiratory:  Positive for cough and shortness of breath (With exertion). Negative for chest tightness and wheezing.    Cardiovascular:  Negative for chest pain, palpitations and leg swelling.   Gastrointestinal:  Positive for abdominal pain (Bilateral lower quadrants.). Negative for abdominal distention, blood in stool, constipation, diarrhea, nausea and vomiting.   Endocrine: Negative for cold intolerance and heat intolerance.   Genitourinary:  Negative for decreased urine volume and dysuria.   Musculoskeletal:  Negative for arthralgias, back pain and myalgias.   Neurological:  Negative for dizziness and syncope.   Hematological:  Negative for adenopathy. Does not bruise/bleed easily.   Psychiatric/Behavioral:  Negative for confusion. The patient is not nervous/anxious.         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    Neutropenia 11/2003    MITZI (obstructive sleep apnea) 2018    Psoriasis 2000    Renal cyst, left     Rheumatoid arthritis     Stroke (cerebrum)     Thrombocytopenia 08/23/2005      Past Surgical History:    Procedure Laterality Date    SKIN LESION EXCISION      back and groin-benign      Social History     Socioeconomic History    Marital status:    Tobacco Use    Smoking status: Former     Current packs/day: 0.00     Types: Cigarettes     Quit date:      Years since quittin.3     Passive exposure: Never    Smokeless tobacco: Never    Tobacco comments:     stopped smoking in    Vaping Use    Vaping status: Never Used   Substance and Sexual Activity    Alcohol use: No    Drug use: No    Sexual activity: Defer      Family History   Problem Relation Age of Onset    Lung cancer Brother         age unknown    Heart disease Brother     Stroke Mother     Heart disease Father     Heart disease Sister       Allergies   Allergen Reactions    Penicillin V Potassium Swelling    Latex Hives      Social Determinants of Health     Tobacco Use: Medium Risk (2024)    Patient History     Smoking Tobacco Use: Former     Smokeless Tobacco Use: Never     Passive Exposure: Never   Alcohol Use: Not At Risk (10/22/2019)    AUDIT-C     Frequency of Alcohol Consumption: Never     Average Number of Drinks: Not on file     Frequency of Binge Drinking: Not on file   Financial Resource Strain: Not on file   Food Insecurity: Not on file   Transportation Needs: Not on file   Physical Activity: Not on file   Stress: Not on file   Social Connections: Unknown (10/10/2023)    Family and Community Support     Help with Day-to-Day Activities: Not on file     Lonely or Isolated: Not on file   Interpersonal Safety: Unknown (10/10/2023)    Abuse Screen     Unsafe at Home or Work/School: Not on file     Feels Threatened by Someone?: Not on file     Does Anyone Keep You from Contacting Others or Doint Things Outside the Home?: Not on file     Physical Sign of Abuse Present: Not on file   Depression: Not on file   Housing Stability: Unknown (10/10/2023)    Housing Stability     Current Living Arrangements: Not on file     Potentially  Unsafe Housing Conditions: Not on file   Utilities: Not on file   Health Literacy: Unknown (10/10/2023)    Education     Help with school or training?: Not on file     Preferred Language: Not on file   Employment: Unknown (10/10/2023)    Employment     Do you want help finding or keeping work or a job?: Not on file   Disabilities: Unknown (10/10/2023)    Disabilities     Concentrating, Remembering, or Making Decisions Difficulty: Not on file     Doing Errands Independently Difficulty: Not on file        Home Medications     Prior to Admission medications    Medication Sig Start Date End Date Taking? Authorizing Provider   apixaban (ELIQUIS) 2.5 MG tablet tablet Take 1 tablet by mouth 2 (Two) Times a Day. 2/26/24   Misty Dias MD   aspirin 81 MG EC tablet Take 1 tablet by mouth Daily.  Patient not taking: Reported on 2/7/2024    Rey Myles MD   atorvastatin (LIPITOR) 20 MG tablet Take 1 tablet by mouth Daily. 6/13/19   Rey Myles MD   fenofibrate 160 MG tablet Take 1 tablet by mouth Daily. 5/23/19   Rey Myles MD   glipiZIDE (GLUCOTROL) 5 MG tablet Take 1 tablet by mouth 2 (Two) Times a Day Before Meals.  Patient not taking: Reported on 2/7/2024    Rey Myles MD   hydroxychloroquine (PLAQUENIL) 200 MG tablet TK 2 TS PO ONCE DAILY 12/9/19   Rey Myles MD   ipratropium-albuterol (DUO-NEB) 0.5-2.5 mg/3 ml nebulizer Take 3 mL by nebulization Every 4 (Four) Hours As Needed for Wheezing.    Rey Myles MD   losartan (COZAAR) 100 MG tablet Take 1 tablet by mouth Daily. 5/25/19   Rey Myles MD   Multiple Vitamins-Minerals (MULTIVITAMIN WITH MINERALS) tablet tablet Take 1 tablet by mouth Daily.    Rey Myles MD   O2 (OXYGEN) Inhale 4 L/min Continuous.    Rey Myles MD   oxybutynin XL (DITROPAN-XL) 10 MG 24 hr tablet Take 1 tablet by mouth Daily. 3/29/23   Rey Myles MD   pantoprazole (PROTONIX) 40 MG EC tablet  no Take 1 tablet by mouth Daily. 6/16/23   Rey Myles MD   revefenacin (Yupelri) 175 MCG/3ML nebulizer solution Take  by nebulization Daily.    Rey Myles MD   spironolactone (ALDACTONE) 25 MG tablet Take 1 tablet by mouth Daily. 4/19/19   Rey Myles MD   tamsulosin (FLOMAX) 0.4 MG capsule 24 hr capsule TAKE 1 CAPSULE BY MOUTH EVERY DAY 1/2 HOUR FOLLOWING THE SAME MEAL EACH DAY 4/30/23   Rey Myles MD        Objective / Physical Exam     Vital signs:  Temp: 97.3 °F (36.3 °C)  BP: 125/55  Heart Rate: 64  Resp: 26  SpO2: 99 %  Weight: 81.9 kg (180 lb 8.9 oz)    Admission Weight: Weight: 81.9 kg (180 lb 8.9 oz)    Physical Exam  Vitals and nursing note reviewed.   Constitutional:       General: He is sleeping. He is not in acute distress.     Appearance: He is normal weight. He is ill-appearing. He is not toxic-appearing or diaphoretic.   HENT:      Head: Normocephalic and atraumatic.      Nose: Nose normal. No congestion.      Mouth/Throat:      Mouth: Mucous membranes are moist.      Pharynx: Oropharynx is clear. No oropharyngeal exudate.   Eyes:      General: No scleral icterus.     Extraocular Movements: Extraocular movements intact.      Conjunctiva/sclera: Conjunctivae normal.      Pupils: Pupils are equal, round, and reactive to light.   Cardiovascular:      Rate and Rhythm: Normal rate and regular rhythm.      Pulses: Normal pulses.      Heart sounds: Normal heart sounds. No murmur heard.  Pulmonary:      Effort: Pulmonary effort is normal. No respiratory distress.      Breath sounds: No wheezing, rhonchi or rales.      Comments: Diminished bibasilar breat sounds.  Abdominal:      General: Bowel sounds are normal. There is no distension.      Tenderness: There is abdominal tenderness (Bilateral lower quadrants.). There is no guarding or rebound.   Musculoskeletal:         General: No swelling.      Cervical back: Neck supple.      Right lower leg: No edema.      Left  lower leg: No edema.   Skin:     General: Skin is warm and dry.      Coloration: Skin is pale.   Neurological:      General: No focal deficit present.      Mental Status: He is oriented to person, place, and time and easily aroused.   Psychiatric:         Mood and Affect: Mood normal.         Behavior: Behavior normal. Behavior is cooperative.         Thought Content: Thought content normal.         Judgment: Judgment normal.          Labs     Results from last 7 days   Lab Units 04/23/24  1235   WBC 10*3/mm3 13.06*   HEMATOCRIT % 28.4*   PLATELETS 10*3/mm3 183      Results from last 7 days   Lab Units 04/23/24  1235   SODIUM mmol/L 143   POTASSIUM mmol/L 4.1   CHLORIDE mmol/L 113*   CO2 mmol/L 16.0*   BUN mg/dL 34*   CREATININE mg/dL 1.53*        Imaging     None at this facility, refer to HPI.    Current Medications     Scheduled Meds:  cefTRIAXone, 1,000 mg, Intravenous, Once  [START ON 4/24/2024] cefTRIAXone, 2,000 mg, Intravenous, Q24H  hydrocortisone sodium succinate, 50 mg, Intravenous, Q6H  [START ON 4/24/2024] pantoprazole, 40 mg, Oral, Q AM  sodium chloride, 2,000 mL, Intravenous, Once  sodium chloride, 10 mL, Intravenous, Q12H         Continuous Infusions:  lactated ringers, 100 mL/hr, Last Rate: 100 mL/hr (04/23/24 1411)  norepinephrine, 0.02-0.5 mcg/kg/min, Last Rate: Stopped (04/23/24 1415)       Patient continues to be critically ill, remains at risk of clinical deterioration or death and needed high complexity decision making. I have spent a total of 38 minutes providing critical care services to this patient including but not limited to: review of labs/ microbiology/imaging/medications, serial monitoring of vital signs, reviewed [facility] documentation, monitoring input/output, review of treatment plan with bedside nurse, RT and other treatment team, management of life support and nutrition needs. I also spoke with patient and family about the plan of care and answered all questions.    Time spent in  performing separately billable procedures and updating family is not included in the critical care time.     AZEEM Blackwell   Critical Care  04/23/24   12:05 EDT

## 2024-04-23 NOTE — PLAN OF CARE
Goal Outcome Evaluation:    Pt admitted from [facility]. CC: Febrile, chills, productive cough, weakness. He was found to be hypotensive - Levo started.     Arrived here via EMS - A&O x4    High Troponin - Cardio consulted  PAN Cultured    Fluid resuscitation administered - Levo off     Liver U/S - Hepatic Steatosis     Echo done - not resulted yet.     Pt unable to give urine or sputum sample today - will pass on to night shift.

## 2024-04-24 ENCOUNTER — APPOINTMENT (OUTPATIENT)
Dept: GENERAL RADIOLOGY | Facility: HOSPITAL | Age: 84
DRG: 871 | End: 2024-04-24
Payer: MEDICARE

## 2024-04-24 ENCOUNTER — APPOINTMENT (OUTPATIENT)
Dept: ULTRASOUND IMAGING | Facility: HOSPITAL | Age: 84
DRG: 871 | End: 2024-04-24
Payer: MEDICARE

## 2024-04-24 ENCOUNTER — APPOINTMENT (OUTPATIENT)
Dept: CT IMAGING | Facility: HOSPITAL | Age: 84
DRG: 871 | End: 2024-04-24
Payer: MEDICARE

## 2024-04-24 LAB
ALBUMIN SERPL-MCNC: 2.4 G/DL (ref 3.5–5.2)
ALBUMIN/GLOB SERPL: 1 G/DL
ALP SERPL-CCNC: 145 U/L (ref 39–117)
ALT SERPL W P-5'-P-CCNC: 75 U/L (ref 1–41)
ANION GAP SERPL CALCULATED.3IONS-SCNC: 11 MMOL/L (ref 5–15)
AST SERPL-CCNC: 156 U/L (ref 1–40)
BASOPHILS # BLD AUTO: 0.01 10*3/MM3 (ref 0–0.2)
BASOPHILS NFR BLD AUTO: 0.2 % (ref 0–1.5)
BH CV ECHO MEAS - AI P1/2T: 582.4 MSEC
BH CV ECHO MEAS - AO MAX PG: 7.1 MMHG
BH CV ECHO MEAS - AO MEAN PG: 4 MMHG
BH CV ECHO MEAS - AO V2 MAX: 133 CM/SEC
BH CV ECHO MEAS - AO V2 VTI: 29.5 CM
BH CV ECHO MEAS - AVA(I,D): 2.46 CM2
BH CV ECHO MEAS - EDV(CUBED): 103.8 ML
BH CV ECHO MEAS - EDV(MOD-SP2): 133 ML
BH CV ECHO MEAS - EDV(MOD-SP4): 120 ML
BH CV ECHO MEAS - EF(MOD-BP): 58.5 %
BH CV ECHO MEAS - EF(MOD-SP2): 55.8 %
BH CV ECHO MEAS - EF(MOD-SP4): 60.5 %
BH CV ECHO MEAS - ESV(CUBED): 32.8 ML
BH CV ECHO MEAS - ESV(MOD-SP2): 58.8 ML
BH CV ECHO MEAS - ESV(MOD-SP4): 47.4 ML
BH CV ECHO MEAS - FS: 31.9 %
BH CV ECHO MEAS - IVS/LVPW: 1.08 CM
BH CV ECHO MEAS - IVSD: 1.4 CM
BH CV ECHO MEAS - LA DIMENSION: 4 CM
BH CV ECHO MEAS - LV DIASTOLIC VOL/BSA (35-75): 62.7 CM2
BH CV ECHO MEAS - LV MASS(C)D: 251.4 GRAMS
BH CV ECHO MEAS - LV MAX PG: 2.8 MMHG
BH CV ECHO MEAS - LV MEAN PG: 1 MMHG
BH CV ECHO MEAS - LV SYSTOLIC VOL/BSA (12-30): 24.8 CM2
BH CV ECHO MEAS - LV V1 MAX: 83.3 CM/SEC
BH CV ECHO MEAS - LV V1 VTI: 19.1 CM
BH CV ECHO MEAS - LVIDD: 4.7 CM
BH CV ECHO MEAS - LVIDS: 3.2 CM
BH CV ECHO MEAS - LVOT AREA: 3.8 CM2
BH CV ECHO MEAS - LVOT DIAM: 2.2 CM
BH CV ECHO MEAS - LVPWD: 1.3 CM
BH CV ECHO MEAS - MR MAX PG: 106.9 MMHG
BH CV ECHO MEAS - MR MAX VEL: 517 CM/SEC
BH CV ECHO MEAS - MR MEAN PG: 68 MMHG
BH CV ECHO MEAS - MR MEAN VEL: 389 CM/SEC
BH CV ECHO MEAS - MR VTI: 199 CM
BH CV ECHO MEAS - MV A DUR: 0.11 SEC
BH CV ECHO MEAS - MV A MAX VEL: 55.1 CM/SEC
BH CV ECHO MEAS - MV DEC SLOPE: 643 CM/SEC2
BH CV ECHO MEAS - MV DEC TIME: 0.17 SEC
BH CV ECHO MEAS - MV E MAX VEL: 152 CM/SEC
BH CV ECHO MEAS - MV E/A: 2.8
BH CV ECHO MEAS - MV MAX PG: 13.7 MMHG
BH CV ECHO MEAS - MV MEAN PG: 4 MMHG
BH CV ECHO MEAS - MV P1/2T: 84.3 MSEC
BH CV ECHO MEAS - MV V2 VTI: 44.1 CM
BH CV ECHO MEAS - MVA(P1/2T): 2.6 CM2
BH CV ECHO MEAS - MVA(VTI): 1.65 CM2
BH CV ECHO MEAS - PA ACC TIME: 0.1 SEC
BH CV ECHO MEAS - PA V2 MAX: 88.9 CM/SEC
BH CV ECHO MEAS - RAP SYSTOLE: 8 MMHG
BH CV ECHO MEAS - RV MAX PG: 2.5 MMHG
BH CV ECHO MEAS - RV V1 MAX: 79.8 CM/SEC
BH CV ECHO MEAS - RV V1 VTI: 12.7 CM
BH CV ECHO MEAS - SV(LVOT): 72.6 ML
BH CV ECHO MEAS - SV(MOD-SP2): 74.2 ML
BH CV ECHO MEAS - SV(MOD-SP4): 72.6 ML
BH CV ECHO MEAS - SVI(LVOT): 38 ML/M2
BH CV ECHO MEAS - SVI(MOD-SP2): 38.8 ML/M2
BH CV ECHO MEAS - SVI(MOD-SP4): 38 ML/M2
BH CV ECHO MEAS - TAPSE (>1.6): 1.46 CM
BH CV ECHO MEAS - TR MAX PG: 38.2 MMHG
BH CV ECHO MEAS - TR MAX VEL: 309 CM/SEC
BILIRUB SERPL-MCNC: 1.3 MG/DL (ref 0–1.2)
BUN SERPL-MCNC: 35 MG/DL (ref 8–23)
BUN/CREAT SERPL: 23.5 (ref 7–25)
CALCIUM SPEC-SCNC: 7.5 MG/DL (ref 8.6–10.5)
CHLORIDE SERPL-SCNC: 114 MMOL/L (ref 98–107)
CO2 SERPL-SCNC: 16 MMOL/L (ref 22–29)
CREAT SERPL-MCNC: 1.49 MG/DL (ref 0.76–1.27)
DEPRECATED RDW RBC AUTO: 55.2 FL (ref 37–54)
EGFRCR SERPLBLD CKD-EPI 2021: 46.3 ML/MIN/1.73
EOSINOPHIL # BLD AUTO: 0 10*3/MM3 (ref 0–0.4)
EOSINOPHIL NFR BLD AUTO: 0 % (ref 0.3–6.2)
ERYTHROCYTE [DISTWIDTH] IN BLOOD BY AUTOMATED COUNT: 14.7 % (ref 12.3–15.4)
GLOBULIN UR ELPH-MCNC: 2.5 GM/DL
GLUCOSE BLDC GLUCOMTR-MCNC: 148 MG/DL (ref 70–105)
GLUCOSE BLDC GLUCOMTR-MCNC: 152 MG/DL (ref 70–105)
GLUCOSE BLDC GLUCOMTR-MCNC: 152 MG/DL (ref 70–105)
GLUCOSE BLDC GLUCOMTR-MCNC: 154 MG/DL (ref 70–105)
GLUCOSE SERPL-MCNC: 130 MG/DL (ref 65–99)
HCT VFR BLD AUTO: 24.7 % (ref 37.5–51)
HEMOCCULT STL QL IA: POSITIVE
HGB BLD-MCNC: 7.6 G/DL (ref 13–17.7)
IMM GRANULOCYTES # BLD AUTO: 0.04 10*3/MM3 (ref 0–0.05)
IMM GRANULOCYTES NFR BLD AUTO: 0.6 % (ref 0–0.5)
IRON 24H UR-MRATE: 16 MCG/DL (ref 59–158)
IRON SATN MFR SERPL: 7 % (ref 20–50)
LEFT ATRIUM VOLUME INDEX: 52.9 ML/M2
LYMPHOCYTES # BLD AUTO: 0.56 10*3/MM3 (ref 0.7–3.1)
LYMPHOCYTES NFR BLD AUTO: 8.6 % (ref 19.6–45.3)
MAGNESIUM SERPL-MCNC: 1.7 MG/DL (ref 1.6–2.4)
MCH RBC QN AUTO: 31.1 PG (ref 26.6–33)
MCHC RBC AUTO-ENTMCNC: 30.8 G/DL (ref 31.5–35.7)
MCV RBC AUTO: 101.2 FL (ref 79–97)
MONOCYTES # BLD AUTO: 0.3 10*3/MM3 (ref 0.1–0.9)
MONOCYTES NFR BLD AUTO: 4.6 % (ref 5–12)
NEUTROPHILS NFR BLD AUTO: 5.57 10*3/MM3 (ref 1.7–7)
NEUTROPHILS NFR BLD AUTO: 86 % (ref 42.7–76)
NRBC BLD AUTO-RTO: 0 /100 WBC (ref 0–0.2)
NT-PROBNP SERPL-MCNC: ABNORMAL PG/ML (ref 0–1800)
PHOSPHATE SERPL-MCNC: 3.3 MG/DL (ref 2.5–4.5)
PLATELET # BLD AUTO: 148 10*3/MM3 (ref 140–450)
PMV BLD AUTO: 12.5 FL (ref 6–12)
POTASSIUM SERPL-SCNC: 3.9 MMOL/L (ref 3.5–5.2)
PROT SERPL-MCNC: 4.9 G/DL (ref 6–8.5)
RBC # BLD AUTO: 2.44 10*6/MM3 (ref 4.14–5.8)
SINUS: 4.3 CM
SODIUM SERPL-SCNC: 141 MMOL/L (ref 136–145)
STJ: 3.4 CM
TIBC SERPL-MCNC: 218 MCG/DL (ref 298–536)
TRANSFERRIN SERPL-MCNC: 146 MG/DL (ref 200–360)
WBC NRBC COR # BLD AUTO: 6.48 10*3/MM3 (ref 3.4–10.8)

## 2024-04-24 PROCEDURE — 83880 ASSAY OF NATRIURETIC PEPTIDE: CPT | Performed by: INTERNAL MEDICINE

## 2024-04-24 PROCEDURE — 82948 REAGENT STRIP/BLOOD GLUCOSE: CPT | Performed by: NURSE PRACTITIONER

## 2024-04-24 PROCEDURE — 25010000002 ENOXAPARIN PER 10 MG: Performed by: NURSE PRACTITIONER

## 2024-04-24 PROCEDURE — 83540 ASSAY OF IRON: CPT | Performed by: NURSE PRACTITIONER

## 2024-04-24 PROCEDURE — 25810000003 SODIUM CHLORIDE 0.9 % SOLUTION

## 2024-04-24 PROCEDURE — 84100 ASSAY OF PHOSPHORUS: CPT | Performed by: NURSE PRACTITIONER

## 2024-04-24 PROCEDURE — 71250 CT THORAX DX C-: CPT

## 2024-04-24 PROCEDURE — 99232 SBSQ HOSP IP/OBS MODERATE 35: CPT | Performed by: INTERNAL MEDICINE

## 2024-04-24 PROCEDURE — 97162 PT EVAL MOD COMPLEX 30 MIN: CPT

## 2024-04-24 PROCEDURE — 82948 REAGENT STRIP/BLOOD GLUCOSE: CPT

## 2024-04-24 PROCEDURE — 80053 COMPREHEN METABOLIC PANEL: CPT | Performed by: NURSE PRACTITIONER

## 2024-04-24 PROCEDURE — 71045 X-RAY EXAM CHEST 1 VIEW: CPT

## 2024-04-24 PROCEDURE — 25010000002 MAGNESIUM SULFATE 2 GM/50ML SOLUTION: Performed by: NURSE PRACTITIONER

## 2024-04-24 PROCEDURE — 25010000002 NA FERRIC GLUC CPLX PER 12.5 MG

## 2024-04-24 PROCEDURE — 25010000002 CEFTRIAXONE PER 250 MG: Performed by: NURSE PRACTITIONER

## 2024-04-24 PROCEDURE — 84466 ASSAY OF TRANSFERRIN: CPT | Performed by: NURSE PRACTITIONER

## 2024-04-24 PROCEDURE — 85025 COMPLETE CBC W/AUTO DIFF WBC: CPT | Performed by: NURSE PRACTITIONER

## 2024-04-24 PROCEDURE — 82274 ASSAY TEST FOR BLOOD FECAL: CPT

## 2024-04-24 PROCEDURE — 97116 GAIT TRAINING THERAPY: CPT

## 2024-04-24 PROCEDURE — 25810000003 LACTATED RINGERS PER 1000 ML: Performed by: NURSE PRACTITIONER

## 2024-04-24 PROCEDURE — 74176 CT ABD & PELVIS W/O CONTRAST: CPT

## 2024-04-24 PROCEDURE — 25010000002 HYDROCORTISONE SOD SUC (PF) 100 MG RECONSTITUTED SOLUTION: Performed by: NURSE PRACTITIONER

## 2024-04-24 PROCEDURE — 83735 ASSAY OF MAGNESIUM: CPT | Performed by: NURSE PRACTITIONER

## 2024-04-24 RX ORDER — SPIRONOLACTONE 25 MG/1
25 TABLET ORAL DAILY
Status: DISCONTINUED | OUTPATIENT
Start: 2024-04-24 | End: 2024-04-30 | Stop reason: HOSPADM

## 2024-04-24 RX ORDER — MAGNESIUM SULFATE HEPTAHYDRATE 40 MG/ML
2 INJECTION, SOLUTION INTRAVENOUS ONCE
Status: COMPLETED | OUTPATIENT
Start: 2024-04-24 | End: 2024-04-24

## 2024-04-24 RX ORDER — MULTIPLE VITAMINS W/ MINERALS TAB 9MG-400MCG
1 TAB ORAL DAILY
Status: DISCONTINUED | OUTPATIENT
Start: 2024-04-24 | End: 2024-04-30 | Stop reason: HOSPADM

## 2024-04-24 RX ORDER — SODIUM BICARBONATE 650 MG/1
650 TABLET ORAL 3 TIMES DAILY
Status: DISCONTINUED | OUTPATIENT
Start: 2024-04-24 | End: 2024-04-24

## 2024-04-24 RX ORDER — ATORVASTATIN CALCIUM 20 MG/1
20 TABLET, FILM COATED ORAL DAILY
Status: DISCONTINUED | OUTPATIENT
Start: 2024-04-24 | End: 2024-04-30 | Stop reason: HOSPADM

## 2024-04-24 RX ORDER — SODIUM BICARBONATE 650 MG/1
1300 TABLET ORAL 3 TIMES DAILY
Status: DISCONTINUED | OUTPATIENT
Start: 2024-04-24 | End: 2024-04-28

## 2024-04-24 RX ORDER — LOSARTAN POTASSIUM 50 MG/1
100 TABLET ORAL DAILY
Status: DISCONTINUED | OUTPATIENT
Start: 2024-04-24 | End: 2024-04-24

## 2024-04-24 RX ORDER — HYDROXYCHLOROQUINE SULFATE 200 MG/1
400 TABLET, FILM COATED ORAL DAILY
Status: DISCONTINUED | OUTPATIENT
Start: 2024-04-24 | End: 2024-04-30 | Stop reason: HOSPADM

## 2024-04-24 RX ORDER — IPRATROPIUM BROMIDE AND ALBUTEROL SULFATE 2.5; .5 MG/3ML; MG/3ML
3 SOLUTION RESPIRATORY (INHALATION) EVERY 4 HOURS PRN
Status: DISCONTINUED | OUTPATIENT
Start: 2024-04-24 | End: 2024-04-30 | Stop reason: HOSPADM

## 2024-04-24 RX ORDER — ENOXAPARIN SODIUM 100 MG/ML
1 INJECTION SUBCUTANEOUS ONCE
Status: COMPLETED | OUTPATIENT
Start: 2024-04-24 | End: 2024-04-24

## 2024-04-24 RX ORDER — MIDODRINE HYDROCHLORIDE 5 MG/1
5 TABLET ORAL
Status: DISCONTINUED | OUTPATIENT
Start: 2024-04-24 | End: 2024-04-30 | Stop reason: HOSPADM

## 2024-04-24 RX ORDER — TAMSULOSIN HYDROCHLORIDE 0.4 MG/1
0.4 CAPSULE ORAL DAILY
Status: DISCONTINUED | OUTPATIENT
Start: 2024-04-24 | End: 2024-04-30 | Stop reason: HOSPADM

## 2024-04-24 RX ADMIN — Medication 1 TABLET: at 08:24

## 2024-04-24 RX ADMIN — SODIUM BICARBONATE 1300 MG: 650 TABLET ORAL at 15:47

## 2024-04-24 RX ADMIN — SODIUM BICARBONATE 1300 MG: 650 TABLET ORAL at 20:18

## 2024-04-24 RX ADMIN — HYDROCORTISONE SODIUM SUCCINATE 50 MG: 100 INJECTION, POWDER, FOR SOLUTION INTRAMUSCULAR; INTRAVENOUS at 04:59

## 2024-04-24 RX ADMIN — SODIUM BICARBONATE 650 MG: 650 TABLET ORAL at 08:24

## 2024-04-24 RX ADMIN — MIDODRINE HYDROCHLORIDE 5 MG: 5 TABLET ORAL at 14:01

## 2024-04-24 RX ADMIN — ENOXAPARIN SODIUM 80 MG: 100 INJECTION SUBCUTANEOUS at 20:18

## 2024-04-24 RX ADMIN — ALUMINUM HYDROXIDE, MAGNESIUM HYDROXIDE, AND DIMETHICONE 15 ML: 400; 400; 40 SUSPENSION ORAL at 17:16

## 2024-04-24 RX ADMIN — HYDROXYCHLOROQUINE SULFATE 400 MG: 200 TABLET ORAL at 08:28

## 2024-04-24 RX ADMIN — ENOXAPARIN SODIUM 80 MG: 100 INJECTION SUBCUTANEOUS at 08:24

## 2024-04-24 RX ADMIN — PANTOPRAZOLE SODIUM 40 MG: 40 TABLET, DELAYED RELEASE ORAL at 05:00

## 2024-04-24 RX ADMIN — MIDODRINE HYDROCHLORIDE 5 MG: 5 TABLET ORAL at 17:16

## 2024-04-24 RX ADMIN — SODIUM CHLORIDE 250 MG: 9 INJECTION, SOLUTION INTRAVENOUS at 11:29

## 2024-04-24 RX ADMIN — Medication 10 ML: at 08:25

## 2024-04-24 RX ADMIN — CEFTRIAXONE 2000 MG: 2 INJECTION, POWDER, FOR SOLUTION INTRAMUSCULAR; INTRAVENOUS at 08:24

## 2024-04-24 RX ADMIN — MAGNESIUM SULFATE HEPTAHYDRATE 2 G: 40 INJECTION, SOLUTION INTRAVENOUS at 09:50

## 2024-04-24 RX ADMIN — Medication 10 ML: at 20:18

## 2024-04-24 RX ADMIN — TAMSULOSIN HYDROCHLORIDE 0.4 MG: 0.4 CAPSULE ORAL at 08:24

## 2024-04-24 RX ADMIN — SODIUM CHLORIDE, POTASSIUM CHLORIDE, SODIUM LACTATE AND CALCIUM CHLORIDE 100 ML/HR: 600; 310; 30; 20 INJECTION, SOLUTION INTRAVENOUS at 00:16

## 2024-04-24 NOTE — THERAPY EVALUATION
Patient Name: Praneeth Nam  : 1940    MRN: [id number]                              Today's Date: 2024       Admit Date: 2024    Visit Dx: No diagnosis found.  Patient Active Problem List   Diagnosis    T-cell large granular lymphocytic leukemia     Chronic ITP (idiopathic thrombocytopenia)    Rheumatoid arthritis    CKD (chronic kidney disease), stage III    Bilateral pulmonary embolism    Elevated factor VIII level    Other myelodysplastic syndromes    Hypogammaglobulinemia    Bronchitis    Subdural hematoma, nontraumatic    Lung nodule    Abnormal cardiovascular stress test    AV block, 1st degree    Chronic obstructive pulmonary disease    Dizziness    Dyslipidemia    Exertional shortness of breath    Fatigue    History of pulmonary embolism    Hypertension    Keratoconjunctivitis sicca, in Sjogren's syndrome    MITZI (obstructive sleep apnea)    Small plaque parapsoriasis    Stroke-like symptoms    Type 2 diabetes mellitus    Bilateral pulmonary embolism    Elevated antinuclear antibody (NAIMA) level    Seropositive rheumatoid arthritis    RBBB    Anemia    Pleural effusion    Sepsis     Past Medical History:   Diagnosis Date    Acute pulmonary embolism 2018    bilateral    Arthritis     bilateral knees and ankles    CKD (chronic kidney disease) 2009    Coagulopathy 2008    COPD (chronic obstructive pulmonary disease)     Diabetes mellitus     History of ITP 2019    History of pneumonia 2015    hospitalized with community-acquired pneumonia, possibly viral     History of prostate cancer 10/2009    [place] 6, Stage II    Hypercholesterolemia     Hypertension     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    Neutropenia 2003    MITZI (obstructive sleep apnea)     Psoriasis 2000    Renal cyst, left     Rheumatoid arthritis     Stroke (cerebrum)     Thrombocytopenia 2005     Past Surgical History:   Procedure Laterality Date     SKIN LESION EXCISION      back and groin-benign      General Information       Row Name 04/24/24 1257          Physical Therapy Time and Intention    Document Type evaluation  -     Mode of Treatment physical therapy  -[facility] 04/24/24 1257          General Information    Patient Profile Reviewed yes  -     Prior Level of Function independent:;ADL's;driving;all household mobility  -     Barriers to Rehab medically complex  -JH       Row Name 04/24/24 1257          Living Environment    People in Home spouse;grandchild(elio)  reports his wife has dementia and needs 24 hr care he cannot provide, grandtr has been living with them to help with care  -JH       Row Name 04/24/24 1257          Home Main Entrance    Number of Stairs, Main Entrance none  -JH       Row Name 04/24/24 1257          Stairs Within Home, Primary    Number of Stairs, Within Home, Primary none  -JH       Row Name 04/24/24 1257          Cognition    Orientation Status (Cognition) oriented x 4  -JH       Row Name 04/24/24 1257          Safety Issues, Functional Mobility    Impairments Affecting Function (Mobility) endurance/activity tolerance;strength;balance  -               User Key  (r) = Recorded By, (t) = Taken By, (c) = Cosigned By      Initials Name Provider Type     Lamar Whitley, PT Physical Therapist                   Mobility       Row Name 04/24/24 1258          Bed Mobility    Bed Mobility supine-sit  -     Supine-Sit [employer] (Bed Mobility) contact guard  -JH       Row Name 04/24/24 1258          Bed-Chair Transfer    Bed-Chair [employer] (Transfers) minimum assist (75% patient effort)  -JH       Row Name 04/24/24 1258          Sit-Stand Transfer    Sit-Stand [employer] (Transfers) contact guard;minimum assist (75% patient effort)  -     Comment, (Sit-Stand Transfer) depends on surface height  -JH       Row Name 04/24/24 1258          Gait/Stairs (Locomotion)    [employer] Level (Gait) contact guard   -     Assistive Device (Gait) walker, front-wheeled  -     Distance in Feet (Gait) 40  -     Deviations/Abnormal Patterns (Gait) base of support, wide;gait speed decreased  -     Bilateral Gait Deviations forward flexed posture;heel strike decreased  -     Comment, (Gait/Stairs) without AD, pt required min A for safety and balance.  improved with use of RW  -               User Key  (r) = Recorded By, (t) = Taken By, (c) = Cosigned By      Initials Name Provider Type    Lamar Dugan PT Physical Therapist                   Obj/Interventions       Row Name 04/24/24 1301          Range of Motion Comprehensive    General Range of Motion bilateral lower extremity ROM WNL  -       Row Name 04/24/24 1301          Strength Comprehensive (MMT)    Comment, General Manual Muscle Testing (MMT) Assessment BLEs 4/5  -       Row Name 04/24/24 1301          Balance    Balance Assessment sitting static balance;sitting dynamic balance;standing static balance;standing dynamic balance  -     Static Sitting Balance independent  -     Dynamic Sitting Balance independent  -     Position, Sitting Balance sitting edge of bed;sitting in chair  -     Static Standing Balance minimal assist  -     Dynamic Standing Balance minimal assist  -[facility] Name 04/24/24 1301          Sensory Assessment (Somatosensory)    Sensory Assessment (Somatosensory) sensation intact  -               User Key  (r) = Recorded By, (t) = Taken By, (c) = Cosigned By      Initials Name Provider Type    Lamar Dugan, PT Physical Therapist                   Goals/Plan       Row Name 04/24/24 1311          Bed Mobility Goal 1 (PT)    Activity/Assistive Device (Bed Mobility Goal 1, PT) bed mobility activities, all  -     [place] Level/Cues Needed (Bed Mobility Goal 1, PT) modified independence  -     Time Frame (Bed Mobility Goal 1, PT) 2 weeks  -       Row Name 04/24/24 1311          Transfer Goal 1 (PT)     Activity/Assistive Device (Transfer Goal 1, PT) transfers, all  -     [place] Level/Cues Needed (Transfer Goal 1, PT) modified independence  -     Time Frame (Transfer Goal 1, PT) 2 weeks  -       Row Name 04/24/24 1311          Gait Training Goal 1 (PT)    Activity/Assistive Device (Gait Training Goal 1, PT) gait (walking locomotion);assistive device use;walker, rolling  -     [place] Level (Gait Training Goal 1, PT) modified independence  -     Distance (Gait Training Goal 1, PT) 200'  -     Time Frame (Gait Training Goal 1, PT) 2 weeks  -       Row Name 04/24/24 1311          Therapy Assessment/Plan (PT)    Planned Therapy Interventions (PT) balance training;bed mobility training;gait training;strengthening;transfer training;patient/family education  -               User Key  (r) = Recorded By, (t) = Taken By, (c) = Cosigned By      Initials Name Provider Type     Lamar Whitley, PT Physical Therapist                   Clinical Impression       Row Name 04/24/24 1304          Pain    Pretreatment Pain Rating 0/10 - no pain  -     Posttreatment Pain Rating 0/10 - no pain  -       Row Name 04/24/24 1304          Plan of Care Review    Plan of Care Reviewed With patient  -     Outcome Evaluation 84 y.o. male with PMH of CKD, MITZI, COPD, GERD, history of SDH/SAH, history of leukemia/prostate cancer in remission, PAF admitted with for chills, generalized weakness, productive cough, and abdominal pain, and was admitted with a principal diagnosis of Sepsis.  Pt is from home with his wife who has dementia and reports his grandtr has been living with them to A in her care.  Pt states he is independent at baseline, has a RW he does not use, still drives, reports no falls but is unsteady with walking.  This date, pt supine in bed, on RA with sats 99%.  Pt states at home he is usually on 4-6L and uses Trilogy machine at night.  Pt was able to transfer OOB to chair with min A, decresaed  standing balance.  Had pt use RW to ambulate x 40' with CGA and improved balance.  Encouarged pt to use walker for all mobility and recommend continued PT while in hospital and HHPT at d/c.  Pt would also benefit from OT eval.  -       Row Name 04/24/24 1304          Therapy Assessment/Plan (PT)    Rehab Potential (PT) good, to achieve stated therapy goals  -     Criteria for Skilled Interventions Met (PT) yes;skilled treatment is necessary  -     Therapy Frequency (PT) 5 times/wk  -       Row Name 04/24/24 1304          Vital Signs    Pre SpO2 (%) 99  -     O2 Delivery Pre Treatment room air  -     Intra SpO2 (%) 94  -     O2 Delivery Intra Treatment room air  -     Post SpO2 (%) 98  -     O2 Delivery Post Treatment room air  -       Row Name 04/24/24 1304          Positioning and Restraints    Pre-Treatment Position in bed  -     Post Treatment Position chair  -     In Chair notified nsg;call light within reach;encouraged to call for assist;reclined;with other staff  -               User Key  (r) = Recorded By, (t) = Taken By, (c) = Cosigned By      Initials Name Provider Type    Lamar Dugan, PT Physical Therapist                   Outcome Measures       Row Name 04/24/24 1311          How much help from another person do you currently need...    Turning from your back to your side while in flat bed without using bedrails? 3  -JH     Moving from lying on back to sitting on the side of a flat bed without bedrails? 3  -JH     Moving to and from a bed to a chair (including a wheelchair)? 3  -JH     Standing up from a chair using your arms (e.g., wheelchair, bedside chair)? 3  -JH     Climbing 3-5 steps with a railing? 2  -JH     To walk in hospital room? 3  -JH     AM-PAC 6 Clicks Score (PT) 17  -     Highest Level of Mobility Goal 5 --> Static standing  -               User Key  (r) = Recorded By, (t) = Taken By, (c) = Cosigned By      Initials Name Provider Type    MAGGIE Whitley  MILKA Newton Physical Therapist                                 Physical Therapy Education       Title: PT OT SLP Therapies (Done)       Topic: Physical Therapy (Done)       Point: Mobility training (Done)       Learning Progress Summary             Patient Acceptance, E,TB, VU by  at 4/24/2024 1311                                         User Key       Initials Effective Dates Name Provider Type Discipline     06/16/21 -  Lamar Whitley PT Physical Therapist PT                  PT Recommendation and Plan  Planned Therapy Interventions (PT): balance training, bed mobility training, gait training, strengthening, transfer training, patient/family education  Plan of Care Reviewed With: patient  Outcome Evaluation: 84 y.o. male with PMH of CKD, MITZI, COPD, GERD, history of SDH/SAH, history of leukemia/prostate cancer in remission, PAF admitted with for chills, generalized weakness, productive cough, and abdominal pain, and was admitted with a principal diagnosis of Sepsis.  Pt is from home with his wife who has dementia and reports his grandtr has been living with them to A in her care.  Pt states he is independent at baseline, has a RW he does not use, still drives, reports no falls but is unsteady with walking.  This date, pt supine in bed, on RA with sats 99%.  Pt states at home he is usually on 4-6L and uses Trilogy machine at night.  Pt was able to transfer OOB to chair with min A, decresaed standing balance.  Had pt use RW to ambulate x 40' with CGA and improved balance.  Encouarged pt to use walker for all mobility and recommend continued PT while in hospital and HHPT at d/c.  Pt would also benefit from OT eval.     Time Calculation:              PT G-Codes  AM-PAC 6 Clicks Score (PT): 17  PT Discharge Summary  Anticipated Discharge Disposition (PT): home with assist, home with home health    Lamar Whitley PT  4/24/2024

## 2024-04-24 NOTE — PLAN OF CARE
Goal Outcome Evaluation:    LEVO OFF. OXYGEN SATS STABLE. TROPONIN TRENDING DOWN. Pt WAS DOWNGRADED TO PCU. H&H LOW. MD AWARE. UA WAS NITRATE AND LEUKOCYTE POSITIVE BUT NOT REFLEXED FOR CULTURE.     PER MD TO PUT O2 BACK ON FOR COMFORT. AT 2L.     KVO GOING AT 20 ML/HR.     PT C/O STOMACH UPSET W/O NAUSEA. GAVE MAALOX.     CT CHEST NOT RESULTED YET.     OFF TELEMETRY FOR TESTS.

## 2024-04-24 NOTE — CONSULTS
NEPHROLOGY CONSULTATION-----[facility]    [facility]  [phone]  Sascha Dorado MD    Patient Care Team:  Guilherme Jason MD as PCP - General (Family Medicine)  Misty Dias MD as Consulting Physician (Cardiology)  Nora Kenyon, BRIAN, APRN as Nurse Practitioner (Thoracic Surgery)  Sascha Dorado MD as Consulting Physician (Nephrology)    CC/REASON FOR CONSULTATION: Elevated creatinine/acidosis    PHYSICIAN REQUESTING CONSULTATION: Dr. Rousseau    History of Present Illness  81-year-old male with past medical history of chronic kidney stage III, COPD, diabetes, hypertension, paroxysmal atrial fibrillation presented from [facility] with complaints of chills, generalized weakness and cough.  Is admitted with diagnosis of sepsis.  His creatinine is 1.5 yesterday, 1.4 today.  Patient appears to have CKD with baseline creatinine 1.1-1.4.  Denies any dysuria or gross hematuria.  No vomiting, has had some diarrhea.  UA suggestive of UTI.  Is currently on LR at 100 mill per hour.  He also received a liter of normal saline at outlying facility.  Blood pressure was soft on admission.  He was on losartan and Aldactone prior to admission.    Review of Systems   As noted above otherwise 10 systems reviewed and were negative.    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    History of pneumonia 02/2015    hospitalized with community-acquired pneumonia, possibly viral     History of prostate cancer 10/2009    [place] 6, Stage II    Hypercholesterolemia     Hypertension     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    Neutropenia 11/2003    MITZI  (obstructive sleep apnea) 2018    Psoriasis     Renal cyst, left     Rheumatoid arthritis     Stroke (cerebrum)     Thrombocytopenia 2005       Past Surgical History:   Procedure Laterality Date    SKIN LESION EXCISION      back and groin-benign       Family History   Problem Relation Age of Onset    Lung cancer Brother         age unknown    Heart disease Brother     Stroke Mother     Heart disease Father     Heart disease Sister        Social History     Tobacco Use    Smoking status: Former     Current packs/day: 0.00     Types: Cigarettes     Quit date:      Years since quittin.3     Passive exposure: Never    Smokeless tobacco: Never    Tobacco comments:     stopped smoking in    Vaping Use    Vaping status: Never Used   Substance Use Topics    Alcohol use: No    Drug use: No       Home Meds:   Medications Prior to Admission   Medication Sig Dispense Refill Last Dose    apixaban (ELIQUIS) 2.5 MG tablet tablet Take 1 tablet by mouth 2 (Two) Times a Day. 60 tablet 11     atorvastatin (LIPITOR) 20 MG tablet Take 1 tablet by mouth Daily.  3     fenofibrate 160 MG tablet Take 1 tablet by mouth Daily.  3     hydroxychloroquine (PLAQUENIL) 200 MG tablet Take 2 tablets by mouth Daily.  0     ipratropium-albuterol (DUO-NEB) 0.5-2.5 mg/3 ml nebulizer Take 3 mL by nebulization Every 4 (Four) Hours As Needed for Wheezing.       losartan (COZAAR) 100 MG tablet Take 1 tablet by mouth Daily.  3     Multiple Vitamins-Minerals (MULTIVITAMIN WITH MINERALS) tablet tablet Take 1 tablet by mouth Daily.       oxybutynin XL (DITROPAN-XL) 10 MG 24 hr tablet Take 1 tablet by mouth Daily.       pantoprazole (PROTONIX) 40 MG EC tablet Take 1 tablet by mouth Daily.       revefenacin (YUPELRI) 175 MCG/3ML nebulizer solution Take 3 mL by nebulization Daily.       spironolactone (ALDACTONE) 25 MG tablet Take 1 tablet by mouth Daily.  3     tamsulosin (FLOMAX) 0.4 MG capsule 24 hr capsule Take 1 capsule by mouth Daily.           Scheduled Meds:  [Held by provider] apixaban, 2.5 mg, Oral, BID  [Held by provider] atorvastatin, 20 mg, Oral, Daily  cefTRIAXone, 2,000 mg, Intravenous, Q24H  enoxaparin, 1 mg/kg, Subcutaneous, Once  ferric gluconate, 250 mg, Intravenous, Once  hydroxychloroquine, 400 mg, Oral, Daily  [Held by provider] losartan, 100 mg, Oral, Daily  multivitamin with minerals, 1 tablet, Oral, Daily  pantoprazole, 40 mg, Oral, Q AM  sodium bicarbonate, 650 mg, Oral, TID  sodium chloride, 10 mL, Intravenous, Q12H  [Held by provider] spironolactone, 25 mg, Oral, Daily  tamsulosin, 0.4 mg, Oral, Daily        Continuous Infusions:  lactated ringers, 100 mL/hr, Last Rate: 100 mL/hr (04/24/24 0016)        PRN Meds:    [Held by provider] acetaminophen **OR** [Held by provider] acetaminophen    aluminum-magnesium hydroxide-simethicone    senna-docusate sodium **AND** polyethylene glycol **AND** bisacodyl **AND** bisacodyl    dextrose    dextrose    glucagon (human recombinant)    ipratropium-albuterol    nitroglycerin    ondansetron ODT **OR** ondansetron    sodium chloride    sodium chloride    Allergies:  Penicillin v potassium and Latex    OBJECTIVE    Vital Signs  Temp:  [96.2 °F (35.7 °C)-97.7 °F (36.5 °C)] 96.2 °F (35.7 °C)  Heart Rate:  [0-66] 45  Resp:  [26] 26  BP: (110-131)/(51-80) 117/54    I/O this shift:  In: 180 [P.O.:180]  Out: -   I/O last 3 completed shifts:  In: 5065 [P.O.:610; I.V.:4455]  Out: 425 [Urine:425]    Physical Exam:  General Appearance: alert, appears stated age and cooperative  Head: normocephalic, without obvious abnormality and atraumatic  Eyes: conjunctivae and sclerae normal and no icterus  Neck: supple and plus JVD  Lungs: Diminished, but bilateral crackles present.  Heart: regular rhythm & normal rate and normal S1, S2  Chest Wall: no abnormalities observed  Abdomen: normal bowel sounds and soft, nontender  Extremities: moves extremities well, plus edema, no cyanosis  Skin: no bleeding,  "bruising, has bilateral chronic skin pigmentation lower extremity.  Neurologic: Alert, and oriented. No focal deficits    Results Review:    I reviewed the patient's new clinical results.    WBC WBC   Date Value Ref Range Status   04/24/2024 6.48 3.40 - 10.80 10*3/mm3 Final   04/23/2024 13.06 (H) 3.40 - 10.80 10*3/mm3 Final      HGB Hemoglobin   Date Value Ref Range Status   04/24/2024 7.6 (L) 13.0 - 17.7 g/dL Final   04/23/2024 8.8 (L) 13.0 - 17.7 g/dL Final      HCT Hematocrit   Date Value Ref Range Status   04/24/2024 24.7 (L) 37.5 - 51.0 % Final   04/23/2024 28.4 (L) 37.5 - 51.0 % Final      Platelets No results found for: \"LABPLAT\"   MCV MCV   Date Value Ref Range Status   04/24/2024 101.2 (H) 79.0 - 97.0 fL Final   04/23/2024 102.5 (H) 79.0 - 97.0 fL Final          Sodium Sodium   Date Value Ref Range Status   04/24/2024 141 136 - 145 mmol/L Final   04/23/2024 143 136 - 145 mmol/L Final      Potassium Potassium   Date Value Ref Range Status   04/24/2024 3.9 3.5 - 5.2 mmol/L Final   04/23/2024 4.1 3.5 - 5.2 mmol/L Final      Chloride Chloride   Date Value Ref Range Status   04/24/2024 114 (H) 98 - 107 mmol/L Final   04/23/2024 113 (H) 98 - 107 mmol/L Final      CO2 CO2   Date Value Ref Range Status   04/24/2024 16.0 (L) 22.0 - 29.0 mmol/L Final   04/23/2024 16.0 (L) 22.0 - 29.0 mmol/L Final      BUN BUN   Date Value Ref Range Status   04/24/2024 35 (H) 8 - 23 mg/dL Final   04/23/2024 34 (H) 8 - 23 mg/dL Final      Creatinine Creatinine   Date Value Ref Range Status   04/24/2024 1.49 (H) 0.76 - 1.27 mg/dL Final   04/23/2024 1.53 (H) 0.76 - 1.27 mg/dL Final      Calcium Calcium   Date Value Ref Range Status   04/24/2024 7.5 (L) 8.6 - 10.5 mg/dL Final   04/23/2024 8.1 (L) 8.6 - 10.5 mg/dL Final      PO4 No results found for: \"CAPO4\"   Albumin Albumin   Date Value Ref Range Status   04/24/2024 2.4 (L) 3.5 - 5.2 g/dL Final   04/23/2024 2.7 (L) 3.5 - 5.2 g/dL Final      Magnesium Magnesium   Date Value Ref Range " "Status   04/24/2024 1.7 1.6 - 2.4 mg/dL Final   04/23/2024 1.6 1.6 - 2.4 mg/dL Final      Uric Acid No results found for: \"URICACID\"       Imaging Results (Last 72 Hours)       Procedure Component Value Units Date/Time    XR Chest 1 View [[id number]] Collected: 04/24/24 0851     Updated: 04/24/24 0854    Narrative:      XR CHEST 1 VW    Date of Exam: 4/24/2024 8:35 AM EDT    Indication: Possible pneumonia    Comparison: 4/23/2024    Findings:  Unchanged enlarged cardiac silhouette. There are persistent small bilateral pleural effusions with adjacent atelectasis. No definite new airspace consolidation. Osseous fractures are unchanged.      Impression:      Impression:  No significant interval change.      Electronically Signed: Daniel Jennings MD    4/24/2024 8:52 AM EDT    Workstation ID: [id number]    US Liver [[id number]] Collected: 04/23/24 1528     Updated: 04/23/24 1533    Narrative:      US LIVER    Date of Exam: 4/23/2024 2:09 PM EDT    Indication: Elevated transaminases.    Comparison: No comparisons available.    Technique: Grayscale and color Doppler ultrasound evaluation of the liver was performed.      Findings:  The liver measures 16.2 cm in length. There is mild increased echogenicity which can be seen with hepatic steatosis. There are no focal hepatic masses. There is normal directional flow in the main portal vein and hepatic veins. There are incidental   echogenic shadowing gallstones. The gallbladder wall is slightly thickened measuring 3-4 mm. The common bile duct caliber is normal measuring 5 mm. There is no ascites.      Impression:      Impression:    1. Increased hepatic echogenicity which can be seen with hepatic steatosis.  2. Cholelithiasis. There is slight thickening of the gallbladder wall which is nonspecific.        Electronically Signed: Jayy Osborn MD    4/23/2024 3:31 PM EDT    Workstation ID: [id number]              Results for orders placed during the hospital encounter of 04/23/24    XR " Chest 1 View    Narrative  XR CHEST 1 VW    Date of Exam: 4/24/2024 8:35 AM EDT    Indication: Possible pneumonia    Comparison: 4/23/2024    Findings:  Unchanged enlarged cardiac silhouette. There are persistent small bilateral pleural effusions with adjacent atelectasis. No definite new airspace consolidation. Osseous fractures are unchanged.    Impression  Impression:  No significant interval change.      Electronically Signed: Daniel Jennings MD  4/24/2024 8:52 AM EDT  Workstation ID: [id number]      Results for orders placed in visit on 09/12/23    SCANNED - IMAGING      Results for orders placed in visit on 06/19/23    SCANNED - IMAGING            ASSESSMENT / PLAN      Sepsis    ELYSSA-likely prerenal and/or ATN in setting of hypotension/sepsis.  Urine suggestive of UTI.  Check renal ultrasound to exclude any obstructive uropathy  CKD stage IIIa-CKD due to hypertensive nephrosclerosis and or diabetic glomerulosclerosis  UTI  [place] metabolic acidosis-likely related to diarrhea/CKD.  Add p.o. sodium bicarb  COPD  Transaminitis  History of paroxysmal atrial fibrillation  Likely sepsis-antibiotics per primary team    Will add midodrine for hypotension  Currently appears to be getting fluid overloaded.  Will stop IV fluids  Check renal ultrasound  Hold losartan and Aldactone  Add p.o. sodium bicarb  Monitor renal function fluid status electrolytes        I discussed the patient's findings and my recommendations with patient and consulting provider    Will follow along closely. Thank you for allowing us to see this patient in renal consultation.    [facility]  [phone]  MD Sascha Gomez MD  04/24/24  11:58 EDT

## 2024-04-24 NOTE — PROGRESS NOTES
Referring Provider: Kevin Hanks DO    Reason for follow-up:  Atrial fibrillation  Elevated troponin level     Patient Care Team:  Guilherme Jason MD as PCP - General (Family Medicine)  Misty Dias MD as Consulting Physician (Cardiology)  Nora Kenyon DNP, APRN as Nurse Practitioner (Thoracic Surgery)    Subjective .      ROS    Today the patient has been without any chest discomfort , unusual shortness of breath, palpitations, dizziness or syncope.  Denies having any headache ,abdominal pain ,nausea, vomiting , diarrhea constipation, loss of weight or loss of appetite.  Denies having any excessive bruising ,hematuria or blood in the stool.    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    Neutropenia 11/2003    MITZI (obstructive sleep apnea) 2018    Psoriasis 2000    Renal cyst, left     Rheumatoid arthritis     Stroke (cerebrum)     Thrombocytopenia 08/23/2005       Past Surgical History:   Procedure Laterality Date    SKIN LESION EXCISION      back and groin-benign       Family History   Problem Relation Age of Onset    Lung cancer Brother         age unknown    Heart disease Brother     Stroke Mother     Heart disease Father     Heart disease Sister        Social History     Tobacco Use    Smoking status: Former     Current packs/day: 0.00     Types: Cigarettes     Quit date: 1995     Years  since quittin.3     Passive exposure: Never    Smokeless tobacco: Never    Tobacco comments:     stopped smoking in    Vaping Use    Vaping status: Never Used   Substance Use Topics    Alcohol use: No    Drug use: No        Medications Prior to Admission   Medication Sig Dispense Refill Last Dose    apixaban (ELIQUIS) 2.5 MG tablet tablet Take 1 tablet by mouth 2 (Two) Times a Day. 60 tablet 11     atorvastatin (LIPITOR) 20 MG tablet Take 1 tablet by mouth Daily.  3     fenofibrate 160 MG tablet Take 1 tablet by mouth Daily.  3     hydroxychloroquine (PLAQUENIL) 200 MG tablet Take 2 tablets by mouth Daily.  0     ipratropium-albuterol (DUO-NEB) 0.5-2.5 mg/3 ml nebulizer Take 3 mL by nebulization Every 4 (Four) Hours As Needed for Wheezing.       losartan (COZAAR) 100 MG tablet Take 1 tablet by mouth Daily.  3     Multiple Vitamins-Minerals (MULTIVITAMIN WITH MINERALS) tablet tablet Take 1 tablet by mouth Daily.       oxybutynin XL (DITROPAN-XL) 10 MG 24 hr tablet Take 1 tablet by mouth Daily.       pantoprazole (PROTONIX) 40 MG EC tablet Take 1 tablet by mouth Daily.       revefenacin (Yupelri) 175 MCG/3ML nebulizer solution Take  by nebulization Daily.       revefenacin (YUPELRI) 175 MCG/3ML nebulizer solution Take 3 mL by nebulization Daily.       spironolactone (ALDACTONE) 25 MG tablet Take 1 tablet by mouth Daily.  3     tamsulosin (FLOMAX) 0.4 MG capsule 24 hr capsule Take 1 capsule by mouth Daily.          Allergies  Penicillin v potassium and Latex    Scheduled Meds:cefTRIAXone, 2,000 mg, Intravenous, Q24H  hydrocortisone sodium succinate, 50 mg, Intravenous, Q6H  pantoprazole, 40 mg, Oral, Q AM  sodium chloride, 10 mL, Intravenous, Q12H      Continuous Infusions:lactated ringers, 100 mL/hr, Last Rate: 100 mL/hr (24 0016)  norepinephrine, 0.02-0.5 mcg/kg/min, Last Rate: Stopped (24 1415)      PRN Meds:.  [Held by provider] acetaminophen **OR** [Held by provider] acetaminophen     "aluminum-magnesium hydroxide-simethicone    senna-docusate sodium **AND** polyethylene glycol **AND** bisacodyl **AND** bisacodyl    dextrose    dextrose    glucagon (human recombinant)    nitroglycerin    ondansetron ODT **OR** ondansetron    sodium chloride    sodium chloride    Objective     VITAL SIGNS  Vitals:    04/24/24 0350 04/24/24 0400 04/24/24 0500 04/24/24 0600   BP:  124/53 124/58 117/54   Pulse:  55 50 (!) 45   Resp:       Temp: 97.6 °F (36.4 °C)      TempSrc: Oral      SpO2:  92% 98% 96%   Weight:       Height:           Flowsheet Rows      Flowsheet Row First Filed Value   Admission Height 167.6 cm (66\") Documented at 04/23/2024 1200   Admission Weight 81.9 kg (180 lb 8.9 oz) Documented at 04/23/2024 1200              Intake/Output Summary (Last 24 hours) at 4/24/2024 0631  Last data filed at 4/24/2024 0442  Gross per 24 hour   Intake 5065 ml   Output 425 ml   Net 4640 ml        TELEMETRY: Atrial fibrillation with controlled ventricular response.    Physical Exam:  The patient is alert, oriented and in no distress.  Vital signs as noted above.  Head and neck revealed no carotid bruits or jugular venous distention.  No thyromegaly or lymphadenopathy is present  Lungs clear.  No wheezing.  Breath sounds are normal bilaterally.  Heart normal first and second heart sounds.  No murmur. No precordial rub is present.  No gallop is present.  Abdomen soft and nontender.  No organomegaly is present.  Extremities with good peripheral pulses without any pedal edema.  Skin warm and dry.  Upper and lower extremity bruising is present.  Musculoskeletal system is grossly normal  CNS grossly normal    Reviewed and updated.    Results Review:   I reviewed the patient's new clinical results.  Lab Results (last 24 hours)       Procedure Component Value Units Date/Time    Phosphorus [[id number]]  (Normal) Collected: 04/24/24 0338    Specimen: Blood Updated: 04/24/24 0405     Phosphorus 3.3 mg/dL     Magnesium [[id number]]  " (Normal) Collected: 04/24/24 0338    Specimen: Blood Updated: 04/24/24 0403     Magnesium 1.7 mg/dL     Comprehensive Metabolic Panel [[id number]]  (Abnormal) Collected: 04/24/24 0338    Specimen: Blood Updated: 04/24/24 0403     Glucose 130 mg/dL      BUN 35 mg/dL      Creatinine 1.49 mg/dL      Sodium 141 mmol/L      Potassium 3.9 mmol/L      Chloride 114 mmol/L      CO2 16.0 mmol/L      Calcium 7.5 mg/dL      Total Protein 4.9 g/dL      Albumin 2.4 g/dL      ALT (SGPT) 75 U/L      AST (SGOT) 156 U/L      Alkaline Phosphatase 145 U/L      Total Bilirubin 1.3 mg/dL      Globulin 2.5 gm/dL      A/G Ratio 1.0 g/dL      BUN/Creatinine Ratio 23.5     Anion Gap 11.0 mmol/L      eGFR 46.3 mL/min/1.73     Narrative:      GFR Normal >60  Chronic Kidney Disease <60  Kidney Failure <15    The GFR formula is only valid for adults with stable renal function between ages 18 and 70.    CBC & Differential [[id number]]  (Abnormal) Collected: 04/24/24 0338    Specimen: Blood Updated: 04/24/24 0346    Narrative:      The following orders were created for panel order CBC & Differential.  Procedure                               Abnormality         Status                     ---------                               -----------         ------                     CBC Auto Differential[[id number]]        Abnormal            Final result                 Please view results for these tests on the individual orders.    CBC Auto Differential [[id number]]  (Abnormal) Collected: 04/24/24 0338    Specimen: Blood Updated: 04/24/24 0346     WBC 6.48 10*3/mm3      RBC 2.44 10*6/mm3      Hemoglobin 7.6 g/dL      Hematocrit 24.7 %      .2 fL      MCH 31.1 pg      MCHC 30.8 g/dL      RDW 14.7 %      RDW-SD 55.2 fl      MPV 12.5 fL      Platelets 148 10*3/mm3      Neutrophil % 86.0 %      Lymphocyte % 8.6 %      Monocyte % 4.6 %      Eosinophil % 0.0 %      Basophil % 0.2 %      Immature Grans % 0.6 %      Neutrophils, Absolute 5.57 10*3/mm3       Lymphocytes, Absolute 0.56 10*3/mm3      Monocytes, Absolute 0.30 10*3/mm3      Eosinophils, Absolute 0.00 10*3/mm3      Basophils, Absolute 0.01 10*3/mm3      Immature Grans, Absolute 0.04 10*3/mm3      nRBC 0.0 /100 WBC     POC Glucose 4x Daily Before Meals & at Bedtime [[id number]]  (Abnormal) Collected: 04/23/24 2235    Specimen: Blood Updated: 04/23/24 2238     Glucose 144 mg/dL      Comment: Serial Number: [id number]:  [id number]       Legionella Antigen, Urine - Urine, Urine, Clean Catch [[id number]]  (Normal) Collected: 04/23/24 2203    Specimen: Urine, Clean Catch Updated: 04/23/24 2227     LEGIONELLA ANTIGEN, URINE Negative    S. Pneumo Ag Urine or CSF - Urine, Urine, Clean Catch [[id number]]  (Normal) Collected: 04/23/24 2203    Specimen: Urine, Clean Catch Updated: 04/23/24 2227     Strep Pneumo Ag Negative    Urinalysis With Culture If Indicated - Urine, Clean Catch [[id number]]  (Abnormal) Collected: 04/23/24 2204    Specimen: Urine, Clean Catch Updated: 04/23/24 2217     Color, UA Lynnette     Appearance, UA Cloudy     pH, UA <=5.0     Specific Gravity, UA 1.023     Glucose, UA Negative     Ketones, UA Negative     Bilirubin, UA Moderate (2+)     Comment: Confirmation testing is unavailable.  A serum bilirubin is recommended for further assessment.        Blood, UA Small (1+)     Protein, UA 30 mg/dL (1+)     Leuk Esterase, UA Small (1+)     Nitrite, UA Positive     Urobilinogen, UA 1.0 E.U./dL    Narrative:      In absence of clinical symptoms, the presence of pyuria, bacteria, and/or nitrites on the urinalysis result does not correlate with infection.    Urinalysis, Microscopic Only - Urine, Clean Catch [[id number]]  (Abnormal) Collected: 04/23/24 2204    Specimen: Urine, Clean Catch Updated: 04/23/24 2217     RBC, UA 11-20 /HPF      WBC, UA 0-2 /HPF      Comment: Urine culture not indicated.        Bacteria, UA None Seen /HPF      Squamous Epithelial Cells, UA 0-2 /HPF      Hyaline Casts, UA 0-2 /LPF       Methodology Automated Microscopy    Respiratory Culture - Sputum, Cough [[id number]] Collected: 04/23/24 2204    Specimen: Sputum from Cough Updated: 04/23/24 2208    POC Glucose 4x Daily Before Meals & at Bedtime [[id number]]  (Abnormal) Collected: 04/23/24 1751    Specimen: Blood Updated: 04/23/24 1801     Glucose 114 mg/dL      Comment: Serial Number: [id number]:  [id number]       High Sensitivity Troponin T 2Hr [[id number]]  (Abnormal) Collected: 04/23/24 1433    Specimen: Blood Updated: 04/23/24 1507     HS Troponin T 219 ng/L      Troponin T Delta -35 ng/L     Narrative:      High Sensitive Troponin T Reference Range:  <14.0 ng/L- Negative Female for AMI  <22.0 ng/L- Negative Male for AMI  >=14 - Abnormal Female indicating possible myocardial injury.  >=22 - Abnormal Male indicating possible myocardial injury.   Clinicians would have to utilize clinical acumen, EKG, Troponin, and serial changes to determine if it is an Acute Myocardial Infarction or myocardial injury due to an underlying chronic condition.         POC Glucose Once [[id number]]  (Normal) Collected: 04/23/24 1433    Specimen: Blood Updated: 04/23/24 1454     Glucose 98 mg/dL      Comment: Serial Number: [id number]:  [id number]       Hemoglobin A1c [[id number]]  (Abnormal) Collected: 04/23/24 1235    Specimen: Blood Updated: 04/23/24 1429     Hemoglobin A1C 4.70 %     MRSA Screen, PCR (Inpatient) - Swab, Nares [[id number]]  (Normal) Collected: 04/23/24 1237    Specimen: Swab from Nares Updated: 04/23/24 1356     MRSA PCR No MRSA Detected    Narrative:      The negative predictive value of this diagnostic test is high and should only be used to consider de-escalating anti-MRSA therapy. A positive result may indicate colonization with MRSA and must be correlated clinically.    CK [[id number]]  (Normal) Collected: 04/23/24 1235    Specimen: Blood Updated: 04/23/24 1349     Creatine Kinase 117 U/L     Lipase [[id number]]  (Normal) Collected:  04/23/24 1235    Specimen: Blood Updated: 04/23/24 1349     Lipase 16 U/L     Amylase [[id number]]  (Normal) Collected: 04/23/24 1235    Specimen: Blood Updated: 04/23/24 1349     Amylase 29 U/L     High Sensitivity Troponin T [[id number]]  (Abnormal) Collected: 04/23/24 1235    Specimen: Blood Updated: 04/23/24 1333     HS Troponin T 254 ng/L     Narrative:      High Sensitive Troponin T Reference Range:  <14.0 ng/L- Negative Female for AMI  <22.0 ng/L- Negative Male for AMI  >=14 - Abnormal Female indicating possible myocardial injury.  >=22 - Abnormal Male indicating possible myocardial injury.   Clinicians would have to utilize clinical acumen, EKG, Troponin, and serial changes to determine if it is an Acute Myocardial Infarction or myocardial injury due to an underlying chronic condition.         Respiratory Panel PCR w/COVID-19(SARS-CoV-2) KEN/HUBERT/CHIKA/PAD/COR/FLORECITA In-House, NP Swab in UTM/VTM, 2 HR TAT - Swab, Nasopharynx [[id number]]  (Normal) Collected: 04/23/24 1237    Specimen: Swab from Nasopharynx Updated: 04/23/24 1331     ADENOVIRUS, PCR Not Detected     Coronavirus 229E Not Detected     Coronavirus HKU1 Not Detected     Coronavirus NL63 Not Detected     Coronavirus OC43 Not Detected     COVID19 Not Detected     Human Metapneumovirus Not Detected     Human Rhinovirus/Enterovirus Not Detected     Influenza A PCR Not Detected     Influenza B PCR Not Detected     Parainfluenza Virus 1 Not Detected     Parainfluenza Virus 2 Not Detected     Parainfluenza Virus 3 Not Detected     Parainfluenza Virus 4 Not Detected     RSV, PCR Not Detected     Bordetella pertussis pcr Not Detected     Bordetella parapertussis PCR Not Detected     Chlamydophila pneumoniae PCR Not Detected     Mycoplasma pneumo by PCR Not Detected    Narrative:      In the setting of a positive respiratory panel with a viral infection PLUS a negative procalcitonin without other underlying concern for bacterial infection, consider observing off  antibiotics or discontinuation of antibiotics and continue supportive care. If the respiratory panel is positive for atypical bacterial infection (Bordetella pertussis, Chlamydophila pneumoniae, or Mycoplasma pneumoniae), consider antibiotic de-escalation to target atypical bacterial infection.    Comprehensive Metabolic Panel [[id number]]  (Abnormal) Collected: 04/23/24 1235    Specimen: Blood Updated: 04/23/24 1328     Glucose 98 mg/dL      BUN 34 mg/dL      Creatinine 1.53 mg/dL      Sodium 143 mmol/L      Potassium 4.1 mmol/L      Chloride 113 mmol/L      CO2 16.0 mmol/L      Calcium 8.1 mg/dL      Total Protein 5.3 g/dL      Albumin 2.7 g/dL      ALT (SGPT) 67 U/L      AST (SGOT) 154 U/L      Alkaline Phosphatase 192 U/L      Total Bilirubin 2.3 mg/dL      Globulin 2.6 gm/dL      A/G Ratio 1.0 g/dL      BUN/Creatinine Ratio 22.2     Anion Gap 14.0 mmol/L      eGFR 44.8 mL/min/1.73     Narrative:      GFR Normal >60  Chronic Kidney Disease <60  Kidney Failure <15    The GFR formula is only valid for adults with stable renal function between ages 18 and 70.    C-reactive Protein [[id number]]  (Abnormal) Collected: 04/23/24 1235    Specimen: Blood Updated: 04/23/24 1328     C-Reactive Protein 10.13 mg/dL     Magnesium [[id number]]  (Normal) Collected: 04/23/24 1235    Specimen: Blood Updated: 04/23/24 1328     Magnesium 1.6 mg/dL     Phosphorus [[id number]]  (Normal) Collected: 04/23/24 1235    Specimen: Blood Updated: 04/23/24 1328     Phosphorus 2.5 mg/dL     Lipid Panel [[id number]]  (Abnormal) Collected: 04/23/24 1235    Specimen: Blood Updated: 04/23/24 1328     Total Cholesterol 64 mg/dL      Triglycerides 47 mg/dL      HDL Cholesterol 30 mg/dL      LDL Cholesterol  21 mg/dL      VLDL Cholesterol 13 mg/dL      LDL/HDL Ratio 0.82    Narrative:      Cholesterol Reference Ranges  (U.S. Department of Health and Human Services ATP III Classifications)    Desirable          <200 mg/dL  Borderline High    200-239  "mg/dL  High Risk          >240 mg/dL      Triglyceride Reference Ranges  (U.S. Department of Health and Human Services ATP III Classifications)    Normal           <150 mg/dL  Borderline High  150-199 mg/dL  High             200-499 mg/dL  Very High        >500 mg/dL    HDL Reference Ranges  (U.S. Department of Health and Human Services ATP III Classifications)    Low     <40 mg/dl (major risk factor for CHD)  High    >60 mg/dl ('negative' risk factor for CHD)        LDL Reference Ranges  (U.S. Department of Health and Human Services ATP III Classifications)    Optimal          <100 mg/dL  Near Optimal     100-129 mg/dL  Borderline High  130-159 mg/dL  High             160-189 mg/dL  Very High        >189 mg/dL    Procalcitonin [[id number]]  (Abnormal) Collected: 04/23/24 1235    Specimen: Blood Updated: 04/23/24 1327     Procalcitonin 8.46 ng/mL     Narrative:      As a Marker for Sepsis (Non-Neonates):    1. <0.5 ng/mL represents a low risk of severe sepsis and/or septic shock.  2. >2 ng/mL represents a high risk of severe sepsis and/or septic shock.    As a Marker for Lower Respiratory Tract Infections that require antibiotic therapy:    PCT on Admission    Antibiotic Therapy       6-12 Hrs later    >0.5                Strongly Recommended  >0.25 - <0.5        Recommended   0.1 - 0.25          Discouraged              Remeasure/reassess PCT  <0.1                Strongly Discouraged     Remeasure/reassess PCT    As 28 day mortality risk marker: \"Change in Procalcitonin Result\" (>80% or <=80%) if Day 0 (or Day 1) and Day 4 values are available. Refer to http://www."[employer]s-pct-calculator.com    Change in PCT <=80%  A decrease of PCT levels below or equal to 80% defines a positive change in PCT test result representing a higher risk for 28-day all-cause mortality of patients diagnosed with severe sepsis for septic shock.    Change in PCT >80%  A decrease of PCT levels of more than 80% defines a negative change in PCT " result representing a lower risk for 28-day all-cause mortality of patients diagnosed with severe sepsis or septic shock.       CBC & Differential [[id number]]  (Abnormal) Collected: 04/23/24 1235    Specimen: Blood Updated: 04/23/24 1313    Narrative:      The following orders were created for panel order CBC & Differential.  Procedure                               Abnormality         Status                     ---------                               -----------         ------                     CBC Auto Differential[[id number]]        Abnormal            Final result               Scan Slide[[id number]]                                       Final result                 Please view results for these tests on the individual orders.    CBC Auto Differential [[id number]]  (Abnormal) Collected: 04/23/24 1235    Specimen: Blood Updated: 04/23/24 1313     WBC 13.06 10*3/mm3      RBC 2.77 10*6/mm3      Hemoglobin 8.8 g/dL      Hematocrit 28.4 %      .5 fL      MCH 31.8 pg      MCHC 31.0 g/dL      RDW 14.6 %      RDW-SD 54.8 fl      MPV 12.2 fL      Platelets 183 10*3/mm3     Narrative:      The previously reported component NRBC is no longer being reported. Previous result was 0.0 /100 WBC (Reference Range: 0.0-0.2 /100 WBC) on 4/23/2024 at 1249 EDT.    Scan Slide [[id number]] Collected: 04/23/24 1235    Specimen: Blood Updated: 04/23/24 1313     Scan Slide --     Comment: See Manual Differential Results       Manual Differential [[id number]]  (Abnormal) Collected: 04/23/24 1235    Specimen: Blood Updated: 04/23/24 1313     Neutrophil % 80.0 %      Lymphocyte % 0.0 %      Bands %  19.0 %      Metamyelocyte % 1.0 %      Neutrophils Absolute 12.93 10*3/mm3      Lymphocytes Absolute 0.00 10*3/mm3      RBC Fragments Slight/1+     Toxic Granulation Slight/1+     Vacuolated Neutrophils Slight/1+     Platelet Morphology Normal    Lactic Acid, Plasma [[id number]]  (Normal) Collected: 04/23/24 1235    Specimen: Blood Updated:  04/23/24 1310     Lactate 0.9 mmol/L     Blood Culture - Blood, Hand, Left [[id number]] Collected: 04/23/24 1245    Specimen: Blood from Hand, Left Updated: 04/23/24 1248    Blood Culture - Blood, Arm, Right [[id number]] Collected: 04/23/24 1235    Specimen: Blood from Arm, Right Updated: 04/23/24 1243            Imaging Results (Last 24 Hours)       Procedure Component Value Units Date/Time    US Liver [[id number]] Collected: 04/23/24 1528     Updated: 04/23/24 1533    Narrative:      US LIVER    Date of Exam: 4/23/2024 2:09 PM EDT    Indication: Elevated transaminases.    Comparison: No comparisons available.    Technique: Grayscale and color Doppler ultrasound evaluation of the liver was performed.      Findings:  The liver measures 16.2 cm in length. There is mild increased echogenicity which can be seen with hepatic steatosis. There are no focal hepatic masses. There is normal directional flow in the main portal vein and hepatic veins. There are incidental   echogenic shadowing gallstones. The gallbladder wall is slightly thickened measuring 3-4 mm. The common bile duct caliber is normal measuring 5 mm. There is no ascites.      Impression:      Impression:    1. Increased hepatic echogenicity which can be seen with hepatic steatosis.  2. Cholelithiasis. There is slight thickening of the gallbladder wall which is nonspecific.        Electronically Signed: Jayy Osborn MD    4/23/2024 3:31 PM EDT    Workstation ID: [id number]        LAB RESULTS (LAST 7 DAYS)    CBC  Results from last 7 days   Lab Units 04/24/24  0338 04/23/24  1235   WBC 10*3/mm3 6.48 13.06*   RBC 10*6/mm3 2.44* 2.77*   HEMOGLOBIN g/dL 7.6* 8.8*   HEMATOCRIT % 24.7* 28.4*   MCV fL 101.2* 102.5*   PLATELETS 10*3/mm3 148 183       BMP  Results from last 7 days   Lab Units 04/24/24  0338 04/23/24  1235   SODIUM mmol/L 141 143   POTASSIUM mmol/L 3.9 4.1   CHLORIDE mmol/L 114* 113*   CO2 mmol/L 16.0* 16.0*   BUN mg/dL 35* 34*   CREATININE mg/dL 1.49* 1.53*    GLUCOSE mg/dL 130* 98   MAGNESIUM mg/dL 1.7 1.6   PHOSPHORUS mg/dL 3.3 2.5       CMP   Results from last 7 days   Lab Units 04/24/24  0338 04/23/24  1235   SODIUM mmol/L 141 143   POTASSIUM mmol/L 3.9 4.1   CHLORIDE mmol/L 114* 113*   CO2 mmol/L 16.0* 16.0*   BUN mg/dL 35* 34*   CREATININE mg/dL 1.49* 1.53*   GLUCOSE mg/dL 130* 98   ALBUMIN g/dL 2.4* 2.7*   BILIRUBIN mg/dL 1.3* 2.3*   ALK PHOS U/L 145* 192*   AST (SGOT) U/L 156* 154*   ALT (SGPT) U/L 75* 67*   AMYLASE U/L  --  29   LIPASE U/L  --  16         BNP        TROPONIN  Results from last 7 days   Lab Units 04/23/24  1433 04/23/24  1235   CK TOTAL U/L  --  117   HSTROP T ng/L 219* 254*       CoAg        Creatinine Clearance  Estimated Creatinine Clearance: 37.7 mL/min (A) (by C-G formula based on SCr of 1.49 mg/dL (H)).    ABG        Radiology  US Liver    Result Date: 4/23/2024  Impression: 1. Increased hepatic echogenicity which can be seen with hepatic steatosis. 2. Cholelithiasis. There is slight thickening of the gallbladder wall which is nonspecific. Electronically Signed: Jayy Osborn MD  4/23/2024 3:31 PM EDT  Workstation ID: [id number]         EKG      I personally viewed and interpreted the patient's EKG/Telemetry data:    ECHOCARDIOGRAM:    Results for orders placed during the hospital encounter of 07/28/21    Adult Transthoracic Echo Complete W/ Cont if Necessary Per Protocol    Interpretation Summary  · Estimated left ventricular EF = 60% Left ventricular systolic function is normal.    Indications  Shortness of breath    Technically satisfactory study.  Mitral valve is structurally normal.  Mild mitral regurgitation.  Tricuspid valve is structurally normal.  Aortic valve is structurally normal.  Pulmonic valve could not be well visualized.  No evidence for tricuspid or aortic regurgitation is seen by Doppler study.  Left atrium is normal in size.  Right atrium is normal in size.  Left ventricle is normal in size and contractility with ejection  fraction of 60%.  Right ventricle is normal in size.  Atrial septum is intact.  Aorta is normal.  No pericardial effusion or intracardiac thrombus is seen.    Impression  Structurally and functionally normal cardiac valves except for mild mitral regurgitation.  Left ventricular size and contractility is normal with ejection fraction of 60%.          STRESS TEST  Results for orders placed during the hospital encounter of 07/28/21    Stress Test With Myocardial Perfusion One Day    Interpretation Summary  Indications  Chest pain    This study was performed under my direct supervision.    Resting ECG  Sinus rhythm right bundle branch block first-degree AV block    The patient was injected with Lexiscan intravenously while constantly monitoring electrocardiogram and vital signs.  Patient did not have any chest discomfort ST abnormalities or ectopy with injection of Lexiscan.    Cardiolite was used as an imaging agent.    Cardiolite images showed mild inferior ischemia.    Gated SPECT images revealed normal left ventricle size and contractility with ejection fraction of 73%.    Impression  ========  Lexiscan Cardiolite test is negative for myocardial ischemia.    Gated SPECT images revealed normal left ventricular size and contractility with ejection fraction of 73%.        Cardiolite (Tc-99m sestamibi) stress test    CARDIAC CATHETERIZATION  No results found for this or any previous visit.                OTHER:         Assessment & Plan     Principal Problem:    Sepsis      ]]]]]]]]]]]]]]]]]]  History  ==========  -Sepsis     - Elevated troponin.     -History of atrial fibrillation  Patient has converted and was maintaining sinus rhythm.  EKG 12/27/2023-atrial fibrillation  EKG 2/7/2024-atrial fibrillation right bundle branch block      - shortness of breath fatigue and lightheadedness.  Improved     Echocardiogram-normal with ejection fraction of 60%-7/28/2021  Lexiscan Cardiolite test-mild inferior  ischemia-7/28/2021      -Chronic right bundle branch block      cardiac catheterization 04/11/2018 revealed normal left ventricular function normal coronary  arteries and no evidence for pulmonary hypertension was present.     -History of diffusely dilated ascending aorta    Echocardiogram 4/24/2024   Thickened mitral and aortic valves with adequate opening motion.  Moderate mitral tricuspid and aortic and pulmonic regurgitation.  Left atrial enlargement.  Left ventricular size and contractility is normal with ejection fraction of 60%.  Left ventricular peak systolic longitudinal strain is abnormal with GL PS of -14%.  Left ventricular grade 3 diastolic dysfunction is present.  Right atrium right ventricle enlargement is present.  Mild pulmonary hypertension is present.    Echocardiogram showed left atrial and right ventricle enlargement prominent aorta.  Normal left ventricular function with ejection fraction of 60% 04/05/2018.  Stacy scan Cardiolite test is abnormal with significant inferior ischemia and apical hypokinesis on the gated SPECT images 04/05/2018.     - diabetes hypertension LGL leukemia rheumatoid arthritis COPD CKD 3  Patient is on home oxygen.     - history of prostate carcinoma.  Status post radiation     - former smoker     - allergic to penicillin  ==========  Plan  ==========  Sepsis.  Elevated troponin.  Bouncing at the bottom.  Likely due to renal dysfunction and sepsis.    Echocardiogram 4/24/2024  Thickened mitral and aortic valves with adequate opening motion.  Moderate mitral tricuspid and aortic and pulmonic regurgitation.  Left atrial enlargement.  Left ventricular size and contractility is normal with ejection fraction of 60%.  Left ventricular peak systolic longitudinal strain is abnormal with GL PS of -14%.  Left ventricular grade 3 diastolic dysfunction is present.  Right atrium right ventricle enlargement is present.  Mild pulmonary hypertension is present.     History of atrial  fibrillation  Patient is maintaining sinus rhythm.  Patient is in sinus rhythm with first-degree AV block right bundle branch block-12/15/2021.  Sinus rhythm first-degree AV block right bundle branch block- 6/22/2022  EKG 6/22/2023 sinus bradycardia right bundle branch block  EKG 12/22/2022-sinus rhythm right bundle branch block  EKG 12/27/2023-atrial fibrillation  EKG 2/7/2024-atrial fibrillation right bundle branch block left anterior fascicular block.  Rate is well-controlled.  EKG-not available  Requested..  Patient is in atrial fibrillation on cardiac monitoring.      Anticoagulation was discussed.  Patient was on low-dose Eliquis.  Consideration may be given for Watchman procedure.     Chronic right bundle branch block-asymptomatic    Renal dysfunction  BUNs/creatinine 35/1.49-4/24/2024    Hypertension-stable.     Shortness of breath fatigue and lightheadedness.-Stable  COPD  Patient is on home oxygen.     Medications were reviewed and updated.     Further plan will depend on patient's progress.     Reviewed and updated-4/24/2024.  [[[[[[[[[[[[[[[[[[[[              Misty Dias MD  04/24/24  06:31 EDT

## 2024-04-24 NOTE — CONSULTS
Nutrition Services    Patient Name: Praneeth Nam  YOB: 1940  MRN: [id number]  Admission date: 4/23/2024    Comment:  -- Add chocolate Boost Plus BID (Provides 720 kcals, 28 g protein if consumed)   Boost Glucose Control may be substituted for Boost Plus at this time, due to national shortage of many ONS products. If substituted, each Boost Glucose Control will provide 190 kcal and 16g PRO.    -- Moderate chronic disease related malnutrition related to multiple chronic disease conditions including CKD, DM as evidenced by weight loss greater than 20% x 1 year, PO intakes less than 75% for 1 month and moderate muscle loss per physical exam.  See MSA below.      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    History of pneumonia 02/2015    hospitalized with community-acquired pneumonia, possibly viral     History of prostate cancer 10/2009    [place] 6, Stage II    Hypercholesterolemia     Hypertension     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    Neutropenia 11/2003    MITZI (obstructive sleep apnea) 2018    Psoriasis 2000    Renal cyst, left     Rheumatoid arthritis     Stroke (cerebrum)     Thrombocytopenia 08/23/2005       Past Surgical History:   Procedure Laterality Date    SKIN LESION EXCISION      back and groin-benign        Current Problems   Shock     Elevated troponin  - cardiology following     Transaminitis     Acute Kidney Injury, superimposed on CKD  - Nephrology following    Chronic / Paroxysmal atrial fibrillation      Essential hypertension     Diabetes mellitus Type  "2     COPD / Chronic oxygen use     GERD  Dyslipidemia  History of chronic IT  History SDH/SAH (2019)  History of Leukemia / Prostate cancer, in remission  History of bilateral PE / History of elevated Factor VIII level  Rheumatoid arthritis  MITZI       Encounter Information        Trending Narrative     4/24: Admitted for chills, generalized weakness, productive cough, and abdominal pain, and diagnosed with sepsis.  RD visited patient at bedside.  Patient very pleasant.  Reports food has not tasted the same x 2 years, no N/V, issues chewing related to no teeth (on soft to chew with chopped meats), weight loss x 200# but this is note reflected in his weight history x 5 years.         Anthropometrics        Current Height, Weight Height: 167.6 cm (66\")  Weight: 81.9 kg (180 lb 8.9 oz) (04/23/24 1200)       Usual Body Weight (UBW) Unable to obtain from patient        Trending Weight Hx     This admission: 4/24: 180#             PTA: 6.7% weight loss x 6 months   21.7% weight los x 1  year - significant     Wt Readings from Last 30 Encounters:   04/23/24 1200 81.9 kg (180 lb 8.9 oz)   02/07/24 1525 83.5 kg (184 lb)   12/27/23 1330 85.7 kg (189 lb)   10/17/23 1406 87.5 kg (193 lb)   07/18/23 1356 94.4 kg (208 lb 3.2 oz)   06/22/23 1555 94.8 kg (209 lb)   04/18/23 1434 100 kg (221 lb)   12/22/22 1504 104 kg (230 lb)   06/22/22 1317 103 kg (226 lb)   04/14/22 1142 104 kg (230 lb)   04/05/22 1451 104 kg (230 lb)   01/13/22 1026 102 kg (224 lb)   12/15/21 1435 104 kg (229 lb 12 oz)   09/13/21 1420 108 kg (239 lb)   07/28/21 1234 107 kg (235 lb)   07/28/21 1418 107 kg (235 lb)   07/13/21 1424 107 kg (235 lb 8 oz)   04/13/21 1345 106 kg (233 lb)   07/09/20 1454 99.3 kg (219 lb)   07/09/20 1356 99.8 kg (220 lb)   03/12/20 1432 98.7 kg (217 lb 9.6 oz)   01/21/20 1327 98.9 kg (218 lb)   12/12/19 1333 98.9 kg (218 lb)   12/05/19 1433 99.8 kg (220 lb)   11/21/19 1321 99.4 kg (219 lb 3.2 oz)   10/30/19 1420 98.2 kg (216 lb 9.6 oz) "   10/22/19 1402 96.5 kg (212 lb 12 oz)   09/08/19 0614 103 kg (227 lb 1.2 oz)   09/07/19 1435 103 kg (227 lb 15.3 oz)   09/07/19 0600 103 kg (227 lb 8.2 oz)   09/06/19 1600 104 kg (228 lb 13.4 oz)   09/06/19 1125 107 kg (235 lb)   09/07/19 1654 103 kg (227 lb)   07/18/19 1202 105 kg (231 lb)      BMI kg/m2 Body mass index is 29.14 kg/m².       Labs        Pertinent Labs    Results from last 7 days   Lab Units 04/24/24  0338 04/23/24  1235   SODIUM mmol/L 141 143   POTASSIUM mmol/L 3.9 4.1   CHLORIDE mmol/L 114* 113*   CO2 mmol/L 16.0* 16.0*   BUN mg/dL 35* 34*   CREATININE mg/dL 1.49* 1.53*   CALCIUM mg/dL 7.5* 8.1*   BILIRUBIN mg/dL 1.3* 2.3*   ALK PHOS U/L 145* 192*   ALT (SGPT) U/L 75* 67*   AST (SGOT) U/L 156* 154*   GLUCOSE mg/dL 130* 98     Results from last 7 days   Lab Units 04/24/24  0338 04/23/24  1235   MAGNESIUM mg/dL 1.7 1.6   PHOSPHORUS mg/dL 3.3 2.5   HEMOGLOBIN g/dL 7.6* 8.8*   HEMATOCRIT % 24.7* 28.4*   TRIGLYCERIDES mg/dL  --  47     Lab Results   Component Value Date    HGBA1C 4.70 (L) 04/23/2024        Medications    Scheduled Medications [Held by provider] apixaban, 2.5 mg, Oral, BID  [Held by provider] atorvastatin, 20 mg, Oral, Daily  cefTRIAXone, 2,000 mg, Intravenous, Q24H  enoxaparin, 1 mg/kg, Subcutaneous, Once  ferric gluconate, 250 mg, Intravenous, Once  hydroxychloroquine, 400 mg, Oral, Daily  midodrine, 5 mg, Oral, TID AC  multivitamin with minerals, 1 tablet, Oral, Daily  pantoprazole, 40 mg, Oral, Q AM  sodium bicarbonate, 1,300 mg, Oral, TID  sodium chloride, 10 mL, Intravenous, Q12H  [Held by provider] spironolactone, 25 mg, Oral, Daily  tamsulosin, 0.4 mg, Oral, Daily        Infusions      PRN Medications   [Held by provider] acetaminophen **OR** [Held by provider] acetaminophen    aluminum-magnesium hydroxide-simethicone    senna-docusate sodium **AND** polyethylene glycol **AND** bisacodyl **AND** bisacodyl    dextrose    dextrose    glucagon (human recombinant)     ipratropium-albuterol    nitroglycerin    ondansetron ODT **OR** ondansetron    sodium chloride    sodium chloride     Physical Findings        Trending Physical   Appearance, NFPE 4/24: NFPE completed, consistent with nutrition diagnosis of malnutrition using AND/ASPEN criteria. See MSA below.      --  Edema  No edema documented      Bowel Function Last documented BM 4/24 (today)     Tubes No feeding tube      Chewing/Swallowing No      Skin Intact      --  Current Nutrition Orders & Evaluation of Intake       Oral Nutrition     Food Allergies NKFA   Current PO Diet Diet: Cardiac; Healthy Heart (2-3 Na+); Texture: Soft to Chew (NDD 3); Soft to Chew: Chopped Meat; Fluid Consistency: Thin (IDDSI 0)   Supplement None ordered    PO Evaluation     Trending % PO Intake 4/24: 67% average PO intakes x 3 documented meals since admission    --  Nutritional Risk Screening        NRS-2002 Score          Nutrition Diagnosis         Nutrition Dx Problem 1 Moderate chronic disease related malnutrition related to multiple chronic disease conditions including CKD, DM as evidenced by weight loss greater than 20% x 1 year, PO intakes less than 75% for 1 month and moderate muscle loss per physical exam.        Nutrition Dx Problem 2        Intervention Goal         Intervention Goal(s) Accept ONS  Stable weight  PO intakes at least 75%     Nutrition Intervention        RD Action Add ONS  Completed NFPE  Obtained Boost for patient      Nutrition Prescription          Diet Prescription Heart Healthy, Soft to Chew, Chopped Meats    Supplement Prescription Boost Plus BID   --  Monitor/Evaluation        Monitor Per protocol, I&O, PO intake, Supplement intake, Pertinent labs, Weight, Skin status, GI status, Symptoms, POC/GOC     Malnutrition Severity Assessment      Patient meets criteria for : Moderate (non-severe) Malnutrition  Malnutrition Type (Last 8 Hours)       Malnutrition Severity Assessment       Row Name 04/25/24 9396        Malnutrition Severity Assessment    Malnutrition Type Chronic Disease - Related Malnutrition      Row Name 04/25/24 1033       Insufficient Energy Intake     Insufficient Energy Intake Findings Moderate    Insufficient Energy Intake  <75% of est. energy requirement for > or equal to 1 month      Row Name 04/25/24 1033       Unintentional Weight Loss     Unintentional Weight Loss Findings Severe    Unintentional Weight Loss  Weight loss greater than 20% in one year      Row Name 04/25/24 1033       Muscle Loss    Loss of Muscle Mass Findings Moderate    Tenriism Region Moderate - slight depression    Clavicle Bone Region Moderate - some protrusion in females, visible in males      Row Name 04/25/24 1033       Criteria Met (Must meet criteria for severity in at least 2 of these categories: M Wasting, Fat Loss, Fluid, Secondary Signs, Wt. Status, Intake)    Patient meets criteria for  Moderate (non-severe) Malnutrition                     Electronically signed by:  Esther Padilla RD  04/24/24 12:54 EDT

## 2024-04-24 NOTE — PROGRESS NOTES
Critical Care Progress Note     Praneeth Nam : 1940 MRN:[id number] LOS:1  Rm: 230     Principal Problem: Sepsis     Reason for follow up: All the medical problems listed below    Summary     Praneeth Nam is a 84 y.o. male with PMH of CKD, MITZI, COPD, GERD, history of SDH/SAH, history of leukemia/prostate cancer in remission, PAF on apixaban, and presented to [facility] via EMS for chills, generalized weakness, productive cough, and abdominal pain, and was admitted with a principal diagnosis of Sepsis.  ED provider stated that patient complained of chest pain, but on admission to this facility, patient denied having any type of chest pain.  He endorsed an approximate 200 lb weight loss over the past few years.  He stated that he has had a decreased appetite.  His spouse recently was at an F and was being treated for pneumonia.  Patient's daughter has also experienced some upper respiratory symptoms.  Patient reported chronic congestion in his nose.  He wears 4-6 LPM nasal cannula at baseline and is compliance with CPAP.  He denies recent antibiotic use.  He endorses fatigue, activity intolerance, shortness of breath with exertion.     Labs at [facility] were obtained with the following results: WBC 5.2, hemoglobin 9.2, hematocrit 29.1, platelets 202, sodium 140, potassium 4.6, chloride 113, CO2 17, anion gap 10, BUN 38, creatinine 1.6, total calcium 8.5, total protein 5.7, albumin 2.5, alk phos 178, ALT 79, , lipase 31, total bilirubin 2.4, osmolality 288, lactate 1.9, troponin 0.21, .  Chest x-ray with blunting of the bilateral costophrenic sulci, which may represent pleural effusions or scarring; no significant interval change from prior study as well as no new airspace consolidation.  At [facility] facility, patient received 3 L of normal saline, 125 mg Solu-Medrol, 1 g ceftriaxone, acetaminophen, & DuoNeb.    Patient received 80 mg of Lovenox at that  a.m. on  4/23/thousand 24.  Patient remained hypotensive, and was started on Levophed drip.  Patient was noted to be febrile on arrival with a temperature of 103.2 °F.  Patient was accepted from transfer to [facility] based on patient request and due to a mildly elevated troponin and the fact that they do not have cardiology services at [facility].  Critical care team accepted patient for admission.      Liver US check with cholelithiasis & hepatic steatosis.  Patient has been titrated off of Levophed and is stable for downgrade out of ICU.  Downgrade to PCU, family practice consulted.      Significant Events     04/24/24 : Lovenox x 1 dose give, resume anticoagulation on 4/25 if no plans by cardiology for workup.  Check iron profile.  Awaiting decision from cardiology for whether to proceed with possible ischemic workup, Dr. Dias seeing patient.  Stable for downgrade out of ICU.  Transfer to PCU, discussed with Dr. Rousseau.  Potentially if patient has further abdominal pain could consider a HIDA scan.    Assessment / Plan     Shock, likely combination of hypovolemic shock and septic shock  Etiology not yet determined, rule out pneumonia.  Fever at hospitals, T. Max 103, resolved with acetaminophen.  Blood cultures-pending with no growth.  UA did not reflex to culture.  Urine antigens-negative.  Sputum culture-pending.  Bandemia of 19, CRP 10.13, Procal 8.46 on admission.  Give an additional 1 G of Rocephin, continue Rocephin 2 G daily.    Persistent hypotension in spite of adequate fluid resuscitation totaling 5 L.    C/w additional fluids as ordered. Avoid fluid overload.   Levophed titrated off on 4/23.  Stress based steroids discontinued.     Elevated troponin, possible NSTEMI versus demand ischemia  Patient stated he had no chest pain, presented with abdominal pain.  No history of CAD, previous cardiac cath without occlusive disease.    EKG at hospitals, independently reviewed with sinus rhythm, right bundle branch block, LASB, without QT or T  wave abnormalities, heart rate 77, QTc 518.  Troponin 254 on admission, consult cardiology.  Received Lovenox at [facility].  Additional dose of Lovenox today.     Transaminitis  On admission, Alk phos 192, , ALT 67. Monitor and trend labs.  Amylase, lipase normal.  US Liver reviewed: with cholelithiasis and hepatic steatosis.  Hold further tylenol.     Acute Kidney Injury, superimposed on CKD  Remains nonoliguric. Baseline creatinine 1.1, 1.53 on admission.  Likely prerenal. Possible intrinsic component due to ATN from infection, drugs and hypotension.   C/w IV fluids as ordered.  Monitor Input/Output very closely.   Avoids NSAIDs, nephrotoxic medications, and hypotension.     Chronic / Paroxysmal atrial fibrillation   On no antidysrhythmics or rate controlling agents per home regimen.   Cardiology consulted.  [id number] Score of at least 7. Currently on anticoagulation with apixaban as outpatient, but reports that he finds it too expensive.  Received Lovenox 80 mg at [facility], with renal function, no anticoagulation needed for 24 hours.  Consideration previously stated for potential Watchman procedure, recommend to follow up with cardiology as outpatient.       Essential hypertension: well controlled.   Hold home antihypertensives due to hypotension.  Titrate medications as needed.     Diabetes mellitus Type 2, not insulin-dependent : well controlled.   Diet controlled.  A1c 4.70.  Advanced to cardiac diet.    Accu checks ACHS or Every 6 Hours, based on diet, but will hold sliding scale to avoid hypoglycemia.     COPD / Chronic oxygen use  Reportedly wears 6 LPM N/C at home, currently tolerating room air.  No in exacerbation.  May use oxygen if needed, keep oxygen saturation > 90%.  Duonebs as needed.     GERD: Continue PPI.  Dyslipidemia: Lipid panel reviewed.  Holding statin therapy due to elevated LFTs.  History of chronic ITP: Platelets stable, monitor and trend labs  History SDH/SAH (2019): Seen at [facility], did not require surgical intervention, no residual deficits.  Has since resume anticoagulation.    History of Leukemia / Prostate cancer, in remission  History of bilateral PE / History of elevated Factor VIII level: Continue anticoagulation  Rheumatoid arthritis: Continue Plaquenil.  MITZI: May use home CPAP.    Disposition:  PT consulted, stable for downgrade out of ICU.  Expect patient to be ready for discharge in 1-2 days depending if cardiology proceeds with cardiac workup.    Code status:   Code Status (Patient has no pulse and is not breathing): CPR (Attempt to Resuscitate)  Medical Interventions (Patient has pulse or is breathing): Full Support       Nutrition:   Diet: Cardiac; Healthy Heart (2-3 Na+); Fluid Consistency: Thin (IDDSI 0)   Patient isn't on Tube Feeding     DVT prophylaxis:  Medical and mechanical DVT prophylaxis orders are present.         Subjective / Review of systems     Review of Systems   Constitutional:  Positive for activity change and fatigue. Negative for chills and fever.   HENT:  Positive for congestion. Negative for sinus pressure and sore throat.    Respiratory:  Positive for cough. Negative for chest tightness and shortness of breath.    Cardiovascular:  Negative for chest pain and palpitations.   Gastrointestinal:  Negative for abdominal distention, abdominal pain, nausea and vomiting.   Endocrine: Negative for cold intolerance and heat intolerance.   Genitourinary:  Negative for difficulty urinating and dysuria.   Musculoskeletal:  Negative for back pain and myalgias.   Neurological:  Negative for dizziness and syncope.   Hematological:  Negative for adenopathy. Does not bruise/bleed easily.   Psychiatric/Behavioral:  Negative for confusion. The patient is not nervous/anxious.         Objective / Physical Exam     Vital signs:  Temp: 96.2 °F (35.7 °C)  BP: 117/54  Heart Rate: (!) 45  Resp: 26  SpO2: 96 %  Weight: 81.9 kg (180 lb 8.9 oz)    Admission Weight: Weight:  81.9 kg (180 lb 8.9 oz)  Current Weight: Weight: 81.9 kg (180 lb 8.9 oz)    Input/Output in last 24 hours:    Intake/Output Summary (Last 24 hours) at 4/24/2024 0811  Last data filed at 4/24/2024 0442  Gross per 24 hour   Intake 5065 ml   Output 425 ml   Net 4640 ml      Net IO Since Admission: 4,640 mL [04/24/24 0811]     Physical Exam  Vitals and nursing note reviewed.   Constitutional:       General: He is not in acute distress.     Appearance: He is not ill-appearing, toxic-appearing or diaphoretic.   HENT:      Head: Normocephalic and atraumatic.      Nose: Nose normal. No congestion or rhinorrhea.      Mouth/Throat:      Mouth: Mucous membranes are moist.      Pharynx: Oropharynx is clear. No oropharyngeal exudate or posterior oropharyngeal erythema.      Comments: Edentulous.  Eyes:      General: No scleral icterus.     Extraocular Movements: Extraocular movements intact.      Conjunctiva/sclera: Conjunctivae normal.      Pupils: Pupils are equal, round, and reactive to light.   Cardiovascular:      Rate and Rhythm: Bradycardia present. Rhythm irregular.      Heart sounds: Normal heart sounds. No murmur heard.     No gallop.      Comments: A fib with slow ventricular rate  Pulmonary:      Breath sounds: Normal breath sounds. No wheezing or rales.   Abdominal:      General: There is no distension.      Palpations: Abdomen is soft.      Tenderness: There is no abdominal tenderness.   Musculoskeletal:         General: No tenderness.      Cervical back: Normal range of motion. No rigidity.      Right lower leg: No edema.      Left lower leg: No edema.   Skin:     General: Skin is warm and dry.   Neurological:      General: No focal deficit present.      Mental Status: He is alert and oriented to person, place, and time.   Psychiatric:         Mood and Affect: Mood normal.         Thought Content: Thought content normal.          Radiology and Labs     Results from last 7 days   Lab Units 04/24/24  5790  04/23/24  1235   WBC 10*3/mm3 6.48 13.06*   HEMOGLOBIN g/dL 7.6* 8.8*   HEMATOCRIT % 24.7* 28.4*   PLATELETS 10*3/mm3 148 183           Results from last 7 days   Lab Units 04/24/24  0338 04/23/24  1235   SODIUM mmol/L 141 143   POTASSIUM mmol/L 3.9 4.1   CHLORIDE mmol/L 114* 113*   CO2 mmol/L 16.0* 16.0*   BUN mg/dL 35* 34*   CREATININE mg/dL 1.49* 1.53*   GLUCOSE mg/dL 130* 98   MAGNESIUM mg/dL 1.7 1.6   PHOSPHORUS mg/dL 3.3 2.5      Results from last 7 days   Lab Units 04/24/24  0338 04/23/24  1235   ALK PHOS U/L 145* 192*   AST (SGOT) U/L 156* 154*   ALT (SGPT) U/L 75* 67*           CXR: independently reviewed with small bilateral pleural effusion without infiltrates.    Current medications     Scheduled Meds:   [Held by provider] apixaban, 2.5 mg, Oral, BID  [Held by provider] atorvastatin, 20 mg, Oral, Daily  cefTRIAXone, 2,000 mg, Intravenous, Q24H  enoxaparin, 1 mg/kg, Subcutaneous, Once  hydroxychloroquine, 400 mg, Oral, Daily  [Held by provider] losartan, 100 mg, Oral, Daily  multivitamin with minerals, 1 tablet, Oral, Daily  pantoprazole, 40 mg, Oral, Q AM  sodium bicarbonate, 650 mg, Oral, TID  sodium chloride, 10 mL, Intravenous, Q12H  [Held by provider] spironolactone, 25 mg, Oral, Daily  tamsulosin, 0.4 mg, Oral, Daily        Continuous Infusions:   lactated ringers, 100 mL/hr, Last Rate: 100 mL/hr (04/24/24 0016)          Plan discussed with RN. Reviewed all other data in the last 24 hours, including but not limited to vitals, labs, microbiology, imaging and pertinent notes from other providers.  Plan also discussed with pateint at the bedside.  Discussed with Dr. Rousseau.      AZEEM Blackwell   Critical Care  04/24/24   08:11 EDT

## 2024-04-24 NOTE — PROGRESS NOTES
LOS: 1 day   Patient Care Team:  Guilherme Jason MD as PCP - General (Family Medicine)  Misty Dias MD as Consulting Physician (Cardiology)  Nora Kenyon DNP, APRN as Nurse Practitioner (Thoracic Surgery)  Sascha Dorado MD as Consulting Physician (Nephrology)    Subjective     Patient reports continued shortness of breath.  Currently transitioning from bed to chair with physical therapy.    Review of Systems   Constitutional:  Positive for activity change, appetite change, fatigue and fever.   HENT: Negative.     Respiratory:  Positive for shortness of breath.    Cardiovascular:  Positive for palpitations. Negative for leg swelling.   Gastrointestinal: Negative.    Genitourinary:  Negative for difficulty urinating.   Musculoskeletal:  Positive for arthralgias.   Skin:  Positive for color change.   Neurological:  Positive for weakness.   Psychiatric/Behavioral: Negative.             Objective     Vital Signs  Temp:  [96.2 °F (35.7 °C)-97.7 °F (36.5 °C)] 97.4 °F (36.3 °C)  Heart Rate:  [45-65] 45  BP: (112-131)/(53-80) 117/54      Physical Exam  Vitals reviewed.   HENT:      Head: Normocephalic.      Right Ear: External ear normal.      Left Ear: External ear normal.      Nose: Nose normal.      Mouth/Throat:      Mouth: Mucous membranes are moist.   Eyes:      General:         Right eye: No discharge.         Left eye: No discharge.   Cardiovascular:      Pulses: Normal pulses.   Pulmonary:      Effort: Pulmonary effort is normal.   Abdominal:      General: Bowel sounds are normal.      Palpations: Abdomen is soft.   Musculoskeletal:         General: Swelling present.   Skin:     General: Skin is warm and dry.   Neurological:      Mental Status: He is alert and oriented to person, place, and time.   Psychiatric:         Mood and Affect: Mood normal.         Behavior: Behavior normal.              Results Review:    Lab Results (last 24 hours)       Procedure Component Value Units Date/Time    Blood  Culture - Blood, Hand, Left [[id number]]  (Normal) Collected: 04/23/24 1245    Specimen: Blood from Hand, Left Updated: 04/24/24 1300     Blood Culture No growth at 24 hours    Blood Culture - Blood, Arm, Right [[id number]]  (Normal) Collected: 04/23/24 1235    Specimen: Blood from Arm, Right Updated: 04/24/24 1245     Blood Culture No growth at 24 hours    Narrative:      Less than seven (7) mL's of blood was collected.  Insufficient quantity may yield false negative results.    BNP [[id number]]  (Abnormal) Collected: 04/24/24 0338    Specimen: Blood Updated: 04/24/24 1220     proBNP 16,780.0 pg/mL     Narrative:      This assay is used as an aid in the diagnosis of individuals suspected of having heart failure. It can be used as an aid in the diagnosis of acute decompensated heart failure (ADHF) in patients presenting with signs and symptoms of ADHF to the emergency department (ED). In addition, NT-proBNP of <300 pg/mL indicates ADHF is not likely.    Age Range Result Interpretation  NT-proBNP Concentration (pg/mL:      <50             Positive            >450                   Gray                 300-450                    Negative             <300    50-75           Positive            >900                  Gray                300-900                  Negative            <300      >75             Positive            >1800                  Gray                300-1800                  Negative            <300    Respiratory Culture - Sputum, Cough [[id number]] Collected: 04/23/24 2204    Specimen: Sputum from Cough Updated: 04/24/24 1132     Respiratory Culture Rare The culture consists of normal respiratory nicolas. This is a preliminary report; final report to follow.     Gram Stain Many (4+) WBCs per low power field      Rare (1+) Epithelial cells per low power field      Rare (1+) Mixed bacterial morphotypes seen on Gram Stain    POC Glucose 4x Daily Before Meals & at Bedtime [[id number]]  (Abnormal) Collected:  04/24/24 1125    Specimen: Blood Updated: 04/24/24 1127     Glucose 152 mg/dL      Comment: Serial Number: [id number]:  [id number]       Iron Profile [[id number]]  (Abnormal) Collected: 04/24/24 0338    Specimen: Blood Updated: 04/24/24 0923     Iron 16 mcg/dL      Iron Saturation (TSAT) 7 %      Transferrin 146 mg/dL      TIBC 218 mcg/dL     POC Glucose 4x Daily Before Meals & at Bedtime [[id number]]  (Abnormal) Collected: 04/24/24 0712    Specimen: Blood Updated: 04/24/24 0713     Glucose 152 mg/dL      Comment: Serial Number: [id number]:  [id number]       Phosphorus [[id number]]  (Normal) Collected: 04/24/24 0338    Specimen: Blood Updated: 04/24/24 0405     Phosphorus 3.3 mg/dL     Magnesium [[id number]]  (Normal) Collected: 04/24/24 0338    Specimen: Blood Updated: 04/24/24 0403     Magnesium 1.7 mg/dL     Comprehensive Metabolic Panel [[id number]]  (Abnormal) Collected: 04/24/24 0338    Specimen: Blood Updated: 04/24/24 0403     Glucose 130 mg/dL      BUN 35 mg/dL      Creatinine 1.49 mg/dL      Sodium 141 mmol/L      Potassium 3.9 mmol/L      Chloride 114 mmol/L      CO2 16.0 mmol/L      Calcium 7.5 mg/dL      Total Protein 4.9 g/dL      Albumin 2.4 g/dL      ALT (SGPT) 75 U/L      AST (SGOT) 156 U/L      Alkaline Phosphatase 145 U/L      Total Bilirubin 1.3 mg/dL      Globulin 2.5 gm/dL      A/G Ratio 1.0 g/dL      BUN/Creatinine Ratio 23.5     Anion Gap 11.0 mmol/L      eGFR 46.3 mL/min/1.73     Narrative:      GFR Normal >60  Chronic Kidney Disease <60  Kidney Failure <15    The GFR formula is only valid for adults with stable renal function between ages 18 and 70.    CBC & Differential [[id number]]  (Abnormal) Collected: 04/24/24 0338    Specimen: Blood Updated: 04/24/24 0346    Narrative:      The following orders were created for panel order CBC & Differential.  Procedure                               Abnormality         Status                     ---------                                -----------         ------                     CBC Auto Differential[[id number]]        Abnormal            Final result                 Please view results for these tests on the individual orders.    CBC Auto Differential [[id number]]  (Abnormal) Collected: 04/24/24 0338    Specimen: Blood Updated: 04/24/24 0346     WBC 6.48 10*3/mm3      RBC 2.44 10*6/mm3      Hemoglobin 7.6 g/dL      Hematocrit 24.7 %      .2 fL      MCH 31.1 pg      MCHC 30.8 g/dL      RDW 14.7 %      RDW-SD 55.2 fl      MPV 12.5 fL      Platelets 148 10*3/mm3      Neutrophil % 86.0 %      Lymphocyte % 8.6 %      Monocyte % 4.6 %      Eosinophil % 0.0 %      Basophil % 0.2 %      Immature Grans % 0.6 %      Neutrophils, Absolute 5.57 10*3/mm3      Lymphocytes, Absolute 0.56 10*3/mm3      Monocytes, Absolute 0.30 10*3/mm3      Eosinophils, Absolute 0.00 10*3/mm3      Basophils, Absolute 0.01 10*3/mm3      Immature Grans, Absolute 0.04 10*3/mm3      nRBC 0.0 /100 WBC     POC Glucose 4x Daily Before Meals & at Bedtime [[id number]]  (Abnormal) Collected: 04/23/24 2235    Specimen: Blood Updated: 04/23/24 2238     Glucose 144 mg/dL      Comment: Serial Number: [id number]:  [id number]       Legionella Antigen, Urine - Urine, Urine, Clean Catch [[id number]]  (Normal) Collected: 04/23/24 2203    Specimen: Urine, Clean Catch Updated: 04/23/24 2227     LEGIONELLA ANTIGEN, URINE Negative    S. Pneumo Ag Urine or CSF - Urine, Urine, Clean Catch [[id number]]  (Normal) Collected: 04/23/24 2203    Specimen: Urine, Clean Catch Updated: 04/23/24 2227     Strep Pneumo Ag Negative    Urinalysis With Culture If Indicated - Urine, Clean Catch [[id number]]  (Abnormal) Collected: 04/23/24 2204    Specimen: Urine, Clean Catch Updated: 04/23/24 2217     Color, UA Lynnette     Appearance, UA Cloudy     pH, UA <=5.0     Specific Gravity, UA 1.023     Glucose, UA Negative     Ketones, UA Negative     Bilirubin, UA Moderate (2+)     Comment: Confirmation testing is  unavailable.  A serum bilirubin is recommended for further assessment.        Blood, UA Small (1+)     Protein, UA 30 mg/dL (1+)     Leuk Esterase, UA Small (1+)     Nitrite, UA Positive     Urobilinogen, UA 1.0 E.U./dL    Narrative:      In absence of clinical symptoms, the presence of pyuria, bacteria, and/or nitrites on the urinalysis result does not correlate with infection.    Urinalysis, Microscopic Only - Urine, Clean Catch [[id number]]  (Abnormal) Collected: 04/23/24 2204    Specimen: Urine, Clean Catch Updated: 04/23/24 2217     RBC, UA 11-20 /HPF      WBC, UA 0-2 /HPF      Comment: Urine culture not indicated.        Bacteria, UA None Seen /HPF      Squamous Epithelial Cells, UA 0-2 /HPF      Hyaline Casts, UA 0-2 /LPF      Methodology Automated Microscopy    POC Glucose 4x Daily Before Meals & at Bedtime [[id number]]  (Abnormal) Collected: 04/23/24 1751    Specimen: Blood Updated: 04/23/24 1801     Glucose 114 mg/dL      Comment: Serial Number: [id number]:  [id number]       High Sensitivity Troponin T 2Hr [[id number]]  (Abnormal) Collected: 04/23/24 1433    Specimen: Blood Updated: 04/23/24 1507     HS Troponin T 219 ng/L      Troponin T Delta -35 ng/L     Narrative:      High Sensitive Troponin T Reference Range:  <14.0 ng/L- Negative Female for AMI  <22.0 ng/L- Negative Male for AMI  >=14 - Abnormal Female indicating possible myocardial injury.  >=22 - Abnormal Male indicating possible myocardial injury.   Clinicians would have to utilize clinical acumen, EKG, Troponin, and serial changes to determine if it is an Acute Myocardial Infarction or myocardial injury due to an underlying chronic condition.         POC Glucose Once [[id number]]  (Normal) Collected: 04/23/24 1433    Specimen: Blood Updated: 04/23/24 1454     Glucose 98 mg/dL      Comment: Serial Number: [id number]:  [id number]       Hemoglobin A1c [[id number]]  (Abnormal) Collected: 04/23/24 1235    Specimen: Blood Updated: 04/23/24 1429      Hemoglobin A1C 4.70 %     MRSA Screen, PCR (Inpatient) - Swab, Nares [[id number]]  (Normal) Collected: 04/23/24 1237    Specimen: Swab from Nares Updated: 04/23/24 1356     MRSA PCR No MRSA Detected    Narrative:      The negative predictive value of this diagnostic test is high and should only be used to consider de-escalating anti-MRSA therapy. A positive result may indicate colonization with MRSA and must be correlated clinically.    CK [[id number]]  (Normal) Collected: 04/23/24 1235    Specimen: Blood Updated: 04/23/24 1349     Creatine Kinase 117 U/L     Lipase [[id number]]  (Normal) Collected: 04/23/24 1235    Specimen: Blood Updated: 04/23/24 1349     Lipase 16 U/L     Amylase [[id number]]  (Normal) Collected: 04/23/24 1235    Specimen: Blood Updated: 04/23/24 1349     Amylase 29 U/L              Imaging Results (Last 24 Hours)       Procedure Component Value Units Date/Time    XR Chest 1 View [[id number]] Collected: 04/24/24 0851     Updated: 04/24/24 0854    Narrative:      XR CHEST 1 VW    Date of Exam: 4/24/2024 8:35 AM EDT    Indication: Possible pneumonia    Comparison: 4/23/2024    Findings:  Unchanged enlarged cardiac silhouette. There are persistent small bilateral pleural effusions with adjacent atelectasis. No definite new airspace consolidation. Osseous fractures are unchanged.      Impression:      Impression:  No significant interval change.      Electronically Signed: Daniel Jennings MD    4/24/2024 8:52 AM EDT    Workstation ID: [id number]    US Liver [[id number]] Collected: 04/23/24 1528     Updated: 04/23/24 1533    Narrative:      US LIVER    Date of Exam: 4/23/2024 2:09 PM EDT    Indication: Elevated transaminases.    Comparison: No comparisons available.    Technique: Grayscale and color Doppler ultrasound evaluation of the liver was performed.      Findings:  The liver measures 16.2 cm in length. There is mild increased echogenicity which can be seen with hepatic steatosis. There are no focal  hepatic masses. There is normal directional flow in the main portal vein and hepatic veins. There are incidental   echogenic shadowing gallstones. The gallbladder wall is slightly thickened measuring 3-4 mm. The common bile duct caliber is normal measuring 5 mm. There is no ascites.      Impression:      Impression:    1. Increased hepatic echogenicity which can be seen with hepatic steatosis.  2. Cholelithiasis. There is slight thickening of the gallbladder wall which is nonspecific.        Electronically Signed: Jayy Osborn MD    4/23/2024 3:31 PM EDT    Workstation ID: [id number]                 I reviewed the patient's new clinical results.    Medication Review:   Scheduled Meds:[Held by provider] apixaban, 2.5 mg, Oral, BID  [Held by provider] atorvastatin, 20 mg, Oral, Daily  cefTRIAXone, 2,000 mg, Intravenous, Q24H  enoxaparin, 1 mg/kg, Subcutaneous, Once  hydroxychloroquine, 400 mg, Oral, Daily  midodrine, 5 mg, Oral, TID AC  multivitamin with minerals, 1 tablet, Oral, Daily  pantoprazole, 40 mg, Oral, Q AM  sodium bicarbonate, 1,300 mg, Oral, TID  sodium chloride, 10 mL, Intravenous, Q12H  [Held by provider] spironolactone, 25 mg, Oral, Daily  tamsulosin, 0.4 mg, Oral, Daily      Continuous Infusions:   PRN Meds:.  [Held by provider] acetaminophen **OR** [Held by provider] acetaminophen    aluminum-magnesium hydroxide-simethicone    senna-docusate sodium **AND** polyethylene glycol **AND** bisacodyl **AND** bisacodyl    dextrose    dextrose    glucagon (human recombinant)    ipratropium-albuterol    nitroglycerin    ondansetron ODT **OR** ondansetron    sodium chloride    sodium chloride     Interval History:    Assessment & Plan     Septic shock  Leukocytosis with bandemia, 19  -Blood culture no growth thus far, respiratory culture negative, urinalysis was completed but no culture obtained  -Etiology uncertain, will obtain CT chest\abdomen\pelvis    Elevated troponin, possible non-STEMI, elevated proBNP  16,866 -cardiology following, repeating echocardiogram    Acute on chronic kidney disease-appreciate nephrology input.  Started on sodium bicarb.  Avoid NSAIDs and nephrotoxic's medication.  Avoid hypotension, checking renal ultrasound    Hypotension-off Levophed, started on midodrine    Iron deficiency anemia-will receive iron replacement today, hemoglobin 7.6-will check Hemoccult stool    Paroxysmal atrial fibrillation-anticoagulated, cardiology following considering Watchman    COPD-okay to use home trilogy machine, will need 6-minute walk prior to discharge-oxygenating well on room air however patient states he wears 4 to 6 L of continuous oxygen at home    Elevated LFTs-US liver shows hepatic steatosis hold statin and monitor LFTs    Incidental finding of cholelithiasis-patient denies right upper quadrant pain will consider HIDA scan if symptomatic    Dyslipidemia, statin on hold due to evaluated LFTs  Diabetes mellitus type 2-A1c stable  History of chronic ITP/platelets stable, continue to monitor  History of leukemia\prostate cancer-in remission  History of bilateral PE\history elevated factor VIII level-anticoagulated  Rheumatoid arthritis-continue Plaquenil  MITZI-can okay to use home trilogy machine    Diet: Healthy heart, soft to chew  DVT prophylaxis: Lovenox  GI prophylaxis: Protonix  Code status: Full code    Plan for disposition: To be determined    AZEEM Singer  04/24/24  13:36 EDT

## 2024-04-24 NOTE — CASE MANAGEMENT/SOCIAL WORK
Discharge Planning Assessment   Zohaib     Patient Name: Praneeth Nam  MRN: [id number]  Today's Date: 4/24/2024    Admit Date: 4/23/2024    Plan: PIERCE Plan: Anticipate routine home with Janis Montes and Spouse. Home Health pending patient choices. List provided. Current with [facility] for Home Oxygen and Trilogy Vent.   Discharge Needs Assessment       Row Name 04/24/24 1213       Living Environment    People in Home spouse;grandchild(elio)    Name(s) of People in Home Lives with spouse Kenya Nam (Dementia), and Janis Jyoti [phone]    Current Living Arrangements home    Potentially Unsafe Housing Conditions none    In the past 12 months has the electric, gas, oil, or water company threatened to shut off services in your home? No    Primary Care Provided by self    Provides Primary Care For spouse    Family Caregiver if Needed grandchild(elio), adult    Family Caregiver Names Janis Montes    Quality of Family Relationships helpful;involved;supportive    Able to Return to Prior Arrangements yes       Resource/Environmental Concerns    Resource/Environmental Concerns none    Transportation Concerns none       Transportation Needs    In the past 12 months, has lack of transportation kept you from medical appointments or from getting medications? no    In the past 12 months, has lack of transportation kept you from meetings, work, or from getting things needed for daily living? No       Food Insecurity    Within the past 12 months, you worried that your food would run out before you got the money to buy more. Never true    Within the past 12 months, the food you bought just didn't last and you didn't have money to get more. Never true       Transition Planning    Patient/Family Anticipates Transition to home with family;home with help/services    Patient/Family Anticipated Services at Transition home health care    Transportation Anticipated family or friend will provide       Discharge Needs Assessment     Readmission Within the Last 30 Days no previous admission in last 30 days    Equipment Currently Used at Home oxygen;walker, rolling;rollator  Trilogy Vent, DME with [facility]    Concerns to be Addressed discharge planning    Anticipated Changes Related to Illness none    Equipment Needed After Discharge none    Outpatient/Agency/Support Group Needs homecare agency    Discharge Facility/Level of Care Needs home with home health    Current Discharge Risk physical impairment                   Discharge Plan       Row Name 04/24/24 3110       Plan    Plan DC Plan: Anticipate routine home with Janis Montes and Spouse. Home Health pending patient choices. List provided. Current with [facility] for Home Oxygen and Trilogy Vent.    Patient/Family in Agreement with Plan yes    Provided Post Acute Provider List? Yes    Post Acute Provider List Home Health    Provided Post Acute Provider Quality & Resource List? Yes    Post Acute Provider Quality and Resource List Home Health    Delivered To Patient    Method of Delivery In person    Plan Comments CM spoke with patient at bedside to discuss admission assessment and discharge planning. Patient confirms PCP and pharmacy. Patient is already enrolled in meds to bed program. Patient denies any difficulty affording medications at this time. Patient denies any additional needs for services or DME at this time. Patient states he is current with [facility] for DME needs. CM spoke with patient and informed him that therapy will be recommending Home with Home Health for him. He is agreeable. CM discussed HH list with him. He states his spouse who has dementia has a service set up to come to the home for her therapy and he will use the same one as her. He is unsure of name but will find out. CM will follow up for choice tomorrow. Patient's daughter will provide transportation home.LEYDI spoke with intensivist, nursing, and NP to obtain clinical upates in morning rounds. Patient downgraded to  PCU level of care. Renal Consult placed by Hospitalist. CM will continue to follow for any further needs and adjust discharge plan accordingly. DC Barriers: Cardiac monitoring, IVF's, IV abx/Iron, monitor labs, pending culutre, pending echocardiogram, Pending Cardiology Care plan, and Nephrology consult.                 Expected Discharge Date and Time       Expected Discharge Date Expected Discharge Time    Apr 26, 2024            Demographic Summary       Row Name 04/24/24 1212       General Information    Admission Type inpatient    Arrived From emergency department;home    Required Notices Provided Important Message from Medicare    Referral Source admission list    Reason for Consult discharge planning    Preferred Language English       Contact Information    Permission Granted to Share Info With                    Functional Status       Row Name 04/24/24 1212       Functional Status    Usual Activity Tolerance good    Current Activity Tolerance moderate       Physical Activity    On average, how many days per week do you engage in moderate to strenuous exercise (like a brisk walk)? 0 days    On average, how many minutes do you engage in exercise at this level? 0 min    Number of minutes of exercise per week 0       Functional Status, IADL    Medications independent    Meal Preparation independent    Housekeeping independent    Laundry independent    Shopping independent       Mental Status    General Appearance WDL WDL       Mental Status Summary    Recent Changes in Mental Status/Cognitive Functioning no changes       Employment/    Employment Status retired    Current or Previous Occupation not applicable           Current or Previous  Service none                 Met with patient in room   Maintain distance greater than six feet and spent less than fifteen minutes in the room.      Deepa Mercer RN    Office Phone: [phone]  Office Cell:     [phone]

## 2024-04-24 NOTE — PLAN OF CARE
Goal Outcome Evaluation:  Plan of Care Reviewed With: patient           Outcome Evaluation: 84 y.o. male with PMH of CKD, MITZI, COPD, GERD, history of SDH/SAH, history of leukemia/prostate cancer in remission, PAF admitted with for chills, generalized weakness, productive cough, and abdominal pain, and was admitted with a principal diagnosis of Sepsis.  Pt is from home with his wife who has dementia and reports his grandtr has been living with them to A in her care.  Pt states he is independent at baseline, has a RW he does not use, still drives, reports no falls but is unsteady with walking.  This date, pt supine in bed, on RA with sats 99%.  Pt states at home he is usually on 4-6L and uses Trilogy machine at night.  Pt was able to transfer OOB to chair with min A, decresaed standing balance.  Had pt use RW to ambulate x 40' with CGA and improved balance.  Encouarged pt to use walker for all mobility and recommend continued PT while in hospital and HHPT at d/c.  Pt would also benefit from OT eval.      Anticipated Discharge Disposition (PT): home with assist, home with home health

## 2024-04-25 ENCOUNTER — INPATIENT HOSPITAL (AMBULATORY)
Dept: URBAN - METROPOLITAN AREA HOSPITAL 84 | Facility: HOSPITAL | Age: 84
End: 2024-04-25

## 2024-04-25 ENCOUNTER — APPOINTMENT (OUTPATIENT)
Dept: ULTRASOUND IMAGING | Facility: HOSPITAL | Age: 84
DRG: 871 | End: 2024-04-25
Payer: MEDICARE

## 2024-04-25 DIAGNOSIS — R63.4 ABNORMAL WEIGHT LOSS: ICD-10-CM

## 2024-04-25 DIAGNOSIS — E44.0 MODERATE PROTEIN-CALORIE MALNUTRITION: ICD-10-CM

## 2024-04-25 DIAGNOSIS — R79.89 OTHER SPECIFIED ABNORMAL FINDINGS OF BLOOD CHEMISTRY: ICD-10-CM

## 2024-04-25 DIAGNOSIS — R19.5 OTHER FECAL ABNORMALITIES: ICD-10-CM

## 2024-04-25 DIAGNOSIS — R74.01 ELEVATION OF LEVELS OF LIVER TRANSAMINASE LEVELS: ICD-10-CM

## 2024-04-25 DIAGNOSIS — R13.10 DYSPHAGIA, UNSPECIFIED: ICD-10-CM

## 2024-04-25 DIAGNOSIS — D53.9 NUTRITIONAL ANEMIA, UNSPECIFIED: ICD-10-CM

## 2024-04-25 LAB
ALBUMIN SERPL-MCNC: 2.6 G/DL (ref 3.5–5.2)
ALBUMIN/GLOB SERPL: 0.9 G/DL
ALP SERPL-CCNC: 184 U/L (ref 39–117)
ALT SERPL W P-5'-P-CCNC: 136 U/L (ref 1–41)
ANION GAP SERPL CALCULATED.3IONS-SCNC: 11 MMOL/L (ref 5–15)
ANISOCYTOSIS BLD QL: NORMAL
AST SERPL-CCNC: 211 U/L (ref 1–40)
BACTERIA SPEC RESP CULT: NORMAL
BASOPHILS # BLD AUTO: 0.01 10*3/MM3 (ref 0–0.2)
BASOPHILS NFR BLD AUTO: 0.1 % (ref 0–1.5)
BILIRUB SERPL-MCNC: 0.5 MG/DL (ref 0–1.2)
BUN SERPL-MCNC: 45 MG/DL (ref 8–23)
BUN/CREAT SERPL: 32.4 (ref 7–25)
CALCIUM SPEC-SCNC: 8.2 MG/DL (ref 8.6–10.5)
CHLORIDE SERPL-SCNC: 114 MMOL/L (ref 98–107)
CO2 SERPL-SCNC: 18 MMOL/L (ref 22–29)
CREAT SERPL-MCNC: 1.39 MG/DL (ref 0.76–1.27)
DEPRECATED RDW RBC AUTO: 54 FL (ref 37–54)
EGFRCR SERPLBLD CKD-EPI 2021: 50.3 ML/MIN/1.73
EOSINOPHIL # BLD AUTO: 0 10*3/MM3 (ref 0–0.4)
EOSINOPHIL NFR BLD AUTO: 0 % (ref 0.3–6.2)
ERYTHROCYTE [DISTWIDTH] IN BLOOD BY AUTOMATED COUNT: 14.9 % (ref 12.3–15.4)
FERRITIN SERPL-MCNC: 542.3 NG/ML (ref 30–400)
GLOBULIN UR ELPH-MCNC: 2.8 GM/DL
GLUCOSE BLDC GLUCOMTR-MCNC: 106 MG/DL (ref 70–105)
GLUCOSE BLDC GLUCOMTR-MCNC: 136 MG/DL (ref 70–105)
GLUCOSE BLDC GLUCOMTR-MCNC: 95 MG/DL (ref 70–105)
GLUCOSE SERPL-MCNC: 131 MG/DL (ref 65–99)
GRAM STN SPEC: NORMAL
HAV IGM SERPL QL IA: NORMAL
HBV CORE IGM SERPL QL IA: NORMAL
HBV SURFACE AG SERPL QL IA: NORMAL
HCT VFR BLD AUTO: 27.1 % (ref 37.5–51)
HCV AB SER QL: NORMAL
HEMOCCULT STL QL IA: POSITIVE
HGB BLD-MCNC: 8.7 G/DL (ref 13–17.7)
IMM GRANULOCYTES # BLD AUTO: 0.02 10*3/MM3 (ref 0–0.05)
IMM GRANULOCYTES NFR BLD AUTO: 0.3 % (ref 0–0.5)
IRON 24H UR-MRATE: 202 MCG/DL (ref 59–158)
LYMPHOCYTES # BLD AUTO: 1.01 10*3/MM3 (ref 0.7–3.1)
LYMPHOCYTES NFR BLD AUTO: 13.8 % (ref 19.6–45.3)
MAGNESIUM SERPL-MCNC: 2.3 MG/DL (ref 1.6–2.4)
MCH RBC QN AUTO: 32 PG (ref 26.6–33)
MCHC RBC AUTO-ENTMCNC: 32.1 G/DL (ref 31.5–35.7)
MCV RBC AUTO: 99.6 FL (ref 79–97)
MONOCYTES # BLD AUTO: 0.61 10*3/MM3 (ref 0.1–0.9)
MONOCYTES NFR BLD AUTO: 8.3 % (ref 5–12)
NEUTROPHILS NFR BLD AUTO: 5.67 10*3/MM3 (ref 1.7–7)
NEUTROPHILS NFR BLD AUTO: 77.5 % (ref 42.7–76)
NRBC BLD AUTO-RTO: 0 /100 WBC (ref 0–0.2)
PHOSPHATE SERPL-MCNC: 2.4 MG/DL (ref 2.5–4.5)
PLATELET # BLD AUTO: 190 10*3/MM3 (ref 140–450)
PMV BLD AUTO: 13.1 FL (ref 6–12)
POTASSIUM SERPL-SCNC: 4.2 MMOL/L (ref 3.5–5.2)
PROT SERPL-MCNC: 5.4 G/DL (ref 6–8.5)
RBC # BLD AUTO: 2.72 10*6/MM3 (ref 4.14–5.8)
SMALL PLATELETS BLD QL SMEAR: ADEQUATE
SODIUM SERPL-SCNC: 143 MMOL/L (ref 136–145)
URATE SERPL-MCNC: 6.5 MG/DL (ref 3.4–7)
WBC MORPH BLD: NORMAL
WBC NRBC COR # BLD AUTO: 7.32 10*3/MM3 (ref 3.4–10.8)

## 2024-04-25 PROCEDURE — 86015 ACTIN ANTIBODY EACH: CPT | Performed by: NURSE PRACTITIONER

## 2024-04-25 PROCEDURE — 94640 AIRWAY INHALATION TREATMENT: CPT

## 2024-04-25 PROCEDURE — 25010000002 CALCIUM GLUCONATE-NACL 1-0.675 GM/50ML-% SOLUTION: Performed by: INTERNAL MEDICINE

## 2024-04-25 PROCEDURE — P9047 ALBUMIN (HUMAN), 25%, 50ML: HCPCS | Performed by: INTERNAL MEDICINE

## 2024-04-25 PROCEDURE — 86376 MICROSOMAL ANTIBODY EACH: CPT | Performed by: NURSE PRACTITIONER

## 2024-04-25 PROCEDURE — 85025 COMPLETE CBC W/AUTO DIFF WBC: CPT | Performed by: NURSE PRACTITIONER

## 2024-04-25 PROCEDURE — 83735 ASSAY OF MAGNESIUM: CPT | Performed by: NURSE PRACTITIONER

## 2024-04-25 PROCEDURE — 82977 ASSAY OF GGT: CPT | Performed by: NURSE PRACTITIONER

## 2024-04-25 PROCEDURE — 99232 SBSQ HOSP IP/OBS MODERATE 35: CPT | Performed by: INTERNAL MEDICINE

## 2024-04-25 PROCEDURE — 83540 ASSAY OF IRON: CPT | Performed by: NURSE PRACTITIONER

## 2024-04-25 PROCEDURE — 25010000002 ALBUMIN HUMAN 25% PER 50 ML: Performed by: INTERNAL MEDICINE

## 2024-04-25 PROCEDURE — 84100 ASSAY OF PHOSPHORUS: CPT | Performed by: INTERNAL MEDICINE

## 2024-04-25 PROCEDURE — 25010000002 CEFTRIAXONE PER 250 MG: Performed by: NURSE PRACTITIONER

## 2024-04-25 PROCEDURE — 84550 ASSAY OF BLOOD/URIC ACID: CPT | Performed by: INTERNAL MEDICINE

## 2024-04-25 PROCEDURE — 86038 ANTINUCLEAR ANTIBODIES: CPT | Performed by: NURSE PRACTITIONER

## 2024-04-25 PROCEDURE — 85007 BL SMEAR W/DIFF WBC COUNT: CPT | Performed by: NURSE PRACTITIONER

## 2024-04-25 PROCEDURE — 76775 US EXAM ABDO BACK WALL LIM: CPT

## 2024-04-25 PROCEDURE — 80074 ACUTE HEPATITIS PANEL: CPT | Performed by: NURSE PRACTITIONER

## 2024-04-25 PROCEDURE — 94664 DEMO&/EVAL PT USE INHALER: CPT

## 2024-04-25 PROCEDURE — 82390 ASSAY OF CERULOPLASMIN: CPT | Performed by: NURSE PRACTITIONER

## 2024-04-25 PROCEDURE — 94799 UNLISTED PULMONARY SVC/PX: CPT

## 2024-04-25 PROCEDURE — 82103 ALPHA-1-ANTITRYPSIN TOTAL: CPT | Performed by: NURSE PRACTITIONER

## 2024-04-25 PROCEDURE — 99222 1ST HOSP IP/OBS MODERATE 55: CPT | Performed by: NURSE PRACTITIONER

## 2024-04-25 PROCEDURE — 82728 ASSAY OF FERRITIN: CPT | Performed by: NURSE PRACTITIONER

## 2024-04-25 PROCEDURE — 82274 ASSAY TEST FOR BLOOD FECAL: CPT

## 2024-04-25 PROCEDURE — 25010000002 FUROSEMIDE PER 20 MG: Performed by: INTERNAL MEDICINE

## 2024-04-25 PROCEDURE — 86381 MITOCHONDRIAL ANTIBODY EACH: CPT | Performed by: NURSE PRACTITIONER

## 2024-04-25 PROCEDURE — 82105 ALPHA-FETOPROTEIN SERUM: CPT | Performed by: NURSE PRACTITIONER

## 2024-04-25 PROCEDURE — 82948 REAGENT STRIP/BLOOD GLUCOSE: CPT | Performed by: NURSE PRACTITIONER

## 2024-04-25 PROCEDURE — 80053 COMPREHEN METABOLIC PANEL: CPT | Performed by: NURSE PRACTITIONER

## 2024-04-25 PROCEDURE — 25810000003 SODIUM CHLORIDE 0.9 % SOLUTION: Performed by: INTERNAL MEDICINE

## 2024-04-25 RX ORDER — FUROSEMIDE 10 MG/ML
20 INJECTION INTRAMUSCULAR; INTRAVENOUS EVERY 8 HOURS
Status: COMPLETED | OUTPATIENT
Start: 2024-04-25 | End: 2024-04-25

## 2024-04-25 RX ORDER — ALBUMIN (HUMAN) 12.5 G/50ML
25 SOLUTION INTRAVENOUS EVERY 8 HOURS
Status: COMPLETED | OUTPATIENT
Start: 2024-04-25 | End: 2024-04-25

## 2024-04-25 RX ORDER — FENTANYL/ROPIVACAINE/NS/PF 2-625MCG/1
15 PLASTIC BAG, INJECTION (ML) EPIDURAL ONCE
Status: COMPLETED | OUTPATIENT
Start: 2024-04-25 | End: 2024-04-25

## 2024-04-25 RX ORDER — CALCIUM GLUCONATE 20 MG/ML
1000 INJECTION, SOLUTION INTRAVENOUS ONCE
Status: COMPLETED | OUTPATIENT
Start: 2024-04-25 | End: 2024-04-25

## 2024-04-25 RX ADMIN — FUROSEMIDE 20 MG: 10 INJECTION, SOLUTION INTRAMUSCULAR; INTRAVENOUS at 08:04

## 2024-04-25 RX ADMIN — FUROSEMIDE 20 MG: 10 INJECTION, SOLUTION INTRAMUSCULAR; INTRAVENOUS at 20:56

## 2024-04-25 RX ADMIN — SODIUM BICARBONATE 1300 MG: 650 TABLET ORAL at 08:14

## 2024-04-25 RX ADMIN — APIXABAN 2.5 MG: 2.5 TABLET, FILM COATED ORAL at 20:57

## 2024-04-25 RX ADMIN — SODIUM BICARBONATE 50 MEQ: 84 INJECTION INTRAVENOUS at 08:03

## 2024-04-25 RX ADMIN — Medication 10 ML: at 08:17

## 2024-04-25 RX ADMIN — ALBUMIN (HUMAN) 25 G: 0.25 INJECTION, SOLUTION INTRAVENOUS at 08:17

## 2024-04-25 RX ADMIN — HYDROXYCHLOROQUINE SULFATE 400 MG: 200 TABLET ORAL at 08:14

## 2024-04-25 RX ADMIN — SODIUM BICARBONATE 1300 MG: 650 TABLET ORAL at 20:57

## 2024-04-25 RX ADMIN — CALCIUM GLUCONATE 1000 MG: 20 INJECTION, SOLUTION INTRAVENOUS at 08:17

## 2024-04-25 RX ADMIN — ALBUMIN (HUMAN) 25 G: 0.25 INJECTION, SOLUTION INTRAVENOUS at 20:56

## 2024-04-25 RX ADMIN — Medication 1 TABLET: at 08:14

## 2024-04-25 RX ADMIN — IPRATROPIUM BROMIDE AND ALBUTEROL SULFATE 3 ML: .5; 3 SOLUTION RESPIRATORY (INHALATION) at 08:39

## 2024-04-25 RX ADMIN — Medication 10 ML: at 20:57

## 2024-04-25 RX ADMIN — FUROSEMIDE 20 MG: 10 INJECTION, SOLUTION INTRAMUSCULAR; INTRAVENOUS at 15:41

## 2024-04-25 RX ADMIN — CEFTRIAXONE 2000 MG: 2 INJECTION, POWDER, FOR SOLUTION INTRAMUSCULAR; INTRAVENOUS at 08:14

## 2024-04-25 RX ADMIN — POTASSIUM PHOSPHATE, MONOBASIC AND POTASSIUM PHOSPHATE, DIBASIC 15 MMOL: 224; 236 INJECTION, SOLUTION, CONCENTRATE INTRAVENOUS at 09:04

## 2024-04-25 RX ADMIN — PANTOPRAZOLE SODIUM 40 MG: 40 TABLET, DELAYED RELEASE ORAL at 03:29

## 2024-04-25 RX ADMIN — MIDODRINE HYDROCHLORIDE 5 MG: 5 TABLET ORAL at 11:35

## 2024-04-25 RX ADMIN — TAMSULOSIN HYDROCHLORIDE 0.4 MG: 0.4 CAPSULE ORAL at 08:14

## 2024-04-25 RX ADMIN — SODIUM BICARBONATE 1300 MG: 650 TABLET ORAL at 15:41

## 2024-04-25 RX ADMIN — ALBUMIN (HUMAN) 25 G: 0.25 INJECTION, SOLUTION INTRAVENOUS at 15:41

## 2024-04-25 NOTE — CASE MANAGEMENT/SOCIAL WORK
Continued Stay Note   Zohaib     Patient Name: Praneeth Nam  MRN: [id number]  Today's Date: 4/25/2024    Admit Date: 4/23/2024    Plan: DC Plan: Anticipate routine home with Janis Montes and Spouse. [facility] accepted and following. Current with [facility] for Home Oxygen and Trilogy Vent.   Discharge Plan       Row Name 04/25/24 1337       Plan    Plan DC Plan: Anticipate routine home with Janis Montes and Spouse. [facility] accepted and following. Current with [facility] for Home Oxygen and Trilogy Vent.    Patient/Family in Agreement with Plan yes    Provided Post Acute Provider List? N/A    Provided Post Acute Provider Quality & Resource List? N/A    Plan Comments CM reviewed chart documentation to obtain clinical updates.CM spoke to patient at bedside to discuss discharge plans. Patient states he would like to use [facility] for home health services. CM ascertained that this is another name for [facility] . CM placed referral in basket and liaison notified.  confirms they do care for patient's spouse, and they will take him on caseload as well when ready for DC. CM will continue to follow for any further needs and adjust discharge plan accordingly. DC Barriers: Cardiac monitoring, O2@2Lnc, IV abx/Lasix/albumin, blood in stool, and monitor labs.               Expected Discharge Date and Time       Expected Discharge Date Expected Discharge Time    Apr 26, 2024           Met with patient in room     Maintain distance greater than six feet and spent less than fifteen minutes in the room.      Deepa Mercer RN    Office Phone: [phone]  Office Cell:     [phone]

## 2024-04-25 NOTE — PROGRESS NOTES
"NEPHROLOGY PROGRESS NOTE------[facility]    [facility]  [phone]  Shawn Shane MD      Patient Care Team:  Guilherme Jason MD as PCP - General (Family Medicine)  Misty Dias MD as Consulting Physician (Cardiology)  Nora Kenyon, BRIAN, APRN as Nurse Practitioner (Thoracic Surgery)  Sascha Dorado MD as Consulting Physician (Nephrology)      Provider:  Shawn Shane MD  Patient Name: Praneeth Nam  :  1940    SUBJECTIVE:    F/U ARF/ELYSSA/CRF/CKD    Some SOB. No angina. No dysuria. Mild cough.       Medication:  [Held by provider] apixaban, 2.5 mg, Oral, BID  [Held by provider] atorvastatin, 20 mg, Oral, Daily  cefTRIAXone, 2,000 mg, Intravenous, Q24H  hydroxychloroquine, 400 mg, Oral, Daily  midodrine, 5 mg, Oral, TID AC  multivitamin with minerals, 1 tablet, Oral, Daily  pantoprazole, 40 mg, Oral, Q AM  sodium bicarbonate, 1,300 mg, Oral, TID  sodium chloride, 10 mL, Intravenous, Q12H  [Held by provider] spironolactone, 25 mg, Oral, Daily  tamsulosin, 0.4 mg, Oral, Daily           OBJECTIVE    Vital Sign Min/Max for last 24 hours  Temp  Min: 96.2 °F (35.7 °C)  Max: 97.7 °F (36.5 °C)   BP  Min: 110/66  Max: 147/68   Pulse  Min: 40  Max: 61   No data recorded   SpO2  Min: 80 %  Max: 100 %   No data recorded   Weight  Min: 86.6 kg (190 lb 14.7 oz)  Max: 86.6 kg (190 lb 14.7 oz)     Flowsheet Rows      Flowsheet Row First Filed Value   Admission Height 167.6 cm (66\") Documented at 2024 1200   Admission Weight 81.9 kg (180 lb 8.9 oz) Documented at 2024 1200            No intake/output data recorded.  I/O last 3 completed shifts:  In: 5115 [P.O.:660; I.V.:4455]  Out: 450 [Urine:450]      Physical Exam:  General Appearance: alert, appears stated age and cooperative  Head: normocephalic, without obvious abnormality and atraumatic  Eyes: conjunctivae and sclerae normal and no icterus  Neck: supple and +JVD  Lungs: " "+FINE BIBASILAR CRACKLES  Heart: IRREG IRREG +KATHLEEN  Chest Wall: no abnormalities observed  Abdomen: normal bowel sounds and soft, nontender  Extremities: moves extremities well, +TRACE PRETIBIAL EDEMA BILAT LE, no cyanosis  Skin: no bleeding, bruising, has bilateral chronic skin pigmentation lower extremity.  Neurologic: Alert, and oriented. No focal deficits    Labs:    WBC WBC   Date Value Ref Range Status   04/25/2024 7.32 3.40 - 10.80 10*3/mm3 Final   04/24/2024 6.48 3.40 - 10.80 10*3/mm3 Final   04/23/2024 13.06 (H) 3.40 - 10.80 10*3/mm3 Final      HGB Hemoglobin   Date Value Ref Range Status   04/25/2024 8.7 (L) 13.0 - 17.7 g/dL Final   04/24/2024 7.6 (L) 13.0 - 17.7 g/dL Final   04/23/2024 8.8 (L) 13.0 - 17.7 g/dL Final      HCT Hematocrit   Date Value Ref Range Status   04/25/2024 27.1 (L) 37.5 - 51.0 % Final   04/24/2024 24.7 (L) 37.5 - 51.0 % Final   04/23/2024 28.4 (L) 37.5 - 51.0 % Final      Platelets No results found for: \"LABPLAT\"   MCV MCV   Date Value Ref Range Status   04/25/2024 99.6 (H) 79.0 - 97.0 fL Final   04/24/2024 101.2 (H) 79.0 - 97.0 fL Final   04/23/2024 102.5 (H) 79.0 - 97.0 fL Final          Sodium Sodium   Date Value Ref Range Status   04/25/2024 143 136 - 145 mmol/L Final   04/24/2024 141 136 - 145 mmol/L Final   04/23/2024 143 136 - 145 mmol/L Final      Potassium Potassium   Date Value Ref Range Status   04/25/2024 4.2 3.5 - 5.2 mmol/L Final   04/24/2024 3.9 3.5 - 5.2 mmol/L Final   04/23/2024 4.1 3.5 - 5.2 mmol/L Final      Chloride Chloride   Date Value Ref Range Status   04/25/2024 114 (H) 98 - 107 mmol/L Final   04/24/2024 114 (H) 98 - 107 mmol/L Final   04/23/2024 113 (H) 98 - 107 mmol/L Final      CO2 CO2   Date Value Ref Range Status   04/25/2024 18.0 (L) 22.0 - 29.0 mmol/L Final   04/24/2024 16.0 (L) 22.0 - 29.0 mmol/L Final   04/23/2024 16.0 (L) 22.0 - 29.0 mmol/L Final      BUN BUN   Date Value Ref Range Status   04/25/2024 45 (H) 8 - 23 mg/dL Final   04/24/2024 35 (H) 8 - " "23 mg/dL Final   04/23/2024 34 (H) 8 - 23 mg/dL Final      Creatinine Creatinine   Date Value Ref Range Status   04/25/2024 1.39 (H) 0.76 - 1.27 mg/dL Final   04/24/2024 1.49 (H) 0.76 - 1.27 mg/dL Final   04/23/2024 1.53 (H) 0.76 - 1.27 mg/dL Final      Calcium Calcium   Date Value Ref Range Status   04/25/2024 8.2 (L) 8.6 - 10.5 mg/dL Final   04/24/2024 7.5 (L) 8.6 - 10.5 mg/dL Final   04/23/2024 8.1 (L) 8.6 - 10.5 mg/dL Final      PO4 No components found for: \"PO4\"   Albumin Albumin   Date Value Ref Range Status   04/25/2024 2.6 (L) 3.5 - 5.2 g/dL Final   04/24/2024 2.4 (L) 3.5 - 5.2 g/dL Final   04/23/2024 2.7 (L) 3.5 - 5.2 g/dL Final      Magnesium Magnesium   Date Value Ref Range Status   04/25/2024 2.3 1.6 - 2.4 mg/dL Final   04/24/2024 1.7 1.6 - 2.4 mg/dL Final   04/23/2024 1.6 1.6 - 2.4 mg/dL Final      Uric Acid No components found for: \"URIC ACID\"     Imaging Results (Last 72 Hours)       Procedure Component Value Units Date/Time    US Renal Bilateral [[id number]] Resulted: 04/24/24 1508     Updated: 04/25/24 0534    CT Chest Without Contrast Diagnostic [[id number]] Collected: 04/24/24 1721     Updated: 04/24/24 1733    Narrative:      CT CHEST WO CONTRAST DIAGNOSTIC, CT ABDOMEN PELVIS WO CONTRAST    Date of Exam: 4/24/2024 4:59 PM EDT    Indication: Chronic lung disease (Ped 0-17y)  Dyspnea, chronic, central airway disease suspected.    Comparison: CT chest 10/6/2023, CT abdomen pelvis 10/19/2015    Technique: Axial CT images were obtained of the chest abdomen and pelvis without contrast administration.  Sagittal and coronal reconstructions were performed.  Automated exposure control and iterative reconstruction methods were used.      Findings:  CT CHEST:  MEDIASTINUM: Shotty mediastinal lymphadenopathy. Aortic size is normal. The heart is enlarged, similar to prior exam. No mass nor pericardial effusion.  CORONARY ARTERIES: There is calcified atherosclerotic disease.  LUNGS: There is peripheral reticular " and linear interstitial scarring, similar prior examination. There is basilar compressive atelectasis related to pleural effusions. Previous 9 mm right-sided subpleural pulmonary nodule now measures 4 mm (image 70,   series 5). No new suspicious nodule. There is mild emphysema.  PLEURAL SPACE: Similar small right but new small left pleural effusions.        CT ABDOMEN AND PELVIS:   LIVER:  Unremarkable parenchyma without focal lesion.  BILIARY/GALLBLADDER: There are numerous gallstones. There are no gallbladder inflammatory changes.  SPLEEN:  Unremarkable  PANCREAS:  Unremarkable  ADRENAL:  Unremarkable  KIDNEYS: Mild bilateral renal cortical atrophy with no solid mass identified. No obstruction.  No calculus identified.  GASTROINTESTINAL/MESENTERY:  No evidence of obstruction nor inflammation.  AORTA/IVC:  Normal caliber.    RETROPERITONEUM/LYMPH NODES:  Unremarkable    REPRODUCTIVE:  Unremarkable  BLADDER:  Unremarkable    OSSEUS STRUCTURES:  Typical for age with no acute process identified.    There is generalized body wall edema. There is minimal likely related mesenteric edema and presacral edema.      Impression:      Impression:  1.Small pleural effusions, new on the left, with generalized body wall edema mild mesenteric edema, and presacral edema.  2.Decrease in size of now 4 mm right lower lobe subpleural pulmonary nodule, previously 9 mm. See below recommendations.  3.Stable subpleural reticular and linear areas of interstitial pulmonary scarring.  4.Other incidental nonemergent findings as detailed above.    The Fleischner society pulmonary nodule recommendations are for the follow-up and management of pulmonary nodules smaller than 8 mm detected incidentally in patients >35 years on non-screening CT. The initial guidelines for the management of solid   nodules were released in 2005 1, and guidelines for the management of subsolid nodules were released in 2013 2. New revised 2017 recommendations for both  "solid and subsolid have since been released 4.    2017 guidelines  Solid nodules  Solitary nodule size: <6 mm  *low risk patients: no follow-up needed  *high risk patients: optional CT at 12 months  Solitary nodule size: 6-8 mm  *low risk patients: follow-up at 6-12 months, then consider further follow-up at 18-24 months  *high risk patients: initial follow-up CT at 6-12 months and then at 18-24 months if no change  Solitary nodule size: >8 mm  *either low or high risk patients  *consider follow-up CT at 3 months, and/or CT-PET, and/or biopsy  Multiple nodules size: <6 mm  *low risk patients: no routine follow-up  *high risk patients: optional CT at 12 months  Multiple nodules size: 6-8 mm  *low risk patients: follow-up at 3-6 months, then consider further follow-up at 18-24 months  *high risk patients: follow-up at 3-6 months, then at 18-24 months if no change  Multiple nodules size: >8 mm  *low risk patients: follow-up at 3-6 months, then consider further follow-up at 18-24 months  *high risk patients: follow-up at 3-6 months, then at 18-24 months if no change  *   Note: newly detected indeterminate nodule in persons 35 years of age or older.  *low risk patients: minimal or absent history of smoking and or other known risk factors  *high risk patients: history of smoking or of other known risk factors (e.g. first degree relative with lung cancer, or exposure to asbestos, radon, uranium)  *if a nodule up to 8 mm is partly solid or is ground glass further follow-up is required after 24 months to exclude possible slow growing adenocarcinoma (RUDY)    Subsolid nodules  Solitary pure ground-glass nodule  *nodule size <6mm  *no CT follow-up required  *nodule size \"e6mm  *follow up CT at 6-12 months, then every 2 years until 5 years  Solitary part-solid nodule  *nodule size <6mm  *no CT follow-up required  *nodule size \"e6mm  *follow-up CT at 3-6 months  *if unchanged, and solid component remains <6mm, then annual " "follow-up for 5 years  Multiple subsolid nodules  *nodule size <6mm  *follow-up CT at 3-6 months  *consider further follow-up at 2 and 4 years if stable  *nodule size \"e6mm  *follow-up CT at 3-6 months  *subsequent management based on the most suspicious nodule(s)      Electronically Signed: Kp Petersen MD    4/24/2024 5:30 PM EDT    Workstation ID: [id number]    CT Abdomen Pelvis Without Contrast [[id number]] Collected: 04/24/24 1721     Updated: 04/24/24 1733    Narrative:      CT CHEST WO CONTRAST DIAGNOSTIC, CT ABDOMEN PELVIS WO CONTRAST    Date of Exam: 4/24/2024 4:59 PM EDT    Indication: Chronic lung disease (Ped 0-17y)  Dyspnea, chronic, central airway disease suspected.    Comparison: CT chest 10/6/2023, CT abdomen pelvis 10/19/2015    Technique: Axial CT images were obtained of the chest abdomen and pelvis without contrast administration.  Sagittal and coronal reconstructions were performed.  Automated exposure control and iterative reconstruction methods were used.      Findings:  CT CHEST:  MEDIASTINUM: Shotty mediastinal lymphadenopathy. Aortic size is normal. The heart is enlarged, similar to prior exam. No mass nor pericardial effusion.  CORONARY ARTERIES: There is calcified atherosclerotic disease.  LUNGS: There is peripheral reticular and linear interstitial scarring, similar prior examination. There is basilar compressive atelectasis related to pleural effusions. Previous 9 mm right-sided subpleural pulmonary nodule now measures 4 mm (image 70,   series 5). No new suspicious nodule. There is mild emphysema.  PLEURAL SPACE: Similar small right but new small left pleural effusions.        CT ABDOMEN AND PELVIS:   LIVER:  Unremarkable parenchyma without focal lesion.  BILIARY/GALLBLADDER: There are numerous gallstones. There are no gallbladder inflammatory changes.  SPLEEN:  Unremarkable  PANCREAS:  Unremarkable  ADRENAL:  Unremarkable  KIDNEYS: Mild bilateral renal cortical atrophy with no solid mass " identified. No obstruction.  No calculus identified.  GASTROINTESTINAL/MESENTERY:  No evidence of obstruction nor inflammation.  AORTA/IVC:  Normal caliber.    RETROPERITONEUM/LYMPH NODES:  Unremarkable    REPRODUCTIVE:  Unremarkable  BLADDER:  Unremarkable    OSSEUS STRUCTURES:  Typical for age with no acute process identified.    There is generalized body wall edema. There is minimal likely related mesenteric edema and presacral edema.      Impression:      Impression:  1.Small pleural effusions, new on the left, with generalized body wall edema mild mesenteric edema, and presacral edema.  2.Decrease in size of now 4 mm right lower lobe subpleural pulmonary nodule, previously 9 mm. See below recommendations.  3.Stable subpleural reticular and linear areas of interstitial pulmonary scarring.  4.Other incidental nonemergent findings as detailed above.    The Fleischner society pulmonary nodule recommendations are for the follow-up and management of pulmonary nodules smaller than 8 mm detected incidentally in patients >35 years on non-screening CT. The initial guidelines for the management of solid   nodules were released in 2005 1, and guidelines for the management of subsolid nodules were released in 2013 2. New revised 2017 recommendations for both solid and subsolid have since been released 4.    2017 guidelines  Solid nodules  Solitary nodule size: <6 mm  *low risk patients: no follow-up needed  *high risk patients: optional CT at 12 months  Solitary nodule size: 6-8 mm  *low risk patients: follow-up at 6-12 months, then consider further follow-up at 18-24 months  *high risk patients: initial follow-up CT at 6-12 months and then at 18-24 months if no change  Solitary nodule size: >8 mm  *either low or high risk patients  *consider follow-up CT at 3 months, and/or CT-PET, and/or biopsy  Multiple nodules size: <6 mm  *low risk patients: no routine follow-up  *high risk patients: optional CT at 12 months  Multiple  "nodules size: 6-8 mm  *low risk patients: follow-up at 3-6 months, then consider further follow-up at 18-24 months  *high risk patients: follow-up at 3-6 months, then at 18-24 months if no change  Multiple nodules size: >8 mm  *low risk patients: follow-up at 3-6 months, then consider further follow-up at 18-24 months  *high risk patients: follow-up at 3-6 months, then at 18-24 months if no change  *   Note: newly detected indeterminate nodule in persons 35 years of age or older.  *low risk patients: minimal or absent history of smoking and or other known risk factors  *high risk patients: history of smoking or of other known risk factors (e.g. first degree relative with lung cancer, or exposure to asbestos, radon, uranium)  *if a nodule up to 8 mm is partly solid or is ground glass further follow-up is required after 24 months to exclude possible slow growing adenocarcinoma (RUDY)    Subsolid nodules  Solitary pure ground-glass nodule  *nodule size <6mm  *no CT follow-up required  *nodule size \"e6mm  *follow up CT at 6-12 months, then every 2 years until 5 years  Solitary part-solid nodule  *nodule size <6mm  *no CT follow-up required  *nodule size \"e6mm  *follow-up CT at 3-6 months  *if unchanged, and solid component remains <6mm, then annual follow-up for 5 years  Multiple subsolid nodules  *nodule size <6mm  *follow-up CT at 3-6 months  *consider further follow-up at 2 and 4 years if stable  *nodule size \"e6mm  *follow-up CT at 3-6 months  *subsequent management based on the most suspicious nodule(s)      Electronically Signed: Kp Petersen MD    4/24/2024 5:30 PM EDT    Workstation ID: [id number]    XR Chest 1 View [[id number]] Collected: 04/24/24 0851     Updated: 04/24/24 0854    Narrative:      XR CHEST 1 VW    Date of Exam: 4/24/2024 8:35 AM EDT    Indication: Possible pneumonia    Comparison: 4/23/2024    Findings:  Unchanged enlarged cardiac silhouette. There are persistent small bilateral pleural effusions " with adjacent atelectasis. No definite new airspace consolidation. Osseous fractures are unchanged.      Impression:      Impression:  No significant interval change.      Electronically Signed: Daniel Jennings MD    4/24/2024 8:52 AM EDT    Workstation ID: [id number]    US Liver [[id number]] Collected: 04/23/24 1528     Updated: 04/23/24 1533    Narrative:      US LIVER    Date of Exam: 4/23/2024 2:09 PM EDT    Indication: Elevated transaminases.    Comparison: No comparisons available.    Technique: Grayscale and color Doppler ultrasound evaluation of the liver was performed.      Findings:  The liver measures 16.2 cm in length. There is mild increased echogenicity which can be seen with hepatic steatosis. There are no focal hepatic masses. There is normal directional flow in the main portal vein and hepatic veins. There are incidental   echogenic shadowing gallstones. The gallbladder wall is slightly thickened measuring 3-4 mm. The common bile duct caliber is normal measuring 5 mm. There is no ascites.      Impression:      Impression:    1. Increased hepatic echogenicity which can be seen with hepatic steatosis.  2. Cholelithiasis. There is slight thickening of the gallbladder wall which is nonspecific.        Electronically Signed: Jayy Osborn MD    4/23/2024 3:31 PM EDT    Workstation ID: [id number]            Results for orders placed during the hospital encounter of 04/23/24    XR Chest 1 View    Narrative  XR CHEST 1 VW    Date of Exam: 4/24/2024 8:35 AM EDT    Indication: Possible pneumonia    Comparison: 4/23/2024    Findings:  Unchanged enlarged cardiac silhouette. There are persistent small bilateral pleural effusions with adjacent atelectasis. No definite new airspace consolidation. Osseous fractures are unchanged.    Impression  Impression:  No significant interval change.      Electronically Signed: Daniel Jennings MD  4/24/2024 8:52 AM EDT  Workstation ID: [id number]      Results for orders placed in visit on  09/12/23    SCANNED - IMAGING      Results for orders placed in visit on 06/19/23    SCANNED - IMAGING            ASSESSMENT / PLAN      Sepsis    ARF/ELYSSA/CRF/CKD------Nonoliguric. +ARF/ELYSSA on top of known CRF/CKD   STG 3A. CRF/CKD STG 3A secondary to DGS/HTN NS. +ARF/ELYSSA is secondary to ATN from hypotension/sepsis. No NSAIDs or IV dye. Cautious diuresis.     2. ACIDOSIS-----Metabolic. No elevation in AGAP. +Type 4 RTA. Give IV and po NaHCO3 and follow    3. HYPOPHOSPHATEMIA------Replace IV    4. ANEMIA OF CKD-----hemoccult +. S/P IV iron for ANDREA    5. COPD------Oxygen, nebs, pulmonary toilet    6. VOLUME OVERLOAD------Diurese with IV Albumin/lasix and follow    7. HYPOALBUMINEMIA-----IV Albumin to temporize    8. ELEVATED LFTS/TRANSAMINITIS------Follow with diuresis    9. SEPSIS/UTI-----Abx per Intensivist    10. HYPOMAGNESEMIA-------Replaced    11. PAF--------Rate controlled. Eliquis on hold b/c Hemoccult +    12. HYPOTENSION------Better with Midodrine    13. BPH-----On Flomax    14. GERD/PUD PROPHYLAXIS-----On PPI. Benefits outweigh risks despite renal dysfnction    15. DMII WITH RENAL MANIFESTATIONS-----AMY knox. Glucometers, SSI          Shawn Shane MD  [facility]  [phone]  04/25/24  07:16 EDT

## 2024-04-25 NOTE — CONSULTS
GI CONSULT  NOTE:    Referring Provider:  Dr. Rousseau    Chief complaint: Weakness and cough    Subjective .     History of present illness: Patient is an 84-year-old male with history of diabetes, COPD on home oxygen, ITP, leukemia, rheumatoid arthritis, PE, CVA, prostate cancer, A-fib on Eliquis, and chronic kidney disease who presented to the hospital as a transfer from [facility] with septic shock.  Had originally presented there with weakness and coughing.  Has elevated troponin and being followed by cardiology.  He is feeling some better now.  GI has been asked to consult due to anemia and heme positive stool.  Patient reports brown stool and no noticeable bright red blood per rectum or melena.  He does take Eliquis at home.  He does report a reduced appetite and recent upper abdominal pain.  Has dysphagia occasionally but does not have teeth.  He moves his bowels daily and normally lately.  He does not take NSAIDs.      Endo History:  2018 colonoscopy by Dr. Beard -mild diverticulosis, tubular adenoma colon polyps and hyperplastic polyp, internal hemorrhoids.    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    Neutropenia 11/2003    MITZI (obstructive sleep apnea) 2018    Psoriasis 2000    Renal cyst, left     Rheumatoid arthritis     Stroke (cerebrum)     Thrombocytopenia 08/23/2005       Past Surgical History:  Past Surgical History:   Procedure  Laterality Date    SKIN LESION EXCISION      back and groin-benign       Social History:  Social History     Tobacco Use    Smoking status: Former     Current packs/day: 0.00     Types: Cigarettes     Quit date:      Years since quittin.3     Passive exposure: Never    Smokeless tobacco: Never    Tobacco comments:     stopped smoking in    Vaping Use    Vaping status: Never Used   Substance Use Topics    Alcohol use: No    Drug use: No       Family History:  Family History   Problem Relation Age of Onset    Lung cancer Brother         age unknown    Heart disease Brother     Stroke Mother     Heart disease Father     Heart disease Sister        Medications:  Medications Prior to Admission   Medication Sig Dispense Refill Last Dose    apixaban (ELIQUIS) 2.5 MG tablet tablet Take 1 tablet by mouth 2 (Two) Times a Day. 60 tablet 11     atorvastatin (LIPITOR) 20 MG tablet Take 1 tablet by mouth Daily.  3     fenofibrate 160 MG tablet Take 1 tablet by mouth Daily.  3     hydroxychloroquine (PLAQUENIL) 200 MG tablet Take 2 tablets by mouth Daily.  0     ipratropium-albuterol (DUO-NEB) 0.5-2.5 mg/3 ml nebulizer Take 3 mL by nebulization Every 4 (Four) Hours As Needed for Wheezing.       losartan (COZAAR) 100 MG tablet Take 1 tablet by mouth Daily.  3     Multiple Vitamins-Minerals (MULTIVITAMIN WITH MINERALS) tablet tablet Take 1 tablet by mouth Daily.       oxybutynin XL (DITROPAN-XL) 10 MG 24 hr tablet Take 1 tablet by mouth Daily.       pantoprazole (PROTONIX) 40 MG EC tablet Take 1 tablet by mouth Daily.       revefenacin (YUPELRI) 175 MCG/3ML nebulizer solution Take 3 mL by nebulization Daily.       spironolactone (ALDACTONE) 25 MG tablet Take 1 tablet by mouth Daily.  3     tamsulosin (FLOMAX) 0.4 MG capsule 24 hr capsule Take 1 capsule by mouth Daily.          Scheduled Meds:albumin human, 25 g, Intravenous, Q8H  apixaban, 2.5 mg, Oral, Q12H  [Held by provider] atorvastatin, 20 mg, Oral,  "Daily  cefTRIAXone, 2,000 mg, Intravenous, Q24H  furosemide, 20 mg, Intravenous, Q8H  hydroxychloroquine, 400 mg, Oral, Daily  midodrine, 5 mg, Oral, TID AC  multivitamin with minerals, 1 tablet, Oral, Daily  pantoprazole, 40 mg, Oral, Q AM  sodium bicarbonate, 1,300 mg, Oral, TID  sodium chloride, 10 mL, Intravenous, Q12H  [Held by provider] spironolactone, 25 mg, Oral, Daily  tamsulosin, 0.4 mg, Oral, Daily      Continuous Infusions:   PRN Meds:.  [Held by provider] acetaminophen **OR** [Held by provider] acetaminophen    senna-docusate sodium **AND** polyethylene glycol **AND** bisacodyl **AND** bisacodyl    dextrose    dextrose    glucagon (human recombinant)    ipratropium-albuterol    nitroglycerin    ondansetron ODT **OR** ondansetron    sodium chloride    sodium chloride    ALLERGIES:  Penicillin v potassium and Latex    ROS:  Review of Systems   Constitutional:  Negative for chills and fever.   HENT:  Positive for trouble swallowing.    Respiratory:  Positive for cough and shortness of breath.    Cardiovascular:  Negative for chest pain.   Gastrointestinal:  Positive for abdominal pain. Negative for nausea and vomiting.   Musculoskeletal:  Positive for arthralgias.   Neurological:  Positive for weakness. Negative for dizziness.   Psychiatric/Behavioral:  Negative for agitation and confusion.        Objective     Vital Signs:   Visit Vitals  /69 (BP Location: Left arm, Patient Position: Lying)   Pulse 61   Temp 97.4 °F (36.3 °C) (Oral)   Resp 13   Ht 167.6 cm (66\")   Wt 86.6 kg (190 lb 14.7 oz)   SpO2 100%   BMI 30.81 kg/m²       Physical Exam:      General Appearance:    Awake and alert, in no acute distress   Head:    Normocephalic, without obvious abnormality, atraumatic   Eyes:            Conjunctivae normal, anicteric sclera   Ears:    Ears appear intact with no abnormalities noted   Throat:   No oral lesions, no thrush, oral mucosa moist       Lungs:     Respirations regular, even and unlabored "       Chest Wall:    No abnormalities observed   Abdomen:     Soft, minimal upper abdominal tenderness, no rebound or guarding, non-distended, no hepatosplenomegaly   Rectal:     Deferred   Extremities:   No edema, no cyanosis, no   redness   Pulses:   Pulses palpable and equal bilaterally   Skin:   No bleeding, bruising or rash, no jaundice   Lymph nodes:   No palpable adenopathy   Neurologic:   Sensation intact       Results Review:   I reviewed the patient's labs and imaging.  CBC  Results from last 7 days   Lab Units 04/25/24  0328 04/24/24  0338 04/23/24  1235   RBC 10*6/mm3 2.72* 2.44* 2.77*   WBC 10*3/mm3 7.32 6.48 13.06*   HEMOGLOBIN g/dL 8.7* 7.6* 8.8*   PLATELETS 10*3/mm3 190 148 183       CMP  Results from last 7 days   Lab Units 04/25/24  0328 04/24/24  0338 04/23/24  1235   SODIUM mmol/L 143 141 143   POTASSIUM mmol/L 4.2 3.9 4.1   CHLORIDE mmol/L 114* 114* 113*   CO2 mmol/L 18.0* 16.0* 16.0*   BUN mg/dL 45* 35* 34*   CREATININE mg/dL 1.39* 1.49* 1.53*   GLUCOSE mg/dL 131* 130* 98   ALBUMIN g/dL 2.6* 2.4* 2.7*   BILIRUBIN mg/dL 0.5 1.3* 2.3*   ALK PHOS U/L 184* 145* 192*   AST (SGOT) U/L 211* 156* 154*   ALT (SGPT) U/L 136* 75* 67*       Amylase and Lipase  Results from last 7 days   Lab Units 04/23/24  1235   AMYLASE U/L 29   LIPASE U/L 16       CRP     Results from last 7 days   Lab Units 04/23/24  1235   CRP mg/dL 10.13*       Imaging Results (Last 24 Hours)       Procedure Component Value Units Date/Time    US Renal Bilateral [[id number]] Collected: 04/25/24 0727     Updated: 04/25/24 0730    Narrative:      US RENAL BILATERAL    Date of Exam: 4/25/2024 5:29 AM EDT    Indication: Kidney failure, acute  ELYSSA.    Comparison: 4/24/2024 CT    Technique: Grayscale and color Doppler ultrasound evaluation of the kidneys and urinary bladder was performed.      Findings:  Right kidney measures 10.8 cm in length. The left kidney measures 10.7 cm in length. There is no hydronephrosis, nephrolithiasis, or  suspicious renal mass.    Limited evaluation of the urinary bladder secondary to recent voiding.      Impression:      Impression:  No hydronephrosis.        Electronically Signed: Daniel Jennings MD    4/25/2024 7:28 AM EDT    Workstation ID: [id number]    CT Chest Without Contrast Diagnostic [[id number]] Collected: 04/24/24 1721     Updated: 04/24/24 1733    Narrative:      CT CHEST WO CONTRAST DIAGNOSTIC, CT ABDOMEN PELVIS WO CONTRAST    Date of Exam: 4/24/2024 4:59 PM EDT    Indication: Chronic lung disease (Ped 0-17y)  Dyspnea, chronic, central airway disease suspected.    Comparison: CT chest 10/6/2023, CT abdomen pelvis 10/19/2015    Technique: Axial CT images were obtained of the chest abdomen and pelvis without contrast administration.  Sagittal and coronal reconstructions were performed.  Automated exposure control and iterative reconstruction methods were used.      Findings:  CT CHEST:  MEDIASTINUM: Shotty mediastinal lymphadenopathy. Aortic size is normal. The heart is enlarged, similar to prior exam. No mass nor pericardial effusion.  CORONARY ARTERIES: There is calcified atherosclerotic disease.  LUNGS: There is peripheral reticular and linear interstitial scarring, similar prior examination. There is basilar compressive atelectasis related to pleural effusions. Previous 9 mm right-sided subpleural pulmonary nodule now measures 4 mm (image 70,   series 5). No new suspicious nodule. There is mild emphysema.  PLEURAL SPACE: Similar small right but new small left pleural effusions.        CT ABDOMEN AND PELVIS:   LIVER:  Unremarkable parenchyma without focal lesion.  BILIARY/GALLBLADDER: There are numerous gallstones. There are no gallbladder inflammatory changes.  SPLEEN:  Unremarkable  PANCREAS:  Unremarkable  ADRENAL:  Unremarkable  KIDNEYS: Mild bilateral renal cortical atrophy with no solid mass identified. No obstruction.  No calculus identified.  GASTROINTESTINAL/MESENTERY:  No evidence of obstruction  nor inflammation.  AORTA/IVC:  Normal caliber.    RETROPERITONEUM/LYMPH NODES:  Unremarkable    REPRODUCTIVE:  Unremarkable  BLADDER:  Unremarkable    OSSEUS STRUCTURES:  Typical for age with no acute process identified.    There is generalized body wall edema. There is minimal likely related mesenteric edema and presacral edema.      Impression:      Impression:  1.Small pleural effusions, new on the left, with generalized body wall edema mild mesenteric edema, and presacral edema.  2.Decrease in size of now 4 mm right lower lobe subpleural pulmonary nodule, previously 9 mm. See below recommendations.  3.Stable subpleural reticular and linear areas of interstitial pulmonary scarring.  4.Other incidental nonemergent findings as detailed above.    The Fleischner society pulmonary nodule recommendations are for the follow-up and management of pulmonary nodules smaller than 8 mm detected incidentally in patients >35 years on non-screening CT. The initial guidelines for the management of solid   nodules were released in 2005 1, and guidelines for the management of subsolid nodules were released in 2013 2. New revised 2017 recommendations for both solid and subsolid have since been released 4.    2017 guidelines  Solid nodules  Solitary nodule size: <6 mm  *low risk patients: no follow-up needed  *high risk patients: optional CT at 12 months  Solitary nodule size: 6-8 mm  *low risk patients: follow-up at 6-12 months, then consider further follow-up at 18-24 months  *high risk patients: initial follow-up CT at 6-12 months and then at 18-24 months if no change  Solitary nodule size: >8 mm  *either low or high risk patients  *consider follow-up CT at 3 months, and/or CT-PET, and/or biopsy  Multiple nodules size: <6 mm  *low risk patients: no routine follow-up  *high risk patients: optional CT at 12 months  Multiple nodules size: 6-8 mm  *low risk patients: follow-up at 3-6 months, then consider further follow-up at 18-24  "months  *high risk patients: follow-up at 3-6 months, then at 18-24 months if no change  Multiple nodules size: >8 mm  *low risk patients: follow-up at 3-6 months, then consider further follow-up at 18-24 months  *high risk patients: follow-up at 3-6 months, then at 18-24 months if no change  *   Note: newly detected indeterminate nodule in persons 35 years of age or older.  *low risk patients: minimal or absent history of smoking and or other known risk factors  *high risk patients: history of smoking or of other known risk factors (e.g. first degree relative with lung cancer, or exposure to asbestos, radon, uranium)  *if a nodule up to 8 mm is partly solid or is ground glass further follow-up is required after 24 months to exclude possible slow growing adenocarcinoma (RUDY)    Subsolid nodules  Solitary pure ground-glass nodule  *nodule size <6mm  *no CT follow-up required  *nodule size \"e6mm  *follow up CT at 6-12 months, then every 2 years until 5 years  Solitary part-solid nodule  *nodule size <6mm  *no CT follow-up required  *nodule size \"e6mm  *follow-up CT at 3-6 months  *if unchanged, and solid component remains <6mm, then annual follow-up for 5 years  Multiple subsolid nodules  *nodule size <6mm  *follow-up CT at 3-6 months  *consider further follow-up at 2 and 4 years if stable  *nodule size \"e6mm  *follow-up CT at 3-6 months  *subsequent management based on the most suspicious nodule(s)      Electronically Signed: Kp Petersen MD    4/24/2024 5:30 PM EDT    Workstation ID: [id number]    CT Abdomen Pelvis Without Contrast [[id number]] Collected: 04/24/24 1721     Updated: 04/24/24 1733    Narrative:      CT CHEST WO CONTRAST DIAGNOSTIC, CT ABDOMEN PELVIS WO CONTRAST    Date of Exam: 4/24/2024 4:59 PM EDT    Indication: Chronic lung disease (Ped 0-17y)  Dyspnea, chronic, central airway disease suspected.    Comparison: CT chest 10/6/2023, CT abdomen pelvis 10/19/2015    Technique: Axial CT images were " obtained of the chest abdomen and pelvis without contrast administration.  Sagittal and coronal reconstructions were performed.  Automated exposure control and iterative reconstruction methods were used.      Findings:  CT CHEST:  MEDIASTINUM: Shotty mediastinal lymphadenopathy. Aortic size is normal. The heart is enlarged, similar to prior exam. No mass nor pericardial effusion.  CORONARY ARTERIES: There is calcified atherosclerotic disease.  LUNGS: There is peripheral reticular and linear interstitial scarring, similar prior examination. There is basilar compressive atelectasis related to pleural effusions. Previous 9 mm right-sided subpleural pulmonary nodule now measures 4 mm (image 70,   series 5). No new suspicious nodule. There is mild emphysema.  PLEURAL SPACE: Similar small right but new small left pleural effusions.        CT ABDOMEN AND PELVIS:   LIVER:  Unremarkable parenchyma without focal lesion.  BILIARY/GALLBLADDER: There are numerous gallstones. There are no gallbladder inflammatory changes.  SPLEEN:  Unremarkable  PANCREAS:  Unremarkable  ADRENAL:  Unremarkable  KIDNEYS: Mild bilateral renal cortical atrophy with no solid mass identified. No obstruction.  No calculus identified.  GASTROINTESTINAL/MESENTERY:  No evidence of obstruction nor inflammation.  AORTA/IVC:  Normal caliber.    RETROPERITONEUM/LYMPH NODES:  Unremarkable    REPRODUCTIVE:  Unremarkable  BLADDER:  Unremarkable    OSSEUS STRUCTURES:  Typical for age with no acute process identified.    There is generalized body wall edema. There is minimal likely related mesenteric edema and presacral edema.      Impression:      Impression:  1.Small pleural effusions, new on the left, with generalized body wall edema mild mesenteric edema, and presacral edema.  2.Decrease in size of now 4 mm right lower lobe subpleural pulmonary nodule, previously 9 mm. See below recommendations.  3.Stable subpleural reticular and linear areas of interstitial  pulmonary scarring.  4.Other incidental nonemergent findings as detailed above.    The Fleischner society pulmonary nodule recommendations are for the follow-up and management of pulmonary nodules smaller than 8 mm detected incidentally in patients >35 years on non-screening CT. The initial guidelines for the management of solid   nodules were released in 2005 1, and guidelines for the management of subsolid nodules were released in 2013 2. New revised 2017 recommendations for both solid and subsolid have since been released 4.    2017 guidelines  Solid nodules  Solitary nodule size: <6 mm  *low risk patients: no follow-up needed  *high risk patients: optional CT at 12 months  Solitary nodule size: 6-8 mm  *low risk patients: follow-up at 6-12 months, then consider further follow-up at 18-24 months  *high risk patients: initial follow-up CT at 6-12 months and then at 18-24 months if no change  Solitary nodule size: >8 mm  *either low or high risk patients  *consider follow-up CT at 3 months, and/or CT-PET, and/or biopsy  Multiple nodules size: <6 mm  *low risk patients: no routine follow-up  *high risk patients: optional CT at 12 months  Multiple nodules size: 6-8 mm  *low risk patients: follow-up at 3-6 months, then consider further follow-up at 18-24 months  *high risk patients: follow-up at 3-6 months, then at 18-24 months if no change  Multiple nodules size: >8 mm  *low risk patients: follow-up at 3-6 months, then consider further follow-up at 18-24 months  *high risk patients: follow-up at 3-6 months, then at 18-24 months if no change  *   Note: newly detected indeterminate nodule in persons 35 years of age or older.  *low risk patients: minimal or absent history of smoking and or other known risk factors  *high risk patients: history of smoking or of other known risk factors (e.g. first degree relative with lung cancer, or exposure to asbestos, radon, uranium)  *if a nodule up to 8 mm is partly solid or is  "ground glass further follow-up is required after 24 months to exclude possible slow growing adenocarcinoma (RUDY)    Subsolid nodules  Solitary pure ground-glass nodule  *nodule size <6mm  *no CT follow-up required  *nodule size \"e6mm  *follow up CT at 6-12 months, then every 2 years until 5 years  Solitary part-solid nodule  *nodule size <6mm  *no CT follow-up required  *nodule size \"e6mm  *follow-up CT at 3-6 months  *if unchanged, and solid component remains <6mm, then annual follow-up for 5 years  Multiple subsolid nodules  *nodule size <6mm  *follow-up CT at 3-6 months  *consider further follow-up at 2 and 4 years if stable  *nodule size \"e6mm  *follow-up CT at 3-6 months  *subsequent management based on the most suspicious nodule(s)      Electronically Signed: Kp Petersen MD    4/24/2024 5:30 PM EDT    Workstation ID: [id number]              ASSESSMENT AND PLAN:  84-year-old male with extensive past medical history presented as a transfer from [facility] on 4/23/2024 with septic shock.  Found to have anemia with heme positive stool.    Macrocytic anemia with heme positive stool  Upper abdominal pain  Dysphagia  Unintentional weight loss  UTI  Elevated troponin  Septic shock  Elevated liver enzymes  Cholelithiasis  Diabetes  COPD on home oxygen  ITP  History of leukemia  Rheumatoid arthritis  History of CVA  A-fib on Eliquis  Chronic kidney disease    Principal Problem:    Sepsis  Active Problems:    Moderate malnutrition     Plan:  84-year-old male presented to the hospital 2 days ago as a transfer from [facility] with septic shock.  Found to have anemia with heme positive stool.  Hemoglobin is low 7.6 but today is 8.7.  He denies any overt GI bleeding and reports brown stool.  He does have some upper abdominal complaints with upper abdominal pain recently, dysphagia, and weight loss.  He is on Eliquis which is being held.  Plan to proceed with EGD tomorrow to evaluate for ulcers or AVM.  " Start PPI.  Monitor H&H and transfuse as needed.  Cardiology following and possible plan for [facility].  Will also send liver serologies due to elevated liver enzymes.  Monitor labs.  N.p.o. at midnight.    I discussed the patients findings and my recommendations with the patient.  Karol Sweet, AZEEM  04/25/24  16:24 EDT

## 2024-04-25 NOTE — PLAN OF CARE
Goal Outcome Evaluation:    HEM+ STOOL IN SAMPLE AGAIN TODAY. GI CONSULTED. GI SCHEDULED HIM FOR A EGD FOR TOMORROW. PT TO BE NPO AFTER MIDNIGHT & HOLD AM ELIQUIS.

## 2024-04-25 NOTE — PROGRESS NOTES
LOS: 2 days   Patient Care Team:  Guilherme Jason MD as PCP - General (Family Medicine)  Misty Dias MD as Consulting Physician (Cardiology)  Nora Kenyon, BRIAN, APRN as Nurse Practitioner (Thoracic Surgery)  Sussy Shane MD as Consulting Physician (Nephrology)    Subjective     Interval History:     Patient Complaints: pt states that he is feeling a little better    History taken from: patient    Review of Systems   Constitutional:  Positive for activity change, appetite change and fatigue.   HENT:  Positive for congestion.    Respiratory:  Positive for cough and shortness of breath.    Cardiovascular:  Positive for palpitations.   Musculoskeletal:  Positive for arthralgias.   Neurological:  Positive for weakness.           Objective     Vital Signs  Temp:  [97.3 °F (36.3 °C)-97.7 °F (36.5 °C)] 97.3 °F (36.3 °C)  Heart Rate:  [40-65] 58  Resp:  [21] 21  BP: (110-147)/() 141/56    Physical Exam:     General Appearance:    Alert, cooperative, in no acute distress   Head:    Normocephalic, without obvious abnormality, atraumatic   Eyes:            Lids and lashes normal, conjunctivae and sclerae normal, no   icterus, no pallor, corneas clear, PERRLA   Ears:    Ears appear intact with no abnormalities noted   Throat:   No oral lesions, no thrush, oral mucosa moist   Neck:   No adenopathy, supple, trachea midline, no thyromegaly, no   carotid bruit, no JVD   Lungs:     crackles    Heart:    Regular rhythm and normal rate, normal S1 and S2, no            murmur, no gallop, no rub, no click   Chest Wall:    No abnormalities observed   Abdomen:     Normal bowel sounds, no masses, no organomegaly, soft        non-tender, non-distended, no guarding, no rebound                tenderness   Extremities:   Moves all extremities well, +edema, no cyanosis, no             redness   Pulses:   Pulses palpable and equal bilaterally   Skin:   No bleeding, bruising or rash   Lymph nodes:   No palpable  adenopathy   Neurologic:   Cranial nerves 2 - 12 grossly intact, sensation intact, DTR       present and equal bilaterally        Results Review:    Lab Results (last 24 hours)       Procedure Component Value Units Date/Time    POC Glucose 4x Daily Before Meals & at Bedtime [[id number]]  (Abnormal) Collected: 04/25/24 0840    Specimen: Blood Updated: 04/25/24 0842     Glucose 136 mg/dL      Comment: Serial Number: [id number]:  [id number]       Occult Blood, Fecal By Immunoassay - Stool, Per Rectum [[id number]]  (Abnormal) Collected: 04/25/24 0824    Specimen: Stool from Per Rectum Updated: 04/25/24 0836     Occult Blood, Fecal by Immunoassay Positive    Magnesium [[id number]]  (Normal) Collected: 04/25/24 0328    Specimen: Blood Updated: 04/25/24 0429     Magnesium 2.3 mg/dL     Phosphorus [[id number]]  (Abnormal) Collected: 04/25/24 0328    Specimen: Blood Updated: 04/25/24 0429     Phosphorus 2.4 mg/dL     CBC & Differential [[id number]]  (Abnormal) Collected: 04/25/24 0328    Specimen: Blood Updated: 04/25/24 0426    Narrative:      The following orders were created for panel order CBC & Differential.  Procedure                               Abnormality         Status                     ---------                               -----------         ------                     CBC Auto Differential[[id number]]        Abnormal            Final result               Scan Slide[[id number]]                                       Final result                 Please view results for these tests on the individual orders.    CBC Auto Differential [[id number]]  (Abnormal) Collected: 04/25/24 0328    Specimen: Blood Updated: 04/25/24 0426     WBC 7.32 10*3/mm3      RBC 2.72 10*6/mm3      Hemoglobin 8.7 g/dL      Hematocrit 27.1 %      MCV 99.6 fL      MCH 32.0 pg      MCHC 32.1 g/dL      RDW 14.9 %      RDW-SD 54.0 fl      MPV 13.1 fL      Platelets 190 10*3/mm3      Neutrophil % 77.5 %      Lymphocyte % 13.8 %      Monocyte % 8.3  %      Eosinophil % 0.0 %      Basophil % 0.1 %      Immature Grans % 0.3 %      Neutrophils, Absolute 5.67 10*3/mm3      Lymphocytes, Absolute 1.01 10*3/mm3      Monocytes, Absolute 0.61 10*3/mm3      Eosinophils, Absolute 0.00 10*3/mm3      Basophils, Absolute 0.01 10*3/mm3      Immature Grans, Absolute 0.02 10*3/mm3      nRBC 0.0 /100 WBC     Scan Slide [[id number]] Collected: 04/25/24 0328    Specimen: Blood Updated: 04/25/24 0426     Anisocytosis Slight/1+     WBC Morphology Normal     Platelet Estimate Adequate    Comprehensive Metabolic Panel [[id number]]  (Abnormal) Collected: 04/25/24 0328    Specimen: Blood Updated: 04/25/24 0426     Glucose 131 mg/dL      BUN 45 mg/dL      Creatinine 1.39 mg/dL      Sodium 143 mmol/L      Potassium 4.2 mmol/L      Chloride 114 mmol/L      CO2 18.0 mmol/L      Calcium 8.2 mg/dL      Total Protein 5.4 g/dL      Albumin 2.6 g/dL      ALT (SGPT) 136 U/L      AST (SGOT) 211 U/L      Alkaline Phosphatase 184 U/L      Total Bilirubin 0.5 mg/dL      Globulin 2.8 gm/dL      A/G Ratio 0.9 g/dL      BUN/Creatinine Ratio 32.4     Anion Gap 11.0 mmol/L      eGFR 50.3 mL/min/1.73     Narrative:      GFR Normal >60  Chronic Kidney Disease <60  Kidney Failure <15    The GFR formula is only valid for adults with stable renal function between ages 18 and 70.    Uric Acid [[id number]]  (Normal) Collected: 04/25/24 0328    Specimen: Blood Updated: 04/25/24 0426     Uric Acid 6.5 mg/dL     POC Glucose Once [[id number]]  (Abnormal) Collected: 04/24/24 2249    Specimen: Blood Updated: 04/24/24 2251     Glucose 154 mg/dL      Comment: Serial Number: [id number]:  [id number]       POC Glucose Once [[id number]]  (Abnormal) Collected: 04/24/24 1822    Specimen: Blood Updated: 04/24/24 2224     Glucose 148 mg/dL      Comment: Serial Number: [id number]:  [id number]       Occult Blood, Fecal By Immunoassay - Stool, Per Rectum [[id number]]  (Abnormal) Collected: 04/24/24 1525    Specimen: Stool  from Per Rectum Updated: 04/24/24 1538     Occult Blood, Fecal by Immunoassay Positive    Blood Culture - Blood, Hand, Left [[id number]]  (Normal) Collected: 04/23/24 1245    Specimen: Blood from Hand, Left Updated: 04/24/24 1300     Blood Culture No growth at 24 hours    Blood Culture - Blood, Arm, Right [[id number]]  (Normal) Collected: 04/23/24 1235    Specimen: Blood from Arm, Right Updated: 04/24/24 1245     Blood Culture No growth at 24 hours    Narrative:      Less than seven (7) mL's of blood was collected.  Insufficient quantity may yield false negative results.    BNP [[id number]]  (Abnormal) Collected: 04/24/24 0338    Specimen: Blood Updated: 04/24/24 1220     proBNP 16,780.0 pg/mL     Narrative:      This assay is used as an aid in the diagnosis of individuals suspected of having heart failure. It can be used as an aid in the diagnosis of acute decompensated heart failure (ADHF) in patients presenting with signs and symptoms of ADHF to the emergency department (ED). In addition, NT-proBNP of <300 pg/mL indicates ADHF is not likely.    Age Range Result Interpretation  NT-proBNP Concentration (pg/mL:      <50             Positive            >450                   Gray                 300-450                    Negative             <300    50-75           Positive            >900                  Gray                300-900                  Negative            <300      >75             Positive            >1800                  Gray                300-1800                  Negative            <300    Respiratory Culture - Sputum, Cough [[id number]] Collected: 04/23/24 2204    Specimen: Sputum from Cough Updated: 04/24/24 1132     Respiratory Culture Rare The culture consists of normal respiratory nicolas. This is a preliminary report; final report to follow.     Gram Stain Many (4+) WBCs per low power field      Rare (1+) Epithelial cells per low power field      Rare (1+) Mixed bacterial morphotypes seen on  Gram Stain    POC Glucose 4x Daily Before Meals & at Bedtime [[id number]]  (Abnormal) Collected: 04/24/24 1125    Specimen: Blood Updated: 04/24/24 1127     Glucose 152 mg/dL      Comment: Serial Number: [id number]:  [id number]                Imaging Results (Last 24 Hours)       Procedure Component Value Units Date/Time    US Renal Bilateral [[id number]] Collected: 04/25/24 0727     Updated: 04/25/24 0730    Narrative:      US RENAL BILATERAL    Date of Exam: 4/25/2024 5:29 AM EDT    Indication: Kidney failure, acute  ELYSSA.    Comparison: 4/24/2024 CT    Technique: Grayscale and color Doppler ultrasound evaluation of the kidneys and urinary bladder was performed.      Findings:  Right kidney measures 10.8 cm in length. The left kidney measures 10.7 cm in length. There is no hydronephrosis, nephrolithiasis, or suspicious renal mass.    Limited evaluation of the urinary bladder secondary to recent voiding.      Impression:      Impression:  No hydronephrosis.        Electronically Signed: Daniel Jennings MD    4/25/2024 7:28 AM EDT    Workstation ID: [id number]    CT Chest Without Contrast Diagnostic [[id number]] Collected: 04/24/24 1721     Updated: 04/24/24 1733    Narrative:      CT CHEST WO CONTRAST DIAGNOSTIC, CT ABDOMEN PELVIS WO CONTRAST    Date of Exam: 4/24/2024 4:59 PM EDT    Indication: Chronic lung disease (Ped 0-17y)  Dyspnea, chronic, central airway disease suspected.    Comparison: CT chest 10/6/2023, CT abdomen pelvis 10/19/2015    Technique: Axial CT images were obtained of the chest abdomen and pelvis without contrast administration.  Sagittal and coronal reconstructions were performed.  Automated exposure control and iterative reconstruction methods were used.      Findings:  CT CHEST:  MEDIASTINUM: Shotty mediastinal lymphadenopathy. Aortic size is normal. The heart is enlarged, similar to prior exam. No mass nor pericardial effusion.  CORONARY ARTERIES: There is calcified atherosclerotic  disease.  LUNGS: There is peripheral reticular and linear interstitial scarring, similar prior examination. There is basilar compressive atelectasis related to pleural effusions. Previous 9 mm right-sided subpleural pulmonary nodule now measures 4 mm (image 70,   series 5). No new suspicious nodule. There is mild emphysema.  PLEURAL SPACE: Similar small right but new small left pleural effusions.        CT ABDOMEN AND PELVIS:   LIVER:  Unremarkable parenchyma without focal lesion.  BILIARY/GALLBLADDER: There are numerous gallstones. There are no gallbladder inflammatory changes.  SPLEEN:  Unremarkable  PANCREAS:  Unremarkable  ADRENAL:  Unremarkable  KIDNEYS: Mild bilateral renal cortical atrophy with no solid mass identified. No obstruction.  No calculus identified.  GASTROINTESTINAL/MESENTERY:  No evidence of obstruction nor inflammation.  AORTA/IVC:  Normal caliber.    RETROPERITONEUM/LYMPH NODES:  Unremarkable    REPRODUCTIVE:  Unremarkable  BLADDER:  Unremarkable    OSSEUS STRUCTURES:  Typical for age with no acute process identified.    There is generalized body wall edema. There is minimal likely related mesenteric edema and presacral edema.      Impression:      Impression:  1.Small pleural effusions, new on the left, with generalized body wall edema mild mesenteric edema, and presacral edema.  2.Decrease in size of now 4 mm right lower lobe subpleural pulmonary nodule, previously 9 mm. See below recommendations.  3.Stable subpleural reticular and linear areas of interstitial pulmonary scarring.  4.Other incidental nonemergent findings as detailed above.    The Fleischner society pulmonary nodule recommendations are for the follow-up and management of pulmonary nodules smaller than 8 mm detected incidentally in patients >35 years on non-screening CT. The initial guidelines for the management of solid   nodules were released in 2005 1, and guidelines for the management of subsolid nodules were released in  "2013 2. New revised 2017 recommendations for both solid and subsolid have since been released 4.    2017 guidelines  Solid nodules  Solitary nodule size: <6 mm  *low risk patients: no follow-up needed  *high risk patients: optional CT at 12 months  Solitary nodule size: 6-8 mm  *low risk patients: follow-up at 6-12 months, then consider further follow-up at 18-24 months  *high risk patients: initial follow-up CT at 6-12 months and then at 18-24 months if no change  Solitary nodule size: >8 mm  *either low or high risk patients  *consider follow-up CT at 3 months, and/or CT-PET, and/or biopsy  Multiple nodules size: <6 mm  *low risk patients: no routine follow-up  *high risk patients: optional CT at 12 months  Multiple nodules size: 6-8 mm  *low risk patients: follow-up at 3-6 months, then consider further follow-up at 18-24 months  *high risk patients: follow-up at 3-6 months, then at 18-24 months if no change  Multiple nodules size: >8 mm  *low risk patients: follow-up at 3-6 months, then consider further follow-up at 18-24 months  *high risk patients: follow-up at 3-6 months, then at 18-24 months if no change  *   Note: newly detected indeterminate nodule in persons 35 years of age or older.  *low risk patients: minimal or absent history of smoking and or other known risk factors  *high risk patients: history of smoking or of other known risk factors (e.g. first degree relative with lung cancer, or exposure to asbestos, radon, uranium)  *if a nodule up to 8 mm is partly solid or is ground glass further follow-up is required after 24 months to exclude possible slow growing adenocarcinoma (RUDY)    Subsolid nodules  Solitary pure ground-glass nodule  *nodule size <6mm  *no CT follow-up required  *nodule size \"e6mm  *follow up CT at 6-12 months, then every 2 years until 5 years  Solitary part-solid nodule  *nodule size <6mm  *no CT follow-up required  *nodule size \"e6mm  *follow-up CT at 3-6 months  *if unchanged, and " "solid component remains <6mm, then annual follow-up for 5 years  Multiple subsolid nodules  *nodule size <6mm  *follow-up CT at 3-6 months  *consider further follow-up at 2 and 4 years if stable  *nodule size \"e6mm  *follow-up CT at 3-6 months  *subsequent management based on the most suspicious nodule(s)      Electronically Signed: Kp Petersen MD    4/24/2024 5:30 PM EDT    Workstation ID: [id number]    CT Abdomen Pelvis Without Contrast [[id number]] Collected: 04/24/24 1721     Updated: 04/24/24 1733    Narrative:      CT CHEST WO CONTRAST DIAGNOSTIC, CT ABDOMEN PELVIS WO CONTRAST    Date of Exam: 4/24/2024 4:59 PM EDT    Indication: Chronic lung disease (Ped 0-17y)  Dyspnea, chronic, central airway disease suspected.    Comparison: CT chest 10/6/2023, CT abdomen pelvis 10/19/2015    Technique: Axial CT images were obtained of the chest abdomen and pelvis without contrast administration.  Sagittal and coronal reconstructions were performed.  Automated exposure control and iterative reconstruction methods were used.      Findings:  CT CHEST:  MEDIASTINUM: Shotty mediastinal lymphadenopathy. Aortic size is normal. The heart is enlarged, similar to prior exam. No mass nor pericardial effusion.  CORONARY ARTERIES: There is calcified atherosclerotic disease.  LUNGS: There is peripheral reticular and linear interstitial scarring, similar prior examination. There is basilar compressive atelectasis related to pleural effusions. Previous 9 mm right-sided subpleural pulmonary nodule now measures 4 mm (image 70,   series 5). No new suspicious nodule. There is mild emphysema.  PLEURAL SPACE: Similar small right but new small left pleural effusions.        CT ABDOMEN AND PELVIS:   LIVER:  Unremarkable parenchyma without focal lesion.  BILIARY/GALLBLADDER: There are numerous gallstones. There are no gallbladder inflammatory changes.  SPLEEN:  Unremarkable  PANCREAS:  Unremarkable  ADRENAL:  Unremarkable  KIDNEYS: Mild bilateral " renal cortical atrophy with no solid mass identified. No obstruction.  No calculus identified.  GASTROINTESTINAL/MESENTERY:  No evidence of obstruction nor inflammation.  AORTA/IVC:  Normal caliber.    RETROPERITONEUM/LYMPH NODES:  Unremarkable    REPRODUCTIVE:  Unremarkable  BLADDER:  Unremarkable    OSSEUS STRUCTURES:  Typical for age with no acute process identified.    There is generalized body wall edema. There is minimal likely related mesenteric edema and presacral edema.      Impression:      Impression:  1.Small pleural effusions, new on the left, with generalized body wall edema mild mesenteric edema, and presacral edema.  2.Decrease in size of now 4 mm right lower lobe subpleural pulmonary nodule, previously 9 mm. See below recommendations.  3.Stable subpleural reticular and linear areas of interstitial pulmonary scarring.  4.Other incidental nonemergent findings as detailed above.    The Fleischner society pulmonary nodule recommendations are for the follow-up and management of pulmonary nodules smaller than 8 mm detected incidentally in patients >35 years on non-screening CT. The initial guidelines for the management of solid   nodules were released in 2005 1, and guidelines for the management of subsolid nodules were released in 2013 2. New revised 2017 recommendations for both solid and subsolid have since been released 4.    2017 guidelines  Solid nodules  Solitary nodule size: <6 mm  *low risk patients: no follow-up needed  *high risk patients: optional CT at 12 months  Solitary nodule size: 6-8 mm  *low risk patients: follow-up at 6-12 months, then consider further follow-up at 18-24 months  *high risk patients: initial follow-up CT at 6-12 months and then at 18-24 months if no change  Solitary nodule size: >8 mm  *either low or high risk patients  *consider follow-up CT at 3 months, and/or CT-PET, and/or biopsy  Multiple nodules size: <6 mm  *low risk patients: no routine follow-up  *high risk  "patients: optional CT at 12 months  Multiple nodules size: 6-8 mm  *low risk patients: follow-up at 3-6 months, then consider further follow-up at 18-24 months  *high risk patients: follow-up at 3-6 months, then at 18-24 months if no change  Multiple nodules size: >8 mm  *low risk patients: follow-up at 3-6 months, then consider further follow-up at 18-24 months  *high risk patients: follow-up at 3-6 months, then at 18-24 months if no change  *   Note: newly detected indeterminate nodule in persons 35 years of age or older.  *low risk patients: minimal or absent history of smoking and or other known risk factors  *high risk patients: history of smoking or of other known risk factors (e.g. first degree relative with lung cancer, or exposure to asbestos, radon, uranium)  *if a nodule up to 8 mm is partly solid or is ground glass further follow-up is required after 24 months to exclude possible slow growing adenocarcinoma (RUDY)    Subsolid nodules  Solitary pure ground-glass nodule  *nodule size <6mm  *no CT follow-up required  *nodule size \"e6mm  *follow up CT at 6-12 months, then every 2 years until 5 years  Solitary part-solid nodule  *nodule size <6mm  *no CT follow-up required  *nodule size \"e6mm  *follow-up CT at 3-6 months  *if unchanged, and solid component remains <6mm, then annual follow-up for 5 years  Multiple subsolid nodules  *nodule size <6mm  *follow-up CT at 3-6 months  *consider further follow-up at 2 and 4 years if stable  *nodule size \"e6mm  *follow-up CT at 3-6 months  *subsequent management based on the most suspicious nodule(s)      Electronically Signed: Kp Petersen MD    4/24/2024 5:30 PM EDT    Workstation ID: [id number]                 I reviewed the patient's new clinical results.    Medication Review:   Scheduled Meds:albumin human, 25 g, Intravenous, Q8H  [Held by provider] apixaban, 2.5 mg, Oral, BID  [Held by provider] atorvastatin, 20 mg, Oral, Daily  cefTRIAXone, 2,000 mg, Intravenous, " Q24H  furosemide, 20 mg, Intravenous, Q8H  hydroxychloroquine, 400 mg, Oral, Daily  midodrine, 5 mg, Oral, TID AC  multivitamin with minerals, 1 tablet, Oral, Daily  pantoprazole, 40 mg, Oral, Q AM  potassium phosphate, 15 mmol, Intravenous, Once  sodium bicarbonate, 1,300 mg, Oral, TID  sodium chloride, 10 mL, Intravenous, Q12H  [Held by provider] spironolactone, 25 mg, Oral, Daily  tamsulosin, 0.4 mg, Oral, Daily      Continuous Infusions:   PRN Meds:.  [Held by provider] acetaminophen **OR** [Held by provider] acetaminophen    senna-docusate sodium **AND** polyethylene glycol **AND** bisacodyl **AND** bisacodyl    dextrose    dextrose    glucagon (human recombinant)    ipratropium-albuterol    nitroglycerin    ondansetron ODT **OR** ondansetron    sodium chloride    sodium chloride     Assessment & Plan       Sepsis    Moderate malnutrition    Septic shock  Leukocytosis with bandemia, 19  -Blood culture no growth thus far, respiratory culture negative, urinalysis was completed but no culture obtained  -Etiology uncertain, CT chest\abdomen\pelvis - unremarkable     Elevated troponin, possible non-STEMI, elevated proBNP 16,786 -cardiology following, repeating echocardiogram     Acute on chronic kidney disease-appreciate nephrology input.  Started on sodium bicarb for acidosis.  Avoid NSAIDs and nephrotoxic's medication.  Avoid hypotension,  renal ultrasound ok     Hypotension-off Levophed, started on midodrine, improved     Iron deficiency anemia - received iron replacement 4/24, hemoglobin 7.6  - Hemoccult stool is positive - consult GI     Paroxysmal atrial fibrillation - anticoagulation - eliquis.  cardiology following considering Watchman     COPD-okay to use home trilogy machine, will need 6-minute walk prior to discharge-oxygenating well on room air however patient states he wears 4 to 6 L of continuous oxygen at home     Elevated LFTs-US liver shows hepatic steatosis hold statin and monitor LFTs      Incidental finding of cholelithiasis-patient denies right upper quadrant pain will consider HIDA scan if symptomatic     Dyslipidemia, statin on hold due to evaluated LFTs  Diabetes mellitus type 2-A1c stable  History of chronic ITP/platelets stable, continue to monitor  History of leukemia\prostate cancer-in remission  History of bilateral PE\history elevated factor VIII level-anticoagulated  Rheumatoid arthritis-continue Plaquenil  MITZI-can okay to use home trilogy machine  Hypophosphatemia - replace  Hypoalbuminemia - IV albumin  Hypomagnesemia - replace  BPH - flomax     Diet: Healthy heart, soft to chew  DVT prophylaxis: Lovenox  GI prophylaxis: Protonix  Code status: Full code      Plan for disposition:MARÍA ELENA Rousseau MD  04/25/24  10:42 EDT

## 2024-04-25 NOTE — PROGRESS NOTES
Referring Provider: Clementine Rousseau MD    Reason for follow-up:  Atrial fibrillation  Elevated troponin level     Patient Care Team:  Guilherme Jason MD as PCP - General (Family Medicine)  Misty Dias MD as Consulting Physician (Cardiology)  Nora Kenyon DNP, APRN as Nurse Practitioner (Thoracic Surgery)  Sascha Dorado MD as Consulting Physician (Nephrology)    Subjective .      ROS    Today, the patient has been without any chest discomfort , unusual shortness of breath, palpitations, dizziness or syncope.  Denies having any headache ,abdominal pain ,nausea, vomiting , diarrhea constipation, loss of weight or loss of appetite.  Denies having any excessive bruising ,hematuria or blood in the stool.    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    Neutropenia 11/2003    MITZI (obstructive sleep apnea) 2018    Psoriasis 2000    Renal cyst, left     Rheumatoid arthritis     Stroke (cerebrum)     Thrombocytopenia 08/23/2005       Past Surgical History:   Procedure Laterality Date    SKIN LESION EXCISION      back and groin-benign       Family History   Problem Relation Age of Onset    Lung cancer Brother         age unknown    Heart disease Brother     Stroke Mother     Heart disease Father     Heart disease Sister        Social History     Tobacco Use    Smoking status: Former     Current packs/day: 0.00      Types: Cigarettes     Quit date:      Years since quittin.3     Passive exposure: Never    Smokeless tobacco: Never    Tobacco comments:     stopped smoking in    Vaping Use    Vaping status: Never Used   Substance Use Topics    Alcohol use: No    Drug use: No        Medications Prior to Admission   Medication Sig Dispense Refill Last Dose    apixaban (ELIQUIS) 2.5 MG tablet tablet Take 1 tablet by mouth 2 (Two) Times a Day. 60 tablet 11     atorvastatin (LIPITOR) 20 MG tablet Take 1 tablet by mouth Daily.  3     fenofibrate 160 MG tablet Take 1 tablet by mouth Daily.  3     hydroxychloroquine (PLAQUENIL) 200 MG tablet Take 2 tablets by mouth Daily.  0     ipratropium-albuterol (DUO-NEB) 0.5-2.5 mg/3 ml nebulizer Take 3 mL by nebulization Every 4 (Four) Hours As Needed for Wheezing.       losartan (COZAAR) 100 MG tablet Take 1 tablet by mouth Daily.  3     Multiple Vitamins-Minerals (MULTIVITAMIN WITH MINERALS) tablet tablet Take 1 tablet by mouth Daily.       oxybutynin XL (DITROPAN-XL) 10 MG 24 hr tablet Take 1 tablet by mouth Daily.       pantoprazole (PROTONIX) 40 MG EC tablet Take 1 tablet by mouth Daily.       revefenacin (YUPELRI) 175 MCG/3ML nebulizer solution Take 3 mL by nebulization Daily.       spironolactone (ALDACTONE) 25 MG tablet Take 1 tablet by mouth Daily.  3     tamsulosin (FLOMAX) 0.4 MG capsule 24 hr capsule Take 1 capsule by mouth Daily.          Allergies  Penicillin v potassium and Latex    Scheduled Meds:[Held by provider] apixaban, 2.5 mg, Oral, BID  [Held by provider] atorvastatin, 20 mg, Oral, Daily  cefTRIAXone, 2,000 mg, Intravenous, Q24H  hydroxychloroquine, 400 mg, Oral, Daily  midodrine, 5 mg, Oral, TID AC  multivitamin with minerals, 1 tablet, Oral, Daily  pantoprazole, 40 mg, Oral, Q AM  sodium bicarbonate, 1,300 mg, Oral, TID  sodium chloride, 10 mL, Intravenous, Q12H  [Held by provider] spironolactone, 25 mg, Oral, Daily  tamsulosin, 0.4 mg, Oral,  "Daily      Continuous Infusions:     PRN Meds:.  [Held by provider] acetaminophen **OR** [Held by provider] acetaminophen    aluminum-magnesium hydroxide-simethicone    senna-docusate sodium **AND** polyethylene glycol **AND** bisacodyl **AND** bisacodyl    dextrose    dextrose    glucagon (human recombinant)    ipratropium-albuterol    nitroglycerin    ondansetron ODT **OR** ondansetron    sodium chloride    sodium chloride    Objective     VITAL SIGNS  Vitals:    04/24/24 2006 04/25/24 0000 04/25/24 0300 04/25/24 0411   BP:       Pulse:       Resp:       Temp: 97.6 °F (36.4 °C) 97.5 °F (36.4 °C)  97.5 °F (36.4 °C)   TempSrc: Oral Axillary  Oral   SpO2:       Weight:   86.6 kg (190 lb 14.7 oz)    Height:           Flowsheet Rows      Flowsheet Row First Filed Value   Admission Height 167.6 cm (66\") Documented at 04/23/2024 1200   Admission Weight 81.9 kg (180 lb 8.9 oz) Documented at 04/23/2024 1200              Intake/Output Summary (Last 24 hours) at 4/25/2024 0634  Last data filed at 4/24/2024 2300  Gross per 24 hour   Intake 540 ml   Output 200 ml   Net 340 ml        TELEMETRY: Atrial fibrillation with controlled ventricular response.    Physical Exam:  The patient is alert, oriented and in no distress.  Vital signs as noted above.  Head and neck revealed no carotid bruits or jugular venous distention.  No thyromegaly or lymphadenopathy is present  Lungs clear.  No wheezing.  Breath sounds are normal bilaterally.  Heart normal first and second heart sounds.  No murmur. No precordial rub is present.  No gallop is present.  Abdomen soft and nontender.  No organomegaly is present.  Extremities with good peripheral pulses without any pedal edema.  Skin warm and dry.  Upper and lower extremity bruising is present.  Musculoskeletal system is grossly normal  CNS grossly normal    Reviewed and updated.    Results Review:   I reviewed the patient's new clinical results.  Lab Results (last 24 hours)       Procedure " Component Value Units Date/Time    Magnesium [[id number]]  (Normal) Collected: 04/25/24 0328    Specimen: Blood Updated: 04/25/24 0429     Magnesium 2.3 mg/dL     Phosphorus [[id number]]  (Abnormal) Collected: 04/25/24 0328    Specimen: Blood Updated: 04/25/24 0429     Phosphorus 2.4 mg/dL     CBC & Differential [[id number]]  (Abnormal) Collected: 04/25/24 0328    Specimen: Blood Updated: 04/25/24 0426    Narrative:      The following orders were created for panel order CBC & Differential.  Procedure                               Abnormality         Status                     ---------                               -----------         ------                     CBC Auto Differential[[id number]]        Abnormal            Final result               Scan Slide[[id number]]                                       Final result                 Please view results for these tests on the individual orders.    CBC Auto Differential [[id number]]  (Abnormal) Collected: 04/25/24 0328    Specimen: Blood Updated: 04/25/24 0426     WBC 7.32 10*3/mm3      RBC 2.72 10*6/mm3      Hemoglobin 8.7 g/dL      Hematocrit 27.1 %      MCV 99.6 fL      MCH 32.0 pg      MCHC 32.1 g/dL      RDW 14.9 %      RDW-SD 54.0 fl      MPV 13.1 fL      Platelets 190 10*3/mm3      Neutrophil % 77.5 %      Lymphocyte % 13.8 %      Monocyte % 8.3 %      Eosinophil % 0.0 %      Basophil % 0.1 %      Immature Grans % 0.3 %      Neutrophils, Absolute 5.67 10*3/mm3      Lymphocytes, Absolute 1.01 10*3/mm3      Monocytes, Absolute 0.61 10*3/mm3      Eosinophils, Absolute 0.00 10*3/mm3      Basophils, Absolute 0.01 10*3/mm3      Immature Grans, Absolute 0.02 10*3/mm3      nRBC 0.0 /100 WBC     Scan Slide [[id number]] Collected: 04/25/24 0328    Specimen: Blood Updated: 04/25/24 0426     Anisocytosis Slight/1+     WBC Morphology Normal     Platelet Estimate Adequate    Comprehensive Metabolic Panel [[id number]]  (Abnormal) Collected: 04/25/24 0328    Specimen: Blood  Updated: 04/25/24 0426     Glucose 131 mg/dL      BUN 45 mg/dL      Creatinine 1.39 mg/dL      Sodium 143 mmol/L      Potassium 4.2 mmol/L      Chloride 114 mmol/L      CO2 18.0 mmol/L      Calcium 8.2 mg/dL      Total Protein 5.4 g/dL      Albumin 2.6 g/dL      ALT (SGPT) 136 U/L      AST (SGOT) 211 U/L      Alkaline Phosphatase 184 U/L      Total Bilirubin 0.5 mg/dL      Globulin 2.8 gm/dL      A/G Ratio 0.9 g/dL      BUN/Creatinine Ratio 32.4     Anion Gap 11.0 mmol/L      eGFR 50.3 mL/min/1.73     Narrative:      GFR Normal >60  Chronic Kidney Disease <60  Kidney Failure <15    The GFR formula is only valid for adults with stable renal function between ages 18 and 70.    Uric Acid [[id number]]  (Normal) Collected: 04/25/24 0328    Specimen: Blood Updated: 04/25/24 0426     Uric Acid 6.5 mg/dL     POC Glucose Once [[id number]]  (Abnormal) Collected: 04/24/24 2249    Specimen: Blood Updated: 04/24/24 2251     Glucose 154 mg/dL      Comment: Serial Number: [id number]:  [id number]       POC Glucose Once [[id number]]  (Abnormal) Collected: 04/24/24 1822    Specimen: Blood Updated: 04/24/24 2224     Glucose 148 mg/dL      Comment: Serial Number: [id number]:  [id number]       Occult Blood, Fecal By Immunoassay - Stool, Per Rectum [[id number]]  (Abnormal) Collected: 04/24/24 1525    Specimen: Stool from Per Rectum Updated: 04/24/24 1538     Occult Blood, Fecal by Immunoassay Positive    Blood Culture - Blood, Hand, Left [[id number]]  (Normal) Collected: 04/23/24 1245    Specimen: Blood from Hand, Left Updated: 04/24/24 1300     Blood Culture No growth at 24 hours    Blood Culture - Blood, Arm, Right [[id number]]  (Normal) Collected: 04/23/24 1235    Specimen: Blood from Arm, Right Updated: 04/24/24 1245     Blood Culture No growth at 24 hours    Narrative:      Less than seven (7) mL's of blood was collected.  Insufficient quantity may yield false negative results.    BNP [[id number]]  (Abnormal) Collected:  04/24/24 0338    Specimen: Blood Updated: 04/24/24 1220     proBNP 16,780.0 pg/mL     Narrative:      This assay is used as an aid in the diagnosis of individuals suspected of having heart failure. It can be used as an aid in the diagnosis of acute decompensated heart failure (ADHF) in patients presenting with signs and symptoms of ADHF to the emergency department (ED). In addition, NT-proBNP of <300 pg/mL indicates ADHF is not likely.    Age Range Result Interpretation  NT-proBNP Concentration (pg/mL:      <50             Positive            >450                   Gray                 300-450                    Negative             <300    50-75           Positive            >900                  Gray                300-900                  Negative            <300      >75             Positive            >1800                  Gray                300-1800                  Negative            <300    Respiratory Culture - Sputum, Cough [[id number]] Collected: 04/23/24 2204    Specimen: Sputum from Cough Updated: 04/24/24 1132     Respiratory Culture Rare The culture consists of normal respiratory nicolas. This is a preliminary report; final report to follow.     Gram Stain Many (4+) WBCs per low power field      Rare (1+) Epithelial cells per low power field      Rare (1+) Mixed bacterial morphotypes seen on Gram Stain    POC Glucose 4x Daily Before Meals & at Bedtime [[id number]]  (Abnormal) Collected: 04/24/24 1125    Specimen: Blood Updated: 04/24/24 1127     Glucose 152 mg/dL      Comment: Serial Number: [id number]:  [id number]       Iron Profile [[id number]]  (Abnormal) Collected: 04/24/24 0338    Specimen: Blood Updated: 04/24/24 0923     Iron 16 mcg/dL      Iron Saturation (TSAT) 7 %      Transferrin 146 mg/dL      TIBC 218 mcg/dL     POC Glucose 4x Daily Before Meals & at Bedtime [[id number]]  (Abnormal) Collected: 04/24/24 0712    Specimen: Blood Updated: 04/24/24 0713     Glucose 152 mg/dL      Comment:  Serial Number: [id number]:  [id number]               Imaging Results (Last 24 Hours)       Procedure Component Value Units Date/Time    US Renal Bilateral [[id number]] Resulted: 04/24/24 1508     Updated: 04/25/24 0534    CT Chest Without Contrast Diagnostic [[id number]] Collected: 04/24/24 1721     Updated: 04/24/24 1733    Narrative:      CT CHEST WO CONTRAST DIAGNOSTIC, CT ABDOMEN PELVIS WO CONTRAST    Date of Exam: 4/24/2024 4:59 PM EDT    Indication: Chronic lung disease (Ped 0-17y)  Dyspnea, chronic, central airway disease suspected.    Comparison: CT chest 10/6/2023, CT abdomen pelvis 10/19/2015    Technique: Axial CT images were obtained of the chest abdomen and pelvis without contrast administration.  Sagittal and coronal reconstructions were performed.  Automated exposure control and iterative reconstruction methods were used.      Findings:  CT CHEST:  MEDIASTINUM: Shotty mediastinal lymphadenopathy. Aortic size is normal. The heart is enlarged, similar to prior exam. No mass nor pericardial effusion.  CORONARY ARTERIES: There is calcified atherosclerotic disease.  LUNGS: There is peripheral reticular and linear interstitial scarring, similar prior examination. There is basilar compressive atelectasis related to pleural effusions. Previous 9 mm right-sided subpleural pulmonary nodule now measures 4 mm (image 70,   series 5). No new suspicious nodule. There is mild emphysema.  PLEURAL SPACE: Similar small right but new small left pleural effusions.        CT ABDOMEN AND PELVIS:   LIVER:  Unremarkable parenchyma without focal lesion.  BILIARY/GALLBLADDER: There are numerous gallstones. There are no gallbladder inflammatory changes.  SPLEEN:  Unremarkable  PANCREAS:  Unremarkable  ADRENAL:  Unremarkable  KIDNEYS: Mild bilateral renal cortical atrophy with no solid mass identified. No obstruction.  No calculus identified.  GASTROINTESTINAL/MESENTERY:  No evidence of obstruction nor  inflammation.  AORTA/IVC:  Normal caliber.    RETROPERITONEUM/LYMPH NODES:  Unremarkable    REPRODUCTIVE:  Unremarkable  BLADDER:  Unremarkable    OSSEUS STRUCTURES:  Typical for age with no acute process identified.    There is generalized body wall edema. There is minimal likely related mesenteric edema and presacral edema.      Impression:      Impression:  1.Small pleural effusions, new on the left, with generalized body wall edema mild mesenteric edema, and presacral edema.  2.Decrease in size of now 4 mm right lower lobe subpleural pulmonary nodule, previously 9 mm. See below recommendations.  3.Stable subpleural reticular and linear areas of interstitial pulmonary scarring.  4.Other incidental nonemergent findings as detailed above.    The Fleischner society pulmonary nodule recommendations are for the follow-up and management of pulmonary nodules smaller than 8 mm detected incidentally in patients >35 years on non-screening CT. The initial guidelines for the management of solid   nodules were released in 2005 1, and guidelines for the management of subsolid nodules were released in 2013 2. New revised 2017 recommendations for both solid and subsolid have since been released 4.    2017 guidelines  Solid nodules  Solitary nodule size: <6 mm  *low risk patients: no follow-up needed  *high risk patients: optional CT at 12 months  Solitary nodule size: 6-8 mm  *low risk patients: follow-up at 6-12 months, then consider further follow-up at 18-24 months  *high risk patients: initial follow-up CT at 6-12 months and then at 18-24 months if no change  Solitary nodule size: >8 mm  *either low or high risk patients  *consider follow-up CT at 3 months, and/or CT-PET, and/or biopsy  Multiple nodules size: <6 mm  *low risk patients: no routine follow-up  *high risk patients: optional CT at 12 months  Multiple nodules size: 6-8 mm  *low risk patients: follow-up at 3-6 months, then consider further follow-up at 18-24  "months  *high risk patients: follow-up at 3-6 months, then at 18-24 months if no change  Multiple nodules size: >8 mm  *low risk patients: follow-up at 3-6 months, then consider further follow-up at 18-24 months  *high risk patients: follow-up at 3-6 months, then at 18-24 months if no change  *   Note: newly detected indeterminate nodule in persons 35 years of age or older.  *low risk patients: minimal or absent history of smoking and or other known risk factors  *high risk patients: history of smoking or of other known risk factors (e.g. first degree relative with lung cancer, or exposure to asbestos, radon, uranium)  *if a nodule up to 8 mm is partly solid or is ground glass further follow-up is required after 24 months to exclude possible slow growing adenocarcinoma (RUDY)    Subsolid nodules  Solitary pure ground-glass nodule  *nodule size <6mm  *no CT follow-up required  *nodule size \"e6mm  *follow up CT at 6-12 months, then every 2 years until 5 years  Solitary part-solid nodule  *nodule size <6mm  *no CT follow-up required  *nodule size \"e6mm  *follow-up CT at 3-6 months  *if unchanged, and solid component remains <6mm, then annual follow-up for 5 years  Multiple subsolid nodules  *nodule size <6mm  *follow-up CT at 3-6 months  *consider further follow-up at 2 and 4 years if stable  *nodule size \"e6mm  *follow-up CT at 3-6 months  *subsequent management based on the most suspicious nodule(s)      Electronically Signed: Kp Petersen MD    4/24/2024 5:30 PM EDT    Workstation ID: [id number]    CT Abdomen Pelvis Without Contrast [[id number]] Collected: 04/24/24 1721     Updated: 04/24/24 1733    Narrative:      CT CHEST WO CONTRAST DIAGNOSTIC, CT ABDOMEN PELVIS WO CONTRAST    Date of Exam: 4/24/2024 4:59 PM EDT    Indication: Chronic lung disease (Ped 0-17y)  Dyspnea, chronic, central airway disease suspected.    Comparison: CT chest 10/6/2023, CT abdomen pelvis 10/19/2015    Technique: Axial CT images were " obtained of the chest abdomen and pelvis without contrast administration.  Sagittal and coronal reconstructions were performed.  Automated exposure control and iterative reconstruction methods were used.      Findings:  CT CHEST:  MEDIASTINUM: Shotty mediastinal lymphadenopathy. Aortic size is normal. The heart is enlarged, similar to prior exam. No mass nor pericardial effusion.  CORONARY ARTERIES: There is calcified atherosclerotic disease.  LUNGS: There is peripheral reticular and linear interstitial scarring, similar prior examination. There is basilar compressive atelectasis related to pleural effusions. Previous 9 mm right-sided subpleural pulmonary nodule now measures 4 mm (image 70,   series 5). No new suspicious nodule. There is mild emphysema.  PLEURAL SPACE: Similar small right but new small left pleural effusions.        CT ABDOMEN AND PELVIS:   LIVER:  Unremarkable parenchyma without focal lesion.  BILIARY/GALLBLADDER: There are numerous gallstones. There are no gallbladder inflammatory changes.  SPLEEN:  Unremarkable  PANCREAS:  Unremarkable  ADRENAL:  Unremarkable  KIDNEYS: Mild bilateral renal cortical atrophy with no solid mass identified. No obstruction.  No calculus identified.  GASTROINTESTINAL/MESENTERY:  No evidence of obstruction nor inflammation.  AORTA/IVC:  Normal caliber.    RETROPERITONEUM/LYMPH NODES:  Unremarkable    REPRODUCTIVE:  Unremarkable  BLADDER:  Unremarkable    OSSEUS STRUCTURES:  Typical for age with no acute process identified.    There is generalized body wall edema. There is minimal likely related mesenteric edema and presacral edema.      Impression:      Impression:  1.Small pleural effusions, new on the left, with generalized body wall edema mild mesenteric edema, and presacral edema.  2.Decrease in size of now 4 mm right lower lobe subpleural pulmonary nodule, previously 9 mm. See below recommendations.  3.Stable subpleural reticular and linear areas of interstitial  pulmonary scarring.  4.Other incidental nonemergent findings as detailed above.    The Fleischner society pulmonary nodule recommendations are for the follow-up and management of pulmonary nodules smaller than 8 mm detected incidentally in patients >35 years on non-screening CT. The initial guidelines for the management of solid   nodules were released in 2005 1, and guidelines for the management of subsolid nodules were released in 2013 2. New revised 2017 recommendations for both solid and subsolid have since been released 4.    2017 guidelines  Solid nodules  Solitary nodule size: <6 mm  *low risk patients: no follow-up needed  *high risk patients: optional CT at 12 months  Solitary nodule size: 6-8 mm  *low risk patients: follow-up at 6-12 months, then consider further follow-up at 18-24 months  *high risk patients: initial follow-up CT at 6-12 months and then at 18-24 months if no change  Solitary nodule size: >8 mm  *either low or high risk patients  *consider follow-up CT at 3 months, and/or CT-PET, and/or biopsy  Multiple nodules size: <6 mm  *low risk patients: no routine follow-up  *high risk patients: optional CT at 12 months  Multiple nodules size: 6-8 mm  *low risk patients: follow-up at 3-6 months, then consider further follow-up at 18-24 months  *high risk patients: follow-up at 3-6 months, then at 18-24 months if no change  Multiple nodules size: >8 mm  *low risk patients: follow-up at 3-6 months, then consider further follow-up at 18-24 months  *high risk patients: follow-up at 3-6 months, then at 18-24 months if no change  *   Note: newly detected indeterminate nodule in persons 35 years of age or older.  *low risk patients: minimal or absent history of smoking and or other known risk factors  *high risk patients: history of smoking or of other known risk factors (e.g. first degree relative with lung cancer, or exposure to asbestos, radon, uranium)  *if a nodule up to 8 mm is partly solid or is  "ground glass further follow-up is required after 24 months to exclude possible slow growing adenocarcinoma (RUDY)    Subsolid nodules  Solitary pure ground-glass nodule  *nodule size <6mm  *no CT follow-up required  *nodule size \"e6mm  *follow up CT at 6-12 months, then every 2 years until 5 years  Solitary part-solid nodule  *nodule size <6mm  *no CT follow-up required  *nodule size \"e6mm  *follow-up CT at 3-6 months  *if unchanged, and solid component remains <6mm, then annual follow-up for 5 years  Multiple subsolid nodules  *nodule size <6mm  *follow-up CT at 3-6 months  *consider further follow-up at 2 and 4 years if stable  *nodule size \"e6mm  *follow-up CT at 3-6 months  *subsequent management based on the most suspicious nodule(s)      Electronically Signed: Kp Petersen MD    4/24/2024 5:30 PM EDT    Workstation ID: [id number]    XR Chest 1 View [[id number]] Collected: 04/24/24 0851     Updated: 04/24/24 0854    Narrative:      XR CHEST 1 VW    Date of Exam: 4/24/2024 8:35 AM EDT    Indication: Possible pneumonia    Comparison: 4/23/2024    Findings:  Unchanged enlarged cardiac silhouette. There are persistent small bilateral pleural effusions with adjacent atelectasis. No definite new airspace consolidation. Osseous fractures are unchanged.      Impression:      Impression:  No significant interval change.      Electronically Signed: Daniel Jennings MD    4/24/2024 8:52 AM EDT    Workstation ID: [id number]        LAB RESULTS (LAST 7 DAYS)    CBC  Results from last 7 days   Lab Units 04/25/24  0328 04/24/24  0338 04/23/24  1235   WBC 10*3/mm3 7.32 6.48 13.06*   RBC 10*6/mm3 2.72* 2.44* 2.77*   HEMOGLOBIN g/dL 8.7* 7.6* 8.8*   HEMATOCRIT % 27.1* 24.7* 28.4*   MCV fL 99.6* 101.2* 102.5*   PLATELETS 10*3/mm3 190 148 183       BMP  Results from last 7 days   Lab Units 04/25/24  0328 04/24/24  0338 04/23/24  1235   SODIUM mmol/L 143 141 143   POTASSIUM mmol/L 4.2 3.9 4.1   CHLORIDE mmol/L 114* 114* 113*   CO2 mmol/L " 18.0* 16.0* 16.0*   BUN mg/dL 45* 35* 34*   CREATININE mg/dL 1.39* 1.49* 1.53*   GLUCOSE mg/dL 131* 130* 98   MAGNESIUM mg/dL 2.3 1.7 1.6   PHOSPHORUS mg/dL 2.4* 3.3 2.5       CMP   Results from last 7 days   Lab Units 04/25/24  0328 04/24/24  0338 04/23/24  1235   SODIUM mmol/L 143 141 143   POTASSIUM mmol/L 4.2 3.9 4.1   CHLORIDE mmol/L 114* 114* 113*   CO2 mmol/L 18.0* 16.0* 16.0*   BUN mg/dL 45* 35* 34*   CREATININE mg/dL 1.39* 1.49* 1.53*   GLUCOSE mg/dL 131* 130* 98   ALBUMIN g/dL 2.6* 2.4* 2.7*   BILIRUBIN mg/dL 0.5 1.3* 2.3*   ALK PHOS U/L 184* 145* 192*   AST (SGOT) U/L 211* 156* 154*   ALT (SGPT) U/L 136* 75* 67*   AMYLASE U/L  --   --  29   LIPASE U/L  --   --  16         BNP        TROPONIN  Results from last 7 days   Lab Units 04/23/24  1433 04/23/24  1235   CK TOTAL U/L  --  117   HSTROP T ng/L 219* 254*       CoAg        Creatinine Clearance  Estimated Creatinine Clearance: 41.5 mL/min (A) (by C-G formula based on SCr of 1.39 mg/dL (H)).    ABG        Radiology  CT Chest Without Contrast Diagnostic    Result Date: 4/24/2024  Impression: 1.Small pleural effusions, new on the left, with generalized body wall edema mild mesenteric edema, and presacral edema. 2.Decrease in size of now 4 mm right lower lobe subpleural pulmonary nodule, previously 9 mm. See below recommendations. 3.Stable subpleural reticular and linear areas of interstitial pulmonary scarring. 4.Other incidental nonemergent findings as detailed above. The Fleischner society pulmonary nodule recommendations are for the follow-up and management of pulmonary nodules smaller than 8 mm detected incidentally in patients >35 years on non-screening CT. The initial guidelines for the management of solid nodules were released in 2005 1, and guidelines for the management of subsolid nodules were released in 2013 2. New revised 2017 recommendations for both solid and subsolid have since been released 4. 2017 guidelines Solid nodules Solitary nodule  "size: <6 mm *low risk patients: no follow-up needed *high risk patients: optional CT at 12 months Solitary nodule size: 6-8 mm *low risk patients: follow-up at 6-12 months, then consider further follow-up at 18-24 months *high risk patients: initial follow-up CT at 6-12 months and then at 18-24 months if no change Solitary nodule size: >8 mm *either low or high risk patients *consider follow-up CT at 3 months, and/or CT-PET, and/or biopsy Multiple nodules size: <6 mm *low risk patients: no routine follow-up *high risk patients: optional CT at 12 months Multiple nodules size: 6-8 mm *low risk patients: follow-up at 3-6 months, then consider further follow-up at 18-24 months *high risk patients: follow-up at 3-6 months, then at 18-24 months if no change Multiple nodules size: >8 mm *low risk patients: follow-up at 3-6 months, then consider further follow-up at 18-24 months *high risk patients: follow-up at 3-6 months, then at 18-24 months if no change * Note: newly detected indeterminate nodule in persons 35 years of age or older. *low risk patients: minimal or absent history of smoking and or other known risk factors *high risk patients: history of smoking or of other known risk factors (e.g. first degree relative with lung cancer, or exposure to asbestos, radon, uranium) *if a nodule up to 8 mm is partly solid or is ground glass further follow-up is required after 24 months to exclude possible slow growing adenocarcinoma (RUDY) Subsolid nodules Solitary pure ground-glass nodule *nodule size <6mm *no CT follow-up required *nodule size \"e6mm *follow up CT at 6-12 months, then every 2 years until 5 years Solitary part-solid nodule *nodule size <6mm *no CT follow-up required *nodule size \"e6mm *follow-up CT at 3-6 months *if unchanged, and solid component remains <6mm, then annual follow-up for 5 years Multiple subsolid nodules *nodule size <6mm *follow-up CT at 3-6 months *consider further follow-up at 2 and 4 years if " "stable *nodule size \"e6mm *follow-up CT at 3-6 months *subsequent management based on the most suspicious nodule(s) Electronically Signed: Kp Petersen MD  4/24/2024 5:30 PM EDT  Workstation ID: [id number]    CT Abdomen Pelvis Without Contrast    Result Date: 4/24/2024  Impression: 1.Small pleural effusions, new on the left, with generalized body wall edema mild mesenteric edema, and presacral edema. 2.Decrease in size of now 4 mm right lower lobe subpleural pulmonary nodule, previously 9 mm. See below recommendations. 3.Stable subpleural reticular and linear areas of interstitial pulmonary scarring. 4.Other incidental nonemergent findings as detailed above. The Fleischner society pulmonary nodule recommendations are for the follow-up and management of pulmonary nodules smaller than 8 mm detected incidentally in patients >35 years on non-screening CT. The initial guidelines for the management of solid nodules were released in 2005 1, and guidelines for the management of subsolid nodules were released in 2013 2. New revised 2017 recommendations for both solid and subsolid have since been released 4. 2017 guidelines Solid nodules Solitary nodule size: <6 mm *low risk patients: no follow-up needed *high risk patients: optional CT at 12 months Solitary nodule size: 6-8 mm *low risk patients: follow-up at 6-12 months, then consider further follow-up at 18-24 months *high risk patients: initial follow-up CT at 6-12 months and then at 18-24 months if no change Solitary nodule size: >8 mm *either low or high risk patients *consider follow-up CT at 3 months, and/or CT-PET, and/or biopsy Multiple nodules size: <6 mm *low risk patients: no routine follow-up *high risk patients: optional CT at 12 months Multiple nodules size: 6-8 mm *low risk patients: follow-up at 3-6 months, then consider further follow-up at 18-24 months *high risk patients: follow-up at 3-6 months, then at 18-24 months if no change Multiple nodules size: >8 " "mm *low risk patients: follow-up at 3-6 months, then consider further follow-up at 18-24 months *high risk patients: follow-up at 3-6 months, then at 18-24 months if no change * Note: newly detected indeterminate nodule in persons 35 years of age or older. *low risk patients: minimal or absent history of smoking and or other known risk factors *high risk patients: history of smoking or of other known risk factors (e.g. first degree relative with lung cancer, or exposure to asbestos, radon, uranium) *if a nodule up to 8 mm is partly solid or is ground glass further follow-up is required after 24 months to exclude possible slow growing adenocarcinoma (RUDY) Subsolid nodules Solitary pure ground-glass nodule *nodule size <6mm *no CT follow-up required *nodule size \"e6mm *follow up CT at 6-12 months, then every 2 years until 5 years Solitary part-solid nodule *nodule size <6mm *no CT follow-up required *nodule size \"e6mm *follow-up CT at 3-6 months *if unchanged, and solid component remains <6mm, then annual follow-up for 5 years Multiple subsolid nodules *nodule size <6mm *follow-up CT at 3-6 months *consider further follow-up at 2 and 4 years if stable *nodule size \"e6mm *follow-up CT at 3-6 months *subsequent management based on the most suspicious nodule(s) Electronically Signed: Kp Petersen MD  4/24/2024 5:30 PM EDT  Workstation ID: [id number]    XR Chest 1 View    Result Date: 4/24/2024  Impression: No significant interval change. Electronically Signed: Daniel Jennings MD  4/24/2024 8:52 AM EDT  Workstation ID: [id number]    US Liver    Result Date: 4/23/2024  Impression: 1. Increased hepatic echogenicity which can be seen with hepatic steatosis. 2. Cholelithiasis. There is slight thickening of the gallbladder wall which is nonspecific. Electronically Signed: Jayy Osborn MD  4/23/2024 3:31 PM EDT  Workstation ID: [id number]         EKG      I personally viewed and interpreted the patient's EKG/Telemetry " data:    ECHOCARDIOGRAM:    Results for orders placed during the hospital encounter of 04/23/24    Adult Transthoracic Echo Complete W/ Cont if Necessary Per Protocol    Interpretation Summary  Indications  Shortness of breath    Technically satisfactory study.  Mitral valve is thickened with adequate opening motion.  Moderate mitral regurgitation is present.  Tricuspid valve is structurally normal.  Moderate tricuspid regurgitation is present.  Aortic valve is thickened with adequate opening motion.  Moderate aortic regurgitation is present.  Pulmonic valve opening motion is normal.  Moderate pulmonic regurgitation is present.  Mild pulmonary hypertension is present.  Left atrium is enlarged.  Right atrium is enlarged.  Left ventricle is normal in size and contractility with ejection fraction of 60%.  Grade 3 left ventricular diastolic dysfunction is present.  (MV E/8 ratio 2.8)  Left ventricular peak systolic longitudinal strain is abnormal with GL PS of -14%.  Concentric left ventricle hypertrophy is present.  Right ventricle enlarged with hypocontractility with a TAPSE of 1.46 cm..  Atrial septum is intact.  Aorta is dilated at 4.3 cm.  IVC is dilated with decreased respiratory variation.  Right atrial pressure 8 mmHg.  No pericardial effusion or intracardiac thrombus is seen.    Impression  Thickened mitral and aortic valves with adequate opening motion.  Moderate mitral tricuspid and aortic and pulmonic regurgitation.  Left atrial enlargement.  Left ventricular size and contractility is normal with ejection fraction of 60%.  Left ventricular peak systolic longitudinal strain is abnormal with GL PS of -14%.  Left ventricular grade 3 diastolic dysfunction is present.  Right atrium right ventricle enlargement is present.  Mild pulmonary hypertension is present.          STRESS TEST  Results for orders placed during the hospital encounter of 07/28/21    Stress Test With Myocardial Perfusion One  Day    Interpretation Summary  Indications  Chest pain    This study was performed under my direct supervision.    Resting ECG  Sinus rhythm right bundle branch block first-degree AV block    The patient was injected with Lexiscan intravenously while constantly monitoring electrocardiogram and vital signs.  Patient did not have any chest discomfort ST abnormalities or ectopy with injection of Lexiscan.    Cardiolite was used as an imaging agent.    Cardiolite images showed mild inferior ischemia.    Gated SPECT images revealed normal left ventricle size and contractility with ejection fraction of 73%.    Impression  ========  Lexiscan Cardiolite test is negative for myocardial ischemia.    Gated SPECT images revealed normal left ventricular size and contractility with ejection fraction of 73%.        Cardiolite (Tc-99m sestamibi) stress test    CARDIAC CATHETERIZATION  No results found for this or any previous visit.                OTHER:         Assessment & Plan     Principal Problem:    Sepsis      ]]]]]]]]]]]]]]]]]]  History  ==========  -Sepsis     - Elevated troponin.     -History of atrial fibrillation  Patient has converted and was maintaining sinus rhythm.  EKG 12/27/2023-atrial fibrillation  EKG 2/7/2024-atrial fibrillation right bundle branch block      - shortness of breath fatigue and lightheadedness.  Improved     Echocardiogram-normal with ejection fraction of 60%-7/28/2021  Lexiscan Cardiolite test-mild inferior ischemia-7/28/2021      -Chronic right bundle branch block      cardiac catheterization 04/11/2018 revealed normal left ventricular function normal coronary  arteries and no evidence for pulmonary hypertension was present.     -History of diffusely dilated ascending aorta    Echocardiogram 4/24/2024   Thickened mitral and aortic valves with adequate opening motion.  Moderate mitral tricuspid and aortic and pulmonic regurgitation.  Left atrial enlargement.  Left ventricular size and  contractility is normal with ejection fraction of 60%.  Left ventricular peak systolic longitudinal strain is abnormal with GL PS of -14%.  Left ventricular grade 3 diastolic dysfunction is present.  Right atrium right ventricle enlargement is present.  Mild pulmonary hypertension is present.    Echocardiogram showed left atrial and right ventricle enlargement prominent aorta.  Normal left ventricular function with ejection fraction of 60% 04/05/2018.  Stacy scan Cardiolite test is abnormal with significant inferior ischemia and apical hypokinesis on the gated SPECT images 04/05/2018.     - diabetes hypertension LGL leukemia rheumatoid arthritis COPD CKD 3  Patient is on home oxygen.     - history of prostate carcinoma.  Status post radiation     - former smoker     - allergic to penicillin  ==========  Plan  ==========  Sepsis.  Elevated troponin.  Bouncing at the bottom.  Likely due to renal dysfunction and sepsis.    Echocardiogram 4/24/2024-as above       History of atrial fibrillation  Patient is maintaining sinus rhythm.  Patient is in sinus rhythm with first-degree AV block right bundle branch block-12/15/2021.  Sinus rhythm first-degree AV block right bundle branch block- 6/22/2022  EKG 6/22/2023 sinus bradycardia right bundle branch block  EKG 12/22/2022-sinus rhythm right bundle branch block  EKG 12/27/2023-atrial fibrillation  EKG 2/7/2024-atrial fibrillation right bundle branch block left anterior fascicular block.  Rate is well-controlled.  EKG-not available  Requested..  Patient is in atrial fibrillation on cardiac monitoring.      Anticoagulation was discussed.  Patient was on low-dose Eliquis.  Consideration may be given for Watchman procedure.     Chronic right bundle branch block-asymptomatic    Renal dysfunction  BUNs/creatinine 35/1.49-4/24/2024  45/1.39-4/25/2024    Hypertension-stable.     Shortness of breath fatigue and lightheadedness.-Stable  COPD  Patient is on home oxygen.     Medications  were reviewed and updated.     Further plan will depend on patient's progress.     Reviewed and updated 4/25/2024.  [[[[[[[[[[[[[[[[[[[[              Misty Dias MD  04/25/24  06:34 EDT

## 2024-04-26 ENCOUNTER — ANESTHESIA (OUTPATIENT)
Dept: GASTROENTEROLOGY | Facility: HOSPITAL | Age: 84
End: 2024-04-26
Payer: MEDICARE

## 2024-04-26 ENCOUNTER — INPATIENT HOSPITAL (AMBULATORY)
Dept: URBAN - METROPOLITAN AREA HOSPITAL 84 | Facility: HOSPITAL | Age: 84
End: 2024-04-26

## 2024-04-26 ENCOUNTER — ANESTHESIA EVENT (OUTPATIENT)
Dept: GASTROENTEROLOGY | Facility: HOSPITAL | Age: 84
End: 2024-04-26
Payer: MEDICARE

## 2024-04-26 DIAGNOSIS — K44.9 DIAPHRAGMATIC HERNIA WITHOUT OBSTRUCTION OR GANGRENE: ICD-10-CM

## 2024-04-26 DIAGNOSIS — K57.10 DIVERTICULOSIS OF SMALL INTESTINE WITHOUT PERFORATION OR ABS: ICD-10-CM

## 2024-04-26 DIAGNOSIS — D62 ACUTE POSTHEMORRHAGIC ANEMIA: ICD-10-CM

## 2024-04-26 DIAGNOSIS — R13.10 DYSPHAGIA, UNSPECIFIED: ICD-10-CM

## 2024-04-26 DIAGNOSIS — K22.2 ESOPHAGEAL OBSTRUCTION: ICD-10-CM

## 2024-04-26 DIAGNOSIS — R19.5 OTHER FECAL ABNORMALITIES: ICD-10-CM

## 2024-04-26 DIAGNOSIS — K31.7 POLYP OF STOMACH AND DUODENUM: ICD-10-CM

## 2024-04-26 LAB
ALBUMIN SERPL-MCNC: 3.3 G/DL (ref 3.5–5.2)
ALBUMIN/GLOB SERPL: 1.4 G/DL
ALP SERPL-CCNC: 142 U/L (ref 39–117)
ALPHA-FETOPROTEIN: <2 NG/ML (ref 0–8.3)
ALPHA1 GLOB MFR UR ELPH: 205 MG/DL (ref 90–200)
ALT SERPL W P-5'-P-CCNC: 100 U/L (ref 1–41)
ANA SER QL: NEGATIVE
ANION GAP SERPL CALCULATED.3IONS-SCNC: 11 MMOL/L (ref 5–15)
AST SERPL-CCNC: 87 U/L (ref 1–40)
BASOPHILS # BLD AUTO: 0.01 10*3/MM3 (ref 0–0.2)
BASOPHILS NFR BLD AUTO: 0.2 % (ref 0–1.5)
BILIRUB SERPL-MCNC: 0.8 MG/DL (ref 0–1.2)
BUN SERPL-MCNC: 48 MG/DL (ref 8–23)
BUN/CREAT SERPL: 36.6 (ref 7–25)
CA-I SERPL ISE-MCNC: 1.2 MMOL/L (ref 1.2–1.3)
CALCIUM SPEC-SCNC: 8.9 MG/DL (ref 8.6–10.5)
CERULOPLASMIN SERPL-MCNC: 22 MG/DL (ref 16–31)
CHLORIDE SERPL-SCNC: 107 MMOL/L (ref 98–107)
CO2 SERPL-SCNC: 26 MMOL/L (ref 22–29)
CREAT SERPL-MCNC: 1.31 MG/DL (ref 0.76–1.27)
D-LACTATE SERPL-SCNC: 1.2 MMOL/L (ref 0.5–2)
DEPRECATED RDW RBC AUTO: 53.7 FL (ref 37–54)
EGFRCR SERPLBLD CKD-EPI 2021: 54 ML/MIN/1.73
EOSINOPHIL # BLD AUTO: 0.06 10*3/MM3 (ref 0–0.4)
EOSINOPHIL NFR BLD AUTO: 0.9 % (ref 0.3–6.2)
ERYTHROCYTE [DISTWIDTH] IN BLOOD BY AUTOMATED COUNT: 15 % (ref 12.3–15.4)
GGT SERPL-CCNC: 129 U/L (ref 8–61)
GLOBULIN UR ELPH-MCNC: 2.4 GM/DL
GLUCOSE BLDC GLUCOMTR-MCNC: 103 MG/DL (ref 70–105)
GLUCOSE BLDC GLUCOMTR-MCNC: 103 MG/DL (ref 70–105)
GLUCOSE BLDC GLUCOMTR-MCNC: 105 MG/DL (ref 70–105)
GLUCOSE BLDC GLUCOMTR-MCNC: 133 MG/DL (ref 70–105)
GLUCOSE SERPL-MCNC: 106 MG/DL (ref 65–99)
HCT VFR BLD AUTO: 26.1 % (ref 37.5–51)
HGB BLD-MCNC: 8.5 G/DL (ref 13–17.7)
IMM GRANULOCYTES # BLD AUTO: 0.04 10*3/MM3 (ref 0–0.05)
IMM GRANULOCYTES NFR BLD AUTO: 0.6 % (ref 0–0.5)
LYMPHOCYTES # BLD AUTO: 0.88 10*3/MM3 (ref 0.7–3.1)
LYMPHOCYTES NFR BLD AUTO: 13.6 % (ref 19.6–45.3)
MAGNESIUM SERPL-MCNC: 1.8 MG/DL (ref 1.6–2.4)
MCH RBC QN AUTO: 31.7 PG (ref 26.6–33)
MCHC RBC AUTO-ENTMCNC: 32.6 G/DL (ref 31.5–35.7)
MCV RBC AUTO: 97.4 FL (ref 79–97)
MITOCHONDRIA M2 IGG SER-ACNC: <20 UNITS (ref 0–20)
MONOCYTES # BLD AUTO: 0.49 10*3/MM3 (ref 0.1–0.9)
MONOCYTES NFR BLD AUTO: 7.6 % (ref 5–12)
NEUTROPHILS NFR BLD AUTO: 4.99 10*3/MM3 (ref 1.7–7)
NEUTROPHILS NFR BLD AUTO: 77.1 % (ref 42.7–76)
NRBC BLD AUTO-RTO: 0 /100 WBC (ref 0–0.2)
PHOSPHATE SERPL-MCNC: 2.2 MG/DL (ref 2.5–4.5)
PLATELET # BLD AUTO: 161 10*3/MM3 (ref 140–450)
PMV BLD AUTO: 13 FL (ref 6–12)
POTASSIUM SERPL-SCNC: 3.8 MMOL/L (ref 3.5–5.2)
PROT SERPL-MCNC: 5.7 G/DL (ref 6–8.5)
RBC # BLD AUTO: 2.68 10*6/MM3 (ref 4.14–5.8)
SMA IGG SER-ACNC: 16 UNITS (ref 0–19)
SODIUM SERPL-SCNC: 144 MMOL/L (ref 136–145)
WBC NRBC COR # BLD AUTO: 6.47 10*3/MM3 (ref 3.4–10.8)

## 2024-04-26 PROCEDURE — 80053 COMPREHEN METABOLIC PANEL: CPT | Performed by: NURSE PRACTITIONER

## 2024-04-26 PROCEDURE — 83605 ASSAY OF LACTIC ACID: CPT | Performed by: INTERNAL MEDICINE

## 2024-04-26 PROCEDURE — 25010000002 FUROSEMIDE PER 20 MG: Performed by: INTERNAL MEDICINE

## 2024-04-26 PROCEDURE — 25810000003 SODIUM CHLORIDE 0.9 % SOLUTION: Performed by: INTERNAL MEDICINE

## 2024-04-26 PROCEDURE — 99233 SBSQ HOSP IP/OBS HIGH 50: CPT | Performed by: INTERNAL MEDICINE

## 2024-04-26 PROCEDURE — 97116 GAIT TRAINING THERAPY: CPT

## 2024-04-26 PROCEDURE — 93005 ELECTROCARDIOGRAM TRACING: CPT | Performed by: INTERNAL MEDICINE

## 2024-04-26 PROCEDURE — 82948 REAGENT STRIP/BLOOD GLUCOSE: CPT | Performed by: NURSE PRACTITIONER

## 2024-04-26 PROCEDURE — 43235 EGD DIAGNOSTIC BRUSH WASH: CPT | Performed by: INTERNAL MEDICINE

## 2024-04-26 PROCEDURE — 43450 DILATE ESOPHAGUS 1/MULT PASS: CPT | Performed by: INTERNAL MEDICINE

## 2024-04-26 PROCEDURE — 84100 ASSAY OF PHOSPHORUS: CPT | Performed by: INTERNAL MEDICINE

## 2024-04-26 PROCEDURE — 25010000002 PROPOFOL 200 MG/20ML EMULSION: Performed by: NURSE ANESTHETIST, CERTIFIED REGISTERED

## 2024-04-26 PROCEDURE — 25010000002 MAGNESIUM SULFATE IN D5W 1G/100ML (PREMIX) 1-5 GM/100ML-% SOLUTION: Performed by: INTERNAL MEDICINE

## 2024-04-26 PROCEDURE — 82330 ASSAY OF CALCIUM: CPT | Performed by: INTERNAL MEDICINE

## 2024-04-26 PROCEDURE — 83735 ASSAY OF MAGNESIUM: CPT | Performed by: NURSE PRACTITIONER

## 2024-04-26 PROCEDURE — 0D758DZ DILATION OF ESOPHAGUS WITH INTRALUMINAL DEVICE, VIA NATURAL OR ARTIFICIAL OPENING ENDOSCOPIC: ICD-10-PCS | Performed by: INTERNAL MEDICINE

## 2024-04-26 PROCEDURE — 97530 THERAPEUTIC ACTIVITIES: CPT

## 2024-04-26 PROCEDURE — 85025 COMPLETE CBC W/AUTO DIFF WBC: CPT | Performed by: NURSE PRACTITIONER

## 2024-04-26 PROCEDURE — 93010 ELECTROCARDIOGRAM REPORT: CPT | Performed by: INTERNAL MEDICINE

## 2024-04-26 PROCEDURE — 25010000002 CEFTRIAXONE PER 250 MG: Performed by: NURSE PRACTITIONER

## 2024-04-26 PROCEDURE — 82948 REAGENT STRIP/BLOOD GLUCOSE: CPT

## 2024-04-26 PROCEDURE — 25810000003 SODIUM CHLORIDE 0.9 % SOLUTION: Performed by: NURSE ANESTHETIST, CERTIFIED REGISTERED

## 2024-04-26 RX ORDER — LIDOCAINE HYDROCHLORIDE 10 MG/ML
INJECTION, SOLUTION EPIDURAL; INFILTRATION; INTRACAUDAL; PERINEURAL AS NEEDED
Status: DISCONTINUED | OUTPATIENT
Start: 2024-04-26 | End: 2024-04-26 | Stop reason: SURG

## 2024-04-26 RX ORDER — EPHEDRINE SULFATE 5 MG/ML
INJECTION INTRAVENOUS AS NEEDED
Status: DISCONTINUED | OUTPATIENT
Start: 2024-04-26 | End: 2024-04-26 | Stop reason: SURG

## 2024-04-26 RX ORDER — LABETALOL HYDROCHLORIDE 5 MG/ML
5 INJECTION, SOLUTION INTRAVENOUS
Status: DISCONTINUED | OUTPATIENT
Start: 2024-04-26 | End: 2024-04-26 | Stop reason: HOSPADM

## 2024-04-26 RX ORDER — DIPHENHYDRAMINE HYDROCHLORIDE 50 MG/ML
12.5 INJECTION INTRAMUSCULAR; INTRAVENOUS
Status: DISCONTINUED | OUTPATIENT
Start: 2024-04-26 | End: 2024-04-26 | Stop reason: HOSPADM

## 2024-04-26 RX ORDER — ONDANSETRON 2 MG/ML
4 INJECTION INTRAMUSCULAR; INTRAVENOUS ONCE AS NEEDED
Status: DISCONTINUED | OUTPATIENT
Start: 2024-04-26 | End: 2024-04-26 | Stop reason: HOSPADM

## 2024-04-26 RX ORDER — EPHEDRINE SULFATE 5 MG/ML
5 INJECTION INTRAVENOUS ONCE AS NEEDED
Status: DISCONTINUED | OUTPATIENT
Start: 2024-04-26 | End: 2024-04-26 | Stop reason: HOSPADM

## 2024-04-26 RX ORDER — HYDROCODONE BITARTRATE AND ACETAMINOPHEN 5; 325 MG/1; MG/1
1 TABLET ORAL EVERY 6 HOURS PRN
Status: DISCONTINUED | OUTPATIENT
Start: 2024-04-26 | End: 2024-04-30 | Stop reason: HOSPADM

## 2024-04-26 RX ORDER — FENTANYL/ROPIVACAINE/NS/PF 2-625MCG/1
15 PLASTIC BAG, INJECTION (ML) EPIDURAL
Status: DISPENSED | OUTPATIENT
Start: 2024-04-26 | End: 2024-04-26

## 2024-04-26 RX ORDER — FUROSEMIDE 10 MG/ML
20 INJECTION INTRAMUSCULAR; INTRAVENOUS EVERY 8 HOURS
Status: COMPLETED | OUTPATIENT
Start: 2024-04-26 | End: 2024-04-26

## 2024-04-26 RX ORDER — IPRATROPIUM BROMIDE AND ALBUTEROL SULFATE 2.5; .5 MG/3ML; MG/3ML
3 SOLUTION RESPIRATORY (INHALATION) ONCE AS NEEDED
Status: DISCONTINUED | OUTPATIENT
Start: 2024-04-26 | End: 2024-04-26 | Stop reason: HOSPADM

## 2024-04-26 RX ORDER — MAGNESIUM SULFATE 1 G/100ML
1 INJECTION INTRAVENOUS ONCE
Status: COMPLETED | OUTPATIENT
Start: 2024-04-26 | End: 2024-04-26

## 2024-04-26 RX ORDER — HYDRALAZINE HYDROCHLORIDE 20 MG/ML
5 INJECTION INTRAMUSCULAR; INTRAVENOUS
Status: DISCONTINUED | OUTPATIENT
Start: 2024-04-26 | End: 2024-04-26 | Stop reason: HOSPADM

## 2024-04-26 RX ORDER — SODIUM CHLORIDE 9 MG/ML
INJECTION, SOLUTION INTRAVENOUS CONTINUOUS PRN
Status: DISCONTINUED | OUTPATIENT
Start: 2024-04-26 | End: 2024-04-26 | Stop reason: SURG

## 2024-04-26 RX ORDER — PROPOFOL 10 MG/ML
INJECTION, EMULSION INTRAVENOUS AS NEEDED
Status: DISCONTINUED | OUTPATIENT
Start: 2024-04-26 | End: 2024-04-26 | Stop reason: SURG

## 2024-04-26 RX ADMIN — FUROSEMIDE 20 MG: 10 INJECTION, SOLUTION INTRAMUSCULAR; INTRAVENOUS at 23:20

## 2024-04-26 RX ADMIN — MAGNESIUM SULFATE IN DEXTROSE 1 G: 10 INJECTION, SOLUTION INTRAVENOUS at 08:39

## 2024-04-26 RX ADMIN — MIDODRINE HYDROCHLORIDE 5 MG: 5 TABLET ORAL at 17:20

## 2024-04-26 RX ADMIN — SODIUM CHLORIDE: 9 INJECTION, SOLUTION INTRAVENOUS at 13:10

## 2024-04-26 RX ADMIN — FUROSEMIDE 20 MG: 10 INJECTION, SOLUTION INTRAMUSCULAR; INTRAVENOUS at 15:52

## 2024-04-26 RX ADMIN — FUROSEMIDE 20 MG: 10 INJECTION, SOLUTION INTRAMUSCULAR; INTRAVENOUS at 08:39

## 2024-04-26 RX ADMIN — MIDODRINE HYDROCHLORIDE 5 MG: 5 TABLET ORAL at 08:39

## 2024-04-26 RX ADMIN — HYDROXYCHLOROQUINE SULFATE 400 MG: 200 TABLET ORAL at 10:23

## 2024-04-26 RX ADMIN — Medication 10 ML: at 10:25

## 2024-04-26 RX ADMIN — CEFTRIAXONE 2000 MG: 2 INJECTION, POWDER, FOR SOLUTION INTRAMUSCULAR; INTRAVENOUS at 10:23

## 2024-04-26 RX ADMIN — Medication 1 TABLET: at 10:23

## 2024-04-26 RX ADMIN — TAMSULOSIN HYDROCHLORIDE 0.4 MG: 0.4 CAPSULE ORAL at 10:22

## 2024-04-26 RX ADMIN — SODIUM BICARBONATE 1300 MG: 650 TABLET ORAL at 20:33

## 2024-04-26 RX ADMIN — PROPOFOL 50 MG: 10 INJECTION, EMULSION INTRAVENOUS at 13:15

## 2024-04-26 RX ADMIN — Medication 10 ML: at 20:34

## 2024-04-26 RX ADMIN — EPHEDRINE SULFATE 10 MG: 5 INJECTION INTRAVENOUS at 13:27

## 2024-04-26 RX ADMIN — SODIUM BICARBONATE 1300 MG: 650 TABLET ORAL at 15:52

## 2024-04-26 RX ADMIN — SODIUM BICARBONATE 1300 MG: 650 TABLET ORAL at 10:23

## 2024-04-26 RX ADMIN — HYDROCODONE BITARTRATE AND ACETAMINOPHEN 1 TABLET: 5; 325 TABLET ORAL at 17:15

## 2024-04-26 RX ADMIN — LIDOCAINE HYDROCHLORIDE 40 MG: 10 INJECTION, SOLUTION EPIDURAL; INFILTRATION; INTRACAUDAL; PERINEURAL at 13:15

## 2024-04-26 RX ADMIN — POTASSIUM PHOSPHATE, MONOBASIC AND POTASSIUM PHOSPHATE, DIBASIC 15 MMOL: 224; 236 INJECTION, SOLUTION, CONCENTRATE INTRAVENOUS at 10:03

## 2024-04-26 RX ADMIN — APIXABAN 2.5 MG: 2.5 TABLET, FILM COATED ORAL at 20:33

## 2024-04-26 RX ADMIN — PROPOFOL 20 MG: 10 INJECTION, EMULSION INTRAVENOUS at 13:19

## 2024-04-26 RX ADMIN — PROPOFOL 30 MG: 10 INJECTION, EMULSION INTRAVENOUS at 13:16

## 2024-04-26 NOTE — CASE MANAGEMENT/SOCIAL WORK
Continued Stay Note   Zoha[facility]     Patient Name: Praneeth Nam  MRN: [id number]  Today's Date: 4/26/2024    Admit Date: 4/23/2024    Plan: DC Plan: Anticipate routine home with Janis Montes and Spouse. [facility] accepted and following. Current with [facility] for Home Oxygen and [facility].   Discharge Plan       Row Name 04/26/24 1547       Plan    Plan DC Plan: Anticipate routine home with Janis Montes and Spouse. [facility] accepted and following. Current with [facility] for Home Oxygen and [facility].    Patient/Family in Agreement with Plan yes    Provided Post Acute Provider List? N/A    Provided Post Acute Provider Quality & Resource List? N/A    Plan Comments CM reviewed chart documentation to obtain clinical updates.  Patient had EGD today to evaluate positive blood in stool. CM will continue to follow for any further needs and adjust discharge plan accordingly. DC Barriers: Cardiac monitoring, O2@2L nc, IV abx/Electrolytes, pending procedure, and monitor labs.                 Expected Discharge Date and Time       Expected Discharge Date Expected Discharge Time    Apr 27, 2024           Phone communication or documentation only- no physical contact with patient or family.      Deepa Mercer RN    Office Phone: [phone]  Office Cell:     [phone]

## 2024-04-26 NOTE — ANESTHESIA PREPROCEDURE EVALUATION
Anesthesia Evaluation     Patient summary reviewed and Nursing notes reviewed   NPO Solid Status: > 8 hours  NPO Liquid Status: > 8 hours           Airway   Mallampati: II  TM distance: >3 FB  Neck ROM: full  No difficulty expected  Dental - normal exam   (+) edentulous    Pulmonary - normal exam   (+) pleural effusion, pulmonary embolism, COPD,shortness of breath, sleep apnea  Cardiovascular     ECG reviewed  Rhythm: irregular  Rate: normal    (+) hypertension, dysrhythmias Atrial Fib, hyperlipidemia      Neuro/Psych  (+) CVA, dizziness/light headedness  GI/Hepatic/Renal/Endo    (+) renal disease- dialysis, diabetes mellitus type 2    Musculoskeletal     Abdominal  - normal exam    Bowel sounds: normal.   Substance History      OB/GYN          Other   arthritis, blood dyscrasia thrombocytopenia,   history of cancer    ROS/Med Hx Other: Impression  Thickened mitral and aortic valves with adequate opening motion.  Moderate mitral tricuspid and aortic and pulmonic regurgitation.  Left atrial enlargement.  Left ventricular size and contractility is normal with ejection fraction of 60%.  Left ventricular peak systolic longitudinal strain is abnormal with GL PS of -14%.  Left ventricular grade 3 diastolic dysfunction is present.  Right atrium right ventricle enlargement is present.  Mild pulmonary hypertension is present.                   Anesthesia Plan    ASA 3     MAC and general     intravenous induction     Anesthetic plan, risks, benefits, and alternatives have been provided, discussed and informed consent has been obtained with: patient.    Plan discussed with CRNA.    CODE STATUS:    Code Status (Patient has no pulse and is not breathing): CPR (Attempt to Resuscitate)  Medical Interventions (Patient has pulse or is breathing): Full Support

## 2024-04-26 NOTE — PROGRESS NOTES
Referring Provider: Clementine Rousseau MD    Reason for follow-up:  Atrial fibrillation  Elevated troponin level     Patient Care Team:  Guilherme Jason MD as PCP - General (Family Medicine)  Misty Dias MD as Consulting Physician (Cardiology)  Nora Kenyon DNP, APRN as Nurse Practitioner (Thoracic Surgery)  Sussy Shane MD as Consulting Physician (Nephrology)    Subjective .      ROS    Today, the patient has been without any chest discomfort , unusual shortness of breath, palpitations, dizziness or syncope.  Denies having any headache ,abdominal pain ,nausea, vomiting , diarrhea constipation, loss of weight or loss of appetite.  Denies having any excessive bruising ,hematuria or blood in the stool.    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    Neutropenia 11/2003    MITZI (obstructive sleep apnea) 2018    Psoriasis 2000    Renal cyst, left     Rheumatoid arthritis     Stroke (cerebrum)     Thrombocytopenia 08/23/2005       Past Surgical History:   Procedure Laterality Date    SKIN LESION EXCISION      back and groin-benign       Family History   Problem Relation Age of Onset    Lung cancer Brother         age unknown    Heart disease Brother     Stroke Mother     Heart disease Father     Heart disease Sister        Social History     Tobacco Use    Smoking status: Former     Current  packs/day: 0.00     Types: Cigarettes     Quit date:      Years since quittin.3     Passive exposure: Never    Smokeless tobacco: Never    Tobacco comments:     stopped smoking in    Vaping Use    Vaping status: Never Used   Substance Use Topics    Alcohol use: No    Drug use: No        Medications Prior to Admission   Medication Sig Dispense Refill Last Dose    apixaban (ELIQUIS) 2.5 MG tablet tablet Take 1 tablet by mouth 2 (Two) Times a Day. 60 tablet 11     atorvastatin (LIPITOR) 20 MG tablet Take 1 tablet by mouth Daily.  3     fenofibrate 160 MG tablet Take 1 tablet by mouth Daily.  3     hydroxychloroquine (PLAQUENIL) 200 MG tablet Take 2 tablets by mouth Daily.  0     ipratropium-albuterol (DUO-NEB) 0.5-2.5 mg/3 ml nebulizer Take 3 mL by nebulization Every 4 (Four) Hours As Needed for Wheezing.       losartan (COZAAR) 100 MG tablet Take 1 tablet by mouth Daily.  3     Multiple Vitamins-Minerals (MULTIVITAMIN WITH MINERALS) tablet tablet Take 1 tablet by mouth Daily.       oxybutynin XL (DITROPAN-XL) 10 MG 24 hr tablet Take 1 tablet by mouth Daily.       pantoprazole (PROTONIX) 40 MG EC tablet Take 1 tablet by mouth Daily.       revefenacin (YUPELRI) 175 MCG/3ML nebulizer solution Take 3 mL by nebulization Daily.       spironolactone (ALDACTONE) 25 MG tablet Take 1 tablet by mouth Daily.  3     tamsulosin (FLOMAX) 0.4 MG capsule 24 hr capsule Take 1 capsule by mouth Daily.          Allergies  Penicillin v potassium and Latex    Scheduled Meds:apixaban, 2.5 mg, Oral, Q12H  [Held by provider] atorvastatin, 20 mg, Oral, Daily  cefTRIAXone, 2,000 mg, Intravenous, Q24H  hydroxychloroquine, 400 mg, Oral, Daily  midodrine, 5 mg, Oral, TID AC  multivitamin with minerals, 1 tablet, Oral, Daily  pantoprazole, 40 mg, Oral, Q AM  sodium bicarbonate, 1,300 mg, Oral, TID  sodium chloride, 10 mL, Intravenous, Q12H  [Held by provider] spironolactone, 25 mg, Oral, Daily  tamsulosin, 0.4 mg, Oral,  "Daily      Continuous Infusions:     PRN Meds:.  [Held by provider] acetaminophen **OR** [Held by provider] acetaminophen    senna-docusate sodium **AND** polyethylene glycol **AND** bisacodyl **AND** bisacodyl    dextrose    dextrose    glucagon (human recombinant)    ipratropium-albuterol    nitroglycerin    ondansetron ODT **OR** ondansetron    sodium chloride    sodium chloride    Objective     VITAL SIGNS  Vitals:    04/25/24 1500 04/25/24 1600 04/25/24 1949 04/26/24 0306   BP: 147/67 151/69     BP Location:  Left arm     Patient Position:  Lying     Pulse: 58 61     Resp:  13     Temp:  97.8 °F (36.6 °C) 98 °F (36.7 °C)    TempSrc:  Oral Oral    SpO2: 99% 100%     Weight:    84.7 kg (186 lb 11.7 oz)   Height:           Flowsheet Rows      Flowsheet Row First Filed Value   Admission Height 167.6 cm (66\") Documented at 04/23/2024 1200   Admission Weight 81.9 kg (180 lb 8.9 oz) Documented at 04/23/2024 1200              Intake/Output Summary (Last 24 hours) at 4/26/2024 0647  Last data filed at 4/26/2024 0000  Gross per 24 hour   Intake 2135 ml   Output 2685 ml   Net -550 ml        TELEMETRY: Atrial fibrillation with controlled ventricular response.    Physical Exam:  The patient is alert, oriented and in no distress.  Vital signs as noted above.  Head and neck revealed no carotid bruits or jugular venous distention.  No thyromegaly or lymphadenopathy is present  Lungs clear.  No wheezing.  Breath sounds are normal bilaterally.  Heart normal first and second heart sounds.  No murmur. No precordial rub is present.  No gallop is present.  Abdomen soft and nontender.  No organomegaly is present.  Extremities with good peripheral pulses without any pedal edema.  Skin warm and dry.  Upper and lower extremity bruising is present.  Musculoskeletal system is grossly normal  CNS grossly normal    Reviewed and updated.    Results Review:   I reviewed the patient's new clinical results.  Lab Results (last 24 hours)       " Procedure Component Value Units Date/Time    Lactic Acid, Plasma [[id number]]  (Normal) Collected: 04/26/24 0546    Specimen: Blood Updated: 04/26/24 0646     Lactate 1.2 mmol/L     Magnesium [[id number]]  (Normal) Collected: 04/26/24 0546    Specimen: Blood Updated: 04/26/24 0645     Magnesium 1.8 mg/dL     Comprehensive Metabolic Panel [[id number]]  (Abnormal) Collected: 04/26/24 0546    Specimen: Blood Updated: 04/26/24 0645     Glucose 106 mg/dL      BUN 48 mg/dL      Creatinine 1.31 mg/dL      Sodium 144 mmol/L      Potassium 3.8 mmol/L      Chloride 107 mmol/L      CO2 26.0 mmol/L      Calcium 8.9 mg/dL      Total Protein 5.7 g/dL      Albumin 3.3 g/dL      ALT (SGPT) 100 U/L      AST (SGOT) 87 U/L      Alkaline Phosphatase 142 U/L      Total Bilirubin 0.8 mg/dL      Globulin 2.4 gm/dL      A/G Ratio 1.4 g/dL      BUN/Creatinine Ratio 36.6     Anion Gap 11.0 mmol/L      eGFR 54.0 mL/min/1.73     Narrative:      GFR Normal >60  Chronic Kidney Disease <60  Kidney Failure <15    The GFR formula is only valid for adults with stable renal function between ages 18 and 70.    Phosphorus [[id number]]  (Abnormal) Collected: 04/26/24 0546    Specimen: Blood Updated: 04/26/24 0645     Phosphorus 2.2 mg/dL     Calcium, Ionized [[id number]]  (Normal) Collected: 04/26/24 0546    Specimen: Blood Updated: 04/26/24 0631     Ionized Calcium 1.20 mmol/L     CBC & Differential [[id number]]  (Abnormal) Collected: 04/26/24 0546    Specimen: Blood Updated: 04/26/24 0626    Narrative:      The following orders were created for panel order CBC & Differential.  Procedure                               Abnormality         Status                     ---------                               -----------         ------                     CBC Auto Differential[[id number]]        Abnormal            Final result               Scan Slide[[id number]]                                                                    Please view results for these tests  on the individual orders.    CBC Auto Differential [[id number]]  (Abnormal) Collected: 04/26/24 0546    Specimen: Blood Updated: 04/26/24 0626     WBC 6.47 10*3/mm3      RBC 2.68 10*6/mm3      Hemoglobin 8.5 g/dL      Hematocrit 26.1 %      MCV 97.4 fL      MCH 31.7 pg      MCHC 32.6 g/dL      RDW 15.0 %      RDW-SD 53.7 fl      MPV 13.0 fL      Platelets 161 10*3/mm3      Neutrophil % 77.1 %      Lymphocyte % 13.6 %      Monocyte % 7.6 %      Eosinophil % 0.9 %      Basophil % 0.2 %      Immature Grans % 0.6 %      Neutrophils, Absolute 4.99 10*3/mm3      Lymphocytes, Absolute 0.88 10*3/mm3      Monocytes, Absolute 0.49 10*3/mm3      Eosinophils, Absolute 0.06 10*3/mm3      Basophils, Absolute 0.01 10*3/mm3      Immature Grans, Absolute 0.04 10*3/mm3      nRBC 0.0 /100 WBC     AFP Tumor Marker [[id number]]  (Normal) Collected: 04/25/24 1728    Specimen: Blood Updated: 04/26/24 0050     ALPHA-FETOPROTEIN <2 ng/mL     Narrative:      Alpha Fetoprotein Tumor Marker Reference Range:    0.0-8.3 ng/mL    Note: Normal values apply only to males and nonpregnant females. These results are not interpretable for pregnant females.    Testing Method: [employer] Electrochemiluminescence Immunoassay(ECLIA)  Values obtained with different assay methods or kits cannot be used interchangeably.    Alpha - 1 - Antitrypsin [[id number]]  (Abnormal) Collected: 04/25/24 0328    Specimen: Blood Updated: 04/26/24 0023     ALPHA -1 ANTITRYPSIN 205 mg/dL     Ceruloplasmin [[id number]]  (Normal) Collected: 04/25/24 0328    Specimen: Blood Updated: 04/26/24 0023     Ceruloplasmin 22 mg/dL     Gamma GT [[id number]]  (Abnormal) Collected: 04/25/24 0328    Specimen: Blood Updated: 04/26/24 0022      U/L     Hepatitis Panel, Acute [[id number]]  (Normal) Collected: 04/25/24 1728    Specimen: Blood Updated: 04/25/24 1805     Hepatitis B Surface Ag Non-Reactive     Hep A IgM Non-Reactive     Hep B C IgM Non-Reactive     Hepatitis C Ab  Non-Reactive    Narrative:      Results may be falsely decreased if patient taking Biotin.     POC Glucose 4x Daily Before Meals & at Bedtime [[id number]]  (Normal) Collected: 04/25/24 1731    Specimen: Blood Updated: 04/25/24 1732     Glucose 95 mg/dL      Comment: Serial Number: [id number]:  [id number]       Anti-microsomal Antibody [[id number]] Collected: 04/25/24 1728    Specimen: Blood Updated: 04/25/24 1731    Anti-Smooth Muscle Antibody Titer [[id number]] Collected: 04/25/24 1728    Specimen: Blood Updated: 04/25/24 1731    Mitochondrial Antibodies, M2 [[id number]] Collected: 04/25/24 1728    Specimen: Blood Updated: 04/25/24 1731    NAIMA [[id number]] Collected: 04/25/24 1728    Specimen: Blood Updated: 04/25/24 1731    Ferritin [[id number]]  (Abnormal) Collected: 04/25/24 0328    Specimen: Blood Updated: 04/25/24 1712     Ferritin 542.30 ng/mL     Narrative:      Results may be falsely decreased if patient taking Biotin.      Iron [[id number]]  (Abnormal) Collected: 04/25/24 0328    Specimen: Blood Updated: 04/25/24 1707     Iron 202 mcg/dL     Blood Culture - Blood, Hand, Left [[id number]]  (Normal) Collected: 04/23/24 1245    Specimen: Blood from Hand, Left Updated: 04/25/24 1301     Blood Culture No growth at 2 days    Blood Culture - Blood, Arm, Right [[id number]]  (Normal) Collected: 04/23/24 1235    Specimen: Blood from Arm, Right Updated: 04/25/24 1245     Blood Culture No growth at 2 days    Narrative:      Less than seven (7) mL's of blood was collected.  Insufficient quantity may yield false negative results.    POC Glucose 4x Daily Before Meals & at Bedtime [[id number]]  (Abnormal) Collected: 04/25/24 1145    Specimen: Blood Updated: 04/25/24 1148     Glucose 106 mg/dL      Comment: Serial Number: [id number]:  [id number]       Respiratory Culture - Sputum, Cough [[id number]] Collected: 04/23/24 2204    Specimen: Sputum from Cough Updated: 04/25/24 1048     Respiratory Culture Rare Normal  respiratory nicolas. No S. aureus or Pseudomonas aeruginosa detected. Final report.     Gram Stain Many (4+) WBCs per low power field      Rare (1+) Epithelial cells per low power field      Rare (1+) Mixed bacterial morphotypes seen on Gram Stain    POC Glucose 4x Daily Before Meals & at Bedtime [[id number]]  (Abnormal) Collected: 04/25/24 0840    Specimen: Blood Updated: 04/25/24 0842     Glucose 136 mg/dL      Comment: Serial Number: [id number]:  [id number]       Occult Blood, Fecal By Immunoassay - Stool, Per Rectum [[id number]]  (Abnormal) Collected: 04/25/24 0824    Specimen: Stool from Per Rectum Updated: 04/25/24 0836     Occult Blood, Fecal by Immunoassay Positive            Imaging Results (Last 24 Hours)       Procedure Component Value Units Date/Time    US Renal Bilateral [[id number]] Collected: 04/25/24 0727     Updated: 04/25/24 0730    Narrative:      US RENAL BILATERAL    Date of Exam: 4/25/2024 5:29 AM EDT    Indication: Kidney failure, acute  ELYSSA.    Comparison: 4/24/2024 CT    Technique: Grayscale and color Doppler ultrasound evaluation of the kidneys and urinary bladder was performed.      Findings:  Right kidney measures 10.8 cm in length. The left kidney measures 10.7 cm in length. There is no hydronephrosis, nephrolithiasis, or suspicious renal mass.    Limited evaluation of the urinary bladder secondary to recent voiding.      Impression:      Impression:  No hydronephrosis.        Electronically Signed: Daniel Jennings MD    4/25/2024 7:28 AM EDT    Workstation ID: [id number]        LAB RESULTS (LAST 7 DAYS)    CBC  Results from last 7 days   Lab Units 04/26/24  0546 04/25/24  0328 04/24/24  0338 04/23/24  1235   WBC 10*3/mm3 6.47 7.32 6.48 13.06*   RBC 10*6/mm3 2.68* 2.72* 2.44* 2.77*   HEMOGLOBIN g/dL 8.5* 8.7* 7.6* 8.8*   HEMATOCRIT % 26.1* 27.1* 24.7* 28.4*   MCV fL 97.4* 99.6* 101.2* 102.5*   PLATELETS 10*3/mm3 161 190 148 183       BMP  Results from last 7 days   Lab Units 04/26/24  0546  04/25/24  0328 04/24/24  0338 04/23/24  1235   SODIUM mmol/L 144 143 141 143   POTASSIUM mmol/L 3.8 4.2 3.9 4.1   CHLORIDE mmol/L 107 114* 114* 113*   CO2 mmol/L 26.0 18.0* 16.0* 16.0*   BUN mg/dL 48* 45* 35* 34*   CREATININE mg/dL 1.31* 1.39* 1.49* 1.53*   GLUCOSE mg/dL 106* 131* 130* 98   MAGNESIUM mg/dL 1.8 2.3 1.7 1.6   PHOSPHORUS mg/dL 2.2* 2.4* 3.3 2.5       CMP   Results from last 7 days   Lab Units 04/26/24  0546 04/25/24  0328 04/24/24  0338 04/23/24  1235   SODIUM mmol/L 144 143 141 143   POTASSIUM mmol/L 3.8 4.2 3.9 4.1   CHLORIDE mmol/L 107 114* 114* 113*   CO2 mmol/L 26.0 18.0* 16.0* 16.0*   BUN mg/dL 48* 45* 35* 34*   CREATININE mg/dL 1.31* 1.39* 1.49* 1.53*   GLUCOSE mg/dL 106* 131* 130* 98   ALBUMIN g/dL 3.3* 2.6* 2.4* 2.7*   BILIRUBIN mg/dL 0.8 0.5 1.3* 2.3*   ALK PHOS U/L 142* 184* 145* 192*   AST (SGOT) U/L 87* 211* 156* 154*   ALT (SGPT) U/L 100* 136* 75* 67*   AMYLASE U/L  --   --   --  29   LIPASE U/L  --   --   --  16         BNP        TROPONIN  Results from last 7 days   Lab Units 04/23/24  1433 04/23/24  1235   CK TOTAL U/L  --  117   HSTROP T ng/L 219* 254*       CoAg        Creatinine Clearance  Estimated Creatinine Clearance: 43.6 mL/min (A) (by C-G formula based on SCr of 1.31 mg/dL (H)).    ABG        Radiology  US Renal Bilateral    Result Date: 4/25/2024  Impression: No hydronephrosis. Electronically Signed: Daniel Jennings MD  4/25/2024 7:28 AM EDT  Workstation ID: [id number]    CT Chest Without Contrast Diagnostic    Result Date: 4/24/2024  Impression: 1.Small pleural effusions, new on the left, with generalized body wall edema mild mesenteric edema, and presacral edema. 2.Decrease in size of now 4 mm right lower lobe subpleural pulmonary nodule, previously 9 mm. See below recommendations. 3.Stable subpleural reticular and linear areas of interstitial pulmonary scarring. 4.Other incidental nonemergent findings as detailed above. The Fleischner society pulmonary nodule recommendations  are for the follow-up and management of pulmonary nodules smaller than 8 mm detected incidentally in patients >35 years on non-screening CT. The initial guidelines for the management of solid nodules were released in 2005 1, and guidelines for the management of subsolid nodules were released in 2013 2. New revised 2017 recommendations for both solid and subsolid have since been released 4. 2017 guidelines Solid nodules Solitary nodule size: <6 mm *low risk patients: no follow-up needed *high risk patients: optional CT at 12 months Solitary nodule size: 6-8 mm *low risk patients: follow-up at 6-12 months, then consider further follow-up at 18-24 months *high risk patients: initial follow-up CT at 6-12 months and then at 18-24 months if no change Solitary nodule size: >8 mm *either low or high risk patients *consider follow-up CT at 3 months, and/or CT-PET, and/or biopsy Multiple nodules size: <6 mm *low risk patients: no routine follow-up *high risk patients: optional CT at 12 months Multiple nodules size: 6-8 mm *low risk patients: follow-up at 3-6 months, then consider further follow-up at 18-24 months *high risk patients: follow-up at 3-6 months, then at 18-24 months if no change Multiple nodules size: >8 mm *low risk patients: follow-up at 3-6 months, then consider further follow-up at 18-24 months *high risk patients: follow-up at 3-6 months, then at 18-24 months if no change * Note: newly detected indeterminate nodule in persons 35 years of age or older. *low risk patients: minimal or absent history of smoking and or other known risk factors *high risk patients: history of smoking or of other known risk factors (e.g. first degree relative with lung cancer, or exposure to asbestos, radon, uranium) *if a nodule up to 8 mm is partly solid or is ground glass further follow-up is required after 24 months to exclude possible slow growing adenocarcinoma (RUDY) Subsolid nodules Solitary pure ground-glass nodule *nodule  "size <6mm *no CT follow-up required *nodule size \"e6mm *follow up CT at 6-12 months, then every 2 years until 5 years Solitary part-solid nodule *nodule size <6mm *no CT follow-up required *nodule size \"e6mm *follow-up CT at 3-6 months *if unchanged, and solid component remains <6mm, then annual follow-up for 5 years Multiple subsolid nodules *nodule size <6mm *follow-up CT at 3-6 months *consider further follow-up at 2 and 4 years if stable *nodule size \"e6mm *follow-up CT at 3-6 months *subsequent management based on the most suspicious nodule(s) Electronically Signed: Kp Petersen MD  4/24/2024 5:30 PM EDT  Workstation ID: [id number]    CT Abdomen Pelvis Without Contrast    Result Date: 4/24/2024  Impression: 1.Small pleural effusions, new on the left, with generalized body wall edema mild mesenteric edema, and presacral edema. 2.Decrease in size of now 4 mm right lower lobe subpleural pulmonary nodule, previously 9 mm. See below recommendations. 3.Stable subpleural reticular and linear areas of interstitial pulmonary scarring. 4.Other incidental nonemergent findings as detailed above. The Fleischner society pulmonary nodule recommendations are for the follow-up and management of pulmonary nodules smaller than 8 mm detected incidentally in patients >35 years on non-screening CT. The initial guidelines for the management of solid nodules were released in 2005 1, and guidelines for the management of subsolid nodules were released in 2013 2. New revised 2017 recommendations for both solid and subsolid have since been released 4. 2017 guidelines Solid nodules Solitary nodule size: <6 mm *low risk patients: no follow-up needed *high risk patients: optional CT at 12 months Solitary nodule size: 6-8 mm *low risk patients: follow-up at 6-12 months, then consider further follow-up at 18-24 months *high risk patients: initial follow-up CT at 6-12 months and then at 18-24 months if no change Solitary nodule size: >8 mm " "*either low or high risk patients *consider follow-up CT at 3 months, and/or CT-PET, and/or biopsy Multiple nodules size: <6 mm *low risk patients: no routine follow-up *high risk patients: optional CT at 12 months Multiple nodules size: 6-8 mm *low risk patients: follow-up at 3-6 months, then consider further follow-up at 18-24 months *high risk patients: follow-up at 3-6 months, then at 18-24 months if no change Multiple nodules size: >8 mm *low risk patients: follow-up at 3-6 months, then consider further follow-up at 18-24 months *high risk patients: follow-up at 3-6 months, then at 18-24 months if no change * Note: newly detected indeterminate nodule in persons 35 years of age or older. *low risk patients: minimal or absent history of smoking and or other known risk factors *high risk patients: history of smoking or of other known risk factors (e.g. first degree relative with lung cancer, or exposure to asbestos, radon, uranium) *if a nodule up to 8 mm is partly solid or is ground glass further follow-up is required after 24 months to exclude possible slow growing adenocarcinoma (RUDY) Subsolid nodules Solitary pure ground-glass nodule *nodule size <6mm *no CT follow-up required *nodule size \"e6mm *follow up CT at 6-12 months, then every 2 years until 5 years Solitary part-solid nodule *nodule size <6mm *no CT follow-up required *nodule size \"e6mm *follow-up CT at 3-6 months *if unchanged, and solid component remains <6mm, then annual follow-up for 5 years Multiple subsolid nodules *nodule size <6mm *follow-up CT at 3-6 months *consider further follow-up at 2 and 4 years if stable *nodule size \"e6mm *follow-up CT at 3-6 months *subsequent management based on the most suspicious nodule(s) Electronically Signed: Kp Petersen MD  4/24/2024 5:30 PM EDT  Workstation ID: [id number]    XR Chest 1 View    Result Date: 4/24/2024  Impression: No significant interval change. Electronically Signed: Daniel Jennings MD  4/24/2024 " 8:52 AM EDT  Workstation ID: [id number]         EKG      I personally viewed and interpreted the patient's EKG/Telemetry data: Atrial fibrillation rate 10 ventricular    ECHOCARDIOGRAM:    Results for orders placed during the hospital encounter of 04/23/24    Adult Transthoracic Echo Complete W/ Cont if Necessary Per Protocol    Interpretation Summary  Indications  Shortness of breath    Technically satisfactory study.  Mitral valve is thickened with adequate opening motion.  Moderate mitral regurgitation is present.  Tricuspid valve is structurally normal.  Moderate tricuspid regurgitation is present.  Aortic valve is thickened with adequate opening motion.  Moderate aortic regurgitation is present.  Pulmonic valve opening motion is normal.  Moderate pulmonic regurgitation is present.  Mild pulmonary hypertension is present.  Left atrium is enlarged.  Right atrium is enlarged.  Left ventricle is normal in size and contractility with ejection fraction of 60%.  Grade 3 left ventricular diastolic dysfunction is present.  (MV E/8 ratio 2.8)  Left ventricular peak systolic longitudinal strain is abnormal with GL PS of -14%.  Concentric left ventricle hypertrophy is present.  Right ventricle enlarged with hypocontractility with a TAPSE of 1.46 cm..  Atrial septum is intact.  Aorta is dilated at 4.3 cm.  IVC is dilated with decreased respiratory variation.  Right atrial pressure 8 mmHg.  No pericardial effusion or intracardiac thrombus is seen.    Impression  Thickened mitral and aortic valves with adequate opening motion.  Moderate mitral tricuspid and aortic and pulmonic regurgitation.  Left atrial enlargement.  Left ventricular size and contractility is normal with ejection fraction of 60%.  Left ventricular peak systolic longitudinal strain is abnormal with GL PS of -14%.  Left ventricular grade 3 diastolic dysfunction is present.  Right atrium right ventricle enlargement is present.  Mild pulmonary hypertension is  present.          STRESS TEST  Results for orders placed during the hospital encounter of 07/28/21    Stress Test With Myocardial Perfusion One Day    Interpretation Summary  Indications  Chest pain    This study was performed under my direct supervision.    Resting ECG  Sinus rhythm right bundle branch block first-degree AV block    The patient was injected with Lexiscan intravenously while constantly monitoring electrocardiogram and vital signs.  Patient did not have any chest discomfort ST abnormalities or ectopy with injection of Lexiscan.    Cardiolite was used as an imaging agent.    Cardiolite images showed mild inferior ischemia.    Gated SPECT images revealed normal left ventricle size and contractility with ejection fraction of 73%.    Impression  ========  Lexiscan Cardiolite test is negative for myocardial ischemia.    Gated SPECT images revealed normal left ventricular size and contractility with ejection fraction of 73%.        Cardiolite (Tc-99m sestamibi) stress test    CARDIAC CATHETERIZATION  No results found for this or any previous visit.                OTHER:         Assessment & Plan     Principal Problem:    Sepsis  Active Problems:    Anemia    Moderate malnutrition      ]]]]]]]]]]]]]]]]]]  History  ==========  -Sepsis     - Elevated troponin.     -History of atrial fibrillation  Patient has converted and was maintaining sinus rhythm.  EKG 12/27/2023-atrial fibrillation  EKG 2/7/2024-atrial fibrillation right bundle branch block      - shortness of breath fatigue and lightheadedness.  Improved     Echocardiogram-normal with ejection fraction of 60%-7/28/2021  Lexiscan Cardiolite test-mild inferior ischemia-7/28/2021      -Chronic right bundle branch block      cardiac catheterization 04/11/2018 revealed normal left ventricular function normal coronary  arteries and no evidence for pulmonary hypertension was present.     -History of diffusely dilated ascending aorta    Echocardiogram  4/24/2024   Thickened mitral and aortic valves with adequate opening motion.  Moderate mitral tricuspid and aortic and pulmonic regurgitation.  Left atrial enlargement.  Left ventricular size and contractility is normal with ejection fraction of 60%.  Left ventricular peak systolic longitudinal strain is abnormal with GL PS of -14%.  Left ventricular grade 3 diastolic dysfunction is present.  Right atrium right ventricle enlargement is present.  Mild pulmonary hypertension is present.    Echocardiogram showed left atrial and right ventricle enlargement prominent aorta.  Normal left ventricular function with ejection fraction of 60% 04/05/2018.  Stacy scan Cardiolite test is abnormal with significant inferior ischemia and apical hypokinesis on the gated SPECT images 04/05/2018.     - diabetes hypertension LGL leukemia rheumatoid arthritis COPD CKD 3  Patient is on home oxygen.     - history of prostate carcinoma.  Status post radiation     - former smoker     - allergic to penicillin  ==========  Plan  ==========  Sepsis.  Elevated troponin.  Bouncing at the bottom.  Likely due to renal dysfunction and sepsis.    Echocardiogram 4/24/2024-as abov    History of atrial fibrillation  Patient is maintaining sinus rhythm.  Patient is in sinus rhythm with first-degree AV block right bundle branch block-12/15/2021.  Sinus rhythm first-degree AV block right bundle branch block- 6/22/2022  EKG 6/22/2023 sinus bradycardia right bundle branch block  EKG 12/22/2022-sinus rhythm right bundle branch block  EKG 12/27/2023-atrial fibrillation  EKG 2/7/2024-atrial fibrillation right bundle branch block left anterior fascicular block.  Rate is well-controlled.  EKG-atrial fibrillation right bundle branch block-4/26/2024.    Anticoagulation was discussed.  Patient was on low-dose Eliquis.  Consideration may be given for Watchman procedure.     Chronic right bundle branch block-asymptomatic    Renal dysfunction  BUNs/creatinine  35/1.49-4/24/2024  45/1.39-4/25/2024  48/1.31    Hypertension-stable.    Positive occult blood.  Patient to have EGD today.  Have communicated with endoscopy unit.     Shortness of breath fatigue and lightheadedness.-Stable  COPD  Patient is on home oxygen.     Medications were reviewed and updated.     Further plan will depend on patient's progress.     Reviewed and updated-4/26/2024.  [[[[[[[[[[[[[[[[[[[[              Misty Dias MD  04/26/24  06:47 EDT

## 2024-04-26 NOTE — OP NOTE
ESOPHAGOGASTRODUODENOSCOPY Procedure Report    Patient Name:  Praneeth Nam  YOB: 1940    Date of Surgery:  4/26/2024     Preoperative diagnosis:  Dysphagia  Anemia  Heme positive stool    Postoperative diagnosis:  Distal esophageal stricture  Hiatal hernia  Benign gastric polyps  Duodenal diverticulum        Procedure(s):  ESOPHAGOGASTRODUODENOSCOPY WITH NONGUIDED DILATION (#46 Stateless BOUGIE)    Staff:  Surgeon(s):  Gamal Yancey MD      Anesthesia: Monitored Anesthesia Care    Implants:    Nothing was implanted during the procedure    Specimen:        See Below    Estimated blood loss: Minimal     Complications:  None    Description of Procedure:  Informed consent was obtained for the procedure, including sedation.  Risks of perforation, hemorrhage, adverse drug reaction and aspiration were discussed.  The patient was brought into the endoscopy suite. Continuous cardiopulmonary monitoring was performed. The patient was placed in the left lateral decubitus position.  The bite block was inserted into the patient's mouth. After adequate sedation was attained, the Olympus gastroscope was inserted into the patient's mouth and advanced to the second portion of the duodenum without difficulty.  Circumferential examination was performed. A retroflex exam was performed in the patient's stomach.  On completion of the exam, the bowel was decompressed, the scope was removed from the patient, the patient tolerated the procedure well, there were no immediate post-operative complications.     Examination of the esophagus: Benign-appearing distal esophageal stricture noted.  This was associated with a 4 cm sliding hiatal hernia.  A 46 Stateless nonguided bougie was advanced blindly esophagus with no resistance noted and removed.  Second look was normal  Examination of the stomach: A few subcentimeter hyperplastic appearing polyps were seen in the stomach body.  No bleeding lesions  identified.  Examination of the duodenum: Medium sized nonbleeding diverticulum and third duodenum.  Ampulla was not involved and appeared normal.  No bleeding lesions seen    Impression:  EGD shows distal esophageal stricture with hiatal hernia and gastric polyps.  No actively bleeding lesion identified.  Empiric 46 French nonguided bougie dilation performed    Recommendations:  Monitor for postoperative complications  Advance diet  Monitor hemoglobin and transfuse if less than 7  If signs of overt GI bleeding, could consider repeating colonoscopy at some point  Liver enzymes are improving, no further workup at this time  PPI daily  Okay for anticoagulation  GI will be available as needed over the weekend, please call questions      We appreciate the referral    Electronically signed by Gamal Yancey MD, 04/26/24, 1:29 PM EDT.

## 2024-04-26 NOTE — PLAN OF CARE
Bed mobility - Min-A supine to sit and seated scooting.  Transfers - CGA sit to stand with walker.   Ambulation - 50 feet CGA and with rolling walker. Frequent cues needed for proximity to walker.

## 2024-04-26 NOTE — THERAPY TREATMENT NOTE
"Subjective: Pt agreeable to therapeutic plan of care.    Objective:     Bed mobility - Min-A supine to sit and seated scooting.  Transfers - CGA sit to stand with walker.   Ambulation - 50 feet CGA and with rolling walker. Frequent cues needed for proximity to walker.       Vitals: WNL    Pain: 2 FACES   Location: L upper chest.  Intervention for pain: Repositioned, RN notified, Increased Activity, and Therapeutic Presence    Education: Provided education on the importance of mobility in the acute care setting, Verbal/Tactile Cues, Transfer Training, Gait Training, and Energy conservation strategies    Assessment: Praneeth Nam presents with endurance and  functional mobility impairments which indicate the need for skilled intervention. Cues needed for safe walker use. Tolerating session today without incident. Will continue to follow and progress as tolerated.     Plan/Recommendations:   If medically appropriate, Low Intensity Therapy recommended post-acute care - This is recommended as therapy feels this patient would require 2-3 visits per week. OP or HH would be the best option depending on patient's home bound status. Consider, if the patient has other  \"skilled\" needs such as wounds, IV antibiotics, etc. Combined with \"low intensity\" could also equate to a SNF. If patient is medically complex, consider LTAC. Pt requires no DME at discharge, pt has a RW.     Pt desires Home with Home Health at discharge. Pt cooperative; agreeable to therapeutic recommendations and plan of care.         Basic Mobility 6-click:  Rollin = Total, A lot = 2, A little = 3; 4 = None  Supine>Sit:   1 = Total, A lot = 2, A little = 3; 4 = None   Sit>Stand with arms:  1 = Total, A lot = 2, A little = 3; 4 = None  Bed>Chair:   1 = Total, A lot = 2, A little = 3; 4 = None  Ambulate in room:  1 = Total, A lot = 2, A little = 3; 4 = None  3-5 Steps with railin = Total, A lot = 2, A little = 3; 4 = None  Score: " 17    Modified [place]: N/A = No pre-op stroke/TIA    Post-Tx Position: Supine with HOB Elevated, Alarms activated, and Call light and personal items within reach  PPE: gloves

## 2024-04-26 NOTE — PROGRESS NOTES
"NEPHROLOGY PROGRESS NOTE------[facility]    [facility]  [phone]  Shawn Shane MD      Patient Care Team:  Guilherme Jason MD as PCP - General (Family Medicine)  Misty Dias MD as Consulting Physician (Cardiology)  Nora Kenyon, BRIAN, APRN as Nurse Practitioner (Thoracic Surgery)  Sussy Shane MD as Consulting Physician (Nephrology)      Provider:  Shawn Shane MD  Patient Name: Praneeth Nam  :  1940    SUBJECTIVE:    F/U ARF/ELYSSA/CRF/CKD    Breathing better. Weak. No major SOB. No angina. No dysuria.       Medication:  apixaban, 2.5 mg, Oral, Q12H  [Held by provider] atorvastatin, 20 mg, Oral, Daily  cefTRIAXone, 2,000 mg, Intravenous, Q24H  hydroxychloroquine, 400 mg, Oral, Daily  midodrine, 5 mg, Oral, TID AC  multivitamin with minerals, 1 tablet, Oral, Daily  pantoprazole, 40 mg, Oral, Q AM  sodium bicarbonate, 1,300 mg, Oral, TID  sodium chloride, 10 mL, Intravenous, Q12H  [Held by provider] spironolactone, 25 mg, Oral, Daily  tamsulosin, 0.4 mg, Oral, Daily           OBJECTIVE    Vital Sign Min/Max for last 24 hours  Temp  Min: 97.3 °F (36.3 °C)  Max: 98 °F (36.7 °C)   BP  Min: 135/60  Max: 154/77   Pulse  Min: 49  Max: 70   Resp  Min: 13  Max: 21   SpO2  Min: 95 %  Max: 100 %   No data recorded   Weight  Min: 84.7 kg (186 lb 11.7 oz)  Max: 84.7 kg (186 lb 11.7 oz)     Flowsheet Rows      Flowsheet Row First Filed Value   Admission Height 167.6 cm (66\") Documented at 2024 1200   Admission Weight 81.9 kg (180 lb 8.9 oz) Documented at 2024 1200            I/O this shift:  In: 552 [P.O.:480; I.V.:72]  Out: 460 [Urine:460]  I/O last 3 completed shifts:  In: 2363 [P.O.:1720; I.V.:643]  Out: 2575 [Urine:2575]      Physical Exam:  General Appearance: alert, appears stated age and cooperative  Head: normocephalic, without obvious abnormality and atraumatic  Eyes: conjunctivae and sclerae normal " "and no icterus  Neck: supple and +MILD JVD  Lungs: +FINE BIBASILAR CRACKLES (BETTER)  Heart: IRREG IRREG +KATHLEEN  Chest Wall: no abnormalities observed  Abdomen: normal bowel sounds and soft, nontender  Extremities: moves extremities well, +TRACE PRETIBIAL EDEMA BILAT LE, no cyanosis  Skin: no bleeding, bruising, has bilateral chronic skin pigmentation lower extremity.  Neurologic: Alert, and oriented. No focal deficits    Labs:    WBC WBC   Date Value Ref Range Status   04/26/2024 6.47 3.40 - 10.80 10*3/mm3 Final   04/25/2024 7.32 3.40 - 10.80 10*3/mm3 Final   04/24/2024 6.48 3.40 - 10.80 10*3/mm3 Final   04/23/2024 13.06 (H) 3.40 - 10.80 10*3/mm3 Final      HGB Hemoglobin   Date Value Ref Range Status   04/26/2024 8.5 (L) 13.0 - 17.7 g/dL Final   04/25/2024 8.7 (L) 13.0 - 17.7 g/dL Final   04/24/2024 7.6 (L) 13.0 - 17.7 g/dL Final   04/23/2024 8.8 (L) 13.0 - 17.7 g/dL Final      HCT Hematocrit   Date Value Ref Range Status   04/26/2024 26.1 (L) 37.5 - 51.0 % Final   04/25/2024 27.1 (L) 37.5 - 51.0 % Final   04/24/2024 24.7 (L) 37.5 - 51.0 % Final   04/23/2024 28.4 (L) 37.5 - 51.0 % Final      Platelets No results found for: \"LABPLAT\"   MCV MCV   Date Value Ref Range Status   04/26/2024 97.4 (H) 79.0 - 97.0 fL Final   04/25/2024 99.6 (H) 79.0 - 97.0 fL Final   04/24/2024 101.2 (H) 79.0 - 97.0 fL Final   04/23/2024 102.5 (H) 79.0 - 97.0 fL Final          Sodium Sodium   Date Value Ref Range Status   04/26/2024 144 136 - 145 mmol/L Final   04/25/2024 143 136 - 145 mmol/L Final   04/24/2024 141 136 - 145 mmol/L Final   04/23/2024 143 136 - 145 mmol/L Final      Potassium Potassium   Date Value Ref Range Status   04/26/2024 3.8 3.5 - 5.2 mmol/L Final   04/25/2024 4.2 3.5 - 5.2 mmol/L Final   04/24/2024 3.9 3.5 - 5.2 mmol/L Final   04/23/2024 4.1 3.5 - 5.2 mmol/L Final      Chloride Chloride   Date Value Ref Range Status   04/26/2024 107 98 - 107 mmol/L Final   04/25/2024 114 (H) 98 - 107 mmol/L Final   04/24/2024 114 (H) " "98 - 107 mmol/L Final   04/23/2024 113 (H) 98 - 107 mmol/L Final      CO2 CO2   Date Value Ref Range Status   04/26/2024 26.0 22.0 - 29.0 mmol/L Final   04/25/2024 18.0 (L) 22.0 - 29.0 mmol/L Final   04/24/2024 16.0 (L) 22.0 - 29.0 mmol/L Final   04/23/2024 16.0 (L) 22.0 - 29.0 mmol/L Final      BUN BUN   Date Value Ref Range Status   04/26/2024 48 (H) 8 - 23 mg/dL Final   04/25/2024 45 (H) 8 - 23 mg/dL Final   04/24/2024 35 (H) 8 - 23 mg/dL Final   04/23/2024 34 (H) 8 - 23 mg/dL Final      Creatinine Creatinine   Date Value Ref Range Status   04/26/2024 1.31 (H) 0.76 - 1.27 mg/dL Final   04/25/2024 1.39 (H) 0.76 - 1.27 mg/dL Final   04/24/2024 1.49 (H) 0.76 - 1.27 mg/dL Final   04/23/2024 1.53 (H) 0.76 - 1.27 mg/dL Final      Calcium Calcium   Date Value Ref Range Status   04/26/2024 8.9 8.6 - 10.5 mg/dL Final   04/25/2024 8.2 (L) 8.6 - 10.5 mg/dL Final   04/24/2024 7.5 (L) 8.6 - 10.5 mg/dL Final   04/23/2024 8.1 (L) 8.6 - 10.5 mg/dL Final      PO4 No components found for: \"PO4\"   Albumin Albumin   Date Value Ref Range Status   04/26/2024 3.3 (L) 3.5 - 5.2 g/dL Final   04/25/2024 2.6 (L) 3.5 - 5.2 g/dL Final   04/24/2024 2.4 (L) 3.5 - 5.2 g/dL Final   04/23/2024 2.7 (L) 3.5 - 5.2 g/dL Final      Magnesium Magnesium   Date Value Ref Range Status   04/26/2024 1.8 1.6 - 2.4 mg/dL Final   04/25/2024 2.3 1.6 - 2.4 mg/dL Final   04/24/2024 1.7 1.6 - 2.4 mg/dL Final   04/23/2024 1.6 1.6 - 2.4 mg/dL Final      Uric Acid No components found for: \"URIC ACID\"     Imaging Results (Last 72 Hours)       Procedure Component Value Units Date/Time    US Renal Bilateral [[id number]] Collected: 04/25/24 0727     Updated: 04/25/24 0730    Narrative:      US RENAL BILATERAL    Date of Exam: 4/25/2024 5:29 AM EDT    Indication: Kidney failure, acute  ELYSSA.    Comparison: 4/24/2024 CT    Technique: Grayscale and color Doppler ultrasound evaluation of the kidneys and urinary bladder was performed.      Findings:  Right kidney measures 10.8 " cm in length. The left kidney measures 10.7 cm in length. There is no hydronephrosis, nephrolithiasis, or suspicious renal mass.    Limited evaluation of the urinary bladder secondary to recent voiding.      Impression:      Impression:  No hydronephrosis.        Electronically Signed: Daniel Jennings MD    4/25/2024 7:28 AM EDT    Workstation ID: [id number]    CT Chest Without Contrast Diagnostic [[id number]] Collected: 04/24/24 1721     Updated: 04/24/24 1733    Narrative:      CT CHEST WO CONTRAST DIAGNOSTIC, CT ABDOMEN PELVIS WO CONTRAST    Date of Exam: 4/24/2024 4:59 PM EDT    Indication: Chronic lung disease (Ped 0-17y)  Dyspnea, chronic, central airway disease suspected.    Comparison: CT chest 10/6/2023, CT abdomen pelvis 10/19/2015    Technique: Axial CT images were obtained of the chest abdomen and pelvis without contrast administration.  Sagittal and coronal reconstructions were performed.  Automated exposure control and iterative reconstruction methods were used.      Findings:  CT CHEST:  MEDIASTINUM: Shotty mediastinal lymphadenopathy. Aortic size is normal. The heart is enlarged, similar to prior exam. No mass nor pericardial effusion.  CORONARY ARTERIES: There is calcified atherosclerotic disease.  LUNGS: There is peripheral reticular and linear interstitial scarring, similar prior examination. There is basilar compressive atelectasis related to pleural effusions. Previous 9 mm right-sided subpleural pulmonary nodule now measures 4 mm (image 70,   series 5). No new suspicious nodule. There is mild emphysema.  PLEURAL SPACE: Similar small right but new small left pleural effusions.        CT ABDOMEN AND PELVIS:   LIVER:  Unremarkable parenchyma without focal lesion.  BILIARY/GALLBLADDER: There are numerous gallstones. There are no gallbladder inflammatory changes.  SPLEEN:  Unremarkable  PANCREAS:  Unremarkable  ADRENAL:  Unremarkable  KIDNEYS: Mild bilateral renal cortical atrophy with no solid mass  identified. No obstruction.  No calculus identified.  GASTROINTESTINAL/MESENTERY:  No evidence of obstruction nor inflammation.  AORTA/IVC:  Normal caliber.    RETROPERITONEUM/LYMPH NODES:  Unremarkable    REPRODUCTIVE:  Unremarkable  BLADDER:  Unremarkable    OSSEUS STRUCTURES:  Typical for age with no acute process identified.    There is generalized body wall edema. There is minimal likely related mesenteric edema and presacral edema.      Impression:      Impression:  1.Small pleural effusions, new on the left, with generalized body wall edema mild mesenteric edema, and presacral edema.  2.Decrease in size of now 4 mm right lower lobe subpleural pulmonary nodule, previously 9 mm. See below recommendations.  3.Stable subpleural reticular and linear areas of interstitial pulmonary scarring.  4.Other incidental nonemergent findings as detailed above.    The Fleischner society pulmonary nodule recommendations are for the follow-up and management of pulmonary nodules smaller than 8 mm detected incidentally in patients >35 years on non-screening CT. The initial guidelines for the management of solid   nodules were released in 2005 1, and guidelines for the management of subsolid nodules were released in 2013 2. New revised 2017 recommendations for both solid and subsolid have since been released 4.    2017 guidelines  Solid nodules  Solitary nodule size: <6 mm  *low risk patients: no follow-up needed  *high risk patients: optional CT at 12 months  Solitary nodule size: 6-8 mm  *low risk patients: follow-up at 6-12 months, then consider further follow-up at 18-24 months  *high risk patients: initial follow-up CT at 6-12 months and then at 18-24 months if no change  Solitary nodule size: >8 mm  *either low or high risk patients  *consider follow-up CT at 3 months, and/or CT-PET, and/or biopsy  Multiple nodules size: <6 mm  *low risk patients: no routine follow-up  *high risk patients: optional CT at 12 months  Multiple  "nodules size: 6-8 mm  *low risk patients: follow-up at 3-6 months, then consider further follow-up at 18-24 months  *high risk patients: follow-up at 3-6 months, then at 18-24 months if no change  Multiple nodules size: >8 mm  *low risk patients: follow-up at 3-6 months, then consider further follow-up at 18-24 months  *high risk patients: follow-up at 3-6 months, then at 18-24 months if no change  *   Note: newly detected indeterminate nodule in persons 35 years of age or older.  *low risk patients: minimal or absent history of smoking and or other known risk factors  *high risk patients: history of smoking or of other known risk factors (e.g. first degree relative with lung cancer, or exposure to asbestos, radon, uranium)  *if a nodule up to 8 mm is partly solid or is ground glass further follow-up is required after 24 months to exclude possible slow growing adenocarcinoma (RUDY)    Subsolid nodules  Solitary pure ground-glass nodule  *nodule size <6mm  *no CT follow-up required  *nodule size \"e6mm  *follow up CT at 6-12 months, then every 2 years until 5 years  Solitary part-solid nodule  *nodule size <6mm  *no CT follow-up required  *nodule size \"e6mm  *follow-up CT at 3-6 months  *if unchanged, and solid component remains <6mm, then annual follow-up for 5 years  Multiple subsolid nodules  *nodule size <6mm  *follow-up CT at 3-6 months  *consider further follow-up at 2 and 4 years if stable  *nodule size \"e6mm  *follow-up CT at 3-6 months  *subsequent management based on the most suspicious nodule(s)      Electronically Signed: Kp Petersen MD    4/24/2024 5:30 PM EDT    Workstation ID: [id number]    CT Abdomen Pelvis Without Contrast [[id number]] Collected: 04/24/24 1721     Updated: 04/24/24 1733    Narrative:      CT CHEST WO CONTRAST DIAGNOSTIC, CT ABDOMEN PELVIS WO CONTRAST    Date of Exam: 4/24/2024 4:59 PM EDT    Indication: Chronic lung disease (Ped 0-17y)  Dyspnea, chronic, central airway disease " suspected.    Comparison: CT chest 10/6/2023, CT abdomen pelvis 10/19/2015    Technique: Axial CT images were obtained of the chest abdomen and pelvis without contrast administration.  Sagittal and coronal reconstructions were performed.  Automated exposure control and iterative reconstruction methods were used.      Findings:  CT CHEST:  MEDIASTINUM: Shotty mediastinal lymphadenopathy. Aortic size is normal. The heart is enlarged, similar to prior exam. No mass nor pericardial effusion.  CORONARY ARTERIES: There is calcified atherosclerotic disease.  LUNGS: There is peripheral reticular and linear interstitial scarring, similar prior examination. There is basilar compressive atelectasis related to pleural effusions. Previous 9 mm right-sided subpleural pulmonary nodule now measures 4 mm (image 70,   series 5). No new suspicious nodule. There is mild emphysema.  PLEURAL SPACE: Similar small right but new small left pleural effusions.        CT ABDOMEN AND PELVIS:   LIVER:  Unremarkable parenchyma without focal lesion.  BILIARY/GALLBLADDER: There are numerous gallstones. There are no gallbladder inflammatory changes.  SPLEEN:  Unremarkable  PANCREAS:  Unremarkable  ADRENAL:  Unremarkable  KIDNEYS: Mild bilateral renal cortical atrophy with no solid mass identified. No obstruction.  No calculus identified.  GASTROINTESTINAL/MESENTERY:  No evidence of obstruction nor inflammation.  AORTA/IVC:  Normal caliber.    RETROPERITONEUM/LYMPH NODES:  Unremarkable    REPRODUCTIVE:  Unremarkable  BLADDER:  Unremarkable    OSSEUS STRUCTURES:  Typical for age with no acute process identified.    There is generalized body wall edema. There is minimal likely related mesenteric edema and presacral edema.      Impression:      Impression:  1.Small pleural effusions, new on the left, with generalized body wall edema mild mesenteric edema, and presacral edema.  2.Decrease in size of now 4 mm right lower lobe subpleural pulmonary nodule,  previously 9 mm. See below recommendations.  3.Stable subpleural reticular and linear areas of interstitial pulmonary scarring.  4.Other incidental nonemergent findings as detailed above.    The Fleischner society pulmonary nodule recommendations are for the follow-up and management of pulmonary nodules smaller than 8 mm detected incidentally in patients >35 years on non-screening CT. The initial guidelines for the management of solid   nodules were released in 2005 1, and guidelines for the management of subsolid nodules were released in 2013 2. New revised 2017 recommendations for both solid and subsolid have since been released 4.    2017 guidelines  Solid nodules  Solitary nodule size: <6 mm  *low risk patients: no follow-up needed  *high risk patients: optional CT at 12 months  Solitary nodule size: 6-8 mm  *low risk patients: follow-up at 6-12 months, then consider further follow-up at 18-24 months  *high risk patients: initial follow-up CT at 6-12 months and then at 18-24 months if no change  Solitary nodule size: >8 mm  *either low or high risk patients  *consider follow-up CT at 3 months, and/or CT-PET, and/or biopsy  Multiple nodules size: <6 mm  *low risk patients: no routine follow-up  *high risk patients: optional CT at 12 months  Multiple nodules size: 6-8 mm  *low risk patients: follow-up at 3-6 months, then consider further follow-up at 18-24 months  *high risk patients: follow-up at 3-6 months, then at 18-24 months if no change  Multiple nodules size: >8 mm  *low risk patients: follow-up at 3-6 months, then consider further follow-up at 18-24 months  *high risk patients: follow-up at 3-6 months, then at 18-24 months if no change  *   Note: newly detected indeterminate nodule in persons 35 years of age or older.  *low risk patients: minimal or absent history of smoking and or other known risk factors  *high risk patients: history of smoking or of other known risk factors (e.g. first degree relative  "with lung cancer, or exposure to asbestos, radon, uranium)  *if a nodule up to 8 mm is partly solid or is ground glass further follow-up is required after 24 months to exclude possible slow growing adenocarcinoma (RUDY)    Subsolid nodules  Solitary pure ground-glass nodule  *nodule size <6mm  *no CT follow-up required  *nodule size \"e6mm  *follow up CT at 6-12 months, then every 2 years until 5 years  Solitary part-solid nodule  *nodule size <6mm  *no CT follow-up required  *nodule size \"e6mm  *follow-up CT at 3-6 months  *if unchanged, and solid component remains <6mm, then annual follow-up for 5 years  Multiple subsolid nodules  *nodule size <6mm  *follow-up CT at 3-6 months  *consider further follow-up at 2 and 4 years if stable  *nodule size \"e6mm  *follow-up CT at 3-6 months  *subsequent management based on the most suspicious nodule(s)      Electronically Signed: Kp Petersen MD    4/24/2024 5:30 PM EDT    Workstation ID: [id number]    XR Chest 1 View [[id number]] Collected: 04/24/24 0851     Updated: 04/24/24 0854    Narrative:      XR CHEST 1 VW    Date of Exam: 4/24/2024 8:35 AM EDT    Indication: Possible pneumonia    Comparison: 4/23/2024    Findings:  Unchanged enlarged cardiac silhouette. There are persistent small bilateral pleural effusions with adjacent atelectasis. No definite new airspace consolidation. Osseous fractures are unchanged.      Impression:      Impression:  No significant interval change.      Electronically Signed: Daniel Jennings MD    4/24/2024 8:52 AM EDT    Workstation ID: [id number]    US Liver [[id number]] Collected: 04/23/24 1528     Updated: 04/23/24 1533    Narrative:      US LIVER    Date of Exam: 4/23/2024 2:09 PM EDT    Indication: Elevated transaminases.    Comparison: No comparisons available.    Technique: Grayscale and color Doppler ultrasound evaluation of the liver was performed.      Findings:  The liver measures 16.2 cm in length. There is mild increased echogenicity which " can be seen with hepatic steatosis. There are no focal hepatic masses. There is normal directional flow in the main portal vein and hepatic veins. There are incidental   echogenic shadowing gallstones. The gallbladder wall is slightly thickened measuring 3-4 mm. The common bile duct caliber is normal measuring 5 mm. There is no ascites.      Impression:      Impression:    1. Increased hepatic echogenicity which can be seen with hepatic steatosis.  2. Cholelithiasis. There is slight thickening of the gallbladder wall which is nonspecific.        Electronically Signed: Jayy Osborn MD    4/23/2024 3:31 PM EDT    Workstation ID: [id number]            Results for orders placed during the hospital encounter of 04/23/24    XR Chest 1 View    Narrative  XR CHEST 1 VW    Date of Exam: 4/24/2024 8:35 AM EDT    Indication: Possible pneumonia    Comparison: 4/23/2024    Findings:  Unchanged enlarged cardiac silhouette. There are persistent small bilateral pleural effusions with adjacent atelectasis. No definite new airspace consolidation. Osseous fractures are unchanged.    Impression  Impression:  No significant interval change.      Electronically Signed: Daniel Jennings MD  4/24/2024 8:52 AM EDT  Workstation ID: [id number]      Results for orders placed in visit on 09/12/23    SCANNED - IMAGING      Results for orders placed in visit on 06/19/23    SCANNED - IMAGING            ASSESSMENT / PLAN      Sepsis    Anemia    Moderate malnutrition    ARF/ELYSSA/CRF/CKD------Nonoliguric. +ARF/ELYSSA on top of known CRF/CKD   STG 3A. CRF/CKD STG 3A secondary to DGS/HTN NS. +ARF/ELYSSA is secondary to ATN from hypotension/sepsis. No NSAIDs or IV dye. Cautious diuresis.     2. ACIDOSIS-----Metabolic. No elevation in AGAP. +Type 4 RTA. Better post IV NaHCO3 and on po NaHCO3    3. HYPOPHOSPHATEMIA------Replace IV    4. ANEMIA OF CKD-----hemoccult +. S/P IV iron for ANDREA    5. COPD------Oxygen, nebs, pulmonary toilet    6. VOLUME OVERLOAD------Diurese  with IV Lasix and follow    7. HYPOALBUMINEMIA-----IV Albumin to temporize    8. ELEVATED LFTS/TRANSAMINITIS------Follow with diuresis    9. SEPSIS/UTI-----Abx per Intensivist    10. HYPOMAGNESEMIA-------Replaced    11. PAF--------Rate controlled. Eliquis on hold b/c Hemoccult +    12. HYPOTENSION------Better with Midodrine    13. BPH-----On Flomax    14. GERD/PUD PROPHYLAXIS-----On PPI. Benefits outweigh risks despite renal dysfnction    15. DMII WITH RENAL MANIFESTATIONS-----BS ok. Glucometers, SSI          Shawn Shane MD  [facility]  [phone]  04/26/24  06:55 EDT

## 2024-04-26 NOTE — ADDENDUM NOTE
Addendum  created 04/26/24 1417 by Vinod Ledezma MD    Attestation recorded in Intraprocedure, Intraprocedure Attestations filed

## 2024-04-26 NOTE — ANESTHESIA POSTPROCEDURE EVALUATION
Patient: Praneeth Nam    Procedure Summary       Date: 04/26/24 Room / Location: [facility] ENDOSCOPY 1 / [facility] ENDOSCOPY    Anesthesia Start: 1310 Anesthesia Stop: 1335    Procedure: ESOPHAGOGASTRODUODENOSCOPY WITH DILATION (#46 BOUGIE) Diagnosis:       Anemia, unspecified type      (Anemia, unspecified type [D64.9])    Surgeons: Gamal Yancey MD Provider: Vinod Ledezma MD    Anesthesia Type: MAC, general ASA Status: 3            Anesthesia Type: MAC, general    Vitals  Vitals Value Taken Time   /57 04/26/24 1347   Temp     Pulse 60 04/26/24 1348   Resp 24 04/26/24 1347   SpO2 100 % 04/26/24 1348   Vitals shown include unfiled device data.        Post Anesthesia Care and Evaluation    Patient location during evaluation: PACU  Patient participation: complete - patient participated  Level of consciousness: awake  Pain scale: See nurse's notes for pain score.  Pain management: adequate    Airway patency: patent  Anesthetic complications: No anesthetic complications  PONV Status: none  Cardiovascular status: acceptable  Respiratory status: acceptable and spontaneous ventilation  Hydration status: acceptable    Comments: Patient seen and examined postoperatively; vital signs stable; SpO2 greater than or equal to 90%; cardiopulmonary status stable; nausea/vomiting adequately controlled; pain adequately controlled; no apparent anesthesia complications; patient discharged from anesthesia care when discharge criteria were met

## 2024-04-26 NOTE — PROGRESS NOTES
LOS: 3 days   Patient Care Team:  Guilherme Jason MD as PCP - General (Family Medicine)  Misty Dias MD as Consulting Physician (Cardiology)  Nora Kenyon, BRIAN, APRN as Nurse Practitioner (Thoracic Surgery)  Sussy Shane MD as Consulting Physician (Nephrology)    Subjective     Interval History: down getting EGD      Review of Systems   Unable to perform ROS: Other           Objective     Vital Signs  Temp:  [97.4 °F (36.3 °C)-98.3 °F (36.8 °C)] 98.3 °F (36.8 °C)  Heart Rate:  [58-70] 61  Resp:  [13-16] 13  BP: (135-151)/(58-71) 151/69       Results Review:    Lab Results (last 24 hours)       Procedure Component Value Units Date/Time    POC Glucose 4x Daily Before Meals & at Bedtime [[id number]]  (Normal) Collected: 04/26/24 0712    Specimen: Blood Updated: 04/26/24 0713     Glucose 103 mg/dL      Comment: Serial Number: [id number]:  [id number]       Lactic Acid, Plasma [[id number]]  (Normal) Collected: 04/26/24 0546    Specimen: Blood Updated: 04/26/24 0646     Lactate 1.2 mmol/L     Magnesium [[id number]]  (Normal) Collected: 04/26/24 0546    Specimen: Blood Updated: 04/26/24 0645     Magnesium 1.8 mg/dL     Comprehensive Metabolic Panel [[id number]]  (Abnormal) Collected: 04/26/24 0546    Specimen: Blood Updated: 04/26/24 0645     Glucose 106 mg/dL      BUN 48 mg/dL      Creatinine 1.31 mg/dL      Sodium 144 mmol/L      Potassium 3.8 mmol/L      Chloride 107 mmol/L      CO2 26.0 mmol/L      Calcium 8.9 mg/dL      Total Protein 5.7 g/dL      Albumin 3.3 g/dL      ALT (SGPT) 100 U/L      AST (SGOT) 87 U/L      Alkaline Phosphatase 142 U/L      Total Bilirubin 0.8 mg/dL      Globulin 2.4 gm/dL      A/G Ratio 1.4 g/dL      BUN/Creatinine Ratio 36.6     Anion Gap 11.0 mmol/L      eGFR 54.0 mL/min/1.73     Narrative:      GFR Normal >60  Chronic Kidney Disease <60  Kidney Failure <15    The GFR formula is only valid for adults with stable renal function between ages 18 and 70.    Phosphorus  [[id number]]  (Abnormal) Collected: 04/26/24 0546    Specimen: Blood Updated: 04/26/24 0645     Phosphorus 2.2 mg/dL     Calcium, Ionized [[id number]]  (Normal) Collected: 04/26/24 0546    Specimen: Blood Updated: 04/26/24 0631     Ionized Calcium 1.20 mmol/L     CBC & Differential [[id number]]  (Abnormal) Collected: 04/26/24 0546    Specimen: Blood Updated: 04/26/24 0626    Narrative:      The following orders were created for panel order CBC & Differential.  Procedure                               Abnormality         Status                     ---------                               -----------         ------                     CBC Auto Differential[[id number]]        Abnormal            Final result               Scan Slide[[id number]]                                                                    Please view results for these tests on the individual orders.    CBC Auto Differential [[id number]]  (Abnormal) Collected: 04/26/24 0546    Specimen: Blood Updated: 04/26/24 0626     WBC 6.47 10*3/mm3      RBC 2.68 10*6/mm3      Hemoglobin 8.5 g/dL      Hematocrit 26.1 %      MCV 97.4 fL      MCH 31.7 pg      MCHC 32.6 g/dL      RDW 15.0 %      RDW-SD 53.7 fl      MPV 13.0 fL      Platelets 161 10*3/mm3      Neutrophil % 77.1 %      Lymphocyte % 13.6 %      Monocyte % 7.6 %      Eosinophil % 0.9 %      Basophil % 0.2 %      Immature Grans % 0.6 %      Neutrophils, Absolute 4.99 10*3/mm3      Lymphocytes, Absolute 0.88 10*3/mm3      Monocytes, Absolute 0.49 10*3/mm3      Eosinophils, Absolute 0.06 10*3/mm3      Basophils, Absolute 0.01 10*3/mm3      Immature Grans, Absolute 0.04 10*3/mm3      nRBC 0.0 /100 WBC     AFP Tumor Marker [[id number]]  (Normal) Collected: 04/25/24 1728    Specimen: Blood Updated: 04/26/24 0050     ALPHA-FETOPROTEIN <2 ng/mL     Narrative:      Alpha Fetoprotein Tumor Marker Reference Range:    0.0-8.3 ng/mL    Note: Normal values apply only to males and nonpregnant females. These results are not  interpretable for pregnant females.    Testing Method: [employer] Electrochemiluminescence Immunoassay(ECLIA)  Values obtained with different assay methods or kits cannot be used interchangeably.    Alpha - 1 - Antitrypsin [[id number]]  (Abnormal) Collected: 04/25/24 0328    Specimen: Blood Updated: 04/26/24 0023     ALPHA -1 ANTITRYPSIN 205 mg/dL     Ceruloplasmin [[id number]]  (Normal) Collected: 04/25/24 0328    Specimen: Blood Updated: 04/26/24 0023     Ceruloplasmin 22 mg/dL     Gamma GT [[id number]]  (Abnormal) Collected: 04/25/24 0328    Specimen: Blood Updated: 04/26/24 0022      U/L     Hepatitis Panel, Acute [[id number]]  (Normal) Collected: 04/25/24 1728    Specimen: Blood Updated: 04/25/24 1805     Hepatitis B Surface Ag Non-Reactive     Hep A IgM Non-Reactive     Hep B C IgM Non-Reactive     Hepatitis C Ab Non-Reactive    Narrative:      Results may be falsely decreased if patient taking Biotin.     POC Glucose 4x Daily Before Meals & at Bedtime [[id number]]  (Normal) Collected: 04/25/24 1731    Specimen: Blood Updated: 04/25/24 1732     Glucose 95 mg/dL      Comment: Serial Number: [id number]:  [id number]       Anti-microsomal Antibody [[id number]] Collected: 04/25/24 1728    Specimen: Blood Updated: 04/25/24 1731    Anti-Smooth Muscle Antibody Titer [[id number]] Collected: 04/25/24 1728    Specimen: Blood Updated: 04/25/24 1731    Mitochondrial Antibodies, M2 [[id number]] Collected: 04/25/24 1728    Specimen: Blood Updated: 04/25/24 1731    NAIMA [[id number]] Collected: 04/25/24 1728    Specimen: Blood Updated: 04/25/24 1731    Ferritin [[id number]]  (Abnormal) Collected: 04/25/24 0328    Specimen: Blood Updated: 04/25/24 1712     Ferritin 542.30 ng/mL     Narrative:      Results may be falsely decreased if patient taking Biotin.      Iron [[id number]]  (Abnormal) Collected: 04/25/24 0328    Specimen: Blood Updated: 04/25/24 1707     Iron 202 mcg/dL     Blood Culture - Blood, Hand, Left  [[id number]]  (Normal) Collected: 04/23/24 1245    Specimen: Blood from Hand, Left Updated: 04/25/24 1301     Blood Culture No growth at 2 days    Blood Culture - Blood, Arm, Right [[id number]]  (Normal) Collected: 04/23/24 1235    Specimen: Blood from Arm, Right Updated: 04/25/24 1245     Blood Culture No growth at 2 days    Narrative:      Less than seven (7) mL's of blood was collected.  Insufficient quantity may yield false negative results.    POC Glucose 4x Daily Before Meals & at Bedtime [[id number]]  (Abnormal) Collected: 04/25/24 1145    Specimen: Blood Updated: 04/25/24 1148     Glucose 106 mg/dL      Comment: Serial Number: [id number]:  [id number]       Respiratory Culture - Sputum, Cough [[id number]] Collected: 04/23/24 2204    Specimen: Sputum from Cough Updated: 04/25/24 1048     Respiratory Culture Rare Normal respiratory nicolas. No S. aureus or Pseudomonas aeruginosa detected. Final report.     Gram Stain Many (4+) WBCs per low power field      Rare (1+) Epithelial cells per low power field      Rare (1+) Mixed bacterial morphotypes seen on Gram Stain             Imaging Results (Last 24 Hours)       ** No results found for the last 24 hours. **                 I reviewed the patient's new clinical results.    Medication Review:   Scheduled Meds:apixaban, 2.5 mg, Oral, Q12H  [Held by provider] atorvastatin, 20 mg, Oral, Daily  cefTRIAXone, 2,000 mg, Intravenous, Q24H  furosemide, 20 mg, Intravenous, Q8H  hydroxychloroquine, 400 mg, Oral, Daily  midodrine, 5 mg, Oral, TID AC  multivitamin with minerals, 1 tablet, Oral, Daily  pantoprazole, 40 mg, Oral, Q AM  potassium phosphate, 15 mmol, Intravenous, Q3H  sodium bicarbonate, 1,300 mg, Oral, TID  sodium chloride, 10 mL, Intravenous, Q12H  [Held by provider] spironolactone, 25 mg, Oral, Daily  tamsulosin, 0.4 mg, Oral, Daily      Continuous Infusions:   PRN Meds:.  [Held by provider] acetaminophen **OR** [Held by provider] acetaminophen     senna-docusate sodium **AND** polyethylene glycol **AND** bisacodyl **AND** bisacodyl    dextrose    dextrose    glucagon (human recombinant)    ipratropium-albuterol    nitroglycerin    ondansetron ODT **OR** ondansetron    sodium chloride    sodium chloride     Assessment & Plan       Sepsis    Anemia    Moderate malnutrition    Septic shock  Leukocytosis with bandemia, 19  -Blood culture no growth thus far, respiratory culture negative, urinalysis was completed but no culture obtained  -Etiology uncertain, CT chest\abdomen\pelvis - unremarkable     Elevated troponin, possible non-STEMI, elevated proBNP 16,786 -cardiology following, repeating echocardiogram     Acute on chronic kidney disease-appreciate nephrology input.  Started on sodium bicarb for acidosis.  Avoid NSAIDs and nephrotoxic's medication.  Avoid hypotension,  renal ultrasound ok     Hypotension-off Levophed, started on midodrine, improved     Iron deficiency anemia - received iron replacement 4/24, hemoglobin 7.6  - Hemoccult stool is positive   - GI following and performing EGD now     Paroxysmal atrial fibrillation - anticoagulation - eliquis. (On hold secondary to EGD and GI bleed).  cardiology following considering Watchman     COPD-okay to use home trilogy machine, will need 6-minute walk prior to discharge-oxygenating well on room air however patient states he wears 4 to 6 L of continuous oxygen at home     Elevated LFTs-US liver shows hepatic steatosis hold statin and monitor LFTs     Incidental finding of cholelithiasis-patient denies right upper quadrant pain will consider HIDA scan if symptomatic     Dyslipidemia, statin on hold due to evaluated LFTs  Diabetes mellitus type 2-A1c stable  History of chronic ITP/platelets stable, continue to monitor  History of leukemia\prostate cancer-in remission  History 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

## 2024-04-26 NOTE — SIGNIFICANT NOTE
04/26/24 1254   OTHER   Discipline physical therapist   Rehab Time/Intention   Session Not Performed patient unavailable for treatment  (off the floor for EGD. Will f/u as time allows.)   Therapy Assessment/Plan (PT)   Criteria for Skilled Interventions Met (PT) yes;skilled treatment is necessary   Recommendation   PT - Next Appointment 04/27/24

## 2024-04-27 LAB
ALBUMIN SERPL-MCNC: 3.1 G/DL (ref 3.5–5.2)
ALBUMIN/GLOB SERPL: 1.3 G/DL
ALP SERPL-CCNC: 153 U/L (ref 39–117)
ALT SERPL W P-5'-P-CCNC: 73 U/L (ref 1–41)
ANION GAP SERPL CALCULATED.3IONS-SCNC: 11 MMOL/L (ref 5–15)
ANISOCYTOSIS BLD QL: NORMAL
AST SERPL-CCNC: 50 U/L (ref 1–40)
BASOPHILS # BLD AUTO: 0.02 10*3/MM3 (ref 0–0.2)
BASOPHILS NFR BLD AUTO: 0.3 % (ref 0–1.5)
BILIRUB SERPL-MCNC: 0.9 MG/DL (ref 0–1.2)
BUN SERPL-MCNC: 42 MG/DL (ref 8–23)
BUN/CREAT SERPL: 35 (ref 7–25)
CA-I SERPL ISE-MCNC: 1.18 MMOL/L (ref 1.2–1.3)
CALCIUM SPEC-SCNC: 9 MG/DL (ref 8.6–10.5)
CHLORIDE SERPL-SCNC: 106 MMOL/L (ref 98–107)
CO2 SERPL-SCNC: 29 MMOL/L (ref 22–29)
CREAT SERPL-MCNC: 1.2 MG/DL (ref 0.76–1.27)
DEPRECATED RDW RBC AUTO: 54.4 FL (ref 37–54)
EGFRCR SERPLBLD CKD-EPI 2021: 60 ML/MIN/1.73
EOSINOPHIL # BLD AUTO: 0.01 10*3/MM3 (ref 0–0.4)
EOSINOPHIL NFR BLD AUTO: 0.1 % (ref 0.3–6.2)
ERYTHROCYTE [DISTWIDTH] IN BLOOD BY AUTOMATED COUNT: 15.1 % (ref 12.3–15.4)
GEN 5 2HR TROPONIN T REFLEX: 125 NG/L
GIANT PLATELETS: NORMAL
GLOBULIN UR ELPH-MCNC: 2.4 GM/DL
GLUCOSE BLDC GLUCOMTR-MCNC: 112 MG/DL (ref 70–105)
GLUCOSE BLDC GLUCOMTR-MCNC: 122 MG/DL (ref 70–105)
GLUCOSE BLDC GLUCOMTR-MCNC: 148 MG/DL (ref 70–105)
GLUCOSE BLDC GLUCOMTR-MCNC: 155 MG/DL (ref 70–105)
GLUCOSE SERPL-MCNC: 121 MG/DL (ref 65–99)
HCT VFR BLD AUTO: 28.3 % (ref 37.5–51)
HEMOCCULT STL QL IA: POSITIVE
HGB BLD-MCNC: 9.1 G/DL (ref 13–17.7)
IMM GRANULOCYTES # BLD AUTO: 0.02 10*3/MM3 (ref 0–0.05)
IMM GRANULOCYTES NFR BLD AUTO: 0.3 % (ref 0–0.5)
LYMPHOCYTES # BLD AUTO: 0.89 10*3/MM3 (ref 0.7–3.1)
LYMPHOCYTES NFR BLD AUTO: 12.5 % (ref 19.6–45.3)
MAGNESIUM SERPL-MCNC: 2.3 MG/DL (ref 1.6–2.4)
MCH RBC QN AUTO: 31.6 PG (ref 26.6–33)
MCHC RBC AUTO-ENTMCNC: 32.2 G/DL (ref 31.5–35.7)
MCV RBC AUTO: 98.3 FL (ref 79–97)
MONOCYTES # BLD AUTO: 0.53 10*3/MM3 (ref 0.1–0.9)
MONOCYTES NFR BLD AUTO: 7.4 % (ref 5–12)
NEUTROPHILS NFR BLD AUTO: 5.65 10*3/MM3 (ref 1.7–7)
NEUTROPHILS NFR BLD AUTO: 79.4 % (ref 42.7–76)
NRBC BLD AUTO-RTO: 0 /100 WBC (ref 0–0.2)
PHOSPHATE SERPL-MCNC: 3 MG/DL (ref 2.5–4.5)
PLATELET # BLD AUTO: 193 10*3/MM3 (ref 140–450)
PMV BLD AUTO: 13.3 FL (ref 6–12)
POTASSIUM SERPL-SCNC: 3.8 MMOL/L (ref 3.5–5.2)
PROT SERPL-MCNC: 5.5 G/DL (ref 6–8.5)
QT INTERVAL: 512 MS
QT INTERVAL: 524 MS
QTC INTERVAL: 515 MS
QTC INTERVAL: 527 MS
RBC # BLD AUTO: 2.88 10*6/MM3 (ref 4.14–5.8)
SODIUM SERPL-SCNC: 146 MMOL/L (ref 136–145)
TROPONIN T DELTA: 17 NG/L
TROPONIN T SERPL HS-MCNC: 108 NG/L
WBC MORPH BLD: NORMAL
WBC NRBC COR # BLD AUTO: 7.12 10*3/MM3 (ref 3.4–10.8)

## 2024-04-27 PROCEDURE — 85007 BL SMEAR W/DIFF WBC COUNT: CPT | Performed by: NURSE PRACTITIONER

## 2024-04-27 PROCEDURE — 84100 ASSAY OF PHOSPHORUS: CPT | Performed by: INTERNAL MEDICINE

## 2024-04-27 PROCEDURE — 84484 ASSAY OF TROPONIN QUANT: CPT | Performed by: INTERNAL MEDICINE

## 2024-04-27 PROCEDURE — 97116 GAIT TRAINING THERAPY: CPT

## 2024-04-27 PROCEDURE — 25010000002 CALCIUM GLUCONATE-NACL 1-0.675 GM/50ML-% SOLUTION: Performed by: INTERNAL MEDICINE

## 2024-04-27 PROCEDURE — 85025 COMPLETE CBC W/AUTO DIFF WBC: CPT | Performed by: NURSE PRACTITIONER

## 2024-04-27 PROCEDURE — 82948 REAGENT STRIP/BLOOD GLUCOSE: CPT | Performed by: NURSE PRACTITIONER

## 2024-04-27 PROCEDURE — 82948 REAGENT STRIP/BLOOD GLUCOSE: CPT

## 2024-04-27 PROCEDURE — 83735 ASSAY OF MAGNESIUM: CPT | Performed by: NURSE PRACTITIONER

## 2024-04-27 PROCEDURE — 99232 SBSQ HOSP IP/OBS MODERATE 35: CPT | Performed by: INTERNAL MEDICINE

## 2024-04-27 PROCEDURE — 82274 ASSAY TEST FOR BLOOD FECAL: CPT

## 2024-04-27 PROCEDURE — 25010000002 CEFTRIAXONE PER 250 MG: Performed by: NURSE PRACTITIONER

## 2024-04-27 PROCEDURE — 82330 ASSAY OF CALCIUM: CPT | Performed by: INTERNAL MEDICINE

## 2024-04-27 PROCEDURE — 93005 ELECTROCARDIOGRAM TRACING: CPT | Performed by: INTERNAL MEDICINE

## 2024-04-27 PROCEDURE — 97535 SELF CARE MNGMENT TRAINING: CPT

## 2024-04-27 PROCEDURE — 80053 COMPREHEN METABOLIC PANEL: CPT | Performed by: NURSE PRACTITIONER

## 2024-04-27 PROCEDURE — 93010 ELECTROCARDIOGRAM REPORT: CPT | Performed by: INTERNAL MEDICINE

## 2024-04-27 RX ORDER — CALCIUM GLUCONATE 20 MG/ML
1000 INJECTION, SOLUTION INTRAVENOUS ONCE
Status: COMPLETED | OUTPATIENT
Start: 2024-04-27 | End: 2024-04-27

## 2024-04-27 RX ORDER — BUMETANIDE 1 MG/1
1 TABLET ORAL DAILY
Status: DISCONTINUED | OUTPATIENT
Start: 2024-04-27 | End: 2024-04-28

## 2024-04-27 RX ADMIN — BUMETANIDE 1 MG: 1 TABLET ORAL at 08:39

## 2024-04-27 RX ADMIN — Medication 10 ML: at 07:34

## 2024-04-27 RX ADMIN — SODIUM BICARBONATE 1300 MG: 650 TABLET ORAL at 16:19

## 2024-04-27 RX ADMIN — TAMSULOSIN HYDROCHLORIDE 0.4 MG: 0.4 CAPSULE ORAL at 07:33

## 2024-04-27 RX ADMIN — APIXABAN 2.5 MG: 2.5 TABLET, FILM COATED ORAL at 21:15

## 2024-04-27 RX ADMIN — CEFTRIAXONE 2000 MG: 2 INJECTION, POWDER, FOR SOLUTION INTRAMUSCULAR; INTRAVENOUS at 07:34

## 2024-04-27 RX ADMIN — SODIUM BICARBONATE 1300 MG: 650 TABLET ORAL at 07:33

## 2024-04-27 RX ADMIN — PANTOPRAZOLE SODIUM 40 MG: 40 TABLET, DELAYED RELEASE ORAL at 05:51

## 2024-04-27 RX ADMIN — APIXABAN 2.5 MG: 2.5 TABLET, FILM COATED ORAL at 07:33

## 2024-04-27 RX ADMIN — Medication 1 TABLET: at 07:33

## 2024-04-27 RX ADMIN — MIDODRINE HYDROCHLORIDE 5 MG: 5 TABLET ORAL at 16:19

## 2024-04-27 RX ADMIN — MIDODRINE HYDROCHLORIDE 5 MG: 5 TABLET ORAL at 11:16

## 2024-04-27 RX ADMIN — SODIUM BICARBONATE 1300 MG: 650 TABLET ORAL at 21:15

## 2024-04-27 RX ADMIN — CALCIUM GLUCONATE 1000 MG: 20 INJECTION, SOLUTION INTRAVENOUS at 08:39

## 2024-04-27 RX ADMIN — HYDROXYCHLOROQUINE SULFATE 400 MG: 200 TABLET ORAL at 07:33

## 2024-04-27 RX ADMIN — Medication 10 ML: at 21:15

## 2024-04-27 NOTE — PLAN OF CARE
Problem: Adult Inpatient Plan of Care  Goal: Absence of Hospital-Acquired Illness or Injury  Intervention: Prevent Infection  Recent Flowsheet Documentation  Taken 4/27/2024 1600 by Shabbir Cunningham RN  Infection Prevention:   hand hygiene promoted   personal protective equipment utilized   rest/sleep promoted   single patient room provided  Taken 4/27/2024 1400 by Shabbir Cunningham RN  Infection Prevention:   hand hygiene promoted   personal protective equipment utilized   rest/sleep promoted   single patient room provided  Taken 4/27/2024 1328 by Shabbir Cunningham RN  Infection Prevention:   hand hygiene promoted   personal protective equipment utilized   rest/sleep promoted   single patient room provided  Taken 4/27/2024 1210 by Shabbir Cunningham RN  Infection Prevention:   hand hygiene promoted   personal protective equipment utilized   rest/sleep promoted   single patient room provided  Taken 4/27/2024 1000 by Shabbir Cunningham RN  Infection Prevention:   hand hygiene promoted   personal protective equipment utilized   rest/sleep promoted   single patient room provided  Taken 4/27/2024 0800 by Shabbir Cunningham RN  Infection Prevention:   hand hygiene promoted   personal protective equipment utilized   rest/sleep promoted   single patient room provided     Goal Outcome Evaluation:  Plan of Care Reviewed With: patient        Progress: improving

## 2024-04-27 NOTE — PROGRESS NOTES
"NEPHROLOGY PROGRESS NOTE------[facility]    [facility]  [phone]  Shawn Shane MD      Patient Care Team:  Guilherme Jason MD as PCP - General (Family Medicine)  Misty Dias MD as Consulting Physician (Cardiology)  Nora Kenyon, BRIAN, APRN as Nurse Practitioner (Thoracic Surgery)  Sussy Shane MD as Consulting Physician (Nephrology)      Provider:  Shanw Shane MD  Patient Name: Praneeth Nam  :  1940    SUBJECTIVE:    F/U ARF/ELYSSA/CRF/CKD    Some nonspecific left upper chest wall pain. No SOB or angina. No dysuria. No palpitations.       Medication:  apixaban, 2.5 mg, Oral, Q12H  [Held by provider] atorvastatin, 20 mg, Oral, Daily  cefTRIAXone, 2,000 mg, Intravenous, Q24H  hydroxychloroquine, 400 mg, Oral, Daily  midodrine, 5 mg, Oral, TID AC  multivitamin with minerals, 1 tablet, Oral, Daily  pantoprazole, 40 mg, Oral, Q AM  sodium bicarbonate, 1,300 mg, Oral, TID  sodium chloride, 10 mL, Intravenous, Q12H  [Held by provider] spironolactone, 25 mg, Oral, Daily  tamsulosin, 0.4 mg, Oral, Daily           OBJECTIVE    Vital Sign Min/Max for last 24 hours  Temp  Min: 97.6 °F (36.4 °C)  Max: 99.1 °F (37.3 °C)   BP  Min: 115/58  Max: 156/75   Pulse  Min: 56  Max: 74   Resp  Min: 16  Max: 25   SpO2  Min: 84 %  Max: 99 %   No data recorded   Weight  Min: 85.1 kg (187 lb 9.8 oz)  Max: 85.1 kg (187 lb 9.8 oz)     Flowsheet Rows      Flowsheet Row First Filed Value   Admission Height 167.6 cm (66\") Documented at 2024 1200   Admission Weight 81.9 kg (180 lb 8.9 oz) Documented at 2024 1200            I/O this shift:  In: 240 [P.O.:240]  Out: 250 [Urine:250]  I/O last 3 completed shifts:  In: 1692 [P.O.:720; I.V.:972]  Out: 3410 [Urine:3410]      Physical Exam:  General Appearance: alert, appears stated age and cooperative  Head: normocephalic, without obvious abnormality and atraumatic  Eyes: conjunctivae " "and sclerae normal and no icterus  Neck: supple and NO GROSS JVD NOW  Lungs: +FINE BIBASILAR CRACKLES (BETTER)  Heart: IRREG IRREG +KATHLEEN  Chest Wall: no abnormalities observed  Abdomen: normal bowel sounds and soft, nontender  Extremities: moves extremities well, +TRACE PRETIBIAL EDEMA BILAT LE, no cyanosis  Skin: no bleeding, bruising, has bilateral chronic skin pigmentation lower extremity.  Neurologic: Alert, and oriented. No focal deficits    Labs:    WBC WBC   Date Value Ref Range Status   04/27/2024 7.12 3.40 - 10.80 10*3/mm3 Final   04/26/2024 6.47 3.40 - 10.80 10*3/mm3 Final   04/25/2024 7.32 3.40 - 10.80 10*3/mm3 Final      HGB Hemoglobin   Date Value Ref Range Status   04/27/2024 9.1 (L) 13.0 - 17.7 g/dL Final   04/26/2024 8.5 (L) 13.0 - 17.7 g/dL Final   04/25/2024 8.7 (L) 13.0 - 17.7 g/dL Final      HCT Hematocrit   Date Value Ref Range Status   04/27/2024 28.3 (L) 37.5 - 51.0 % Final   04/26/2024 26.1 (L) 37.5 - 51.0 % Final   04/25/2024 27.1 (L) 37.5 - 51.0 % Final      Platelets No results found for: \"LABPLAT\"   MCV MCV   Date Value Ref Range Status   04/27/2024 98.3 (H) 79.0 - 97.0 fL Final   04/26/2024 97.4 (H) 79.0 - 97.0 fL Final   04/25/2024 99.6 (H) 79.0 - 97.0 fL Final          Sodium Sodium   Date Value Ref Range Status   04/27/2024 146 (H) 136 - 145 mmol/L Final   04/26/2024 144 136 - 145 mmol/L Final   04/25/2024 143 136 - 145 mmol/L Final      Potassium Potassium   Date Value Ref Range Status   04/27/2024 3.8 3.5 - 5.2 mmol/L Final   04/26/2024 3.8 3.5 - 5.2 mmol/L Final   04/25/2024 4.2 3.5 - 5.2 mmol/L Final      Chloride Chloride   Date Value Ref Range Status   04/27/2024 106 98 - 107 mmol/L Final   04/26/2024 107 98 - 107 mmol/L Final   04/25/2024 114 (H) 98 - 107 mmol/L Final      CO2 CO2   Date Value Ref Range Status   04/27/2024 29.0 22.0 - 29.0 mmol/L Final   04/26/2024 26.0 22.0 - 29.0 mmol/L Final   04/25/2024 18.0 (L) 22.0 - 29.0 mmol/L Final      BUN BUN   Date Value Ref Range " "Status   04/27/2024 42 (H) 8 - 23 mg/dL Final   04/26/2024 48 (H) 8 - 23 mg/dL Final   04/25/2024 45 (H) 8 - 23 mg/dL Final      Creatinine Creatinine   Date Value Ref Range Status   04/27/2024 1.20 0.76 - 1.27 mg/dL Final   04/26/2024 1.31 (H) 0.76 - 1.27 mg/dL Final   04/25/2024 1.39 (H) 0.76 - 1.27 mg/dL Final      Calcium Calcium   Date Value Ref Range Status   04/27/2024 9.0 8.6 - 10.5 mg/dL Final   04/26/2024 8.9 8.6 - 10.5 mg/dL Final   04/25/2024 8.2 (L) 8.6 - 10.5 mg/dL Final      PO4 No components found for: \"PO4\"   Albumin Albumin   Date Value Ref Range Status   04/27/2024 3.1 (L) 3.5 - 5.2 g/dL Final   04/26/2024 3.3 (L) 3.5 - 5.2 g/dL Final   04/25/2024 2.6 (L) 3.5 - 5.2 g/dL Final      Magnesium Magnesium   Date Value Ref Range Status   04/27/2024 2.3 1.6 - 2.4 mg/dL Final   04/26/2024 1.8 1.6 - 2.4 mg/dL Final   04/25/2024 2.3 1.6 - 2.4 mg/dL Final      Uric Acid No components found for: \"URIC ACID\"     Imaging Results (Last 72 Hours)       Procedure Component Value Units Date/Time    US Renal Bilateral [[id number]] Collected: 04/25/24 0727     Updated: 04/25/24 0730    Narrative:      US RENAL BILATERAL    Date of Exam: 4/25/2024 5:29 AM EDT    Indication: Kidney failure, acute  ELYSSA.    Comparison: 4/24/2024 CT    Technique: Grayscale and color Doppler ultrasound evaluation of the kidneys and urinary bladder was performed.      Findings:  Right kidney measures 10.8 cm in length. The left kidney measures 10.7 cm in length. There is no hydronephrosis, nephrolithiasis, or suspicious renal mass.    Limited evaluation of the urinary bladder secondary to recent voiding.      Impression:      Impression:  No hydronephrosis.        Electronically Signed: Daniel Jennings MD    4/25/2024 7:28 AM EDT    Workstation ID: [id number]    CT Chest Without Contrast Diagnostic [[id number]] Collected: 04/24/24 1721     Updated: 04/24/24 1733    Narrative:      CT CHEST WO CONTRAST DIAGNOSTIC, CT ABDOMEN PELVIS WO " CONTRAST    Date of Exam: 4/24/2024 4:59 PM EDT    Indication: Chronic lung disease (Ped 0-17y)  Dyspnea, chronic, central airway disease suspected.    Comparison: CT chest 10/6/2023, CT abdomen pelvis 10/19/2015    Technique: Axial CT images were obtained of the chest abdomen and pelvis without contrast administration.  Sagittal and coronal reconstructions were performed.  Automated exposure control and iterative reconstruction methods were used.      Findings:  CT CHEST:  MEDIASTINUM: Shotty mediastinal lymphadenopathy. Aortic size is normal. The heart is enlarged, similar to prior exam. No mass nor pericardial effusion.  CORONARY ARTERIES: There is calcified atherosclerotic disease.  LUNGS: There is peripheral reticular and linear interstitial scarring, similar prior examination. There is basilar compressive atelectasis related to pleural effusions. Previous 9 mm right-sided subpleural pulmonary nodule now measures 4 mm (image 70,   series 5). No new suspicious nodule. There is mild emphysema.  PLEURAL SPACE: Similar small right but new small left pleural effusions.        CT ABDOMEN AND PELVIS:   LIVER:  Unremarkable parenchyma without focal lesion.  BILIARY/GALLBLADDER: There are numerous gallstones. There are no gallbladder inflammatory changes.  SPLEEN:  Unremarkable  PANCREAS:  Unremarkable  ADRENAL:  Unremarkable  KIDNEYS: Mild bilateral renal cortical atrophy with no solid mass identified. No obstruction.  No calculus identified.  GASTROINTESTINAL/MESENTERY:  No evidence of obstruction nor inflammation.  AORTA/IVC:  Normal caliber.    RETROPERITONEUM/LYMPH NODES:  Unremarkable    REPRODUCTIVE:  Unremarkable  BLADDER:  Unremarkable    OSSEUS STRUCTURES:  Typical for age with no acute process identified.    There is generalized body wall edema. There is minimal likely related mesenteric edema and presacral edema.      Impression:      Impression:  1.Small pleural effusions, new on the left, with generalized  body wall edema mild mesenteric edema, and presacral edema.  2.Decrease in size of now 4 mm right lower lobe subpleural pulmonary nodule, previously 9 mm. See below recommendations.  3.Stable subpleural reticular and linear areas of interstitial pulmonary scarring.  4.Other incidental nonemergent findings as detailed above.    The Fleischner society pulmonary nodule recommendations are for the follow-up and management of pulmonary nodules smaller than 8 mm detected incidentally in patients >35 years on non-screening CT. The initial guidelines for the management of solid   nodules were released in 2005 1, and guidelines for the management of subsolid nodules were released in 2013 2. New revised 2017 recommendations for both solid and subsolid have since been released 4.    2017 guidelines  Solid nodules  Solitary nodule size: <6 mm  *low risk patients: no follow-up needed  *high risk patients: optional CT at 12 months  Solitary nodule size: 6-8 mm  *low risk patients: follow-up at 6-12 months, then consider further follow-up at 18-24 months  *high risk patients: initial follow-up CT at 6-12 months and then at 18-24 months if no change  Solitary nodule size: >8 mm  *either low or high risk patients  *consider follow-up CT at 3 months, and/or CT-PET, and/or biopsy  Multiple nodules size: <6 mm  *low risk patients: no routine follow-up  *high risk patients: optional CT at 12 months  Multiple nodules size: 6-8 mm  *low risk patients: follow-up at 3-6 months, then consider further follow-up at 18-24 months  *high risk patients: follow-up at 3-6 months, then at 18-24 months if no change  Multiple nodules size: >8 mm  *low risk patients: follow-up at 3-6 months, then consider further follow-up at 18-24 months  *high risk patients: follow-up at 3-6 months, then at 18-24 months if no change  *   Note: newly detected indeterminate nodule in persons 35 years of age or older.  *low risk patients: minimal or absent history of  "smoking and or other known risk factors  *high risk patients: history of smoking or of other known risk factors (e.g. first degree relative with lung cancer, or exposure to asbestos, radon, uranium)  *if a nodule up to 8 mm is partly solid or is ground glass further follow-up is required after 24 months to exclude possible slow growing adenocarcinoma (RUDY)    Subsolid nodules  Solitary pure ground-glass nodule  *nodule size <6mm  *no CT follow-up required  *nodule size \"e6mm  *follow up CT at 6-12 months, then every 2 years until 5 years  Solitary part-solid nodule  *nodule size <6mm  *no CT follow-up required  *nodule size \"e6mm  *follow-up CT at 3-6 months  *if unchanged, and solid component remains <6mm, then annual follow-up for 5 years  Multiple subsolid nodules  *nodule size <6mm  *follow-up CT at 3-6 months  *consider further follow-up at 2 and 4 years if stable  *nodule size \"e6mm  *follow-up CT at 3-6 months  *subsequent management based on the most suspicious nodule(s)      Electronically Signed: Kp Petersen MD    4/24/2024 5:30 PM EDT    Workstation ID: [id number]    CT Abdomen Pelvis Without Contrast [[id number]] Collected: 04/24/24 1721     Updated: 04/24/24 1733    Narrative:      CT CHEST WO CONTRAST DIAGNOSTIC, CT ABDOMEN PELVIS WO CONTRAST    Date of Exam: 4/24/2024 4:59 PM EDT    Indication: Chronic lung disease (Ped 0-17y)  Dyspnea, chronic, central airway disease suspected.    Comparison: CT chest 10/6/2023, CT abdomen pelvis 10/19/2015    Technique: Axial CT images were obtained of the chest abdomen and pelvis without contrast administration.  Sagittal and coronal reconstructions were performed.  Automated exposure control and iterative reconstruction methods were used.      Findings:  CT CHEST:  MEDIASTINUM: Shotty mediastinal lymphadenopathy. Aortic size is normal. The heart is enlarged, similar to prior exam. No mass nor pericardial effusion.  CORONARY ARTERIES: There is calcified " atherosclerotic disease.  LUNGS: There is peripheral reticular and linear interstitial scarring, similar prior examination. There is basilar compressive atelectasis related to pleural effusions. Previous 9 mm right-sided subpleural pulmonary nodule now measures 4 mm (image 70,   series 5). No new suspicious nodule. There is mild emphysema.  PLEURAL SPACE: Similar small right but new small left pleural effusions.        CT ABDOMEN AND PELVIS:   LIVER:  Unremarkable parenchyma without focal lesion.  BILIARY/GALLBLADDER: There are numerous gallstones. There are no gallbladder inflammatory changes.  SPLEEN:  Unremarkable  PANCREAS:  Unremarkable  ADRENAL:  Unremarkable  KIDNEYS: Mild bilateral renal cortical atrophy with no solid mass identified. No obstruction.  No calculus identified.  GASTROINTESTINAL/MESENTERY:  No evidence of obstruction nor inflammation.  AORTA/IVC:  Normal caliber.    RETROPERITONEUM/LYMPH NODES:  Unremarkable    REPRODUCTIVE:  Unremarkable  BLADDER:  Unremarkable    OSSEUS STRUCTURES:  Typical for age with no acute process identified.    There is generalized body wall edema. There is minimal likely related mesenteric edema and presacral edema.      Impression:      Impression:  1.Small pleural effusions, new on the left, with generalized body wall edema mild mesenteric edema, and presacral edema.  2.Decrease in size of now 4 mm right lower lobe subpleural pulmonary nodule, previously 9 mm. See below recommendations.  3.Stable subpleural reticular and linear areas of interstitial pulmonary scarring.  4.Other incidental nonemergent findings as detailed above.    The Fleischner society pulmonary nodule recommendations are for the follow-up and management of pulmonary nodules smaller than 8 mm detected incidentally in patients >35 years on non-screening CT. The initial guidelines for the management of solid   nodules were released in 2005 1, and guidelines for the management of subsolid nodules were  "released in 2013 2. New revised 2017 recommendations for both solid and subsolid have since been released 4.    2017 guidelines  Solid nodules  Solitary nodule size: <6 mm  *low risk patients: no follow-up needed  *high risk patients: optional CT at 12 months  Solitary nodule size: 6-8 mm  *low risk patients: follow-up at 6-12 months, then consider further follow-up at 18-24 months  *high risk patients: initial follow-up CT at 6-12 months and then at 18-24 months if no change  Solitary nodule size: >8 mm  *either low or high risk patients  *consider follow-up CT at 3 months, and/or CT-PET, and/or biopsy  Multiple nodules size: <6 mm  *low risk patients: no routine follow-up  *high risk patients: optional CT at 12 months  Multiple nodules size: 6-8 mm  *low risk patients: follow-up at 3-6 months, then consider further follow-up at 18-24 months  *high risk patients: follow-up at 3-6 months, then at 18-24 months if no change  Multiple nodules size: >8 mm  *low risk patients: follow-up at 3-6 months, then consider further follow-up at 18-24 months  *high risk patients: follow-up at 3-6 months, then at 18-24 months if no change  *   Note: newly detected indeterminate nodule in persons 35 years of age or older.  *low risk patients: minimal or absent history of smoking and or other known risk factors  *high risk patients: history of smoking or of other known risk factors (e.g. first degree relative with lung cancer, or exposure to asbestos, radon, uranium)  *if a nodule up to 8 mm is partly solid or is ground glass further follow-up is required after 24 months to exclude possible slow growing adenocarcinoma (RUDY)    Subsolid nodules  Solitary pure ground-glass nodule  *nodule size <6mm  *no CT follow-up required  *nodule size \"e6mm  *follow up CT at 6-12 months, then every 2 years until 5 years  Solitary part-solid nodule  *nodule size <6mm  *no CT follow-up required  *nodule size \"e6mm  *follow-up CT at 3-6 months  *if " "unchanged, and solid component remains <6mm, then annual follow-up for 5 years  Multiple subsolid nodules  *nodule size <6mm  *follow-up CT at 3-6 months  *consider further follow-up at 2 and 4 years if stable  *nodule size \"e6mm  *follow-up CT at 3-6 months  *subsequent management based on the most suspicious nodule(s)      Electronically Signed: Kp Petersen MD    4/24/2024 5:30 PM EDT    Workstation ID: [id number]    XR Chest 1 View [[id number]] Collected: 04/24/24 0851     Updated: 04/24/24 0854    Narrative:      XR CHEST 1 VW    Date of Exam: 4/24/2024 8:35 AM EDT    Indication: Possible pneumonia    Comparison: 4/23/2024    Findings:  Unchanged enlarged cardiac silhouette. There are persistent small bilateral pleural effusions with adjacent atelectasis. No definite new airspace consolidation. Osseous fractures are unchanged.      Impression:      Impression:  No significant interval change.      Electronically Signed: Daniel Jennings MD    4/24/2024 8:52 AM EDT    Workstation ID: [id number]            Results for orders placed during the hospital encounter of 04/23/24    XR Chest 1 View    Narrative  XR CHEST 1 VW    Date of Exam: 4/24/2024 8:35 AM EDT    Indication: Possible pneumonia    Comparison: 4/23/2024    Findings:  Unchanged enlarged cardiac silhouette. There are persistent small bilateral pleural effusions with adjacent atelectasis. No definite new airspace consolidation. Osseous fractures are unchanged.    Impression  Impression:  No significant interval change.      Electronically Signed: Daniel Jennings MD  4/24/2024 8:52 AM EDT  Workstation ID: [id number]      Results for orders placed in visit on 09/12/23    SCANNED - IMAGING      Results for orders placed in visit on 06/19/23    SCANNED - IMAGING            ASSESSMENT / PLAN      Sepsis    Anemia    Moderate malnutrition    ARF/ELYSSA/CRF/CKD------Nonoliguric. BUN/Cr down. +ARF/ELYSSA on top of   known CRF/CKD STG 3A. CRF/CKD STG 3A secondary to DGS/HTN NS. " +ARF/ELYSSA is secondary to ATN from hypotension/sepsis. No NSAIDs or IV dye. Cautious diuresis.     2. ACIDOSIS-----Metabolic. No elevation in AGAP. +Type 4 RTA. Better post IV NaHCO3 and on po NaHCO3    3. HYPOPHOSPHATEMIA------Replace IV    4. ANEMIA OF CKD-----hemoccult +. S/P IV iron for ANDREA    5. COPD------Oxygen, nebs, pulmonary toilet    6. VOLUME OVERLOAD------Diuresing nicely. Change to po Bumex today    7. HYPOALBUMINEMIA-----IV Albumin to temporize    8. ELEVATED LFTS/TRANSAMINITIS------Follow with diuresis    9. SEPSIS/UTI-----Abx per Intensivist    10. HYPOMAGNESEMIA-------Replaced    11. PAF--------Rate controlled. Eliquis on hold b/c Hemoccult +    12. HYPOTENSION------Better with Midodrine    13. BPH-----On Flomax    14. GERD/PUD PROPHYLAXIS-----On PPI. Benefits outweigh risks despite renal dysfnction    15. DMII WITH RENAL MANIFESTATIONS-----BS ok. Glucometers, SSI    16. MILD HYPERNATREMIA------Back down diuretics today    17. HYPOCALCEMIA------Replace IV          Shawn Shane MD  [facility]  [phone]  04/27/24  07:50 EDT

## 2024-04-27 NOTE — PROGRESS NOTES
LOS: 4 days   Patient Care Team:  Guilherme Jason MD as PCP - General (Family Medicine)  Misty Dias MD as Consulting Physician (Cardiology)  Nora Kenyon, BRIAN, APRN as Nurse Practitioner (Thoracic Surgery)  Sussy Shane MD as Consulting Physician (Nephrology)    Subjective     Interval History: down getting EGD      Review of Systems   Constitutional:  Positive for activity change and fatigue.   HENT: Negative.     Respiratory:  Positive for shortness of breath.    Gastrointestinal:  Negative for abdominal distention.   Genitourinary:  Negative for difficulty urinating.   Musculoskeletal:  Positive for arthralgias and myalgias.   Skin: Negative.  Positive for color change.   Neurological:  Positive for weakness. Negative for dizziness, light-headedness, numbness and headaches.   Psychiatric/Behavioral: Negative.             Objective     Vital Signs  Temp:  [97.6 °F (36.4 °C)-99.1 °F (37.3 °C)] 98.2 °F (36.8 °C)  Heart Rate:  [57-74] 68  Resp:  [16-25] 23  BP: (115-156)/() 129/50       Results Review:    Lab Results (last 24 hours)       Procedure Component Value Units Date/Time    POC Glucose 4x Daily Before Meals & at Bedtime [[id number]]  (Abnormal) Collected: 04/27/24 0721    Specimen: Blood Updated: 04/27/24 0723     Glucose 112 mg/dL      Comment: Serial Number: [id number]:  [id number]       CBC & Differential [[id number]]  (Abnormal) Collected: 04/27/24 0205    Specimen: Blood from Arm, Right Updated: 04/27/24 0323    Narrative:      The following orders were created for panel order CBC & Differential.  Procedure                               Abnormality         Status                     ---------                               -----------         ------                     CBC Auto Differential[[id number]]        Abnormal            Final result               Scan Slide[[id number]]                                       Final result                 Please view results for  these tests on the individual orders.    Scan Slide [[id number]] Collected: 04/27/24 0205    Specimen: Blood from Arm, Right Updated: 04/27/24 0323     Anisocytosis Slight/1+     WBC Morphology Normal     Giant Platelets Slight/1+    CBC Auto Differential [[id number]]  (Abnormal) Collected: 04/27/24 0205    Specimen: Blood from Arm, Right Updated: 04/27/24 0323     WBC 7.12 10*3/mm3      RBC 2.88 10*6/mm3      Hemoglobin 9.1 g/dL      Hematocrit 28.3 %      MCV 98.3 fL      MCH 31.6 pg      MCHC 32.2 g/dL      RDW 15.1 %      RDW-SD 54.4 fl      MPV 13.3 fL      Platelets 193 10*3/mm3      Comment: Result checked          Neutrophil % 79.4 %      Lymphocyte % 12.5 %      Monocyte % 7.4 %      Eosinophil % 0.1 %      Basophil % 0.3 %      Immature Grans % 0.3 %      Neutrophils, Absolute 5.65 10*3/mm3      Lymphocytes, Absolute 0.89 10*3/mm3      Monocytes, Absolute 0.53 10*3/mm3      Eosinophils, Absolute 0.01 10*3/mm3      Basophils, Absolute 0.02 10*3/mm3      Immature Grans, Absolute 0.02 10*3/mm3      nRBC 0.0 /100 WBC     Magnesium [[id number]]  (Normal) Collected: 04/27/24 0205    Specimen: Blood from Arm, Right Updated: 04/27/24 0319     Magnesium 2.3 mg/dL     Phosphorus [[id number]]  (Normal) Collected: 04/27/24 0205    Specimen: Blood from Arm, Right Updated: 04/27/24 0319     Phosphorus 3.0 mg/dL     Comprehensive Metabolic Panel [[id number]]  (Abnormal) Collected: 04/27/24 0205    Specimen: Blood from Arm, Right Updated: 04/27/24 0312     Glucose 121 mg/dL      BUN 42 mg/dL      Creatinine 1.20 mg/dL      Sodium 146 mmol/L      Potassium 3.8 mmol/L      Chloride 106 mmol/L      CO2 29.0 mmol/L      Calcium 9.0 mg/dL      Total Protein 5.5 g/dL      Albumin 3.1 g/dL      ALT (SGPT) 73 U/L      AST (SGOT) 50 U/L      Alkaline Phosphatase 153 U/L      Total Bilirubin 0.9 mg/dL      Globulin 2.4 gm/dL      A/G Ratio 1.3 g/dL      BUN/Creatinine Ratio 35.0     Anion Gap 11.0 mmol/L      eGFR 60.0 mL/min/1.73      Narrative:      GFR Normal >60  Chronic Kidney Disease <60  Kidney Failure <15    The GFR formula is only valid for adults with stable renal function between ages 18 and 70.    Calcium, Ionized [[id number]]  (Abnormal) Collected: 04/27/24 0205    Specimen: Blood from Arm, Right Updated: 04/27/24 0300     Ionized Calcium 1.18 mmol/L     POC Glucose Once [[id number]]  (Normal) Collected: 04/26/24 2041    Specimen: Blood Updated: 04/26/24 2043     Glucose 105 mg/dL      Comment: Serial Number: [id number]:  [id number]       POC Glucose Once [[id number]]  (Abnormal) Collected: 04/26/24 1719    Specimen: Blood Updated: 04/26/24 1721     Glucose 133 mg/dL      Comment: Serial Number: [id number]:  [id number]       Anti-Smooth Muscle Antibody Titer [[id number]] Collected: 04/25/24 1728    Specimen: Blood Updated: 04/26/24 1510     Smooth Muscle Ab 16 Units      Comment:                  Negative                     0 - 19                   Weak positive               20 - 30                   Moderate to strong positive     >30   Actin Antibodies are found in 52-85% of patients with   autoimmune hepatitis or chronic active hepatitis and   in 22% of patients with primary biliary cirrhosis.       Narrative:      Performed at:  [address]  [id number]  : Prakash Wilkerson PhD, Phone:  [phone]    Mitochondrial Antibodies, M2 [[id number]] Collected: 04/25/24 1728    Specimen: Blood Updated: 04/26/24 1510     Mitochondrial Ab <20.0 Units      Comment:                                 Negative    0.0 - 20.0                                  Equivocal  20.1 - 24.9                                  Positive         >24.9  Mitochondrial (M2) Antibodies are found in 90-96% of  patients with primary biliary cirrhosis.       Narrative:      Performed at:  [address]  [id number]  : Prakash Wilkerson PhD, Phone:  [phone]    Blood  Culture - Blood, Hand, Left [[id number]]  (Normal) Collected: 04/23/24 1245    Specimen: Blood from Hand, Left Updated: 04/26/24 1300     Blood Culture No growth at 3 days    Blood Culture - Blood, Arm, Right [[id number]]  (Normal) Collected: 04/23/24 1235    Specimen: Blood from Arm, Right Updated: 04/26/24 1245     Blood Culture No growth at 3 days    Narrative:      Less than seven (7) mL's of blood was collected.  Insufficient quantity may yield false negative results.    POC Glucose 4x Daily Before Meals & at Bedtime [[id number]]  (Normal) Collected: 04/26/24 1115    Specimen: Blood Updated: 04/26/24 1119     Glucose 103 mg/dL      Comment: Serial Number: [id number]:  [id number]                Imaging Results (Last 24 Hours)       ** No results found for the last 24 hours. **                 I reviewed the patient's new clinical results.    Medication Review:   Scheduled Meds:apixaban, 2.5 mg, Oral, Q12H  [Held by provider] atorvastatin, 20 mg, Oral, Daily  bumetanide, 1 mg, Oral, Daily  cefTRIAXone, 2,000 mg, Intravenous, Q24H  hydroxychloroquine, 400 mg, Oral, Daily  midodrine, 5 mg, Oral, TID AC  multivitamin with minerals, 1 tablet, Oral, Daily  pantoprazole, 40 mg, Oral, Q AM  sodium bicarbonate, 1,300 mg, Oral, TID  sodium chloride, 10 mL, Intravenous, Q12H  [Held by provider] spironolactone, 25 mg, Oral, Daily  tamsulosin, 0.4 mg, Oral, Daily      Continuous Infusions:   PRN Meds:.  [Held by provider] acetaminophen **OR** [Held by provider] acetaminophen    senna-docusate sodium **AND** polyethylene glycol **AND** bisacodyl **AND** bisacodyl    dextrose    dextrose    glucagon (human recombinant)    HYDROcodone-acetaminophen    ipratropium-albuterol    nitroglycerin    ondansetron ODT **OR** ondansetron    sodium chloride    sodium chloride     Assessment & Plan       Sepsis    Anemia    Moderate malnutrition    Septic shock, resolved  Leukocytosis with bandemia, 19 on admission  -Blood culture no  growth thus far, respiratory culture negative, urinalysis was completed but no culture obtained  -Etiology uncertain, CT chest\abdomen\pelvis - unremarkable     Elevated troponin, possible non-STEMI, elevated proBNP 16,786 -cardiology following     Acute on chronic kidney disease-neurology following.  Continue sodium bicarb for acidosis.  Avoid NSAIDs and nephrotoxic's medication.  Avoid hypotension,  renal ultrasound ok     Hypotension-off Levophed, started on midodrine, improved     Iron deficiency anemia - received iron replacement 4/24, hemoglobin 7.6  - Hemoccult stool is positive   -Status post EGD that showed distal esophageal stricture with hiatal hernia and gastric polyp, no active bleeding lesions were identified, dilation was performed    Paroxysmal atrial fibrillation - anticoagulation - eliquis. (On hold secondary to EGD and GI bleed).  cardiology following considering Watchman     COPD-okay to use home trilogy machine, will need 6-minute walk prior to discharge-oxygenating well on room air however patient states he wears 4 to 6 L of continuous oxygen at home     Elevated LFTs-US liver shows hepatic steatosis hold statin and monitor LFTs, improving     Incidental finding of cholelithiasis-patient denies right upper quadrant pain will consider HIDA scan if symptomatic     Dyslipidemia, statin on hold due to evaluated LFTs  Diabetes mellitus type 2-A1c stable  History of chronic ITP/platelets stable, continue to monitor  History of leukemia\prostate cancer-in remission  History of bilateral PE\history elevated factor VIII level-anticoagulated  Rheumatoid arthritis-continue Plaquenil  MITZI-can okay to use home trilogy machine  Hypophosphatemia - replace  Hypoalbuminemia - IV albumin  Hypomagnesemia - replace  BPH - flomax     Diet: Healthy heart, soft to chew  DVT prophylaxis: Lovenox  GI prophylaxis: Protonix  Code status: Full code        Plan for disposition:MARÍA ELENA    Nydiapadmaja Muñoz, APRN  04/27/24  10:55  EDT

## 2024-04-27 NOTE — PROGRESS NOTES
Referring Provider: Clementine Rousseau MD    Reason for follow-up:  Atrial fibrillation  Elevated troponin level     Patient Care Team:  Guilherme Jason MD as PCP - General (Family Medicine)  Misty Dias MD as Consulting Physician (Cardiology)  Nora Kenyon DNP, APRN as Nurse Practitioner (Thoracic Surgery)  Sussy Shane MD as Consulting Physician (Nephrology)    Subjective .   Patient seen and examined.  Chart reviewed.  Labs reviewed.  Discussed with RN taking care of patient.  Patient is complaining of intermittent chest pain with no aggravating or relieving factors.         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    Neutropenia 2003    MITZI (obstructive sleep apnea) 2018    Psoriasis 2000    Renal cyst, left     Rheumatoid arthritis     Stroke (cerebrum)     Thrombocytopenia 2005       Past Surgical History:   Procedure Laterality Date    SKIN LESION EXCISION      back and groin-benign       Family History   Problem Relation Age of Onset    Lung cancer Brother         age unknown    Heart disease Brother     Stroke Mother     Heart disease Father     Heart disease Sister        Social History     Tobacco Use    Smoking status: Former     Current packs/day: 0.00     Types: Cigarettes     Quit date:      Years since quittin.3     Passive exposure: Never    Smokeless  tobacco: Never    Tobacco comments:     stopped smoking in 1995   Vaping Use    Vaping status: Never Used   Substance Use Topics    Alcohol use: No    Drug use: No        Medications Prior to Admission   Medication Sig Dispense Refill Last Dose    apixaban (ELIQUIS) 2.5 MG tablet tablet Take 1 tablet by mouth 2 (Two) Times a Day. 60 tablet 11     atorvastatin (LIPITOR) 20 MG tablet Take 1 tablet by mouth Daily.  3     fenofibrate 160 MG tablet Take 1 tablet by mouth Daily.  3     hydroxychloroquine (PLAQUENIL) 200 MG tablet Take 2 tablets by mouth Daily.  0     ipratropium-albuterol (DUO-NEB) 0.5-2.5 mg/3 ml nebulizer Take 3 mL by nebulization Every 4 (Four) Hours As Needed for Wheezing.       losartan (COZAAR) 100 MG tablet Take 1 tablet by mouth Daily.  3     Multiple Vitamins-Minerals (MULTIVITAMIN WITH MINERALS) tablet tablet Take 1 tablet by mouth Daily.       oxybutynin XL (DITROPAN-XL) 10 MG 24 hr tablet Take 1 tablet by mouth Daily.       pantoprazole (PROTONIX) 40 MG EC tablet Take 1 tablet by mouth Daily.       revefenacin (YUPELRI) 175 MCG/3ML nebulizer solution Take 3 mL by nebulization Daily.       spironolactone (ALDACTONE) 25 MG tablet Take 1 tablet by mouth Daily.  3     tamsulosin (FLOMAX) 0.4 MG capsule 24 hr capsule Take 1 capsule by mouth Daily.          Allergies  Penicillin v potassium and Latex    Scheduled Meds:apixaban, 2.5 mg, Oral, Q12H  [Held by provider] atorvastatin, 20 mg, Oral, Daily  bumetanide, 1 mg, Oral, Daily  cefTRIAXone, 2,000 mg, Intravenous, Q24H  hydroxychloroquine, 400 mg, Oral, Daily  midodrine, 5 mg, Oral, TID AC  multivitamin with minerals, 1 tablet, Oral, Daily  pantoprazole, 40 mg, Oral, Q AM  sodium bicarbonate, 1,300 mg, Oral, TID  sodium chloride, 10 mL, Intravenous, Q12H  [Held by provider] spironolactone, 25 mg, Oral, Daily  tamsulosin, 0.4 mg, Oral, Daily      Continuous Infusions:     PRN Meds:.  [Held by provider] acetaminophen **OR** [Held by provider]  "acetaminophen    senna-docusate sodium **AND** polyethylene glycol **AND** bisacodyl **AND** bisacodyl    dextrose    dextrose    glucagon (human recombinant)    HYDROcodone-acetaminophen    ipratropium-albuterol    nitroglycerin    ondansetron ODT **OR** ondansetron    sodium chloride    sodium chloride    Objective     VITAL SIGNS  Vitals:    04/26/24 2020 04/26/24 2314 04/27/24 0416 04/27/24 0731   BP: 134/61 156/75 128/60 141/61   BP Location: Right arm Right arm Right arm    Patient Position: Lying Lying Lying    Pulse: 62 65 57 59   Resp: 17 23 25 20   Temp: 97.8 °F (36.6 °C) 98 °F (36.7 °C) 97.6 °F (36.4 °C) 99 °F (37.2 °C)   TempSrc: Oral Oral Oral    SpO2: (!) 86% 97% 97% 92%   Weight:   85.1 kg (187 lb 9.8 oz)    Height:           Flowsheet Rows      Flowsheet Row First Filed Value   Admission Height 167.6 cm (66\") Documented at 04/23/2024 1200   Admission Weight 81.9 kg (180 lb 8.9 oz) Documented at 04/23/2024 1200              Intake/Output Summary (Last 24 hours) at 4/27/2024 0840  Last data filed at 4/27/2024 0735  Gross per 24 hour   Intake 1380 ml   Output 2750 ml   Net -1370 ml        TELEMETRY: Atrial fibrillation with controlled ventricular response.    Physical Exam:  The patient is alert, oriented and in no distress.  Vital signs as noted above.  Head and neck revealed no carotid bruits or jugular venous distention.  No thyromegaly or lymphadenopathy is present  Lungs clear.  No wheezing.  Breath sounds are normal bilaterally.  Heart normal first and second heart sounds.  No murmur. No precordial rub is present.  No gallop is present.  Abdomen soft and nontender.  No organomegaly is present.  Extremities with good peripheral pulses without any pedal edema.  Skin warm and dry.  Upper and lower extremity bruising is present.  Musculoskeletal system is grossly normal  CNS grossly normal    Reviewed and updated.    Results Review:   I reviewed the patient's new clinical results.  Lab Results (last " 24 hours)       Procedure Component Value Units Date/Time    POC Glucose 4x Daily Before Meals & at Bedtime [[id number]]  (Abnormal) Collected: 04/27/24 0721    Specimen: Blood Updated: 04/27/24 0723     Glucose 112 mg/dL      Comment: Serial Number: [id number]:  [id number]       CBC & Differential [[id number]]  (Abnormal) Collected: 04/27/24 0205    Specimen: Blood from Arm, Right Updated: 04/27/24 0323    Narrative:      The following orders were created for panel order CBC & Differential.  Procedure                               Abnormality         Status                     ---------                               -----------         ------                     CBC Auto Differential[[id number]]        Abnormal            Final result               Scan Slide[[id number]]                                       Final result                 Please view results for these tests on the individual orders.    Scan Slide [[id number]] Collected: 04/27/24 0205    Specimen: Blood from Arm, Right Updated: 04/27/24 0323     Anisocytosis Slight/1+     WBC Morphology Normal     Giant Platelets Slight/1+    CBC Auto Differential [[id number]]  (Abnormal) Collected: 04/27/24 0205    Specimen: Blood from Arm, Right Updated: 04/27/24 0323     WBC 7.12 10*3/mm3      RBC 2.88 10*6/mm3      Hemoglobin 9.1 g/dL      Hematocrit 28.3 %      MCV 98.3 fL      MCH 31.6 pg      MCHC 32.2 g/dL      RDW 15.1 %      RDW-SD 54.4 fl      MPV 13.3 fL      Platelets 193 10*3/mm3      Comment: Result checked          Neutrophil % 79.4 %      Lymphocyte % 12.5 %      Monocyte % 7.4 %      Eosinophil % 0.1 %      Basophil % 0.3 %      Immature Grans % 0.3 %      Neutrophils, Absolute 5.65 10*3/mm3      Lymphocytes, Absolute 0.89 10*3/mm3      Monocytes, Absolute 0.53 10*3/mm3      Eosinophils, Absolute 0.01 10*3/mm3      Basophils, Absolute 0.02 10*3/mm3      Immature Grans, Absolute 0.02 10*3/mm3      nRBC 0.0 /100 WBC     Magnesium [[id number]]  (Normal)  Collected: 04/27/24 0205    Specimen: Blood from Arm, Right Updated: 04/27/24 0319     Magnesium 2.3 mg/dL     Phosphorus [[id number]]  (Normal) Collected: 04/27/24 0205    Specimen: Blood from Arm, Right Updated: 04/27/24 0319     Phosphorus 3.0 mg/dL     Comprehensive Metabolic Panel [[id number]]  (Abnormal) Collected: 04/27/24 0205    Specimen: Blood from Arm, Right Updated: 04/27/24 0312     Glucose 121 mg/dL      BUN 42 mg/dL      Creatinine 1.20 mg/dL      Sodium 146 mmol/L      Potassium 3.8 mmol/L      Chloride 106 mmol/L      CO2 29.0 mmol/L      Calcium 9.0 mg/dL      Total Protein 5.5 g/dL      Albumin 3.1 g/dL      ALT (SGPT) 73 U/L      AST (SGOT) 50 U/L      Alkaline Phosphatase 153 U/L      Total Bilirubin 0.9 mg/dL      Globulin 2.4 gm/dL      A/G Ratio 1.3 g/dL      BUN/Creatinine Ratio 35.0     Anion Gap 11.0 mmol/L      eGFR 60.0 mL/min/1.73     Narrative:      GFR Normal >60  Chronic Kidney Disease <60  Kidney Failure <15    The GFR formula is only valid for adults with stable renal function between ages 18 and 70.    Calcium, Ionized [[id number]]  (Abnormal) Collected: 04/27/24 0205    Specimen: Blood from Arm, Right Updated: 04/27/24 0300     Ionized Calcium 1.18 mmol/L     POC Glucose Once [[id number]]  (Normal) Collected: 04/26/24 2041    Specimen: Blood Updated: 04/26/24 2043     Glucose 105 mg/dL      Comment: Serial Number: [id number]:  [id number]       POC Glucose Once [[id number]]  (Abnormal) Collected: 04/26/24 1719    Specimen: Blood Updated: 04/26/24 1721     Glucose 133 mg/dL      Comment: Serial Number: [id number]:  [id number]       Anti-Smooth Muscle Antibody Titer [[id number]] Collected: 04/25/24 1728    Specimen: Blood Updated: 04/26/24 1510     Smooth Muscle Ab 16 Units      Comment:                  Negative                     0 - 19                   Weak positive               20 - 30                   Moderate to strong positive     >30   Actin Antibodies are found in  52-85% of patients with   autoimmune hepatitis or chronic active hepatitis and   in 22% of patients with primary biliary cirrhosis.       Narrative:      Performed at:  01 - [facility] [address]  [id number]  : Prakash Wilkerson PhD, Phone:  [phone]    Mitochondrial Antibodies, M2 [[id number]] Collected: 04/25/24 1728    Specimen: Blood Updated: 04/26/24 1510     Mitochondrial Ab <20.0 Units      Comment:                                 Negative    0.0 - 20.0                                  Equivocal  20.1 - 24.9                                  Positive         >24.9  Mitochondrial (M2) Antibodies are found in 90-96% of  patients with primary biliary cirrhosis.       Narrative:      Performed at:  01 - [facility] [address]  [id number]  : Prakash Wilkerson PhD, Phone:  [phone]    Blood Culture - Blood, Hand, Left [[id number]]  (Normal) Collected: 04/23/24 1245    Specimen: Blood from Hand, Left Updated: 04/26/24 1300     Blood Culture No growth at 3 days    Blood Culture - Blood, Arm, Right [[id number]]  (Normal) Collected: 04/23/24 1235    Specimen: Blood from Arm, Right Updated: 04/26/24 1245     Blood Culture No growth at 3 days    Narrative:      Less than seven (7) mL's of blood was collected.  Insufficient quantity may yield false negative results.    POC Glucose 4x Daily Before Meals & at Bedtime [[id number]]  (Normal) Collected: 04/26/24 1115    Specimen: Blood Updated: 04/26/24 1119     Glucose 103 mg/dL      Comment: Serial Number: [id number]:  [id number]       NAIMA [[id number]]  (Normal) Collected: 04/25/24 1728    Specimen: Blood Updated: 04/26/24 1053     Antinuclear Antibodies (NAIMA) Negative            Imaging Results (Last 24 Hours)       ** No results found for the last 24 hours. **        LAB RESULTS (LAST 7 DAYS)    CBC  Results from last 7 days   Lab Units 04/27/24  0205 04/26/24  0546 04/25/24  0328 04/24/24  0338  04/23/24  1235   WBC 10*3/mm3 7.12 6.47 7.32 6.48 13.06*   RBC 10*6/mm3 2.88* 2.68* 2.72* 2.44* 2.77*   HEMOGLOBIN g/dL 9.1* 8.5* 8.7* 7.6* 8.8*   HEMATOCRIT % 28.3* 26.1* 27.1* 24.7* 28.4*   MCV fL 98.3* 97.4* 99.6* 101.2* 102.5*   PLATELETS 10*3/mm3 193 161 190 148 183       BMP  Results from last 7 days   Lab Units 04/27/24  0205 04/26/24  0546 04/25/24 0328 04/24/24  0338 04/23/24  1235   SODIUM mmol/L 146* 144 143 141 143   POTASSIUM mmol/L 3.8 3.8 4.2 3.9 4.1   CHLORIDE mmol/L 106 107 114* 114* 113*   CO2 mmol/L 29.0 26.0 18.0* 16.0* 16.0*   BUN mg/dL 42* 48* 45* 35* 34*   CREATININE mg/dL 1.20 1.31* 1.39* 1.49* 1.53*   GLUCOSE mg/dL 121* 106* 131* 130* 98   MAGNESIUM mg/dL 2.3 1.8 2.3 1.7 1.6   PHOSPHORUS mg/dL 3.0 2.2* 2.4* 3.3 2.5       CMP   Results from last 7 days   Lab Units 04/27/24  0205 04/26/24  0546 04/25/24 0328 04/24/24  0338 04/23/24  1235   SODIUM mmol/L 146* 144 143 141 143   POTASSIUM mmol/L 3.8 3.8 4.2 3.9 4.1   CHLORIDE mmol/L 106 107 114* 114* 113*   CO2 mmol/L 29.0 26.0 18.0* 16.0* 16.0*   BUN mg/dL 42* 48* 45* 35* 34*   CREATININE mg/dL 1.20 1.31* 1.39* 1.49* 1.53*   GLUCOSE mg/dL 121* 106* 131* 130* 98   ALBUMIN g/dL 3.1* 3.3* 2.6* 2.4* 2.7*   BILIRUBIN mg/dL 0.9 0.8 0.5 1.3* 2.3*   ALK PHOS U/L 153* 142* 184* 145* 192*   AST (SGOT) U/L 50* 87* 211* 156* 154*   ALT (SGPT) U/L 73* 100* 136* 75* 67*   AMYLASE U/L  --   --   --   --  29   LIPASE U/L  --   --   --   --  16         BNP        TROPONIN  Results from last 7 days   Lab Units 04/23/24  1433 04/23/24  1235   CK TOTAL U/L  --  117   HSTROP T ng/L 219* 254*       CoAg        Creatinine Clearance  Estimated Creatinine Clearance: 47.7 mL/min (by C-G formula based on SCr of 1.2 mg/dL).    ABG        Radiology  No radiology results for the last day        EKG      I personally viewed and interpreted the patient's EKG/Telemetry data: Atrial fibrillation rate 10 ventricular    ECHOCARDIOGRAM:    Results for orders placed during the  hospital encounter of 04/23/24    Adult Transthoracic Echo Complete W/ Cont if Necessary Per Protocol    Interpretation Summary  Indications  Shortness of breath    Technically satisfactory study.  Mitral valve is thickened with adequate opening motion.  Moderate mitral regurgitation is present.  Tricuspid valve is structurally normal.  Moderate tricuspid regurgitation is present.  Aortic valve is thickened with adequate opening motion.  Moderate aortic regurgitation is present.  Pulmonic valve opening motion is normal.  Moderate pulmonic regurgitation is present.  Mild pulmonary hypertension is present.  Left atrium is enlarged.  Right atrium is enlarged.  Left ventricle is normal in size and contractility with ejection fraction of 60%.  Grade 3 left ventricular diastolic dysfunction is present.  (MV E/8 ratio 2.8)  Left ventricular peak systolic longitudinal strain is abnormal with GL PS of -14%.  Concentric left ventricle hypertrophy is present.  Right ventricle enlarged with hypocontractility with a TAPSE of 1.46 cm..  Atrial septum is intact.  Aorta is dilated at 4.3 cm.  IVC is dilated with decreased respiratory variation.  Right atrial pressure 8 mmHg.  No pericardial effusion or intracardiac thrombus is seen.    Impression  Thickened mitral and aortic valves with adequate opening motion.  Moderate mitral tricuspid and aortic and pulmonic regurgitation.  Left atrial enlargement.  Left ventricular size and contractility is normal with ejection fraction of 60%.  Left ventricular peak systolic longitudinal strain is abnormal with GL PS of -14%.  Left ventricular grade 3 diastolic dysfunction is present.  Right atrium right ventricle enlargement is present.  Mild pulmonary hypertension is present.          STRESS TEST  Results for orders placed during the hospital encounter of 07/28/21    Stress Test With Myocardial Perfusion One Day    Interpretation Summary  Indications  Chest pain    This study was performed  under my direct supervision.    Resting ECG  Sinus rhythm right bundle branch block first-degree AV block    The patient was injected with Lexiscan intravenously while constantly monitoring electrocardiogram and vital signs.  Patient did not have any chest discomfort ST abnormalities or ectopy with injection of Lexiscan.    Cardiolite was used as an imaging agent.    Cardiolite images showed mild inferior ischemia.    Gated SPECT images revealed normal left ventricle size and contractility with ejection fraction of 73%.    Impression  ========  Lexiscan Cardiolite test is negative for myocardial ischemia.    Gated SPECT images revealed normal left ventricular size and contractility with ejection fraction of 73%.        Cardiolite (Tc-99m sestamibi) stress test    CARDIAC CATHETERIZATION  No results found for this or any previous visit.                OTHER:         Assessment & Plan     Principal Problem:    Sepsis  Active Problems:    Anemia    Moderate malnutrition      ]]]]]]]]]]]]]]]]]]  History  ==========  -Sepsis     - Elevated troponin.     -History of atrial fibrillation  Patient has converted and was maintaining sinus rhythm.  EKG 12/27/2023-atrial fibrillation  EKG 2/7/2024-atrial fibrillation right bundle branch block      - shortness of breath fatigue and lightheadedness.  Improved     Echocardiogram-normal with ejection fraction of 60%-7/28/2021  Lexiscan Cardiolite test-mild inferior ischemia-7/28/2021      -Chronic right bundle branch block      cardiac catheterization 04/11/2018 revealed normal left ventricular function normal coronary  arteries and no evidence for pulmonary hypertension was present.     -History of diffusely dilated ascending aorta    Echocardiogram 4/24/2024   Thickened mitral and aortic valves with adequate opening motion.  Moderate mitral tricuspid and aortic and pulmonic regurgitation.  Left atrial enlargement.  Left ventricular size and contractility is normal with ejection  fraction of 60%.  Left ventricular peak systolic longitudinal strain is abnormal with GL PS of -14%.  Left ventricular grade 3 diastolic dysfunction is present.  Right atrium right ventricle enlargement is present.  Mild pulmonary hypertension is present.    Echocardiogram showed left atrial and right ventricle enlargement prominent aorta.  Normal left ventricular function with ejection fraction of 60% 04/05/2018.  Stacy scan Cardiolite test is abnormal with significant inferior ischemia and apical hypokinesis on the gated SPECT images 04/05/2018.     - diabetes hypertension LGL leukemia rheumatoid arthritis COPD CKD 3  Patient is on home oxygen.     - history of prostate carcinoma.  Status post radiation     - former smoker     - allergic to penicillin  ==========  Plan  ==========      Sepsis.  Elevated troponin.  Bouncing at the bottom.  Likely due to renal dysfunction and sepsis.    Echocardiogram 4/24/2024-as abov    History of atrial fibrillation  Patient is maintaining sinus rhythm.  Patient is in sinus rhythm with first-degree AV block right bundle branch block-12/15/2021.  Sinus rhythm first-degree AV block right bundle branch block- 6/22/2022  EKG 6/22/2023 sinus bradycardia right bundle branch block  EKG 12/22/2022-sinus rhythm right bundle branch block  EKG 12/27/2023-atrial fibrillation  EKG 2/7/2024-atrial fibrillation right bundle branch block left anterior fascicular block.  Rate is well-controlled.  EKG-atrial fibrillation right bundle branch block-4/26/2024.    Anticoagulation was discussed.  Patient was on low-dose Eliquis.  Consideration may be given for Watchman procedure.     Chronic right bundle branch block-asymptomatic    Renal dysfunction  BUNs/creatinine 35/1.49-4/24/2024  45/1.39-4/25/2024  48/1.31  42/1.20    Hypertension-stable.    Positive occult blood.  Patient to have EGD today.  Have communicated with endoscopy unit.     Shortness of breath fatigue and  lightheadedness.-Stable  COPD  Patient is on home oxygen.       Patient is complaining of chest pain 4/27/2024.  Will check EKG  Will check troponin.  Patient had previously elevated troponin will monitor trend by repeating troponin tomorrow.  Patient has chronic hypotension requiring midodrine will hold off on cardiac meds which potentially can cause hypotension at the current time.  Will monitor renal function and hemoglobin.    José Miguel Sanchez MD  04/27/24  08:40 EDT

## 2024-04-27 NOTE — PLAN OF CARE
Assessment: Praneeth Nam presents with functional mobility impairments which indicate the need for skilled intervention. Tolerating session today without incident. Pt req cga to amb using rwx but fatigues easily. Would benefit from short rehab stay to get stronger and incr endurance if he will go, otherwise would benefit from HH and 24 hr assist.Will continue to follow and progress as tolerated.

## 2024-04-27 NOTE — PLAN OF CARE
Goal Outcome Evaluation:           Progress: no change       Patient resting well through out the night. No complaints of SOA. Declines wearing SCD's. Able to maintain O2 stat on room air.

## 2024-04-27 NOTE — THERAPY TREATMENT NOTE
"Subjective: Pt agreeable to therapeutic plan of care. Pt stated he didn't really want to go to rehab but if needed to he would. Stated he gets SOA easily     Objective:     Bed mobility - SBA  Transfers - CGA and with rolling walker  Ambulation - 20x2 feet CGA and with rolling walker    Vitals: WNL although SOA     Pain:  c/o chest pain  Intervention for pain: Repositioned, RN notified, and Therapeutic Presence    Education: Provided education on the importance of mobility in the acute care setting, Verbal/Tactile Cues, ADL training, Transfer Training, and Gait Training    Assessment: Praneeth Nam presents with functional mobility impairments which indicate the need for skilled intervention. Tolerating session today without incident. Pt req cga to amb using rwx but fatigues easily. Would benefit from short rehab stay to get stronger and incr endurance if he will go, otherwise would benefit from HH and 24 hr assist.Will continue to follow and progress as tolerated.     Plan/Recommendations:   If medically appropriate, Low Intensity Therapy recommended post-acute care - This is recommended as therapy feels this patient would require 2-3 visits per week. OP or HH would be the best option depending on patient's home bound status. Consider, if the patient has other  \"skilled\" needs such as wounds, IV antibiotics, etc. Combined with \"low intensity\" could also equate to a SNF. If patient is medically complex, consider LTAC. Pt requires no DME at discharge.     Pt desires Home with family assist and Home Health at discharge. Pt cooperative; agreeable to therapeutic recommendations and plan of care.         Basic Mobility 6-click:  Rollin = Total, A lot = 2, A little = 3; 4 = None  Supine>Sit:   1 = Total, A lot = 2, A little = 3; 4 = None   Sit>Stand with arms:  1 = Total, A lot = 2, A little = 3; 4 = None  Bed>Chair:   1 = Total, A lot = 2, A little = 3; 4 = None  Ambulate in room:  1 = Total, A lot = 2, A " little = 3; 4 = None  3-5 Steps with railin = Total, A lot = 2, A little = 3; 4 = None  Score: 19      Post-Tx Position: Supine with HOB Elevated, Alarms activated, and Call light and personal items within reach  PPE: gloves

## 2024-04-28 LAB
ALBUMIN SERPL-MCNC: 2.8 G/DL (ref 3.5–5.2)
ALBUMIN/GLOB SERPL: 1.1 G/DL
ALP SERPL-CCNC: 146 U/L (ref 39–117)
ALT SERPL W P-5'-P-CCNC: 58 U/L (ref 1–41)
ANION GAP SERPL CALCULATED.3IONS-SCNC: 8 MMOL/L (ref 5–15)
AST SERPL-CCNC: 40 U/L (ref 1–40)
BACTERIA SPEC AEROBE CULT: NORMAL
BACTERIA SPEC AEROBE CULT: NORMAL
BASOPHILS # BLD AUTO: 0.02 10*3/MM3 (ref 0–0.2)
BASOPHILS NFR BLD AUTO: 0.2 % (ref 0–1.5)
BILIRUB SERPL-MCNC: 0.6 MG/DL (ref 0–1.2)
BUN SERPL-MCNC: 53 MG/DL (ref 8–23)
BUN/CREAT SERPL: 38.7 (ref 7–25)
CALCIUM SPEC-SCNC: 8.5 MG/DL (ref 8.6–10.5)
CHLORIDE SERPL-SCNC: 101 MMOL/L (ref 98–107)
CO2 SERPL-SCNC: 31 MMOL/L (ref 22–29)
CREAT SERPL-MCNC: 1.37 MG/DL (ref 0.76–1.27)
DEPRECATED RDW RBC AUTO: 53 FL (ref 37–54)
EGFRCR SERPLBLD CKD-EPI 2021: 51.2 ML/MIN/1.73
EOSINOPHIL # BLD AUTO: 0.3 10*3/MM3 (ref 0–0.4)
EOSINOPHIL NFR BLD AUTO: 3.7 % (ref 0.3–6.2)
ERYTHROCYTE [DISTWIDTH] IN BLOOD BY AUTOMATED COUNT: 15 % (ref 12.3–15.4)
GLOBULIN UR ELPH-MCNC: 2.6 GM/DL
GLUCOSE BLDC GLUCOMTR-MCNC: 115 MG/DL (ref 70–105)
GLUCOSE BLDC GLUCOMTR-MCNC: 133 MG/DL (ref 70–105)
GLUCOSE BLDC GLUCOMTR-MCNC: 141 MG/DL (ref 70–105)
GLUCOSE BLDC GLUCOMTR-MCNC: 167 MG/DL (ref 70–105)
GLUCOSE SERPL-MCNC: 123 MG/DL (ref 65–99)
HCT VFR BLD AUTO: 27.9 % (ref 37.5–51)
HGB BLD-MCNC: 9 G/DL (ref 13–17.7)
IMM GRANULOCYTES # BLD AUTO: 0.03 10*3/MM3 (ref 0–0.05)
IMM GRANULOCYTES NFR BLD AUTO: 0.4 % (ref 0–0.5)
LARGE PLATELETS: NORMAL
LYMPHOCYTES # BLD AUTO: 1.22 10*3/MM3 (ref 0.7–3.1)
LYMPHOCYTES NFR BLD AUTO: 15 % (ref 19.6–45.3)
MAGNESIUM SERPL-MCNC: 1.8 MG/DL (ref 1.6–2.4)
MCH RBC QN AUTO: 31.5 PG (ref 26.6–33)
MCHC RBC AUTO-ENTMCNC: 32.3 G/DL (ref 31.5–35.7)
MCV RBC AUTO: 97.6 FL (ref 79–97)
MONOCYTES # BLD AUTO: 0.7 10*3/MM3 (ref 0.1–0.9)
MONOCYTES NFR BLD AUTO: 8.6 % (ref 5–12)
NEUTROPHILS NFR BLD AUTO: 5.89 10*3/MM3 (ref 1.7–7)
NEUTROPHILS NFR BLD AUTO: 72.1 % (ref 42.7–76)
NRBC BLD AUTO-RTO: 0 /100 WBC (ref 0–0.2)
PHOSPHATE SERPL-MCNC: 2.5 MG/DL (ref 2.5–4.5)
PLATELET # BLD AUTO: 207 10*3/MM3 (ref 140–450)
PMV BLD AUTO: 13.1 FL (ref 6–12)
POTASSIUM SERPL-SCNC: 3.8 MMOL/L (ref 3.5–5.2)
PROT SERPL-MCNC: 5.4 G/DL (ref 6–8.5)
RBC # BLD AUTO: 2.86 10*6/MM3 (ref 4.14–5.8)
RBC MORPH BLD: NORMAL
SMALL PLATELETS BLD QL SMEAR: ADEQUATE
SODIUM SERPL-SCNC: 140 MMOL/L (ref 136–145)
TROPONIN T SERPL HS-MCNC: 122 NG/L
WBC MORPH BLD: NORMAL
WBC NRBC COR # BLD AUTO: 8.16 10*3/MM3 (ref 3.4–10.8)

## 2024-04-28 PROCEDURE — 83735 ASSAY OF MAGNESIUM: CPT | Performed by: NURSE PRACTITIONER

## 2024-04-28 PROCEDURE — 99232 SBSQ HOSP IP/OBS MODERATE 35: CPT | Performed by: INTERNAL MEDICINE

## 2024-04-28 PROCEDURE — 85025 COMPLETE CBC W/AUTO DIFF WBC: CPT | Performed by: NURSE PRACTITIONER

## 2024-04-28 PROCEDURE — 82948 REAGENT STRIP/BLOOD GLUCOSE: CPT | Performed by: NURSE PRACTITIONER

## 2024-04-28 PROCEDURE — 82948 REAGENT STRIP/BLOOD GLUCOSE: CPT

## 2024-04-28 PROCEDURE — 85007 BL SMEAR W/DIFF WBC COUNT: CPT | Performed by: NURSE PRACTITIONER

## 2024-04-28 PROCEDURE — 25010000002 CEFTRIAXONE PER 250 MG: Performed by: NURSE PRACTITIONER

## 2024-04-28 PROCEDURE — 84100 ASSAY OF PHOSPHORUS: CPT | Performed by: INTERNAL MEDICINE

## 2024-04-28 PROCEDURE — 84484 ASSAY OF TROPONIN QUANT: CPT | Performed by: INTERNAL MEDICINE

## 2024-04-28 PROCEDURE — 80053 COMPREHEN METABOLIC PANEL: CPT | Performed by: NURSE PRACTITIONER

## 2024-04-28 RX ORDER — ONDANSETRON 2 MG/ML
4 INJECTION INTRAMUSCULAR; INTRAVENOUS EVERY 6 HOURS PRN
Status: DISCONTINUED | OUTPATIENT
Start: 2024-04-28 | End: 2024-04-28 | Stop reason: SDUPTHER

## 2024-04-28 RX ORDER — ONDANSETRON 4 MG/1
4 TABLET, ORALLY DISINTEGRATING ORAL EVERY 6 HOURS PRN
Status: DISCONTINUED | OUTPATIENT
Start: 2024-04-28 | End: 2024-04-28 | Stop reason: SDUPTHER

## 2024-04-28 RX ORDER — BUMETANIDE 1 MG/1
0.5 TABLET ORAL DAILY
Status: DISCONTINUED | OUTPATIENT
Start: 2024-04-28 | End: 2024-04-30 | Stop reason: HOSPADM

## 2024-04-28 RX ADMIN — PANTOPRAZOLE SODIUM 40 MG: 40 TABLET, DELAYED RELEASE ORAL at 05:41

## 2024-04-28 RX ADMIN — TAMSULOSIN HYDROCHLORIDE 0.4 MG: 0.4 CAPSULE ORAL at 07:15

## 2024-04-28 RX ADMIN — Medication 10 ML: at 21:58

## 2024-04-28 RX ADMIN — Medication 10 ML: at 07:17

## 2024-04-28 RX ADMIN — Medication 1 TABLET: at 07:15

## 2024-04-28 RX ADMIN — BUMETANIDE 0.5 MG: 1 TABLET ORAL at 07:15

## 2024-04-28 RX ADMIN — CEFTRIAXONE 2000 MG: 2 INJECTION, POWDER, FOR SOLUTION INTRAMUSCULAR; INTRAVENOUS at 07:15

## 2024-04-28 RX ADMIN — MIDODRINE HYDROCHLORIDE 5 MG: 5 TABLET ORAL at 07:15

## 2024-04-28 RX ADMIN — MIDODRINE HYDROCHLORIDE 5 MG: 5 TABLET ORAL at 16:30

## 2024-04-28 RX ADMIN — MIDODRINE HYDROCHLORIDE 5 MG: 5 TABLET ORAL at 11:38

## 2024-04-28 RX ADMIN — APIXABAN 2.5 MG: 2.5 TABLET, FILM COATED ORAL at 07:15

## 2024-04-28 RX ADMIN — HYDROXYCHLOROQUINE SULFATE 400 MG: 200 TABLET ORAL at 07:15

## 2024-04-28 NOTE — THERAPY TREATMENT NOTE
Pt stated his wife took his trilogy home , pt stated someone here told him he didn't need it , rt will monitor   , pt declined our machine at this time

## 2024-04-28 NOTE — PROGRESS NOTES
Referring Provider: Clementine Rousseau MD    Reason for follow-up:  Atrial fibrillation  Elevated troponin level     Patient Care Team:  Guilherme Jason MD as PCP - General (Family Medicine)  Misty Dias MD as Consulting Physician (Cardiology)  Nora Kenyon DNP, APRN as Nurse Practitioner (Thoracic Surgery)  Sussy Shane MD as Consulting Physician (Nephrology)    Subjective .   Patient seen and examined.  Chart reviewed.  Labs reviewed.  Patient was complaining of intermittent substernal chest pain on 2024.  Has improvement of symptoms today.  Troponin is slightly elevated but is flat.         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    Neutropenia 2003    MITZI (obstructive sleep apnea)     Psoriasis     Renal cyst, left     Rheumatoid arthritis     Stroke (cerebrum)     Thrombocytopenia 2005       Past Surgical History:   Procedure Laterality Date    SKIN LESION EXCISION      back and groin-benign       Family History   Problem Relation Age of Onset    Lung cancer Brother         age unknown    Heart disease Brother     Stroke Mother     Heart disease Father     Heart disease Sister        Social History     Tobacco Use    Smoking status: Former     Current packs/day: 0.00     Types: Cigarettes     Quit date:      Years since quittin.3     Passive exposure:  Never    Smokeless tobacco: Never    Tobacco comments:     stopped smoking in 1995   Vaping Use    Vaping status: Never Used   Substance Use Topics    Alcohol use: No    Drug use: No        Medications Prior to Admission   Medication Sig Dispense Refill Last Dose    apixaban (ELIQUIS) 2.5 MG tablet tablet Take 1 tablet by mouth 2 (Two) Times a Day. 60 tablet 11     atorvastatin (LIPITOR) 20 MG tablet Take 1 tablet by mouth Daily.  3     fenofibrate 160 MG tablet Take 1 tablet by mouth Daily.  3     hydroxychloroquine (PLAQUENIL) 200 MG tablet Take 2 tablets by mouth Daily.  0     ipratropium-albuterol (DUO-NEB) 0.5-2.5 mg/3 ml nebulizer Take 3 mL by nebulization Every 4 (Four) Hours As Needed for Wheezing.       losartan (COZAAR) 100 MG tablet Take 1 tablet by mouth Daily.  3     Multiple Vitamins-Minerals (MULTIVITAMIN WITH MINERALS) tablet tablet Take 1 tablet by mouth Daily.       oxybutynin XL (DITROPAN-XL) 10 MG 24 hr tablet Take 1 tablet by mouth Daily.       pantoprazole (PROTONIX) 40 MG EC tablet Take 1 tablet by mouth Daily.       revefenacin (YUPELRI) 175 MCG/3ML nebulizer solution Take 3 mL by nebulization Daily.       spironolactone (ALDACTONE) 25 MG tablet Take 1 tablet by mouth Daily.  3     tamsulosin (FLOMAX) 0.4 MG capsule 24 hr capsule Take 1 capsule by mouth Daily.          Allergies  Penicillin v potassium and Latex    Scheduled Meds:[Held by provider] apixaban, 2.5 mg, Oral, Q12H  [Held by provider] atorvastatin, 20 mg, Oral, Daily  bumetanide, 0.5 mg, Oral, Daily  hydroxychloroquine, 400 mg, Oral, Daily  midodrine, 5 mg, Oral, TID AC  multivitamin with minerals, 1 tablet, Oral, Daily  pantoprazole, 40 mg, Oral, Q AM  sodium chloride, 10 mL, Intravenous, Q12H  [Held by provider] spironolactone, 25 mg, Oral, Daily  tamsulosin, 0.4 mg, Oral, Daily      Continuous Infusions:     PRN Meds:.  [Held by provider] acetaminophen **OR** [Held by provider] acetaminophen    senna-docusate sodium **AND**  "polyethylene glycol **AND** bisacodyl **AND** bisacodyl    dextrose    dextrose    glucagon (human recombinant)    HYDROcodone-acetaminophen    ipratropium-albuterol    nitroglycerin    ondansetron ODT **OR** ondansetron    sodium chloride    sodium chloride    Objective     VITAL SIGNS  Vitals:    04/27/24 2343 04/28/24 0000 04/28/24 0500 04/28/24 0709   BP: 131/66 119/55 138/52 136/64   BP Location: Right arm Right arm Right arm    Patient Position: Lying Lying Lying    Pulse: 67 62 54 63   Resp: 22 24 24   Temp: 97.8 °F (36.6 °C)  98 °F (36.7 °C) 97.8 °F (36.6 °C)   TempSrc: Oral  Oral    SpO2: 97% 95% 94% 94%   Weight:   82.3 kg (181 lb 7 oz)    Height:           Flowsheet Rows      Flowsheet Row First Filed Value   Admission Height 167.6 cm (66\") Documented at 04/23/2024 1200   Admission Weight 81.9 kg (180 lb 8.9 oz) Documented at 04/23/2024 1200              Intake/Output Summary (Last 24 hours) at 4/28/2024 1046  Last data filed at 4/28/2024 0819  Gross per 24 hour   Intake 1680 ml   Output 1675 ml   Net 5 ml        TELEMETRY: Atrial fibrillation with controlled ventricular response.    Physical Exam:  The patient is alert, oriented and in no distress.  Vital signs as noted above.  Head and neck revealed no carotid bruits or jugular venous distention.  No thyromegaly or lymphadenopathy is present  Lungs clear.  No wheezing.  Breath sounds are normal bilaterally.  Heart normal first and second heart sounds.  No murmur. No precordial rub is present.  No gallop is present.  Abdomen soft and nontender.  No organomegaly is present.  Extremities with good peripheral pulses without any pedal edema.  Skin warm and dry.  Upper and lower extremity bruising is present.  Musculoskeletal system is grossly normal  CNS grossly normal    Reviewed and updated.    Results Review:   I reviewed the patient's new clinical results.  Lab Results (last 24 hours)       Procedure Component Value Units Date/Time    POC Glucose 4x " Daily Before Meals & at Bedtime [[id number]]  (Abnormal) Collected: 04/28/24 0747    Specimen: Blood Updated: 04/28/24 0749     Glucose 133 mg/dL      Comment: Serial Number: [id number]:  [id number]       CBC & Differential [[id number]]  (Abnormal) Collected: 04/28/24 0027    Specimen: Blood Updated: 04/28/24 0134    Narrative:      The following orders were created for panel order CBC & Differential.  Procedure                               Abnormality         Status                     ---------                               -----------         ------                     CBC Auto Differential[[id number]]        Abnormal            Final result               Scan Slide[[id number]]                                       Final result                 Please view results for these tests on the individual orders.    Scan Slide [[id number]] Collected: 04/28/24 0027    Specimen: Blood Updated: 04/28/24 0134     RBC Morphology Normal     WBC Morphology Normal     Platelet Estimate Adequate     Large Platelets Slight/1+    CBC Auto Differential [[id number]]  (Abnormal) Collected: 04/28/24 0027    Specimen: Blood Updated: 04/28/24 0134     WBC 8.16 10*3/mm3      RBC 2.86 10*6/mm3      Hemoglobin 9.0 g/dL      Hematocrit 27.9 %      MCV 97.6 fL      MCH 31.5 pg      MCHC 32.3 g/dL      RDW 15.0 %      RDW-SD 53.0 fl      MPV 13.1 fL      Platelets 207 10*3/mm3      Neutrophil % 72.1 %      Lymphocyte % 15.0 %      Monocyte % 8.6 %      Eosinophil % 3.7 %      Basophil % 0.2 %      Immature Grans % 0.4 %      Neutrophils, Absolute 5.89 10*3/mm3      Lymphocytes, Absolute 1.22 10*3/mm3      Monocytes, Absolute 0.70 10*3/mm3      Eosinophils, Absolute 0.30 10*3/mm3      Basophils, Absolute 0.02 10*3/mm3      Immature Grans, Absolute 0.03 10*3/mm3      nRBC 0.0 /100 WBC     High Sensitivity Troponin T [[id number]]  (Abnormal) Collected: 04/28/24 0027    Specimen: Blood Updated: 04/28/24 0108     HS Troponin T 122 ng/L      Narrative:      High Sensitive Troponin T Reference Range:  <14.0 ng/L- Negative Female for AMI  <22.0 ng/L- Negative Male for AMI  >=14 - Abnormal Female indicating possible myocardial injury.  >=22 - Abnormal Male indicating possible myocardial injury.   Clinicians would have to utilize clinical acumen, EKG, Troponin, and serial changes to determine if it is an Acute Myocardial Infarction or myocardial injury due to an underlying chronic condition.         Magnesium [[id number]]  (Normal) Collected: 04/28/24 0027    Specimen: Blood Updated: 04/28/24 0106     Magnesium 1.8 mg/dL     Comprehensive Metabolic Panel [[id number]]  (Abnormal) Collected: 04/28/24 0027    Specimen: Blood Updated: 04/28/24 0106     Glucose 123 mg/dL      BUN 53 mg/dL      Creatinine 1.37 mg/dL      Sodium 140 mmol/L      Potassium 3.8 mmol/L      Chloride 101 mmol/L      CO2 31.0 mmol/L      Calcium 8.5 mg/dL      Total Protein 5.4 g/dL      Albumin 2.8 g/dL      ALT (SGPT) 58 U/L      AST (SGOT) 40 U/L      Alkaline Phosphatase 146 U/L      Total Bilirubin 0.6 mg/dL      Globulin 2.6 gm/dL      A/G Ratio 1.1 g/dL      BUN/Creatinine Ratio 38.7     Anion Gap 8.0 mmol/L      eGFR 51.2 mL/min/1.73     Narrative:      GFR Normal >60  Chronic Kidney Disease <60  Kidney Failure <15    The GFR formula is only valid for adults with stable renal function between ages 18 and 70.    Phosphorus [[id number]]  (Normal) Collected: 04/28/24 0027    Specimen: Blood Updated: 04/28/24 0106     Phosphorus 2.5 mg/dL     POC Glucose Once [[id number]]  (Abnormal) Collected: 04/27/24 2048    Specimen: Blood Updated: 04/27/24 2051     Glucose 148 mg/dL      Comment: Serial Number: [id number]:  [id number]       Occult Blood, Fecal By Immunoassay - Stool, Per Rectum [[id number]]  (Abnormal) Collected: 04/27/24 1944    Specimen: Stool from Per Rectum Updated: 04/27/24 2026     Occult Blood, Fecal by Immunoassay Positive    POC Glucose Once [[id number]]  (Abnormal)  Collected: 04/27/24 1655    Specimen: Blood Updated: 04/27/24 1656     Glucose 122 mg/dL      Comment: Serial Number: [id number]:  [id number]       High Sensitivity Troponin T 2Hr [[id number]]  (Abnormal) Collected: 04/27/24 1255    Specimen: Blood from Arm, Left Updated: 04/27/24 1330     HS Troponin T 125 ng/L      Troponin T Delta 17 ng/L     Narrative:      High Sensitive Troponin T Reference Range:  <14.0 ng/L- Negative Female for AMI  <22.0 ng/L- Negative Male for AMI  >=14 - Abnormal Female indicating possible myocardial injury.  >=22 - Abnormal Male indicating possible myocardial injury.   Clinicians would have to utilize clinical acumen, EKG, Troponin, and serial changes to determine if it is an Acute Myocardial Infarction or myocardial injury due to an underlying chronic condition.         Blood Culture - Blood, Hand, Left [[id number]]  (Normal) Collected: 04/23/24 1245    Specimen: Blood from Hand, Left Updated: 04/27/24 1301     Blood Culture No growth at 4 days    Blood Culture - Blood, Arm, Right [[id number]]  (Normal) Collected: 04/23/24 1235    Specimen: Blood from Arm, Right Updated: 04/27/24 1245     Blood Culture No growth at 4 days    Narrative:      Less than seven (7) mL's of blood was collected.  Insufficient quantity may yield false negative results.    High Sensitivity Troponin T [[id number]]  (Abnormal) Collected: 04/27/24 1057    Specimen: Blood from Arm, Left Updated: 04/27/24 1139     HS Troponin T 108 ng/L     Narrative:      High Sensitive Troponin T Reference Range:  <14.0 ng/L- Negative Female for AMI  <22.0 ng/L- Negative Male for AMI  >=14 - Abnormal Female indicating possible myocardial injury.  >=22 - Abnormal Male indicating possible myocardial injury.   Clinicians would have to utilize clinical acumen, EKG, Troponin, and serial changes to determine if it is an Acute Myocardial Infarction or myocardial injury due to an underlying chronic condition.         POC Glucose 4x Daily  Before Meals & at Bedtime [[id number]]  (Abnormal) Collected: 04/27/24 1112    Specimen: Blood Updated: 04/27/24 1114     Glucose 155 mg/dL      Comment: Serial Number: [id number]:  [id number]               Imaging Results (Last 24 Hours)       ** No results found for the last 24 hours. **        LAB RESULTS (LAST 7 DAYS)    CBC  Results from last 7 days   Lab Units 04/28/24  0027 04/27/24  0205 04/26/24  0546 04/25/24 0328 04/24/24  0338 04/23/24  1235   WBC 10*3/mm3 8.16 7.12 6.47 7.32 6.48 13.06*   RBC 10*6/mm3 2.86* 2.88* 2.68* 2.72* 2.44* 2.77*   HEMOGLOBIN g/dL 9.0* 9.1* 8.5* 8.7* 7.6* 8.8*   HEMATOCRIT % 27.9* 28.3* 26.1* 27.1* 24.7* 28.4*   MCV fL 97.6* 98.3* 97.4* 99.6* 101.2* 102.5*   PLATELETS 10*3/mm3 207 193 161 190 148 183       BMP  Results from last 7 days   Lab Units 04/28/24  0027 04/27/24  0205 04/26/24  0546 04/25/24 0328 04/24/24  0338 04/23/24  1235   SODIUM mmol/L 140 146* 144 143 141 143   POTASSIUM mmol/L 3.8 3.8 3.8 4.2 3.9 4.1   CHLORIDE mmol/L 101 106 107 114* 114* 113*   CO2 mmol/L 31.0* 29.0 26.0 18.0* 16.0* 16.0*   BUN mg/dL 53* 42* 48* 45* 35* 34*   CREATININE mg/dL 1.37* 1.20 1.31* 1.39* 1.49* 1.53*   GLUCOSE mg/dL 123* 121* 106* 131* 130* 98   MAGNESIUM mg/dL 1.8 2.3 1.8 2.3 1.7 1.6   PHOSPHORUS mg/dL 2.5 3.0 2.2* 2.4* 3.3 2.5       CMP   Results from last 7 days   Lab Units 04/28/24  0027 04/27/24  0205 04/26/24  0546 04/25/24  0328 04/24/24  0338 04/23/24  1235   SODIUM mmol/L 140 146* 144 143 141 143   POTASSIUM mmol/L 3.8 3.8 3.8 4.2 3.9 4.1   CHLORIDE mmol/L 101 106 107 114* 114* 113*   CO2 mmol/L 31.0* 29.0 26.0 18.0* 16.0* 16.0*   BUN mg/dL 53* 42* 48* 45* 35* 34*   CREATININE mg/dL 1.37* 1.20 1.31* 1.39* 1.49* 1.53*   GLUCOSE mg/dL 123* 121* 106* 131* 130* 98   ALBUMIN g/dL 2.8* 3.1* 3.3* 2.6* 2.4* 2.7*   BILIRUBIN mg/dL 0.6 0.9 0.8 0.5 1.3* 2.3*   ALK PHOS U/L 146* 153* 142* 184* 145* 192*   AST (SGOT) U/L 40 50* 87* 211* 156* 154*   ALT (SGPT) U/L 58* 73* 100* 136*  75* 67*   AMYLASE U/L  --   --   --   --   --  29   LIPASE U/L  --   --   --   --   --  16         BNP        TROPONIN  Results from last 7 days   Lab Units 04/28/24  0027 04/23/24  1433 04/23/24  1235   CK TOTAL U/L  --   --  117   HSTROP T ng/L 122*   < > 254*    < > = values in this interval not displayed.       CoAg        Creatinine Clearance  Estimated Creatinine Clearance: 41.1 mL/min (A) (by C-G formula based on SCr of 1.37 mg/dL (H)).    ABG        Radiology  No radiology results for the last day        EKG      I personally viewed and interpreted the patient's EKG/Telemetry data: Atrial fibrillation rate 10 ventricular    ECHOCARDIOGRAM:    Results for orders placed during the hospital encounter of 04/23/24    Adult Transthoracic Echo Complete W/ Cont if Necessary Per Protocol    Interpretation Summary  Indications  Shortness of breath    Technically satisfactory study.  Mitral valve is thickened with adequate opening motion.  Moderate mitral regurgitation is present.  Tricuspid valve is structurally normal.  Moderate tricuspid regurgitation is present.  Aortic valve is thickened with adequate opening motion.  Moderate aortic regurgitation is present.  Pulmonic valve opening motion is normal.  Moderate pulmonic regurgitation is present.  Mild pulmonary hypertension is present.  Left atrium is enlarged.  Right atrium is enlarged.  Left ventricle is normal in size and contractility with ejection fraction of 60%.  Grade 3 left ventricular diastolic dysfunction is present.  (MV E/8 ratio 2.8)  Left ventricular peak systolic longitudinal strain is abnormal with GL PS of -14%.  Concentric left ventricle hypertrophy is present.  Right ventricle enlarged with hypocontractility with a TAPSE of 1.46 cm..  Atrial septum is intact.  Aorta is dilated at 4.3 cm.  IVC is dilated with decreased respiratory variation.  Right atrial pressure 8 mmHg.  No pericardial effusion or intracardiac thrombus is  seen.    Impression  Thickened mitral and aortic valves with adequate opening motion.  Moderate mitral tricuspid and aortic and pulmonic regurgitation.  Left atrial enlargement.  Left ventricular size and contractility is normal with ejection fraction of 60%.  Left ventricular peak systolic longitudinal strain is abnormal with GL PS of -14%.  Left ventricular grade 3 diastolic dysfunction is present.  Right atrium right ventricle enlargement is present.  Mild pulmonary hypertension is present.          STRESS TEST  Results for orders placed during the hospital encounter of 07/28/21    Stress Test With Myocardial Perfusion One Day    Interpretation Summary  Indications  Chest pain    This study was performed under my direct supervision.    Resting ECG  Sinus rhythm right bundle branch block first-degree AV block    The patient was injected with Lexiscan intravenously while constantly monitoring electrocardiogram and vital signs.  Patient did not have any chest discomfort ST abnormalities or ectopy with injection of Lexiscan.    Cardiolite was used as an imaging agent.    Cardiolite images showed mild inferior ischemia.    Gated SPECT images revealed normal left ventricle size and contractility with ejection fraction of 73%.    Impression  ========  Lexiscan Cardiolite test is negative for myocardial ischemia.    Gated SPECT images revealed normal left ventricular size and contractility with ejection fraction of 73%.        Cardiolite (Tc-99m sestamibi) stress test    CARDIAC CATHETERIZATION  No results found for this or any previous visit.                OTHER:         Assessment & Plan     Principal Problem:    Sepsis  Active Problems:    Anemia    Moderate malnutrition      ]]]]]]]]]]]]]]]]]]  History  ==========  -Sepsis     - Elevated troponin.     -History of atrial fibrillation  Patient has converted and was maintaining sinus rhythm.  EKG 12/27/2023-atrial fibrillation  EKG 2/7/2024-atrial fibrillation right  bundle branch block      - shortness of breath fatigue and lightheadedness.  Improved     Echocardiogram-normal with ejection fraction of 60%-7/28/2021  Lexiscan Cardiolite test-mild inferior ischemia-7/28/2021      -Chronic right bundle branch block      cardiac catheterization 04/11/2018 revealed normal left ventricular function normal coronary  arteries and no evidence for pulmonary hypertension was present.     -History of diffusely dilated ascending aorta    Echocardiogram 4/24/2024   Thickened mitral and aortic valves with adequate opening motion.  Moderate mitral tricuspid and aortic and pulmonic regurgitation.  Left atrial enlargement.  Left ventricular size and contractility is normal with ejection fraction of 60%.  Left ventricular peak systolic longitudinal strain is abnormal with GL PS of -14%.  Left ventricular grade 3 diastolic dysfunction is present.  Right atrium right ventricle enlargement is present.  Mild pulmonary hypertension is present.    Echocardiogram showed left atrial and right ventricle enlargement prominent aorta.  Normal left ventricular function with ejection fraction of 60% 04/05/2018.  Stacy scan Cardiolite test is abnormal with significant inferior ischemia and apical hypokinesis on the gated SPECT images 04/05/2018.     - diabetes hypertension LGL leukemia rheumatoid arthritis COPD CKD 3  Patient is on home oxygen.     - history of prostate carcinoma.  Status post radiation     - former smoker     - allergic to penicillin  ==========  Plan  ==========      Sepsis.  Elevated troponin.  Bouncing at the bottom.  Likely due to renal dysfunction and sepsis.    Echocardiogram 4/24/2024-as abov    History of atrial fibrillation  Patient is maintaining sinus rhythm.  Patient is in sinus rhythm with first-degree AV block right bundle branch block-12/15/2021.  Sinus rhythm first-degree AV block right bundle branch block- 6/22/2022  EKG 6/22/2023 sinus bradycardia right bundle branch  block  EKG 12/22/2022-sinus rhythm right bundle branch block  EKG 12/27/2023-atrial fibrillation  EKG 2/7/2024-atrial fibrillation right bundle branch block left anterior fascicular block.  Rate is well-controlled.  EKG-atrial fibrillation right bundle branch block-4/26/2024.    Anticoagulation was discussed.  Patient was on low-dose Eliquis.  Consideration may be given for Watchman procedure.     Chronic right bundle branch block-asymptomatic    Renal dysfunction  BUNs/creatinine 35/1.49-4/24/2024  45/1.39-4/25/2024  48/1.31  42/1.20    Hypertension-stable.    Positive occult blood.  Patient to have EGD today.  Have communicated with endoscopy unit.     Shortness of breath fatigue and lightheadedness.-Stable  COPD  Patient is on home oxygen.       Patient has been having atypical chest pain.  Troponins are flat at 125 and 122.  Probably renal in origin.  Will monitor trend by checking troponin in AM.  EKG done 4/27/2024 reviewed/interpreted by me reveals A-fib with right bundle branch block with new T wave changes in anterior leads.  Will hold Eliquis  Give aspirin  Will repeat troponin in a.m. and EKG in AM.  Patient's primary cardiologist will follow-up in a.m.  Patient has chronic hypotension requiring midodrine will hold off on cardiac meds which potentially can cause hypotension at the current time.  Will monitor renal function and hemoglobin.    José Miguel Sanchez MD  04/28/24  10:46 EDT

## 2024-04-28 NOTE — PLAN OF CARE
Goal Outcome Evaluation:           Patient improving.          Patient awake on and off through the night. Minimal complaint of chest pain. Maintaining NSR most of the night, no complaints of SOA. Occult blood test +, physician aware.

## 2024-04-28 NOTE — PROGRESS NOTES
LOS: 5 days   Patient Care Team:  Guilherme Jason MD as PCP - General (Family Medicine)  Mitsy Dias MD as Consulting Physician (Cardiology)  Nora Kenyon, BRIAN, APRN as Nurse Practitioner (Thoracic Surgery)  Sussy Shane MD as Consulting Physician (Nephrology)    Subjective     Interval History: Patient denies chest pain today.  Feeling more at his baseline      Review of Systems   Constitutional:  Positive for activity change and fatigue.   HENT: Negative.     Respiratory:  Positive for shortness of breath.    Gastrointestinal:  Negative for abdominal distention.   Genitourinary:  Negative for difficulty urinating.   Musculoskeletal:  Positive for arthralgias and myalgias.   Skin: Negative.    Neurological:  Positive for weakness. Negative for dizziness, light-headedness, numbness and headaches.   Psychiatric/Behavioral: Negative.             Objective     Vital Signs  Temp:  [97.8 °F (36.6 °C)-99.1 °F (37.3 °C)] 97.8 °F (36.6 °C)  Heart Rate:  [54-78] 63  Resp:  [22-28] 24  BP: (108-138)/(49-66) 136/64       Results Review:    Lab Results (last 24 hours)       Procedure Component Value Units Date/Time    POC Glucose 4x Daily Before Meals & at Bedtime [[id number]]  (Abnormal) Collected: 04/28/24 0747    Specimen: Blood Updated: 04/28/24 0749     Glucose 133 mg/dL      Comment: Serial Number: [id number]:  [id number]       CBC & Differential [[id number]]  (Abnormal) Collected: 04/28/24 0027    Specimen: Blood Updated: 04/28/24 0134    Narrative:      The following orders were created for panel order CBC & Differential.  Procedure                               Abnormality         Status                     ---------                               -----------         ------                     CBC Auto Differential[[id number]]        Abnormal            Final result               Scan Slide[[id number]]                                       Final result                 Please view results for  these tests on the individual orders.    Scan Slide [[id number]] Collected: 04/28/24 0027    Specimen: Blood Updated: 04/28/24 0134     RBC Morphology Normal     WBC Morphology Normal     Platelet Estimate Adequate     Large Platelets Slight/1+    CBC Auto Differential [[id number]]  (Abnormal) Collected: 04/28/24 0027    Specimen: Blood Updated: 04/28/24 0134     WBC 8.16 10*3/mm3      RBC 2.86 10*6/mm3      Hemoglobin 9.0 g/dL      Hematocrit 27.9 %      MCV 97.6 fL      MCH 31.5 pg      MCHC 32.3 g/dL      RDW 15.0 %      RDW-SD 53.0 fl      MPV 13.1 fL      Platelets 207 10*3/mm3      Neutrophil % 72.1 %      Lymphocyte % 15.0 %      Monocyte % 8.6 %      Eosinophil % 3.7 %      Basophil % 0.2 %      Immature Grans % 0.4 %      Neutrophils, Absolute 5.89 10*3/mm3      Lymphocytes, Absolute 1.22 10*3/mm3      Monocytes, Absolute 0.70 10*3/mm3      Eosinophils, Absolute 0.30 10*3/mm3      Basophils, Absolute 0.02 10*3/mm3      Immature Grans, Absolute 0.03 10*3/mm3      nRBC 0.0 /100 WBC     High Sensitivity Troponin T [[id number]]  (Abnormal) Collected: 04/28/24 0027    Specimen: Blood Updated: 04/28/24 0108     HS Troponin T 122 ng/L     Narrative:      High Sensitive Troponin T Reference Range:  <14.0 ng/L- Negative Female for AMI  <22.0 ng/L- Negative Male for AMI  >=14 - Abnormal Female indicating possible myocardial injury.  >=22 - Abnormal Male indicating possible myocardial injury.   Clinicians would have to utilize clinical acumen, EKG, Troponin, and serial changes to determine if it is an Acute Myocardial Infarction or myocardial injury due to an underlying chronic condition.         Magnesium [[id number]]  (Normal) Collected: 04/28/24 0027    Specimen: Blood Updated: 04/28/24 0106     Magnesium 1.8 mg/dL     Comprehensive Metabolic Panel [[id number]]  (Abnormal) Collected: 04/28/24 0027    Specimen: Blood Updated: 04/28/24 0106     Glucose 123 mg/dL      BUN 53 mg/dL      Creatinine 1.37 mg/dL      Sodium 140  mmol/L      Potassium 3.8 mmol/L      Chloride 101 mmol/L      CO2 31.0 mmol/L      Calcium 8.5 mg/dL      Total Protein 5.4 g/dL      Albumin 2.8 g/dL      ALT (SGPT) 58 U/L      AST (SGOT) 40 U/L      Alkaline Phosphatase 146 U/L      Total Bilirubin 0.6 mg/dL      Globulin 2.6 gm/dL      A/G Ratio 1.1 g/dL      BUN/Creatinine Ratio 38.7     Anion Gap 8.0 mmol/L      eGFR 51.2 mL/min/1.73     Narrative:      GFR Normal >60  Chronic Kidney Disease <60  Kidney Failure <15    The GFR formula is only valid for adults with stable renal function between ages 18 and 70.    Phosphorus [[id number]]  (Normal) Collected: 04/28/24 0027    Specimen: Blood Updated: 04/28/24 0106     Phosphorus 2.5 mg/dL     POC Glucose Once [[id number]]  (Abnormal) Collected: 04/27/24 2048    Specimen: Blood Updated: 04/27/24 2051     Glucose 148 mg/dL      Comment: Serial Number: [id number]:  [id number]       Occult Blood, Fecal By Immunoassay - Stool, Per Rectum [[id number]]  (Abnormal) Collected: 04/27/24 1944    Specimen: Stool from Per Rectum Updated: 04/27/24 2026     Occult Blood, Fecal by Immunoassay Positive    POC Glucose Once [[id number]]  (Abnormal) Collected: 04/27/24 1655    Specimen: Blood Updated: 04/27/24 1656     Glucose 122 mg/dL      Comment: Serial Number: [id number]:  [id number]       High Sensitivity Troponin T 2Hr [[id number]]  (Abnormal) Collected: 04/27/24 1255    Specimen: Blood from Arm, Left Updated: 04/27/24 1330     HS Troponin T 125 ng/L      Troponin T Delta 17 ng/L     Narrative:      High Sensitive Troponin T Reference Range:  <14.0 ng/L- Negative Female for AMI  <22.0 ng/L- Negative Male for AMI  >=14 - Abnormal Female indicating possible myocardial injury.  >=22 - Abnormal Male indicating possible myocardial injury.   Clinicians would have to utilize clinical acumen, EKG, Troponin, and serial changes to determine if it is an Acute Myocardial Infarction or myocardial injury due to an underlying chronic  condition.         Blood Culture - Blood, Hand, Left [[id number]]  (Normal) Collected: 04/23/24 1245    Specimen: Blood from Hand, Left Updated: 04/27/24 1301     Blood Culture No growth at 4 days    Blood Culture - Blood, Arm, Right [[id number]]  (Normal) Collected: 04/23/24 1235    Specimen: Blood from Arm, Right Updated: 04/27/24 1245     Blood Culture No growth at 4 days    Narrative:      Less than seven (7) mL's of blood was collected.  Insufficient quantity may yield false negative results.             Imaging Results (Last 24 Hours)       ** No results found for the last 24 hours. **                 I reviewed the patient's new clinical results.    Medication Review:   Scheduled Meds:[Held by provider] apixaban, 2.5 mg, Oral, Q12H  [Held by provider] atorvastatin, 20 mg, Oral, Daily  bumetanide, 0.5 mg, Oral, Daily  hydroxychloroquine, 400 mg, Oral, Daily  midodrine, 5 mg, Oral, TID AC  multivitamin with minerals, 1 tablet, Oral, Daily  pantoprazole, 40 mg, Oral, Q AM  sodium chloride, 10 mL, Intravenous, Q12H  [Held by provider] spironolactone, 25 mg, Oral, Daily  tamsulosin, 0.4 mg, Oral, Daily      Continuous Infusions:   PRN Meds:.  [Held by provider] acetaminophen **OR** [Held by provider] acetaminophen    senna-docusate sodium **AND** polyethylene glycol **AND** bisacodyl **AND** bisacodyl    dextrose    dextrose    glucagon (human recombinant)    HYDROcodone-acetaminophen    ipratropium-albuterol    nitroglycerin    ondansetron ODT **OR** ondansetron    sodium chloride    sodium chloride     Assessment & Plan       Sepsis    Anemia    Moderate malnutrition    Septic shock, resolved  Leukocytosis with bandemia, 19 on admission  -Blood culture no growth thus far, respiratory culture negative, urinalysis was completed but no culture obtained  -Etiology uncertain, CT chest\abdomen\pelvis - unremarkable     Elevated troponin, possible non-STEMI, elevated proBNP 16,786 -cardiology following     Acute on  chronic kidney disease-neurology following.  Continue sodium bicarb for acidosis.  Avoid NSAIDs and nephrotoxic's medication.  Avoid hypotension,  renal ultrasound ok     Hypotension-off Levophed, started on midodrine, improved     Iron deficiency anemia - received iron replacement 4/24, hemoglobin 7.6  - Hemoccult stool is positive   -Status post EGD that showed distal esophageal stricture with hiatal hernia and gastric polyp, no active bleeding lesions were identified, dilation was performed    Paroxysmal atrial fibrillation - anticoagulation - eliquis. (On hold secondary to EGD and GI bleed).  cardiology following considering Watchman     COPD-okay to use home trilogy machine, will need 6-minute walk prior to discharge-oxygenating well on room air however patient states he wears 4 to 6 L of continuous oxygen at home     Elevated LFTs-US liver shows hepatic steatosis hold statin and monitor LFTs, improving     Incidental finding of cholelithiasis-patient denies right upper quadrant pain will consider HIDA scan if symptomatic     Dyslipidemia, statin on hold due to evaluated LFTs  Diabetes mellitus type 2-A1c stable  History of chronic ITP/platelets stable, continue to monitor  History of leukemia\prostate cancer-in remission  History of bilateral PE\history elevated factor VIII level-anticoagulated  Rheumatoid arthritis-continue Plaquenil  MITZI-can okay to use home trilogy machine  Hypophosphatemia - replace  Hypoalbuminemia - IV albumin  Hypomagnesemia - replace  BPH - flomax     Diet: Healthy heart, soft to chew  DVT prophylaxis: On hold  GI prophylaxis: Protonix  Code status: Full code        Plan for disposition: Home with granddaughter and home health, 1 to 2 days    AZEEM Singer  04/28/24  11:52 EDT

## 2024-04-28 NOTE — PROGRESS NOTES
"NEPHROLOGY PROGRESS NOTE------[facility]    [facility]  [phone]  Shawn Shane MD      Patient Care Team:  Guilherme Jason MD as PCP - General (Family Medicine)  Misty Dias MD as Consulting Physician (Cardiology)  Nora Kenyon, BRIAN, APRN as Nurse Practitioner (Thoracic Surgery)  Sussy Shane MD as Consulting Physician (Nephrology)      Provider:  Shawn Shane MD  Patient Name: Praneeth Nam  :  1940    SUBJECTIVE:    F/U ARF/ELYSSA/CRF/CKD    Breathing ok. No angina. No dysuria. Appetite ok      Medication:  apixaban, 2.5 mg, Oral, Q12H  [Held by provider] atorvastatin, 20 mg, Oral, Daily  bumetanide, 1 mg, Oral, Daily  cefTRIAXone, 2,000 mg, Intravenous, Q24H  hydroxychloroquine, 400 mg, Oral, Daily  midodrine, 5 mg, Oral, TID AC  multivitamin with minerals, 1 tablet, Oral, Daily  pantoprazole, 40 mg, Oral, Q AM  sodium bicarbonate, 1,300 mg, Oral, TID  sodium chloride, 10 mL, Intravenous, Q12H  [Held by provider] spironolactone, 25 mg, Oral, Daily  tamsulosin, 0.4 mg, Oral, Daily           OBJECTIVE    Vital Sign Min/Max for last 24 hours  Temp  Min: 97.8 °F (36.6 °C)  Max: 99.1 °F (37.3 °C)   BP  Min: 108/49  Max: 141/61   Pulse  Min: 54  Max: 78   Resp  Min: 20  Max: 28   SpO2  Min: 92 %  Max: 97 %   No data recorded   Weight  Min: 82.3 kg (181 lb 7 oz)  Max: 82.3 kg (181 lb 7 oz)     Flowsheet Rows      Flowsheet Row First Filed Value   Admission Height 167.6 cm (66\") Documented at 2024 1200   Admission Weight 81.9 kg (180 lb 8.9 oz) Documented at 2024 1200            I/O this shift:  In: 240 [P.O.:240]  Out: 900 [Urine:900]  I/O last 3 completed shifts:  In: 2580 [P.O.:1680; I.V.:900]  Out: 3975 [Urine:3975]      Physical Exam:  General Appearance: alert, appears stated age and cooperative  Head: normocephalic, without obvious abnormality and atraumatic  Eyes: conjunctivae and sclerae " "normal and no icterus  Neck: supple and NO GROSS JVD NOW  Lungs: +FINE BIBASILAR CRACKLES (BETTER)  Heart: IRREG IRREG +KATHLEEN  Chest Wall: no abnormalities observed  Abdomen: normal bowel sounds and soft, nontender  Extremities: moves extremities well, +TRACE PRETIBIAL EDEMA BILAT LE, no cyanosis  Skin: no bleeding, bruising, has bilateral chronic skin pigmentation lower extremity.  Neurologic: Alert, and oriented. No focal deficits    Labs:    WBC WBC   Date Value Ref Range Status   04/28/2024 8.16 3.40 - 10.80 10*3/mm3 Final   04/27/2024 7.12 3.40 - 10.80 10*3/mm3 Final   04/26/2024 6.47 3.40 - 10.80 10*3/mm3 Final      HGB Hemoglobin   Date Value Ref Range Status   04/28/2024 9.0 (L) 13.0 - 17.7 g/dL Final   04/27/2024 9.1 (L) 13.0 - 17.7 g/dL Final   04/26/2024 8.5 (L) 13.0 - 17.7 g/dL Final      HCT Hematocrit   Date Value Ref Range Status   04/28/2024 27.9 (L) 37.5 - 51.0 % Final   04/27/2024 28.3 (L) 37.5 - 51.0 % Final   04/26/2024 26.1 (L) 37.5 - 51.0 % Final      Platelets No results found for: \"LABPLAT\"   MCV MCV   Date Value Ref Range Status   04/28/2024 97.6 (H) 79.0 - 97.0 fL Final   04/27/2024 98.3 (H) 79.0 - 97.0 fL Final   04/26/2024 97.4 (H) 79.0 - 97.0 fL Final          Sodium Sodium   Date Value Ref Range Status   04/28/2024 140 136 - 145 mmol/L Final   04/27/2024 146 (H) 136 - 145 mmol/L Final   04/26/2024 144 136 - 145 mmol/L Final      Potassium Potassium   Date Value Ref Range Status   04/28/2024 3.8 3.5 - 5.2 mmol/L Final   04/27/2024 3.8 3.5 - 5.2 mmol/L Final   04/26/2024 3.8 3.5 - 5.2 mmol/L Final      Chloride Chloride   Date Value Ref Range Status   04/28/2024 101 98 - 107 mmol/L Final   04/27/2024 106 98 - 107 mmol/L Final   04/26/2024 107 98 - 107 mmol/L Final      CO2 CO2   Date Value Ref Range Status   04/28/2024 31.0 (H) 22.0 - 29.0 mmol/L Final   04/27/2024 29.0 22.0 - 29.0 mmol/L Final   04/26/2024 26.0 22.0 - 29.0 mmol/L Final      BUN BUN   Date Value Ref Range Status " "  04/28/2024 53 (H) 8 - 23 mg/dL Final   04/27/2024 42 (H) 8 - 23 mg/dL Final   04/26/2024 48 (H) 8 - 23 mg/dL Final      Creatinine Creatinine   Date Value Ref Range Status   04/28/2024 1.37 (H) 0.76 - 1.27 mg/dL Final   04/27/2024 1.20 0.76 - 1.27 mg/dL Final   04/26/2024 1.31 (H) 0.76 - 1.27 mg/dL Final      Calcium Calcium   Date Value Ref Range Status   04/28/2024 8.5 (L) 8.6 - 10.5 mg/dL Final   04/27/2024 9.0 8.6 - 10.5 mg/dL Final   04/26/2024 8.9 8.6 - 10.5 mg/dL Final      PO4 No components found for: \"PO4\"   Albumin Albumin   Date Value Ref Range Status   04/28/2024 2.8 (L) 3.5 - 5.2 g/dL Final   04/27/2024 3.1 (L) 3.5 - 5.2 g/dL Final   04/26/2024 3.3 (L) 3.5 - 5.2 g/dL Final      Magnesium Magnesium   Date Value Ref Range Status   04/28/2024 1.8 1.6 - 2.4 mg/dL Final   04/27/2024 2.3 1.6 - 2.4 mg/dL Final   04/26/2024 1.8 1.6 - 2.4 mg/dL Final      Uric Acid No components found for: \"URIC ACID\"     Imaging Results (Last 72 Hours)       Procedure Component Value Units Date/Time    US Renal Bilateral [[id number]] Collected: 04/25/24 0727     Updated: 04/25/24 0730    Narrative:      US RENAL BILATERAL    Date of Exam: 4/25/2024 5:29 AM EDT    Indication: Kidney failure, acute  ELYSSA.    Comparison: 4/24/2024 CT    Technique: Grayscale and color Doppler ultrasound evaluation of the kidneys and urinary bladder was performed.      Findings:  Right kidney measures 10.8 cm in length. The left kidney measures 10.7 cm in length. There is no hydronephrosis, nephrolithiasis, or suspicious renal mass.    Limited evaluation of the urinary bladder secondary to recent voiding.      Impression:      Impression:  No hydronephrosis.        Electronically Signed: Daniel Jennings MD    4/25/2024 7:28 AM EDT    Workstation ID: [id number]            Results for orders placed during the hospital encounter of 04/23/24    XR Chest 1 View    Narrative  XR CHEST 1 VW    Date of Exam: 4/24/2024 8:35 AM EDT    Indication: Possible " pneumonia    Comparison: 4/23/2024    Findings:  Unchanged enlarged cardiac silhouette. There are persistent small bilateral pleural effusions with adjacent atelectasis. No definite new airspace consolidation. Osseous fractures are unchanged.    Impression  Impression:  No significant interval change.      Electronically Signed: Daniel Jennings MD  4/24/2024 8:52 AM EDT  Workstation ID: [id number]      Results for orders placed in visit on 09/12/23    SCANNED - IMAGING      Results for orders placed in visit on 06/19/23    SCANNED - IMAGING            ASSESSMENT / PLAN      Sepsis    Anemia    Moderate malnutrition    ARF/ELYSSA/CRF/CKD------Nonoliguric. BUN/Cr down. +ARF/ELYSSA on top of   known CRF/CKD STG 3A. CRF/CKD STG 3A secondary to DGS/HTN NS. +ARF/ELYSSA is secondary to ATN from hypotension/sepsis. No NSAIDs or IV dye. Cautious diuresis. Back down Bumex a little today    2. ACIDOSIS--Resolved. D/C NaHCO3 and follow    3. HYPOPHOSPHATEMIA------Replacd    4. ANEMIA OF CKD-----hemoccult +. S/P IV iron for ANDREA    5. COPD------Oxygen, nebs, pulmonary toilet    6. VOLUME OVERLOAD----Clinically resolved. Back down Bumex today and follow    7. HYPOALBUMINEMIA-----S/P IV Albumin to temporize    8. ELEVATED LFTS/TRANSAMINITIS------Better with diuresis    9. SEPSIS/UTI-----Abx per Intensivist    10. HYPOMAGNESEMIA-------Replace IV    11. PAF--------Rate controlled. Eliquis on hold b/c Hemoccult +    12. HYPOTENSION------Better with Midodrine    13. BPH-----On Flomax    14. GERD/PUD PROPHYLAXIS-----On PPI. Benefits outweigh risks despite renal dysfnction    15. DMII WITH RENAL MANIFESTATIONS-----BS ok. Glucometers, SSI    16. MILD HYPERNATREMIA------Better. Back down diuretics today    17. HYPOCALCEMIA------Replaced      Shawn Shane MD  [facility]  [phone]  04/28/24  06:57 EDT

## 2024-04-28 NOTE — PLAN OF CARE
Problem: Skin Injury Risk Increased  Goal: Skin Health and Integrity  Outcome: Ongoing, Progressing  Intervention: Promote and Optimize Oral Intake  Recent Flowsheet Documentation  Taken 4/28/2024 0819 by Shabbir Cunningham RN  Oral Nutrition Promotion:   rest periods promoted   physical activity promoted  Intervention: Optimize Skin Protection  Recent Flowsheet Documentation  Taken 4/28/2024 0819 by Shabbir Cunningham, RN  Pressure Reduction Techniques:   frequent weight shift encouraged   weight shift assistance provided  Head of Bed (HOB) Positioning: HOB elevated  Pressure Reduction Devices: positioning supports utilized  Skin Protection: skin sealant/moisture barrier applied     Goal Outcome Evaluation:  Plan of Care Reviewed With: patient        Progress: no change

## 2024-04-29 LAB
ALBUMIN SERPL-MCNC: 2.7 G/DL (ref 3.5–5.2)
ALBUMIN/GLOB SERPL: 1.1 G/DL
ALP SERPL-CCNC: 139 U/L (ref 39–117)
ALT SERPL W P-5'-P-CCNC: 41 U/L (ref 1–41)
ANION GAP SERPL CALCULATED.3IONS-SCNC: 8 MMOL/L (ref 5–15)
AST SERPL-CCNC: 29 U/L (ref 1–40)
BASOPHILS # BLD AUTO: 0.04 10*3/MM3 (ref 0–0.2)
BASOPHILS NFR BLD AUTO: 0.5 % (ref 0–1.5)
BILIRUB SERPL-MCNC: 0.6 MG/DL (ref 0–1.2)
BUN SERPL-MCNC: 44 MG/DL (ref 8–23)
BUN/CREAT SERPL: 42.7 (ref 7–25)
CALCIUM SPEC-SCNC: 8.7 MG/DL (ref 8.6–10.5)
CHLORIDE SERPL-SCNC: 104 MMOL/L (ref 98–107)
CO2 SERPL-SCNC: 29 MMOL/L (ref 22–29)
CREAT SERPL-MCNC: 1.03 MG/DL (ref 0.76–1.27)
DEPRECATED RDW RBC AUTO: 54.7 FL (ref 37–54)
EGFRCR SERPLBLD CKD-EPI 2021: 72.1 ML/MIN/1.73
EOSINOPHIL # BLD AUTO: 0.45 10*3/MM3 (ref 0–0.4)
EOSINOPHIL NFR BLD AUTO: 6.1 % (ref 0.3–6.2)
ERYTHROCYTE [DISTWIDTH] IN BLOOD BY AUTOMATED COUNT: 15.6 % (ref 12.3–15.4)
GEN 5 2HR TROPONIN T REFLEX: 107 NG/L
GLOBULIN UR ELPH-MCNC: 2.5 GM/DL
GLUCOSE BLDC GLUCOMTR-MCNC: 116 MG/DL (ref 70–105)
GLUCOSE BLDC GLUCOMTR-MCNC: 119 MG/DL (ref 70–105)
GLUCOSE BLDC GLUCOMTR-MCNC: 124 MG/DL (ref 70–105)
GLUCOSE SERPL-MCNC: 102 MG/DL (ref 65–99)
HCT VFR BLD AUTO: 28.9 % (ref 37.5–51)
HGB BLD-MCNC: 9.2 G/DL (ref 13–17.7)
IMM GRANULOCYTES # BLD AUTO: 0.03 10*3/MM3 (ref 0–0.05)
IMM GRANULOCYTES NFR BLD AUTO: 0.4 % (ref 0–0.5)
LYMPHOCYTES # BLD AUTO: 1.39 10*3/MM3 (ref 0.7–3.1)
LYMPHOCYTES NFR BLD AUTO: 18.8 % (ref 19.6–45.3)
MAGNESIUM SERPL-MCNC: 1.9 MG/DL (ref 1.6–2.4)
MCH RBC QN AUTO: 31.3 PG (ref 26.6–33)
MCHC RBC AUTO-ENTMCNC: 31.8 G/DL (ref 31.5–35.7)
MCV RBC AUTO: 98.3 FL (ref 79–97)
MONOCYTES # BLD AUTO: 0.78 10*3/MM3 (ref 0.1–0.9)
MONOCYTES NFR BLD AUTO: 10.5 % (ref 5–12)
NEUTROPHILS NFR BLD AUTO: 4.71 10*3/MM3 (ref 1.7–7)
NEUTROPHILS NFR BLD AUTO: 63.7 % (ref 42.7–76)
NRBC BLD AUTO-RTO: 0 /100 WBC (ref 0–0.2)
PHOSPHATE SERPL-MCNC: 2.2 MG/DL (ref 2.5–4.5)
PLATELET # BLD AUTO: 227 10*3/MM3 (ref 140–450)
PMV BLD AUTO: 13 FL (ref 6–12)
POTASSIUM SERPL-SCNC: 3.8 MMOL/L (ref 3.5–5.2)
PROT SERPL-MCNC: 5.2 G/DL (ref 6–8.5)
QT INTERVAL: 483 MS
QT INTERVAL: 496 MS
QTC INTERVAL: 505 MS
QTC INTERVAL: 521 MS
RBC # BLD AUTO: 2.94 10*6/MM3 (ref 4.14–5.8)
SODIUM SERPL-SCNC: 141 MMOL/L (ref 136–145)
TROPONIN T DELTA: -20 NG/L
TROPONIN T SERPL HS-MCNC: 127 NG/L
WBC NRBC COR # BLD AUTO: 7.4 10*3/MM3 (ref 3.4–10.8)

## 2024-04-29 PROCEDURE — 93010 ELECTROCARDIOGRAM REPORT: CPT | Performed by: INTERNAL MEDICINE

## 2024-04-29 PROCEDURE — 99233 SBSQ HOSP IP/OBS HIGH 50: CPT | Performed by: INTERNAL MEDICINE

## 2024-04-29 PROCEDURE — 93005 ELECTROCARDIOGRAM TRACING: CPT | Performed by: INTERNAL MEDICINE

## 2024-04-29 PROCEDURE — 97530 THERAPEUTIC ACTIVITIES: CPT

## 2024-04-29 PROCEDURE — 97116 GAIT TRAINING THERAPY: CPT

## 2024-04-29 PROCEDURE — 80053 COMPREHEN METABOLIC PANEL: CPT | Performed by: INTERNAL MEDICINE

## 2024-04-29 PROCEDURE — 84484 ASSAY OF TROPONIN QUANT: CPT | Performed by: INTERNAL MEDICINE

## 2024-04-29 PROCEDURE — 97535 SELF CARE MNGMENT TRAINING: CPT

## 2024-04-29 PROCEDURE — 84100 ASSAY OF PHOSPHORUS: CPT | Performed by: INTERNAL MEDICINE

## 2024-04-29 PROCEDURE — 82948 REAGENT STRIP/BLOOD GLUCOSE: CPT

## 2024-04-29 PROCEDURE — 83735 ASSAY OF MAGNESIUM: CPT | Performed by: INTERNAL MEDICINE

## 2024-04-29 PROCEDURE — 85025 COMPLETE CBC W/AUTO DIFF WBC: CPT | Performed by: INTERNAL MEDICINE

## 2024-04-29 RX ADMIN — MIDODRINE HYDROCHLORIDE 5 MG: 5 TABLET ORAL at 16:17

## 2024-04-29 RX ADMIN — BUMETANIDE 0.5 MG: 1 TABLET ORAL at 07:42

## 2024-04-29 RX ADMIN — Medication 1 TABLET: at 07:42

## 2024-04-29 RX ADMIN — Medication 10 ML: at 21:20

## 2024-04-29 RX ADMIN — HYDROXYCHLOROQUINE SULFATE 400 MG: 200 TABLET ORAL at 07:42

## 2024-04-29 RX ADMIN — PANTOPRAZOLE SODIUM 40 MG: 40 TABLET, DELAYED RELEASE ORAL at 05:53

## 2024-04-29 RX ADMIN — APIXABAN 2.5 MG: 2.5 TABLET, FILM COATED ORAL at 21:20

## 2024-04-29 RX ADMIN — MIDODRINE HYDROCHLORIDE 5 MG: 5 TABLET ORAL at 07:42

## 2024-04-29 RX ADMIN — MIDODRINE HYDROCHLORIDE 5 MG: 5 TABLET ORAL at 10:47

## 2024-04-29 RX ADMIN — TAMSULOSIN HYDROCHLORIDE 0.4 MG: 0.4 CAPSULE ORAL at 07:42

## 2024-04-29 RX ADMIN — Medication 10 ML: at 07:43

## 2024-04-29 NOTE — PROGRESS NOTES
Nutrition Services    Patient Name: Praneeth Nam  YOB: 1940  MRN: [id number]  Admission date: 4/23/2024    PROGRESS NOTE      Encounter Information: Checking in on patient to monitor Nutrition POC and diet tolerance. EKG scheduled in AM. Patient is currently tolerating po diet without noted issues at this time.  No N/V/D/C reported. RD will continue to monitor diet tolerance per protocol.        PO Diet: Diet: Cardiac, Diabetic, Renal; Healthy Heart (2-3 Na+); Consistent Carbohydrate; Low Sodium (2-3g), Low Potassium, Low Phosphorus; Fluid Consistency: Thin (IDDSI 0)   PO Supplements: Chocolate Boost Plus BID (Provides 720 kcals, 28 g protein if consumed)   Boost Glucose Control may be substituted for Boost Plus at this time, due to national shortage of many ONS products. If substituted, each Boost Glucose Control will provide 190 kcal and 16g PRO.    PO Intake:  85% average po intake x last 7 documented meals        Current nutrition support: -   Nutrition support review: -       Labs (reviewed below): Hypophosphatemia - replacement available       GI Function:  Last documented BM 4/28       Nutrition Intervention Updates: Continue to encourage good po intake    Continue to encourage ONS intake       Results from last 7 days   Lab Units 04/29/24  0433 04/28/24  0027 04/27/24  0205   SODIUM mmol/L 141 140 146*   POTASSIUM mmol/L 3.8 3.8 3.8   CHLORIDE mmol/L 104 101 106   CO2 mmol/L 29.0 31.0* 29.0   BUN mg/dL 44* 53* 42*   CREATININE mg/dL 1.03 1.37* 1.20   CALCIUM mg/dL 8.7 8.5* 9.0   BILIRUBIN mg/dL 0.6 0.6 0.9   ALK PHOS U/L 139* 146* 153*   ALT (SGPT) U/L 41 58* 73*   AST (SGOT) U/L 29 40 50*   GLUCOSE mg/dL 102* 123* 121*     Results from last 7 days   Lab Units 04/29/24  0433 04/28/24  0027 04/27/24  0205 04/24/24  0338 04/23/24  1235   MAGNESIUM mg/dL 1.9 1.8 2.3   < > 1.6   PHOSPHORUS mg/dL 2.2* 2.5 3.0   < > 2.5   HEMOGLOBIN g/dL 9.2* 9.0* 9.1*   < > 8.8*   HEMATOCRIT % 28.9* 27.9* 28.3*    < > 28.4*   TRIGLYCERIDES mg/dL  --   --   --   --  47    < > = values in this interval not displayed.     COVID19   Date Value Ref Range Status   04/23/2024 Not Detected Not Detected - Ref. Range Final     Lab Results   Component Value Date    HGBA1C 4.70 (L) 04/23/2024       RD to follow up per protocol.    Electronically signed by:  Su Koenig RD  04/29/24 13:27 EDT

## 2024-04-29 NOTE — PROGRESS NOTES
Referring Provider: Pat Napoles MD    Reason for follow-up:  Atrial fibrillation  Elevated troponin level     Patient Care Team:  Guilherme Jason MD as PCP - General (Family Medicine)  Misty Dias MD as Consulting Physician (Cardiology)  Nora Kenyon DNP, APRN as Nurse Practitioner (Thoracic Surgery)  Sussy Shane MD as Consulting Physician (Nephrology)    Subjective .      ROS    Today, the patient has been without any chest discomfort , unusual shortness of breath, palpitations, dizziness or syncope.  Denies having any headache ,abdominal pain ,nausea, vomiting , diarrhea constipation, loss of weight or loss of appetite.  Denies having any excessive bruising ,hematuria or blood in the stool.    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    Neutropenia 11/2003    MITZI (obstructive sleep apnea) 2018    Psoriasis 2000    Renal cyst, left     Rheumatoid arthritis     Stroke (cerebrum)     Thrombocytopenia 08/23/2005       Past Surgical History:   Procedure Laterality Date    SKIN LESION EXCISION      back and groin-benign       Family History   Problem Relation Age of Onset    Lung cancer Brother         age unknown    Heart disease Brother     Stroke Mother     Heart disease Father     Heart disease Sister        Social History     Tobacco Use    Smoking status: Former     Current packs/day:  0.00     Types: Cigarettes     Quit date:      Years since quittin.3     Passive exposure: Never    Smokeless tobacco: Never    Tobacco comments:     stopped smoking in    Vaping Use    Vaping status: Never Used   Substance Use Topics    Alcohol use: No    Drug use: No        Medications Prior to Admission   Medication Sig Dispense Refill Last Dose    apixaban (ELIQUIS) 2.5 MG tablet tablet Take 1 tablet by mouth 2 (Two) Times a Day. 60 tablet 11     atorvastatin (LIPITOR) 20 MG tablet Take 1 tablet by mouth Daily.  3     fenofibrate 160 MG tablet Take 1 tablet by mouth Daily.  3     hydroxychloroquine (PLAQUENIL) 200 MG tablet Take 2 tablets by mouth Daily.  0     ipratropium-albuterol (DUO-NEB) 0.5-2.5 mg/3 ml nebulizer Take 3 mL by nebulization Every 4 (Four) Hours As Needed for Wheezing.       losartan (COZAAR) 100 MG tablet Take 1 tablet by mouth Daily.  3     Multiple Vitamins-Minerals (MULTIVITAMIN WITH MINERALS) tablet tablet Take 1 tablet by mouth Daily.       oxybutynin XL (DITROPAN-XL) 10 MG 24 hr tablet Take 1 tablet by mouth Daily.       pantoprazole (PROTONIX) 40 MG EC tablet Take 1 tablet by mouth Daily.       revefenacin (YUPELRI) 175 MCG/3ML nebulizer solution Take 3 mL by nebulization Daily.       spironolactone (ALDACTONE) 25 MG tablet Take 1 tablet by mouth Daily.  3     tamsulosin (FLOMAX) 0.4 MG capsule 24 hr capsule Take 1 capsule by mouth Daily.          Allergies  Penicillin v potassium and Latex    Scheduled Meds:[Held by provider] apixaban, 2.5 mg, Oral, Q12H  [Held by provider] atorvastatin, 20 mg, Oral, Daily  bumetanide, 0.5 mg, Oral, Daily  hydroxychloroquine, 400 mg, Oral, Daily  midodrine, 5 mg, Oral, TID AC  multivitamin with minerals, 1 tablet, Oral, Daily  pantoprazole, 40 mg, Oral, Q AM  sodium chloride, 10 mL, Intravenous, Q12H  [Held by provider] spironolactone, 25 mg, Oral, Daily  tamsulosin, 0.4 mg, Oral, Daily      Continuous Infusions:     PRN Meds:.  [Held by  "provider] acetaminophen **OR** [Held by provider] acetaminophen    senna-docusate sodium **AND** polyethylene glycol **AND** bisacodyl **AND** bisacodyl    dextrose    dextrose    glucagon (human recombinant)    HYDROcodone-acetaminophen    ipratropium-albuterol    nitroglycerin    ondansetron ODT **OR** ondansetron    sodium chloride    sodium chloride    Objective     VITAL SIGNS  Vitals:    04/28/24 1928 04/28/24 2355 04/29/24 0433 04/29/24 0435   BP: 135/55 152/66  136/61   BP Location: Right arm Right arm  Right arm   Patient Position: Lying Lying  Lying   Pulse: 65 90  65   Resp: 12 28  18   Temp: 98.4 °F (36.9 °C) 98.3 °F (36.8 °C)  97.6 °F (36.4 °C)   TempSrc: Oral Oral  Oral   SpO2: 96% 96%  98%   Weight:   83.1 kg (183 lb 3.2 oz)    Height:           Flowsheet Rows      Flowsheet Row First Filed Value   Admission Height 167.6 cm (66\") Documented at 04/23/2024 1200   Admission Weight 81.9 kg (180 lb 8.9 oz) Documented at 04/23/2024 1200              Intake/Output Summary (Last 24 hours) at 4/29/2024 0650  Last data filed at 4/29/2024 0434  Gross per 24 hour   Intake 1080 ml   Output 1400 ml   Net -320 ml        TELEMETRY: Atrial fibrillation with controlled ventricular response.  Intermittent junctional rhythm and A-V dissociation.    Physical Exam:  The patient is alert, oriented and in no distress.  Vital signs as noted above.  Head and neck revealed no carotid bruits or jugular venous distention.  No thyromegaly or lymphadenopathy is present  Lungs clear.  No wheezing.  Breath sounds are normal bilaterally.  Heart normal first and second heart sounds.  No murmur. No precordial rub is present.  No gallop is present.  Abdomen soft and nontender.  No organomegaly is present.  Extremities with good peripheral pulses without any pedal edema.  Skin warm and dry.  Upper and lower extremity bruising is present.  Musculoskeletal system is grossly normal  CNS grossly normal    Reviewed and updated    Results " Review:   I reviewed the patient's new clinical results.  Lab Results (last 24 hours)       Procedure Component Value Units Date/Time    High Sensitivity Troponin T [[id number]]  (Abnormal) Collected: 04/29/24 0433    Specimen: Blood from Arm, Left Updated: 04/29/24 0634     HS Troponin T 127 ng/L     Narrative:      High Sensitive Troponin T Reference Range:  <14.0 ng/L- Negative Female for AMI  <22.0 ng/L- Negative Male for AMI  >=14 - Abnormal Female indicating possible myocardial injury.  >=22 - Abnormal Male indicating possible myocardial injury.   Clinicians would have to utilize clinical acumen, EKG, Troponin, and serial changes to determine if it is an Acute Myocardial Infarction or myocardial injury due to an underlying chronic condition.         Magnesium [[id number]]  (Normal) Collected: 04/29/24 0433    Specimen: Blood from Arm, Left Updated: 04/29/24 0540     Magnesium 1.9 mg/dL     Comprehensive Metabolic Panel [[id number]]  (Abnormal) Collected: 04/29/24 0433    Specimen: Blood from Arm, Left Updated: 04/29/24 0540     Glucose 102 mg/dL      BUN 44 mg/dL      Creatinine 1.03 mg/dL      Sodium 141 mmol/L      Potassium 3.8 mmol/L      Chloride 104 mmol/L      CO2 29.0 mmol/L      Calcium 8.7 mg/dL      Total Protein 5.2 g/dL      Albumin 2.7 g/dL      ALT (SGPT) 41 U/L      AST (SGOT) 29 U/L      Alkaline Phosphatase 139 U/L      Total Bilirubin 0.6 mg/dL      Globulin 2.5 gm/dL      A/G Ratio 1.1 g/dL      BUN/Creatinine Ratio 42.7     Anion Gap 8.0 mmol/L      eGFR 72.1 mL/min/1.73     Narrative:      GFR Normal >60  Chronic Kidney Disease <60  Kidney Failure <15    The GFR formula is only valid for adults with stable renal function between ages 18 and 70.    Phosphorus [[id number]]  (Abnormal) Collected: 04/29/24 0433    Specimen: Blood from Arm, Left Updated: 04/29/24 0540     Phosphorus 2.2 mg/dL     CBC & Differential [[id number]]  (Abnormal) Collected: 04/29/24 0433    Specimen: Blood from Arm, Left  Updated: 04/29/24 0512    Narrative:      The following orders were created for panel order CBC & Differential.  Procedure                               Abnormality         Status                     ---------                               -----------         ------                     CBC Auto Differential[[id number]]        Abnormal            Final result                 Please view results for these tests on the individual orders.    CBC Auto Differential [[id number]]  (Abnormal) Collected: 04/29/24 0433    Specimen: Blood from Arm, Left Updated: 04/29/24 0512     WBC 7.40 10*3/mm3      RBC 2.94 10*6/mm3      Hemoglobin 9.2 g/dL      Hematocrit 28.9 %      MCV 98.3 fL      MCH 31.3 pg      MCHC 31.8 g/dL      RDW 15.6 %      RDW-SD 54.7 fl      MPV 13.0 fL      Platelets 227 10*3/mm3      Neutrophil % 63.7 %      Lymphocyte % 18.8 %      Monocyte % 10.5 %      Eosinophil % 6.1 %      Basophil % 0.5 %      Immature Grans % 0.4 %      Neutrophils, Absolute 4.71 10*3/mm3      Lymphocytes, Absolute 1.39 10*3/mm3      Monocytes, Absolute 0.78 10*3/mm3      Eosinophils, Absolute 0.45 10*3/mm3      Basophils, Absolute 0.04 10*3/mm3      Immature Grans, Absolute 0.03 10*3/mm3      nRBC 0.0 /100 WBC     POC Glucose Once [[id number]]  (Abnormal) Collected: 04/28/24 2041    Specimen: Blood Updated: 04/28/24 2102     Glucose 115 mg/dL      Comment: Serial Number: [id number]:  [id number]       POC Glucose Once [[id number]]  (Abnormal) Collected: 04/28/24 1619    Specimen: Blood Updated: 04/28/24 1620     Glucose 141 mg/dL      Comment: Serial Number: [id number]:  [id number]       Blood Culture - Blood, Hand, Left [[id number]]  (Normal) Collected: 04/23/24 1245    Specimen: Blood from Hand, Left Updated: 04/28/24 1301     Blood Culture No growth at 5 days    Blood Culture - Blood, Arm, Right [[id number]]  (Normal) Collected: 04/23/24 1235    Specimen: Blood from Arm, Right Updated: 04/28/24 1245     Blood Culture No  growth at 5 days    Narrative:      Less than seven (7) mL's of blood was collected.  Insufficient quantity may yield false negative results.    POC Glucose Once [[id number]]  (Abnormal) Collected: 04/28/24 1152    Specimen: Blood Updated: 04/28/24 1153     Glucose 167 mg/dL      Comment: Serial Number: [id number]:  [id number]       POC Glucose 4x Daily Before Meals & at Bedtime [[id number]]  (Abnormal) Collected: 04/28/24 0747    Specimen: Blood Updated: 04/28/24 0749     Glucose 133 mg/dL      Comment: Serial Number: [id number]:  [id number]               Imaging Results (Last 24 Hours)       ** No results found for the last 24 hours. **        LAB RESULTS (LAST 7 DAYS)    CBC  Results from last 7 days   Lab Units 04/29/24  0433 04/28/24  0027 04/27/24  0205 04/26/24  0546 04/25/24  0328 04/24/24  0338 04/23/24  1235   WBC 10*3/mm3 7.40 8.16 7.12 6.47 7.32 6.48 13.06*   RBC 10*6/mm3 2.94* 2.86* 2.88* 2.68* 2.72* 2.44* 2.77*   HEMOGLOBIN g/dL 9.2* 9.0* 9.1* 8.5* 8.7* 7.6* 8.8*   HEMATOCRIT % 28.9* 27.9* 28.3* 26.1* 27.1* 24.7* 28.4*   MCV fL 98.3* 97.6* 98.3* 97.4* 99.6* 101.2* 102.5*   PLATELETS 10*3/mm3 227 207 193 161 190 148 183       BMP  Results from last 7 days   Lab Units 04/29/24  0433 04/28/24  0027 04/27/24  0205 04/26/24  0546 04/25/24  0328 04/24/24  0338 04/23/24  1235   SODIUM mmol/L 141 140 146* 144 143 141 143   POTASSIUM mmol/L 3.8 3.8 3.8 3.8 4.2 3.9 4.1   CHLORIDE mmol/L 104 101 106 107 114* 114* 113*   CO2 mmol/L 29.0 31.0* 29.0 26.0 18.0* 16.0* 16.0*   BUN mg/dL 44* 53* 42* 48* 45* 35* 34*   CREATININE mg/dL 1.03 1.37* 1.20 1.31* 1.39* 1.49* 1.53*   GLUCOSE mg/dL 102* 123* 121* 106* 131* 130* 98   MAGNESIUM mg/dL 1.9 1.8 2.3 1.8 2.3 1.7 1.6   PHOSPHORUS mg/dL 2.2* 2.5 3.0 2.2* 2.4* 3.3 2.5       CMP   Results from last 7 days   Lab Units 04/29/24  0433 04/28/24  0027 04/27/24  0205 04/26/24  0546 04/25/24  0328 04/24/24  0338 04/23/24  1235   SODIUM mmol/L 141 140 146* 144 143 141 143    POTASSIUM mmol/L 3.8 3.8 3.8 3.8 4.2 3.9 4.1   CHLORIDE mmol/L 104 101 106 107 114* 114* 113*   CO2 mmol/L 29.0 31.0* 29.0 26.0 18.0* 16.0* 16.0*   BUN mg/dL 44* 53* 42* 48* 45* 35* 34*   CREATININE mg/dL 1.03 1.37* 1.20 1.31* 1.39* 1.49* 1.53*   GLUCOSE mg/dL 102* 123* 121* 106* 131* 130* 98   ALBUMIN g/dL 2.7* 2.8* 3.1* 3.3* 2.6* 2.4* 2.7*   BILIRUBIN mg/dL 0.6 0.6 0.9 0.8 0.5 1.3* 2.3*   ALK PHOS U/L 139* 146* 153* 142* 184* 145* 192*   AST (SGOT) U/L 29 40 50* 87* 211* 156* 154*   ALT (SGPT) U/L 41 58* 73* 100* 136* 75* 67*   AMYLASE U/L  --   --   --   --   --   --  29   LIPASE U/L  --   --   --   --   --   --  16         BNP        TROPONIN  Results from last 7 days   Lab Units 04/29/24  0433 04/23/24  1433 04/23/24  1235   CK TOTAL U/L  --   --  117   HSTROP T ng/L 127*   < > 254*    < > = values in this interval not displayed.       CoAg        Creatinine Clearance  Estimated Creatinine Clearance: 55 mL/min (by C-G formula based on SCr of 1.03 mg/dL).    ABG        Radiology  No radiology results for the last day        EKG            I personally viewed and interpreted the patient's EKG/Telemetry data: Atrial fibrillation  EKG 4/29/2024.  Right bundle branch block possible junctional rhythm with possible A-V dissociation.    ECHOCARDIOGRAM:    Results for orders placed during the hospital encounter of 04/23/24    Adult Transthoracic Echo Complete W/ Cont if Necessary Per Protocol    Interpretation Summary  Indications  Shortness of breath    Technically satisfactory study.  Mitral valve is thickened with adequate opening motion.  Moderate mitral regurgitation is present.  Tricuspid valve is structurally normal.  Moderate tricuspid regurgitation is present.  Aortic valve is thickened with adequate opening motion.  Moderate aortic regurgitation is present.  Pulmonic valve opening motion is normal.  Moderate pulmonic regurgitation is present.  Mild pulmonary hypertension is present.  Left atrium is  enlarged.  Right atrium is enlarged.  Left ventricle is normal in size and contractility with ejection fraction of 60%.  Grade 3 left ventricular diastolic dysfunction is present.  (MV E/8 ratio 2.8)  Left ventricular peak systolic longitudinal strain is abnormal with GL PS of -14%.  Concentric left ventricle hypertrophy is present.  Right ventricle enlarged with hypocontractility with a TAPSE of 1.46 cm..  Atrial septum is intact.  Aorta is dilated at 4.3 cm.  IVC is dilated with decreased respiratory variation.  Right atrial pressure 8 mmHg.  No pericardial effusion or intracardiac thrombus is seen.    Impression  Thickened mitral and aortic valves with adequate opening motion.  Moderate mitral tricuspid and aortic and pulmonic regurgitation.  Left atrial enlargement.  Left ventricular size and contractility is normal with ejection fraction of 60%.  Left ventricular peak systolic longitudinal strain is abnormal with GL PS of -14%.  Left ventricular grade 3 diastolic dysfunction is present.  Right atrium right ventricle enlargement is present.  Mild pulmonary hypertension is present.          STRESS TEST  Results for orders placed during the hospital encounter of 07/28/21    Stress Test With Myocardial Perfusion One Day    Interpretation Summary  Indications  Chest pain    This study was performed under my direct supervision.    Resting ECG  Sinus rhythm right bundle branch block first-degree AV block    The patient was injected with Lexiscan intravenously while constantly monitoring electrocardiogram and vital signs.  Patient did not have any chest discomfort ST abnormalities or ectopy with injection of Lexiscan.    Cardiolite was used as an imaging agent.    Cardiolite images showed mild inferior ischemia.    Gated SPECT images revealed normal left ventricle size and contractility with ejection fraction of 73%.    Impression  ========  Lexiscan Cardiolite test is negative for myocardial ischemia.    Gated SPECT  images revealed normal left ventricular size and contractility with ejection fraction of 73%.        Cardiolite (Tc-99m sestamibi) stress test    CARDIAC CATHETERIZATION  No results found for this or any previous visit.                OTHER:         Assessment & Plan     Principal Problem:    Sepsis  Active Problems:    Anemia    Moderate malnutrition      ]]]]]]]]]]]]]]]]]]  History  ==========  -Sepsis     - Elevated troponin.     -History of atrial fibrillation  Patient has converted and was maintaining sinus rhythm.  EKG 12/27/2023-atrial fibrillation  EKG 2/7/2024-atrial fibrillation right bundle branch block      - shortness of breath fatigue and lightheadedness.  Improved     Echocardiogram-normal with ejection fraction of 60%-7/28/2021  Lexiscan Cardiolite test-mild inferior ischemia-7/28/2021      -Chronic right bundle branch block      cardiac catheterization 04/11/2018 revealed normal left ventricular function normal coronary  arteries and no evidence for pulmonary hypertension was present.     -History of diffusely dilated ascending aorta    Echocardiogram 4/24/2024   Thickened mitral and aortic valves with adequate opening motion.  Moderate mitral tricuspid and aortic and pulmonic regurgitation.  Left atrial enlargement.  Left ventricular size and contractility is normal with ejection fraction of 60%.  Left ventricular peak systolic longitudinal strain is abnormal with GL PS of -14%.  Left ventricular grade 3 diastolic dysfunction is present.  Right atrium right ventricle enlargement is present.  Mild pulmonary hypertension is present.    Echocardiogram showed left atrial and right ventricle enlargement prominent aorta.  Normal left ventricular function with ejection fraction of 60% 04/05/2018.  Stacy scan Cardiolite test is abnormal with significant inferior ischemia and apical hypokinesis on the gated SPECT images 04/05/2018.     - diabetes hypertension LGL leukemia rheumatoid arthritis COPD CKD  3  Patient is on home oxygen.     - history of prostate carcinoma.  Status post radiation     - former smoker     - allergic to penicillin  ==========  Plan  ==========  Sepsis.  Elevated troponin.  Bouncing at the bottom.  Likely due to renal dysfunction and sepsis.    Echocardiogram 4/24/2024-as abov    History of atrial fibrillation  Patient is maintaining sinus rhythm.  Patient is in sinus rhythm with first-degree AV block right bundle branch block-12/15/2021.  Sinus rhythm first-degree AV block right bundle branch block- 6/22/2022  EKG 6/22/2023 sinus bradycardia right bundle branch block  EKG 12/22/2022-sinus rhythm right bundle branch block  EKG 12/27/2023-atrial fibrillation  EKG 2/7/2024-atrial fibrillation right bundle branch block left anterior fascicular block.  Rate is well-controlled.  EKG-atrial fibrillation right bundle branch block-4/26/2024.    EKG 4/29/2024.  Right bundle branch block possible junctional rhythm with possible A-V dissociation.  Patient is asymptomatic.  Will observe closely.  Have discussed with attending nurse for coordination of care.  Patient is not on any medication to cause bradycardia or A-V dissociation.  Observe closely.    Anticoagulation was discussed.  Patient was on low-dose Eliquis.  Consideration may be given for Watchman procedure.     Chronic right bundle branch block-asymptomatic    Renal dysfunction  BUNs/creatinine 35/1.49-4/24/2024  45/1.39-4/25/2024  48/1.31  44/1.03-4/29/2024.    Hypertension-stable.    Positive occult blood.  Patient to have EGD today.  Have communicated with endoscopy unit.     Shortness of breath fatigue and lightheadedness.-Stable  COPD  Patient is on home oxygen.     Medications were reviewed and updated.  Current medications include Bumex hydroxychloroquine midodrine pantoprazole Flomax.    Patient is off Eliquis atorvastatin.     Further plan will depend on patient's progress.     Reviewed and updated-4/29/2024.  [[[[[[[[[[[[[[[[[[[[               Misty Dias MD  04/29/24  06:50 EDT

## 2024-04-29 NOTE — PROGRESS NOTES
LOS: 6 days   Patient Care Team:  Guilherme Jason MD as PCP - General (Family Medicine)  Misty Dias MD as Consulting Physician (Cardiology)  Nora Kenyon, BRIAN, APRN as Nurse Practitioner (Thoracic Surgery)  Sussy Shane MD as Consulting Physician (Nephrology)    Subjective     Interval History: Stable overnight; EKG this am shows junctional rhythm (asymptomatic); currently in afib    Patient Complaints: No specific complaints    History taken from: patient    Review of Systems   Constitutional:  Positive for activity change and fatigue. Negative for appetite change, chills, diaphoresis, fever and unexpected weight change.   HENT:  Negative for facial swelling.    Eyes:  Negative for photophobia.   Respiratory:  Negative for cough, shortness of breath, wheezing and stridor.    Cardiovascular:  Negative for chest pain, palpitations and leg swelling.   Gastrointestinal:  Negative for abdominal pain, constipation, diarrhea, nausea and vomiting.   Endocrine: Negative for polyuria.   Genitourinary:  Negative for dysuria.   Musculoskeletal:  Positive for arthralgias. Negative for gait problem.   Skin:  Negative for rash and wound.   Neurological:  Negative for dizziness, light-headedness and headaches.   Psychiatric/Behavioral:  Negative for confusion.            Objective     Vital Signs  Temp:  [97.6 °F (36.4 °C)-98.4 °F (36.9 °C)] 98.2 °F (36.8 °C)  Heart Rate:  [61-90] 65  Resp:  [12-28] 28  BP: (116-152)/(51-68) 130/51    Physical Exam:     General Appearance:    Alert, cooperative, in no acute distress,   Head:    Normocephalic, without obvious abnormality, atraumatic   Eyes:            Lids and lashes normal, conjunctivae and sclerae normal, no   icterus, no pallor, corneas clear, PERRLA   Ears:    Ears appear intact with no abnormalities noted   Throat:   No oral lesions, no thrush, oral mucosa moist   Neck:   No adenopathy, supple, trachea midline, no thyromegaly, no   carotid bruit,  no JVD   Lungs:     Clear to auscultation,respirations regular, even and                  unlabored    Heart:    Irregularly irregular   Chest Wall:    No abnormalities observed   Abdomen:     Normal bowel sounds, no masses, no organomegaly, soft        Non-tender non-distended, no guarding,   Extremities:   Moves all extremities well, no edema, no cyanosis, no             Redness   Pulses:   Pulses palpable and equal bilaterally   Skin:   No bleeding, bruising or rash   Lymph nodes:   No palpable adenopathy   Neurologic:   Cranial nerves 2 - 12 grossly intact, sensation intact, DTR       present and equal bilaterally        Results Review:    Lab Results (last 24 hours)       Procedure Component Value Units Date/Time    High Sensitivity Troponin T 2Hr [[id number]]  (Abnormal) Collected: 04/29/24 0744    Specimen: Blood Updated: 04/29/24 0837     HS Troponin T 107 ng/L      Troponin T Delta -20 ng/L     Narrative:      High Sensitive Troponin T Reference Range:  <14.0 ng/L- Negative Female for AMI  <22.0 ng/L- Negative Male for AMI  >=14 - Abnormal Female indicating possible myocardial injury.  >=22 - Abnormal Male indicating possible myocardial injury.   Clinicians would have to utilize clinical acumen, EKG, Troponin, and serial changes to determine if it is an Acute Myocardial Infarction or myocardial injury due to an underlying chronic condition.         POC Glucose Once [[id number]]  (Abnormal) Collected: 04/29/24 0720    Specimen: Blood Updated: 04/29/24 0723     Glucose 116 mg/dL      Comment: Serial Number: [id number]:  [id number]       High Sensitivity Troponin T [[id number]]  (Abnormal) Collected: 04/29/24 0433    Specimen: Blood from Arm, Left Updated: 04/29/24 0634     HS Troponin T 127 ng/L     Narrative:      High Sensitive Troponin T Reference Range:  <14.0 ng/L- Negative Female for AMI  <22.0 ng/L- Negative Male for AMI  >=14 - Abnormal Female indicating possible myocardial injury.  >=22 - Abnormal  Male indicating possible myocardial injury.   Clinicians would have to utilize clinical acumen, EKG, Troponin, and serial changes to determine if it is an Acute Myocardial Infarction or myocardial injury due to an underlying chronic condition.         Magnesium [[id number]]  (Normal) Collected: 04/29/24 0433    Specimen: Blood from Arm, Left Updated: 04/29/24 0540     Magnesium 1.9 mg/dL     Comprehensive Metabolic Panel [[id number]]  (Abnormal) Collected: 04/29/24 0433    Specimen: Blood from Arm, Left Updated: 04/29/24 0540     Glucose 102 mg/dL      BUN 44 mg/dL      Creatinine 1.03 mg/dL      Sodium 141 mmol/L      Potassium 3.8 mmol/L      Chloride 104 mmol/L      CO2 29.0 mmol/L      Calcium 8.7 mg/dL      Total Protein 5.2 g/dL      Albumin 2.7 g/dL      ALT (SGPT) 41 U/L      AST (SGOT) 29 U/L      Alkaline Phosphatase 139 U/L      Total Bilirubin 0.6 mg/dL      Globulin 2.5 gm/dL      A/G Ratio 1.1 g/dL      BUN/Creatinine Ratio 42.7     Anion Gap 8.0 mmol/L      eGFR 72.1 mL/min/1.73     Narrative:      GFR Normal >60  Chronic Kidney Disease <60  Kidney Failure <15    The GFR formula is only valid for adults with stable renal function between ages 18 and 70.    Phosphorus [[id number]]  (Abnormal) Collected: 04/29/24 0433    Specimen: Blood from Arm, Left Updated: 04/29/24 0540     Phosphorus 2.2 mg/dL     CBC & Differential [[id number]]  (Abnormal) Collected: 04/29/24 0433    Specimen: Blood from Arm, Left Updated: 04/29/24 0512    Narrative:      The following orders were created for panel order CBC & Differential.  Procedure                               Abnormality         Status                     ---------                               -----------         ------                     CBC Auto Differential[[id number]]        Abnormal            Final result                 Please view results for these tests on the individual orders.    CBC Auto Differential [[id number]]  (Abnormal) Collected: 04/29/24 0433     Specimen: Blood from Arm, Left Updated: 04/29/24 0512     WBC 7.40 10*3/mm3      RBC 2.94 10*6/mm3      Hemoglobin 9.2 g/dL      Hematocrit 28.9 %      MCV 98.3 fL      MCH 31.3 pg      MCHC 31.8 g/dL      RDW 15.6 %      RDW-SD 54.7 fl      MPV 13.0 fL      Platelets 227 10*3/mm3      Neutrophil % 63.7 %      Lymphocyte % 18.8 %      Monocyte % 10.5 %      Eosinophil % 6.1 %      Basophil % 0.5 %      Immature Grans % 0.4 %      Neutrophils, Absolute 4.71 10*3/mm3      Lymphocytes, Absolute 1.39 10*3/mm3      Monocytes, Absolute 0.78 10*3/mm3      Eosinophils, Absolute 0.45 10*3/mm3      Basophils, Absolute 0.04 10*3/mm3      Immature Grans, Absolute 0.03 10*3/mm3      nRBC 0.0 /100 WBC     POC Glucose Once [[id number]]  (Abnormal) Collected: 04/28/24 2041    Specimen: Blood Updated: 04/28/24 2102     Glucose 115 mg/dL      Comment: Serial Number: [id number]:  [id number]                Imaging Results (Last 24 Hours)       ** No results found for the last 24 hours. **                 I reviewed the patient's new clinical results.    Medication Review:   Scheduled Meds:apixaban, 2.5 mg, Oral, Q12H  [Held by provider] atorvastatin, 20 mg, Oral, Daily  bumetanide, 0.5 mg, Oral, Daily  hydroxychloroquine, 400 mg, Oral, Daily  midodrine, 5 mg, Oral, TID AC  multivitamin with minerals, 1 tablet, Oral, Daily  pantoprazole, 40 mg, Oral, Q AM  sodium chloride, 10 mL, Intravenous, Q12H  [Held by provider] spironolactone, 25 mg, Oral, Daily  tamsulosin, 0.4 mg, Oral, Daily      Continuous Infusions:   PRN Meds:.  [Held by provider] acetaminophen **OR** [Held by provider] acetaminophen    senna-docusate sodium **AND** polyethylene glycol **AND** bisacodyl **AND** bisacodyl    dextrose    dextrose    glucagon (human recombinant)    HYDROcodone-acetaminophen    ipratropium-albuterol    nitroglycerin    ondansetron ODT **OR** ondansetron    sodium chloride    sodium chloride     Assessment & Plan       Sepsis    Anemia     Moderate malnutrition  Permanent a fib - restart anticoagulation  Intermittent junctional rhythm - asymptomatic; observe  BPH - tamsulosin  Acute on chronic kidney disease - appears euvolemic; creatinine improved  Abnormal LFT's -likely due to shock liver, resolved        Plan for disposition:possible discharge home tomorrow; increase activity level today    Pat Napoles MD  04/29/24  16:36 EDT

## 2024-04-29 NOTE — PLAN OF CARE
Goal Outcome Evaluation:  Plan of Care Reviewed With: patient           Outcome Evaluation: Pt has rested comfortably throughout the night and remains on room air at this time. Repeat troponin and EKG for the AM. Cardio and nephro following, will continue to monitor.

## 2024-04-29 NOTE — THERAPY TREATMENT NOTE
"Subjective: Pt agreeable to therapeutic plan of care.    Objective:     Bed mobility - Min-A  Transfers - CGA and with rolling walker including to/from commode; increased time to stand from low commode; pt self completes hygiene post BM.   Ambulation - 25 feet x2 SBA, CGA, and with rolling walker    Vitals: WNL    Pain: 0 VAS   Location: pt denies pain  Intervention for pain: N/A    Education: Provided education on the importance of mobility in the acute care setting, Verbal/Tactile Cues, ADL training, Transfer Training, Gait Training, and Energy conservation strategies    Assessment: Praneeth Nam presents with functional mobility impairments which indicate the need for skilled intervention. Pt progressing with upright mobility including ambulating to/from bathroom with Rwx and CGA/SBA with no overt LOB this date. Tolerating session today without incident. Will continue to follow and progress as tolerated.     Plan/Recommendations:   If medically appropriate, Low Intensity Therapy recommended post-acute care - This is recommended as therapy feels this patient would require 2-3 visits per week. OP or HH would be the best option depending on patient's home bound status. Consider, if the patient has other  \"skilled\" needs such as wounds, IV antibiotics, etc. Combined with \"low intensity\" could also equate to a SNF. If patient is medically complex, consider LTAC. Pt requires rolling walker at discharge.     Pt desires Home with Home Health /[facility] at discharge. Pt cooperative; agreeable to therapeutic recommendations and plan of care.     Post-Tx Position: Up in Chair, Alarms activated, and Call light and personal items within reach  PPE: gloves, surgical mask, and gown   "

## 2024-04-29 NOTE — PLAN OF CARE
Problem: Adult Inpatient Plan of Care  Goal: Absence of Hospital-Acquired Illness or Injury  Intervention: Identify and Manage Fall Risk  Recent Flowsheet Documentation  Taken 4/29/2024 1436 by Alexa Sanchez RN  Safety Promotion/Fall Prevention:   assistive device/personal items within reach   clutter free environment maintained   fall prevention program maintained   lighting adjusted   nonskid shoes/slippers when out of bed   room organization consistent   safety round/check completed  Taken 4/29/2024 1203 by Alexa Sanchez RN  Safety Promotion/Fall Prevention:   assistive device/personal items within reach   clutter free environment maintained   fall prevention program maintained   lighting adjusted   nonskid shoes/slippers when out of bed   safety round/check completed   room organization consistent  Taken 4/29/2024 1019 by Alexa Sanchez RN  Safety Promotion/Fall Prevention:   assistive device/personal items within reach   clutter free environment maintained   fall prevention program maintained   lighting adjusted   nonskid shoes/slippers when out of bed   room organization consistent   safety round/check completed  Taken 4/29/2024 0743 by Alexa Sanchez RN  Safety Promotion/Fall Prevention:   assistive device/personal items within reach   clutter free environment maintained   fall prevention program maintained   lighting adjusted   nonskid shoes/slippers when out of bed   room organization consistent   safety round/check completed  Intervention: Prevent Skin Injury  Recent Flowsheet Documentation  Taken 4/29/2024 1436 by Alexa Sanchez RN  Body Position: turned  Taken 4/29/2024 1203 by Alexa Sanchez RN  Body Position: weight shifting  Taken 4/29/2024 1019 by Alexa Sanchez RN  Body Position: weight shifting  Taken 4/29/2024 0743 by Alexa Sanchez RN  Body Position: weight shifting  Intervention: Prevent and Manage VTE (Venous Thromboembolism) Risk  Recent Flowsheet Documentation  Taken 4/29/2024 1436 by Daniel  GWEN Liu  Activity Management: activity encouraged  Taken 4/29/2024 1203 by Alexa Sanchez RN  Activity Management: activity encouraged  Taken 4/29/2024 1019 by Alexa Sanchez RN  Activity Management: activity encouraged  Taken 4/29/2024 0743 by Alexa Sanchez RN  Activity Management: ambulated to bathroom  Range of Motion: active ROM (range of motion) encouraged  Intervention: Prevent Infection  Recent Flowsheet Documentation  Taken 4/29/2024 1436 by Alexa Sanchez RN  Infection Prevention:   hand hygiene promoted   personal protective equipment utilized  Taken 4/29/2024 1203 by Alexa Sanchez RN  Infection Prevention:   hand hygiene promoted   personal protective equipment utilized  Taken 4/29/2024 1019 by Alexa Sanchez RN  Infection Prevention:   hand hygiene promoted   personal protective equipment utilized  Taken 4/29/2024 0743 by Alexa Sanchez RN  Infection Prevention:   hand hygiene promoted   personal protective equipment utilized  Goal: Optimal Comfort and Wellbeing  Intervention: Provide Person-Centered Care  Recent Flowsheet Documentation  Taken 4/29/2024 1203 by Alexa Sanchez RN  Trust Relationship/Rapport: care explained  Taken 4/29/2024 0743 by Alexa Sanchez RN  Trust Relationship/Rapport: care explained   Goal Outcome Evaluation:      Pt with no complaints this shift. VSS, pt up in chair, call light in reach. Plan of care to continue,

## 2024-04-29 NOTE — PROGRESS NOTES
"NEPHROLOGY PROGRESS NOTE------[facility]    [facility]  [phone]  Sascha Dorado MD      Patient Care Team:  Guilherme Jason MD as PCP - General (Family Medicine)  Misty Dias MD as Consulting Physician (Cardiology)  Nora Kenyon, BRIAN, APRN as Nurse Practitioner (Thoracic Surgery)  Sussy Shane MD as Consulting Physician (Nephrology)      Provider:  Sascha Dorado MD  Patient Name: Praneeth Nam  :  1940    SUBJECTIVE:    F/U ELYSSA/CKD  No chest pain shortness of breath  Appetite fair.      Medication:  [Held by provider] apixaban, 2.5 mg, Oral, Q12H  [Held by provider] atorvastatin, 20 mg, Oral, Daily  bumetanide, 0.5 mg, Oral, Daily  hydroxychloroquine, 400 mg, Oral, Daily  midodrine, 5 mg, Oral, TID AC  multivitamin with minerals, 1 tablet, Oral, Daily  pantoprazole, 40 mg, Oral, Q AM  sodium chloride, 10 mL, Intravenous, Q12H  [Held by provider] spironolactone, 25 mg, Oral, Daily  tamsulosin, 0.4 mg, Oral, Daily           OBJECTIVE    Vital Sign Min/Max for last 24 hours  Temp  Min: 97.6 °F (36.4 °C)  Max: 98.4 °F (36.9 °C)   BP  Min: 116/68  Max: 152/66   Pulse  Min: 55  Max: 90   Resp  Min: 12  Max: 28   SpO2  Min: 96 %  Max: 98 %   No data recorded   Weight  Min: 83.1 kg (183 lb 3.2 oz)  Max: 83.1 kg (183 lb 3.2 oz)     Flowsheet Rows      Flowsheet Row First Filed Value   Admission Height 167.6 cm (66\") Documented at 2024 1200   Admission Weight 81.9 kg (180 lb 8.9 oz) Documented at 2024 1200            I/O this shift:  In: 240 [P.O.:240]  Out: 100 [Urine:100]  I/O last 3 completed shifts:  In: 1320 [P.O.:1320]  Out: 2300 [Urine:2300]      Physical Exam:  General Appearance: alert, appears stated age and cooperative  Head: normocephalic, without obvious abnormality and atraumatic  Eyes: conjunctivae and sclerae normal and no icterus  Neck: supple and NO GROSS JVD   Lungs: Diminished breath " "sounds  Heart: IRREG IRREG +KATHLEEN  Chest Wall: no abnormalities observed  Abdomen: normal bowel sounds and soft, nontender  Extremities: moves extremities well, +TRACE PRETIBIAL EDEMA BILAT LE, no cyanosis  Skin: no bleeding, bruising, has bilateral chronic skin pigmentation lower extremity.  Neurologic: Alert, and oriented. No focal deficits    Labs:    WBC WBC   Date Value Ref Range Status   04/29/2024 7.40 3.40 - 10.80 10*3/mm3 Final   04/28/2024 8.16 3.40 - 10.80 10*3/mm3 Final   04/27/2024 7.12 3.40 - 10.80 10*3/mm3 Final      HGB Hemoglobin   Date Value Ref Range Status   04/29/2024 9.2 (L) 13.0 - 17.7 g/dL Final   04/28/2024 9.0 (L) 13.0 - 17.7 g/dL Final   04/27/2024 9.1 (L) 13.0 - 17.7 g/dL Final      HCT Hematocrit   Date Value Ref Range Status   04/29/2024 28.9 (L) 37.5 - 51.0 % Final   04/28/2024 27.9 (L) 37.5 - 51.0 % Final   04/27/2024 28.3 (L) 37.5 - 51.0 % Final      Platelets No results found for: \"LABPLAT\"   MCV MCV   Date Value Ref Range Status   04/29/2024 98.3 (H) 79.0 - 97.0 fL Final   04/28/2024 97.6 (H) 79.0 - 97.0 fL Final   04/27/2024 98.3 (H) 79.0 - 97.0 fL Final          Sodium Sodium   Date Value Ref Range Status   04/29/2024 141 136 - 145 mmol/L Final   04/28/2024 140 136 - 145 mmol/L Final   04/27/2024 146 (H) 136 - 145 mmol/L Final      Potassium Potassium   Date Value Ref Range Status   04/29/2024 3.8 3.5 - 5.2 mmol/L Final   04/28/2024 3.8 3.5 - 5.2 mmol/L Final   04/27/2024 3.8 3.5 - 5.2 mmol/L Final      Chloride Chloride   Date Value Ref Range Status   04/29/2024 104 98 - 107 mmol/L Final   04/28/2024 101 98 - 107 mmol/L Final   04/27/2024 106 98 - 107 mmol/L Final      CO2 CO2   Date Value Ref Range Status   04/29/2024 29.0 22.0 - 29.0 mmol/L Final   04/28/2024 31.0 (H) 22.0 - 29.0 mmol/L Final   04/27/2024 29.0 22.0 - 29.0 mmol/L Final      BUN BUN   Date Value Ref Range Status   04/29/2024 44 (H) 8 - 23 mg/dL Final   04/28/2024 53 (H) 8 - 23 mg/dL Final   04/27/2024 42 (H) 8 - " "23 mg/dL Final      Creatinine Creatinine   Date Value Ref Range Status   04/29/2024 1.03 0.76 - 1.27 mg/dL Final   04/28/2024 1.37 (H) 0.76 - 1.27 mg/dL Final   04/27/2024 1.20 0.76 - 1.27 mg/dL Final      Calcium Calcium   Date Value Ref Range Status   04/29/2024 8.7 8.6 - 10.5 mg/dL Final   04/28/2024 8.5 (L) 8.6 - 10.5 mg/dL Final   04/27/2024 9.0 8.6 - 10.5 mg/dL Final      PO4 No components found for: \"PO4\"   Albumin Albumin   Date Value Ref Range Status   04/29/2024 2.7 (L) 3.5 - 5.2 g/dL Final   04/28/2024 2.8 (L) 3.5 - 5.2 g/dL Final   04/27/2024 3.1 (L) 3.5 - 5.2 g/dL Final      Magnesium Magnesium   Date Value Ref Range Status   04/29/2024 1.9 1.6 - 2.4 mg/dL Final   04/28/2024 1.8 1.6 - 2.4 mg/dL Final   04/27/2024 2.3 1.6 - 2.4 mg/dL Final      Uric Acid No components found for: \"URIC ACID\"     Imaging Results (Last 72 Hours)       ** No results found for the last 72 hours. **            Results for orders placed during the hospital encounter of 04/23/24    XR Chest 1 View    Narrative  XR CHEST 1 VW    Date of Exam: 4/24/2024 8:35 AM EDT    Indication: Possible pneumonia    Comparison: 4/23/2024    Findings:  Unchanged enlarged cardiac silhouette. There are persistent small bilateral pleural effusions with adjacent atelectasis. No definite new airspace consolidation. Osseous fractures are unchanged.    Impression  Impression:  No significant interval change.      Electronically Signed: Daniel Jennings MD  4/24/2024 8:52 AM EDT  Workstation ID: [id number]      Results for orders placed in visit on 09/12/23    SCANNED - IMAGING      Results for orders placed in visit on 06/19/23    SCANNED - IMAGING            ASSESSMENT / PLAN      Sepsis    Anemia    Moderate malnutrition    ELYSSA-likely prerenal and/or ATN in setting of hypotension/sepsis.  Urine suggestive of UTI.  Renal ultrasound without obstructive uropathy.  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  Resolved on oral sodium bicarb  COPD  Transaminitis  History of paroxysmal atrial fibrillation  Likely sepsis-antibiotics per primary team  Anemia of CKD  Hypotension-on midodrine       Creatinine stable  Blood pressure okay, continue midodrine  Monitor renal function fluid status electrolytes      Sascha Dorado MD  [facility]  [phone]  04/29/24  10:03 EDT

## 2024-04-29 NOTE — PLAN OF CARE
"Assessment: Praneeth Nam presents with functional mobility impairments which indicate the need for skilled intervention. Pt progressing with upright mobility including ambulating to/from bathroom with Rwx and CGA/SBA with no overt LOB this date. Tolerating session today without incident. Will continue to follow and progress as tolerated.     Plan/Recommendations:   If medically appropriate, Low Intensity Therapy recommended post-acute care - This is recommended as therapy feels this patient would require 2-3 visits per week. OP or HH would be the best option depending on patient's home bound status. Consider, if the patient has other  \"skilled\" needs such as wounds, IV antibiotics, etc. Combined with \"low intensity\" could also equate to a SNF. If patient is medically complex, consider LTAC. Pt requires rolling walker at discharge.     Pt desires Home with Home Health /[facility] at discharge. Pt cooperative; agreeable to therapeutic recommendations and plan of care.                                               "

## 2024-04-30 ENCOUNTER — READMISSION MANAGEMENT (OUTPATIENT)
Dept: CALL CENTER | Facility: HOSPITAL | Age: 84
End: 2024-04-30
Payer: MEDICARE

## 2024-04-30 VITALS
RESPIRATION RATE: 15 BRPM | TEMPERATURE: 97.7 F | SYSTOLIC BLOOD PRESSURE: 140 MMHG | HEART RATE: 78 BPM | WEIGHT: 182.1 LBS | DIASTOLIC BLOOD PRESSURE: 54 MMHG | HEIGHT: 66 IN | BODY MASS INDEX: 29.27 KG/M2 | OXYGEN SATURATION: 95 %

## 2024-04-30 PROBLEM — A49.9 UTI (URINARY TRACT INFECTION), BACTERIAL: Status: ACTIVE | Noted: 2024-04-30

## 2024-04-30 PROBLEM — I48.21 PERMANENT ATRIAL FIBRILLATION: Status: ACTIVE | Noted: 2024-04-30

## 2024-04-30 PROBLEM — D68.69 SECONDARY HYPERCOAGULABILITY DISORDER: Status: ACTIVE | Noted: 2024-04-30

## 2024-04-30 PROBLEM — R79.89 ABNORMAL LFTS: Status: ACTIVE | Noted: 2024-04-30

## 2024-04-30 PROBLEM — D63.8 ANEMIA, CHRONIC DISEASE: Status: ACTIVE | Noted: 2019-12-12

## 2024-04-30 PROBLEM — N39.0 UTI (URINARY TRACT INFECTION), BACTERIAL: Status: ACTIVE | Noted: 2024-04-30

## 2024-04-30 LAB
ALBUMIN SERPL-MCNC: 2.5 G/DL (ref 3.5–5.2)
ALBUMIN/GLOB SERPL: 1 G/DL
ALP SERPL-CCNC: 125 U/L (ref 39–117)
ALT SERPL W P-5'-P-CCNC: 34 U/L (ref 1–41)
ANION GAP SERPL CALCULATED.3IONS-SCNC: 10 MMOL/L (ref 5–15)
AST SERPL-CCNC: 31 U/L (ref 1–40)
BASOPHILS # BLD AUTO: 0.03 10*3/MM3 (ref 0–0.2)
BASOPHILS NFR BLD AUTO: 0.4 % (ref 0–1.5)
BILIRUB SERPL-MCNC: 0.5 MG/DL (ref 0–1.2)
BUN SERPL-MCNC: 43 MG/DL (ref 8–23)
BUN/CREAT SERPL: 39.8 (ref 7–25)
CALCIUM SPEC-SCNC: 8.3 MG/DL (ref 8.6–10.5)
CHLORIDE SERPL-SCNC: 100 MMOL/L (ref 98–107)
CO2 SERPL-SCNC: 27 MMOL/L (ref 22–29)
CREAT SERPL-MCNC: 1.08 MG/DL (ref 0.76–1.27)
DEPRECATED RDW RBC AUTO: 53.5 FL (ref 37–54)
EGFRCR SERPLBLD CKD-EPI 2021: 68.1 ML/MIN/1.73
EOSINOPHIL # BLD AUTO: 0.43 10*3/MM3 (ref 0–0.4)
EOSINOPHIL NFR BLD AUTO: 6 % (ref 0.3–6.2)
ERYTHROCYTE [DISTWIDTH] IN BLOOD BY AUTOMATED COUNT: 15.4 % (ref 12.3–15.4)
GLOBULIN UR ELPH-MCNC: 2.4 GM/DL
GLUCOSE SERPL-MCNC: 103 MG/DL (ref 65–99)
HCT VFR BLD AUTO: 27.2 % (ref 37.5–51)
HGB BLD-MCNC: 8.5 G/DL (ref 13–17.7)
IMM GRANULOCYTES # BLD AUTO: 0.04 10*3/MM3 (ref 0–0.05)
IMM GRANULOCYTES NFR BLD AUTO: 0.6 % (ref 0–0.5)
LKM-1 AB SER-ACNC: <1 UNITS (ref 0–20)
LYMPHOCYTES # BLD AUTO: 1.19 10*3/MM3 (ref 0.7–3.1)
LYMPHOCYTES NFR BLD AUTO: 16.5 % (ref 19.6–45.3)
MAGNESIUM SERPL-MCNC: 1.8 MG/DL (ref 1.6–2.4)
MCH RBC QN AUTO: 30.8 PG (ref 26.6–33)
MCHC RBC AUTO-ENTMCNC: 31.3 G/DL (ref 31.5–35.7)
MCV RBC AUTO: 98.6 FL (ref 79–97)
MONOCYTES # BLD AUTO: 0.74 10*3/MM3 (ref 0.1–0.9)
MONOCYTES NFR BLD AUTO: 10.3 % (ref 5–12)
NEUTROPHILS NFR BLD AUTO: 4.78 10*3/MM3 (ref 1.7–7)
NEUTROPHILS NFR BLD AUTO: 66.2 % (ref 42.7–76)
NRBC BLD AUTO-RTO: 0 /100 WBC (ref 0–0.2)
PHOSPHATE SERPL-MCNC: 2.7 MG/DL (ref 2.5–4.5)
PLATELET # BLD AUTO: 218 10*3/MM3 (ref 140–450)
PMV BLD AUTO: 13 FL (ref 6–12)
POTASSIUM SERPL-SCNC: 3.7 MMOL/L (ref 3.5–5.2)
PROT SERPL-MCNC: 4.9 G/DL (ref 6–8.5)
RBC # BLD AUTO: 2.76 10*6/MM3 (ref 4.14–5.8)
SODIUM SERPL-SCNC: 137 MMOL/L (ref 136–145)
WBC NRBC COR # BLD AUTO: 7.21 10*3/MM3 (ref 3.4–10.8)

## 2024-04-30 PROCEDURE — 83735 ASSAY OF MAGNESIUM: CPT | Performed by: INTERNAL MEDICINE

## 2024-04-30 PROCEDURE — 25010000002 CEFTRIAXONE PER 250 MG: Performed by: INTERNAL MEDICINE

## 2024-04-30 PROCEDURE — 80053 COMPREHEN METABOLIC PANEL: CPT | Performed by: INTERNAL MEDICINE

## 2024-04-30 PROCEDURE — 94618 PULMONARY STRESS TESTING: CPT

## 2024-04-30 PROCEDURE — 84100 ASSAY OF PHOSPHORUS: CPT | Performed by: INTERNAL MEDICINE

## 2024-04-30 PROCEDURE — 85025 COMPLETE CBC W/AUTO DIFF WBC: CPT | Performed by: INTERNAL MEDICINE

## 2024-04-30 PROCEDURE — 99232 SBSQ HOSP IP/OBS MODERATE 35: CPT | Performed by: INTERNAL MEDICINE

## 2024-04-30 RX ORDER — FERROUS SULFATE 325(65) MG
325 TABLET ORAL
Qty: 90 TABLET | Refills: 1 | Status: SHIPPED | OUTPATIENT
Start: 2024-04-30

## 2024-04-30 RX ORDER — ACETAMINOPHEN 325 MG/1
650 TABLET ORAL EVERY 4 HOURS PRN
Start: 2024-04-30

## 2024-04-30 RX ORDER — CEFDINIR 300 MG/1
300 CAPSULE ORAL 2 TIMES DAILY
Qty: 10 CAPSULE | Refills: 0 | Status: SHIPPED | OUTPATIENT
Start: 2024-05-01 | End: 2024-05-06

## 2024-04-30 RX ORDER — MIDODRINE HYDROCHLORIDE 5 MG/1
5 TABLET ORAL
Qty: 90 TABLET | Refills: 1 | Status: SHIPPED | OUTPATIENT
Start: 2024-04-30

## 2024-04-30 RX ADMIN — PANTOPRAZOLE SODIUM 40 MG: 40 TABLET, DELAYED RELEASE ORAL at 06:16

## 2024-04-30 RX ADMIN — Medication 1 TABLET: at 08:35

## 2024-04-30 RX ADMIN — MIDODRINE HYDROCHLORIDE 5 MG: 5 TABLET ORAL at 08:36

## 2024-04-30 RX ADMIN — APIXABAN 2.5 MG: 2.5 TABLET, FILM COATED ORAL at 08:36

## 2024-04-30 RX ADMIN — HYDROXYCHLOROQUINE SULFATE 400 MG: 200 TABLET ORAL at 08:35

## 2024-04-30 RX ADMIN — CEFTRIAXONE SODIUM 2000 MG: 2 INJECTION, POWDER, FOR SOLUTION INTRAMUSCULAR; INTRAVENOUS at 14:40

## 2024-04-30 RX ADMIN — Medication 10 ML: at 08:37

## 2024-04-30 RX ADMIN — TAMSULOSIN HYDROCHLORIDE 0.4 MG: 0.4 CAPSULE ORAL at 08:36

## 2024-04-30 RX ADMIN — BUMETANIDE 0.5 MG: 1 TABLET ORAL at 08:36

## 2024-04-30 NOTE — CASE MANAGEMENT/SOCIAL WORK
Continued Stay Note  SERGIO Penny     Patient Name: Praneeth Nam  MRN: [id number]  Today's Date: 4/30/2024    Admit Date: 4/23/2024    Plan: Anticpate routine home with jagjit Montes and spouse. [facility] accepted and following. Current with [employer] for home oxygen and Trilogy vent.   Discharge Plan       Row Name 04/30/24 1134       Plan    Plan Anticpate routine home with jagjit Montes and spouse. [facility] accepted and following. Current with [employer] for home oxygen and Trilogy vent.    Patient/Family in Agreement with Plan yes    Plan Comments Discharge barriers: Cardio following, increased BUN, decreased H&H, cardiac monitoring.             Expected Discharge Date and Time       Expected Discharge Date Expected Discharge Time    Apr 30, 2024           Met with patient in room wearing PPE: mask.    Maintained distance greater than six feet and spent less than 15 minutes in the room.       Lara Calhoun RN

## 2024-04-30 NOTE — PROGRESS NOTES
"NEPHROLOGY PROGRESS NOTE------[facility]    [facility]  [phone]  Sascha Dorado MD      Patient Care Team:  Guilherme Jason MD as PCP - General (Family Medicine)  Misty Dias MD as Consulting Physician (Cardiology)  Nora Kenyon, BRIAN, APRN as Nurse Practitioner (Thoracic Surgery)  Sussy Shane MD as Consulting Physician (Nephrology)      Provider:  Sascha Dorado MD  Patient Name: Praneeth Nam  :  1940    SUBJECTIVE:    F/U ELYSSA/CKD  No chest pain shortness of breath  Appetite fair.      Medication:  apixaban, 2.5 mg, Oral, Q12H  [Held by provider] atorvastatin, 20 mg, Oral, Daily  bumetanide, 0.5 mg, Oral, Daily  hydroxychloroquine, 400 mg, Oral, Daily  midodrine, 5 mg, Oral, TID AC  multivitamin with minerals, 1 tablet, Oral, Daily  pantoprazole, 40 mg, Oral, Q AM  sodium chloride, 10 mL, Intravenous, Q12H  [Held by provider] spironolactone, 25 mg, Oral, Daily  tamsulosin, 0.4 mg, Oral, Daily           OBJECTIVE    Vital Sign Min/Max for last 24 hours  Temp  Min: 97.7 °F (36.5 °C)  Max: 98.4 °F (36.9 °C)   BP  Min: 112/58  Max: 132/57   Pulse  Min: 61  Max: 71   Resp  Min: 19  Max: 28   SpO2  Min: 94 %  Max: 99 %   No data recorded   Weight  Min: 82.6 kg (182 lb 1.6 oz)  Max: 82.6 kg (182 lb 1.6 oz)     Flowsheet Rows      Flowsheet Row First Filed Value   Admission Height 167.6 cm (66\") Documented at 2024 1200   Admission Weight 81.9 kg (180 lb 8.9 oz) Documented at 2024 1200            I/O this shift:  In: 240 [P.O.:240]  Out: 300 [Urine:300]  I/O last 3 completed shifts:  In: 1080 [P.O.:1080]  Out: 2325 [Urine:2325]      Physical Exam:  General Appearance: alert, appears stated age and cooperative  Head: normocephalic, without obvious abnormality and atraumatic  Eyes: conjunctivae and sclerae normal and no icterus  Neck: supple and NO GROSS JVD   Lungs: Diminished breath sounds  Heart: IRREG IRREG " "+KATHLEEN  Chest Wall: no abnormalities observed  Abdomen: normal bowel sounds and soft, nontender  Extremities: moves extremities well, +TRACE PRETIBIAL EDEMA BILAT LE, no cyanosis  Skin: no bleeding, bruising, has bilateral chronic skin pigmentation lower extremity.  Neurologic: Alert, and oriented. No focal deficits    Labs:    WBC WBC   Date Value Ref Range Status   04/30/2024 7.21 3.40 - 10.80 10*3/mm3 Final   04/29/2024 7.40 3.40 - 10.80 10*3/mm3 Final   04/28/2024 8.16 3.40 - 10.80 10*3/mm3 Final      HGB Hemoglobin   Date Value Ref Range Status   04/30/2024 8.5 (L) 13.0 - 17.7 g/dL Final   04/29/2024 9.2 (L) 13.0 - 17.7 g/dL Final   04/28/2024 9.0 (L) 13.0 - 17.7 g/dL Final      HCT Hematocrit   Date Value Ref Range Status   04/30/2024 27.2 (L) 37.5 - 51.0 % Final   04/29/2024 28.9 (L) 37.5 - 51.0 % Final   04/28/2024 27.9 (L) 37.5 - 51.0 % Final      Platelets No results found for: \"LABPLAT\"   MCV MCV   Date Value Ref Range Status   04/30/2024 98.6 (H) 79.0 - 97.0 fL Final   04/29/2024 98.3 (H) 79.0 - 97.0 fL Final   04/28/2024 97.6 (H) 79.0 - 97.0 fL Final          Sodium Sodium   Date Value Ref Range Status   04/30/2024 137 136 - 145 mmol/L Final   04/29/2024 141 136 - 145 mmol/L Final   04/28/2024 140 136 - 145 mmol/L Final      Potassium Potassium   Date Value Ref Range Status   04/30/2024 3.7 3.5 - 5.2 mmol/L Final     Comment:     Slight hemolysis detected by analyzer. Result may be falsely elevated.   04/29/2024 3.8 3.5 - 5.2 mmol/L Final   04/28/2024 3.8 3.5 - 5.2 mmol/L Final      Chloride Chloride   Date Value Ref Range Status   04/30/2024 100 98 - 107 mmol/L Final   04/29/2024 104 98 - 107 mmol/L Final   04/28/2024 101 98 - 107 mmol/L Final      CO2 CO2   Date Value Ref Range Status   04/30/2024 27.0 22.0 - 29.0 mmol/L Final   04/29/2024 29.0 22.0 - 29.0 mmol/L Final   04/28/2024 31.0 (H) 22.0 - 29.0 mmol/L Final      BUN BUN   Date Value Ref Range Status   04/30/2024 43 (H) 8 - 23 mg/dL Final " "  04/29/2024 44 (H) 8 - 23 mg/dL Final   04/28/2024 53 (H) 8 - 23 mg/dL Final      Creatinine Creatinine   Date Value Ref Range Status   04/30/2024 1.08 0.76 - 1.27 mg/dL Final   04/29/2024 1.03 0.76 - 1.27 mg/dL Final   04/28/2024 1.37 (H) 0.76 - 1.27 mg/dL Final      Calcium Calcium   Date Value Ref Range Status   04/30/2024 8.3 (L) 8.6 - 10.5 mg/dL Final   04/29/2024 8.7 8.6 - 10.5 mg/dL Final   04/28/2024 8.5 (L) 8.6 - 10.5 mg/dL Final      PO4 No components found for: \"PO4\"   Albumin Albumin   Date Value Ref Range Status   04/30/2024 2.5 (L) 3.5 - 5.2 g/dL Final   04/29/2024 2.7 (L) 3.5 - 5.2 g/dL Final   04/28/2024 2.8 (L) 3.5 - 5.2 g/dL Final      Magnesium Magnesium   Date Value Ref Range Status   04/30/2024 1.8 1.6 - 2.4 mg/dL Final   04/29/2024 1.9 1.6 - 2.4 mg/dL Final   04/28/2024 1.8 1.6 - 2.4 mg/dL Final      Uric Acid No components found for: \"URIC ACID\"     Imaging Results (Last 72 Hours)       ** No results found for the last 72 hours. **            Results for orders placed during the hospital encounter of 04/23/24    XR Chest 1 View    Narrative  XR CHEST 1 VW    Date of Exam: 4/24/2024 8:35 AM EDT    Indication: Possible pneumonia    Comparison: 4/23/2024    Findings:  Unchanged enlarged cardiac silhouette. There are persistent small bilateral pleural effusions with adjacent atelectasis. No definite new airspace consolidation. Osseous fractures are unchanged.    Impression  Impression:  No significant interval change.      Electronically Signed: Daniel Jennings MD  4/24/2024 8:52 AM EDT  Workstation ID: [id number]      Results for orders placed in visit on 09/12/23    SCANNED - IMAGING      Results for orders placed in visit on 06/19/23    SCANNED - IMAGING            ASSESSMENT / PLAN      Sepsis    Anemia    Moderate malnutrition    ELYSSA-likely prerenal and/or ATN in setting of hypotension/sepsis.  Urine suggestive of UTI.  Renal ultrasound without obstructive uropathy.  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  Resolved on oral sodium bicarb  COPD  Transaminitis  History of paroxysmal atrial fibrillation  Likely sepsis-antibiotics per primary team/resolved  Anemia of CKD  Hypotension-on midodrine       Creatinine stable  Blood pressure okay, continue midodrine  Monitor renal function fluid status electrolytes      Sascha Dorado MD  [facility]  [phone]  04/30/24  08:26 EDT

## 2024-04-30 NOTE — PROGRESS NOTES
Referring Provider: Pat Napoles MD    Reason for follow-up:  Atrial fibrillation  Elevated troponin level     Patient Care Team:  Guilherme Jason MD as PCP - General (Family Medicine)  Misty Dias MD as Consulting Physician (Cardiology)  Nora Kenyon DNP, APRN as Nurse Practitioner (Thoracic Surgery)  Sussy Shane MD as Consulting Physician (Nephrology)    Subjective .      ROS    Today, the patient has been without any chest discomfort , unusual shortness of breath, palpitations, dizziness or syncope.  Denies having any headache ,abdominal pain ,nausea, vomiting , diarrhea constipation, loss of weight or loss of appetite.  Denies having any excessive bruising ,hematuria or blood in the stool.    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    Neutropenia 11/2003    MITZI (obstructive sleep apnea) 2018    Psoriasis 2000    Renal cyst, left     Rheumatoid arthritis     Stroke (cerebrum)     Thrombocytopenia 08/23/2005       Past Surgical History:   Procedure Laterality Date    ENDOSCOPY N/A 4/26/2024    Procedure: ESOPHAGOGASTRODUODENOSCOPY WITH DILATION (#46 BOUGIE);  Surgeon: Gamal Yancey MD;  Location: [facility] ENDOSCOPY;  Service: Gastroenterology;  Laterality: N/A;  DUODENAL DIVERTICULUM, HIATAL HERNIA, DISTAL ESOPHAGEAL STRICTURE    SKIN LESION EXCISION      back and  groin-benign       Family History   Problem Relation Age of Onset    Lung cancer Brother         age unknown    Heart disease Brother     Stroke Mother     Heart disease Father     Heart disease Sister        Social History     Tobacco Use    Smoking status: Former     Current packs/day: 0.00     Types: Cigarettes     Quit date:      Years since quittin.3     Passive exposure: Never    Smokeless tobacco: Never    Tobacco comments:     stopped smoking in    Vaping Use    Vaping status: Never Used   Substance Use Topics    Alcohol use: No    Drug use: No        Medications Prior to Admission   Medication Sig Dispense Refill Last Dose    apixaban (ELIQUIS) 2.5 MG tablet tablet Take 1 tablet by mouth 2 (Two) Times a Day. 60 tablet 11     atorvastatin (LIPITOR) 20 MG tablet Take 1 tablet by mouth Daily.  3     fenofibrate 160 MG tablet Take 1 tablet by mouth Daily.  3     hydroxychloroquine (PLAQUENIL) 200 MG tablet Take 2 tablets by mouth Daily.  0     ipratropium-albuterol (DUO-NEB) 0.5-2.5 mg/3 ml nebulizer Take 3 mL by nebulization Every 4 (Four) Hours As Needed for Wheezing.       losartan (COZAAR) 100 MG tablet Take 1 tablet by mouth Daily.  3     Multiple Vitamins-Minerals (MULTIVITAMIN WITH MINERALS) tablet tablet Take 1 tablet by mouth Daily.       oxybutynin XL (DITROPAN-XL) 10 MG 24 hr tablet Take 1 tablet by mouth Daily.       pantoprazole (PROTONIX) 40 MG EC tablet Take 1 tablet by mouth Daily.       revefenacin (YUPELRI) 175 MCG/3ML nebulizer solution Take 3 mL by nebulization Daily.       spironolactone (ALDACTONE) 25 MG tablet Take 1 tablet by mouth Daily.  3     tamsulosin (FLOMAX) 0.4 MG capsule 24 hr capsule Take 1 capsule by mouth Daily.          Allergies  Penicillin v potassium and Latex    Scheduled Meds:apixaban, 2.5 mg, Oral, Q12H  [Held by provider] atorvastatin, 20 mg, Oral, Daily  bumetanide, 0.5 mg, Oral, Daily  hydroxychloroquine, 400 mg, Oral, Daily  midodrine, 5 mg, Oral, TID  "AC  multivitamin with minerals, 1 tablet, Oral, Daily  pantoprazole, 40 mg, Oral, Q AM  sodium chloride, 10 mL, Intravenous, Q12H  [Held by provider] spironolactone, 25 mg, Oral, Daily  tamsulosin, 0.4 mg, Oral, Daily      Continuous Infusions:     PRN Meds:.  [Held by provider] acetaminophen **OR** [Held by provider] acetaminophen    senna-docusate sodium **AND** polyethylene glycol **AND** bisacodyl **AND** bisacodyl    dextrose    dextrose    glucagon (human recombinant)    HYDROcodone-acetaminophen    ipratropium-albuterol    nitroglycerin    ondansetron ODT **OR** ondansetron    sodium chloride    sodium chloride    Objective     VITAL SIGNS  Vitals:    04/29/24 1612 04/29/24 2005 04/29/24 2305 04/30/24 0300   BP: 130/51 132/57 112/58 130/58   BP Location:  Right arm Right arm Right arm   Patient Position:  Lying Lying Lying   Pulse: 65 71 65 61   Resp: 28 19 26 25   Temp: 98.2 °F (36.8 °C) 98.1 °F (36.7 °C) 98.4 °F (36.9 °C) 97.8 °F (36.6 °C)   TempSrc: Oral Oral Oral Oral   SpO2: 94% 98% 99% 99%   Weight:    82.6 kg (182 lb 1.6 oz)   Height:           Flowsheet Rows      Flowsheet Row First Filed Value   Admission Height 167.6 cm (66\") Documented at 04/23/2024 1200   Admission Weight 81.9 kg (180 lb 8.9 oz) Documented at 04/23/2024 1200              Intake/Output Summary (Last 24 hours) at 4/30/2024 0618  Last data filed at 4/30/2024 0300  Gross per 24 hour   Intake 480 ml   Output 1725 ml   Net -1245 ml        TELEMETRY: Atrial fibrillation with controlled ventricular response.  Intermittent junctional rhythm and A-V dissociation.    Physical Exam:  The patient is alert, oriented and in no distress.  Vital signs as noted above.  Head and neck revealed no carotid bruits or jugular venous distention.  No thyromegaly or lymphadenopathy is present  Lungs clear.  No wheezing.  Breath sounds are normal bilaterally.  Heart normal first and second heart sounds.  No murmur. No precordial rub is present.  No gallop is " present.  Abdomen soft and nontender.  No organomegaly is present.  Extremities with good peripheral pulses without any pedal edema.  Skin warm and dry.  Upper and lower extremity bruising is present.  Musculoskeletal system is grossly normal  CNS grossly normal    Reviewed and updated.    Results Review:   I reviewed the patient's new clinical results.  Lab Results (last 24 hours)       Procedure Component Value Units Date/Time    Magnesium [[id number]]  (Normal) Collected: 04/30/24 0311    Specimen: Blood from Arm, Left Updated: 04/30/24 0426     Magnesium 1.8 mg/dL     Comprehensive Metabolic Panel [[id number]]  (Abnormal) Collected: 04/30/24 0311    Specimen: Blood from Arm, Left Updated: 04/30/24 0426     Glucose 103 mg/dL      BUN 43 mg/dL      Creatinine 1.08 mg/dL      Sodium 137 mmol/L      Potassium 3.7 mmol/L      Comment: Slight hemolysis detected by analyzer. Result may be falsely elevated.        Chloride 100 mmol/L      CO2 27.0 mmol/L      Calcium 8.3 mg/dL      Total Protein 4.9 g/dL      Albumin 2.5 g/dL      ALT (SGPT) 34 U/L      AST (SGOT) 31 U/L      Alkaline Phosphatase 125 U/L      Total Bilirubin 0.5 mg/dL      Globulin 2.4 gm/dL      A/G Ratio 1.0 g/dL      BUN/Creatinine Ratio 39.8     Anion Gap 10.0 mmol/L      eGFR 68.1 mL/min/1.73     Narrative:      GFR Normal >60  Chronic Kidney Disease <60  Kidney Failure <15    The GFR formula is only valid for adults with stable renal function between ages 18 and 70.    Phosphorus [[id number]]  (Normal) Collected: 04/30/24 0311    Specimen: Blood from Arm, Left Updated: 04/30/24 0426     Phosphorus 2.7 mg/dL     CBC & Differential [[id number]]  (Abnormal) Collected: 04/30/24 0311    Specimen: Blood from Arm, Left Updated: 04/30/24 0409    Narrative:      The following orders were created for panel order CBC & Differential.  Procedure                               Abnormality         Status                     ---------                                -----------         ------                     CBC Auto Differential[[id number]]        Abnormal            Final result                 Please view results for these tests on the individual orders.    CBC Auto Differential [[id number]]  (Abnormal) Collected: 04/30/24 0311    Specimen: Blood from Arm, Left Updated: 04/30/24 0409     WBC 7.21 10*3/mm3      RBC 2.76 10*6/mm3      Hemoglobin 8.5 g/dL      Hematocrit 27.2 %      MCV 98.6 fL      MCH 30.8 pg      MCHC 31.3 g/dL      RDW 15.4 %      RDW-SD 53.5 fl      MPV 13.0 fL      Platelets 218 10*3/mm3      Neutrophil % 66.2 %      Lymphocyte % 16.5 %      Monocyte % 10.3 %      Eosinophil % 6.0 %      Basophil % 0.4 %      Immature Grans % 0.6 %      Neutrophils, Absolute 4.78 10*3/mm3      Lymphocytes, Absolute 1.19 10*3/mm3      Monocytes, Absolute 0.74 10*3/mm3      Eosinophils, Absolute 0.43 10*3/mm3      Basophils, Absolute 0.03 10*3/mm3      Immature Grans, Absolute 0.04 10*3/mm3      nRBC 0.0 /100 WBC     POC Glucose Once [[id number]]  (Abnormal) Collected: 04/29/24 2001    Specimen: Blood Updated: 04/29/24 2002     Glucose 119 mg/dL      Comment: Serial Number: [id number]:  [id number]       POC Glucose Once [[id number]]  (Abnormal) Collected: 04/29/24 1715    Specimen: Blood Updated: 04/29/24 1716     Glucose 124 mg/dL      Comment: Serial Number: [id number]:  [id number]       High Sensitivity Troponin T 2Hr [[id number]]  (Abnormal) Collected: 04/29/24 0744    Specimen: Blood Updated: 04/29/24 0837     HS Troponin T 107 ng/L      Troponin T Delta -20 ng/L     Narrative:      High Sensitive Troponin T Reference Range:  <14.0 ng/L- Negative Female for AMI  <22.0 ng/L- Negative Male for AMI  >=14 - Abnormal Female indicating possible myocardial injury.  >=22 - Abnormal Male indicating possible myocardial injury.   Clinicians would have to utilize clinical acumen, EKG, Troponin, and serial changes to determine if it is an Acute Myocardial Infarction or  myocardial injury due to an underlying chronic condition.         POC Glucose Once [[id number]]  (Abnormal) Collected: 04/29/24 0720    Specimen: Blood Updated: 04/29/24 0723     Glucose 116 mg/dL      Comment: Serial Number: [id number]:  [id number]       High Sensitivity Troponin T [[id number]]  (Abnormal) Collected: 04/29/24 0433    Specimen: Blood from Arm, Left Updated: 04/29/24 0634     HS Troponin T 127 ng/L     Narrative:      High Sensitive Troponin T Reference Range:  <14.0 ng/L- Negative Female for AMI  <22.0 ng/L- Negative Male for AMI  >=14 - Abnormal Female indicating possible myocardial injury.  >=22 - Abnormal Male indicating possible myocardial injury.   Clinicians would have to utilize clinical acumen, EKG, Troponin, and serial changes to determine if it is an Acute Myocardial Infarction or myocardial injury due to an underlying chronic condition.                 Imaging Results (Last 24 Hours)       ** No results found for the last 24 hours. **        LAB RESULTS (LAST 7 DAYS)    CBC  Results from last 7 days   Lab Units 04/30/24 0311 04/29/24 0433 04/28/24 0027 04/27/24  0205 04/26/24  0546 04/25/24 0328 04/24/24  0338   WBC 10*3/mm3 7.21 7.40 8.16 7.12 6.47 7.32 6.48   RBC 10*6/mm3 2.76* 2.94* 2.86* 2.88* 2.68* 2.72* 2.44*   HEMOGLOBIN g/dL 8.5* 9.2* 9.0* 9.1* 8.5* 8.7* 7.6*   HEMATOCRIT % 27.2* 28.9* 27.9* 28.3* 26.1* 27.1* 24.7*   MCV fL 98.6* 98.3* 97.6* 98.3* 97.4* 99.6* 101.2*   PLATELETS 10*3/mm3 218 227 207 193 161 190 148       BMP  Results from last 7 days   Lab Units 04/30/24  0311 04/29/24  0433 04/28/24  0027 04/27/24  0205 04/26/24  0546 04/25/24  0328 04/24/24  0338   SODIUM mmol/L 137 141 140 146* 144 143 141   POTASSIUM mmol/L 3.7 3.8 3.8 3.8 3.8 4.2 3.9   CHLORIDE mmol/L 100 104 101 106 107 114* 114*   CO2 mmol/L 27.0 29.0 31.0* 29.0 26.0 18.0* 16.0*   BUN mg/dL 43* 44* 53* 42* 48* 45* 35*   CREATININE mg/dL 1.08 1.03 1.37* 1.20 1.31* 1.39* 1.49*   GLUCOSE mg/dL 103* 102*  123* 121* 106* 131* 130*   MAGNESIUM mg/dL 1.8 1.9 1.8 2.3 1.8 2.3 1.7   PHOSPHORUS mg/dL 2.7 2.2* 2.5 3.0 2.2* 2.4* 3.3       CMP   Results from last 7 days   Lab Units 04/30/24  0311 04/29/24  0433 04/28/24  0027 04/27/24  0205 04/26/24  0546 04/25/24  0328 04/24/24  0338 04/23/24  1235   SODIUM mmol/L 137 141 140 146* 144 143 141 143   POTASSIUM mmol/L 3.7 3.8 3.8 3.8 3.8 4.2 3.9 4.1   CHLORIDE mmol/L 100 104 101 106 107 114* 114* 113*   CO2 mmol/L 27.0 29.0 31.0* 29.0 26.0 18.0* 16.0* 16.0*   BUN mg/dL 43* 44* 53* 42* 48* 45* 35* 34*   CREATININE mg/dL 1.08 1.03 1.37* 1.20 1.31* 1.39* 1.49* 1.53*   GLUCOSE mg/dL 103* 102* 123* 121* 106* 131* 130* 98   ALBUMIN g/dL 2.5* 2.7* 2.8* 3.1* 3.3* 2.6* 2.4* 2.7*   BILIRUBIN mg/dL 0.5 0.6 0.6 0.9 0.8 0.5 1.3* 2.3*   ALK PHOS U/L 125* 139* 146* 153* 142* 184* 145* 192*   AST (SGOT) U/L 31 29 40 50* 87* 211* 156* 154*   ALT (SGPT) U/L 34 41 58* 73* 100* 136* 75* 67*   AMYLASE U/L  --   --   --   --   --   --   --  29   LIPASE U/L  --   --   --   --   --   --   --  16         BNP        TROPONIN  Results from last 7 days   Lab Units 04/29/24  0744 04/23/24  1433 04/23/24  1235   CK TOTAL U/L  --   --  117   HSTROP T ng/L 107*   < > 254*    < > = values in this interval not displayed.       CoAg        Creatinine Clearance  Estimated Creatinine Clearance: 52.3 mL/min (by C-G formula based on SCr of 1.08 mg/dL).    ABG        Radiology  No radiology results for the last day        EKG                  I personally viewed and interpreted the patient's EKG/Telemetry data: Atrial fibrillation  EKG 4/29/2024.  Right bundle branch block possible junctional rhythm with possible A-V dissociation.    ECHOCARDIOGRAM:    Results for orders placed during the hospital encounter of 04/23/24    Adult Transthoracic Echo Complete W/ Cont if Necessary Per Protocol    Interpretation Summary  Indications  Shortness of breath    Technically satisfactory study.  Mitral valve is thickened with  adequate opening motion.  Moderate mitral regurgitation is present.  Tricuspid valve is structurally normal.  Moderate tricuspid regurgitation is present.  Aortic valve is thickened with adequate opening motion.  Moderate aortic regurgitation is present.  Pulmonic valve opening motion is normal.  Moderate pulmonic regurgitation is present.  Mild pulmonary hypertension is present.  Left atrium is enlarged.  Right atrium is enlarged.  Left ventricle is normal in size and contractility with ejection fraction of 60%.  Grade 3 left ventricular diastolic dysfunction is present.  (MV E/8 ratio 2.8)  Left ventricular peak systolic longitudinal strain is abnormal with GL PS of -14%.  Concentric left ventricle hypertrophy is present.  Right ventricle enlarged with hypocontractility with a TAPSE of 1.46 cm..  Atrial septum is intact.  Aorta is dilated at 4.3 cm.  IVC is dilated with decreased respiratory variation.  Right atrial pressure 8 mmHg.  No pericardial effusion or intracardiac thrombus is seen.    Impression  Thickened mitral and aortic valves with adequate opening motion.  Moderate mitral tricuspid and aortic and pulmonic regurgitation.  Left atrial enlargement.  Left ventricular size and contractility is normal with ejection fraction of 60%.  Left ventricular peak systolic longitudinal strain is abnormal with GL PS of -14%.  Left ventricular grade 3 diastolic dysfunction is present.  Right atrium right ventricle enlargement is present.  Mild pulmonary hypertension is present.          STRESS TEST  Results for orders placed during the hospital encounter of 07/28/21    Stress Test With Myocardial Perfusion One Day    Interpretation Summary  Indications  Chest pain    This study was performed under my direct supervision.    Resting ECG  Sinus rhythm right bundle branch block first-degree AV block    The patient was injected with Lexiscan intravenously while constantly monitoring electrocardiogram and vital signs.   Patient did not have any chest discomfort ST abnormalities or ectopy with injection of Lexiscan.    Cardiolite was used as an imaging agent.    Cardiolite images showed mild inferior ischemia.    Gated SPECT images revealed normal left ventricle size and contractility with ejection fraction of 73%.    Impression  ========  Lexiscan Cardiolite test is negative for myocardial ischemia.    Gated SPECT images revealed normal left ventricular size and contractility with ejection fraction of 73%.        Cardiolite (Tc-99m sestamibi) stress test    CARDIAC CATHETERIZATION  No results found for this or any previous visit.                OTHER:         Assessment & Plan     Principal Problem:    Sepsis  Active Problems:    Anemia    Moderate malnutrition      ]]]]]]]]]]]]]]]]]]  History  ==========  -Sepsis     - Elevated troponin.     -History of atrial fibrillation  Patient has converted and was maintaining sinus rhythm.  EKG 12/27/2023-atrial fibrillation  EKG 2/7/2024-atrial fibrillation right bundle branch block      - shortness of breath fatigue and lightheadedness.  Improved     Echocardiogram-normal with ejection fraction of 60%-7/28/2021  Lexiscan Cardiolite test-mild inferior ischemia-7/28/2021      -Chronic right bundle branch block      cardiac catheterization 04/11/2018 revealed normal left ventricular function normal coronary  arteries and no evidence for pulmonary hypertension was present.     -History of diffusely dilated ascending aorta    Echocardiogram 4/24/2024   Thickened mitral and aortic valves with adequate opening motion.  Moderate mitral tricuspid and aortic and pulmonic regurgitation.  Left atrial enlargement.  Left ventricular size and contractility is normal with ejection fraction of 60%.  Left ventricular peak systolic longitudinal strain is abnormal with GL PS of -14%.  Left ventricular grade 3 diastolic dysfunction is present.  Right atrium right ventricle enlargement is present.  Mild  pulmonary hypertension is present.    Echocardiogram showed left atrial and right ventricle enlargement prominent aorta.  Normal left ventricular function with ejection fraction of 60% 04/05/2018.  Stacy scan Cardiolite test is abnormal with significant inferior ischemia and apical hypokinesis on the gated SPECT images 04/05/2018.     - diabetes hypertension LGL leukemia rheumatoid arthritis COPD CKD 3  Patient is on home oxygen.     - history of prostate carcinoma.  Status post radiation     - former smoker     - allergic to penicillin  ==========  Plan  ==========  Sepsis.  Elevated troponin.  Bouncing at the bottom.  Likely due to renal dysfunction and sepsis.    Echocardiogram 4/24/2024-as abov    History of atrial fibrillation  Patient is maintaining sinus rhythm.  Patient is in sinus rhythm with first-degree AV block right bundle branch block-12/15/2021.  Sinus rhythm first-degree AV block right bundle branch block- 6/22/2022  EKG 6/22/2023 sinus bradycardia right bundle branch block  EKG 12/22/2022-sinus rhythm right bundle branch block  EKG 12/27/2023-atrial fibrillation  EKG 2/7/2024-atrial fibrillation right bundle branch block left anterior fascicular block.  Rate is well-controlled.  EKG-atrial fibrillation right bundle branch block-4/26/2024.    EKG 4/29/2024.  Right bundle branch block possible junctional rhythm with possible A-V dissociation.  Patient is asymptomatic.  Will observe closely.  Have discussed with attending nurse for coordination of care.  Patient is not on any medication to cause bradycardia or A-V dissociation.  Observe closely.    Anticoagulation was discussed.  Patient was on low-dose Eliquis.  Consideration may be given for Watchman procedure.     Chronic right bundle branch block-asymptomatic    Renal dysfunction  BUNs/creatinine 35/1.49-4/24/2024  45/1.39-4/25/2024  48/1.31  44/1.03-4/29/2024.  43/1.08-4/30/2024    Hypertension-stable.    Positive occult blood.  Patient to have  EGD today.  Have communicated with endoscopy unit.     Shortness of breath fatigue and lightheadedness.-Stable  COPD  Patient is on home oxygen.     Medications were reviewed and updated.  Current medications include Bumex hydroxychloroquine midodrine pantoprazole Flomax.    Patient is off Eliquis atorvastatin.     Further plan will depend on patient's progress.     Reviewed and updated-4/30/2024.  [[[[[[[[[[[[[[[[[[[[              Misty Dias MD  04/30/24  06:18 EDT

## 2024-04-30 NOTE — PROCEDURES
Exercise Oximetry    Patient Name:Praneeth Nam   MRN: [id number]   Date: 04/30/24             ROOM AIR BASELINE   SpO2% 100   Heart Rate    Blood Pressure      EXERCISE ON ROOM AIR SpO2% EXERCISE ON O2 @  LPM SpO2%   1 MINUTE 100 1 MINUTE    2 MINUTES 97 2 MINUTES    3 MINUTES 96 3 MINUTES    4 MINUTES 95 4 MINUTES    5 MINUTES 96 5 MINUTES    6 MINUTES 94 6 MINUTES               Distance Walked   Distance Walked   Dyspnea (Cici Scale)   Dyspnea (Cici Scale)   Fatigue (Cici Scale)   Fatigue (Cici Scale)   SpO2% Post Exercise  96 SpO2% Post Exercise   HR Post Exercise   HR Post Exercise   Time to Recovery   Time to Recovery     Comments: walked pt and sats stayed above 90% while ambulating.  Pt does not need home o2 @ this time.

## 2024-04-30 NOTE — PROCEDURES
(No note.)Rizwana Gonsalez, LEIGHTON   Respiratory Therapist  Specialty: Respiratory Therapy     Procedures      Signed     Date of Service: 04/30/24 1252  Creation Time: 04/30/24 1252     Signed         Exercise Oximetry     Patient Name:Praneeth Nam   MRN: [id number]   Date: 04/30/24                                                                                                     ROOM AIR BASELINE   SpO2% 100   Heart Rate    Blood Pressure       EXERCISE ON ROOM AIR SpO2% EXERCISE ON O2 @  LPM SpO2%   1 MINUTE 100 1 MINUTE     2 MINUTES 97 2 MINUTES     3 MINUTES 96 3 MINUTES     4 MINUTES 95 4 MINUTES     5 MINUTES 96 5 MINUTES     6 MINUTES 94 6 MINUTES                                                                                                                   Distance Walked   Distance Walked   Dyspnea (Cici Scale)   Dyspnea (Cici Scale)   Fatigue (Cici Scale)   Fatigue (Cici Scale)   SpO2% Post Exercise  96 SpO2% Post Exercise   HR Post Exercise   HR Post Exercise   Time to Recovery   Time to Recovery      Comments: walked pt and sats stayed above 90% while ambulating.  Pt does not need home o2 @ this time.

## 2024-04-30 NOTE — PLAN OF CARE
Problem: Adult Inpatient Plan of Care  Goal: Absence of Hospital-Acquired Illness or Injury  Intervention: Identify and Manage Fall Risk  Description: Perform standard risk assessment on admission using a validated tool or comprehensive approach appropriate to the patient; reassess fall risk frequently, with change in status or transfer to another level of care.  Communicate fall injury risk to interprofessional healthcare team.  Determine need for increased observation, equipment and environmental modification, such as low bed, signage and supportive, nonskid footwear.  Adjust safety measures to individual developmental age, stage and identified risk factors.  Reinforce the importance of safety and physical activity with patient and family.  Perform regular intentional rounding to assess need for position change, pain assessment and personal needs, including assistance with toileting.  Recent Flowsheet Documentation  Taken 4/30/2024 0200 by Vimal Osorio LPN  Safety Promotion/Fall Prevention:   safety round/check completed   room organization consistent   muscle strengthening facilitated   nonskid shoes/slippers when out of bed   mobility aid in reach   lighting adjusted   fall prevention program maintained   clutter free environment maintained   assistive device/personal items within reach   activity supervised  Taken 4/30/2024 0005 by Vimal Osorio LPN  Safety Promotion/Fall Prevention:   safety round/check completed   room organization consistent   nonskid shoes/slippers when out of bed   muscle strengthening facilitated   mobility aid in reach   lighting adjusted   fall prevention program maintained   clutter free environment maintained   assistive device/personal items within reach   activity supervised  Taken 4/29/2024 2200 by Vimal Osorio LPN  Safety Promotion/Fall Prevention:   safety round/check completed   room organization consistent   nonskid shoes/slippers when out of bed    muscle strengthening facilitated   mobility aid in reach   lighting adjusted   fall prevention program maintained   clutter free environment maintained   assistive device/personal items within reach   activity supervised  Taken 4/29/2024 2007 by Vimal Osorio LPN  Safety Promotion/Fall Prevention:   safety round/check completed   room organization consistent   nonskid shoes/slippers when out of bed   muscle strengthening facilitated   mobility aid in reach   lighting adjusted   gait belt   fall prevention program maintained   clutter free environment maintained   assistive device/personal items within reach   activity supervised  Intervention: Prevent Skin Injury  Description: Perform a screening for skin injury risk, such as pressure or moisture associated skin damage on admission and at regular intervals throughout hospital stay.  Keep all areas of skin (especially folds) clean and dry.  Maintain adequate skin hydration.  Relieve and redistribute pressure and protect bony prominences; implement measures based on patient-specific risk factors.  Match turning and repositioning schedule to clinical condition.  Encourage weight shift frequently; assist with reposition if unable to complete independently.  Float heels off bed; avoid pressure on the Achilles tendon.  Keep skin free from extended contact with medical devices.  Encourage functional activity and mobility, as early as tolerated.  Use aids (e.g., slide boards, mechanical lift) during transfer.  Recent Flowsheet Documentation  Taken 4/30/2024 0200 by Vimal Osorio LPN  Body Position: weight shifting  Taken 4/30/2024 0005 by Vimal Osorio LPN  Body Position: weight shifting  Taken 4/29/2024 2300 by Vimal Osorio LPN  Body Position:   position changed independently   weight shifting  Taken 4/29/2024 2200 by Vimal Osorio LPN  Body Position: weight shifting  Taken 4/29/2024 2007 by Vimal Osorio LPN  Body Position:  weight shifting  Intervention: Prevent and Manage VTE (Venous Thromboembolism) Risk  Description: Assess for VTE (venous thromboembolism) risk.  Encourage and assist with early ambulation.  Initiate and maintain compression or other therapy, as indicated, based on identified risk in accordance with organizational protocol and provider order.  Encourage both active and passive leg exercises while in bed, if unable to ambulate.  Recent Flowsheet Documentation  Taken 4/30/2024 0200 by Vimal Osorio LPN  Activity Management: activity encouraged  Taken 4/30/2024 0005 by Vimal Osorio LPN  Activity Management: activity encouraged  Taken 4/29/2024 2200 by Vimal Osorio LPN  Activity Management: activity encouraged  Taken 4/29/2024 2007 by Vimal Osorio LPN  Activity Management: activity encouraged  Intervention: Prevent Infection  Description: Maintain skin and mucous membrane integrity; promote hand, oral and pulmonary hygiene.  Optimize fluid balance, nutrition, sleep and glycemic control to maximize infection resistance.  Identify potential sources of infection early to prevent or mitigate progression of infection (e.g., wound, lines, devices).  Evaluate ongoing need for invasive devices; remove promptly when no longer indicated.  Recent Flowsheet Documentation  Taken 4/30/2024 0200 by Vimal Osorio LPN  Infection Prevention:   single patient room provided   visitors restricted/screened   rest/sleep promoted   personal protective equipment utilized   hand hygiene promoted  Taken 4/29/2024 2200 by Vimal Osorio LPN  Infection Prevention:   visitors restricted/screened   single patient room provided   rest/sleep promoted   personal protective equipment utilized   hand hygiene promoted  Taken 4/29/2024 2007 by Vimal Osorio LPN  Infection Prevention:   visitors restricted/screened   single patient room provided   rest/sleep promoted   personal protective equipment utilized    hand hygiene promoted  Goal: Optimal Comfort and Wellbeing  Intervention: Monitor Pain and Promote Comfort  Description: Assess pain level, treatment efficacy and patient response at regular intervals using a consistent pain scale.  Consider the presence and impact of preexisting chronic pain.  Encourage patient and caregiver involvement in pain assessment, interventions and safety measures.  Recent Flowsheet Documentation  Taken 4/30/2024 0005 by Vimal Osorio LPN  Pain Management Interventions:   see MAR   quiet environment facilitated   position adjusted   pain management plan reviewed with patient/caregiver   medication offered but refused  Taken 4/29/2024 2007 by Vimal Osorio LPN  Pain Management Interventions:   see MAR   quiet environment facilitated   pain management plan reviewed with patient/caregiver   medication offered but refused   position adjusted     Problem: Skin Injury Risk Increased  Goal: Skin Health and Integrity  Intervention: Optimize Skin Protection  Description: Perform a full pressure injury risk assessment, as indicated by screening, upon admission to care unit.  Reassess skin (injury risk, full inspection) frequently (e.g., scheduled interval, with change in condition) to provide optimal early detection and prevention.  Maintain adequate tissue perfusion (e.g., encourage fluid balance; avoid crossing legs, constrictive clothing or devices) to promote tissue oxygenation.  Maintain head of bed at lowest degree of elevation tolerated, considering medical condition and other restrictions.  Avoid positioning onto an area that remains reddened.  Minimize incontinence and moisture (e.g., toileting schedule; moisture-wicking pad, diaper or incontinence collection device; skin moisture barrier).  Cleanse skin promptly and gently when soiled utilizing a pH-balanced cleanser.  Relieve and redistribute pressure (e.g., scheduled position changes, weight shifts, use of support surface,  medical device repositioning, protective dressing application, use of positioning device, microclimate control, use of pressure-injury-monitor  Encourage increased activity, such as sitting in a chair at the bedside or early mobilization, when able to tolerate.  Recent Flowsheet Documentation  Taken 4/30/2024 0200 by Vimal Osorio LPN  Head of Bed (HOB) Positioning: HOB at 20-30 degrees  Taken 4/30/2024 0005 by Vimal Osorio LPN  Pressure Reduction Techniques: frequent weight shift encouraged  Head of Bed (HOB) Positioning: HOB at 20-30 degrees  Pressure Reduction Devices: pressure-redistributing mattress utilized  Taken 4/29/2024 2300 by Vimal Osorio LPN  Head of Bed (HOB) Positioning: HOB at 20-30 degrees  Taken 4/29/2024 2007 by Vimal Osorio LPN  Head of Bed (HOB) Positioning: HOB at 30-45 degrees  Pressure Reduction Devices: pressure-redistributing mattress utilized     Problem: COPD (Chronic Obstructive Pulmonary Disease) Comorbidity  Goal: Maintenance of COPD Symptom Control  Intervention: Maintain COPD-Symptom Control  Description: Evaluate adherence to management plan (e.g., medication, trigger avoidance, infection prevention, self-monitoring).  Advocate for continuation of home regimen, including medication, method of delivery, schedule and symptom monitoring.  Anticipate the need for breathing techniques and activity pacing to minimize fatigue and breathlessness.  Assess for proper use of inhaled medication and delivery technique; assist or reinstruct if needed.  Evaluate effectiveness of coping skills; encourage expression of feelings, expectations and concerns related to disease management and quality of life; reinforce education to enhance management plan and wellbeing.  Recent Flowsheet Documentation  Taken 4/30/2024 0200 by Vimal Osorio LPN  Medication Review/Management: medications reviewed  Taken 4/29/2024 2200 by Vimal Osorio LPN  Medication  Review/Management: medications reviewed  Taken 4/29/2024 2007 by Vimal Osorio LPN  Medication Review/Management: medications reviewed     Problem: Heart Failure Comorbidity  Goal: Maintenance of Heart Failure Symptom Control  Intervention: Maintain Heart Failure-Management  Description: Evaluate adherence to home heart failure self-care regimen (e.g., medication, fluid balance, sodium intake, daily weight, physical activity, telemonitoring, support).  Advocate continuation of home medication and schedule.  Consider pharmacologic therapy administration time and effects (e.g., avoid giving diuretic prior to bedtime or nitrates on empty stomach).  Monitor response to pharmacologic therapy, including weight fluctuations, blood pressure and electrolyte levels.  Monitor for signs and symptoms of anxiety and depression, including severity and duration; if present, provide psychosocial support.  Consider need for heart failure clinic or palliative care consult.  Recent Flowsheet Documentation  Taken 4/30/2024 0200 by Vimal Osorio LPN  Medication Review/Management: medications reviewed  Taken 4/29/2024 2200 by Vimal Osorio LPN  Medication Review/Management: medications reviewed  Taken 4/29/2024 2007 by Vimal Osorio LPN  Medication Review/Management: medications reviewed     Problem: Hypertension Comorbidity  Goal: Blood Pressure in Desired Range  Intervention: Maintain Blood Pressure Management  Description: Evaluate adherence to home antihypertensive regimen (e.g., exercise and activity, diet modification, medication).  Provide scheduled antihypertensive medication; consider administration time and effects (e.g., avoid giving diuretic prior to bedtime).  Monitor response to antihypertensive medication therapy (e.g., blood pressure, electrolyte levels, medication effects).  Minimize risk of orthostatic hypotension; encourage caution with position changes, particularly if elderly.  Recent  Flowsheet Documentation  Taken 4/30/2024 0200 by Vimal Osorio LPN  Medication Review/Management: medications reviewed  Taken 4/30/2024 0005 by Vimal Osorio LPN  Syncope Management: position changed slowly  Taken 4/29/2024 2200 by Vimal Osorio LPN  Medication Review/Management: medications reviewed  Taken 4/29/2024 2007 by Vimal Osorio LPN  Medication Review/Management: medications reviewed     Problem: Glycemic Control Impaired (Sepsis/Septic Shock)  Goal: Blood Glucose Level Within Desired Range  Intervention: Optimize Glycemic Control  Description: Establish target blood glucose levels based on patient-specific factors, such as illness severity and comorbidity.  Document blood glucose levels and monitor trend.  If elevated blood glucose level is not within desired range, initiate insulin therapy to optimize glycemic control using an insulin management protocol.  Avoid hypoglycemic episodes by proactively adjusting insulin therapy if there is a change in condition, treatment, illness severity, medication or nutrition support therapy.  Recent Flowsheet Documentation  Taken 4/30/2024 0005 by Vimal Osorio LPN  Glycemic Management: blood glucose monitored  Taken 4/29/2024 2007 by Vimal Osorio LPN  Glycemic Management: blood glucose monitored     Problem: Infection Progression (Sepsis/Septic Shock)  Goal: Absence of Infection Signs and Symptoms  Intervention: Initiate Sepsis Management  Description: Provide fluid therapy, such as crystalloid or albumin, to increase intravascular volume, organ perfusion and oxygen delivery.  Provide respiratory support, such as oxygen therapy, noninvasive or invasive positive pressure ventilation, to achieve oxygenation and ventilation goal; avoid hyperoxemia.  Obtain cultures prior to initiating antimicrobial therapy when possible. Do not delay for laboratory results in the presence of high suspicion or clinical indicators.  Administer  intravenous broad-spectrum antimicrobial therapy promptly.  Implement hemodynamic monitoring to guide intravascular support based on individual targeted parameters.  Determine and address underlying source of infection aggressively; implement transmission-based precautions and isolation, as indicated.  Recent Flowsheet Documentation  Taken 4/30/2024 0200 by Vimal Osorio LPN  Infection Prevention:   single patient room provided   visitors restricted/screened   rest/sleep promoted   personal protective equipment utilized   hand hygiene promoted  Taken 4/29/2024 2200 by Vimal Osorio LPN  Infection Prevention:   visitors restricted/screened   single patient room provided   rest/sleep promoted   personal protective equipment utilized   hand hygiene promoted  Taken 4/29/2024 2007 by Vimal Osorio LPN  Infection Prevention:   visitors restricted/screened   single patient room provided   rest/sleep promoted   personal protective equipment utilized   hand hygiene promoted  Intervention: Promote Recovery  Description: Encourage pulmonary hygiene, such as cough-enhancement and airway-clearance techniques, that may include use of incentive spirometry, deep breathing and cough.  Encourage early rehabilitation and physical activity to optimize functional ability and activity tolerance, as well as minimize delirium.  Promote energy conservation; minimize oxygen demand and consumption by adjusting environment, decreasing stimulation, maintaining normothermia and treating pain.  Optimize fluid balance, nutrition intake, sleep and glycemic control to maintain tissue perfusion and enhance immune response.  Recent Flowsheet Documentation  Taken 4/30/2024 0200 by Vimal Osorio LPN  Activity Management: activity encouraged  Taken 4/30/2024 0005 by Vimal Osorio LPN  Activity Management: activity encouraged  Sleep/Rest Enhancement:   awakenings minimized   room darkened  Taken 4/29/2024 2200 by  Vimal Osorio LPN  Activity Management: activity encouraged  Taken 4/29/2024 2007 by Vimal Osorio LPN  Activity Management: activity encouraged  Sleep/Rest Enhancement:   awakenings minimized   consistent schedule promoted     Problem: Fall Injury Risk  Goal: Absence of Fall and Fall-Related Injury  Intervention: Identify and Manage Contributors  Description: Develop a fall prevention plan with the patient and caregiver/family.  Provide reorientation, appropriate sensory stimulation and routines with changes in mental status to decrease risk of fall.  Promote use of personal vision and auditory aids.  Assess assistance level required for safe and effective self-care; provide support as needed, such as toileting, mobilization. For age 65 and older, implement timed toileting with assistance.  Encourage physical activity, such as performance of mobility and self-care at highest level of patient ability, multicomponent exercise program and provision of appropriate assistive devices.  If fall occurs, assess the severity of injury; implement fall injury protocol. Determine the cause and revise fall injury prevention plan.  Regularly review medication contribution to fall risk; adjust medication administration times to minimize risk of falling.  Consider risk related to polypharmacy and age.  Balance adequate pain management with potential for oversedation.  Recent Flowsheet Documentation  Taken 4/30/2024 0200 by Vimal Osorio LPN  Medication Review/Management: medications reviewed  Taken 4/29/2024 2200 by Vimal Osorio LPN  Medication Review/Management: medications reviewed  Taken 4/29/2024 2007 by Vimal Osorio LPN  Medication Review/Management: medications reviewed  Intervention: Promote Injury-Free Environment  Description: Provide a safe, barrier-free environment that encourages independent activity.  Keep care area uncluttered and well-lighted.  Determine need for increased  observation or monitoring.  Avoid use of devices that minimize mobility, such as restraints or indwelling urinary catheter.  Recent Flowsheet Documentation  Taken 4/30/2024 0200 by Vimal Osorio LPN  Safety Promotion/Fall Prevention:   safety round/check completed   room organization consistent   muscle strengthening facilitated   nonskid shoes/slippers when out of bed   mobility aid in reach   lighting adjusted   fall prevention program maintained   clutter free environment maintained   assistive device/personal items within reach   activity supervised  Taken 4/30/2024 0005 by Vimal Osorio LPN  Safety Promotion/Fall Prevention:   safety round/check completed   room organization consistent   nonskid shoes/slippers when out of bed   muscle strengthening facilitated   mobility aid in reach   lighting adjusted   fall prevention program maintained   clutter free environment maintained   assistive device/personal items within reach   activity supervised  Taken 4/29/2024 2200 by Vimal Osorio LPN  Safety Promotion/Fall Prevention:   safety round/check completed   room organization consistent   nonskid shoes/slippers when out of bed   muscle strengthening facilitated   mobility aid in reach   lighting adjusted   fall prevention program maintained   clutter free environment maintained   assistive device/personal items within reach   activity supervised  Taken 4/29/2024 2007 by Vimal Osorio LPN  Safety Promotion/Fall Prevention:   safety round/check completed   room organization consistent   nonskid shoes/slippers when out of bed   muscle strengthening facilitated   mobility aid in reach   lighting adjusted   gait belt   fall prevention program maintained   clutter free environment maintained   assistive device/personal items within reach   activity supervised   Goal Outcome Evaluation: On Going, possible discharge in AM after MD rounds and review of am Labs, pt requires assist x1 , cont to  monitor pt progress toward goal . Noted progressing toward goal

## 2024-04-30 NOTE — DISCHARGE SUMMARY
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]   COPD (chronic obstructive pulmonary disease) [J44.9]       Presenting Problem/History of Present Illness  Active Hospital Problems    Diagnosis  POA    **Sepsis [A41.9]  Yes    Permanent atrial fibrillation [I48.21]  Yes    Secondary hypercoagulability disorder [D68.69]  Yes    Abnormal LFTs [R79.89]  Yes    UTI (urinary tract infection), bacterial [N39.0, A49.9]  Yes    Moderate malnutrition [E44.0]  Yes    Anemia, chronic disease [D63.8]  Yes    Rheumatoid arthritis [M06.9]  Yes    CKD (chronic kidney disease), stage III [N18.30]  Yes    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          Hospital Course  Patient is a 84 y.o. male with multiple medical problems listed above who presented to an outlying facility with shortness of breath, fatigue.  He was febrile (T103) and hypotensive.  He was treated with IV fluids and  transferred to our ICU where he required pressors.  Infectious workup was unremarkable except for abnormal urine, although it did not trigger urine culture.  He improved with supportive measures and IV ceftriaxone.  It is suspected that he has prostatitis.  He has not demonstrated an oxygen requirement here, including on walking oximetry.  He wears 3-4 lpm at home and has a home Trilogy machine.  He will continue using the Trilogy machine at night and can resume home oxygen if sats are below 90%.    He developed elevated transaminases, likely from  hypotension.  LFT's have normalized.  Statin was held but may be resumed at discharge.    He remained in a controlled atrial fib and is anticoagulated.    He has iron deficiency anemia with occult blood noted in stool.  EGD showed distal esophageal stricture with HH, no active bleeding.  He will take iron and continue PPI.  He should have hg repeated at hospital follow up.  If anemia persists, he can consider colonoscopy.    He has CKD and creatinine was elevated above his baseline.  Diuretics are being held due to hypotension.   He will need repeat chemistry panel at follow up with consideration of restarting diuretics if he appears fluid overloaded.    He had an episode of asymptomatic junctional rhythm that resolved without intervention.     At the time of discharge he is feeling well.  He is returning home with home health services and has a granddaughter that will stay with him and his wife, who has advanced dementia.  He will use a rolling walker.  He will have prompt follow up with PCP and cardiologist.     He is on midodrine for persistent hypotension.  This can be discontinued if BP recovers.     He will complete an additional 5 days of cefdinir for presumed prostatitis as source for sepsis.             Procedures Performed    Procedure(s):  ESOPHAGOGASTRODUODENOSCOPY WITH DILATION (#[id number] BOUGIE)  -------------------  04/30 1252 Note By: Rizwana Gonsalez, LEIGHTON    Consults:   Consults       Date and Time Order Name Status Description    4/25/2024 10:42 AM Inpatient Gastroenterology Consult Completed     4/24/2024 10:14 AM Inpatient Nephrology Consult Completed     4/24/2024  8:01 AM Inpatient Family Practice Consult      4/23/2024  1:45 PM Inpatient Cardiology Consult              Pertinent Test Results:    Lab Results (most recent)       Procedure Component Value Units Date/Time    Magnesium [[id number]]  (Normal) Collected: 04/30/24 0311    Specimen: Blood from Arm, Left Updated: 04/30/24 1409     Magnesium  1.8 mg/dL     Comprehensive Metabolic Panel [[id number]]  (Abnormal) Collected: 04/30/24 0311    Specimen: Blood from Arm, Left Updated: 04/30/24 0426     Glucose 103 mg/dL      BUN 43 mg/dL      Creatinine 1.08 mg/dL      Sodium 137 mmol/L      Potassium 3.7 mmol/L      Comment: Slight hemolysis detected by analyzer. Result may be falsely elevated.        Chloride 100 mmol/L      CO2 27.0 mmol/L      Calcium 8.3 mg/dL      Total Protein 4.9 g/dL      Albumin 2.5 g/dL      ALT (SGPT) 34 U/L      AST (SGOT) 31 U/L      Alkaline Phosphatase 125 U/L      Total Bilirubin 0.5 mg/dL      Globulin 2.4 gm/dL      A/G Ratio 1.0 g/dL      BUN/Creatinine Ratio 39.8     Anion Gap 10.0 mmol/L      eGFR 68.1 mL/min/1.73     Narrative:      GFR Normal >60  Chronic Kidney Disease <60  Kidney Failure <15    The GFR formula is only valid for adults with stable renal function between ages 18 and 70.    Phosphorus [[id number]]  (Normal) Collected: 04/30/24 0311    Specimen: Blood from Arm, Left Updated: 04/30/24 0426     Phosphorus 2.7 mg/dL     CBC & Differential [[id number]]  (Abnormal) Collected: 04/30/24 0311    Specimen: Blood from Arm, Left Updated: 04/30/24 0409    Narrative:      The following orders were created for panel order CBC & Differential.  Procedure                               Abnormality         Status                     ---------                               -----------         ------                     CBC Auto Differential[[id number]]        Abnormal            Final result                 Please view results for these tests on the individual orders.    CBC Auto Differential [[id number]]  (Abnormal) Collected: 04/30/24 0311    Specimen: Blood from Arm, Left Updated: 04/30/24 0409     WBC 7.21 10*3/mm3      RBC 2.76 10*6/mm3      Hemoglobin 8.5 g/dL      Hematocrit 27.2 %      MCV 98.6 fL      MCH 30.8 pg      MCHC 31.3 g/dL      RDW 15.4 %      RDW-SD 53.5 fl      MPV 13.0 fL      Platelets 218 10*3/mm3       Neutrophil % 66.2 %      Lymphocyte % 16.5 %      Monocyte % 10.3 %      Eosinophil % 6.0 %      Basophil % 0.4 %      Immature Grans % 0.6 %      Neutrophils, Absolute 4.78 10*3/mm3      Lymphocytes, Absolute 1.19 10*3/mm3      Monocytes, Absolute 0.74 10*3/mm3      Eosinophils, Absolute 0.43 10*3/mm3      Basophils, Absolute 0.03 10*3/mm3      Immature Grans, Absolute 0.04 10*3/mm3      nRBC 0.0 /100 WBC     POC Glucose Once [[id number]]  (Abnormal) Collected: 04/29/24 2001    Specimen: Blood Updated: 04/29/24 2002     Glucose 119 mg/dL      Comment: Serial Number: [id number]:  [id number]       POC Glucose Once [[id number]]  (Abnormal) Collected: 04/29/24 1715    Specimen: Blood Updated: 04/29/24 1716     Glucose 124 mg/dL      Comment: Serial Number: [id number]:  [id number]       High Sensitivity Troponin T 2Hr [[id number]]  (Abnormal) Collected: 04/29/24 0744    Specimen: Blood Updated: 04/29/24 0837     HS Troponin T 107 ng/L      Troponin T Delta -20 ng/L     Narrative:      High Sensitive Troponin T Reference Range:  <14.0 ng/L- Negative Female for AMI  <22.0 ng/L- Negative Male for AMI  >=14 - Abnormal Female indicating possible myocardial injury.  >=22 - Abnormal Male indicating possible myocardial injury.   Clinicians would have to utilize clinical acumen, EKG, Troponin, and serial changes to determine if it is an Acute Myocardial Infarction or myocardial injury due to an underlying chronic condition.         High Sensitivity Troponin T [[id number]]  (Abnormal) Collected: 04/29/24 0433    Specimen: Blood from Arm, Left Updated: 04/29/24 0634     HS Troponin T 127 ng/L     Narrative:      High Sensitive Troponin T Reference Range:  <14.0 ng/L- Negative Female for AMI  <22.0 ng/L- Negative Male for AMI  >=14 - Abnormal Female indicating possible myocardial injury.  >=22 - Abnormal Male indicating possible myocardial injury.   Clinicians would have to utilize clinical acumen, EKG, Troponin, and serial  changes to determine if it is an Acute Myocardial Infarction or myocardial injury due to an underlying chronic condition.         Magnesium [[id number]]  (Normal) Collected: 04/29/24 0433    Specimen: Blood from Arm, Left Updated: 04/29/24 0540     Magnesium 1.9 mg/dL     Comprehensive Metabolic Panel [[id number]]  (Abnormal) Collected: 04/29/24 0433    Specimen: Blood from Arm, Left Updated: 04/29/24 0540     Glucose 102 mg/dL      BUN 44 mg/dL      Creatinine 1.03 mg/dL      Sodium 141 mmol/L      Potassium 3.8 mmol/L      Chloride 104 mmol/L      CO2 29.0 mmol/L      Calcium 8.7 mg/dL      Total Protein 5.2 g/dL      Albumin 2.7 g/dL      ALT (SGPT) 41 U/L      AST (SGOT) 29 U/L      Alkaline Phosphatase 139 U/L      Total Bilirubin 0.6 mg/dL      Globulin 2.5 gm/dL      A/G Ratio 1.1 g/dL      BUN/Creatinine Ratio 42.7     Anion Gap 8.0 mmol/L      eGFR 72.1 mL/min/1.73     Narrative:      GFR Normal >60  Chronic Kidney Disease <60  Kidney Failure <15    The GFR formula is only valid for adults with stable renal function between ages 18 and 70.    Phosphorus [[id number]]  (Abnormal) Collected: 04/29/24 0433    Specimen: Blood from Arm, Left Updated: 04/29/24 0540     Phosphorus 2.2 mg/dL     CBC & Differential [[id number]]  (Abnormal) Collected: 04/29/24 0433    Specimen: Blood from Arm, Left Updated: 04/29/24 0512    Narrative:      The following orders were created for panel order CBC & Differential.  Procedure                               Abnormality         Status                     ---------                               -----------         ------                     CBC Auto Differential[[id number]]        Abnormal            Final result                 Please view results for these tests on the individual orders.    CBC Auto Differential [[id number]]  (Abnormal) Collected: 04/29/24 0433    Specimen: Blood from Arm, Left Updated: 04/29/24 0512     WBC 7.40 10*3/mm3      RBC 2.94 10*6/mm3      Hemoglobin 9.2  g/dL      Hematocrit 28.9 %      MCV 98.3 fL      MCH 31.3 pg      MCHC 31.8 g/dL      RDW 15.6 %      RDW-SD 54.7 fl      MPV 13.0 fL      Platelets 227 10*3/mm3      Neutrophil % 63.7 %      Lymphocyte % 18.8 %      Monocyte % 10.5 %      Eosinophil % 6.1 %      Basophil % 0.5 %      Immature Grans % 0.4 %      Neutrophils, Absolute 4.71 10*3/mm3      Lymphocytes, Absolute 1.39 10*3/mm3      Monocytes, Absolute 0.78 10*3/mm3      Eosinophils, Absolute 0.45 10*3/mm3      Basophils, Absolute 0.04 10*3/mm3      Immature Grans, Absolute 0.03 10*3/mm3      nRBC 0.0 /100 WBC     Blood Culture - Blood, Hand, Left [[id number]]  (Normal) Collected: 04/23/24 1245    Specimen: Blood from Hand, Left Updated: 04/28/24 1301     Blood Culture No growth at 5 days    Blood Culture - Blood, Arm, Right [[id number]]  (Normal) Collected: 04/23/24 1235    Specimen: Blood from Arm, Right Updated: 04/28/24 1245     Blood Culture No growth at 5 days    Narrative:      Less than seven (7) mL's of blood was collected.  Insufficient quantity may yield false negative results.    Scan Slide [[id number]] Collected: 04/28/24 0027    Specimen: Blood Updated: 04/28/24 0134     RBC Morphology Normal     WBC Morphology Normal     Platelet Estimate Adequate     Large Platelets Slight/1+    High Sensitivity Troponin T [[id number]]  (Abnormal) Collected: 04/28/24 0027    Specimen: Blood Updated: 04/28/24 0108     HS Troponin T 122 ng/L     Narrative:      High Sensitive Troponin T Reference Range:  <14.0 ng/L- Negative Female for AMI  <22.0 ng/L- Negative Male for AMI  >=14 - Abnormal Female indicating possible myocardial injury.  >=22 - Abnormal Male indicating possible myocardial injury.   Clinicians would have to utilize clinical acumen, EKG, Troponin, and serial changes to determine if it is an Acute Myocardial Infarction or myocardial injury due to an underlying chronic condition.         Occult Blood, Fecal By Immunoassay - Stool, Per Rectum  [[id number]]  (Abnormal) Collected: 04/27/24 1944    Specimen: Stool from Per Rectum Updated: 04/27/24 2026     Occult Blood, Fecal by Immunoassay Positive    High Sensitivity Troponin T 2Hr [[id number]]  (Abnormal) Collected: 04/27/24 1255    Specimen: Blood from Arm, Left Updated: 04/27/24 1330     HS Troponin T 125 ng/L      Troponin T Delta 17 ng/L     Narrative:      High Sensitive Troponin T Reference Range:  <14.0 ng/L- Negative Female for AMI  <22.0 ng/L- Negative Male for AMI  >=14 - Abnormal Female indicating possible myocardial injury.  >=22 - Abnormal Male indicating possible myocardial injury.   Clinicians would have to utilize clinical acumen, EKG, Troponin, and serial changes to determine if it is an Acute Myocardial Infarction or myocardial injury due to an underlying chronic condition.         Scan Slide [[id number]] Collected: 04/27/24 0205    Specimen: Blood from Arm, Right Updated: 04/27/24 0323     Anisocytosis Slight/1+     WBC Morphology Normal     Giant Platelets Slight/1+    Calcium, Ionized [[id number]]  (Abnormal) Collected: 04/27/24 0205    Specimen: Blood from Arm, Right Updated: 04/27/24 0300     Ionized Calcium 1.18 mmol/L     Anti-Smooth Muscle Antibody Titer [[id number]] Collected: 04/25/24 1728    Specimen: Blood Updated: 04/26/24 1510     Smooth Muscle Ab 16 Units      Comment:                  Negative                     0 - 19                   Weak positive               20 - 30                   Moderate to strong positive     >30   Actin Antibodies are found in 52-85% of patients with   autoimmune hepatitis or chronic active hepatitis and   in 22% of patients with primary biliary cirrhosis.       Narrative:      Performed at:  [address] [place] [address]  [id number]  : Prakash Wilkerson PhD, Phone:  [phone]    Mitochondrial Antibodies, M2 [[id number]] Collected: 04/25/24 1728    Specimen: Blood Updated: 04/26/24 1510     Mitochondrial Ab <20.0  Units      Comment:                                 Negative    0.0 - 20.0                                  Equivocal  20.1 - 24.9                                  Positive         >24.9  Mitochondrial (M2) Antibodies are found in 90-96% of  patients with primary biliary cirrhosis.       Narrative:      Performed at:  [address] [place] [address]  [id number]  : Prakash Wilkerson PhD, Phone:  [phone]    NAIMA [[id number]]  (Normal) Collected: 04/25/24 1728    Specimen: Blood Updated: 04/26/24 1053     Antinuclear Antibodies (NAIMA) Negative    Lactic Acid, Plasma [[id number]]  (Normal) Collected: 04/26/24 0546    Specimen: Blood Updated: 04/26/24 0646     Lactate 1.2 mmol/L     Calcium, Ionized [[id number]]  (Normal) Collected: 04/26/24 0546    Specimen: Blood Updated: 04/26/24 0631     Ionized Calcium 1.20 mmol/L     AFP Tumor Marker [[id number]]  (Normal) Collected: 04/25/24 1728    Specimen: Blood Updated: 04/26/24 0050     ALPHA-FETOPROTEIN <2 ng/mL     Narrative:      Alpha Fetoprotein Tumor Marker Reference Range:    0.0-8.3 ng/mL    Note: Normal values apply only to males and nonpregnant females. These results are not interpretable for pregnant females.    Testing Method: [employer] Electrochemiluminescence Immunoassay(ECLIA)  Values obtained with different assay methods or kits cannot be used interchangeably.    Alpha - 1 - Antitrypsin [[id number]]  (Abnormal) Collected: 04/25/24 0328    Specimen: Blood Updated: 04/26/24 0023     ALPHA -1 ANTITRYPSIN 205 mg/dL     Ceruloplasmin [[id number]]  (Normal) Collected: 04/25/24 0328    Specimen: Blood Updated: 04/26/24 0023     Ceruloplasmin 22 mg/dL     Gamma GT [[id number]]  (Abnormal) Collected: 04/25/24 0328    Specimen: Blood Updated: 04/26/24 0022      U/L     Hepatitis Panel, Acute [[id number]]  (Normal) Collected: 04/25/24 1728    Specimen: Blood Updated: 04/25/24 1805     Hepatitis B Surface Ag Non-Reactive     Hep A IgM  Non-Reactive     Hep B C IgM Non-Reactive     Hepatitis C Ab Non-Reactive    Narrative:      Results may be falsely decreased if patient taking Biotin.     Anti-microsomal Antibody [[id number]] Collected: 04/25/24 1728    Specimen: Blood Updated: 04/25/24 1731    Ferritin [[id number]]  (Abnormal) Collected: 04/25/24 0328    Specimen: Blood Updated: 04/25/24 1712     Ferritin 542.30 ng/mL     Narrative:      Results may be falsely decreased if patient taking Biotin.      Iron [[id number]]  (Abnormal) Collected: 04/25/24 0328    Specimen: Blood Updated: 04/25/24 1707     Iron 202 mcg/dL     Respiratory Culture - Sputum, Cough [[id number]] Collected: 04/23/24 2204    Specimen: Sputum from Cough Updated: 04/25/24 1048     Respiratory Culture Rare Normal respiratory nicolas. No S. aureus or Pseudomonas aeruginosa detected. Final report.     Gram Stain Many (4+) WBCs per low power field      Rare (1+) Epithelial cells per low power field      Rare (1+) Mixed bacterial morphotypes seen on Gram Stain    Occult Blood, Fecal By Immunoassay - Stool, Per Rectum [[id number]]  (Abnormal) Collected: 04/25/24 0824    Specimen: Stool from Per Rectum Updated: 04/25/24 0836     Occult Blood, Fecal by Immunoassay Positive    Uric Acid [[id number]]  (Normal) Collected: 04/25/24 0328    Specimen: Blood Updated: 04/25/24 0426     Uric Acid 6.5 mg/dL     BNP [[id number]]  (Abnormal) Collected: 04/24/24 0338    Specimen: Blood Updated: 04/24/24 1220     proBNP 16,780.0 pg/mL     Narrative:      This assay is used as an aid in the diagnosis of individuals suspected of having heart failure. It can be used as an aid in the diagnosis of acute decompensated heart failure (ADHF) in patients presenting with signs and symptoms of ADHF to the emergency department (ED). In addition, NT-proBNP of <300 pg/mL indicates ADHF is not likely.    Age Range Result Interpretation  NT-proBNP Concentration (pg/mL:      <50             Positive            >450                    Gray                 300-450                    Negative             <300    50-75           Positive            >900                  Gray                300-900                  Negative            <300      >75             Positive            >1800                  Gray                300-1800                  Negative            <300    Iron Profile [[id number]]  (Abnormal) Collected: 04/24/24 0338    Specimen: Blood Updated: 04/24/24 0923     Iron 16 mcg/dL      Iron Saturation (TSAT) 7 %      Transferrin 146 mg/dL      TIBC 218 mcg/dL     Legionella Antigen, Urine - Urine, Urine, Clean Catch [[id number]]  (Normal) Collected: 04/23/24 2203    Specimen: Urine, Clean Catch Updated: 04/23/24 2227     LEGIONELLA ANTIGEN, URINE Negative    S. Pneumo Ag Urine or CSF - Urine, Urine, Clean Catch [[id number]]  (Normal) Collected: 04/23/24 2203    Specimen: Urine, Clean Catch Updated: 04/23/24 2227     Strep Pneumo Ag Negative    Urinalysis With Culture If Indicated - Urine, Clean Catch [[id number]]  (Abnormal) Collected: 04/23/24 2204    Specimen: Urine, Clean Catch Updated: 04/23/24 2217     Color, UA Lynnette     Appearance, UA Cloudy     pH, UA <=5.0     Specific Gravity, UA 1.023     Glucose, UA Negative     Ketones, UA Negative     Bilirubin, UA Moderate (2+)     Comment: Confirmation testing is unavailable.  A serum bilirubin is recommended for further assessment.        Blood, UA Small (1+)     Protein, UA 30 mg/dL (1+)     Leuk Esterase, UA Small (1+)     Nitrite, UA Positive     Urobilinogen, UA 1.0 E.U./dL    Narrative:      In absence of clinical symptoms, the presence of pyuria, bacteria, and/or nitrites on the urinalysis result does not correlate with infection.    Urinalysis, Microscopic Only - Urine, Clean Catch [[id number]]  (Abnormal) Collected: 04/23/24 2204    Specimen: Urine, Clean Catch Updated: 04/23/24 2217     RBC, UA 11-20 /HPF      WBC, UA 0-2 /HPF      Comment: Urine culture not indicated.         Bacteria, UA None Seen /HPF      Squamous Epithelial Cells, UA 0-2 /HPF      Hyaline Casts, UA 0-2 /LPF      Methodology Automated Microscopy    Hemoglobin A1c [[id number]]  (Abnormal) Collected: 04/23/24 1235    Specimen: Blood Updated: 04/23/24 1429     Hemoglobin A1C 4.70 %     MRSA Screen, PCR (Inpatient) - Swab, Nares [[id number]]  (Normal) Collected: 04/23/24 1237    Specimen: Swab from Nares Updated: 04/23/24 1356     MRSA PCR No MRSA Detected    Narrative:      The negative predictive value of this diagnostic test is high and should only be used to consider de-escalating anti-MRSA therapy. A positive result may indicate colonization with MRSA and must be correlated clinically.    CK [[id number]]  (Normal) Collected: 04/23/24 1235    Specimen: Blood Updated: 04/23/24 1349     Creatine Kinase 117 U/L     Lipase [[id number]]  (Normal) Collected: 04/23/24 1235    Specimen: Blood Updated: 04/23/24 1349     Lipase 16 U/L     Amylase [[id number]]  (Normal) Collected: 04/23/24 1235    Specimen: Blood Updated: 04/23/24 1349     Amylase 29 U/L     Respiratory Panel PCR w/COVID-19(SARS-CoV-2) KEN/HUBERT/CHIKA/PAD/COR/FLORECITA In-House, NP Swab in UTM/VTM, 2 HR TAT - Swab, Nasopharynx [[id number]]  (Normal) Collected: 04/23/24 1237    Specimen: Swab from Nasopharynx Updated: 04/23/24 1331     ADENOVIRUS, PCR Not Detected     Coronavirus 229E Not Detected     Coronavirus HKU1 Not Detected     Coronavirus NL63 Not Detected     Coronavirus OC43 Not Detected     COVID19 Not Detected     Human Metapneumovirus Not Detected     Human Rhinovirus/Enterovirus Not Detected     Influenza A PCR Not Detected     Influenza B PCR Not Detected     Parainfluenza Virus 1 Not Detected     Parainfluenza Virus 2 Not Detected     Parainfluenza Virus 3 Not Detected     Parainfluenza Virus 4 Not Detected     RSV, PCR Not Detected     Bordetella pertussis pcr Not Detected     Bordetella parapertussis PCR Not Detected     Chlamydophila pneumoniae PCR Not  Detected     Mycoplasma pneumo by PCR Not Detected    Narrative:      In the setting of a positive respiratory panel with a viral infection PLUS a negative procalcitonin without other underlying concern for bacterial infection, consider observing off antibiotics or discontinuation of antibiotics and continue supportive care. If the respiratory panel is positive for atypical bacterial infection (Bordetella pertussis, Chlamydophila pneumoniae, or Mycoplasma pneumoniae), consider antibiotic de-escalation to target atypical bacterial infection.    C-reactive Protein [[id number]]  (Abnormal) Collected: 04/23/24 1235    Specimen: Blood Updated: 04/23/24 1328     C-Reactive Protein 10.13 mg/dL     Lipid Panel [[id number]]  (Abnormal) Collected: 04/23/24 1235    Specimen: Blood Updated: 04/23/24 1328     Total Cholesterol 64 mg/dL      Triglycerides 47 mg/dL      HDL Cholesterol 30 mg/dL      LDL Cholesterol  21 mg/dL      VLDL Cholesterol 13 mg/dL      LDL/HDL Ratio 0.82    Narrative:      Cholesterol Reference Ranges  (U.S. Department of Health and Human Services ATP III Classifications)    Desirable          <200 mg/dL  Borderline High    200-239 mg/dL  High Risk          >240 mg/dL      Triglyceride Reference Ranges  (U.S. Department of Health and Human Services ATP III Classifications)    Normal           <150 mg/dL  Borderline High  150-199 mg/dL  High             200-499 mg/dL  Very High        >500 mg/dL    HDL Reference Ranges  (U.S. Department of Health and Human Services ATP III Classifications)    Low     <40 mg/dl (major risk factor for CHD)  High    >60 mg/dl ('negative' risk factor for CHD)        LDL Reference Ranges  (U.S. Department of Health and Human Services ATP III Classifications)    Optimal          <100 mg/dL  Near Optimal     100-129 mg/dL  Borderline High  130-159 mg/dL  High             160-189 mg/dL  Very High        >189 mg/dL    Procalcitonin [[id number]]  (Abnormal) Collected: 04/23/24 1235     "Specimen: Blood Updated: 04/23/24 1327     Procalcitonin 8.46 ng/mL     Narrative:      As a Marker for Sepsis (Non-Neonates):    1. <0.5 ng/mL represents a low risk of severe sepsis and/or septic shock.  2. >2 ng/mL represents a high risk of severe sepsis and/or septic shock.    As a Marker for Lower Respiratory Tract Infections that require antibiotic therapy:    PCT on Admission    Antibiotic Therapy       6-12 Hrs later    >0.5                Strongly Recommended  >0.25 - <0.5        Recommended   0.1 - 0.25          Discouraged              Remeasure/reassess PCT  <0.1                Strongly Discouraged     Remeasure/reassess PCT    As 28 day mortality risk marker: \"Change in Procalcitonin Result\" (>80% or <=80%) if Day 0 (or Day 1) and Day 4 values are available. Refer to http://www.[employer]pct-calculator.com    Change in PCT <=80%  A decrease of PCT levels below or equal to 80% defines a positive change in PCT test result representing a higher risk for 28-day all-cause mortality of patients diagnosed with severe sepsis for septic shock.    Change in PCT >80%  A decrease of PCT levels of more than 80% defines a negative change in PCT result representing a lower risk for 28-day all-cause mortality of patients diagnosed with severe sepsis or septic shock.       Manual Differential [[id number]]  (Abnormal) Collected: 04/23/24 1235    Specimen: Blood Updated: 04/23/24 1313     Neutrophil % 80.0 %      Lymphocyte % 0.0 %      Bands %  19.0 %      Metamyelocyte % 1.0 %      Neutrophils Absolute 12.93 10*3/mm3      Lymphocytes Absolute 0.00 10*3/mm3      RBC Fragments Slight/1+     Toxic Granulation Slight/1+     Vacuolated Neutrophils Slight/1+     Platelet Morphology Normal    Lactic Acid, Plasma [[id number]]  (Normal) Collected: 04/23/24 1235    Specimen: Blood Updated: 04/23/24 1310     Lactate 0.9 mmol/L              Results for orders placed during the hospital encounter of 04/23/24    Adult Transthoracic Echo " Complete W/ Cont if Necessary Per Protocol    Interpretation Summary  Indications  Shortness of breath    Technically satisfactory study.  Mitral valve is thickened with adequate opening motion.  Moderate mitral regurgitation is present.  Tricuspid valve is structurally normal.  Moderate tricuspid regurgitation is present.  Aortic valve is thickened with adequate opening motion.  Moderate aortic regurgitation is present.  Pulmonic valve opening motion is normal.  Moderate pulmonic regurgitation is present.  Mild pulmonary hypertension is present.  Left atrium is enlarged.  Right atrium is enlarged.  Left ventricle is normal in size and contractility with ejection fraction of 60%.  Grade 3 left ventricular diastolic dysfunction is present.  (MV E/8 ratio 2.8)  Left ventricular peak systolic longitudinal strain is abnormal with GL PS of -14%.  Concentric left ventricle hypertrophy is present.  Right ventricle enlarged with hypocontractility with a TAPSE of 1.46 cm..  Atrial septum is intact.  Aorta is dilated at 4.3 cm.  IVC is dilated with decreased respiratory variation.  Right atrial pressure 8 mmHg.  No pericardial effusion or intracardiac thrombus is seen.    Impression  Thickened mitral and aortic valves with adequate opening motion.  Moderate mitral tricuspid and aortic and pulmonic regurgitation.  Left atrial enlargement.  Left ventricular size and contractility is normal with ejection fraction of 60%.  Left ventricular peak systolic longitudinal strain is abnormal with GL PS of -14%.  Left ventricular grade 3 diastolic dysfunction is present.  Right atrium right ventricle enlargement is present.  Mild pulmonary hypertension is present.              Condition on Discharge:  Improved, stable    Vital Signs  Temp:  [97.7 °F (36.5 °C)-98.4 °F (36.9 °C)] 97.7 °F (36.5 °C)  Heart Rate:  [57-82] 78  Resp:  [15-28] 15  BP: (112-140)/(51-74) 140/54    Physical Exam:     General Appearance:    Alert, cooperative, in no  acute distress   Head:    Normocephalic, without obvious abnormality, atraumatic   Eyes:            Lids and lashes normal, conjunctivae and sclerae normal, no   icterus, no pallor, corneas clear, PERRLA   Ears:    Ears appear intact with no abnormalities noted   Throat:   No oral lesions, no thrush, oral mucosa moist   Neck:   No adenopathy, supple, trachea midline, no thyromegaly, no   carotid bruit, no JVD   Lungs:     Clear to auscultation,respirations regular, even and                  unlabored    Heart:    Irregularly irregular   Chest Wall:    No abnormalities observed   Abdomen:     Normal bowel sounds, no masses, no organomegaly, soft        non-tender, non-distended, no guarding, no rebound                tenderness   Extremities:   Moves all extremities well, no edema, no cyanosis, no             redness   Pulses:   Pulses palpable and equal bilaterally   Skin:   CVS changes bilaterally   Lymph nodes:   No palpable adenopathy   Neurologic:   Cranial nerves 2 - 12 grossly intact, sensation intact, DTR       present and equal bilaterally       Discharge Disposition  Home or Self Care    Discharge Medications     Discharge Medications        New Medications        Instructions Start Date   acetaminophen 325 MG tablet  Commonly known as: TYLENOL   650 mg, Oral, Every 4 Hours PRN      cefdinir 300 MG capsule  Commonly known as: OMNICEF   300 mg, Oral, 2 Times Daily   Start Date: May 1, 2024     ferrous sulfate 325 (65 FE) MG tablet   325 mg, Oral, Daily With Breakfast      midodrine 5 MG tablet  Commonly known as: PROAMATINE   5 mg, Oral, 3 Times Daily Before Meals             Continue These Medications        Instructions Start Date   apixaban 2.5 MG tablet tablet  Commonly known as: ELIQUIS   2.5 mg, Oral, 2 Times Daily      atorvastatin 20 MG tablet  Commonly known as: LIPITOR   20 mg, Oral, Daily      hydroxychloroquine 200 MG tablet  Commonly known as: PLAQUENIL   Take 2 tablets by mouth Daily.       ipratropium-albuterol 0.5-2.5 mg/3 ml nebulizer  Commonly known as: DUO-NEB   3 mL, Nebulization, Every 4 Hours PRN      multivitamin with minerals tablet tablet   1 tablet, Oral, Daily      oxybutynin XL 10 MG 24 hr tablet  Commonly known as: DITROPAN-XL   1 tablet, Oral, Daily      pantoprazole 40 MG EC tablet  Commonly known as: PROTONIX   1 tablet, Oral, Daily      revefenacin 175 MCG/3ML nebulizer solution  Commonly known as: YUPELRI   175 mcg, Nebulization, Daily - RT      tamsulosin 0.4 MG capsule 24 hr capsule  Commonly known as: FLOMAX   1 capsule, Oral, Daily             Stop These Medications      fenofibrate 160 MG tablet     losartan 100 MG tablet  Commonly known as: COZAAR     spironolactone 25 MG tablet  Commonly known as: ALDACTONE              Discharge Diet: Regular    Activity at Discharge:   Activity Instructions       Activity as Tolerated      Use a walker at home.            Follow-up Appointments  Future Appointments   Date Time Provider Department Center   5/7/2024  2:45 PM Nora Kenyon DNP, APRN [facility]   8/8/2024  1:10 PM Misty Dias MD [facility]     Additional Instructions for the Follow-ups that You Need to Schedule       Discharge Follow-up with PCP   As directed       Currently Documented PCP:    Guilherme Jason MD    PCP Phone Number:    [phone]     Follow Up Details: Call [facility] to request a hospital follow-up appointment as soon as possible.        Discharge Follow-up with Specified Provider: Dr. Dias; 2 Weeks   As directed      To: Dr. Dias   Follow Up: 2 Weeks                Test Results Pending at Discharge  Pending Labs       Order Current Status    Anti-microsomal Antibody In process             Pat Napoles MD  04/30/24  14:30 EDT    Time: Discharge 35 min

## 2024-05-01 NOTE — OUTREACH NOTE
Prep Survey      Flowsheet Row Responses   [facility] patient discharged from? [facility]   Is LACE score < 7 ? No   Eligibility [facility]   Date of Admission 04/23/24   Date of Discharge 04/30/24   Discharge Disposition Home or Self Care   Discharge diagnosis Sepsis-EGD with dilation this visit   Does the patient have one of the following disease processes/diagnoses(primary or secondary)? Sepsis   Does the patient have Home health ordered? Yes   What is the Home health agency?  [facility]   Is there a DME ordered? No   Prep survey completed? Yes            CECI UGALDE - Registered Nurse

## 2024-05-01 NOTE — CASE MANAGEMENT/SOCIAL WORK
Case Management Discharge Note      Final Note: Home with [facility]    Provided Post Acute Provider List?: N/A  Post Acute Provider List: Home Health  Provided Post Acute Provider Quality & Resource List?: N/A  Post Acute Provider Quality and Resource List: Home Health  Delivered To: Patient  Method of Delivery: In person    Selected Continued Care - Discharged on 4/30/2024 Admission date: 4/23/2024 - Discharge disposition: Home or Self Care          Home Medical Care Coordination complete.      Service Provider Selected Services Address Phone Fax Patient Preferred    [facility] [address] [place] [address], [place] [id number] [phone] --                 Transportation Services  Private: Car    Final Discharge Disposition Code: 06 - home with home health care

## 2024-05-02 ENCOUNTER — TELEPHONE (OUTPATIENT)
Dept: SURGERY | Facility: CLINIC | Age: 84
End: 2024-05-02
Payer: MEDICARE

## 2024-05-02 NOTE — TELEPHONE ENCOUNTER
I attempted to give pt 5/7/2024 CT appt at [facility], however pt is not doing well and asked for a little time. Pt just came home from a Hospital admission. I made provider aware and we will push his appointment out a little further.    KANWAL 11:45  5/2/2024

## 2024-05-06 ENCOUNTER — TELEPHONE (OUTPATIENT)
Dept: SURGERY | Facility: CLINIC | Age: 84
End: 2024-05-06
Payer: MEDICARE

## 2024-05-07 ENCOUNTER — READMISSION MANAGEMENT (OUTPATIENT)
Dept: CALL CENTER | Facility: HOSPITAL | Age: 84
End: 2024-05-07
Payer: MEDICARE

## 2024-05-14 ENCOUNTER — OFFICE VISIT (OUTPATIENT)
Dept: SURGERY | Facility: CLINIC | Age: 84
End: 2024-05-14
Payer: MEDICARE

## 2024-05-14 VITALS
HEART RATE: 58 BPM | WEIGHT: 182 LBS | OXYGEN SATURATION: 99 % | DIASTOLIC BLOOD PRESSURE: 60 MMHG | HEIGHT: 66 IN | BODY MASS INDEX: 29.25 KG/M2 | SYSTOLIC BLOOD PRESSURE: 120 MMHG

## 2024-05-14 DIAGNOSIS — R91.8 LUNG NODULES: Primary | ICD-10-CM

## 2024-05-14 DIAGNOSIS — Z87.891 FORMER SMOKER: ICD-10-CM

## 2024-05-14 PROCEDURE — 99214 OFFICE O/P EST MOD 30 MIN: CPT | Performed by: NURSE PRACTITIONER

## 2024-05-14 PROCEDURE — 1159F MED LIST DOCD IN RCRD: CPT | Performed by: NURSE PRACTITIONER

## 2024-05-14 PROCEDURE — 3074F SYST BP LT 130 MM HG: CPT | Performed by: NURSE PRACTITIONER

## 2024-05-14 PROCEDURE — 1160F RVW MEDS BY RX/DR IN RCRD: CPT | Performed by: NURSE PRACTITIONER

## 2024-05-14 PROCEDURE — 3078F DIAST BP <80 MM HG: CPT | Performed by: NURSE PRACTITIONER

## 2024-05-14 NOTE — PROGRESS NOTES
"Chief Complaint  Follow up, lung nodules, right pleural effusion    Subjective        Praneeth Nam presents to [facility]  History of Present Illness    Mr. Nam is a pleasant 84-year-old gentleman who is seen today in follow-up for continued surveillance of subcentimeter lung nodules.  A new right lower lobe lung nodule was seen on imaging performed in February 2023.  He is a former smoker with a greater than 40-pack-year history, quitting in 1995.  He also reports a history of prostate cancer status post radiation as well as leukemia status posttreatment with Dr. Helton.  He has significant rheumatoid arthritis and is on supplemental oxygen at baseline.  He is able to care for himself but is not very active at baseline.      He was recently admitted to the ICU in April 2024 for sepsis and discharged with therapy via home health.  He is slowly returning to his daily activities. He is followed by Dr. Dias of cardiology.  He was told he no longer needs to use his supplemental oxygen, but feels very dyspneic with exertion.  He presents today feeling well with his daughter and CT chest performed while admitted to the hospital      Objective   Vital Signs:  /60 (BP Location: Left arm, Patient Position: Sitting, Cuff Size: Adult)   Pulse 58   Ht 167.6 cm (65.98\")   Wt 82.6 kg (182 lb)   SpO2 99%   BMI 29.39 kg/m²   Estimated body mass index is 29.39 kg/m² as calculated from the following:    Height as of this encounter: 167.6 cm (65.98\").    Weight as of this encounter: 82.6 kg (182 lb).         Physical Exam  Constitutional:       General: He is not in acute distress.     Appearance: Normal appearance. He is not ill-appearing.   HENT:      Head: Normocephalic and atraumatic.      Comments: Nasal cannula  Cardiovascular:      Rate and Rhythm: Normal rate.      Pulses: Normal pulses.   Pulmonary:      Effort: Pulmonary effort is normal.   Musculoskeletal:         " General: Deformity (Consistent with rheumatoid arthritis) present.      Cervical back: Normal range of motion and neck supple.      Comments: In wheelchair secondary to weakness   Skin:     General: Skin is warm and dry.      Findings: Bruising present.   Neurological:      General: No focal deficit present.      Mental Status: He is alert. Mental status is at baseline.      Motor: Weakness present.   Psychiatric:         Mood and Affect: Mood normal.        Result Review :    CT chest 4/24/2024: Right-lower lobe 9 mm to 4 mm.  There is a new small left pleural effusion.  Small right pleural effusion is stable.  There is chronic emphysema.  No acute intrathoracic process.  Imaging reviewed independently.  Pulmonary nodule has decreased from    CT chest 10/06/2023: Stable 9mm nodule in the right lower lobe. Small to moderate right pleural effusion. Emphysema. No new lung nodules.    CT chest 6/20/2023: new small right pleural effusion.  There is a 9 mm nodule in the right lower lobe that is unchanged since first seen on last exam in February 2023.  There is chronic emphysema.  No mediastinal or hilar lymphadenopathy.  No pleural effusion on the left or pericardial effusion.    CT chest 2/28/2023: a new pleural-based right lower lobe nodule measuring 13 x 10 mm.  There is stable scarring throughout the left lung.  No pleural or pericardial effusion.  No mediastinal or hilar lymphadenopathy            Assessment and Plan   Diagnoses and all orders for this visit:    1. Lung nodules (Primary)  -     CT Chest Without Contrast; Future    2. Former smoker  -     CT Chest Without Contrast; Future        Mr. Nam's most recent CT chest demonstrates small pleural effusions.  The left is new.  The previously seen 9 mm nodule in the right lower lobe is now 4 mm.  Interstitial scarring is without change.  Given the stable findings and recent prolonged hospitalization, recommend CT chest in 1 year for continued lung cancer  surveillance.  He should follow-up with cardiology as scheduled.  Imaging reviewed and discussed with the patient and his daughter and they are in agreement with this plan         I spent 36 minutes caring for Praneeth on this date of service. This time includes time spent by me in the following activities:preparing for the visit, reviewing tests, performing a medically appropriate examination and/or evaluation , counseling and educating the patient/family/caregiver, ordering medications, tests, or procedures, documenting information in the medical record, independently interpreting results and communicating that information with the patient/family/caregiver and care coordination     Follow Up   Return in about 1 year (around 5/14/2025) for Next scheduled follow up.  Patient was given instructions and counseling regarding his condition or for health maintenance advice. Please see specific information pulled into the AVS if appropriate.

## 2024-05-16 ENCOUNTER — READMISSION MANAGEMENT (OUTPATIENT)
Dept: CALL CENTER | Facility: HOSPITAL | Age: 84
End: 2024-05-16
Payer: MEDICARE

## 2024-05-16 NOTE — OUTREACH NOTE
Sepsis Week 2 Survey      Flowsheet Row Responses   [facility] patient discharged from? [facility]   Does the patient have one of the following disease processes/diagnoses(primary or secondary)? Sepsis   Week 2 attempt successful? Yes   Call start time 1108   Call end time 1111   Discharge diagnosis Sepsis-EGD with dilation this visit   Meds reviewed with patient/caregiver? Yes   Is the patient taking all medications as directed (includes completed medication regime)? Yes   Does the patient have a primary care provider?  Yes   Does the patient have an appointment with their PCP within 7 days of discharge? Greater than 7 days   What is preventing the patient from scheduling follow up appointments within 7 days of discharge? Haven't had time   Nursing Interventions Educated patient on importance of making appointment, Advised patient to make appointment   Has the patient kept scheduled appointments due by today? N/A   What is the Home health agency?  [facility]    Has home health visited the patient within 72 hours of discharge? Yes   Psychosocial issues? No   Did the patient receive a copy of their discharge instructions? Yes   Nursing interventions Reviewed instructions with patient   What is the patient's perception of their health status since discharge? Improving   Nursing interventions Nurse provided patient education   Is the patient/caregiver able to teach back TIME? T emperature - higher or lower than normal, I nfection - may have signs and symptoms of an infection, M ental Decline - confused, sleepy, difficult to arouse, E xtremely Ill - severe pain, discomfort, shortness of breath   Nursing interventions Nurse provided patient education   Is patient/caregiver able to teach back steps to recovery at home? Set small, achievable goals for return to baseline health   Is the patient/caregiver able to teach back signs and symptoms of worsening condition: Fever, Altered mental status(confusion/coma), Hyperthermia   Is  the patient/caregiver able to teach back the hierarchy of who to call/visit for symptoms/problems? PCP, Specialist, Home health nurse, Urgent Care, ED, 911 Yes   Week 2 call completed? Yes   Wrap up additional comments Pt reports improvement but not 100% yet. He states he saw his cardio dr but not PCP yet. Encouraged pt to make appt with PCP as his next appt he reports is in sept.   Call end time 1111            WILL SCHAEFFER - Registered Nurse

## 2024-06-10 ENCOUNTER — ANESTHESIA EVENT (OUTPATIENT)
Dept: GASTROENTEROLOGY | Facility: HOSPITAL | Age: 84
End: 2024-06-10
Payer: MEDICARE

## 2024-06-10 ENCOUNTER — INPATIENT HOSPITAL (AMBULATORY)
Dept: URBAN - METROPOLITAN AREA HOSPITAL 84 | Facility: HOSPITAL | Age: 84
End: 2024-06-10

## 2024-06-10 ENCOUNTER — APPOINTMENT (OUTPATIENT)
Dept: GENERAL RADIOLOGY | Facility: HOSPITAL | Age: 84
DRG: 872 | End: 2024-06-10
Payer: MEDICARE

## 2024-06-10 ENCOUNTER — HOSPITAL ENCOUNTER (INPATIENT)
Facility: HOSPITAL | Age: 84
LOS: 5 days | Discharge: NURSING, REHAB OR LONG-TERM CARE | DRG: 872 | End: 2024-06-15
Attending: EMERGENCY MEDICINE | Admitting: INTERNAL MEDICINE
Payer: MEDICARE

## 2024-06-10 ENCOUNTER — APPOINTMENT (OUTPATIENT)
Dept: CT IMAGING | Facility: HOSPITAL | Age: 84
DRG: 872 | End: 2024-06-10
Payer: MEDICARE

## 2024-06-10 ENCOUNTER — ANESTHESIA (OUTPATIENT)
Dept: GASTROENTEROLOGY | Facility: HOSPITAL | Age: 84
End: 2024-06-10
Payer: MEDICARE

## 2024-06-10 DIAGNOSIS — K80.33 CALCULUS OF BILE DUCT WITH ACUTE CHOLANGITIS WITH OBSTRUCTIO: ICD-10-CM

## 2024-06-10 DIAGNOSIS — K83.09 CHOLANGITIS: Primary | ICD-10-CM

## 2024-06-10 DIAGNOSIS — K44.9 DIAPHRAGMATIC HERNIA WITHOUT OBSTRUCTION OR GANGRENE: ICD-10-CM

## 2024-06-10 DIAGNOSIS — K80.50 CHOLEDOCHOLITHIASIS: ICD-10-CM

## 2024-06-10 DIAGNOSIS — T18.2XXA FOREIGN BODY IN STOMACH, INITIAL ENCOUNTER: ICD-10-CM

## 2024-06-10 LAB
ALBUMIN SERPL-MCNC: 3.4 G/DL (ref 3.5–5.2)
ALBUMIN/GLOB SERPL: 1.3 G/DL
ALP SERPL-CCNC: 356 U/L (ref 39–117)
ALT SERPL W P-5'-P-CCNC: 51 U/L (ref 1–41)
ANION GAP SERPL CALCULATED.3IONS-SCNC: 13.1 MMOL/L (ref 5–15)
AST SERPL-CCNC: 144 U/L (ref 1–40)
B PARAPERT DNA SPEC QL NAA+PROBE: NOT DETECTED
B PERT DNA SPEC QL NAA+PROBE: NOT DETECTED
BACTERIA BLD CULT: ABNORMAL
BACTERIA UR QL AUTO: ABNORMAL /HPF
BASOPHILS # BLD AUTO: 0.01 10*3/MM3 (ref 0–0.2)
BASOPHILS NFR BLD AUTO: 0.2 % (ref 0–1.5)
BILIRUB SERPL-MCNC: 1.6 MG/DL (ref 0–1.2)
BILIRUB UR QL STRIP: NEGATIVE
BOTTLE TYPE: ABNORMAL
BUN SERPL-MCNC: 26 MG/DL (ref 8–23)
BUN/CREAT SERPL: 21.7 (ref 7–25)
C PNEUM DNA NPH QL NAA+NON-PROBE: NOT DETECTED
CALCIUM SPEC-SCNC: 8.8 MG/DL (ref 8.6–10.5)
CHLORIDE SERPL-SCNC: 109 MMOL/L (ref 98–107)
CLARITY UR: CLEAR
CO2 SERPL-SCNC: 19.9 MMOL/L (ref 22–29)
COLOR UR: ABNORMAL
CREAT SERPL-MCNC: 1.2 MG/DL (ref 0.76–1.27)
D-LACTATE SERPL-SCNC: 1.8 MMOL/L (ref 0.5–2)
D-LACTATE SERPL-SCNC: 2.3 MMOL/L (ref 0.3–2)
DEPRECATED RDW RBC AUTO: 54.9 FL (ref 37–54)
EGFRCR SERPLBLD CKD-EPI 2021: 60 ML/MIN/1.73
EOSINOPHIL # BLD AUTO: 0.01 10*3/MM3 (ref 0–0.4)
EOSINOPHIL NFR BLD AUTO: 0.2 % (ref 0.3–6.2)
ERYTHROCYTE [DISTWIDTH] IN BLOOD BY AUTOMATED COUNT: 14.3 % (ref 12.3–15.4)
FLUAV SUBTYP SPEC NAA+PROBE: NOT DETECTED
FLUBV RNA ISLT QL NAA+PROBE: NOT DETECTED
GEN 5 2HR TROPONIN T REFLEX: 107 NG/L
GLOBULIN UR ELPH-MCNC: 2.6 GM/DL
GLUCOSE BLDC GLUCOMTR-MCNC: 101 MG/DL (ref 70–105)
GLUCOSE BLDC GLUCOMTR-MCNC: 114 MG/DL (ref 70–105)
GLUCOSE BLDC GLUCOMTR-MCNC: 46 MG/DL (ref 70–105)
GLUCOSE BLDC GLUCOMTR-MCNC: 55 MG/DL (ref 70–105)
GLUCOSE BLDC GLUCOMTR-MCNC: 61 MG/DL (ref 70–105)
GLUCOSE BLDC GLUCOMTR-MCNC: 70 MG/DL (ref 70–105)
GLUCOSE BLDC GLUCOMTR-MCNC: 73 MG/DL (ref 70–105)
GLUCOSE BLDC GLUCOMTR-MCNC: 75 MG/DL (ref 70–105)
GLUCOSE BLDC GLUCOMTR-MCNC: 78 MG/DL (ref 70–105)
GLUCOSE BLDC GLUCOMTR-MCNC: 80 MG/DL (ref 70–105)
GLUCOSE BLDC GLUCOMTR-MCNC: 84 MG/DL (ref 70–105)
GLUCOSE SERPL-MCNC: 53 MG/DL (ref 65–99)
GLUCOSE UR STRIP-MCNC: NEGATIVE MG/DL
HADV DNA SPEC NAA+PROBE: NOT DETECTED
HCOV 229E RNA SPEC QL NAA+PROBE: NOT DETECTED
HCOV HKU1 RNA SPEC QL NAA+PROBE: NOT DETECTED
HCOV NL63 RNA SPEC QL NAA+PROBE: NOT DETECTED
HCOV OC43 RNA SPEC QL NAA+PROBE: NOT DETECTED
HCT VFR BLD AUTO: 29.2 % (ref 37.5–51)
HGB BLD-MCNC: 8.9 G/DL (ref 13–17.7)
HGB UR QL STRIP.AUTO: ABNORMAL
HMPV RNA NPH QL NAA+NON-PROBE: NOT DETECTED
HOLD SPECIMEN: NORMAL
HOLD SPECIMEN: NORMAL
HPIV1 RNA ISLT QL NAA+PROBE: NOT DETECTED
HPIV2 RNA SPEC QL NAA+PROBE: NOT DETECTED
HPIV3 RNA NPH QL NAA+PROBE: NOT DETECTED
HPIV4 P GENE NPH QL NAA+PROBE: NOT DETECTED
HYALINE CASTS UR QL AUTO: ABNORMAL /LPF
IMM GRANULOCYTES # BLD AUTO: 0.01 10*3/MM3 (ref 0–0.05)
IMM GRANULOCYTES NFR BLD AUTO: 0.2 % (ref 0–0.5)
INR PPP: 1.15 (ref 0.93–1.1)
KETONES UR QL STRIP: NEGATIVE
LEUKOCYTE ESTERASE UR QL STRIP.AUTO: ABNORMAL
LIPASE SERPL-CCNC: 32 U/L (ref 13–60)
LYMPHOCYTES # BLD AUTO: 0.21 10*3/MM3 (ref 0.7–3.1)
LYMPHOCYTES NFR BLD AUTO: 3.4 % (ref 19.6–45.3)
M PNEUMO IGG SER IA-ACNC: NOT DETECTED
MCH RBC QN AUTO: 31.8 PG (ref 26.6–33)
MCHC RBC AUTO-ENTMCNC: 30.5 G/DL (ref 31.5–35.7)
MCV RBC AUTO: 104.3 FL (ref 79–97)
MONOCYTES # BLD AUTO: 0.16 10*3/MM3 (ref 0.1–0.9)
MONOCYTES NFR BLD AUTO: 2.6 % (ref 5–12)
NEUTROPHILS NFR BLD AUTO: 5.79 10*3/MM3 (ref 1.7–7)
NEUTROPHILS NFR BLD AUTO: 93.4 % (ref 42.7–76)
NITRITE UR QL STRIP: NEGATIVE
NRBC BLD AUTO-RTO: 0 /100 WBC (ref 0–0.2)
NT-PROBNP SERPL-MCNC: 4712 PG/ML (ref 0–1800)
PH UR STRIP.AUTO: <=5 [PH] (ref 5–8)
PLATELET # BLD AUTO: 131 10*3/MM3 (ref 140–450)
PMV BLD AUTO: 12.2 FL (ref 6–12)
POTASSIUM SERPL-SCNC: 4.7 MMOL/L (ref 3.5–5.2)
PROCALCITONIN SERPL-MCNC: 0.38 NG/ML (ref 0–0.25)
PROT SERPL-MCNC: 6 G/DL (ref 6–8.5)
PROT UR QL STRIP: ABNORMAL
PROTHROMBIN TIME: 12.4 SECONDS (ref 9.6–11.7)
RBC # BLD AUTO: 2.8 10*6/MM3 (ref 4.14–5.8)
RBC # UR STRIP: ABNORMAL /HPF
REF LAB TEST METHOD: ABNORMAL
RHINOVIRUS RNA SPEC NAA+PROBE: NOT DETECTED
RSV RNA NPH QL NAA+NON-PROBE: NOT DETECTED
SARS-COV-2 RNA NPH QL NAA+NON-PROBE: NOT DETECTED
SODIUM SERPL-SCNC: 142 MMOL/L (ref 136–145)
SP GR UR STRIP: 1.02 (ref 1–1.03)
SQUAMOUS #/AREA URNS HPF: ABNORMAL /HPF
TROPONIN T DELTA: 18 NG/L
TROPONIN T SERPL HS-MCNC: 89 NG/L
UROBILINOGEN UR QL STRIP: ABNORMAL
WBC # UR STRIP: ABNORMAL /HPF
WBC NRBC COR # BLD AUTO: 6.19 10*3/MM3 (ref 3.4–10.8)
WHOLE BLOOD HOLD COAG: NORMAL
WHOLE BLOOD HOLD SPECIMEN: NORMAL

## 2024-06-10 PROCEDURE — 43262 ENDO CHOLANGIOPANCREATOGRAPH: CPT | Performed by: INTERNAL MEDICINE

## 2024-06-10 PROCEDURE — 99285 EMERGENCY DEPT VISIT HI MDM: CPT

## 2024-06-10 PROCEDURE — 25010000002 PHENYLEPHRINE 10 MG/ML SOLUTION: Performed by: NURSE ANESTHETIST, CERTIFIED REGISTERED

## 2024-06-10 PROCEDURE — C2625 STENT, NON-COR, TEM W/DEL SY: HCPCS | Performed by: INTERNAL MEDICINE

## 2024-06-10 PROCEDURE — 83880 ASSAY OF NATRIURETIC PEPTIDE: CPT | Performed by: EMERGENCY MEDICINE

## 2024-06-10 PROCEDURE — 25010000002 CEFEPIME PER 500 MG: Performed by: EMERGENCY MEDICINE

## 2024-06-10 PROCEDURE — 25010000002 ONDANSETRON PER 1 MG

## 2024-06-10 PROCEDURE — 94799 UNLISTED PULMONARY SVC/PX: CPT

## 2024-06-10 PROCEDURE — C1769 GUIDE WIRE: HCPCS | Performed by: INTERNAL MEDICINE

## 2024-06-10 PROCEDURE — 83605 ASSAY OF LACTIC ACID: CPT | Performed by: EMERGENCY MEDICINE

## 2024-06-10 PROCEDURE — 0202U NFCT DS 22 TRGT SARS-COV-2: CPT | Performed by: EMERGENCY MEDICINE

## 2024-06-10 PROCEDURE — 84484 ASSAY OF TROPONIN QUANT: CPT | Performed by: EMERGENCY MEDICINE

## 2024-06-10 PROCEDURE — 99232 SBSQ HOSP IP/OBS MODERATE 35: CPT | Performed by: SURGERY

## 2024-06-10 PROCEDURE — 0F7D8DZ DILATION OF PANCREATIC DUCT WITH INTRALUMINAL DEVICE, VIA NATURAL OR ARTIFICIAL OPENING ENDOSCOPIC: ICD-10-PCS | Performed by: INTERNAL MEDICINE

## 2024-06-10 PROCEDURE — 87186 SC STD MICRODIL/AGAR DIL: CPT | Performed by: EMERGENCY MEDICINE

## 2024-06-10 PROCEDURE — 71045 X-RAY EXAM CHEST 1 VIEW: CPT

## 2024-06-10 PROCEDURE — 43264 ERCP REMOVE DUCT CALCULI: CPT | Performed by: INTERNAL MEDICINE

## 2024-06-10 PROCEDURE — 84145 PROCALCITONIN (PCT): CPT | Performed by: EMERGENCY MEDICINE

## 2024-06-10 PROCEDURE — 94664 DEMO&/EVAL PT USE INHALER: CPT

## 2024-06-10 PROCEDURE — 74330 X-RAY BILE/PANC ENDOSCOPY: CPT

## 2024-06-10 PROCEDURE — 25010000002 METRONIDAZOLE 500 MG/100ML SOLUTION: Performed by: INTERNAL MEDICINE

## 2024-06-10 PROCEDURE — 25810000003 LACTATED RINGERS PER 1000 ML: Performed by: NURSE ANESTHETIST, CERTIFIED REGISTERED

## 2024-06-10 PROCEDURE — 36415 COLL VENOUS BLD VENIPUNCTURE: CPT

## 2024-06-10 PROCEDURE — 83690 ASSAY OF LIPASE: CPT | Performed by: EMERGENCY MEDICINE

## 2024-06-10 PROCEDURE — 0FC98ZZ EXTIRPATION OF MATTER FROM COMMON BILE DUCT, VIA NATURAL OR ARTIFICIAL OPENING ENDOSCOPIC: ICD-10-PCS | Performed by: INTERNAL MEDICINE

## 2024-06-10 PROCEDURE — 82948 REAGENT STRIP/BLOOD GLUCOSE: CPT

## 2024-06-10 PROCEDURE — 87154 CUL TYP ID BLD PTHGN 6+ TRGT: CPT | Performed by: EMERGENCY MEDICINE

## 2024-06-10 PROCEDURE — BF101ZZ FLUOROSCOPY OF BILE DUCTS USING LOW OSMOLAR CONTRAST: ICD-10-PCS | Performed by: INTERNAL MEDICINE

## 2024-06-10 PROCEDURE — 25010000002 SUGAMMADEX 200 MG/2ML SOLUTION: Performed by: NURSE ANESTHETIST, CERTIFIED REGISTERED

## 2024-06-10 PROCEDURE — 25810000003 DEXTROSE 5 % AND SODIUM CHLORIDE 0.9 % 5-0.9 % SOLUTION: Performed by: ANESTHESIOLOGY

## 2024-06-10 PROCEDURE — 25810000003 SODIUM CHLORIDE 0.9 % SOLUTION: Performed by: INTERNAL MEDICINE

## 2024-06-10 PROCEDURE — 85610 PROTHROMBIN TIME: CPT | Performed by: NURSE PRACTITIONER

## 2024-06-10 PROCEDURE — 25510000001 IOPAMIDOL PER 1 ML: Performed by: EMERGENCY MEDICINE

## 2024-06-10 PROCEDURE — 25010000002 ONDANSETRON PER 1 MG: Performed by: NURSE ANESTHETIST, CERTIFIED REGISTERED

## 2024-06-10 PROCEDURE — 74177 CT ABD & PELVIS W/CONTRAST: CPT

## 2024-06-10 PROCEDURE — 25010000002 DEXAMETHASONE PER 1 MG: Performed by: NURSE ANESTHETIST, CERTIFIED REGISTERED

## 2024-06-10 PROCEDURE — 25510000001 IOPAMIDOL 61 % SOLUTION 50 ML VIAL: Performed by: INTERNAL MEDICINE

## 2024-06-10 PROCEDURE — 80053 COMPREHEN METABOLIC PANEL: CPT | Performed by: EMERGENCY MEDICINE

## 2024-06-10 PROCEDURE — 87040 BLOOD CULTURE FOR BACTERIA: CPT | Performed by: EMERGENCY MEDICINE

## 2024-06-10 PROCEDURE — 25010000002 METRONIDAZOLE 500 MG/100ML SOLUTION: Performed by: EMERGENCY MEDICINE

## 2024-06-10 PROCEDURE — 94761 N-INVAS EAR/PLS OXIMETRY MLT: CPT

## 2024-06-10 PROCEDURE — 94640 AIRWAY INHALATION TREATMENT: CPT

## 2024-06-10 PROCEDURE — 93005 ELECTROCARDIOGRAM TRACING: CPT | Performed by: EMERGENCY MEDICINE

## 2024-06-10 PROCEDURE — 85025 COMPLETE CBC W/AUTO DIFF WBC: CPT | Performed by: EMERGENCY MEDICINE

## 2024-06-10 PROCEDURE — 81001 URINALYSIS AUTO W/SCOPE: CPT | Performed by: EMERGENCY MEDICINE

## 2024-06-10 PROCEDURE — 25010000002 PROPOFOL 200 MG/20ML EMULSION: Performed by: NURSE ANESTHETIST, CERTIFIED REGISTERED

## 2024-06-10 DEVICE — PANCREATIC STENT
Type: IMPLANTABLE DEVICE | Site: BILE DUCT | Status: FUNCTIONAL
Brand: ADVANIX™ PANCREATIC STENT

## 2024-06-10 RX ORDER — MIDODRINE HYDROCHLORIDE 5 MG/1
5 TABLET ORAL
Status: DISCONTINUED | OUTPATIENT
Start: 2024-06-11 | End: 2024-06-10

## 2024-06-10 RX ORDER — EPHEDRINE SULFATE 5 MG/ML
5 INJECTION INTRAVENOUS ONCE AS NEEDED
Status: DISCONTINUED | OUTPATIENT
Start: 2024-06-10 | End: 2024-06-10 | Stop reason: HOSPADM

## 2024-06-10 RX ORDER — NITROGLYCERIN 0.4 MG/1
0.4 TABLET SUBLINGUAL
Status: DISCONTINUED | OUTPATIENT
Start: 2024-06-10 | End: 2024-06-15 | Stop reason: HOSPADM

## 2024-06-10 RX ORDER — LABETALOL HYDROCHLORIDE 5 MG/ML
5 INJECTION, SOLUTION INTRAVENOUS
Status: DISCONTINUED | OUTPATIENT
Start: 2024-06-10 | End: 2024-06-10 | Stop reason: HOSPADM

## 2024-06-10 RX ORDER — SODIUM CHLORIDE, SODIUM LACTATE, POTASSIUM CHLORIDE, CALCIUM CHLORIDE 600; 310; 30; 20 MG/100ML; MG/100ML; MG/100ML; MG/100ML
INJECTION, SOLUTION INTRAVENOUS CONTINUOUS PRN
Status: DISCONTINUED | OUTPATIENT
Start: 2024-06-10 | End: 2024-06-10 | Stop reason: SURG

## 2024-06-10 RX ORDER — ONDANSETRON 2 MG/ML
INJECTION INTRAMUSCULAR; INTRAVENOUS AS NEEDED
Status: DISCONTINUED | OUTPATIENT
Start: 2024-06-10 | End: 2024-06-10 | Stop reason: SURG

## 2024-06-10 RX ORDER — OXYCODONE HYDROCHLORIDE 5 MG/1
5 TABLET ORAL ONCE AS NEEDED
Status: DISCONTINUED | OUTPATIENT
Start: 2024-06-10 | End: 2024-06-10 | Stop reason: HOSPADM

## 2024-06-10 RX ORDER — MIDODRINE HYDROCHLORIDE 5 MG/1
5 TABLET ORAL
Status: DISCONTINUED | OUTPATIENT
Start: 2024-06-10 | End: 2024-06-13

## 2024-06-10 RX ORDER — PROPOFOL 10 MG/ML
INJECTION, EMULSION INTRAVENOUS AS NEEDED
Status: DISCONTINUED | OUTPATIENT
Start: 2024-06-10 | End: 2024-06-10 | Stop reason: SURG

## 2024-06-10 RX ORDER — SODIUM CHLORIDE 0.9 % (FLUSH) 0.9 %
10 SYRINGE (ML) INJECTION AS NEEDED
Status: DISCONTINUED | OUTPATIENT
Start: 2024-06-10 | End: 2024-06-15 | Stop reason: HOSPADM

## 2024-06-10 RX ORDER — DEXAMETHASONE SODIUM PHOSPHATE 4 MG/ML
INJECTION, SOLUTION INTRA-ARTICULAR; INTRALESIONAL; INTRAMUSCULAR; INTRAVENOUS; SOFT TISSUE AS NEEDED
Status: DISCONTINUED | OUTPATIENT
Start: 2024-06-10 | End: 2024-06-10 | Stop reason: SURG

## 2024-06-10 RX ORDER — FENOFIBRATE 160 MG/1
160 TABLET ORAL DAILY
COMMUNITY

## 2024-06-10 RX ORDER — DEXTROSE MONOHYDRATE 25 G/50ML
INJECTION, SOLUTION INTRAVENOUS
Status: COMPLETED
Start: 2024-06-10 | End: 2024-06-10

## 2024-06-10 RX ORDER — LIDOCAINE HYDROCHLORIDE 10 MG/ML
INJECTION, SOLUTION EPIDURAL; INFILTRATION; INTRACAUDAL; PERINEURAL AS NEEDED
Status: DISCONTINUED | OUTPATIENT
Start: 2024-06-10 | End: 2024-06-10 | Stop reason: SURG

## 2024-06-10 RX ORDER — ROCURONIUM BROMIDE 10 MG/ML
INJECTION, SOLUTION INTRAVENOUS AS NEEDED
Status: DISCONTINUED | OUTPATIENT
Start: 2024-06-10 | End: 2024-06-10 | Stop reason: SURG

## 2024-06-10 RX ORDER — METRONIDAZOLE 500 MG/100ML
500 INJECTION, SOLUTION INTRAVENOUS EVERY 8 HOURS
Qty: 1000 ML | Refills: 0 | Status: DISCONTINUED | OUTPATIENT
Start: 2024-06-10 | End: 2024-06-11

## 2024-06-10 RX ORDER — SODIUM CHLORIDE 9 MG/ML
100 INJECTION, SOLUTION INTRAVENOUS CONTINUOUS
Status: DISCONTINUED | OUTPATIENT
Start: 2024-06-10 | End: 2024-06-11

## 2024-06-10 RX ORDER — DEXTROSE MONOHYDRATE 25 G/50ML
50 INJECTION, SOLUTION INTRAVENOUS
Status: DISCONTINUED | OUTPATIENT
Start: 2024-06-10 | End: 2024-06-15 | Stop reason: HOSPADM

## 2024-06-10 RX ORDER — METRONIDAZOLE 500 MG/100ML
500 INJECTION, SOLUTION INTRAVENOUS ONCE
Status: COMPLETED | OUTPATIENT
Start: 2024-06-10 | End: 2024-06-10

## 2024-06-10 RX ORDER — SODIUM CHLORIDE 9 MG/ML
30 INJECTION, SOLUTION INTRAVENOUS CONTINUOUS PRN
Status: CANCELLED | OUTPATIENT
Start: 2024-06-10

## 2024-06-10 RX ORDER — HYDRALAZINE HYDROCHLORIDE 20 MG/ML
5 INJECTION INTRAMUSCULAR; INTRAVENOUS
Status: DISCONTINUED | OUTPATIENT
Start: 2024-06-10 | End: 2024-06-10 | Stop reason: HOSPADM

## 2024-06-10 RX ORDER — OXYCODONE HYDROCHLORIDE 5 MG/1
10 TABLET ORAL EVERY 4 HOURS PRN
Status: DISCONTINUED | OUTPATIENT
Start: 2024-06-10 | End: 2024-06-10 | Stop reason: HOSPADM

## 2024-06-10 RX ORDER — ACETAMINOPHEN 500 MG
1000 TABLET ORAL ONCE
Status: COMPLETED | OUTPATIENT
Start: 2024-06-10 | End: 2024-06-10

## 2024-06-10 RX ORDER — NALOXONE HCL 0.4 MG/ML
0.4 VIAL (ML) INJECTION AS NEEDED
Status: DISCONTINUED | OUTPATIENT
Start: 2024-06-10 | End: 2024-06-10 | Stop reason: HOSPADM

## 2024-06-10 RX ORDER — ACETAMINOPHEN 325 MG/1
325 TABLET ORAL EVERY 6 HOURS PRN
COMMUNITY

## 2024-06-10 RX ORDER — DIPHENHYDRAMINE HYDROCHLORIDE 50 MG/ML
12.5 INJECTION INTRAMUSCULAR; INTRAVENOUS
Status: DISCONTINUED | OUTPATIENT
Start: 2024-06-10 | End: 2024-06-10 | Stop reason: HOSPADM

## 2024-06-10 RX ORDER — ONDANSETRON 2 MG/ML
INJECTION INTRAMUSCULAR; INTRAVENOUS
Status: COMPLETED
Start: 2024-06-10 | End: 2024-06-10

## 2024-06-10 RX ORDER — IPRATROPIUM BROMIDE AND ALBUTEROL SULFATE 2.5; .5 MG/3ML; MG/3ML
3 SOLUTION RESPIRATORY (INHALATION)
Status: DISCONTINUED | OUTPATIENT
Start: 2024-06-10 | End: 2024-06-15 | Stop reason: HOSPADM

## 2024-06-10 RX ORDER — ONDANSETRON 2 MG/ML
4 INJECTION INTRAMUSCULAR; INTRAVENOUS ONCE AS NEEDED
Status: DISCONTINUED | OUTPATIENT
Start: 2024-06-10 | End: 2024-06-10 | Stop reason: HOSPADM

## 2024-06-10 RX ORDER — PANTOPRAZOLE SODIUM 40 MG/10ML
40 INJECTION, POWDER, LYOPHILIZED, FOR SOLUTION INTRAVENOUS
Status: DISCONTINUED | OUTPATIENT
Start: 2024-06-10 | End: 2024-06-12

## 2024-06-10 RX ORDER — DEXTROSE MONOHYDRATE AND SODIUM CHLORIDE 5; .45 G/100ML; G/100ML
100 INJECTION, SOLUTION INTRAVENOUS CONTINUOUS
Status: DISCONTINUED | OUTPATIENT
Start: 2024-06-10 | End: 2024-06-10 | Stop reason: SDUPTHER

## 2024-06-10 RX ORDER — SODIUM CHLORIDE 0.9 % (FLUSH) 0.9 %
10 SYRINGE (ML) INJECTION EVERY 12 HOURS SCHEDULED
Status: DISCONTINUED | OUTPATIENT
Start: 2024-06-10 | End: 2024-06-15 | Stop reason: HOSPADM

## 2024-06-10 RX ORDER — PHENYLEPHRINE HYDROCHLORIDE 10 MG/ML
INJECTION INTRAVENOUS AS NEEDED
Status: DISCONTINUED | OUTPATIENT
Start: 2024-06-10 | End: 2024-06-10 | Stop reason: SURG

## 2024-06-10 RX ORDER — ONDANSETRON 2 MG/ML
4 INJECTION INTRAMUSCULAR; INTRAVENOUS EVERY 6 HOURS PRN
Status: DISCONTINUED | OUTPATIENT
Start: 2024-06-10 | End: 2024-06-15 | Stop reason: HOSPADM

## 2024-06-10 RX ORDER — DIPHENHYDRAMINE HYDROCHLORIDE 50 MG/ML
12.5 INJECTION INTRAMUSCULAR; INTRAVENOUS ONCE AS NEEDED
Status: DISCONTINUED | OUTPATIENT
Start: 2024-06-10 | End: 2024-06-10 | Stop reason: HOSPADM

## 2024-06-10 RX ORDER — SODIUM CHLORIDE 9 MG/ML
40 INJECTION, SOLUTION INTRAVENOUS AS NEEDED
Status: DISCONTINUED | OUTPATIENT
Start: 2024-06-10 | End: 2024-06-15 | Stop reason: HOSPADM

## 2024-06-10 RX ORDER — ONDANSETRON 2 MG/ML
4 INJECTION INTRAMUSCULAR; INTRAVENOUS ONCE
Status: COMPLETED | OUTPATIENT
Start: 2024-06-10 | End: 2024-06-10

## 2024-06-10 RX ORDER — DEXTROSE MONOHYDRATE AND SODIUM CHLORIDE 5; .9 G/100ML; G/100ML
125 INJECTION, SOLUTION INTRAVENOUS CONTINUOUS
Status: DISCONTINUED | OUTPATIENT
Start: 2024-06-10 | End: 2024-06-11

## 2024-06-10 RX ORDER — ACETAMINOPHEN 650 MG/1
650 SUPPOSITORY RECTAL EVERY 4 HOURS PRN
Status: DISCONTINUED | OUTPATIENT
Start: 2024-06-10 | End: 2024-06-15 | Stop reason: HOSPADM

## 2024-06-10 RX ORDER — IPRATROPIUM BROMIDE AND ALBUTEROL SULFATE 2.5; .5 MG/3ML; MG/3ML
3 SOLUTION RESPIRATORY (INHALATION) ONCE AS NEEDED
Status: DISCONTINUED | OUTPATIENT
Start: 2024-06-10 | End: 2024-06-10 | Stop reason: HOSPADM

## 2024-06-10 RX ORDER — LOSARTAN POTASSIUM 100 MG/1
100 TABLET ORAL DAILY
Status: ON HOLD | COMMUNITY
End: 2024-06-15

## 2024-06-10 RX ADMIN — DEXTROSE MONOHYDRATE 50 ML: 25 INJECTION, SOLUTION INTRAVENOUS at 07:12

## 2024-06-10 RX ADMIN — SODIUM CHLORIDE 100 ML/HR: 9 INJECTION, SOLUTION INTRAVENOUS at 10:46

## 2024-06-10 RX ADMIN — ONDANSETRON 4 MG: 2 INJECTION INTRAMUSCULAR; INTRAVENOUS at 15:58

## 2024-06-10 RX ADMIN — PHENYLEPHRINE HYDROCHLORIDE 200 MCG: 10 INJECTION INTRAVENOUS at 16:04

## 2024-06-10 RX ADMIN — ONDANSETRON 4 MG: 2 INJECTION INTRAMUSCULAR; INTRAVENOUS at 07:22

## 2024-06-10 RX ADMIN — SODIUM CHLORIDE, SODIUM LACTATE, POTASSIUM CHLORIDE, AND CALCIUM CHLORIDE: .6; .31; .03; .02 INJECTION, SOLUTION INTRAVENOUS at 15:37

## 2024-06-10 RX ADMIN — METRONIDAZOLE 500 MG: 500 INJECTION, SOLUTION INTRAVENOUS at 18:28

## 2024-06-10 RX ADMIN — PANTOPRAZOLE SODIUM 40 MG: 40 INJECTION, POWDER, FOR SOLUTION INTRAVENOUS at 10:46

## 2024-06-10 RX ADMIN — PANTOPRAZOLE SODIUM 40 MG: 40 INJECTION, POWDER, FOR SOLUTION INTRAVENOUS at 18:28

## 2024-06-10 RX ADMIN — Medication 10 ML: at 19:45

## 2024-06-10 RX ADMIN — LIDOCAINE HYDROCHLORIDE 50 MG: 10 INJECTION, SOLUTION EPIDURAL; INFILTRATION; INTRACAUDAL; PERINEURAL at 15:45

## 2024-06-10 RX ADMIN — CEFEPIME 2000 MG: 2 INJECTION, POWDER, FOR SOLUTION INTRAVENOUS at 08:45

## 2024-06-10 RX ADMIN — SUGAMMADEX 200 MG: 100 INJECTION, SOLUTION INTRAVENOUS at 16:30

## 2024-06-10 RX ADMIN — DEXTROSE MONOHYDRATE AND SODIUM CHLORIDE 125 ML/HR: 5; .9 INJECTION, SOLUTION INTRAVENOUS at 18:27

## 2024-06-10 RX ADMIN — METRONIDAZOLE 500 MG: 500 INJECTION, SOLUTION INTRAVENOUS at 08:45

## 2024-06-10 RX ADMIN — ACETAMINOPHEN 650 MG: 650 SUPPOSITORY RECTAL at 09:58

## 2024-06-10 RX ADMIN — PHENYLEPHRINE HYDROCHLORIDE 100 MCG: 10 INJECTION INTRAVENOUS at 16:22

## 2024-06-10 RX ADMIN — IPRATROPIUM BROMIDE AND ALBUTEROL SULFATE 3 ML: .5; 3 SOLUTION RESPIRATORY (INHALATION) at 13:35

## 2024-06-10 RX ADMIN — ACETAMINOPHEN 1000 MG: 500 TABLET, FILM COATED ORAL at 05:43

## 2024-06-10 RX ADMIN — PROPOFOL 100 MG: 10 INJECTION, EMULSION INTRAVENOUS at 15:45

## 2024-06-10 RX ADMIN — ROCURONIUM BROMIDE 40 MG: 10 INJECTION, SOLUTION INTRAVENOUS at 15:45

## 2024-06-10 RX ADMIN — DEXAMETHASONE SODIUM PHOSPHATE 4 MG: 4 INJECTION, SOLUTION INTRAMUSCULAR; INTRAVENOUS at 15:58

## 2024-06-10 RX ADMIN — IOPAMIDOL 100 ML: 755 INJECTION, SOLUTION INTRAVENOUS at 06:47

## 2024-06-10 RX ADMIN — MIDODRINE HYDROCHLORIDE 5 MG: 5 TABLET ORAL at 19:44

## 2024-06-10 RX ADMIN — ROCURONIUM BROMIDE 10 MG: 10 INJECTION, SOLUTION INTRAVENOUS at 16:07

## 2024-06-10 RX ADMIN — DEXTROSE MONOHYDRATE AND SODIUM CHLORIDE 125 ML/HR: 5; .9 INJECTION, SOLUTION INTRAVENOUS at 13:51

## 2024-06-10 RX ADMIN — PHENYLEPHRINE HYDROCHLORIDE 100 MCG: 10 INJECTION INTRAVENOUS at 16:19

## 2024-06-10 NOTE — CONSULTS
GENERAL SURGERY CONSULT    Referring Provider: [place]  Reason for Consultation: gallstones, choledocholithiasis    Patient Care Team:  Guilherme Jason MD as PCP - General (Family Medicine)  Misty Dias MD as Consulting Physician (Cardiology)  Nora Kenyon DNP, APRN as Nurse Practitioner (Thoracic Surgery)  Sussy Shane MD as Consulting Physician (Nephrology)    Chief complaint fever, abdominal pain, fatigue    Subjective .     History of present illness:  83 yo man presented to the ER with his daughter with a day and a half history of fevers, lower abdominal pain.  He has had some nausea vomiting as well.  In the ER he was noted to be febrile.  Labs showed mildly elevated bilirubin with elevation of other LFTs as well.  His WBC was normal.  CT was performed showing gallstones and choledocholithiasis which prompted my consultation.      Review of Systems    Review of Systems   Gastrointestinal:  Positive for abdominal pain, nausea and vomiting.         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    Neutropenia 11/2003    MITZI (obstructive sleep apnea) 2018    Psoriasis 2000    Renal cyst, left     Rheumatoid arthritis     Stroke (cerebrum)     Thrombocytopenia 08/23/2005   ,   Past Surgical History:   Procedure Laterality Date    ENDOSCOPY N/A 4/26/2024    Procedure: ESOPHAGOGASTRODUODENOSCOPY WITH DILATION (#46 BOUGIE);  Surgeon: Bc  Gamal Alvarez MD;  Location: [facility] ENDOSCOPY;  Service: Gastroenterology;  Laterality: N/A;  DUODENAL DIVERTICULUM, HIATAL HERNIA, DISTAL ESOPHAGEAL STRICTURE    SKIN LESION EXCISION      back and groin-benign   ,   Family History   Problem Relation Age of Onset    Lung cancer Brother         age unknown    Heart disease Brother     Stroke Mother     Heart disease Father     Heart disease Sister    ,   Social History     Tobacco Use    Smoking status: Former     Current packs/day: 0.00     Types: Cigarettes     Quit date:      Years since quittin.4     Passive exposure: Never    Smokeless tobacco: Never    Tobacco comments:     stopped smoking in    Vaping Use    Vaping status: Never Used   Substance Use Topics    Alcohol use: No    Drug use: No   , (Not in a hospital admission)  , Scheduled Meds:  [START ON 2024] cefepime, 2,000 mg, Intravenous, Q24H  ipratropium-albuterol, 3 mL, Nebulization, 4x Daily - RT  metroNIDAZOLE, 500 mg, Intravenous, Q8H  pantoprazole, 40 mg, Intravenous, BID AC  sodium chloride, 10 mL, Intravenous, Q12H    , Continuous Infusions:  dextrose 5 % and sodium chloride 0.9 %, 125 mL/hr, Last Rate: 125 mL/hr (06/10/24 1351)  sodium chloride, 100 mL/hr, Last Rate: Stopped (06/10/24 1352)    , PRN Meds:    acetaminophen    Calcium Replacement - Follow Nurse / BPA Driven Protocol    dextrose    Magnesium Standard Dose Replacement - Follow Nurse / BPA Driven Protocol    nitroglycerin    ondansetron    Phosphorus Replacement - Follow Nurse / BPA Driven Protocol    Potassium Replacement - Follow Nurse / BPA Driven Protocol    sodium chloride    sodium chloride    sodium chloride    sodium chloride and Allergies:  Penicillin v potassium and Latex    Objective     Vital Signs   Temp:  [98.7 °F (37.1 °C)-102 °F (38.9 °C)] 100.4 °F (38 °C)  Heart Rate:  [72-93] 72  Resp:  [22-37] 35  BP: (100-170)/(41-88) 105/45    Physical Exam:       General Appearance:  Elderly chronically  ill-appearing gentleman   Head:    Normocephalic, without obvious abnormality, atraumatic   Eyes:            Lids and lashes normal, conjunctivae and sclerae normal, no   icterus, no pallor, corneas clear   Ears:    Ears appear intact with no abnormalities noted   Lungs:   Appears short of breath and laboring to breathe   Chest Wall:    No abnormalities observed   Abdomen:   Soft, tender in right upper quadrant   Extremities:   Moves all extremities   Neurologic:   Cranial nerves 2 - 12 grossly intact       Results Review:  Lab Results (last 72 hours)       Procedure Component Value Units Date/Time    POC Glucose Once [[id number]]  (Normal) Collected: 06/10/24 1420    Specimen: Blood Updated: 06/10/24 1421     Glucose 75 mg/dL      Comment: Serial Number: [id number]:  [id number]       POC Glucose Once [[id number]]  (Normal) Collected: 06/10/24 1354    Specimen: Blood Updated: 06/10/24 1357     Glucose 78 mg/dL      Comment: Serial Number: [id number]:  [id number]       POC Glucose Once [[id number]]  (Abnormal) Collected: 06/10/24 1308    Specimen: Blood Updated: 06/10/24 1311     Glucose 55 mg/dL      Comment: Serial Number: [id number]:  [id number]       POC Glucose Once [[id number]]  (Abnormal) Collected: 06/10/24 1219    Specimen: Blood Updated: 06/10/24 1222     Glucose 61 mg/dL      Comment: Serial Number: [id number]:  [id number]       POC Glucose Once [[id number]]  (Normal) Collected: 06/10/24 1104    Specimen: Blood Updated: 06/10/24 1106     Glucose 80 mg/dL      Comment: Serial Number: [id number]:  [id number]       Protime-INR [[id number]]  (Abnormal) Collected: 06/10/24 0524    Specimen: Blood Updated: 06/10/24 1008     Protime 12.4 Seconds      INR 1.15    High Sensitivity Troponin T 2Hr [[id number]]  (Abnormal) Collected: 06/10/24 0727    Specimen: Blood from Arm, Right Updated: 06/10/24 1007     HS Troponin T 107 ng/L      Troponin T Delta 18 ng/L     Narrative:      High Sensitive  Troponin T Reference Range:  <14.0 ng/L- Negative Female for AMI  <22.0 ng/L- Negative Male for AMI  >=14 - Abnormal Female indicating possible myocardial injury.  >=22 - Abnormal Male indicating possible myocardial injury.   Clinicians would have to utilize clinical acumen, EKG, Troponin, and serial changes to determine if it is an Acute Myocardial Infarction or myocardial injury due to an underlying chronic condition.         POC Glucose Once [[id number]]  (Normal) Collected: 06/10/24 0958    Specimen: Blood Updated: 06/10/24 1000     Glucose 84 mg/dL      Comment: Serial Number: [id number]:  [id number]       STAT Lactic Acid, Reflex [[id number]]  (Normal) Collected: 06/10/24 0855    Specimen: Blood Updated: 06/10/24 0921     Lactate 1.8 mmol/L     POC Glucose Once [[id number]]  (Normal) Collected: 06/10/24 0837    Specimen: Blood Updated: 06/10/24 0839     Glucose 73 mg/dL      Comment: Serial Number: [id number]:  [id number]       POC Glucose Once [[id number]]  (Normal) Collected: 06/10/24 0730    Specimen: Blood Updated: 06/10/24 0732     Glucose 101 mg/dL      Comment: Serial Number: [id number]:  [id number]       POC Glucose Once [[id number]]  (Abnormal) Collected: 06/10/24 0700    Specimen: Blood Updated: 06/10/24 0703     Glucose 46 mg/dL      Comment: Serial Number: [id number]:  [id number]       Respiratory Panel PCR w/COVID-19(SARS-CoV-2) KEN/HUBERT/CHIKA/PAD/COR/FLORECITA In-House, NP Swab in UTM/[facility], 2 HR TAT - Swab, Nasopharynx [[id number]]  (Normal) Collected: 06/10/24 0527    Specimen: Swab from Nasopharynx Updated: 06/10/24 0617     ADENOVIRUS, PCR Not Detected     Coronavirus 229E Not Detected     Coronavirus HKU1 Not Detected     Coronavirus NL63 Not Detected     Coronavirus OC43 Not Detected     COVID19 Not Detected     Human Metapneumovirus Not Detected     Human Rhinovirus/Enterovirus Not Detected     Influenza A PCR Not Detected     Influenza B PCR Not Detected     Parainfluenza Virus 1  Not Detected     Parainfluenza Virus 2 Not Detected     Parainfluenza Virus 3 Not Detected     Parainfluenza Virus 4 Not Detected     RSV, PCR Not Detected     Bordetella pertussis pcr Not Detected     Bordetella parapertussis PCR Not Detected     Chlamydophila pneumoniae PCR Not Detected     Mycoplasma pneumo by PCR Not Detected    Narrative:      In the setting of a positive respiratory panel with a viral infection PLUS a negative procalcitonin without other underlying concern for bacterial infection, consider observing off antibiotics or discontinuation of antibiotics and continue supportive care. If the respiratory panel is positive for atypical bacterial infection (Bordetella pertussis, Chlamydophila pneumoniae, or Mycoplasma pneumoniae), consider antibiotic de-escalation to target atypical bacterial infection.    Comprehensive Metabolic Panel [[id number]]  (Abnormal) Collected: 06/10/24 0524    Specimen: Blood Updated: 06/10/24 0610     Glucose 53 mg/dL      BUN 26 mg/dL      Creatinine 1.20 mg/dL      Sodium 142 mmol/L      Potassium 4.7 mmol/L      Comment: Slight hemolysis detected by analyzer. Result may be falsely elevated.        Chloride 109 mmol/L      CO2 19.9 mmol/L      Calcium 8.8 mg/dL      Total Protein 6.0 g/dL      Albumin 3.4 g/dL      ALT (SGPT) 51 U/L      AST (SGOT) 144 U/L      Alkaline Phosphatase 356 U/L      Total Bilirubin 1.6 mg/dL      Globulin 2.6 gm/dL      A/G Ratio 1.3 g/dL      BUN/Creatinine Ratio 21.7     Anion Gap 13.1 mmol/L      eGFR 60.0 mL/min/1.73     Narrative:      GFR Normal >60  Chronic Kidney Disease <60  Kidney Failure <15    The GFR formula is only valid for adults with stable renal function between ages 18 and 70.    Lipase [[id number]]  (Normal) Collected: 06/10/24 0524    Specimen: Blood Updated: 06/10/24 0609     Lipase 32 U/L     BNP [[id number]]  (Abnormal) Collected: 06/10/24 0524    Specimen: Blood Updated: 06/10/24 0609     proBNP 4,712.0 pg/mL      "Narrative:      This assay is used as an aid in the diagnosis of individuals suspected of having heart failure. It can be used as an aid in the diagnosis of acute decompensated heart failure (ADHF) in patients presenting with signs and symptoms of ADHF to the emergency department (ED). In addition, NT-proBNP of <300 pg/mL indicates ADHF is not likely.    Age Range Result Interpretation  NT-proBNP Concentration (pg/mL:      <50             Positive            >450                   Gray                 300-450                    Negative             <300    50-75           Positive            >900                  Gray                300-900                  Negative            <300      >75             Positive            >1800                  Gray                300-1800                  Negative            <300    Procalcitonin [[id number]]  (Abnormal) Collected: 06/10/24 0524    Specimen: Blood Updated: 06/10/24 0609     Procalcitonin 0.38 ng/mL     Narrative:      As a Marker for Sepsis (Non-Neonates):    1. <0.5 ng/mL represents a low risk of severe sepsis and/or septic shock.  2. >2 ng/mL represents a high risk of severe sepsis and/or septic shock.    As a Marker for Lower Respiratory Tract Infections that require antibiotic therapy:    PCT on Admission    Antibiotic Therapy       6-12 Hrs later    >0.5                Strongly Recommended  >0.25 - <0.5        Recommended   0.1 - 0.25          Discouraged              Remeasure/reassess PCT  <0.1                Strongly Discouraged     Remeasure/reassess PCT    As 28 day mortality risk marker: \"Change in Procalcitonin Result\" (>80% or <=80%) if Day 0 (or Day 1) and Day 4 values are available. Refer to http://www.[employer]-pct-calculator.com    Change in PCT <=80%  A decrease of PCT levels below or equal to 80% defines a positive change in PCT test result representing a higher risk for 28-day all-cause mortality of patients diagnosed with severe sepsis for septic " shock.    Change in PCT >80%  A decrease of PCT levels of more than 80% defines a negative change in PCT result representing a lower risk for 28-day all-cause mortality of patients diagnosed with severe sepsis or septic shock.       Single High Sensitivity Troponin T [[id number]]  (Abnormal) Collected: 06/10/24 0524    Specimen: Blood Updated: 06/10/24 0609     HS Troponin T 89 ng/L     Narrative:      High Sensitive Troponin T Reference Range:  <14.0 ng/L- Negative Female for AMI  <22.0 ng/L- Negative Male for AMI  >=14 - Abnormal Female indicating possible myocardial injury.  >=22 - Abnormal Male indicating possible myocardial injury.   Clinicians would have to utilize clinical acumen, EKG, Troponin, and serial changes to determine if it is an Acute Myocardial Infarction or myocardial injury due to an underlying chronic condition.         Urinalysis With Culture If Indicated - Urine, Clean Catch [[id number]]  (Abnormal) Collected: 06/10/24 0533    Specimen: Urine, Clean Catch Updated: 06/10/24 0549     Color, UA Dark Yellow     Appearance, UA Clear     pH, UA <=5.0     Specific Gravity, UA 1.019     Glucose, UA Negative     Ketones, UA Negative     Bilirubin, UA Negative     Blood, UA Moderate (2+)     Protein, UA Trace     Leuk Esterase, UA Trace     Nitrite, UA Negative     Urobilinogen, UA 1.0 E.U./dL    Narrative:      In absence of clinical symptoms, the presence of pyuria, bacteria, and/or nitrites on the urinalysis result does not correlate with infection.    Urinalysis, Microscopic Only - Urine, Clean Catch [[id number]]  (Abnormal) Collected: 06/10/24 0533    Specimen: Urine, Clean Catch Updated: 06/10/24 0549     RBC, UA 21-50 /HPF      WBC, UA 3-5 /HPF      Comment: Urine culture not indicated.        Bacteria, UA None Seen /HPF      Squamous Epithelial Cells, UA 3-6 /HPF      Hyaline Casts, UA 0-2 /LPF      Methodology Automated Microscopy    [place] Draw [[id number]] Collected: 06/10/24 0524    Specimen:  Blood Updated: 06/10/24 0545    Narrative:      The following orders were created for panel order [place] Draw.  Procedure                               Abnormality         Status                     ---------                               -----------         ------                     Green Top (Gel)[[id number]]                                  Final result               Lavender Top[[id number]]                                     Final result               Gold Top - SST[[id number]]                                   Final result               Light Blue Top[[id number]]                                   Final result                 Please view results for these tests on the individual orders.    Green Top (Gel) [[id number]] Collected: 06/10/24 0524    Specimen: Blood Updated: 06/10/24 0545     Extra Tube Hold for add-ons.     Comment: Auto resulted.       Lavender Top [[id number]] Collected: 06/10/24 0524    Specimen: Blood Updated: 06/10/24 0545     Extra Tube hold for add-on     Comment: Auto resulted       Gold Top - SST [[id number]] Collected: 06/10/24 0524    Specimen: Blood Updated: 06/10/24 0545     Extra Tube Hold for add-ons.     Comment: Auto resulted.       Light Blue Top [[id number]] Collected: 06/10/24 0524    Specimen: Blood Updated: 06/10/24 0545     Extra Tube Hold for add-ons.     Comment: Auto resulted       CBC & Differential [[id number]]  (Abnormal) Collected: 06/10/24 0524    Specimen: Blood Updated: 06/10/24 0534    Narrative:      The following orders were created for panel order CBC & Differential.  Procedure                               Abnormality         Status                     ---------                               -----------         ------                     CBC Auto Differential[[id number]]        Abnormal            Final result                 Please view results for these tests on the individual orders.    CBC Auto Differential [[id number]]  (Abnormal) Collected: 06/10/24 0524     Specimen: Blood Updated: 06/10/24 0534     WBC 6.19 10*3/mm3      RBC 2.80 10*6/mm3      Hemoglobin 8.9 g/dL      Hematocrit 29.2 %      .3 fL      MCH 31.8 pg      MCHC 30.5 g/dL      RDW 14.3 %      RDW-SD 54.9 fl      MPV 12.2 fL      Platelets 131 10*3/mm3      Neutrophil % 93.4 %      Lymphocyte % 3.4 %      Monocyte % 2.6 %      Eosinophil % 0.2 %      Basophil % 0.2 %      Immature Grans % 0.2 %      Neutrophils, Absolute 5.79 10*3/mm3      Lymphocytes, Absolute 0.21 10*3/mm3      Monocytes, Absolute 0.16 10*3/mm3      Eosinophils, Absolute 0.01 10*3/mm3      Basophils, Absolute 0.01 10*3/mm3      Immature Grans, Absolute 0.01 10*3/mm3      nRBC 0.0 /100 WBC     POC Lactate [[id number]]  (Abnormal) Collected: 06/10/24 0531    Specimen: Blood Updated: 06/10/24 0532     Lactate 2.3 mmol/L      Comment: Serial Number: [id number]:  [id number]       Blood Culture - Blood, Arm, Left [[id number]] Collected: 06/10/24 0524    Specimen: Blood from Arm, Left Updated: 06/10/24 0530    Blood Culture - Blood, Arm, Right [[id number]] Collected: 06/10/24 0524    Specimen: Blood from Arm, Right Updated: 06/10/24 0530          Imaging Results (Last 72 Hours)       Procedure Component Value Units Date/Time    CT Abdomen Pelvis With Contrast [[id number]] Collected: 06/10/24 0705     Updated: 06/10/24 0720    Narrative:      CT ABDOMEN PELVIS W CONTRAST    Date of Exam: 6/10/2024 6:37 AM EDT    Indication: lower abdominal pain.    Comparison: CT of the abdomen and pelvis dated 4/24/2024    Technique: Axial CT images were obtained of the abdomen and pelvis following the uneventful intravenous administration of iodinated contrast. Sagittal and coronal reconstructions were performed.  Automated exposure control and iterative reconstruction   methods were used.      Findings:    Liver: The liver is unremarkable in morphology. No focal liver lesion is seen. There is mild biliary dilation. There are tiny stones within the  distal CBD (series 3, image 55).    Gallbladder: Gallbladder is distended and contains multiple small gallstones.    Pancreas: Unremarkable.    Spleen: Multiple tiny splenic granulomas.    Adrenal glands: Unremarkable.    Genitourinary tract: Several subcentimeter left renal hypodensities are too small to characterize. The kidneys are otherwise unremarkable. No hydronephrosis is seen. The visualized portions of the ureters and urinary bladder appear unremarkable. There   are surgical changes of the prostate gland.    Gastrointestinal tract: Colonic diverticulosis is present. The hollow viscera appear otherwise unremarkable. There is no evidence of bowel obstruction.    Appendix: No findings to suggest acute appendicitis.    Other findings: No free air or free fluid is identified. No pathologically enlarged lymph nodes are seen. Vascular calcifications are seen. The IVC is unremarkable.    Bones and soft tissues: No acute or suspicious osseous or soft tissue lesion is identified.    Lung bases: Partially visualized small bilateral pleural effusions with bibasilar atelectasis or scarring.      Impression:      Impression:  1.Cholelithiasis and obstructive choledocholithiasis with mild biliary dilation.  2.Colonic diverticulosis.  3.Small bilateral pleural effusions, partially visualized.  4.Additional findings as detailed above.      Electronically Signed: Benton Soto MD    6/10/2024 7:18 AM EDT    Workstation ID: [id number]    XR Chest 1 View [[id number]] Collected: 06/10/24 0605     Updated: 06/10/24 0610    Narrative:      XR CHEST 1 VW    Date of Exam: 6/10/2024 5:37 AM EDT    Indication: soa    Comparison: Chest radiograph 4/24/2024 at 8:37 a.m.    Findings:  The heart is enlarged. There are bilateral pleural effusions. There is bilateral basilar atelectasis. There is pleural scarring. The overall appearance of the chest is similar prior study.      Impression:      Impression:  Cardiomegaly with bilateral  pleural effusions and basilar atelectasis.        Electronically Signed: Edgar Khan MD    6/10/2024 6:08 AM EDT    Workstation ID: [id number]            I reviewed the patient's new clinical results.  I reviewed the patient's new imaging results and agree with the interpretation.  I reviewed the patient's other test results and agree with the interpretation      Assessment & Plan       Sepsis    Cholangitis    Choledocholithiasis      84-year-old gentleman with cholelithiasis, choledocholithiasis, probable cholangitis    Given that he has no choledocholithiasis with elevated LFTs and febrile illness with abdominal pain I suspect that he has cholangitis due to obstruction of his bile duct.  I discussed this with he and his daughter.  They understand this and he is planning to undergo ERCP likely later today for clearance of his common duct.  I discussed with them that clearance of his common duct is the most urgent part of his treatment at this point and that cholecystectomy is generally recommended after resolution of his acute illness to prevent further episodes of choledocholithiasis.  However given his relative frailty I would like to see how he tolerates his ERCP and if he is medically suitable we can consider cholecystectomy in the coming days.    I discussed the patient's findings and my recommendations with patient, family, and nursing staff              This document has been electronically signed by Greg Jensen MD on Alla 10, 2024 15:06 EDT      Greg Jensen MD  06/10/24  15:06 EDT

## 2024-06-10 NOTE — ED NOTES
Nursing report ED to floor  Praneeth Nam  84 y.o.  male    HPI:   Chief Complaint   Patient presents with    Shortness of Breath       Admitting doctor:   Pat Napoles MD    Admitting diagnosis:   The primary encounter diagnosis was Cholangitis. A diagnosis of Choledocholithiasis was also pertinent to this visit.    Code status:   Current Code Status       Date Active Code Status Order ID Comments User Context       6/10/2024 1215 CPR (Attempt to Resuscitate) [id number]  Pat Napoles MD ED        Question Answer    Code Status (Patient has no pulse and is not breathing) CPR (Attempt to Resuscitate)    Medical Interventions (Patient has pulse or is breathing) Full Support                    Allergies:   Penicillin v potassium and Latex    Isolation:  No active isolations     Fall Risk:  Fall Risk Assessment was completed, and patient is at high risk for falls.   Predictive Model Details         50 Factor Value    Calculated 6/10/2024 14:33 Age 84    Risk of Fall Model Respiratory Rate 37     Number of Distinct Medication Classes administered 9     Active Peripheral IV Present     Imaging order in this encounter Present     Diastolic BP 41     Magnesium not on file     Albumin 3.4 g/dL     Total Bilirubin 1.6 mg/dL     J Luis Scale not on file     Calcium 8.8 mg/dL     Chloride 109 mmol/L     Clinically Relevant Sex Not Female     Number of administrations of Ulcer Drugs 1     ALT 51 U/L     Days after Admission 0.407     Creatinine 1.2 mg/dL     Potassium 4.7 mmol/L     Tobacco Use Quit         Weight:       06/10/24  0441   Weight: 82.6 kg (182 lb 1.6 oz)       Intake and Output    Intake/Output Summary (Last 24 hours) at 6/10/2024 1433  Last data filed at 6/10/2024 0915  Gross per 24 hour   Intake 200 ml   Output --   Net 200 ml       Diet:   Dietary Orders (From admission, onward)       Start     Ordered    06/10/24 1216  NPO Diet NPO Type: Strict NPO  Diet Effective Now        Question:  NPO Type  Answer:   "Strict NPO    06/10/24 1215                     Most recent vitals:   Vitals:    06/10/24 1031 06/10/24 1113 06/10/24 1335 06/10/24 1338   BP: 118/41      BP Location: Left arm      Patient Position: Sitting      Pulse: 76  74 80   Resp: 22  (!) 32 (!) 37   Temp:  100.4 °F (38 °C)     TempSrc:  Oral     SpO2: 98%  98% 100%   Weight:       Height:           Active LDAs/IV Access:   Lines, Drains & Airways       Active LDAs       Name Placement date Placement time Site Days    Peripheral IV 06/10/24 0527 Anterior;Right Forearm 06/10/24  0527  Forearm  less than 1    Peripheral IV 06/10/24 0527 Anterior;Distal;Right Forearm 06/10/24  0527  Forearm  less than 1                    Skin Condition:   Skin Assessments (last day)       Date/Time Skin WDL    06/10/24 1321 WDL             Labs (abnormal labs have a star):   Labs Reviewed   PROCALCITONIN - Abnormal; Notable for the following components:       Result Value    Procalcitonin 0.38 (*)     All other components within normal limits    Narrative:     As a Marker for Sepsis (Non-Neonates):    1. <0.5 ng/mL represents a low risk of severe sepsis and/or septic shock.  2. >2 ng/mL represents a high risk of severe sepsis and/or septic shock.    As a Marker for Lower Respiratory Tract Infections that require antibiotic therapy:    PCT on Admission    Antibiotic Therapy       6-12 Hrs later    >0.5                Strongly Recommended  >0.25 - <0.5        Recommended   0.1 - 0.25          Discouraged              Remeasure/reassess PCT  <0.1                Strongly Discouraged     Remeasure/reassess PCT    As 28 day mortality risk marker: \"Change in Procalcitonin Result\" (>80% or <=80%) if Day 0 (or Day 1) and Day 4 values are available. Refer to http://www.[employer]-pct-calculator.com    Change in PCT <=80%  A decrease of PCT levels below or equal to 80% defines a positive change in PCT test result representing a higher risk for 28-day all-cause mortality of patients diagnosed " with severe sepsis for septic shock.    Change in PCT >80%  A decrease of PCT levels of more than 80% defines a negative change in PCT result representing a lower risk for 28-day all-cause mortality of patients diagnosed with severe sepsis or septic shock.      URINALYSIS W/ CULTURE IF INDICATED - Abnormal; Notable for the following components:    Color, UA Dark Yellow (*)     Blood, UA Moderate (2+) (*)     Protein, UA Trace (*)     Leuk Esterase, UA Trace (*)     All other components within normal limits    Narrative:     In absence of clinical symptoms, the presence of pyuria, bacteria, and/or nitrites on the urinalysis result does not correlate with infection.   BNP (IN-HOUSE) - Abnormal; Notable for the following components:    proBNP 4,712.0 (*)     All other components within normal limits    Narrative:     This assay is used as an aid in the diagnosis of individuals suspected of having heart failure. It can be used as an aid in the diagnosis of acute decompensated heart failure (ADHF) in patients presenting with signs and symptoms of ADHF to the emergency department (ED). In addition, NT-proBNP of <300 pg/mL indicates ADHF is not likely.    Age Range Result Interpretation  NT-proBNP Concentration (pg/mL:      <50             Positive            >450                   Gray                 300-450                    Negative             <300    50-75           Positive            >900                  Gray                300-900                  Negative            <300      >75             Positive            >1800                  Gray                300-1800                  Negative            <300   SINGLE HS TROPONIN T - Abnormal; Notable for the following components:    HS Troponin T 89 (*)     All other components within normal limits    Narrative:     High Sensitive Troponin T Reference Range:  <14.0 ng/L- Negative Female for AMI  <22.0 ng/L- Negative Male for AMI  >=14 - Abnormal Female indicating  possible myocardial injury.  >=22 - Abnormal Male indicating possible myocardial injury.   Clinicians would have to utilize clinical acumen, EKG, Troponin, and serial changes to determine if it is an Acute Myocardial Infarction or myocardial injury due to an underlying chronic condition.        COMPREHENSIVE METABOLIC PANEL - Abnormal; Notable for the following components:    Glucose 53 (*)     BUN 26 (*)     Chloride 109 (*)     CO2 19.9 (*)     Albumin 3.4 (*)     ALT (SGPT) 51 (*)     AST (SGOT) 144 (*)     Alkaline Phosphatase 356 (*)     Total Bilirubin 1.6 (*)     eGFR 60.0 (*)     All other components within normal limits    Narrative:     GFR Normal >60  Chronic Kidney Disease <60  Kidney Failure <15    The GFR formula is only valid for adults with stable renal function between ages 18 and 70.   CBC WITH AUTO DIFFERENTIAL - Abnormal; Notable for the following components:    RBC 2.80 (*)     Hemoglobin 8.9 (*)     Hematocrit 29.2 (*)     .3 (*)     MCHC 30.5 (*)     RDW-SD 54.9 (*)     MPV 12.2 (*)     Platelets 131 (*)     Neutrophil % 93.4 (*)     Lymphocyte % 3.4 (*)     Monocyte % 2.6 (*)     Eosinophil % 0.2 (*)     Lymphocytes, Absolute 0.21 (*)     All other components within normal limits   URINALYSIS, MICROSCOPIC ONLY - Abnormal; Notable for the following components:    RBC, UA 21-50 (*)     WBC, UA 3-5 (*)     Squamous Epithelial Cells, UA 3-6 (*)     All other components within normal limits   HIGH SENSITIVITIY TROPONIN T 2HR - Abnormal; Notable for the following components:    HS Troponin T 107 (*)     Troponin T Delta 18 (*)     All other components within normal limits    Narrative:     High Sensitive Troponin T Reference Range:  <14.0 ng/L- Negative Female for AMI  <22.0 ng/L- Negative Male for AMI  >=14 - Abnormal Female indicating possible myocardial injury.  >=22 - Abnormal Male indicating possible myocardial injury.   Clinicians would have to utilize clinical acumen, EKG, Troponin,  and serial changes to determine if it is an Acute Myocardial Infarction or myocardial injury due to an underlying chronic condition.        PROTIME-INR - Abnormal; Notable for the following components:    Protime 12.4 (*)     INR 1.15 (*)     All other components within normal limits   POC LACTATE - Abnormal; Notable for the following components:    Lactate 2.3 (*)     All other components within normal limits   POCT GLUCOSE FINGERSTICK - Abnormal; Notable for the following components:    Glucose 46 (*)     All other components within normal limits   POCT GLUCOSE FINGERSTICK - Abnormal; Notable for the following components:    Glucose 61 (*)     All other components within normal limits   POCT GLUCOSE FINGERSTICK - Abnormal; Notable for the following components:    Glucose 55 (*)     All other components within normal limits   RESPIRATORY PANEL PCR W/ COVID-19 (SARS-COV-2), NP SWAB IN UTM/VTP, 2 HR TAT - Normal    Narrative:     In the setting of a positive respiratory panel with a viral infection PLUS a negative procalcitonin without other underlying concern for bacterial infection, consider observing off antibiotics or discontinuation of antibiotics and continue supportive care. If the respiratory panel is positive for atypical bacterial infection (Bordetella pertussis, Chlamydophila pneumoniae, or Mycoplasma pneumoniae), consider antibiotic de-escalation to target atypical bacterial infection.   LIPASE - Normal   LACTIC ACID, REFLEX - Normal   POCT GLUCOSE FINGERSTICK - Normal   POCT GLUCOSE FINGERSTICK - Normal   POCT GLUCOSE FINGERSTICK - Normal   POCT GLUCOSE FINGERSTICK - Normal   POCT GLUCOSE FINGERSTICK - Normal   POCT GLUCOSE FINGERSTICK - Normal   BLOOD CULTURE   BLOOD CULTURE   RAINBOW DRAW    Narrative:     The following orders were created for panel order [place] Draw.  Procedure                               Abnormality         Status                     ---------                                -----------         ------                     Green Top (Gel)[[id number]]                                  Final result               Lavender Top[[id number]]                                     Final result               Gold Top - SST[[id number]]                                   Final result               Light Blue Top[[id number]]                                   Final result                 Please view results for these tests on the individual orders.   VITAMIN B12   FOLATE   CBC AND DIFFERENTIAL    Narrative:     The following orders were created for panel order CBC & Differential.  Procedure                               Abnormality         Status                     ---------                               -----------         ------                     CBC Auto Differential[[id number]]        Abnormal            Final result                 Please view results for these tests on the individual orders.   GREEN TOP   LAVENDER TOP   GOLD TOP - SST   LIGHT BLUE TOP       LOC: Person, Place, and Situation    Telemetry:  Med/Surg    Cardiac Monitoring Ordered: yes    EKG:   ECG 12 Lead Dyspnea   Preliminary Result   HEART RATE= 86  bpm   RR Interval= 700  ms   SC Interval= 56  ms   P Horizontal Axis= 201  deg   P Front Axis= 0  deg   QRSD Interval= 162  ms   QT Interval= 437  ms   QTcB= 522  ms   QRS Axis= -71  deg   T Wave Axis= 85  deg   - ABNORMAL ECG -   Sinus rhythm   RBBB and LAFB   Electronically Signed By:    Date and Time of Study: 2024-06-10 05:00:24      Telemetry Scan   Final Result      Telemetry Scan   Final Result          Medications Given in the ED:   Medications   sodium chloride 0.9 % flush 10 mL (has no administration in time range)   sodium chloride 0.9 % flush 10 mL (has no administration in time range)   dextrose (D50W) (25 g/50 mL) IV injection 50 mL (50 mL Intravenous Given 6/10/24 0712)   cefepime 2000 mg IVPB in 100 mL NS (MBP) (has no administration in time range)   metroNIDAZOLE (FLAGYL)  IVPB 500 mg (has no administration in time range)   pantoprazole (PROTONIX) injection 40 mg (40 mg Intravenous Given 6/10/24 1046)   acetaminophen (TYLENOL) suppository 650 mg (650 mg Rectal Given 6/10/24 0958)   sodium chloride 0.9 % infusion (100 mL/hr Intravenous New Bag 6/10/24 1046)   sodium chloride 0.9 % flush 10 mL (10 mL Intravenous Not Given 6/10/24 1405)   sodium chloride 0.9 % flush 10 mL (has no administration in time range)   sodium chloride 0.9 % infusion 40 mL (has no administration in time range)   nitroglycerin (NITROSTAT) SL tablet 0.4 mg (has no administration in time range)   Potassium Replacement - Follow Nurse / BPA Driven Protocol (has no administration in time range)   Magnesium Standard Dose Replacement - Follow Nurse / BPA Driven Protocol (has no administration in time range)   Phosphorus Replacement - Follow Nurse / BPA Driven Protocol (has no administration in time range)   Calcium Replacement - Follow Nurse / BPA Driven Protocol (has no administration in time range)   ondansetron (ZOFRAN) injection 4 mg (has no administration in time range)   ipratropium-albuterol (DUO-NEB) nebulizer solution 3 mL (3 mL Nebulization Given 6/10/24 1335)   dextrose 5 % and sodium chloride 0.9 % infusion (has no administration in time range)   acetaminophen (TYLENOL) tablet 1,000 mg (1,000 mg Oral Given 6/10/24 0543)   iopamidol (ISOVUE-370) 76 % injection 100 mL (100 mL Intravenous Given 6/10/24 0647)   ondansetron (ZOFRAN) injection 4 mg (4 mg Intravenous Given 6/10/24 0722)   cefepime 2000 mg IVPB in 100 mL NS (MBP) (0 mg Intravenous Stopped 6/10/24 0915)   metroNIDAZOLE (FLAGYL) IVPB 500 mg (0 mg Intravenous Stopped 6/10/24 0915)       Imaging results:  CT Abdomen Pelvis With Contrast    Result Date: 6/10/2024  Impression: 1.Cholelithiasis and obstructive choledocholithiasis with mild biliary dilation. 2.Colonic diverticulosis. 3.Small bilateral pleural effusions, partially visualized. 4.Additional  findings as detailed above. Electronically Signed: Benton Soto MD  6/10/2024 7:18 AM EDT  Workstation ID: [id number]    XR Chest 1 View    Result Date: 6/10/2024  Impression: Cardiomegaly with bilateral pleural effusions and basilar atelectasis. Electronically Signed: Edgar Khan MD  6/10/2024 6:08 AM EDT  Workstation ID: [id number]     Social issues:   Social History     Socioeconomic History    Marital status:    Tobacco Use    Smoking status: Former     Current packs/day: 0.00     Types: Cigarettes     Quit date:      Years since quittin.4     Passive exposure: Never    Smokeless tobacco: Never    Tobacco comments:     stopped smoking in    Vaping Use    Vaping status: Never Used   Substance and Sexual Activity    Alcohol use: No    Drug use: No    Sexual activity: Defer       NIH Stroke Scale:  Interval: (not recorded)  1a. Level of Consciousness: (not recorded)  1b. LOC Questions: (not recorded)  1c. LOC Commands: (not recorded)  2. Best Gaze: (not recorded)  3. Visual: (not recorded)  4. Facial Palsy: (not recorded)  5a. Motor Arm, Left: (not recorded)  5b. Motor Arm, Right: (not recorded)  6a. Motor Leg, Left: (not recorded)  6b. Motor Leg, Right: (not recorded)  7. Limb Ataxia: (not recorded)  8. Sensory: (not recorded)  9. Best Language: (not recorded)  10. Dysarthria: (not recorded)  11. Extinction and Inattention (formerly Neglect): (not recorded)    Total (NIH Stroke Scale): (not recorded)     Additional notable assessment information:     Nursing report ED to floor:  Report given to Nadiya Beaulieu RN   06/10/24 14:33 EDT

## 2024-06-10 NOTE — ANESTHESIA PREPROCEDURE EVALUATION
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   Substance History      OB/GYN          Other   arthritis, blood dyscrasia thrombocytopenia,   history of cancer    ROS/Med Hx Other: Impression  Thickened mitral and aortic valves with adequate opening motion.  Moderate mitral tricuspid and aortic and pulmonic regurgitation.  Left atrial enlargement.  Left ventricular size and contractility is normal with ejection fraction of 60%.  Left ventricular peak systolic longitudinal strain is abnormal with GL PS of -14%.  Left ventricular grade 3 diastolic dysfunction is present.  Right atrium right ventricle enlargement is present.  Mild pulmonary hypertension is present.                   Anesthesia Plan    ASA 3     general   total IV anesthesia  intravenous induction     Anesthetic plan, risks, benefits, and alternatives have been provided, discussed and informed consent has been obtained with: patient.    Plan discussed with CRNA.    CODE STATUS:    Code Status (Patient has no pulse and is not breathing): CPR (Attempt to Resuscitate)  Medical Interventions (Patient has pulse or is breathing): Full Support

## 2024-06-10 NOTE — ED NOTES
Pt was diagnosed with pneumonia. Today pt developed SOA and upper abd pain. Pt normally wears 2L nasal cannula. EMS put pt on 4L nasal cannula

## 2024-06-10 NOTE — ANESTHESIA PROCEDURE NOTES
Airway  Date/Time: 6/10/2024 3:46 PM    General Information and Staff    Patient location during procedure: OR    Indications and Patient Condition  Indications for airway management: airway protection    Preoxygenated: yes  MILS maintained throughout  Mask difficulty assessment: 1 - vent by mask    Final Airway Details  Final airway type: endotracheal airway      Successful airway: ETT  Cuffed: yes   Successful intubation technique: video laryngoscopy  Facilitating devices/methods: intubating stylet  Endotracheal tube insertion site: oral  Blade: Maurice  Blade size: 4  ETT size (mm): 7.0  Cormack-Lehane Classification: grade I - full view of glottis  Placement verified by: chest auscultation and capnometry   Cuff volume (mL): 8  Number of attempts at approach: 1  Assessment: lips, teeth, and gum same as pre-op and atraumatic intubation

## 2024-06-10 NOTE — CONSULTS
GI CONSULT  NOTE:    Referring Provider:  Dr. Hyman    Chief complaint: Abdominal pain, nausea/vomiting    Subjective .     History of present illness: Praneeth Nam is a 84 y.o. male with history of PE on Eliquis, ITP, COPD, CKD, diabetes, hypertension, CVA, and psoriasis who presents with complaints of abdominal pain.  The patient reports that abdominal pain began acutely yesterday.  Pain is located in the epigastric and right upper quadrant area and is intermittent.  The patient is unable to describe the pain.  He does report associated nausea/vomiting, but denies any hematemesis or coffee-ground emesis.  Also complains of recent heartburn.  Reports occasional dysphagia.  Denies NSAID use.  States that he typically moves his bowels regularly without bright red blood per rectum or melena.  Has had fever as high as 101.4 during hospitalization.  Also reports a weight loss of approximately 100 pounds over the past 2 to 3 years.      Endo History:  4/2024 EGD (Dr. Yancey) -distal esophageal stricture s/p dilation to 46 [place], polyps in stomach body, duodenal diverticulum  8/2018 colonoscopy (Dr. 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    Neutropenia 11/2003    MITZI (obstructive sleep apnea) 2018    Psoriasis 2000    Renal cyst, left     Rheumatoid arthritis     Stroke (cerebrum)      Thrombocytopenia 2005       Past Surgical History:  Past Surgical History:   Procedure Laterality Date    ENDOSCOPY N/A 2024    Procedure: ESOPHAGOGASTRODUODENOSCOPY WITH DILATION (#46 BOUGIE);  Surgeon: Gamal Yancey MD;  Location: [facility] ENDOSCOPY;  Service: Gastroenterology;  Laterality: N/A;  DUODENAL DIVERTICULUM, HIATAL HERNIA, DISTAL ESOPHAGEAL STRICTURE    SKIN LESION EXCISION      back and groin-benign       Social History:  Social History     Tobacco Use    Smoking status: Former     Current packs/day: 0.00     Types: Cigarettes     Quit date:      Years since quittin.4     Passive exposure: Never    Smokeless tobacco: Never    Tobacco comments:     stopped smoking in    Vaping Use    Vaping status: Never Used   Substance Use Topics    Alcohol use: No    Drug use: No       Family History:  Family History   Problem Relation Age of Onset    Lung cancer Brother         age unknown    Heart disease Brother     Stroke Mother     Heart disease Father     Heart disease Sister        Medications:  (Not in a hospital admission)      Scheduled Meds:cefepime, 2,000 mg, Intravenous, Q24H  metroNIDAZOLE, 500 mg, Intravenous, Q8H  pantoprazole, 40 mg, Intravenous, BID AC      Continuous Infusions:   PRN Meds:.  dextrose    sodium chloride    sodium chloride    ALLERGIES:  Penicillin v potassium and Latex    ROS:  The following systems were reviewed;   Constitution:  No fevers, no chills, unintentional weight loss  Skin: no rash, no jaundice  Eyes:  No blurry vision, no eye pain  HENT:  No change in hearing or smell  Resp:  No dyspnea or cough  CV:  No chest pain or palpitations  :  No dysuria, hematuria  Musculoskeletal:  No leg cramps or arthralgias  Neuro:  No tremor, no numbness  Psych:  No depression or confusion    Objective     Vital Signs:   Vitals:    06/10/24 0527 06/10/24 0531 06/10/24 0702 06/10/24 0801   BP:  136/52 125/88 100/52   BP Location:   Left arm    Patient  Position:   Sitting    Pulse:  86 87 76   Resp:   24 23   Temp: (!) 101.4 °F (38.6 °C)      TempSrc: Rectal      SpO2:  99% 100% 100%   Weight:       Height:           Physical Exam:       General Appearance:    Awake and alert, in no acute distress   Head:    Normocephalic, without obvious abnormality, atraumatic   Throat:   No oral lesions, no thrush, oral mucosa moist   Lungs:     Respirations regular, even and unlabored   Chest Wall:    No abnormalities observed   Abdomen:     Soft, RUQ/epigastric tenderness, no rebound or guarding, non-distended   Rectal:     Deferred   Extremities:   Moves all extremities, no edema, no cyanosis   Pulses:   Pulses palpable and equal bilaterally   Skin:   No rash, no jaundice, normal palpation   Lymph nodes:   No cervical, supraclavicular or submandibular palpable adenopathy   Neurologic:   Cranial nerves 2 - 12 grossly intact, no asterixis       Results Review:   I reviewed the patient's labs and imaging.  CBC    Results from last 7 days   Lab Units 06/10/24  0524   WBC 10*3/mm3 6.19   HEMOGLOBIN g/dL 8.9*   PLATELETS 10*3/mm3 131*     CMP   Results from last 7 days   Lab Units 06/10/24  0524   SODIUM mmol/L 142   POTASSIUM mmol/L 4.7   CHLORIDE mmol/L 109*   CO2 mmol/L 19.9*   BUN mg/dL 26*   CREATININE mg/dL 1.20   GLUCOSE mg/dL 53*   ALBUMIN g/dL 3.4*   BILIRUBIN mg/dL 1.6*   ALK PHOS U/L 356*   AST (SGOT) U/L 144*   ALT (SGPT) U/L 51*   LIPASE U/L 32     Cr Clearance Estimated Creatinine Clearance: 47 mL/min (by C-G formula based on SCr of 1.2 mg/dL).  Coag     HbA1C   Lab Results   Component Value Date    HGBA1C 4.70 (L) 04/23/2024    HGBA1C 6.1 (H) 09/11/2019    HGBA1C 5.9 (H) 09/10/2019     Blood Glucose   Glucose   Date/Time Value Ref Range Status   06/10/2024 0837 73 70 - 105 mg/dL Final     Comment:     Serial Number: [id number]:  [id number]   06/10/2024 0730 101 70 - 105 mg/dL Final     Comment:     Serial Number: [id number]:  [id number]   06/10/2024 0700  46 (C) 70 - 105 mg/dL Final     Comment:     Serial Number: [id number]:  [id number]     Infection   Results from last 7 days   Lab Units 06/10/24  0524   PROCALCITONIN ng/mL 0.38*     UA    Results from last 7 days   Lab Units 06/10/24  0533   NITRITE UA  Negative   WBC UA /HPF 3-5*   BACTERIA UA /HPF None Seen   SQUAM EPITHEL UA /HPF 3-6*     Imaging Results (Last 72 Hours)       Procedure Component Value Units Date/Time    CT Abdomen Pelvis With Contrast [[id number]] Collected: 06/10/24 0705     Updated: 06/10/24 0720    Narrative:      CT ABDOMEN PELVIS W CONTRAST    Date of Exam: 6/10/2024 6:37 AM EDT    Indication: lower abdominal pain.    Comparison: CT of the abdomen and pelvis dated 4/24/2024    Technique: Axial CT images were obtained of the abdomen and pelvis following the uneventful intravenous administration of iodinated contrast. Sagittal and coronal reconstructions were performed.  Automated exposure control and iterative reconstruction   methods were used.      Findings:    Liver: The liver is unremarkable in morphology. No focal liver lesion is seen. There is mild biliary dilation. There are tiny stones within the distal CBD (series 3, image 55).    Gallbladder: Gallbladder is distended and contains multiple small gallstones.    Pancreas: Unremarkable.    Spleen: Multiple tiny splenic granulomas.    Adrenal glands: Unremarkable.    Genitourinary tract: Several subcentimeter left renal hypodensities are too small to characterize. The kidneys are otherwise unremarkable. No hydronephrosis is seen. The visualized portions of the ureters and urinary bladder appear unremarkable. There   are surgical changes of the prostate gland.    Gastrointestinal tract: Colonic diverticulosis is present. The hollow viscera appear otherwise unremarkable. There is no evidence of bowel obstruction.    Appendix: No findings to suggest acute appendicitis.    Other findings: No free air or free fluid is identified. No  pathologically enlarged lymph nodes are seen. Vascular calcifications are seen. The IVC is unremarkable.    Bones and soft tissues: No acute or suspicious osseous or soft tissue lesion is identified.    Lung bases: Partially visualized small bilateral pleural effusions with bibasilar atelectasis or scarring.      Impression:      Impression:  1.Cholelithiasis and obstructive choledocholithiasis with mild biliary dilation.  2.Colonic diverticulosis.  3.Small bilateral pleural effusions, partially visualized.  4.Additional findings as detailed above.      Electronically Signed: Benton Soto MD    6/10/2024 7:18 AM EDT    Workstation ID: [id number]    XR Chest 1 View [[id number]] Collected: 06/10/24 0605     Updated: 06/10/24 0610    Narrative:      XR CHEST 1 VW    Date of Exam: 6/10/2024 5:37 AM EDT    Indication: soa    Comparison: Chest radiograph 4/24/2024 at 8:37 a.m.    Findings:  The heart is enlarged. There are bilateral pleural effusions. There is bilateral basilar atelectasis. There is pleural scarring. The overall appearance of the chest is similar prior study.      Impression:      Impression:  Cardiomegaly with bilateral pleural effusions and basilar atelectasis.        Electronically Signed: Edgar Khan MD    6/10/2024 6:08 AM EDT    Workstation ID: [id number]            ASSESSMENT:  -Choledocholithiasis  -Cholangitis  -Elevated LFTs -due to above  -Macrocytic anemia  -History of PE on Eliquis  -ITP  -COPD  -CKD  -Diabetes  -Hypertension  -History of CVA  -Psoriasis    PLAN:  Patient is an 84-year-old male with history of PE on Eliquis, ITP, COPD, and CVA who presented on 6/10 with complaints of abdominal pain and nausea/vomiting.    CT abdomen/pelvis W on admission shows cholelithiasis and obstructive choledocholithiasis with mild biliary dilation.  Patient has been febrile with temperature as high as 101.4 in the past 24 hours.  WBC count and lactic acid normal.  Continue antibiotics.  Total bilirubin  1.6, alk phos 356, , ALT 51.  Lipase normal.  Plan ERCP today due to choledocholithiasis and cholangitis.  Maintain NPO.  Hold Eliquis.  Start PPI.  Hemoglobin 8.9 with .3.  Will check vitamin B12, folate, and haptoglobin.  Patient on oral iron supplementation.  General surgery has been consulted as patient will likely need cholecystectomy at some point.  Supportive care.      I discussed the patients findings and my recommendations with the patient.  I will discuss case with Dr. Snyder and change plan accordingly.    We appreciate the referral.    Electronically signed by AZEEM Nugent, 06/10/24, 9:20 AM EDT.

## 2024-06-10 NOTE — ED NOTES
Endo transport here for patient. Explained to transporter that patient glucose was low and we are waiting for dextrose orders from [facility]. Primary nurse called Endo to inform them of low glucose readings and waiting for orders. Primary nurse sent patient with dextrose bag per Endo nurse request.

## 2024-06-10 NOTE — H&P
Patient Care Team:  Guilherme Jason MD as PCP - General (Family Medicine)  Misty Dias MD as Consulting Physician (Cardiology)  Nora Kenyon DNP, APRN as Nurse Practitioner (Thoracic Surgery)  Sussy Shane MD as Consulting Physician (Nephrology)    Chief complaint abdominal pain     Subjective     Patient is a 84 y.o. male  history of PE on Eliquis, ITP, COPD, CKD, diabetes, hypertension, CVA, and psoriasis  who presents with abdominal pain. Onset of symptoms was last night.    Patient reports having lower abdominal pain last night after dinner accompanied by nausea and vomiting that is progressively worsening. Patient denies changes in stools, melena, or hematochezia. Patient states he is freezing despite fever.     Review of Systems   Constitutional:  Positive for chills and fever.   Cardiovascular:  Negative for chest pain.   Gastrointestinal:  Positive for abdominal pain, nausea and vomiting. Negative for blood in stool, constipation and diarrhea.          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    Neutropenia 11/2003    MITZI (obstructive sleep apnea) 2018    Psoriasis 2000    Renal cyst, left     Rheumatoid arthritis     Stroke (cerebrum)     Thrombocytopenia 08/23/2005     Past Surgical History:   Procedure Laterality Date    ENDOSCOPY N/A 4/26/2024    Procedure: ESOPHAGOGASTRODUODENOSCOPY WITH DILATION (#46 BOUGIE);  Surgeon:  Gamal Yancey MD;  Location: [facility] ENDOSCOPY;  Service: Gastroenterology;  Laterality: N/A;  DUODENAL DIVERTICULUM, HIATAL HERNIA, DISTAL ESOPHAGEAL STRICTURE    SKIN LESION EXCISION      back and groin-benign     Family History   Problem Relation Age of Onset    Lung cancer Brother         age unknown    Heart disease Brother     Stroke Mother     Heart disease Father     Heart disease Sister      Social History     Tobacco Use    Smoking status: Former     Current packs/day: 0.00     Types: Cigarettes     Quit date:      Years since quittin.4     Passive exposure: Never    Smokeless tobacco: Never    Tobacco comments:     stopped smoking in    Vaping Use    Vaping status: Never Used   Substance Use Topics    Alcohol use: No    Drug use: No     (Not in a hospital admission)    Allergies:  Penicillin v potassium and Latex    Objective     Vital Signs  Temp:  [98.7 °F (37.1 °C)-102 °F (38.9 °C)] 102 °F (38.9 °C)  Heart Rate:  [76-93] 93  Resp:  [22-34] 34  BP: (100-170)/(52-88) 170/67     Physical Exam:      General Appearance:    Alert, cooperative, in no acute distress, uncontrollable shivering   Head:    Normocephalic, without obvious abnormality, atraumatic   Eyes:            Lids and lashes normal, conjunctivae and sclerae normal, no   icterus, no pallor, corneas clear, PERRLA   Ears:    Ears appear intact with no abnormalities noted   Throat:   No oral lesions, no thrush, oral mucosa moist   Neck:   No adenopathy, supple, trachea midline, no thyromegaly, no   carotid bruit, no JVD   Lungs:     Clear to auscultation,respirations regular, even and                  unlabored    Heart:    Regular rhythm and normal rate, normal S1 and S2, no            murmur, no gallop, no rub, no click   Chest Wall:    No abnormalities observed   Abdomen:     Tender in RUQ, no R/G, soft   Extremities:   Moves all extremities well, no edema, no cyanosis, no             redness   Pulses:   Pulses palpable  and equal bilaterally   Skin:   No bleeding, bruising or rash       Neurologic:  No focal deficit        Results Review:     Imaging Results (Last 24 Hours)       Procedure Component Value Units Date/Time    CT Abdomen Pelvis With Contrast [[id number]] Collected: 06/10/24 0705     Updated: 06/10/24 0720    Narrative:      CT ABDOMEN PELVIS W CONTRAST    Date of Exam: 6/10/2024 6:37 AM EDT    Indication: lower abdominal pain.    Comparison: CT of the abdomen and pelvis dated 4/24/2024    Technique: Axial CT images were obtained of the abdomen and pelvis following the uneventful intravenous administration of iodinated contrast. Sagittal and coronal reconstructions were performed.  Automated exposure control and iterative reconstruction   methods were used.      Findings:    Liver: The liver is unremarkable in morphology. No focal liver lesion is seen. There is mild biliary dilation. There are tiny stones within the distal CBD (series 3, image 55).    Gallbladder: Gallbladder is distended and contains multiple small gallstones.    Pancreas: Unremarkable.    Spleen: Multiple tiny splenic granulomas.    Adrenal glands: Unremarkable.    Genitourinary tract: Several subcentimeter left renal hypodensities are too small to characterize. The kidneys are otherwise unremarkable. No hydronephrosis is seen. The visualized portions of the ureters and urinary bladder appear unremarkable. There   are surgical changes of the prostate gland.    Gastrointestinal tract: Colonic diverticulosis is present. The hollow viscera appear otherwise unremarkable. There is no evidence of bowel obstruction.    Appendix: No findings to suggest acute appendicitis.    Other findings: No free air or free fluid is identified. No pathologically enlarged lymph nodes are seen. Vascular calcifications are seen. The IVC is unremarkable.    Bones and soft tissues: No acute or suspicious osseous or soft tissue lesion is identified.    Lung bases: Partially  visualized small bilateral pleural effusions with bibasilar atelectasis or scarring.      Impression:      Impression:  1.Cholelithiasis and obstructive choledocholithiasis with mild biliary dilation.  2.Colonic diverticulosis.  3.Small bilateral pleural effusions, partially visualized.  4.Additional findings as detailed above.      Electronically Signed: Benton Soto MD    6/10/2024 7:18 AM EDT    Workstation ID: [id number]    XR Chest 1 View [[id number]] Collected: 06/10/24 0605     Updated: 06/10/24 0610    Narrative:      XR CHEST 1 VW    Date of Exam: 6/10/2024 5:37 AM EDT    Indication: soa    Comparison: Chest radiograph 4/24/2024 at 8:37 a.m.    Findings:  The heart is enlarged. There are bilateral pleural effusions. There is bilateral basilar atelectasis. There is pleural scarring. The overall appearance of the chest is similar prior study.      Impression:      Impression:  Cardiomegaly with bilateral pleural effusions and basilar atelectasis.        Electronically Signed: Edgar Khan MD    6/10/2024 6:08 AM EDT    Workstation ID: [id number]             Lab Results (last 24 hours)       Procedure Component Value Units Date/Time    Protime-INR [[id number]]  (Abnormal) Collected: 06/10/24 0524    Specimen: Blood Updated: 06/10/24 1008     Protime 12.4 Seconds      INR 1.15    High Sensitivity Troponin T 2Hr [[id number]]  (Abnormal) Collected: 06/10/24 0727    Specimen: Blood from Arm, Right Updated: 06/10/24 1007     HS Troponin T 107 ng/L      Troponin T Delta 18 ng/L     Narrative:      High Sensitive Troponin T Reference Range:  <14.0 ng/L- Negative Female for AMI  <22.0 ng/L- Negative Male for AMI  >=14 - Abnormal Female indicating possible myocardial injury.  >=22 - Abnormal Male indicating possible myocardial injury.   Clinicians would have to utilize clinical acumen, EKG, Troponin, and serial changes to determine if it is an Acute Myocardial Infarction or myocardial injury due to an underlying chronic  condition.         POC Glucose Once [[id number]]  (Normal) Collected: 06/10/24 0958    Specimen: Blood Updated: 06/10/24 1000     Glucose 84 mg/dL      Comment: Serial Number: [id number]:  [id number]       STAT Lactic Acid, Reflex [[id number]]  (Normal) Collected: 06/10/24 0855    Specimen: Blood Updated: 06/10/24 0921     Lactate 1.8 mmol/L     POC Glucose Once [[id number]]  (Normal) Collected: 06/10/24 0837    Specimen: Blood Updated: 06/10/24 0839     Glucose 73 mg/dL      Comment: Serial Number: [id number]:  [id number]       POC Glucose Once [[id number]]  (Normal) Collected: 06/10/24 0730    Specimen: Blood Updated: 06/10/24 0732     Glucose 101 mg/dL      Comment: Serial Number: [id number]:  [id number]       POC Glucose Once [[id number]]  (Abnormal) Collected: 06/10/24 0700    Specimen: Blood Updated: 06/10/24 0703     Glucose 46 mg/dL      Comment: Serial Number: [id number]:  [id number]       Respiratory Panel PCR w/COVID-19(SARS-CoV-2) KEN/HUBERT/CHIKA/PAD/COR/FLORECITA In-House, NP Swab in UTM/VTM, 2 HR TAT - Swab, Nasopharynx [[id number]]  (Normal) Collected: 06/10/24 0527    Specimen: Swab from Nasopharynx Updated: 06/10/24 0617     ADENOVIRUS, PCR Not Detected     Coronavirus 229E Not Detected     Coronavirus HKU1 Not Detected     Coronavirus NL63 Not Detected     Coronavirus OC43 Not Detected     COVID19 Not Detected     Human Metapneumovirus Not Detected     Human Rhinovirus/Enterovirus Not Detected     Influenza A PCR Not Detected     Influenza B PCR Not Detected     Parainfluenza Virus 1 Not Detected     Parainfluenza Virus 2 Not Detected     Parainfluenza Virus 3 Not Detected     Parainfluenza Virus 4 Not Detected     RSV, PCR Not Detected     Bordetella pertussis pcr Not Detected     Bordetella parapertussis PCR Not Detected     Chlamydophila pneumoniae PCR Not Detected     Mycoplasma pneumo by PCR Not Detected    Narrative:      In the setting of a positive respiratory panel with a viral  infection PLUS a negative procalcitonin without other underlying concern for bacterial infection, consider observing off antibiotics or discontinuation of antibiotics and continue supportive care. If the respiratory panel is positive for atypical bacterial infection (Bordetella pertussis, Chlamydophila pneumoniae, or Mycoplasma pneumoniae), consider antibiotic de-escalation to target atypical bacterial infection.    Comprehensive Metabolic Panel [[id number]]  (Abnormal) Collected: 06/10/24 0524    Specimen: Blood Updated: 06/10/24 0610     Glucose 53 mg/dL      BUN 26 mg/dL      Creatinine 1.20 mg/dL      Sodium 142 mmol/L      Potassium 4.7 mmol/L      Comment: Slight hemolysis detected by analyzer. Result may be falsely elevated.        Chloride 109 mmol/L      CO2 19.9 mmol/L      Calcium 8.8 mg/dL      Total Protein 6.0 g/dL      Albumin 3.4 g/dL      ALT (SGPT) 51 U/L      AST (SGOT) 144 U/L      Alkaline Phosphatase 356 U/L      Total Bilirubin 1.6 mg/dL      Globulin 2.6 gm/dL      A/G Ratio 1.3 g/dL      BUN/Creatinine Ratio 21.7     Anion Gap 13.1 mmol/L      eGFR 60.0 mL/min/1.73     Narrative:      GFR Normal >60  Chronic Kidney Disease <60  Kidney Failure <15    The GFR formula is only valid for adults with stable renal function between ages 18 and 70.    Lipase [[id number]]  (Normal) Collected: 06/10/24 0524    Specimen: Blood Updated: 06/10/24 0609     Lipase 32 U/L     BNP [[id number]]  (Abnormal) Collected: 06/10/24 0524    Specimen: Blood Updated: 06/10/24 0609     proBNP 4,712.0 pg/mL     Narrative:      This assay is used as an aid in the diagnosis of individuals suspected of having heart failure. It can be used as an aid in the diagnosis of acute decompensated heart failure (ADHF) in patients presenting with signs and symptoms of ADHF to the emergency department (ED). In addition, NT-proBNP of <300 pg/mL indicates ADHF is not likely.    Age Range Result Interpretation  NT-proBNP Concentration  "(pg/mL:      <50             Positive            >450                   Gray                 300-450                    Negative             <300    50-75           Positive            >900                  Gray                300-900                  Negative            <300      >75             Positive            >1800                  Gray                300-1800                  Negative            <300    Procalcitonin [[id number]]  (Abnormal) Collected: 06/10/24 0524    Specimen: Blood Updated: 06/10/24 0609     Procalcitonin 0.38 ng/mL     Narrative:      As a Marker for Sepsis (Non-Neonates):    1. <0.5 ng/mL represents a low risk of severe sepsis and/or septic shock.  2. >2 ng/mL represents a high risk of severe sepsis and/or septic shock.    As a Marker for Lower Respiratory Tract Infections that require antibiotic therapy:    PCT on Admission    Antibiotic Therapy       6-12 Hrs later    >0.5                Strongly Recommended  >0.25 - <0.5        Recommended   0.1 - 0.25          Discouraged              Remeasure/reassess PCT  <0.1                Strongly Discouraged     Remeasure/reassess PCT    As 28 day mortality risk marker: \"Change in Procalcitonin Result\" (>80% or <=80%) if Day 0 (or Day 1) and Day 4 values are available. Refer to http://www.[employer]-pct-calculator.com    Change in PCT <=80%  A decrease of PCT levels below or equal to 80% defines a positive change in PCT test result representing a higher risk for 28-day all-cause mortality of patients diagnosed with severe sepsis for septic shock.    Change in PCT >80%  A decrease of PCT levels of more than 80% defines a negative change in PCT result representing a lower risk for 28-day all-cause mortality of patients diagnosed with severe sepsis or septic shock.       Single High Sensitivity Troponin T [[id number]]  (Abnormal) Collected: 06/10/24 0524    Specimen: Blood Updated: 06/10/24 0609     HS Troponin T 89 ng/L     Narrative:      High " Sensitive Troponin T Reference Range:  <14.0 ng/L- Negative Female for AMI  <22.0 ng/L- Negative Male for AMI  >=14 - Abnormal Female indicating possible myocardial injury.  >=22 - Abnormal Male indicating possible myocardial injury.   Clinicians would have to utilize clinical acumen, EKG, Troponin, and serial changes to determine if it is an Acute Myocardial Infarction or myocardial injury due to an underlying chronic condition.         Urinalysis With Culture If Indicated - Urine, Clean Catch [[id number]]  (Abnormal) Collected: 06/10/24 0533    Specimen: Urine, Clean Catch Updated: 06/10/24 0549     Color, UA Dark Yellow     Appearance, UA Clear     pH, UA <=5.0     Specific Gravity, UA 1.019     Glucose, UA Negative     Ketones, UA Negative     Bilirubin, UA Negative     Blood, UA Moderate (2+)     Protein, UA Trace     Leuk Esterase, UA Trace     Nitrite, UA Negative     Urobilinogen, UA 1.0 E.U./dL    Narrative:      In absence of clinical symptoms, the presence of pyuria, bacteria, and/or nitrites on the urinalysis result does not correlate with infection.    Urinalysis, Microscopic Only - Urine, Clean Catch [[id number]]  (Abnormal) Collected: 06/10/24 0533    Specimen: Urine, Clean Catch Updated: 06/10/24 0549     RBC, UA 21-50 /HPF      WBC, UA 3-5 /HPF      Comment: Urine culture not indicated.        Bacteria, UA None Seen /HPF      Squamous Epithelial Cells, UA 3-6 /HPF      Hyaline Casts, UA 0-2 /LPF      Methodology Automated Microscopy    [place] Draw [[id number]] Collected: 06/10/24 0524    Specimen: Blood Updated: 06/10/24 0545    Narrative:      The following orders were created for panel order [place] Draw.  Procedure                               Abnormality         Status                     ---------                               -----------         ------                     Green Top (Gel)[[id number]]                                  Final result               Lavender Top[[id number]]                                      Final result               Gold Top - SST[[id number]]                                   Final result               Light Blue Top[[id number]]                                   Final result                 Please view results for these tests on the individual orders.    Green Top (Gel) [[id number]] Collected: 06/10/24 0524    Specimen: Blood Updated: 06/10/24 0545     Extra Tube Hold for add-ons.     Comment: Auto resulted.       Lavender Top [[id number]] Collected: 06/10/24 0524    Specimen: Blood Updated: 06/10/24 0545     Extra Tube hold for add-on     Comment: Auto resulted       Gold Top - SST [[id number]] Collected: 06/10/24 0524    Specimen: Blood Updated: 06/10/24 0545     Extra Tube Hold for add-ons.     Comment: Auto resulted.       Light Blue Top [[id number]] Collected: 06/10/24 0524    Specimen: Blood Updated: 06/10/24 0545     Extra Tube Hold for add-ons.     Comment: Auto resulted       CBC & Differential [[id number]]  (Abnormal) Collected: 06/10/24 0524    Specimen: Blood Updated: 06/10/24 0534    Narrative:      The following orders were created for panel order CBC & Differential.  Procedure                               Abnormality         Status                     ---------                               -----------         ------                     CBC Auto Differential[[id number]]        Abnormal            Final result                 Please view results for these tests on the individual orders.    CBC Auto Differential [[id number]]  (Abnormal) Collected: 06/10/24 0524    Specimen: Blood Updated: 06/10/24 0534     WBC 6.19 10*3/mm3      RBC 2.80 10*6/mm3      Hemoglobin 8.9 g/dL      Hematocrit 29.2 %      .3 fL      MCH 31.8 pg      MCHC 30.5 g/dL      RDW 14.3 %      RDW-SD 54.9 fl      MPV 12.2 fL      Platelets 131 10*3/mm3      Neutrophil % 93.4 %      Lymphocyte % 3.4 %      Monocyte % 2.6 %      Eosinophil % 0.2 %      Basophil % 0.2 %      Immature Grans % 0.2 %       Neutrophils, Absolute 5.79 10*3/mm3      Lymphocytes, Absolute 0.21 10*3/mm3      Monocytes, Absolute 0.16 10*3/mm3      Eosinophils, Absolute 0.01 10*3/mm3      Basophils, Absolute 0.01 10*3/mm3      Immature Grans, Absolute 0.01 10*3/mm3      nRBC 0.0 /100 WBC     POC Lactate [[id number]]  (Abnormal) Collected: 06/10/24 0531    Specimen: Blood Updated: 06/10/24 0532     Lactate 2.3 mmol/L      Comment: Serial Number: [id number]:  [id number]       Blood Culture - Blood, Arm, Left [[id number]] Collected: 06/10/24 0524    Specimen: Blood from Arm, Left Updated: 06/10/24 0530    Blood Culture - Blood, Arm, Right [[id number]] Collected: 06/10/24 0524    Specimen: Blood from Arm, Right Updated: 06/10/24 0530             I reviewed the patient's new clinical results.    Assessment & Plan       Cholangitis    Choledocholithiasis    CT abd/pelic w contrast on admission shows cholelithiasis and obstructive choledocholithiasis with mild biliary dilation. Patient is febrile with a temp of 102. WBC unremarkable, lactate slightly elevated at 2.3. GI overseeing ERCP later this afternoon. Continue antibiotics - cefepime and metronidazole, ordered rectal tylenol for fever. Hold Eliquis for ERCP.   Will need cholecystectomy at some point.      2.  Sepsis - blood cultures pending; covered with broad spectrum antibiotics.  Changing IV fluids to D5 1/2 NS due to hypoglycemia    3.  Elevated troponin - chronically elevated based on review of chart; no chest pain, no acute EKG changes - no ischemic workup planned    4.  PAF - holding Eliquis; currently in sinus rhythm.    5. H/o ITP - monitor platelet count    6.  H/ O PE - resume anticoagulation when able    SCD's for dvt prophy  IV PPI for stress ulcer prophy      I discussed the patient's findings and my recommendations with patient.     MERCEDEZ Mehta Student  06/10/24  10:30 EDT    I have interviewed and examined patient and provided 100% of medical decision-making.  Pat  MD Dony

## 2024-06-10 NOTE — ED PROVIDER NOTES
Subjective   History of Present Illness  84-year-old male with dyspnea, abdominal pain, chills since 7 PM.      Review of Systems   Constitutional:  Positive for chills.   Respiratory:  Positive for cough and shortness of breath.    Cardiovascular:  Negative for chest pain.   Gastrointestinal:  Positive for abdominal pain. Negative for diarrhea, nausea and vomiting.   Genitourinary:  Negative for dysuria.       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    Neutropenia 11/2003    MITZI (obstructive sleep apnea) 2018    Psoriasis 2000    Renal cyst, left     Rheumatoid arthritis     Stroke (cerebrum)     Thrombocytopenia 08/23/2005       Allergies   Allergen Reactions    Penicillin V Potassium Swelling    Latex Hives       Past Surgical History:   Procedure Laterality Date    ENDOSCOPY N/A 4/26/2024    Procedure: ESOPHAGOGASTRODUODENOSCOPY WITH DILATION (#[id number] BOUGIE);  Surgeon: Gamal Yancey MD;  Location: [facility] ENDOSCOPY;  Service: Gastroenterology;  Laterality: N/A;  DUODENAL DIVERTICULUM, HIATAL HERNIA, DISTAL ESOPHAGEAL STRICTURE    SKIN LESION EXCISION      back and groin-benign       Family History   Problem Relation Age of Onset    Lung cancer Brother         age unknown    Heart disease Brother     Stroke Mother     Heart disease Father     Heart disease Sister        Social History     Socioeconomic History    Marital status:    Tobacco Use    Smoking  status: Former     Current packs/day: 0.00     Types: Cigarettes     Quit date:      Years since quittin.4     Passive exposure: Never    Smokeless tobacco: Never    Tobacco comments:     stopped smoking in    Vaping Use    Vaping status: Never Used   Substance and Sexual Activity    Alcohol use: No    Drug use: No    Sexual activity: Defer           Objective   Physical Exam  Constitutional:       General: He is not in acute distress.     Appearance: He is well-developed.   HENT:      Head: Normocephalic and atraumatic.      Mouth/Throat:      Mouth: Mucous membranes are moist.      Pharynx: Oropharynx is clear.   Eyes:      Conjunctiva/sclera: Conjunctivae normal.      Pupils: Pupils are equal, round, and reactive to light.   Cardiovascular:      Rate and Rhythm: Normal rate and regular rhythm.      Heart sounds: Normal heart sounds.   Pulmonary:      Effort: Pulmonary effort is normal.      Breath sounds: Normal breath sounds.   Abdominal:      General: Bowel sounds are normal. There is no distension.      Palpations: Abdomen is soft.      Tenderness: There is no abdominal tenderness.   Skin:     General: Skin is warm and dry.      Capillary Refill: Capillary refill takes less than 2 seconds.   Neurological:      General: No focal deficit present.      Mental Status: He is alert.         Procedures           ED Course                                             Medical Decision Making  Patient with choledocholithiasis, elevated liver enzymes, fever with actually lower abdominal pain.  On reevaluation, patient states his abdominal pain has resolved since he vomited.  Patient is hemodynamically stable and alert and appears nontoxic.  Will admit to the [facility] service, discuss with surgery.    Case discussed with Dr. Jensen general surgery, will see in consultation.  Case discussed with Dr. Snyder with GI, will see in consultation.  Case discussed with Dr. Napoles with [facility] service, accepts    Problems  Addressed:  Cholangitis: acute illness or injury    Amount and/or Complexity of Data Reviewed  Labs: ordered.  Radiology: ordered.  ECG/medicine tests: ordered and independent interpretation performed.     Details: EKG interpretation: Atrial fibrillation, rate 87, no STEMI    Risk  OTC drugs.  Prescription drug management.  Decision regarding hospitalization.        Final diagnoses:   Cholangitis       ED Disposition  ED Disposition       ED Disposition   Decision to Admit    Condition   --    Comment   Level of Care: Telemetry [5]   Admitting Physician: WILLOW FRIAS [[id number]]   Attending Physician: WILLOW FRIAS [[id number]]                 No follow-up provider specified.       Medication List      No changes were made to your prescriptions during this visit.            Guilherme Hyman MD  06/10/24 0752

## 2024-06-10 NOTE — ED NOTES
Patient states that he is very nauseated at this time. Nurse at bedside.Patient vomiting. ER provider made aware

## 2024-06-10 NOTE — Clinical Note
Level of Care: Telemetry [5]   Admitting Physician: WILLOW FRIAS [[id number]]   Attending Physician: WILLOW FRIAS [[id number]]

## 2024-06-10 NOTE — LETTER
EMS Transport Request  For use at [facility], [place], [place], [place], and [place] only   Patient Name: Praneeth Nam : 1940   Weight:90 kg (198 lb 6.6 oz) Pick-up Location: [facility] BLS/ALS: BLS/ALS: BLS   Insurance: MEDICARE Auth End Date:    Pre-Cert #: D/C Summary complete:    Destination: Other [facility]   Contact Precautions: None   Equipment (O2, Fluids, etc.): None   Arrive By Date/Time: 6/15/2024 Stretcher/WC: Wheelchair   CM Requesting: Monailsa Martinez RN Ext: [phone]   Notes/Medical Necessity: admit to [facility].     ______________________________________________________________________    *Only 2 patient bags OR 1 carry-on size bag are permitted.  Wheelchairs and walkers CANNOT transported with the patient. Acknowledge: Yes

## 2024-06-10 NOTE — ED NOTES
Patient refusing peanut butter crackers, stating he does like the taste of them. Patient given a small cup of oatmeal.

## 2024-06-10 NOTE — ANESTHESIA POSTPROCEDURE EVALUATION
Patient: Praneeth Nam    Procedure Summary       Date: 06/10/24 Room / Location: [facility] ENDOSCOPY 2 / [facility] ENDOSCOPY    Anesthesia Start: 1536 Anesthesia Stop: 1637    Procedure: ENDOSCOPIC RETROGRADE CHOLANGIOPANCREATOGRAPHY, sphincterotomy, balloon clearance of commonn bile duct (9-12mm), stone extraction, pancreatic stent placement Diagnosis:       Cholangitis      Choledocholithiasis      (Cholangitis [K83.09])      (Choledocholithiasis [K80.50])    Surgeons: Mariaelena Snyder MD Provider: Thelma Vaughan MD    Anesthesia Type: general ASA Status: 3            Anesthesia Type: general    Vitals  Vitals Value Taken Time   BP 82/44 06/10/24 1802   Temp 98.7 °F (37.1 °C) 06/10/24 1802   Pulse 129 06/10/24 1802   Resp 38 06/10/24 1802   SpO2 95 % 06/10/24 1802           Post Anesthesia Care and Evaluation    Patient location during evaluation: PACU  Patient participation: complete - patient participated  Level of consciousness: awake  Pain scale: See nurse's notes for pain score.  Pain management: adequate    Airway patency: patent  Anesthetic complications: No anesthetic complications  PONV Status: none  Cardiovascular status: acceptable  Respiratory status: acceptable and spontaneous ventilation  Hydration status: acceptable    Comments: Patient seen and examined postoperatively; vital signs stable; SpO2 greater than or equal to 90%; cardiopulmonary status stable; nausea/vomiting adequately controlled; pain adequately controlled; no apparent anesthesia complications; patient discharged from anesthesia care when discharge criteria were met

## 2024-06-10 NOTE — OP NOTE
ENDOSCOPIC RETROGRADE CHOLANGIOPANCREATOGRAPHY Procedure Report    Patient Name:  Praneeth Nam  YOB: 1940    Date of Surgery:  6/10/2024     Pre-Op Diagnosis:  Cholangitis [K83.09]  Choledocholithiasis [K80.50]        Procedure/CPT® Codes:      Procedure(s):  ENDOSCOPIC RETROGRADE CHOLANGIOPANCREATOGRAPHY, sphincterotomy, balloon clearance of commonn bile duct (9-12mm), stone extraction, pancreatic stent placement    Staff:  Surgeon(s):  Mariaelena Snyder MD      Anesthesia: General    Description of Procedure:  A description of the procedure as well as risks, benefits and alternative methods were explained to the patient who voiced understanding and signed the corresponding consent form.Specifically risks of post-ERCP pancreatitis, bleeding, perforation, failure to canulate and adverse reaction to sedation were discussed. A physical exam was performed and vital signs were monitored throughout the procedure.    A  film was performed which was normal. With the patient in the semi-prone position, an Olympus side viewing endoscope was placed into the mouth and proceeded through the esophagus, stomach and second portion of the duodenum without difficulty. Limited views of the esophagus and stomach were normal. The ampulla was visualized and appeared normal. A [employer] hydrotome was used to cannulate the ampulla using wire guided technique. Bile was aspirated to ensure this was the duct of interest. Contrast was injected into the bile duct.      The scope was then retroflexed and the fundus was visualized. The procedure was not difficult and there were no immediate complications.    Findings:   Limited evaluation of esophagogastric dual mucosa grossly looks normal however there was a large amount of fluid and retained fluid was noticed in the stomach as well as hiatal hernia precluding complete evaluation.  Major ampulla was aligned with the scope, was slightly bulbous with inflammatory  changes.  Cannulation of the ampulla was performed using Hydratome preloaded with a wire initially wire went into the pancreas duct was left in place subsequently second wire was used and with a slight manipulation it was advanced intrahepatically.  Followed by the catheter with opacification of the common bile duct Common bile duct was not significantly dilated barely 7 to 8 mm at the most with multiple filling defect.  Sphincterotomy was performed at 11 to 12 o'clock position using erbe setting.  At this stage, after completion of sphincterotomy 9 to 12 mm balloon was used for clearance of common bile duct with removal of at least 3-4 pigmented dark stone.  Subsequent occlusion cholangiogram did not show any further filling defect bile was draining very freely.  Cystic duct was slightly prominent.  As patient has taken Eliquis yesterday we did not give him indomethacin to prevent any bleeding PD stent was placed to prevent post ERCP pancreatitis.  PD stent was 5 Italian 7 cm    Impression:  1.  Choledocholithiasis status post of sphincterotomy removal of multiple stones as detailed above.  2.  PD stent was placed to prevent post ERCP pancreatitis as patient did not receive indomethacin to prevent any bleeding as patient has been on Eliquis till recently  3.  Significant retained fluid and gastric contents noticed in the stomach precluding complete evaluation.  4.  Hiatal hernia    Recommendations:  Follow liver function test.  Continue antibiotic.  Resume other p.o. medication.  PD stent should migrated out by itself and 6 to 7 days if not we will do EGD to remove the PD stent.      Mariaelena Snyder MD     Date: 6/10/2024    Time: 16:28 EDT

## 2024-06-10 NOTE — PLAN OF CARE
Problem: Adult Inpatient Plan of Care  Goal: Plan of Care Review  Outcome: Ongoing, Progressing  Flowsheets (Taken 6/10/2024 1822)  Progress: no change  Plan of Care Reviewed With:   patient   daughter  Outcome Evaluation: Pt is a new admission, just arroived to floor from [facility]. BP is hypotensive. Dr. Napoles contacted to address  Goal: Patient-Specific Goal (Individualized)  Outcome: Ongoing, Progressing  Goal: Absence of Hospital-Acquired Illness or Injury  Outcome: Ongoing, Progressing  Goal: Optimal Comfort and Wellbeing  Outcome: Ongoing, Progressing  Goal: Readiness for Transition of Care  Outcome: Ongoing, Progressing  Intervention: Mutually Develop Transition Plan  Recent Flowsheet Documentation  Taken 6/10/2024 1807 by Nadiya Jean, RN  Transportation Anticipated: family or friend will provide  Patient/Family Anticipated Services at Transition: none  Patient/Family Anticipates Transition to: home with family  Taken 6/10/2024 1806 by Nadiya Jean, RN  Equipment Currently Used at Home:   walker, rolling   oxygen   nebulizer     Problem: Fall Injury Risk  Goal: Absence of Fall and Fall-Related Injury  Outcome: Ongoing, Progressing     Problem: COPD (Chronic Obstructive Pulmonary Disease) Comorbidity  Goal: Maintenance of COPD Symptom Control  Outcome: Ongoing, Progressing     Problem: Skin Injury Risk Increased  Goal: Skin Health and Integrity  Outcome: Ongoing, Progressing   Goal Outcome Evaluation:  Plan of Care Reviewed With: patient, daughter        Progress: no change  Outcome Evaluation: Pt is a new admission, just arroived to floor from [facility]. BP is hypotensive. Dr. Napoles contacted to address

## 2024-06-11 ENCOUNTER — INPATIENT HOSPITAL (AMBULATORY)
Dept: URBAN - METROPOLITAN AREA HOSPITAL 84 | Facility: HOSPITAL | Age: 84
End: 2024-06-11

## 2024-06-11 DIAGNOSIS — K80.50 CALCULUS OF BILE DUCT WITHOUT CHOLANGITIS OR CHOLECYSTITIS W: ICD-10-CM

## 2024-06-11 LAB
ALBUMIN SERPL-MCNC: 2.8 G/DL (ref 3.5–5.2)
ALBUMIN/GLOB SERPL: 1.1 G/DL
ALP SERPL-CCNC: 222 U/L (ref 39–117)
ALT SERPL W P-5'-P-CCNC: 57 U/L (ref 1–41)
ANION GAP SERPL CALCULATED.3IONS-SCNC: 8.2 MMOL/L (ref 5–15)
ANISOCYTOSIS BLD QL: ABNORMAL
AST SERPL-CCNC: 101 U/L (ref 1–40)
BILIRUB SERPL-MCNC: 2.5 MG/DL (ref 0–1.2)
BUN SERPL-MCNC: 32 MG/DL (ref 8–23)
BUN/CREAT SERPL: 19.4 (ref 7–25)
CALCIUM SPEC-SCNC: 8.1 MG/DL (ref 8.6–10.5)
CHLORIDE SERPL-SCNC: 112 MMOL/L (ref 98–107)
CO2 SERPL-SCNC: 19.8 MMOL/L (ref 22–29)
CREAT SERPL-MCNC: 1.65 MG/DL (ref 0.76–1.27)
DEPRECATED RDW RBC AUTO: 55.3 FL (ref 37–54)
EGFRCR SERPLBLD CKD-EPI 2021: 40.9 ML/MIN/1.73
ERYTHROCYTE [DISTWIDTH] IN BLOOD BY AUTOMATED COUNT: 14.5 % (ref 12.3–15.4)
FOLATE SERPL-MCNC: 19.4 NG/ML (ref 4.78–24.2)
GLOBULIN UR ELPH-MCNC: 2.6 GM/DL
GLUCOSE BLDC GLUCOMTR-MCNC: 113 MG/DL (ref 70–105)
GLUCOSE BLDC GLUCOMTR-MCNC: 138 MG/DL (ref 70–105)
GLUCOSE BLDC GLUCOMTR-MCNC: 95 MG/DL (ref 70–105)
GLUCOSE SERPL-MCNC: 105 MG/DL (ref 65–99)
HAPTOGLOB SERPL-MCNC: 208 MG/DL (ref 30–200)
HCT VFR BLD AUTO: 29 % (ref 37.5–51)
HGB BLD-MCNC: 8.8 G/DL (ref 13–17.7)
LIPASE SERPL-CCNC: 19 U/L (ref 13–60)
LYMPHOCYTES # BLD MANUAL: 0.75 10*3/MM3 (ref 0.7–3.1)
LYMPHOCYTES NFR BLD MANUAL: 2 % (ref 5–12)
MACROCYTES BLD QL SMEAR: ABNORMAL
MCH RBC QN AUTO: 31.4 PG (ref 26.6–33)
MCHC RBC AUTO-ENTMCNC: 30.3 G/DL (ref 31.5–35.7)
MCV RBC AUTO: 103.6 FL (ref 79–97)
MONOCYTES # BLD: 0.25 10*3/MM3 (ref 0.1–0.9)
NEUTROPHILS # BLD AUTO: 11.56 10*3/MM3 (ref 1.7–7)
NEUTROPHILS NFR BLD MANUAL: 79 % (ref 42.7–76)
NEUTS BAND NFR BLD MANUAL: 13 % (ref 0–5)
PLATELET # BLD AUTO: 101 10*3/MM3 (ref 140–450)
PMV BLD AUTO: 12.1 FL (ref 6–12)
POLYCHROMASIA BLD QL SMEAR: ABNORMAL
POTASSIUM SERPL-SCNC: 5.1 MMOL/L (ref 3.5–5.2)
PROT SERPL-MCNC: 5.4 G/DL (ref 6–8.5)
QT INTERVAL: 437 MS
QTC INTERVAL: 522 MS
RBC # BLD AUTO: 2.8 10*6/MM3 (ref 4.14–5.8)
SCAN SLIDE: NORMAL
SMALL PLATELETS BLD QL SMEAR: ABNORMAL
SODIUM SERPL-SCNC: 140 MMOL/L (ref 136–145)
VARIANT LYMPHS NFR BLD MANUAL: 6 % (ref 19.6–45.3)
VIT B12 BLD-MCNC: 1017 PG/ML (ref 211–946)
WBC MORPH BLD: NORMAL
WBC NRBC COR # BLD AUTO: 12.57 10*3/MM3 (ref 3.4–10.8)

## 2024-06-11 PROCEDURE — 83010 ASSAY OF HAPTOGLOBIN QUANT: CPT | Performed by: INTERNAL MEDICINE

## 2024-06-11 PROCEDURE — 25010000002 CEFEPIME PER 500 MG: Performed by: INTERNAL MEDICINE

## 2024-06-11 PROCEDURE — 94664 DEMO&/EVAL PT USE INHALER: CPT

## 2024-06-11 PROCEDURE — 82746 ASSAY OF FOLIC ACID SERUM: CPT | Performed by: INTERNAL MEDICINE

## 2024-06-11 PROCEDURE — 82607 VITAMIN B-12: CPT | Performed by: INTERNAL MEDICINE

## 2024-06-11 PROCEDURE — 25010000002 METRONIDAZOLE 500 MG/100ML SOLUTION: Performed by: INTERNAL MEDICINE

## 2024-06-11 PROCEDURE — 83690 ASSAY OF LIPASE: CPT | Performed by: INTERNAL MEDICINE

## 2024-06-11 PROCEDURE — 82948 REAGENT STRIP/BLOOD GLUCOSE: CPT | Performed by: INTERNAL MEDICINE

## 2024-06-11 PROCEDURE — 82948 REAGENT STRIP/BLOOD GLUCOSE: CPT

## 2024-06-11 PROCEDURE — 85007 BL SMEAR W/DIFF WBC COUNT: CPT | Performed by: INTERNAL MEDICINE

## 2024-06-11 PROCEDURE — 85025 COMPLETE CBC W/AUTO DIFF WBC: CPT | Performed by: INTERNAL MEDICINE

## 2024-06-11 PROCEDURE — 99232 SBSQ HOSP IP/OBS MODERATE 35: CPT | Mod: FS | Performed by: NURSE PRACTITIONER

## 2024-06-11 PROCEDURE — 99232 SBSQ HOSP IP/OBS MODERATE 35: CPT | Performed by: SURGERY

## 2024-06-11 PROCEDURE — 25010000002 CEFTRIAXONE PER 250 MG: Performed by: NURSE PRACTITIONER

## 2024-06-11 PROCEDURE — 25810000003 DEXTROSE 5 % AND SODIUM CHLORIDE 0.9 % 5-0.9 % SOLUTION: Performed by: ANESTHESIOLOGY

## 2024-06-11 PROCEDURE — 94799 UNLISTED PULMONARY SVC/PX: CPT

## 2024-06-11 PROCEDURE — 80053 COMPREHEN METABOLIC PANEL: CPT | Performed by: INTERNAL MEDICINE

## 2024-06-11 PROCEDURE — 94761 N-INVAS EAR/PLS OXIMETRY MLT: CPT

## 2024-06-11 RX ORDER — DEXTROSE MONOHYDRATE AND SODIUM CHLORIDE 5; .45 G/100ML; G/100ML
75 INJECTION, SOLUTION INTRAVENOUS CONTINUOUS
Status: DISCONTINUED | OUTPATIENT
Start: 2024-06-11 | End: 2024-06-11

## 2024-06-11 RX ADMIN — PANTOPRAZOLE SODIUM 40 MG: 40 INJECTION, POWDER, FOR SOLUTION INTRAVENOUS at 17:06

## 2024-06-11 RX ADMIN — Medication 10 ML: at 09:23

## 2024-06-11 RX ADMIN — METRONIDAZOLE 500 MG: 500 INJECTION, SOLUTION INTRAVENOUS at 09:23

## 2024-06-11 RX ADMIN — MIDODRINE HYDROCHLORIDE 5 MG: 5 TABLET ORAL at 07:28

## 2024-06-11 RX ADMIN — PANTOPRAZOLE SODIUM 40 MG: 40 INJECTION, POWDER, FOR SOLUTION INTRAVENOUS at 07:28

## 2024-06-11 RX ADMIN — DEXTROSE MONOHYDRATE AND SODIUM CHLORIDE 125 ML/HR: 5; .9 INJECTION, SOLUTION INTRAVENOUS at 06:46

## 2024-06-11 RX ADMIN — CEFEPIME 2000 MG: 2 INJECTION, POWDER, FOR SOLUTION INTRAVENOUS at 09:23

## 2024-06-11 RX ADMIN — IPRATROPIUM BROMIDE AND ALBUTEROL SULFATE 3 ML: .5; 3 SOLUTION RESPIRATORY (INHALATION) at 07:34

## 2024-06-11 RX ADMIN — IPRATROPIUM BROMIDE AND ALBUTEROL SULFATE 3 ML: .5; 3 SOLUTION RESPIRATORY (INHALATION) at 10:35

## 2024-06-11 RX ADMIN — IPRATROPIUM BROMIDE AND ALBUTEROL SULFATE 3 ML: .5; 3 SOLUTION RESPIRATORY (INHALATION) at 14:40

## 2024-06-11 RX ADMIN — METRONIDAZOLE 500 MG: 500 INJECTION, SOLUTION INTRAVENOUS at 17:07

## 2024-06-11 RX ADMIN — METRONIDAZOLE 500 MG: 500 INJECTION, SOLUTION INTRAVENOUS at 01:13

## 2024-06-11 RX ADMIN — Medication 10 ML: at 19:41

## 2024-06-11 RX ADMIN — CEFTRIAXONE 2000 MG: 2 INJECTION, POWDER, FOR SOLUTION INTRAMUSCULAR; INTRAVENOUS at 19:41

## 2024-06-11 RX ADMIN — DEXTROSE AND SODIUM CHLORIDE 75 ML/HR: 5; 450 INJECTION, SOLUTION INTRAVENOUS at 15:57

## 2024-06-11 RX ADMIN — MIDODRINE HYDROCHLORIDE 5 MG: 5 TABLET ORAL at 17:06

## 2024-06-11 RX ADMIN — MIDODRINE HYDROCHLORIDE 5 MG: 5 TABLET ORAL at 10:41

## 2024-06-11 NOTE — CONSULTS
Infectious Diseases Consult Note    Referring Provider: Pat Napoles MD    Reason for Consultation: Bacteremia    Patient Care Team:  Guilherme Jason MD as PCP - General (Family Medicine)  Misty Dias MD as Consulting Physician (Cardiology)  Nora Kenyon DNP, APRN as Nurse Practitioner (Thoracic Surgery)  Sussy Shane MD as Consulting Physician (Nephrology)    Chief complaint fever, chills, abdominal pain    Subjective     History of present illness:      This is an 84-year-old male presents to the hospital on 6/10/2024 with complaints of abdominal pain and fever and chills.  Patient is status post ERCP on 1624 with clearance of CBD stone and stent placement.  Feels better today with less fever and chills, has chronic shortness of breath and is normally on 2 L of oxygen by nasal cannula.  Denies productive cough, abdominal pain is better and denies other GI symptoms or urinary symptoms.    Review of Systems   Review of Systems   Constitutional:  Positive for fatigue.   HENT: Negative.     Eyes: Negative.    Respiratory: Negative.     Cardiovascular: Negative.    Gastrointestinal:  Positive for abdominal pain.   Endocrine: Negative.    Genitourinary: Negative.    Musculoskeletal: Negative.    Skin: Negative.    Neurological: Negative.    Psychiatric/Behavioral: Negative.     All other systems reviewed and are negative.      Medications  Medications Prior to Admission   Medication Sig Dispense Refill Last Dose    apixaban (ELIQUIS) 2.5 MG tablet tablet Take 1 tablet by mouth 2 (Two) Times a Day. 60 tablet 11 6/9/2024    acetaminophen (TYLENOL) 325 MG tablet Take 1 tablet by mouth Every 6 (Six) Hours As Needed for Mild Pain.       fenofibrate 160 MG tablet Take 1 tablet by mouth Daily.       ferrous sulfate 325 (65 FE) MG tablet Take 1 tablet by mouth Daily With Breakfast. 90 tablet 1     hydroxychloroquine (PLAQUENIL) 200 MG tablet Take 2 tablets by mouth Daily.  0     ipratropium-albuterol  (DUO-NEB) 0.5-2.5 mg/3 ml nebulizer Take 3 mL by nebulization Every 4 (Four) Hours As Needed for Wheezing.       losartan (COZAAR) 100 MG tablet Take 1 tablet by mouth Daily.       midodrine (PROAMATINE) 5 MG tablet Take 1 tablet by mouth 3 (Three) Times a Day Before Meals. 90 tablet 1     Multiple Vitamins-Minerals (MULTIVITAMIN WITH MINERALS) tablet tablet Take 1 tablet by mouth Daily.       oxybutynin XL (DITROPAN-XL) 10 MG 24 hr tablet Take 1 tablet by mouth Daily.          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    Neutropenia 11/2003    MITZI (obstructive sleep apnea) 2018    Psoriasis 2000    Renal cyst, left     Rheumatoid arthritis     Stroke (cerebrum)     Thrombocytopenia 08/23/2005     Past Surgical History:   Procedure Laterality Date    ENDOSCOPY N/A 4/26/2024    Procedure: ESOPHAGOGASTRODUODENOSCOPY WITH DILATION (#46 BOUGIE);  Surgeon: Gamal Yancey MD;  Location: [facility] ENDOSCOPY;  Service: Gastroenterology;  Laterality: N/A;  DUODENAL DIVERTICULUM, HIATAL HERNIA, DISTAL ESOPHAGEAL STRICTURE    ERCP N/A 6/10/2024    Procedure: ENDOSCOPIC RETROGRADE CHOLANGIOPANCREATOGRAPHY, sphincterotomy, balloon clearance of commonn bile duct (9-12mm), stone extraction, pancreatic stent placement;  Surgeon: Mariaelena Snyder MD;  Location: [facility] ENDOSCOPY;  Service: Gastroenterology;  Laterality: N/A;  post: choledocholithiasis    SKIN LESION EXCISION       back and groin-benign       Family History  Family History   Problem Relation Age of Onset    Lung cancer Brother         age unknown    Heart disease Brother     Stroke Mother     Heart disease Father     Heart disease Sister        Social History   reports that he quit smoking about 29 years ago. His smoking use included cigarettes. He has never been exposed to tobacco smoke. He has never used smokeless tobacco. He reports that he does not drink alcohol and does not use drugs.    Allergies  Penicillin v potassium and Latex    Objective     Vital Signs   Vital Signs (last 24 hours)         06/10 0700  06/11 0659 06/11 0700  06/11 1835   Most Recent      Temp (°F) 97.1 -  102    97.5 -  98.5     97.5 (36.4) 06/11 1632    Heart Rate 46 -  129    45 -  79     54 06/11 1632    Resp 22 -  38    16 -  30     27 06/11 1632    BP 82/44 -  170/67    101/43 -  130/62     120/46 06/11 1632    SpO2 (%) 92 -  100    92 -  100     99 06/11 1440    Flow (L/min) 3 -  8      2     2 06/11 1632    Oxygen Concentration (%)   32                    Physical Exam:  Physical Exam  Vitals and nursing note reviewed.   Constitutional:       General: He is not in acute distress.     Appearance: He is well-developed and normal weight. He is ill-appearing. He is not diaphoretic.   HENT:      Head: Normocephalic and atraumatic.   Eyes:      Conjunctiva/sclera: Conjunctivae normal.      Pupils: Pupils are equal, round, and reactive to light.   Cardiovascular:      Rate and Rhythm: Normal rate and regular rhythm.      Heart sounds: Normal heart sounds, S1 normal and S2 normal.   Pulmonary:      Effort: Pulmonary effort is normal. No respiratory distress.      Breath sounds: Normal breath sounds. No stridor. No wheezing or rales.   Abdominal:      General: Bowel sounds are normal. There is no distension.      Palpations: Abdomen is soft. There is no mass.      Tenderness: There is abdominal tenderness. There is no guarding.   Musculoskeletal:          General: No deformity.      Cervical back: Neck supple.   Skin:     General: Skin is warm and dry.      Coloration: Skin is not pale.      Findings: No erythema or rash.   Neurological:      Mental Status: He is alert and oriented to person, place, and time.   Psychiatric:         Mood and Affect: Mood normal.         Microbiology  Microbiology Results (last 10 days)       Procedure Component Value - Date/Time    Respiratory Panel PCR w/COVID-19(SARS-CoV-2) KEN/HUBERT/CHIKA/PAD/COR/FLORECITA In-House, NP Swab in UTM/VTM, 2 HR TAT - Swab, Nasopharynx [[id number]]  (Normal) Collected: 06/10/24 0527    Lab Status: Final result Specimen: Swab from Nasopharynx Updated: 06/10/24 0617     ADENOVIRUS, PCR Not Detected     Coronavirus 229E Not Detected     Coronavirus HKU1 Not Detected     Coronavirus NL63 Not Detected     Coronavirus OC43 Not Detected     COVID19 Not Detected     Human Metapneumovirus Not Detected     Human Rhinovirus/Enterovirus Not Detected     Influenza A PCR Not Detected     Influenza B PCR Not Detected     Parainfluenza Virus 1 Not Detected     Parainfluenza Virus 2 Not Detected     Parainfluenza Virus 3 Not Detected     Parainfluenza Virus 4 Not Detected     RSV, PCR Not Detected     Bordetella pertussis pcr Not Detected     Bordetella parapertussis PCR Not Detected     Chlamydophila pneumoniae PCR Not Detected     Mycoplasma pneumo by PCR Not Detected    Narrative:      In the setting of a positive respiratory panel with a viral infection PLUS a negative procalcitonin without other underlying concern for bacterial infection, consider observing off antibiotics or discontinuation of antibiotics and continue supportive care. If the respiratory panel is positive for atypical bacterial infection (Bordetella pertussis, Chlamydophila pneumoniae, or Mycoplasma pneumoniae), consider antibiotic de-escalation to target atypical bacterial infection.    Blood Culture - Blood, Arm, Left [[id number]]  (Abnormal) Collected:  06/10/24 0524    Lab Status: Preliminary result Specimen: Blood from Arm, Left Updated: 06/11/24 0236     Blood Culture Abnormal Stain     Gram Stain Aerobic Bottle Gram negative bacilli    Narrative:      Less than seven (7) mL's of blood was collected.  Insufficient quantity may yield false negative results.    Blood Culture - Blood, Arm, Right [[id number]]  (Abnormal) Collected: 06/10/24 0524    Lab Status: Preliminary result Specimen: Blood from Arm, Right Updated: 06/11/24 0828     Blood Culture Escherichia coli     Isolated from Aerobic and Anaerobic Bottles     Gram Stain Aerobic Bottle Gram negative bacilli      Anaerobic Bottle Gram negative bacilli    Blood Culture ID, PCR - Blood, Arm, Right [[id number]]  (Abnormal) Collected: 06/10/24 0524    Lab Status: Final result Specimen: Blood from Arm, Right Updated: 06/10/24 1957     BCID, PCR Escherichia coli. Identification by BCID2 PCR.     BOTTLE TYPE Aerobic Bottle    Narrative:      No resistance genes detected.            Laboratory  Results from last 7 days   Lab Units 06/11/24  0110   WBC 10*3/mm3 12.57*   HEMOGLOBIN g/dL 8.8*   HEMATOCRIT % 29.0*   PLATELETS 10*3/mm3 101*     Results from last 7 days   Lab Units 06/11/24  0110   SODIUM mmol/L 140   POTASSIUM mmol/L 5.1   CHLORIDE mmol/L 112*   CO2 mmol/L 19.8*   BUN mg/dL 32*   CREATININE mg/dL 1.65*   GLUCOSE mg/dL 105*   CALCIUM mg/dL 8.1*     Results from last 7 days   Lab Units 06/11/24  0110   SODIUM mmol/L 140   POTASSIUM mmol/L 5.1   CHLORIDE mmol/L 112*   CO2 mmol/L 19.8*   BUN mg/dL 32*   CREATININE mg/dL 1.65*   GLUCOSE mg/dL 105*   CALCIUM mg/dL 8.1*                   Radiology  Imaging Results (Last 72 Hours)       Procedure Component Value Units Date/Time    FL ERCP pancreatic and biliary ducts [[id number]] Resulted: 06/10/24 1632     Updated: 06/10/24 1634    CT Abdomen Pelvis With Contrast [[id number]] Collected: 06/10/24 0705     Updated: 06/10/24 0720    Narrative:      CT ABDOMEN PELVIS W  CONTRAST    Date of Exam: 6/10/2024 6:37 AM EDT    Indication: lower abdominal pain.    Comparison: CT of the abdomen and pelvis dated 4/24/2024    Technique: Axial CT images were obtained of the abdomen and pelvis following the uneventful intravenous administration of iodinated contrast. Sagittal and coronal reconstructions were performed.  Automated exposure control and iterative reconstruction   methods were used.      Findings:    Liver: The liver is unremarkable in morphology. No focal liver lesion is seen. There is mild biliary dilation. There are tiny stones within the distal CBD (series 3, image 55).    Gallbladder: Gallbladder is distended and contains multiple small gallstones.    Pancreas: Unremarkable.    Spleen: Multiple tiny splenic granulomas.    Adrenal glands: Unremarkable.    Genitourinary tract: Several subcentimeter left renal hypodensities are too small to characterize. The kidneys are otherwise unremarkable. No hydronephrosis is seen. The visualized portions of the ureters and urinary bladder appear unremarkable. There   are surgical changes of the prostate gland.    Gastrointestinal tract: Colonic diverticulosis is present. The hollow viscera appear otherwise unremarkable. There is no evidence of bowel obstruction.    Appendix: No findings to suggest acute appendicitis.    Other findings: No free air or free fluid is identified. No pathologically enlarged lymph nodes are seen. Vascular calcifications are seen. The IVC is unremarkable.    Bones and soft tissues: No acute or suspicious osseous or soft tissue lesion is identified.    Lung bases: Partially visualized small bilateral pleural effusions with bibasilar atelectasis or scarring.      Impression:      Impression:  1.Cholelithiasis and obstructive choledocholithiasis with mild biliary dilation.  2.Colonic diverticulosis.  3.Small bilateral pleural effusions, partially visualized.  4.Additional findings as detailed  above.      Electronically Signed: Benton Soto MD    6/10/2024 7:18 AM EDT    Workstation ID: [id number]    XR Chest 1 View [[id number]] Collected: 06/10/24 0605     Updated: 06/10/24 0610    Narrative:      XR CHEST 1 VW    Date of Exam: 6/10/2024 5:37 AM EDT    Indication: soa    Comparison: Chest radiograph 4/24/2024 at 8:37 a.m.    Findings:  The heart is enlarged. There are bilateral pleural effusions. There is bilateral basilar atelectasis. There is pleural scarring. The overall appearance of the chest is similar prior study.      Impression:      Impression:  Cardiomegaly with bilateral pleural effusions and basilar atelectasis.        Electronically Signed: Edgar Khan MD    6/10/2024 6:08 AM EDT    Workstation ID: [id number]            Cardiology      Results Review:  I have reviewed all clinical data, test, lab, and imaging results.       Schedule Meds  cefepime, 2,000 mg, Intravenous, Q24H  ipratropium-albuterol, 3 mL, Nebulization, 4x Daily - RT  metroNIDAZOLE, 500 mg, Intravenous, Q8H  midodrine, 5 mg, Oral, TID AC  pantoprazole, 40 mg, Intravenous, BID AC  sodium chloride, 10 mL, Intravenous, Q12H        Infusion Meds       PRN Meds    acetaminophen    Calcium Replacement - Follow Nurse / BPA Driven Protocol    dextrose    Magnesium Standard Dose Replacement - Follow Nurse / BPA Driven Protocol    nitroglycerin    ondansetron    Phosphorus Replacement - Follow Nurse / BPA Driven Protocol    Potassium Replacement - Follow Nurse / BPA Driven Protocol    sodium chloride    sodium chloride    sodium chloride    sodium chloride      Assessment & Plan       Assessment    E. coli bacteremia and both blood culture sets from admission.  Most likely source is biliary sepsis.  Urinalysis was unremarkable.  Patient had a ERCP on 6/10/2024 with clearance of CBD stone and pancreatic stent placement.    Cholangitis/choledocholithiasis.  Both GI and general surgery are following.  Possible cholecystectomy in the near  future    Febrile illness-likely related to the above.  COVID-19 screen and respiratory viral DNA panel are negative.  Fevers are resolving    Acute kidney injury    Elevated liver transaminase and hyperbilirubinemia-likely related to biliary sepsis    History of PE    COPD-currently on 2 L of oxygen by nasal cannula which is his baseline    Plan    Discontinue IV cefepime and IV Flagyl  Start IV Rocephin 2 g every 24 hours for 13 days for 2 weeks of treatment for the bacteremia  Waiting for blood cultures to finalize  Continue supportive care  Ronnie labs Case discussed with pharmacy      Cheri Hernandes, AZEEM  06/11/24  18:35 EDT    Note is dictated utilizing voice recognition software/Dragon

## 2024-06-11 NOTE — PROGRESS NOTES
LOS: 1 day   Patient Care Team:  Guilherme Jason MD as PCP - General (Family Medicine)  Misty Dias MD as Consulting Physician (Cardiology)  Nora Kenyon, BRIAN, APRN as Nurse Practitioner (Thoracic Surgery)  Sussy Shane MD as Consulting Physician (Nephrology)      Subjective     Interval History:     Subjective: Patient reports that he is feeling better today.  Only mild RUQ pain with deep palpation.  No nausea/vomiting.      ROS:   No chest pain, shortness of breath, or cough.        Medication Review:     Current Facility-Administered Medications:     acetaminophen (TYLENOL) suppository 650 mg, 650 mg, Rectal, Q4H PRN, Pat Napoles MD, 650 mg at 06/10/24 0958    Calcium Replacement - Follow Nurse / BPA Driven Protocol, , Does not apply, PRNDony Kay, MD    cefepime 2000 mg IVPB in 100 mL NS (MBP), 2,000 mg, Intravenous, Q24H, Pat Napoles MD, 2,000 mg at 06/11/24 0923    dextrose (D50W) (25 g/50 mL) IV injection 50 mL, 50 mL, Intravenous, Q1H PRN, Pat Napoles MD, 50 mL at 06/10/24 0712    dextrose 5 % and sodium chloride 0.9 % infusion, 125 mL/hr, Intravenous, Continuous, Thelma Vaughan MD, Last Rate: 125 mL/hr at 06/11/24 0646, 125 mL/hr at 06/11/24 0646    ipratropium-albuterol (DUO-NEB) nebulizer solution 3 mL, 3 mL, Nebulization, 4x Daily - RT, Thelma Vaughan MD, 3 mL at 06/11/24 1035    Magnesium Standard Dose Replacement - Follow Nurse / BPA Driven Protocol, , Does not apply, PRN, Pat Napoles MD    metroNIDAZOLE (FLAGYL) IVPB 500 mg, 500 mg, Intravenous, Q8H, Pat Napoles MD, Last Rate: 200 mL/hr at 06/11/24 0923, 500 mg at 06/11/24 0923    midodrine (PROAMATINE) tablet 5 mg, 5 mg, Oral, TID AC, Pat Napoles MD, 5 mg at 06/11/24 0728    nitroglycerin (NITROSTAT) SL tablet 0.4 mg, 0.4 mg, Sublingual, Q5 Min PRN, Pat Napoles MD    ondansetron (ZOFRAN) injection 4 mg, 4 mg, Intravenous, Q6H PRN, Pat Napoles MD    pantoprazole (PROTONIX) injection 40 mg, 40 mg, Intravenous,  BID AC, Pat Napoles MD, 40 mg at 06/11/24 0728    Phosphorus Replacement - Follow Nurse / BPA Driven Protocol, , Does not apply, Dony BUENROSTRO Kay, MD    Potassium Replacement - Follow Nurse / BPA Driven Protocol, , Does not apply, Dony BUENROSTRO Kay, MD    sodium chloride 0.9 % flush 10 mL, 10 mL, Intravenous, PRN, Pat Napoles MD    sodium chloride 0.9 % flush 10 mL, 10 mL, Intravenous, PRN, Pat Napoles MD    sodium chloride 0.9 % flush 10 mL, 10 mL, Intravenous, Q12H, Pat Napoles MD, 10 mL at 06/11/24 0923    sodium chloride 0.9 % flush 10 mL, 10 mL, Intravenous, PRNDony Kay, MD    sodium chloride 0.9 % infusion 40 mL, 40 mL, Intravenous, PRCELSO, Pat Napoles MD    sodium chloride 0.9 % infusion, 100 mL/hr, Intravenous, Continuous, Pat Napoles MD, Stopped at 06/10/24 1352      Objective     Vital Signs  Vitals:    06/11/24 0726 06/11/24 0734 06/11/24 0738 06/11/24 0825   BP: 109/48   101/43   BP Location: Left arm   Left arm   Patient Position: Lying   Lying   Pulse: 51 (!) 48 (!) 45 52   Resp:  20 20 (!) 30   Temp:    98.5 °F (36.9 °C)   TempSrc:    Oral   SpO2: 99% 100% 100% 98%   Weight:       Height:           Physical Exam:     General Appearance:    Awake and alert, in no acute distress   Head:    Normocephalic, without obvious abnormality   Eyes:          Conjunctivae normal, anicteric sclera   Throat:   No oral lesions, no thrush, oral mucosa moist   Neck:   No adenopathy, supple, no JVD   Lungs:     respirations regular, even and unlabored   Abdomen:     Soft, mild RUQ tenderness, no rebound or guarding, non-distended   Rectal:     Deferred   Extremities:   No edema, no cyanosis   Skin:   No bruising or rash, no jaundice        Results Review:    CBC    Results from last 7 days   Lab Units 06/11/24  0110 06/10/24  0524   WBC 10*3/mm3 12.57* 6.19   HEMOGLOBIN g/dL 8.8* 8.9*   PLATELETS 10*3/mm3 101* 131*     CMP   Results from last 7 days   Lab Units 06/11/24  0110 06/10/24  0524   SODIUM mmol/L 140  142   POTASSIUM mmol/L 5.1 4.7   CHLORIDE mmol/L 112* 109*   CO2 mmol/L 19.8* 19.9*   BUN mg/dL 32* 26*   CREATININE mg/dL 1.65* 1.20   GLUCOSE mg/dL 105* 53*   ALBUMIN g/dL 2.8* 3.4*   BILIRUBIN mg/dL 2.5* 1.6*   ALK PHOS U/L 222* 356*   AST (SGOT) U/L 101* 144*   ALT (SGPT) U/L 57* 51*   LIPASE U/L 19 32     Cr Clearance Estimated Creatinine Clearance: 35.6 mL/min (A) (by C-G formula based on SCr of 1.65 mg/dL (H)).  Coag   Results from last 7 days   Lab Units 06/10/24  0524   INR  1.15*     HbA1C   Lab Results   Component Value Date    HGBA1C 4.70 (L) 04/23/2024    HGBA1C 6.1 (H) 09/11/2019    HGBA1C 5.9 (H) 09/10/2019     Results from last 7 days   Lab Units 06/10/24  0727 06/10/24  0524   HSTROP T ng/L 107* 89*     Infection   Results from last 7 days   Lab Units 06/10/24  0524   BLOODCX  Escherichia coli*  Abnormal Stain*   BCIDPCR  Escherichia coli. Identification by BCID2 PCR.*   PROCALCITONIN ng/mL 0.38*     UA    Results from last 7 days   Lab Units 06/10/24  0533   NITRITE UA  Negative   WBC UA /HPF 3-5*   BACTERIA UA /HPF None Seen   SQUAM EPITHEL UA /HPF 3-6*     Microbiology Results (last 10 days)       Procedure Component Value - Date/Time    Respiratory Panel PCR w/COVID-19(SARS-CoV-2) KEN/HUBERT/CHIKA/PAD/COR/FLORECITA In-House, NP Swab in UTM/VTM, 2 HR TAT - Swab, Nasopharynx [[id number]]  (Normal) Collected: 06/10/24 0527    Lab Status: Final result Specimen: Swab from Nasopharynx Updated: 06/10/24 0617     ADENOVIRUS, PCR Not Detected     Coronavirus 229E Not Detected     Coronavirus HKU1 Not Detected     Coronavirus NL63 Not Detected     Coronavirus OC43 Not Detected     COVID19 Not Detected     Human Metapneumovirus Not Detected     Human Rhinovirus/Enterovirus Not Detected     Influenza A PCR Not Detected     Influenza B PCR Not Detected     Parainfluenza Virus 1 Not Detected     Parainfluenza Virus 2 Not Detected     Parainfluenza Virus 3 Not Detected     Parainfluenza Virus 4 Not Detected     RSV, PCR  Not Detected     Bordetella pertussis pcr Not Detected     Bordetella parapertussis PCR Not Detected     Chlamydophila pneumoniae PCR Not Detected     Mycoplasma pneumo by PCR Not Detected    Narrative:      In the setting of a positive respiratory panel with a viral infection PLUS a negative procalcitonin without other underlying concern for bacterial infection, consider observing off antibiotics or discontinuation of antibiotics and continue supportive care. If the respiratory panel is positive for atypical bacterial infection (Bordetella pertussis, Chlamydophila pneumoniae, or Mycoplasma pneumoniae), consider antibiotic de-escalation to target atypical bacterial infection.    Blood Culture - Blood, Arm, Left [[id number]]  (Abnormal) Collected: 06/10/24 0524    Lab Status: Preliminary result Specimen: Blood from Arm, Left Updated: 06/11/24 0236     Blood Culture Abnormal Stain     Gram Stain Aerobic Bottle Gram negative bacilli    Narrative:      Less than seven (7) mL's of blood was collected.  Insufficient quantity may yield false negative results.    Blood Culture - Blood, Arm, Right [[id number]]  (Abnormal) Collected: 06/10/24 0524    Lab Status: Preliminary result Specimen: Blood from Arm, Right Updated: 06/11/24 0828     Blood Culture Escherichia coli     Isolated from Aerobic and Anaerobic Bottles     Gram Stain Aerobic Bottle Gram negative bacilli      Anaerobic Bottle Gram negative bacilli    Blood Culture ID, PCR - Blood, Arm, Right [[id number]]  (Abnormal) Collected: 06/10/24 0524    Lab Status: Final result Specimen: Blood from Arm, Right Updated: 06/10/24 1957     BCID, PCR Escherichia coli. Identification by BCID2 PCR.     BOTTLE TYPE Aerobic Bottle    Narrative:      No resistance genes detected.          Imaging Results (Last 72 Hours)       Procedure Component Value Units Date/Time    FL ERCP pancreatic and biliary ducts [[id number]] Resulted: 06/10/24 1632     Updated: 06/10/24 1634    CT Abdomen  Pelvis With Contrast [[id number]] Collected: 06/10/24 0705     Updated: 06/10/24 0720    Narrative:      CT ABDOMEN PELVIS W CONTRAST    Date of Exam: 6/10/2024 6:37 AM EDT    Indication: lower abdominal pain.    Comparison: CT of the abdomen and pelvis dated 4/24/2024    Technique: Axial CT images were obtained of the abdomen and pelvis following the uneventful intravenous administration of iodinated contrast. Sagittal and coronal reconstructions were performed.  Automated exposure control and iterative reconstruction   methods were used.      Findings:    Liver: The liver is unremarkable in morphology. No focal liver lesion is seen. There is mild biliary dilation. There are tiny stones within the distal CBD (series 3, image 55).    Gallbladder: Gallbladder is distended and contains multiple small gallstones.    Pancreas: Unremarkable.    Spleen: Multiple tiny splenic granulomas.    Adrenal glands: Unremarkable.    Genitourinary tract: Several subcentimeter left renal hypodensities are too small to characterize. The kidneys are otherwise unremarkable. No hydronephrosis is seen. The visualized portions of the ureters and urinary bladder appear unremarkable. There   are surgical changes of the prostate gland.    Gastrointestinal tract: Colonic diverticulosis is present. The hollow viscera appear otherwise unremarkable. There is no evidence of bowel obstruction.    Appendix: No findings to suggest acute appendicitis.    Other findings: No free air or free fluid is identified. No pathologically enlarged lymph nodes are seen. Vascular calcifications are seen. The IVC is unremarkable.    Bones and soft tissues: No acute or suspicious osseous or soft tissue lesion is identified.    Lung bases: Partially visualized small bilateral pleural effusions with bibasilar atelectasis or scarring.      Impression:      Impression:  1.Cholelithiasis and obstructive choledocholithiasis with mild biliary dilation.  2.Colonic  diverticulosis.  3.Small bilateral pleural effusions, partially visualized.  4.Additional findings as detailed above.      Electronically Signed: Benton Soto MD    6/10/2024 7:18 AM EDT    Workstation ID: [id number]    XR Chest 1 View [[id number]] Collected: 06/10/24 0605     Updated: 06/10/24 0610    Narrative:      XR CHEST 1 VW    Date of Exam: 6/10/2024 5:37 AM EDT    Indication: soa    Comparison: Chest radiograph 4/24/2024 at 8:37 a.m.    Findings:  The heart is enlarged. There are bilateral pleural effusions. There is bilateral basilar atelectasis. There is pleural scarring. The overall appearance of the chest is similar prior study.      Impression:      Impression:  Cardiomegaly with bilateral pleural effusions and basilar atelectasis.        Electronically Signed: Edgar Khan MD    6/10/2024 6:08 AM EDT    Workstation ID: [id number]            Assessment & Plan     ASSESSMENT:  -Choledocholithiasis  -Cholangitis  -Elevated LFTs -due to above  -Macrocytic anemia  -History of PE on Eliquis  -ITP  -COPD  -CKD  -Diabetes  -Hypertension  -History of CVA  -Psoriasis     PLAN:  Patient is an 84-year-old male with history of PE on Eliquis, ITP, COPD, and CVA who presented on 6/10 with complaints of abdominal pain and nausea/vomiting. CT abdomen/pelvis W on admission shows cholelithiasis and obstructive choledocholithiasis with mild biliary dilation.     Patient reports that he is feeling much better today.  Only very mild right upper quadrant pain with deep palpation.  No nausea/vomiting.  S/p ERCP yesterday showing choledocholithiasis s/p sphincterotomy and balloon clearance of the bile duct.  PD stent placed.  Patient will need KUB in 6 to 7 days to ensure migration of the PD stent.  Total bilirubin 2.5 from 1.6, alk phos 222 from 356,  from 144, ALT 57 from 51.  Lipase normal.   WBC count 12.57 from 6.19.  Continue antibiotics.  Hemoglobin stable at 8.8 from 8.9.  Continue to monitor H/H and transfuse as  needed.  No obvious source of GI blood loss on EGD/ERCP yesterday.  B12, folate, and haptoglobin normal.  Continue oral iron supplementation.  General surgery following and are planning cholecystectomy in the near future.  Not much else to add from a GI standpoint as an inpatient at this time.  Patient can follow-up in our office in 6 to 8 weeks.  GI will be available as needed.      Electronically signed by AZEEM Nugent, 06/11/24, 10:37 AM EDT.

## 2024-06-11 NOTE — PROGRESS NOTES
LOS: 1 day   Patient Care Team:  Guilherme aJson MD as PCP - General (Family Medicine)  Misty Dias MD as Consulting Physician (Cardiology)  Nora Kenyon, BRIAN, APRN as Nurse Practitioner (Thoracic Surgery)  Sussy Shane MD as Consulting Physician (Nephrology)    Subjective     Interval History: s/p ERCP, sphincterectomy, PD stent placement     Patient Complaints: mild RUQ pain     History taken from: patient    Review of Systems   Constitutional:  Negative for fever.   Respiratory:  Negative for chest tightness and shortness of breath.    Cardiovascular:  Negative for chest pain.   Gastrointestinal:  Positive for nausea and vomiting. Negative for abdominal pain, blood in stool, constipation and diarrhea.   Neurological:  Positive for weakness.   Psychiatric/Behavioral:  Negative for confusion.            Objective     Vital Signs  Temp:  [97.1 °F (36.2 °C)-98.7 °F (37.1 °C)] 98.5 °F (36.9 °C)  Heart Rate:  [] 54  Resp:  [20-38] 22  BP: ()/(34-50) 116/44    Physical Exam:     General Appearance:    Alert, cooperative, in no acute distress,   Head:    Normocephalic, without obvious abnormality, atraumatic   Eyes:            Lids and lashes normal, conjunctivae and sclerae normal, no   icterus, no pallor, corneas clear, PERRLA   Ears:    Ears appear intact with no abnormalities noted   Throat:   No oral lesions, no thrush, oral mucosa moist   Neck:   No adenopathy, supple, trachea midline, no thyromegaly, no   carotid bruit, no JVD   Lungs:     Clear to auscultation,respirations regular, even and                  unlabored    Heart:    Regular rhythm and normal rate, normal S1 and S2, no            murmur, no gallop, no rub, no click   Chest Wall:    No abnormalities observed   Abdomen:     Normal bowel sounds, no masses, no organomegaly, soft        RUQ tenderness to deep palpation, no guarding,   Extremities:   Moves all extremities well, no edema, no cyanosis, no              Redness   Pulses:   Pulses palpable and equal bilaterally   Skin:   No bleeding, bruising or rash   Lymph nodes:   No palpable adenopathy   Neurologic:   No focal deficit         Results Review:    Lab Results (last 24 hours)       Procedure Component Value Units Date/Time    Folate [[id number]]  (Normal) Collected: 06/11/24 0110    Specimen: Blood Updated: 06/11/24 1059     Folate 19.40 ng/mL     Narrative:      Results may be falsely increased if patient taking Biotin.      Vitamin B12 [[id number]]  (Abnormal) Collected: 06/11/24 0110    Specimen: Blood Updated: 06/11/24 1059     Vitamin B-12 1,017 pg/mL     Narrative:      Results may be falsely increased if patient taking Biotin.      Haptoglobin [[id number]]  (Abnormal) Collected: 06/11/24 0110    Specimen: Blood Updated: 06/11/24 1048     Haptoglobin 208 mg/dL     Blood Culture - Blood, Arm, Right [[id number]]  (Abnormal) Collected: 06/10/24 0524    Specimen: Blood from Arm, Right Updated: 06/11/24 0828     Blood Culture Escherichia coli     Isolated from Aerobic and Anaerobic Bottles     Gram Stain Aerobic Bottle Gram negative bacilli      Anaerobic Bottle Gram negative bacilli    Manual Differential [[id number]]  (Abnormal) Collected: 06/11/24 0110    Specimen: Blood Updated: 06/11/24 0259     Neutrophil % 79.0 %      Lymphocyte % 6.0 %      Monocyte % 2.0 %      Bands %  13.0 %      Neutrophils Absolute 11.56 10*3/mm3      Lymphocytes Absolute 0.75 10*3/mm3      Monocytes Absolute 0.25 10*3/mm3      Anisocytosis Slight/1+     Macrocytes Slight/1+     Polychromasia Slight/1+     WBC Morphology Normal     Platelet Estimate Decreased    CBC & Differential [[id number]]  (Abnormal) Collected: 06/11/24 0110    Specimen: Blood Updated: 06/11/24 0259    Narrative:      The following orders were created for panel order CBC & Differential.  Procedure                               Abnormality         Status                     ---------                                -----------         ------                     CBC Auto Differential[[id number]]        Abnormal            Final result               Scan Slide[[id number]]                                       Final result                 Please view results for these tests on the individual orders.    CBC Auto Differential [[id number]]  (Abnormal) Collected: 06/11/24 0110    Specimen: Blood Updated: 06/11/24 0259     WBC 12.57 10*3/mm3      RBC 2.80 10*6/mm3      Hemoglobin 8.8 g/dL      Hematocrit 29.0 %      .6 fL      MCH 31.4 pg      MCHC 30.3 g/dL      RDW 14.5 %      RDW-SD 55.3 fl      MPV 12.1 fL      Platelets 101 10*3/mm3     Narrative:      The previously reported component NRBC is no longer being reported. Previous result was 0.0 /100 WBC (Reference Range: 0.0-0.2 /100 WBC) on 6/11/2024 at 0131 EDT.    Scan Slide [[id number]] Collected: 06/11/24 0110    Specimen: Blood Updated: 06/11/24 0259     Scan Slide --     Comment: See Manual Differential Results       Blood Culture - Blood, Arm, Left [[id number]]  (Abnormal) Collected: 06/10/24 0524    Specimen: Blood from Arm, Left Updated: 06/11/24 0236     Blood Culture Abnormal Stain     Gram Stain Aerobic Bottle Gram negative bacilli    Narrative:      Less than seven (7) mL's of blood was collected.  Insufficient quantity may yield false negative results.    Comprehensive Metabolic Panel [[id number]]  (Abnormal) Collected: 06/11/24 0110    Specimen: Blood Updated: 06/11/24 0143     Glucose 105 mg/dL      BUN 32 mg/dL      Creatinine 1.65 mg/dL      Sodium 140 mmol/L      Potassium 5.1 mmol/L      Chloride 112 mmol/L      CO2 19.8 mmol/L      Calcium 8.1 mg/dL      Total Protein 5.4 g/dL      Albumin 2.8 g/dL      ALT (SGPT) 57 U/L      AST (SGOT) 101 U/L      Alkaline Phosphatase 222 U/L      Total Bilirubin 2.5 mg/dL      Globulin 2.6 gm/dL      A/G Ratio 1.1 g/dL      BUN/Creatinine Ratio 19.4     Anion Gap 8.2 mmol/L      eGFR 40.9 mL/min/1.73     Narrative:       GFR Normal >60  Chronic Kidney Disease <60  Kidney Failure <15    The GFR formula is only valid for adults with stable renal function between ages 18 and 70.    Lipase [[id number]]  (Normal) Collected: 06/11/24 0110    Specimen: Blood Updated: 06/11/24 0143     Lipase 19 U/L     Blood Culture ID, PCR - Blood, Arm, Right [[id number]]  (Abnormal) Collected: 06/10/24 0524    Specimen: Blood from Arm, Right Updated: 06/10/24 1957     BCID, PCR Escherichia coli. Identification by BCID2 PCR.     BOTTLE TYPE Aerobic Bottle    Narrative:      No resistance genes detected.    POC Glucose Once [[id number]]  (Abnormal) Collected: 06/10/24 1706    Specimen: Blood Updated: 06/10/24 1707     Glucose 114 mg/dL      Comment: Serial Number: [id number]:  [id number]       POC Glucose Once [[id number]]  (Normal) Collected: 06/10/24 1534    Specimen: Blood Updated: 06/10/24 1536     Glucose 70 mg/dL      Comment: Serial Number: [id number]:  [id number]       POC Glucose Once [[id number]]  (Normal) Collected: 06/10/24 1420    Specimen: Blood Updated: 06/10/24 1421     Glucose 75 mg/dL      Comment: Serial Number: [id number]:  [id number]       POC Glucose Once [[id number]]  (Normal) Collected: 06/10/24 1354    Specimen: Blood Updated: 06/10/24 1357     Glucose 78 mg/dL      Comment: Serial Number: [id number]:  [id number]       POC Glucose Once [[id number]]  (Abnormal) Collected: 06/10/24 1308    Specimen: Blood Updated: 06/10/24 1311     Glucose 55 mg/dL      Comment: Serial Number: [id number]:  [id number]       POC Glucose Once [[id number]]  (Abnormal) Collected: 06/10/24 1219    Specimen: Blood Updated: 06/10/24 1222     Glucose 61 mg/dL      Comment: Serial Number: [id number]:  [id number]                Imaging Results (Last 24 Hours)       Procedure Component Value Units Date/Time    FL ERCP pancreatic and biliary ducts [[id number]] Resulted: 06/10/24 1632     Updated: 06/10/24 1634                 I reviewed  the patient's new clinical results.    Medication Review:   Scheduled Meds:cefepime, 2,000 mg, Intravenous, Q24H  ipratropium-albuterol, 3 mL, Nebulization, 4x Daily - RT  metroNIDAZOLE, 500 mg, Intravenous, Q8H  midodrine, 5 mg, Oral, TID AC  pantoprazole, 40 mg, Intravenous, BID AC  sodium chloride, 10 mL, Intravenous, Q12H      Continuous Infusions:dextrose 5 % and sodium chloride 0.9 %, 125 mL/hr, Last Rate: 125 mL/hr (06/11/24 0646)  sodium chloride, 100 mL/hr, Last Rate: Stopped (06/10/24 1352)      PRN Meds:.  acetaminophen    Calcium Replacement - Follow Nurse / BPA Driven Protocol    dextrose    Magnesium Standard Dose Replacement - Follow Nurse / BPA Driven Protocol    nitroglycerin    ondansetron    Phosphorus Replacement - Follow Nurse / BPA Driven Protocol    Potassium Replacement - Follow Nurse / BPA Driven Protocol    sodium chloride    sodium chloride    sodium chloride    sodium chloride     Assessment & Plan       Sepsis    Cholangitis    Choledocholithiasis    Sepsis  Patient afebrile today. WBC elevated at 12.57, likely result of surgery.  E.coli identified in 2/2 blood culture bottl.  E.coli confirmed with PCR. Continue cefepime and metronidazole.  Consult ID to advise on duration of IV therapy.    Cholangitis, Choledocholithiasis   ERCP successful at removing several stones and stent placement. Cholecystectomy in near future overseen by general surgery. Continue to hold Eliquis.     Elevated troponin   Chronically elevated based on review of chart, no chest pain, no acute EKG changes- no ischemic workup planned     PAF  Holding eliquis, currently in sinus rhythm     H/o ITP  Today down to 101, continue to monitor platelet count.     H/o PE   Resume anticoagulation when able     Ulcer prophylaxis   Continue PPI     8. Hypotension   Continue midodrine     9. COPD   Continue albuterol     9. VTE prophylaxis   SCD    Plan for disposition:home at discharge     MERCEDEZ Mehta  Student  06/11/24  11:25 EDT    I have interviewed and examined patient and provided 100% of the medical decision making.  Pat Napoles MD

## 2024-06-11 NOTE — CASE MANAGEMENT/SOCIAL WORK
Discharge Planning Assessment   Zoha[place]     Patient Name: Praneeth Nam  MRN: [id number]  Today's Date: 6/11/2024    Admit Date: 6/10/2024    Plan: Plan to return home with spouse who has dementia and granddaughter who care for them both.   Discharge Needs Assessment       Row Name 06/11/24 1247       Living Environment    People in Home spouse    Name(s) of People in Home [place] - spouse 0dementia)  Granddaughter - Jyoti (caregiver)    Current Living Arrangements home    Potentially Unsafe Housing Conditions none    In the past 12 months has the electric, gas, oil, or water company threatened to shut off services in your home? No    Primary Care Provided by self    Provides Primary Care For no one    Family Caregiver if Needed spouse    Family Caregiver Names Granddaughter - Jyoti    Quality of Family Relationships helpful;involved;supportive    Able to Return to Prior Arrangements yes       Resource/Environmental Concerns    Resource/Environmental Concerns none    Transportation Concerns none       Transportation Needs    In the past 12 months, has lack of transportation kept you from medical appointments or from getting medications? no    In the past 12 months, has lack of transportation kept you from meetings, work, or from getting things needed for daily living? No       Food Insecurity    Within the past 12 months, you worried that your food would run out before you got the money to buy more. Never true    Within the past 12 months, the food you bought just didn't last and you didn't have money to get more. Never true       Transition Planning    Patient/Family Anticipates Transition to home with family    Patient/Family Anticipated Services at Transition none    Transportation Anticipated car, drives self;family or friend will provide       Discharge Needs Assessment    Readmission Within the Last 30 Days no previous admission in last 30 days    Equipment Currently Used at Home walker, rolling;oxygen;nebulizer     Concerns to be Addressed discharge planning    Anticipated Changes Related to Illness none    Equipment Needed After Discharge none                   Discharge Plan       Row Name 06/11/24 1248       Plan    Plan Plan to return home with spouse who has dementia and granddaughter who care for them both.    Patient/Family in Agreement with Plan yes    Plan Comments CM met with patient at bedside. Patient A&Ox4. Patient lives at home with spouse who has dementia and granddaughter who cares fo rthem both and will transport at discharge. Patient performs ADLs but does have RW and is current with [facility] for home O2 2l NC. States he has used [facility] for [facility] in past and is agreeable to return if recommended.  Does not want to go SNF/IRFG unless absolutely necessary. PCP and pharmacy confirmed. Agreeable to M2B.  Denies financial assistance needs for medication and/or food. Denies any current HH, Caregiver, or rehab services.  DC Barriers:  IV Abxs, Nebs, D5NS @ 125, GI, Gen Sx following.                 Selected Continued Care - Prior Encounters Includes continued care and service providers with selected services from prior encounters from 3/12/2024 to 6/11/2024      Discharged on 4/30/2024 Admission date: 4/23/2024 - Discharge disposition: Home-Health Care [facility]      Home Medical Care       Service Provider Selected Services Address Phone Fax Patient Preferred    [facility] [address] [place] [address], [address] [address] [phone] --                          Expected Discharge Date and Time       Expected Discharge Date Expected Discharge Time    Jun 12, 2024            Demographic Summary       Row Name 06/11/24 1246       General Information    Admission Type inpatient    Arrived From emergency department    Required Notices Provided Important Message from Medicare    Referral Source admission list    Reason for Consult discharge planning     Preferred Language English                   Functional Status       Row Name 06/11/24 1246       Functional Status    Usual Activity Tolerance moderate    Current Activity Tolerance moderate       Functional Status, IADL    Medications independent    Meal Preparation independent    Housekeeping independent    Laundry independent    Shopping assistive equipment and person       Mental Status    General Appearance WDL WDL       Mental Status Summary    Recent Changes in Mental Status/Cognitive Functioning no changes                  Patient Forms       Row Name 06/11/24 1246       Patient Forms    Important Message from Medicare (IMM) Delivered  IMM 2nd copy reviewed, refused copyv 6/11    Delivered to Patient    Method of delivery In person                      MARTI Kent RN  SIPS/ICU   O: [phone]  C: [phone]  [email or web address]

## 2024-06-11 NOTE — PLAN OF CARE
Goal Outcome Evaluation:  Plan of Care Reviewed With: patient, daughter        Progress: no change     Pt a/o x3, slight confusion with time of day but easily reoriented. BP has been running 90s/40s, midodrine ordered and has increased to 110/42. Pt has not had any nausea/vomiting this shift. DS 1/2 NS going at 125. Decreased O2 from 4 to 3L and has maintained at . No reports of pain this shift. Daughter/caregiver has been at bedside throughout night. Call light within reach, Plan of care ongoing

## 2024-06-11 NOTE — PLAN OF CARE
Problem: Adult Inpatient Plan of Care  Goal: Plan of Care Review  Outcome: Ongoing, Progressing  Flowsheets (Taken 6/11/2024 1733)  Progress: improving  Plan of Care Reviewed With: patient  Outcome Evaluation: Pt is alert and oriented, nco pain this shift. plan to hold eliquis and have surgery in the next week. Pt uses call light appropriately. Safety maintained this shift.  Goal: Patient-Specific Goal (Individualized)  Outcome: Ongoing, Progressing  Goal: Absence of Hospital-Acquired Illness or Injury  Outcome: Ongoing, Progressing  Intervention: Identify and Manage Fall Risk  Recent Flowsheet Documentation  Taken 6/11/2024 1600 by Nadiya Jean, RN  Safety Promotion/Fall Prevention:   safety round/check completed   room organization consistent   nonskid shoes/slippers when out of bed   lighting adjusted   fall prevention program maintained   clutter free environment maintained   assistive device/personal items within reach  Taken 6/11/2024 1400 by Nadiya Jean, RN  Safety Promotion/Fall Prevention:   safety round/check completed   room organization consistent   nonskid shoes/slippers when out of bed   lighting adjusted   fall prevention program maintained   clutter free environment maintained   assistive device/personal items within reach  Taken 6/11/2024 1200 by Nadiya Jean, RN  Safety Promotion/Fall Prevention:   safety round/check completed   room organization consistent   nonskid shoes/slippers when out of bed   lighting adjusted   fall prevention program maintained   clutter free environment maintained   assistive device/personal items within reach  Taken 6/11/2024 1000 by Nadiya Jean, RN  Safety Promotion/Fall Prevention:   safety round/check completed   room organization consistent   nonskid shoes/slippers when out of bed   lighting adjusted   fall prevention program maintained   elopement precautions  Taken 6/11/2024 0800 by Nadiya Jean, RN  Safety Promotion/Fall  Prevention:   safety round/check completed   room organization consistent   nonskid shoes/slippers when out of bed   lighting adjusted   fall prevention program maintained   clutter free environment maintained   assistive device/personal items within reach  Taken 6/11/2024 0732 by Nadiya Jean RN  Safety Promotion/Fall Prevention:   safety round/check completed   room organization consistent   nonskid shoes/slippers when out of bed   lighting adjusted   fall prevention program maintained   clutter free environment maintained   assistive device/personal items within reach  Intervention: Prevent Skin Injury  Recent Flowsheet Documentation  Taken 6/11/2024 1600 by Nadiya Jean RN  Body Position: position changed independently  Skin Protection:   adhesive use limited   transparent dressing maintained  Taken 6/11/2024 1400 by Nadiya Jean RN  Body Position: position changed independently  Taken 6/11/2024 1200 by Nadiya Jean RN  Body Position: position changed independently  Skin Protection:   adhesive use limited   transparent dressing maintained   tubing/devices free from skin contact  Taken 6/11/2024 1000 by Nadiya Jean RN  Body Position: position changed independently  Taken 6/11/2024 0800 by Nadiya Jean RN  Body Position: position changed independently  Taken 6/11/2024 0732 by Nadiya Jean RN  Body Position: weight shifting  Skin Protection:   adhesive use limited   transparent dressing maintained   tubing/devices free from skin contact   incontinence pads utilized  Intervention: Prevent and Manage VTE (Venous Thromboembolism) Risk  Recent Flowsheet Documentation  Taken 6/11/2024 1600 by Nadiya Jean RN  VTE Prevention/Management:   bilateral   sequential compression devices on  Taken 6/11/2024 1200 by Nadiya Jean RN  VTE Prevention/Management:   bilateral   sequential compression devices on  Range of Motion: active ROM (range of motion)  encouraged  Taken 6/11/2024 0800 by Nadiya Jean RN  VTE Prevention/Management:   bilateral   sequential compression devices on  Taken 6/11/2024 0732 by Nadiya Jean RN  Activity Management: activity encouraged  VTE Prevention/Management:   bilateral   sequential compression devices on  Range of Motion: active ROM (range of motion) encouraged  Intervention: Prevent Infection  Recent Flowsheet Documentation  Taken 6/11/2024 1600 by Nadiya Jean RN  Infection Prevention:   single patient room provided   rest/sleep promoted   hand hygiene promoted   environmental surveillance performed  Taken 6/11/2024 1400 by Nadiya Jean RN  Infection Prevention:   single patient room provided   rest/sleep promoted   hand hygiene promoted   environmental surveillance performed  Taken 6/11/2024 1200 by Nadiya Jean RN  Infection Prevention:   single patient room provided   rest/sleep promoted   hand hygiene promoted   environmental surveillance performed  Taken 6/11/2024 1000 by Nadiya Jean RN  Infection Prevention:   single patient room provided   rest/sleep promoted   hand hygiene promoted   environmental surveillance performed  Taken 6/11/2024 0800 by Nadiya Jean RN  Infection Prevention:   single patient room provided   rest/sleep promoted   hand hygiene promoted   environmental surveillance performed  Taken 6/11/2024 0732 by Nadiya Jean RN  Infection Prevention:   single patient room provided   rest/sleep promoted   hand hygiene promoted   environmental surveillance performed  Goal: Optimal Comfort and Wellbeing  Outcome: Ongoing, Progressing  Intervention: Provide Person-Centered Care  Recent Flowsheet Documentation  Taken 6/11/2024 1600 by Nadiya Jean RN  Trust Relationship/Rapport:   care explained   questions answered  Taken 6/11/2024 1200 by Nadiya Jean RN  Trust Relationship/Rapport:   care explained   questions answered  Taken 6/11/2024 0732 by  Nadiya Jean, GWEN  Trust Relationship/Rapport:   care explained   questions answered  Goal: Readiness for Transition of Care  Outcome: Ongoing, Progressing     Problem: Fall Injury Risk  Goal: Absence of Fall and Fall-Related Injury  Outcome: Ongoing, Progressing  Intervention: Identify and Manage Contributors  Recent Flowsheet Documentation  Taken 6/11/2024 0732 by Nadiya Jean RN  Medication Review/Management: medications reviewed  Intervention: Promote Injury-Free Environment  Recent Flowsheet Documentation  Taken 6/11/2024 1600 by Nadiya Jean RN  Safety Promotion/Fall Prevention:   safety round/check completed   room organization consistent   nonskid shoes/slippers when out of bed   lighting adjusted   fall prevention program maintained   clutter free environment maintained   assistive device/personal items within reach  Taken 6/11/2024 1400 by Nadiya Jean RN  Safety Promotion/Fall Prevention:   safety round/check completed   room organization consistent   nonskid shoes/slippers when out of bed   lighting adjusted   fall prevention program maintained   clutter free environment maintained   assistive device/personal items within reach  Taken 6/11/2024 1200 by Nadiya Jean RN  Safety Promotion/Fall Prevention:   safety round/check completed   room organization consistent   nonskid shoes/slippers when out of bed   lighting adjusted   fall prevention program maintained   clutter free environment maintained   assistive device/personal items within reach  Taken 6/11/2024 1000 by Nadiya Jean, RN  Safety Promotion/Fall Prevention:   safety round/check completed   room organization consistent   nonskid shoes/slippers when out of bed   lighting adjusted   fall prevention program maintained   elopement precautions  Taken 6/11/2024 0800 by Nadiya Jean, RN  Safety Promotion/Fall Prevention:   safety round/check completed   room organization consistent   nonskid shoes/slippers  when out of bed   lighting adjusted   fall prevention program maintained   clutter free environment maintained   assistive device/personal items within reach  Taken 6/11/2024 0732 by Nadiya Jean RN  Safety Promotion/Fall Prevention:   safety round/check completed   room organization consistent   nonskid shoes/slippers when out of bed   lighting adjusted   fall prevention program maintained   clutter free environment maintained   assistive device/personal items within reach     Problem: COPD (Chronic Obstructive Pulmonary Disease) Comorbidity  Goal: Maintenance of COPD Symptom Control  Outcome: Ongoing, Progressing  Intervention: Maintain COPD-Symptom Control  Recent Flowsheet Documentation  Taken 6/11/2024 1600 by Nadiya Jean RN  Supportive Measures: active listening utilized  Taken 6/11/2024 0732 by Nadiya Jean RN  Medication Review/Management: medications reviewed     Problem: Skin Injury Risk Increased  Goal: Skin Health and Integrity  Outcome: Ongoing, Progressing  Intervention: Optimize Skin Protection  Recent Flowsheet Documentation  Taken 6/11/2024 1600 by Nadiya Jean RN  Pressure Reduction Techniques: weight shift assistance provided  Head of Bed (HOB) Positioning: [facility] elevated  Pressure Reduction Devices: pressure-redistributing mattress utilized  Skin Protection:   adhesive use limited   transparent dressing maintained  Taken 6/11/2024 1400 by Nadiya Jean RN  Head of Bed ([facility]) Positioning: HOB elevated  Taken 6/11/2024 1200 by Nadiya Jean RN  Pressure Reduction Techniques: weight shift assistance provided  Head of Bed (HOB) Positioning: HOB elevated  Pressure Reduction Devices: pressure-redistributing mattress utilized  Skin Protection:   adhesive use limited   transparent dressing maintained   tubing/devices free from skin contact  Taken 6/11/2024 1000 by Nadiya Jean RN  Head of Bed ([facility]) Positioning: HOB elevated  Taken 6/11/2024 0800 by Miranda  GWEN Hearn  Head of Bed (HOB) Positioning: HOB elevated  Taken 6/11/2024 0732 by Nadiya Jean RN  Pressure Reduction Techniques:   weight shift assistance provided   heels elevated off bed  Head of Bed (HOB) Positioning: HOB elevated  Pressure Reduction Devices: pressure-redistributing mattress utilized  Skin Protection:   adhesive use limited   transparent dressing maintained   tubing/devices free from skin contact   incontinence pads utilized   Goal Outcome Evaluation:  Plan of Care Reviewed With: patient        Progress: improving  Outcome Evaluation: Pt is alert and oriented, nco pain this shift. plan to hold eliquis and have surgery in the next week. Pt uses call light appropriately. Safety maintained this shift.

## 2024-06-12 ENCOUNTER — APPOINTMENT (OUTPATIENT)
Dept: GENERAL RADIOLOGY | Facility: HOSPITAL | Age: 84
DRG: 872 | End: 2024-06-12
Payer: MEDICARE

## 2024-06-12 LAB
ALBUMIN SERPL-MCNC: 2.7 G/DL (ref 3.5–5.2)
ALBUMIN SERPL-MCNC: 2.8 G/DL (ref 3.5–5.2)
ALBUMIN/GLOB SERPL: 1.1 G/DL
ALBUMIN/GLOB SERPL: 1.1 G/DL
ALP SERPL-CCNC: 208 U/L (ref 39–117)
ALP SERPL-CCNC: 285 U/L (ref 39–117)
ALT SERPL W P-5'-P-CCNC: 46 U/L (ref 1–41)
ALT SERPL W P-5'-P-CCNC: 50 U/L (ref 1–41)
ANION GAP SERPL CALCULATED.3IONS-SCNC: 8 MMOL/L (ref 5–15)
ANION GAP SERPL CALCULATED.3IONS-SCNC: 8.9 MMOL/L (ref 5–15)
ANISOCYTOSIS BLD QL: ABNORMAL
ANISOCYTOSIS BLD QL: ABNORMAL
AST SERPL-CCNC: 48 U/L (ref 1–40)
AST SERPL-CCNC: 71 U/L (ref 1–40)
BACTERIA SPEC AEROBE CULT: ABNORMAL
BACTERIA SPEC AEROBE CULT: ABNORMAL
BASOPHILS # BLD MANUAL: 0.09 10*3/MM3 (ref 0–0.2)
BASOPHILS NFR BLD MANUAL: 1 % (ref 0–1.5)
BILIRUB SERPL-MCNC: 0.8 MG/DL (ref 0–1.2)
BILIRUB SERPL-MCNC: 1.2 MG/DL (ref 0–1.2)
BUN SERPL-MCNC: 43 MG/DL (ref 8–23)
BUN SERPL-MCNC: 47 MG/DL (ref 8–23)
BUN/CREAT SERPL: 26.1 (ref 7–25)
BUN/CREAT SERPL: 32.4 (ref 7–25)
BURR CELLS BLD QL SMEAR: ABNORMAL
CALCIUM SPEC-SCNC: 7.9 MG/DL (ref 8.6–10.5)
CALCIUM SPEC-SCNC: 8.3 MG/DL (ref 8.6–10.5)
CHLORIDE SERPL-SCNC: 113 MMOL/L (ref 98–107)
CHLORIDE SERPL-SCNC: 114 MMOL/L (ref 98–107)
CO2 SERPL-SCNC: 18 MMOL/L (ref 22–29)
CO2 SERPL-SCNC: 20.1 MMOL/L (ref 22–29)
CREAT SERPL-MCNC: 1.45 MG/DL (ref 0.76–1.27)
CREAT SERPL-MCNC: 1.65 MG/DL (ref 0.76–1.27)
DEPRECATED RDW RBC AUTO: 53 FL (ref 37–54)
DEPRECATED RDW RBC AUTO: 53.8 FL (ref 37–54)
EGFRCR SERPLBLD CKD-EPI 2021: 40.9 ML/MIN/1.73
EGFRCR SERPLBLD CKD-EPI 2021: 47.8 ML/MIN/1.73
ERYTHROCYTE [DISTWIDTH] IN BLOOD BY AUTOMATED COUNT: 14.5 % (ref 12.3–15.4)
ERYTHROCYTE [DISTWIDTH] IN BLOOD BY AUTOMATED COUNT: 14.6 % (ref 12.3–15.4)
GLOBULIN UR ELPH-MCNC: 2.5 GM/DL
GLOBULIN UR ELPH-MCNC: 2.6 GM/DL
GLUCOSE BLDC GLUCOMTR-MCNC: 115 MG/DL (ref 70–105)
GLUCOSE BLDC GLUCOMTR-MCNC: 143 MG/DL (ref 70–105)
GLUCOSE BLDC GLUCOMTR-MCNC: 87 MG/DL (ref 70–105)
GLUCOSE BLDC GLUCOMTR-MCNC: 92 MG/DL (ref 70–105)
GLUCOSE SERPL-MCNC: 101 MG/DL (ref 65–99)
GLUCOSE SERPL-MCNC: 104 MG/DL (ref 65–99)
GRAM STN SPEC: ABNORMAL
HCT VFR BLD AUTO: 26.9 % (ref 37.5–51)
HCT VFR BLD AUTO: 27.9 % (ref 37.5–51)
HGB BLD-MCNC: 8.3 G/DL (ref 13–17.7)
HGB BLD-MCNC: 8.8 G/DL (ref 13–17.7)
ISOLATED FROM: ABNORMAL
ISOLATED FROM: ABNORMAL
LARGE PLATELETS: ABNORMAL
LARGE PLATELETS: ABNORMAL
LIPASE SERPL-CCNC: 28 U/L (ref 13–60)
LYMPHOCYTES # BLD MANUAL: 0.62 10*3/MM3 (ref 0.7–3.1)
LYMPHOCYTES # BLD MANUAL: 1.07 10*3/MM3 (ref 0.7–3.1)
LYMPHOCYTES NFR BLD MANUAL: 2 % (ref 5–12)
LYMPHOCYTES NFR BLD MANUAL: 3 % (ref 5–12)
MACROCYTES BLD QL SMEAR: ABNORMAL
MCH RBC QN AUTO: 31.4 PG (ref 26.6–33)
MCH RBC QN AUTO: 31.4 PG (ref 26.6–33)
MCHC RBC AUTO-ENTMCNC: 30.9 G/DL (ref 31.5–35.7)
MCHC RBC AUTO-ENTMCNC: 31.5 G/DL (ref 31.5–35.7)
MCV RBC AUTO: 101.9 FL (ref 79–97)
MCV RBC AUTO: 99.6 FL (ref 79–97)
MONOCYTES # BLD: 0.18 10*3/MM3 (ref 0.1–0.9)
MONOCYTES # BLD: 0.37 10*3/MM3 (ref 0.1–0.9)
NEUTROPHILS # BLD AUTO: 11.4 10*3/MM3 (ref 1.7–7)
NEUTROPHILS # BLD AUTO: 7.57 10*3/MM3 (ref 1.7–7)
NEUTROPHILS NFR BLD MANUAL: 83 % (ref 42.7–76)
NEUTROPHILS NFR BLD MANUAL: 85 % (ref 42.7–76)
NEUTS BAND NFR BLD MANUAL: 9 % (ref 0–5)
NEUTS VAC BLD QL SMEAR: ABNORMAL
PLATELET # BLD AUTO: 111 10*3/MM3 (ref 140–450)
PLATELET # BLD AUTO: 115 10*3/MM3 (ref 140–450)
PMV BLD AUTO: 13 FL (ref 6–12)
PMV BLD AUTO: 13.4 FL (ref 6–12)
POIKILOCYTOSIS BLD QL SMEAR: ABNORMAL
POIKILOCYTOSIS BLD QL SMEAR: ABNORMAL
POTASSIUM SERPL-SCNC: 4.9 MMOL/L (ref 3.5–5.2)
POTASSIUM SERPL-SCNC: 4.9 MMOL/L (ref 3.5–5.2)
PROT SERPL-MCNC: 5.2 G/DL (ref 6–8.5)
PROT SERPL-MCNC: 5.4 G/DL (ref 6–8.5)
RBC # BLD AUTO: 2.64 10*6/MM3 (ref 4.14–5.8)
RBC # BLD AUTO: 2.8 10*6/MM3 (ref 4.14–5.8)
SCAN SLIDE: NORMAL
SCAN SLIDE: NORMAL
SMALL PLATELETS BLD QL SMEAR: ABNORMAL
SMALL PLATELETS BLD QL SMEAR: ABNORMAL
SODIUM SERPL-SCNC: 140 MMOL/L (ref 136–145)
SODIUM SERPL-SCNC: 142 MMOL/L (ref 136–145)
VARIANT LYMPHS NFR BLD MANUAL: 12 % (ref 19.6–45.3)
VARIANT LYMPHS NFR BLD MANUAL: 5 % (ref 19.6–45.3)
WBC MORPH BLD: NORMAL
WBC NRBC COR # BLD AUTO: 12.39 10*3/MM3 (ref 3.4–10.8)
WBC NRBC COR # BLD AUTO: 8.91 10*3/MM3 (ref 3.4–10.8)

## 2024-06-12 PROCEDURE — 94761 N-INVAS EAR/PLS OXIMETRY MLT: CPT

## 2024-06-12 PROCEDURE — 94799 UNLISTED PULMONARY SVC/PX: CPT

## 2024-06-12 PROCEDURE — 25010000002 ENOXAPARIN PER 10 MG

## 2024-06-12 PROCEDURE — 25010000002 FUROSEMIDE PER 20 MG: Performed by: INTERNAL MEDICINE

## 2024-06-12 PROCEDURE — 82948 REAGENT STRIP/BLOOD GLUCOSE: CPT | Performed by: INTERNAL MEDICINE

## 2024-06-12 PROCEDURE — 97162 PT EVAL MOD COMPLEX 30 MIN: CPT

## 2024-06-12 PROCEDURE — 85025 COMPLETE CBC W/AUTO DIFF WBC: CPT | Performed by: INTERNAL MEDICINE

## 2024-06-12 PROCEDURE — 71045 X-RAY EXAM CHEST 1 VIEW: CPT

## 2024-06-12 PROCEDURE — 94664 DEMO&/EVAL PT USE INHALER: CPT

## 2024-06-12 PROCEDURE — 97166 OT EVAL MOD COMPLEX 45 MIN: CPT

## 2024-06-12 PROCEDURE — 25010000002 CEFTRIAXONE PER 250 MG: Performed by: NURSE PRACTITIONER

## 2024-06-12 PROCEDURE — 85007 BL SMEAR W/DIFF WBC COUNT: CPT | Performed by: INTERNAL MEDICINE

## 2024-06-12 PROCEDURE — 82948 REAGENT STRIP/BLOOD GLUCOSE: CPT

## 2024-06-12 PROCEDURE — 80053 COMPREHEN METABOLIC PANEL: CPT | Performed by: INTERNAL MEDICINE

## 2024-06-12 RX ORDER — ENOXAPARIN SODIUM 100 MG/ML
30 INJECTION SUBCUTANEOUS EVERY 24 HOURS
Status: DISCONTINUED | OUTPATIENT
Start: 2024-06-12 | End: 2024-06-13

## 2024-06-12 RX ORDER — FUROSEMIDE 10 MG/ML
40 INJECTION INTRAMUSCULAR; INTRAVENOUS ONCE
Status: COMPLETED | OUTPATIENT
Start: 2024-06-12 | End: 2024-06-12

## 2024-06-12 RX ORDER — ALBUTEROL SULFATE 2.5 MG/3ML
2.5 SOLUTION RESPIRATORY (INHALATION) EVERY 6 HOURS PRN
Status: DISCONTINUED | OUTPATIENT
Start: 2024-06-12 | End: 2024-06-15 | Stop reason: HOSPADM

## 2024-06-12 RX ORDER — PANTOPRAZOLE SODIUM 40 MG/10ML
40 INJECTION, POWDER, LYOPHILIZED, FOR SOLUTION INTRAVENOUS
Status: DISCONTINUED | OUTPATIENT
Start: 2024-06-13 | End: 2024-06-14

## 2024-06-12 RX ADMIN — ENOXAPARIN SODIUM 30 MG: 100 INJECTION SUBCUTANEOUS at 17:17

## 2024-06-12 RX ADMIN — IPRATROPIUM BROMIDE AND ALBUTEROL SULFATE 3 ML: .5; 3 SOLUTION RESPIRATORY (INHALATION) at 08:25

## 2024-06-12 RX ADMIN — FUROSEMIDE 40 MG: 10 INJECTION, SOLUTION INTRAMUSCULAR; INTRAVENOUS at 11:59

## 2024-06-12 RX ADMIN — MIDODRINE HYDROCHLORIDE 5 MG: 5 TABLET ORAL at 08:19

## 2024-06-12 RX ADMIN — IPRATROPIUM BROMIDE AND ALBUTEROL SULFATE 3 ML: .5; 3 SOLUTION RESPIRATORY (INHALATION) at 18:37

## 2024-06-12 RX ADMIN — IPRATROPIUM BROMIDE AND ALBUTEROL SULFATE 3 ML: .5; 3 SOLUTION RESPIRATORY (INHALATION) at 12:21

## 2024-06-12 RX ADMIN — Medication 10 ML: at 20:05

## 2024-06-12 RX ADMIN — CEFTRIAXONE 2000 MG: 2 INJECTION, POWDER, FOR SOLUTION INTRAMUSCULAR; INTRAVENOUS at 20:05

## 2024-06-12 RX ADMIN — ALBUTEROL SULFATE 2.5 MG: 2.5 SOLUTION RESPIRATORY (INHALATION) at 01:18

## 2024-06-12 RX ADMIN — Medication 10 ML: at 08:20

## 2024-06-12 RX ADMIN — PANTOPRAZOLE SODIUM 40 MG: 40 INJECTION, POWDER, FOR SOLUTION INTRAVENOUS at 08:19

## 2024-06-12 NOTE — PROGRESS NOTES
Infectious Diseases Progress Note      LOS: 2 days   Patient Care Team:  Guilherme Jason MD as PCP - General (Family Medicine)  Misty Dias MD as Consulting Physician (Cardiology)  Nora Kenyon DNP, APRN as Nurse Practitioner (Thoracic Surgery)  Sussy Shane MD as Consulting Physician (Nephrology)    Chief Complaint: Fever, chills and abdominal pain at admission    Subjective       The patient has been afebrile for the last 24 hours.  The patient is on 2 L of oxygen by nasal cannula hemodynamically stable, and is tolerating antimicrobial therapy.  Patient is currently in the chair and stating that his extremities are hurting.  Admits to having a fall at home      Review of Systems:   Review of Systems   Constitutional:  Positive for fatigue.   HENT: Negative.     Eyes: Negative.    Respiratory: Negative.     Cardiovascular: Negative.    Gastrointestinal:  Positive for abdominal pain.   Endocrine: Negative.    Genitourinary: Negative.    Musculoskeletal: Negative.         Generalized pain   Skin: Negative.    Neurological: Negative.    Psychiatric/Behavioral: Negative.     All other systems reviewed and are negative.       Objective     Vital Signs  Temp:  [97.9 °F (36.6 °C)-98.2 °F (36.8 °C)] 98 °F (36.7 °C)  Heart Rate:  [53-77] 59  Resp:  [20-28] 22  BP: (120-156)/(54-74) 156/74    Physical Exam:  Physical Exam  Vitals and nursing note reviewed.   Constitutional:       General: He is not in acute distress.     Appearance: He is well-developed and normal weight. He is ill-appearing. He is not diaphoretic.   HENT:      Head: Normocephalic and atraumatic.   Eyes:      Conjunctiva/sclera: Conjunctivae normal.      Pupils: Pupils are equal, round, and reactive to light.   Cardiovascular:      Rate and Rhythm: Normal rate and regular rhythm.      Heart sounds: Normal heart sounds, S1 normal and S2 normal.   Pulmonary:      Effort: Pulmonary effort is normal. No respiratory distress.      Breath  sounds: Normal breath sounds. No stridor. No wheezing or rales.   Abdominal:      General: Bowel sounds are normal. There is no distension.      Palpations: Abdomen is soft. There is no mass.      Tenderness: There is abdominal tenderness. There is no guarding.   Musculoskeletal:         General: No deformity. Normal range of motion.      Cervical back: Neck supple.   Skin:     General: Skin is warm and dry.      Coloration: Skin is not pale.      Findings: No erythema or rash.   Neurological:      Mental Status: He is alert and oriented to person, place, and time.      Cranial Nerves: No cranial nerve deficit.   Psychiatric:         Mood and Affect: Mood normal.          Results Review:    I have reviewed all clinical data, test, lab, and imaging results.     Radiology  XR Chest 1 View    Result Date: 6/12/2024  XR CHEST 1 VW Date of Exam: 6/12/2024 11:53 AM EDT Indication: Shortness of breath Comparison: 6/10/2024. Findings: The heart is enlarged. There is stable small bilateral basilar pleural effusions with atelectasis and less likely pneumonia. The pulmonary vascular markings are normal. There are chronic age-related changes involving the bony thorax and thoracic aorta.     Impression: No interval change from the study performed 2 days ago with abnormalities as described. Electronically Signed: Jayy Osborn MD  6/12/2024 12:29 PM EDT  Workstation ID: [id number]     Cardiology    Laboratory    Results from last 7 days   Lab Units 06/11/24  2350 06/11/24  0110 06/10/24  0524   WBC 10*3/mm3 12.39* 12.57* 6.19   HEMOGLOBIN g/dL 8.3* 8.8* 8.9*   HEMATOCRIT % 26.9* 29.0* 29.2*   PLATELETS 10*3/mm3 111* 101* 131*     Results from last 7 days   Lab Units 06/11/24 2350 06/11/24  0110 06/10/24  0524   SODIUM mmol/L 140 140 142   POTASSIUM mmol/L 4.9 5.1 4.7   CHLORIDE mmol/L 114* 112* 109*   CO2 mmol/L 18.0* 19.8* 19.9*   BUN mg/dL 43* 32* 26*   CREATININE mg/dL 1.65* 1.65* 1.20   GLUCOSE mg/dL 104* 105* 53*   ALBUMIN g/dL  2.7* 2.8* 3.4*   BILIRUBIN mg/dL 1.2 2.5* 1.6*   ALK PHOS U/L 208* 222* 356*   AST (SGOT) U/L 71* 101* 144*   ALT (SGPT) U/L 50* 57* 51*   LIPASE U/L 28 19 32   CALCIUM mg/dL 7.9* 8.1* 8.8                 Microbiology   Microbiology Results (last 10 days)       Procedure Component Value - Date/Time    Respiratory Panel PCR w/COVID-19(SARS-CoV-2) KEN/HUBERT/CHIKA/PAD/COR/FLORECITA In-House, NP Swab in UTM/VTM, 2 HR TAT - Swab, Nasopharynx [[id number]]  (Normal) Collected: 06/10/24 0527    Lab Status: Final result Specimen: Swab from Nasopharynx Updated: 06/10/24 0617     ADENOVIRUS, PCR Not Detected     Coronavirus 229E Not Detected     Coronavirus HKU1 Not Detected     Coronavirus NL63 Not Detected     Coronavirus OC43 Not Detected     COVID19 Not Detected     Human Metapneumovirus Not Detected     Human Rhinovirus/Enterovirus Not Detected     Influenza A PCR Not Detected     Influenza B PCR Not Detected     Parainfluenza Virus 1 Not Detected     Parainfluenza Virus 2 Not Detected     Parainfluenza Virus 3 Not Detected     Parainfluenza Virus 4 Not Detected     RSV, PCR Not Detected     Bordetella pertussis pcr Not Detected     Bordetella parapertussis PCR Not Detected     Chlamydophila pneumoniae PCR Not Detected     Mycoplasma pneumo by PCR Not Detected    Narrative:      In the setting of a positive respiratory panel with a viral infection PLUS a negative procalcitonin without other underlying concern for bacterial infection, consider observing off antibiotics or discontinuation of antibiotics and continue supportive care. If the respiratory panel is positive for atypical bacterial infection (Bordetella pertussis, Chlamydophila pneumoniae, or Mycoplasma pneumoniae), consider antibiotic de-escalation to target atypical bacterial infection.    Blood Culture - Blood, Arm, Left [[id number]]  (Abnormal) Collected: 06/10/24 0524    Lab Status: Final result Specimen: Blood from Arm, Left Updated: 06/12/24 0715     Blood Culture  Escherichia coli     Isolated from Aerobic Bottle     Gram Stain Aerobic Bottle Gram negative bacilli    Narrative:      Less than seven (7) mL's of blood was collected.  Insufficient quantity may yield false negative results.    Refer to previous blood culture collected on 06/10/2024 0524 for MICs      Blood Culture - Blood, Arm, Right [[id number]]  (Abnormal)  (Susceptibility) Collected: 06/10/24 0524    Lab Status: Final result Specimen: Blood from Arm, Right Updated: 06/12/24 0713     Blood Culture Escherichia coli     Isolated from Aerobic and Anaerobic Bottles     Gram Stain Aerobic Bottle Gram negative bacilli      Anaerobic Bottle Gram negative bacilli    Susceptibility        Escherichia coli      LANCE      Amoxicillin + Clavulanate Susceptible      Ampicillin Intermediate      Ampicillin + Sulbactam Susceptible      Cefepime Susceptible      Ceftazidime Susceptible      Ceftriaxone Susceptible      Gentamicin Susceptible      Levofloxacin Susceptible      Piperacillin + Tazobactam Susceptible      Trimethoprim + Sulfamethoxazole Susceptible                       Susceptibility Comments       Escherichia coli    Cefazolin sensitivity will not be reported for Enterobacteriaceae in non-urine isolates. If cefazolin is preferred, please call the microbiology lab to request an E-test.  With the exception of urinary-sourced infections, aminoglycosides should not be used as monotherapy.               Blood Culture ID, PCR - Blood, Arm, Right [[id number]]  (Abnormal) Collected: 06/10/24 0524    Lab Status: Final result Specimen: Blood from Arm, Right Updated: 06/10/24 1957     BCID, PCR Escherichia coli. Identification by BCID2 PCR.     BOTTLE TYPE Aerobic Bottle    Narrative:      No resistance genes detected.            Medication Review:       Schedule Meds  cefTRIAXone, 2,000 mg, Intravenous, Q24H  enoxaparin, 30 mg, Subcutaneous, Q24H  ipratropium-albuterol, 3 mL, Nebulization, 4x Daily - RT  midodrine, 5 mg,  Oral, TID AC  [START ON 6/13/2024] pantoprazole, 40 mg, Intravenous, Q AM  sodium chloride, 10 mL, Intravenous, Q12H        Infusion Meds       PRN Meds    acetaminophen    albuterol    Calcium Replacement - Follow Nurse / BPA Driven Protocol    dextrose    Magnesium Standard Dose Replacement - Follow Nurse / BPA Driven Protocol    nitroglycerin    ondansetron    Phosphorus Replacement - Follow Nurse / BPA Driven Protocol    Potassium Replacement - Follow Nurse / BPA Driven Protocol    sodium chloride    sodium chloride    sodium chloride    sodium chloride        Assessment & Plan       Antimicrobial Therapy   1.  IV ceftriaxone        2.        3.        4.        5.          ssessment     E. coli bacteremia and both blood culture sets from admission.  Most likely source is biliary sepsis.  Urinalysis was unremarkable.  Patient had a ERCP on 6/10/2024 with clearance of CBD stone and pancreatic stent placement.     Cholangitis/choledocholithiasis.  Both GI and general surgery are following.  Possible cholecystectomy in the near future     Febrile illness-likely related to the above.  COVID-19 screen and respiratory viral DNA panel are negative.  Fevers are resolving     Acute kidney injury     Elevated liver transaminase and hyperbilirubinemia-likely related to biliary sepsis     History of PE     COPD-currently on 2 L of oxygen by nasal cannula which is his baseline     Plan     Continue IV Rocephin 2 g every 24 hours for 13 days for 2 weeks of treatment for the bacteremia-last day on 6/23/2024  QTc interval was too elevated to use an oral quinolone  Patient will need a midline before discharge-please remove when IV antibiotics are completed   Weekly labs CBC and creatinine x 2 weeks  Continue supportive care  Case discussed with pharmacy  Not much more to add from infectious disease standpoint-we will sign off at this time-please call with any questions.    Please fax all post discharge lab results, imaging  studies and correspondence to this fax number [phone]  For any question or concern please contact our service number [phone]         AZEEM Lackey  06/12/24  16:41 EDT    Note is dictated utilizing voice recognition software/Dragon

## 2024-06-12 NOTE — PLAN OF CARE
Goal Outcome Evaluation:  Plan of Care Reviewed With: patient        Progress: improving     Pt has not c/o pain throughout shift. C/o SOB around 0145, checked chart and nebulizer treatments had not been given throughout the day. Called and got PRN order placed and RT gave treatment. Pt's 02 staying at  while on 2 L via NC. Pt reports he feels slightly better, continuing to monitor throughout the night. BUE/BLE elevated with pillow to help with edema. Pt tolerating gi soft diet well. Call light within reach, plan of care ongoing

## 2024-06-12 NOTE — CASE MANAGEMENT/SOCIAL WORK
Continued Stay Note  [facility]     Patient Name: Praneeth Nam  MRN: [id number]  Today's Date: 6/12/2024    Admit Date: 6/10/2024    Plan: Plan to return home with spouse who has dementia and granddaughter who care for them both.  PT/OT eval pending.  Watch for IV Abxs and O2.   Discharge Plan       Row Name 06/12/24 1139       Plan    Plan Plan to return home with spouse who has dementia and granddaughter who care for them both.  PT/OT eval pending.  Watch for IV Abxs and O2.    Plan Comments DC Barriers: 2L NC, IV Abxs, Nebs, final bldGen Sx/ID/GI following.               Expected Discharge Date and Time       Expected Discharge Date Expected Discharge Time    Jun 13, 2024               MARTI Kent RN  SIPS/ICU   O: [phone]  C: [phone]  [email or web address]

## 2024-06-12 NOTE — THERAPY EVALUATION
Patient Name: Praneeth Nam  : 1940    MRN: [id number]                              Today's Date: 2024       Admit Date: 6/10/2024    Visit Dx:     ICD-10-CM ICD-9-CM   1. Cholangitis  K83.09 576.1   2. Choledocholithiasis  K80.50 574.50     Patient Active Problem List   Diagnosis    T-cell large granular lymphocytic leukemia     Chronic ITP (idiopathic thrombocytopenia)    Rheumatoid arthritis    CKD (chronic kidney disease), stage III    Bilateral pulmonary embolism    Elevated factor VIII level    Other myelodysplastic syndromes    Hypogammaglobulinemia    Bronchitis    Subdural hematoma, nontraumatic    Lung nodule    Abnormal cardiovascular stress test    AV block, 1st degree    COPD (chronic obstructive pulmonary disease)    Dizziness    Dyslipidemia    Exertional shortness of breath    Fatigue    History of pulmonary embolism    Hypertension    Keratoconjunctivitis sicca, in Sjogren's syndrome    MITZI (obstructive sleep apnea)    Small plaque parapsoriasis    Stroke-like symptoms    Type 2 diabetes mellitus    Bilateral pulmonary embolism    Elevated antinuclear antibody (NAIMA) level    Seropositive rheumatoid arthritis    RBBB    Anemia, chronic disease    Pleural effusion    Sepsis    Moderate malnutrition    Permanent atrial fibrillation    Secondary hypercoagulability disorder    Abnormal LFTs    UTI (urinary tract infection), bacterial    Cholangitis    Choledocholithiasis     Past Medical History:   Diagnosis Date    Acute pulmonary embolism 2018    bilateral    Arthritis     bilateral knees and ankles    CKD (chronic kidney disease) 2009    Coagulopathy 2008    COPD (chronic obstructive pulmonary disease)     Diabetes mellitus     History of ITP 2019    History of pneumonia 2015    hospitalized with community-acquired pneumonia, possibly viral     History of prostate cancer 10/2009    G[place] 6, Stage II    Hypercholesterolemia     Hypertension     Large granular  lymphocytic leukemia 08/2005    T-Cell Large granular lymphocytic leukemia    Neutropenia 11/2003    MITZI (obstructive sleep apnea) 2018    Psoriasis 2000    Renal cyst, left     Rheumatoid arthritis     Stroke (cerebrum)     Thrombocytopenia 08/23/2005     Past Surgical History:   Procedure Laterality Date    ENDOSCOPY N/A 4/26/2024    Procedure: ESOPHAGOGASTRODUODENOSCOPY WITH DILATION (#46 BOUGIE);  Surgeon: Gamal Yancey MD;  Location: [facility] ENDOSCOPY;  Service: Gastroenterology;  Laterality: N/A;  DUODENAL DIVERTICULUM, HIATAL HERNIA, DISTAL ESOPHAGEAL STRICTURE    ERCP N/A 6/10/2024    Procedure: ENDOSCOPIC RETROGRADE CHOLANGIOPANCREATOGRAPHY, sphincterotomy, balloon clearance of commonn bile duct (9-12mm), stone extraction, pancreatic stent placement;  Surgeon: Mariaelena Sndyer MD;  Location: [facility] ENDOSCOPY;  Service: Gastroenterology;  Laterality: N/A;  post: choledocholithiasis    SKIN LESION EXCISION      back and groin-benign      General Information       Row Name 06/12/24 1544          OT Time and Intention    Document Type evaluation  -LS     Mode of Treatment occupational therapy  -       Row Name 06/12/24 1541          General Information    Patient Profile Reviewed yes  -LS     Prior Level of Function independent:;ADL's;driving;all household mobility  -LS     Existing Precautions/Restrictions fall  -LS     Barriers to Rehab none identified  -       Row Name 06/12/24 1544          Living Environment    People in Home spouse;grandchild(elio)  -       Row Name 06/12/24 1544          Home Main Entrance    Number of Stairs, Main Entrance none  -LS       Row Name 06/12/24 1544          Stairs Within Home, Primary    Number of Stairs, Within Home, Primary none  -LS       Row Name 06/12/24 1544          Cognition    Orientation Status (Cognition) oriented x 4  -LS       Row Name 06/12/24 154          Safety Issues, Functional Mobility    Impairments Affecting Function (Mobility)  balance;endurance/activity tolerance;shortness of breath;strength;pain;range of motion (ROM)  -               User Key  (r) = Recorded By, (t) = Taken By, (c) = Cosigned By      Initials Name Provider Type    Binu Santos OT Occupational Therapist                     Mobility/ADL's       Row Name 06/12/24 1547          Bed Mobility    Bed Mobility supine-sit  -LS     Supine-Sit [place] (Bed Mobility) moderate assist (50% patient effort)  -       Row Name 06/12/24 1547          Transfers    Transfers bed-chair transfer;sit-stand transfer  -       Row Name 06/12/24 1547          Bed-Chair Transfer    Bed-Chair [place] (Transfers) minimum assist (75% patient effort)  -LS     Assistive Device (Bed-Chair Transfers) walker, front-wheeled  -LS       Row Name 06/12/24 1547          Sit-Stand Transfer    Sit-Stand [place] (Transfers) moderate assist (50% patient effort)  -LS     Assistive Device (Sit-Stand Transfers) walker, front-wheeled  -LS       Row Name 06/12/24 1547          Functional Mobility    Functional Mobility- Ind. Level minimum assist (75% patient effort)  -LS     Functional Mobility- Device walker, front-wheeled  -LS     Patient was able to Ambulate yes  -       Row Name 06/12/24 1547          Activities of Daily Living    BADL Assessment/Intervention lower body dressing  -       Row Name 06/12/24 1547          Lower Body Dressing Assessment/Training    [place] Level (Lower Body Dressing) lower body dressing skills;doff;don;socks;pants/bottoms;maximum assist (25% patient effort)  -     Position (Lower Body Dressing) edge of bed sitting  -               User Key  (r) = Recorded By, (t) = Taken By, (c) = Cosigned By      Initials Name Provider Type    Binu Santos OT Occupational Therapist                   Obj/Interventions       Row Name 06/12/24 1551          Sensory Assessment (Somatosensory)    Sensory Assessment (Somatosensory) sensation intact  -[facility]  Name 06/12/24 1551          Vision Assessment/Intervention    Visual Impairment/Limitations WFL  -LS       Row Name 06/12/24 1551          Range of Motion Comprehensive    Comment, General Range of Motion L shoulder with no active ROM, RUE WFL  -LS       Row Name 06/12/24 1551          Strength Comprehensive (MMT)    Comment, General Manual Muscle Testing (MMT) Assessment L shoulder 1/5, Otherwise BUEs grossly 3/5  -LS       Row Name 06/12/24 1551          Balance    Balance Assessment sitting static balance;sitting dynamic balance;standing static balance;standing dynamic balance  -LS     Static Sitting Balance standby assist  -LS     Dynamic Sitting Balance contact guard  -LS     Position, Sitting Balance unsupported;sitting edge of bed  -LS     Static Standing Balance minimal assist  -LS     Dynamic Standing Balance minimal assist  -LS     Position/Device Used, Standing Balance walker, front-wheeled;supported  -LS               User Key  (r) = Recorded By, (t) = Taken By, (c) = Cosigned By      Initials Name Provider Type    Binu Santos OT Occupational Therapist                   Goals/Plan       Row Name 06/12/24 1559          Bed Mobility Goal 1 (OT)    Activity/Assistive Device (Bed Mobility Goal 1, OT) bed mobility activities, all  -LS     [place] Level/Cues Needed (Bed Mobility Goal 1, OT) modified independence  -LS     Time Frame (Bed Mobility Goal 1, OT) long term goal (LTG);2 weeks  -LS       Row Name 06/12/24 1559          Transfer Goal 1 (OT)    Activity/Assistive Device (Transfer Goal 1, OT) transfers, all  -LS     [place] Level/Cues Needed (Transfer Goal 1, OT) modified independence  -LS     Time Frame (Transfer Goal 1, OT) long term goal (LTG);2 weeks  -LS       Row Name 06/12/24 1559          Dressing Goal 1 (OT)    Activity/Device (Dressing Goal 1, OT) dressing skills, all  -LS     [place]/Cues Needed (Dressing Goal 1, OT) modified independence  -LS     Time Frame (Dressing Goal  1, OT) long term goal (LTG);2 weeks  -LS       Row Name 06/12/24 1559          Toileting Goal 1 (OT)    Activity/Device (Toileting Goal 1, OT) toileting skills, all  -LS     [place] Level/Cues Needed (Toileting Goal 1, OT) modified independence  -LS     Time Frame (Toileting Goal 1, OT) long term goal (LTG);2 weeks  -LS       Row Name 06/12/24 1550          Therapy Assessment/Plan (OT)    Planned Therapy Interventions (OT) activity tolerance training;BADL retraining;functional balance retraining;IADL retraining;occupation/activity based interventions;ROM/therapeutic exercise;strengthening exercise;transfer/mobility retraining;patient/caregiver education/training  -LS               User Key  (r) = Recorded By, (t) = Taken By, (c) = Cosigned By      Initials Name Provider Type    LS Binu Navarro OT Occupational Therapist                   Clinical Impression       Row Name 06/12/24 1550          Pain Assessment    Pretreatment Pain Rating 5/10  -LS     Posttreatment Pain Rating 5/10  -LS     Pain Location - Side/Orientation Left  -LS     Pain Location - knee;shoulder;abdomen  -LS       Row Name 06/12/24 1556          Plan of Care Review    Plan of Care Reviewed With patient  -LS     Outcome Evaluation Patient is a 84 y.o. male with a history of PE on Eliquis, ITP, COPD, CKD, diabetes, hypertension, CVA, and psoriasis who [facility] with abdominal pain. CT abd/pelic w contrast on admission shows cholelithiasis and obstructive choledocholithiasis with mild biliary dilation. Surgery consulted, then performed ERCP, sphincterectomy, balloon clearance of commonn bile duct (9-12mm), stone extraction, pancreatic stent placement. At baseline, pt pt lives w/ wife (who has dementia) and 29 y/o grandaughter who is caregiver for pt and pt's spouse. Pt lives in a [facility] w/ 0 [facility] and was reports he was previously independent w/ household mobility w/ no AD (does have RW, SPC, and w/c), was independent w/ ADLs,  "reports 1 fall ~1 month ago getting \"tangled\" in 02 tubing L shoulder/L knee (awaiting results of imaging that were done ~1 week ago per pt report) and with reports of impaired ROM/use of LUE with L shoulder/L hip pain. This date, pt able to support hi,self on LLE, but with no AROM of the L shoulder. PROM WNL and pain free until attempting to actively hold left shoulder elevated. Mod A required for supine to sit EOB. At EOB, Max A required for lower body dressing tasks including socks and brief. Mod A to come to standing and Min A to ambulate in room with RW to the chair. Pt is far from baseline, thus OT will follow. Recommend SNF at [place].  -       Row Name 06/12/24 9343          Therapy Assessment/Plan (OT)    Rehab Potential (OT) good, to achieve stated therapy goals  -     Criteria for Skilled Therapeutic Interventions Met (OT) yes;skilled treatment is necessary  -     Therapy Frequency (OT) 3 times/wk  -     Predicted Duration of Therapy Intervention (OT) until dc  -       Row Name 06/12/24 2813          Therapy Plan Review/Discharge Plan (OT)    Anticipated Discharge Disposition (OT) skilled nursing facility  -       Row Name 06/12/24 1553          Vital Signs    Pre SpO2 (%) 99  -LS     O2 Delivery Pre Treatment supplemental O2  2L  -LS     Intra SpO2 (%) 93  -LS     O2 Delivery Intra Treatment supplemental O2  -LS     Post SpO2 (%) 99  -LS     O2 Delivery Post Treatment supplemental O2  -LS     Pre Patient Position Supine  -LS     Intra Patient Position Standing  -LS     Post Patient Position Sitting  -       Row Name 06/12/24 7543          Positioning and Restraints    Pre-Treatment Position in bed  -LS     Post Treatment Position chair  -LS     In Chair notified nsg;reclined;call light within reach;encouraged to call for assist;exit alarm on  -LS               User Key  (r) = Recorded By, (t) = Taken By, (c) = Cosigned By      Initials Name Provider Type    Binu Santos, LIDIA Occupational " Therapist                   Outcome Measures       Row Name 06/12/24 1559          How much help from another is currently needed...    Putting on and taking off regular lower body clothing? 2  -LS     Bathing (including washing, rinsing, and drying) 2  -LS     Toileting (which includes using toilet bed pan or urinal) 2  -LS     Putting on and taking off regular upper body clothing 3  -LS     Taking care of personal grooming (such as brushing teeth) 3  -LS     Eating meals 4  -LS     AM-PAC 6 Clicks Score (OT) 16  -LS       Row Name 06/12/24 0800          How much help from another person do you currently need...    Turning from your back to your side while in flat bed without using bedrails? 3  -CC     Moving from lying on back to sitting on the side of a flat bed without bedrails? 3  -CC     Moving to and from a bed to a chair (including a wheelchair)? 3  -CC     Standing up from a chair using your arms (e.g., wheelchair, bedside chair)? 3  -CC     Climbing 3-5 steps with a railing? 2  -CC     To walk in hospital room? 2  -CC     AM-PAC 6 Clicks Score (PT) 16  -CC     Highest Level of Mobility Goal 5 --> Static standing  -CC       Row Name 06/12/24 1559          Functional Assessment    Outcome Measure Options AM-PAC 6 Clicks Daily Activity (OT)  -               User Key  (r) = Recorded By, (t) = Taken By, (c) = Cosigned By      Initials Name Provider Type    Binu Santos OT Occupational Therapist    CC Suzie Giles LPN Licensed Nurse                    Occupational Therapy Education       Title: PT OT SLP Therapies (Done)       Topic: Occupational Therapy (Done)       Point: ADL training (Done)       Description:   Instruct learner(s) on proper safety adaptation and remediation techniques during self care or transfers.   Instruct in proper use of assistive devices.                  Learning Progress Summary             Patient Acceptance, E,TB, VU by  at 6/12/2024 8168                             "             User Key       Initials Effective Dates Name Provider Type Discipline     09/22/22 -  Binu Navarro OT Occupational Therapist OT                  OT Recommendation and Plan  Planned Therapy Interventions (OT): activity tolerance training, BADL retraining, functional balance retraining, IADL retraining, occupation/activity based interventions, ROM/therapeutic exercise, strengthening exercise, transfer/mobility retraining, patient/caregiver education/training  Therapy Frequency (OT): 3 times/wk  Plan of Care Review  Plan of Care Reviewed With: patient  Outcome Evaluation: Patient is a 84 y.o. male with a history of PE on Eliquis, ITP, COPD, CKD, diabetes, hypertension, CVA, and psoriasis who presents to [facility] 6/11/24 with abdominal pain. CT abd/pelic w contrast on admission shows cholelithiasis and obstructive choledocholithiasis with mild biliary dilation. Surgery consulted, then performed ERCP, sphincterectomy, balloon clearance of commonn bile duct (9-12mm), stone extraction, pancreatic stent placement. At baseline, pt pt lives w/ wife (who has dementia) and 29 y/o grandaughter who is caregiver for pt and pt's spouse. Pt lives in a [facility] w/ 0 [place] and was reports he was previously independent w/ household mobility w/ no AD (does have RW, SPC, and w/c), was independent w/ ADLs, reports 1 fall ~1 month ago getting \"tangled\" in 02 tubing L shoulder/L knee (awaiting results of imaging that were done ~1 week ago per pt report) and with reports of impaired ROM/use of LUE with L shoulder/L hip pain. This date, pt able to support hi,self on LLE, but with no AROM of the L shoulder. PROM WNL and pain free until attempting to actively hold left shoulder elevated. Mod A required for supine to sit EOB. At EOB, Max A required for lower body dressing tasks including socks and brief. Mod A to come to standing and Min A to ambulate in room with RW to the chair. Pt is far from baseline, thus OT will follow. Recommend SNF " at [place].     Time Calculation:         Time Calculation- OT       Row Name 06/12/24 1559             Time Calculation- OT    OT Start Time 1108  -LS      OT Stop Time 1132  -LS      OT Time Calculation (min) 24 min  -LS      OT Received On 06/12/24  -LS      OT - Next Appointment 06/14/24  -      OT Goal Re-Cert Due Date 06/26/24  -         Untimed Charges    OT Eval/Re-eval Minutes 24  -LS         Total Minutes    Untimed Charges Total Minutes 24  -LS       Total Minutes 24  -LS                User Key  (r) = Recorded By, (t) = Taken By, (c) = Cosigned By      Initials Name Provider Type    Binu Santos OT Occupational Therapist                  Therapy Charges for Today       Code Description Service Date Service Provider Modifiers Qty    [id number] HC OT EVAL MOD COMPLEXITY 4 6/12/2024 Binu Navarro OT GO 1                 Binu Navarro OT  6/12/2024

## 2024-06-12 NOTE — PLAN OF CARE
"Goal Outcome Evaluation:  Plan of Care Reviewed With: patient        Progress: no change  Outcome Evaluation: Pt is an 83 y/o M POD #2 ERCP w/ clearance of stone and stent placement secondary to obstruction of bile duct w/ choledocholithiasis and (+) for sepsis related to E. coli bacteremia. Pt also with hx of COPD on 2L home 02 PRN. At baseline, pt lives w/ wife (who has dementia) and 31 y/o grandaughter who is caregiver for pt and pt's spouse. Pt lives in a [facility] w/ 0 [place] and was reports he was previously independent w/ household mobility w/ no AD (does have RW, SPC, and w/c), was independent w/ ADLs, reports 1 fall ~1 month ago getting \"tangled\" in 02 tubing at a [place] restaurant w/ injury to L shoulder/L hip (awaiting results of imaging that were done ~1 week ago per pt report) and with reports of impaired ROM/use of LUE with L shoulder/L hip pain. During eval, pt up in recliner and reports \"I wish I could just get back to bed, I'm so tired.\" Pt required MOD A for sit to stand transfer with RW and MIN A for pivot portion of transfer to EOB. Pt then completed sit to supine transfer requiring MIN A for BLEs into bed and MAX A x 2 for repositioning to HOB. Pt with increased SOA with very limited activity, however, with Sp02 95-99% on RA throughout session. Pt far from baseline and not safe for home, and at increased risk for continued falls. Recommending SNF at d/c and plan to see 5x/week at [facility] for progression of mobility. PPE: gloves      Anticipated Discharge Disposition (PT): skilled nursing facility                        "

## 2024-06-12 NOTE — THERAPY EVALUATION
Patient Name: Praneeth Nam  : 1940    MRN: [id number]                              Today's Date: 2024       Admit Date: 6/10/2024    Visit Dx:     ICD-10-CM ICD-9-CM   1. Cholangitis  K83.09 576.1   2. Choledocholithiasis  K80.50 574.50     Patient Active Problem List   Diagnosis    T-cell large granular lymphocytic leukemia     Chronic ITP (idiopathic thrombocytopenia)    Rheumatoid arthritis    CKD (chronic kidney disease), stage III    Bilateral pulmonary embolism    Elevated factor VIII level    Other myelodysplastic syndromes    Hypogammaglobulinemia    Bronchitis    Subdural hematoma, nontraumatic    Lung nodule    Abnormal cardiovascular stress test    AV block, 1st degree    COPD (chronic obstructive pulmonary disease)    Dizziness    Dyslipidemia    Exertional shortness of breath    Fatigue    History of pulmonary embolism    Hypertension    Keratoconjunctivitis sicca, in Sjogren's syndrome    MITZI (obstructive sleep apnea)    Small plaque parapsoriasis    Stroke-like symptoms    Type 2 diabetes mellitus    Bilateral pulmonary embolism    Elevated antinuclear antibody (NAIMA) level    Seropositive rheumatoid arthritis    RBBB    Anemia, chronic disease    Pleural effusion    Sepsis    Moderate malnutrition    Permanent atrial fibrillation    Secondary hypercoagulability disorder    Abnormal LFTs    UTI (urinary tract infection), bacterial    Cholangitis    Choledocholithiasis     Past Medical History:   Diagnosis Date    Acute pulmonary embolism 2018    bilateral    Arthritis     bilateral knees and ankles    CKD (chronic kidney disease) 2009    Coagulopathy 2008    COPD (chronic obstructive pulmonary disease)     Diabetes mellitus     History of ITP 2019    History of pneumonia 2015    hospitalized with community-acquired pneumonia, possibly viral     History of prostate cancer 10/2009    [place] 6, Stage II    Hypercholesterolemia     Hypertension     Large granular  lymphocytic leukemia 08/2005    T-Cell Large granular lymphocytic leukemia    Neutropenia 11/2003    MITZI (obstructive sleep apnea) 2018    Psoriasis 2000    Renal cyst, left     Rheumatoid arthritis     Stroke (cerebrum)     Thrombocytopenia 08/23/2005     Past Surgical History:   Procedure Laterality Date    ENDOSCOPY N/A 4/26/2024    Procedure: ESOPHAGOGASTRODUODENOSCOPY WITH DILATION (#46 BOUGIE);  Surgeon: Gamal Yancey MD;  Location: [facility] END[facility]COPY;  Service: Gastroenterology;  Laterality: N/A;  DUODENAL DIVERTICULUM, HIATAL HERNIA, DISTAL ESOPHAGEAL STRICTURE    ERCP N/A 6/10/2024    Procedure: ENDOSCOPIC RETROGRADE CHOLANGIOPANCREATOGRAPHY, sphincterotomy, balloon clearance of commonn bile duct (9-12mm), stone extraction, pancreatic stent placement;  Surgeon: Mariaelena Snyder MD;  Location: [facility] ENDOSCOPY;  Service: Gastroenterology;  Laterality: N/A;  post: choledocholithiasis    SKIN LESION EXCISION      back and groin-benign      General Information       Row Name 06/12/24 7232          Physical Therapy Time and Intention    Document Type evaluation  -AO     Mode of Treatment physical therapy  -AO       Row Name 06/12/24 9411          General Information    Patient Profile Reviewed yes  -AO     Prior Level of Function --  Pt reports he was previously independent w/ household mobility w/ no AD (does have RW, SPC, and w/c), was independent w/ ADLs, reports 1 fall ~1 month ago w/ injury to L shoulder/L hip (awaiting results of imaging that were done ~1 week ago per pt report  -AO     Existing Precautions/Restrictions fall  -AO     Barriers to Rehab none identified  -AO       Row Name 06/12/24 1408          Living Environment    People in Home spouse;grandchild(elio)  Lives w/ wife (who has dementia) and 31 y/o grandaughter who is caregiver for pt and pt's spouse  -AO       Row Name 06/12/24 1401          Home Main Entrance    Number of Stairs, Main Entrance none  -AO       Row Name 06/12/24 6858           Stairs Within Home, Primary    Number of Stairs, Within Home, Primary none  -AO       Row Name 06/12/24 1405          Cognition    Orientation Status (Cognition) oriented x 4  -AO       Row Name 06/12/24 1405          Safety Issues, Functional Mobility    Impairments Affecting Function (Mobility) balance;endurance/activity tolerance;shortness of breath;strength;pain;range of motion (ROM)  -AO               User Key  (r) = Recorded By, (t) = Taken By, (c) = Cosigned By      Initials Name Provider Type    AO Mackenzie Carvalho PT Physical Therapist                   Mobility       Row Name 06/12/24 1405          Bed Mobility    Bed Mobility sit-supine  -AO     Sit-Supine [place] (Bed Mobility) minimum assist (75% patient effort)  -AO     Comment, (Bed Mobility) Assist for BLEs into bed and for safely lowering trunk  -AO       Row Name 06/12/24 1405          Bed-Chair Transfer    Bed-Chair [place] (Transfers) minimum assist (75% patient effort)  -AO     Assistive Device (Bed-Chair Transfers) walker, front-wheeled  -AO     Comment, (Bed-Chair Transfer) stand pivot sit from recliner to EOB  -AO       Row Name 06/12/24 1405          Sit-Stand Transfer    Sit-Stand [place] (Transfers) moderate assist (50% patient effort)  -AO     Assistive Device (Sit-Stand Transfers) walker, front-wheeled  -AO       Row Name 06/12/24 1405          Gait/Stairs (Locomotion)    [place] Level (Gait) unable to assess  -AO     Comment, (Gait/Stairs) Pt too weak/tired from being up in recliner most of the day  -AO               User Key  (r) = Recorded By, (t) = Taken By, (c) = Cosigned By      Initials Name Provider Type    Mackenzie Diamond PT Physical Therapist                   Obj/Interventions       Row Name 06/12/24 1405          Range of Motion Comprehensive    General Range of Motion lower extremity range of motion deficits identified;upper extremity range of motion deficits identified  -AO     Comment,  General Range of Motion RUE AROM elevation imparied ~50%, LUE AROM elevation impaired >75% by pain/weakness, L hip flexion impaired ~24-50%  -AO       Row Name 06/12/24 1405          Strength Comprehensive (MMT)    General Manual Muscle Testing (MMT) Assessment upper extremity strength deficits identified;lower extremity strength deficits identified  -AO     Comment, General Manual Muscle Testing (MMT) Assessment L hip flexion: 2/5, L knee flexion/extension: 3+/5, R hip flexion: 3/5, R knee flexion/extension: 4-/5  -AO       Row Name 06/12/24 1405          Balance    Balance Assessment sitting static balance;sitting dynamic balance;sit to stand dynamic balance;standing static balance;standing dynamic balance  -AO     Static Sitting Balance standby assist  -AO     Dynamic Sitting Balance contact guard  -AO     Position, Sitting Balance unsupported;sitting edge of bed  -AO     Sit to Stand Dynamic Balance moderate assist  -AO     Static Standing Balance minimal assist  -AO     Dynamic Standing Balance minimal assist;moderate assist  -AO     Position/Device Used, Standing Balance supported;walker, rolling  -AO     Balance Interventions weight shifting activity  -AO       Row Name 06/12/24 1405          Sensory Assessment (Somatosensory)    Sensory Assessment (Somatosensory) sensation intact  -AO               User Key  (r) = Recorded By, (t) = Taken By, (c) = Cosigned By      Initials Name Provider Type    AO Mackenzie Carvalho, PT Physical Therapist                   Goals/Plan       Row Name 06/12/24 1405          Bed Mobility Goal 1 (PT)    Activity/Assistive Device (Bed Mobility Goal 1, PT) bed mobility activities, all  -AO     [place] Level/Cues Needed (Bed Mobility Goal 1, PT) standby assist  -AO     Time Frame (Bed Mobility Goal 1, PT) long term goal (LTG);3 weeks  -AO     Progress/Outcomes (Bed Mobility Goal 1, PT) goal not met  -AO       Row Name 06/12/24 1405          Transfer Goal 1 (PT)     Activity/Assistive Device (Transfer Goal 1, PT) transfers, all;walker, rolling  -AO     [place] Level/Cues Needed (Transfer Goal 1, PT) contact guard required  -AO     Time Frame (Transfer Goal 1, PT) long term goal (LTG);2 weeks  -AO     Progress/Outcome (Transfer Goal 1, PT) goal not met  -AO       Row Name 06/12/24 1407          Gait Training Goal 1 (PT)    Activity/Assistive Device (Gait Training Goal 1, PT) gait (walking locomotion);walker, rolling  -AO     [place] Level (Gait Training Goal 1, PT) contact guard required  -AO     Distance (Gait Training Goal 1, PT) 15 ft  -AO     Time Frame (Gait Training Goal 1, PT) long term goal (LTG);2 weeks  -AO     Progress/Outcome (Gait Training Goal 1, PT) goal not met  -AO       Row Name 06/12/24 6503          Therapy Assessment/Plan (PT)    Planned Therapy Interventions (PT) balance training;bed mobility training;gait training;home exercise program;neuromuscular re-education;patient/family education;strengthening;transfer training  -AO               User Key  (r) = Recorded By, (t) = Taken By, (c) = Cosigned By      Initials Name Provider Type    AO Mackenzie Carvalho, PT Physical Therapist                   Clinical Impression       Row Name 06/12/24 4844          Pain    Pretreatment Pain Rating --  -AO     Posttreatment Pain Rating --  -AO     Additional Documentation Pain Scale: FACES Pre/Post-Treatment (Group)  -AO       Row Name 06/12/24 8235          Pain Scale: FACES Pre/Post-Treatment    Pain: FACES Scale, Pretreatment 2-->hurts little bit  -AO     Posttreatment Pain Rating 4-->hurts little more  -AO     Pain Location - Side/Orientation Left  -AO     Pain Location - hip;shoulder  -AO       Row Name 06/12/24 9800          Plan of Care Review    Plan of Care Reviewed With patient  -AO     Progress no change  -AO     Outcome Evaluation Pt is an 83 y/o M POD #2 ERCP w/ clearance of stone and stent placement secondary to obstruction of bile duct w/  "choledocholithiasis and (+) for sepsis related to E. coli bacteremia. Pt also with hx of COPD on 2L home 02 PRN. At baseline, pt lives w/ wife (who has dementia) and 29 y/o grandaughter who is caregiver for pt and pt's spouse. Pt lives in a [facility] w/ 0 MARQUITA and was reports he was previously independent w/ household mobility w/ no AD (does have RW, SPC, and w/c), was independent w/ ADLs, reports 1 fall ~1 month ago getting \"tangled\" in 02 tubing at a [employer] restaurant w/ injury to L shoulder/L hip (awaiting results of imaging that were done ~1 week ago per pt report) and with reports of impaired ROM/use of LUE with L shoulder/L hip pain. During eval, pt up in recliner and reports \"I wish I could just get back to bed, I'm so tired.\" Pt required MOD A for sit to stand transfer with RW and MIN A for pivot portion of transfer to EOB. Pt then completed sit to supine transfer requiring MIN A for BLEs into bed and MAX A x 2 for repositioning to HOB. Pt with increased SOA with very limited activity, however, with Sp02 95-99% on RA throughout session. Pt far from baseline and not safe for home, and at increased risk for continued falls. Recommending SNF at d/c and plan to see 5x/week at [facility] for progression of mobility. PPE: gloves  -AO       Row Name 06/12/24 1568          Therapy Assessment/Plan (PT)    Rehab Potential (PT) good, to achieve stated therapy goals  -AO     Criteria for Skilled Interventions Met (PT) yes;meets criteria;skilled treatment is necessary  -AO     Therapy Frequency (PT) 5 times/wk  -AO     Predicted Duration of Therapy Intervention (PT) until d/c  -AO       Row Name 06/12/24 1403          Vital Signs    Recovery Time increased SOA with limited mobility but with Sp02 >95% on RA throughout session  -AO       Row Name 06/12/24 1408          Positioning and Restraints    Pre-Treatment Position sitting in chair/recliner  -AO     Post Treatment Position bed  -AO     In Bed notified nsg;supine;call light within " reach;encouraged to call for assist;exit alarm on  -AO               User Key  (r) = Recorded By, (t) = Taken By, (c) = Cosigned By      Initials Name Provider Type    AO Mackenzie Carvalho, PT Physical Therapist                   Outcome Measures       Row Name 06/12/24 0800          How much help from another person do you currently need...    Turning from your back to your side while in flat bed without using bedrails? 3  -CC     Moving from lying on back to sitting on the side of a flat bed without bedrails? 3  -CC     Moving to and from a bed to a chair (including a wheelchair)? 3  -CC     Standing up from a chair using your arms (e.g., wheelchair, bedside chair)? 3  -CC     Climbing 3-5 steps with a railing? 2  -CC     To walk in hospital room? 2  -CC     AM-PAC 6 Clicks Score (PT) 16  -CC     Highest Level of Mobility Goal 5 --> Static standing  -CC               User Key  (r) = Recorded By, (t) = Taken By, (c) = Cosigned By      Initials Name Provider Type    CC Suzie Giles LPN Licensed Nurse                                 Physical Therapy Education       Title: PT OT SLP Therapies (Done)       Topic: Physical Therapy (Done)       Point: Mobility training (Done)       Learning Progress Summary             Patient Acceptance, E,TB, VU by AO at 6/12/2024 1452                                         User Key       Initials Effective Dates Name Provider Type Discipline    AO 06/16/21 -  Mackenzie Carvalho, PT Physical Therapist PT                  PT Recommendation and Plan  Planned Therapy Interventions (PT): balance training, bed mobility training, gait training, home exercise program, neuromuscular re-education, patient/family education, strengthening, transfer training  Plan of Care Reviewed With: patient  Progress: no change  Outcome Evaluation: Pt is an 83 y/o M POD #2 ERCP w/ clearance of stone and stent placement secondary to obstruction of bile duct w/ choledocholithiasis and (+) for sepsis related  "to E. coli bacteremia. Pt also with hx of COPD on 2L home 02 PRN. At baseline, pt lives w/ wife (who has dementia) and 29 y/o grandaughter who is caregiver for pt and pt's spouse. Pt lives in a [facility] w/ 0 [place]QU[place] and was reports he was previously independent w/ household mobility w/ no AD (does have RW, SPC, and w/c), was independent w/ ADLs, reports 1 fall ~1 month ago getting \"tangled\" in 02 tubing at a [employer] restaurant w/ injury to L shoulder/L hip (awaiting results of imaging that were done ~1 week ago per pt report) and with reports of impaired ROM/use of LUE with L shoulder/L hip pain. During eval, pt up in recliner and reports \"I wish I could just get back to bed, I'm so tired.\" Pt required MOD A for sit to stand transfer with RW and MIN A for pivot portion of transfer to EOB. Pt then completed sit to supine transfer requiring MIN A for BLEs into bed and MAX A x 2 for repositioning to HOB. Pt with increased SOA with very limited activity, however, with Sp02 95-99% on RA throughout session. Pt far from baseline and not safe for home, and at increased risk for continued falls. Recommending SNF at d/c and plan to see 5x/week at [facility] for progression of mobility. PPE: gloves     Time Calculation:   PT Evaluation Complexity  History, PT Evaluation Complexity: 1-2 personal factors and/or comorbidities  Examination of Body Systems (PT Eval Complexity): total of 3 or more elements  Clinical Presentation (PT Evaluation Complexity): evolving  Clinical Decision Making (PT Evaluation Complexity): moderate complexity  Overall Complexity (PT Evaluation Complexity): moderate complexity     PT Charges       Row Name 06/12/24 1453             Time Calculation    Start Time 1405  -AO      Stop Time 1425  -AO      Time Calculation (min) 20 min  -AO      PT Received On 06/12/24  -AO      PT - Next Appointment 06/13/24  -AO      PT Goal Re-Cert Due Date 06/26/24  -AO         Time Calculation- PT    Total Timed Code Minutes- PT 0 " minute(s)  -AO                User Key  (r) = Recorded By, (t) = Taken By, (c) = Cosigned By      Initials Name Provider Type    AO Mackenzie Carvalho, PT Physical Therapist                  Therapy Charges for Today       Code Description Service Date Service Provider Modifiers Qty    60521776676 HC PT EVAL MOD COMPLEXITY 4 6/12/2024 Mackenzie Carvalho, PT GP 1            PT G-Codes  AM-PAC 6 Clicks Score (PT): 16  PT Discharge Summary  Anticipated Discharge Disposition (PT): skilled nursing facility    Mackenzie Carvalho PT  6/12/2024

## 2024-06-12 NOTE — PLAN OF CARE
Goal Outcome Evaluation:                 Pt with no complaints at this time. Tolerating diet well. Up as a 2 assist with a walker. Safety measures in place. Plan of care on going.

## 2024-06-12 NOTE — PROGRESS NOTES
LOS: 2 days   Patient Care Team:  Guilherme Jason MD as PCP - General (Family Medicine)  Misty Dias MD as Consulting Physician (Cardiology)  Nora Kenyon, BRIAN, APRN as Nurse Practitioner (Thoracic Surgery)  Sussy Shane MD as Consulting Physician (Nephrology)    Subjective     Interval History: no significant change    Patient Complaints: abdominal pain, left arm edema    History taken from: patient    Review of Systems   Constitutional:  Negative for appetite change and fever.   Respiratory:  Positive for shortness of breath. Negative for cough and chest tightness.    Gastrointestinal:  Positive for abdominal pain. Negative for blood in stool, constipation, diarrhea, nausea and vomiting.           Objective     Vital Signs  Temp:  [97.5 °F (36.4 °C)-98.2 °F (36.8 °C)] 98 °F (36.7 °C)  Heart Rate:  [52-77] 59  Resp:  [16-28] 22  BP: (120-156)/(46-74) 156/74    Physical Exam:     General Appearance:    Alert, cooperative, in no acute distress,   Head:    Normocephalic, without obvious abnormality, atraumatic   Eyes:            Lids and lashes normal, conjunctivae and sclerae normal, no   icterus, no pallor, corneas clear, PERRLA   Ears:    Ears appear intact with no abnormalities noted   Throat:   No oral lesions, no thrush, oral mucosa moist   Neck:   No adenopathy, supple, trachea midline, no thyromegaly, no   carotid bruit, no JVD   Lungs:     Clear to auscultation,respirations regular, even and                  unlabored    Heart:    Regular rhythm and normal rate, normal S1 and S2, no            murmur, no gallop, no rub, no click   Chest Wall:    No abnormalities observed   Abdomen:     Normal bowel sounds, no masses, no organomegaly, soft. RUQ, LLQ, epigastric tenderness, non-distended, no guarding,   Extremities:   Moves all extremities well, no edema, no cyanosis, no             Redness   Pulses:   Pulses palpable and equal bilaterally   Skin:   No bleeding, bruising or rash    Lymph nodes:   No palpable adenopathy   Neurologic:   No focal deficit         Results Review:    Lab Results (last 24 hours)       Procedure Component Value Units Date/Time    POC Glucose Finger 4x Daily Before Meals & at Bedtime [[id number]]  (Normal) Collected: 06/12/24 1148    Specimen: Blood from Finger Updated: 06/12/24 1150     Glucose 87 mg/dL      Comment: Serial Number: [id number]:  [id number]       POC Glucose Finger 4x Daily Before Meals & at Bedtime [[id number]]  (Normal) Collected: 06/12/24 0716    Specimen: Blood from Finger Updated: 06/12/24 0717     Glucose 92 mg/dL      Comment: Serial Number: [id number]:  [id number]       Blood Culture - Blood, Arm, Left [[id number]]  (Abnormal) Collected: 06/10/24 0524    Specimen: Blood from Arm, Left Updated: 06/12/24 0715     Blood Culture Escherichia coli     Isolated from Aerobic Bottle     Gram Stain Aerobic Bottle Gram negative bacilli    Narrative:      Less than seven (7) mL's of blood was collected.  Insufficient quantity may yield false negative results.    Refer to previous blood culture collected on 06/10/2024 0524 for MICs      Blood Culture - Blood, Arm, Right [[id number]]  (Abnormal)  (Susceptibility) Collected: 06/10/24 0524    Specimen: Blood from Arm, Right Updated: 06/12/24 0713     Blood Culture Escherichia coli     Isolated from Aerobic and Anaerobic Bottles     Gram Stain Aerobic Bottle Gram negative bacilli      Anaerobic Bottle Gram negative bacilli    Susceptibility        Escherichia coli      LNACE      Amoxicillin + Clavulanate Susceptible      Ampicillin Intermediate      Ampicillin + Sulbactam Susceptible      Cefepime Susceptible      Ceftazidime Susceptible      Ceftriaxone Susceptible      Gentamicin Susceptible      Levofloxacin Susceptible      Piperacillin + Tazobactam Susceptible      Trimethoprim + Sulfamethoxazole Susceptible                       Susceptibility Comments       Escherichia coli    Cefazolin  sensitivity will not be reported for Enterobacteriaceae in non-urine isolates. If cefazolin is preferred, please call the microbiology lab to request an E-test.  With the exception of urinary-sourced infections, aminoglycosides should not be used as monotherapy.               CBC & Differential [[id number]]  (Abnormal) Collected: 06/11/24 2350    Specimen: Blood from Arm, Right Updated: 06/12/24 0048    Narrative:      The following orders were created for panel order CBC & Differential.  Procedure                               Abnormality         Status                     ---------                               -----------         ------                     CBC Auto Differential[[id number]]        Abnormal            Final result               Scan Slide[[id number]]                                       Final result                 Please view results for these tests on the individual orders.    CBC Auto Differential [[id number]]  (Abnormal) Collected: 06/11/24 2350    Specimen: Blood from Arm, Right Updated: 06/12/24 0048     WBC 12.39 10*3/mm3      RBC 2.64 10*6/mm3      Hemoglobin 8.3 g/dL      Hematocrit 26.9 %      .9 fL      MCH 31.4 pg      MCHC 30.9 g/dL      RDW 14.6 %      RDW-SD 53.8 fl      MPV 13.0 fL      Platelets 111 10*3/mm3     Narrative:      The previously reported component NRBC is no longer being reported. Previous result was 0.0 /100 WBC (Reference Range: 0.0-0.2 /100 WBC) on 6/12/2024 at 0017 EDT.    Scan Slide [[id number]] Collected: 06/11/24 2350    Specimen: Blood from Arm, Right Updated: 06/12/24 0048     Scan Slide --     Comment: See Manual Differential Results       Manual Differential [[id number]]  (Abnormal) Collected: 06/11/24 2350    Specimen: Blood from Arm, Right Updated: 06/12/24 0048     Neutrophil % 83.0 %      Lymphocyte % 5.0 %      Monocyte % 3.0 %      Bands %  9.0 %      Neutrophils Absolute 11.40 10*3/mm3      Lymphocytes Absolute 0.62 10*3/mm3      Monocytes Absolute  0.37 10*3/mm3      Anisocytosis Slight/1+     Macrocytes Slight/1+     Poikilocytes Slight/1+     WBC Morphology Normal     Platelet Estimate Decreased     Large Platelets Slight/1+    Comprehensive Metabolic Panel [[id number]]  (Abnormal) Collected: 06/11/24 2350    Specimen: Blood from Arm, Right Updated: 06/12/24 0025     Glucose 104 mg/dL      BUN 43 mg/dL      Creatinine 1.65 mg/dL      Sodium 140 mmol/L      Potassium 4.9 mmol/L      Chloride 114 mmol/L      CO2 18.0 mmol/L      Calcium 7.9 mg/dL      Total Protein 5.2 g/dL      Albumin 2.7 g/dL      ALT (SGPT) 50 U/L      AST (SGOT) 71 U/L      Alkaline Phosphatase 208 U/L      Total Bilirubin 1.2 mg/dL      Globulin 2.5 gm/dL      A/G Ratio 1.1 g/dL      BUN/Creatinine Ratio 26.1     Anion Gap 8.0 mmol/L      eGFR 40.9 mL/min/1.73     Narrative:      GFR Normal >60  Chronic Kidney Disease <60  Kidney Failure <15    The GFR formula is only valid for adults with stable renal function between ages 18 and 70.    Lipase [[id number]]  (Normal) Collected: 06/11/24 2350    Specimen: Blood from Arm, Right Updated: 06/12/24 0025     Lipase 28 U/L     POC Glucose Once [[id number]]  (Abnormal) Collected: 06/11/24 2113    Specimen: Blood Updated: 06/11/24 2114     Glucose 113 mg/dL      Comment: Serial Number: [id number]:  [id number]       POC Glucose Once [[id number]]  (Abnormal) Collected: 06/11/24 1632    Specimen: Blood Updated: 06/11/24 1633     Glucose 138 mg/dL      Comment: Serial Number: [id number]:  [id number]                Imaging Results (Last 24 Hours)       Procedure Component Value Units Date/Time    XR Chest 1 View [[id number]] Collected: 06/12/24 1227     Updated: 06/12/24 1231    Narrative:      XR CHEST 1 VW    Date of Exam: 6/12/2024 11:53 AM EDT    Indication: Shortness of breath    Comparison: 6/10/2024.    Findings:  The heart is enlarged. There is stable small bilateral basilar pleural effusions with atelectasis and less likely pneumonia.  The pulmonary vascular markings are normal. There are chronic age-related changes involving the bony thorax and thoracic aorta.      Impression:      Impression:  No interval change from the study performed 2 days ago with abnormalities as described.      Electronically Signed: Jayy Osborn MD    6/12/2024 12:29 PM EDT    Workstation ID: [id number]    FL ERCP pancreatic and biliary ducts [[id number]] Collected: 06/12/24 0144     Updated: 06/12/24 0149    Narrative:      FL ERCP PANCREATIC AND BILIARY DUCTS    Date of Exam: 6/10/2024 3:55 PM EDT    Indication: ENDOSCOPIC RETROGRADE CHOLANGIOPANCREATOGRAPHY, sphincterotomy, balloon clearance of commonn bile duct (9-12mm), stone extraction, pancreatic stent placement.    Comparison: None available.    Technique:  A series of radiographic digital spot films were obtained in conjunction with a endoscopic catheterization of the biliary and pancreatic ductal system, performed by the gastroenterologist.    Fluoroscopic Time: 3.7 minutes    Number of Images: 6    Findings:  Images demonstrate cannulation of the common bile duct with retrograde cholangiopancreatography. There are multiple filling defects seen within the common bile duct with changes of balloon sweep. Final image demonstrates a pancreatic stent in place.   There is no intrahepatic biliary ductal dilatation.      Impression:      Impression:  Changes from pancreatic stent placement and balloon sweep to clear multiple filling defects of the common bile duct.      Electronically Signed: Edgar Khan MD    6/12/2024 1:47 AM EDT    Workstation ID: [id number]                 I reviewed the patient's new clinical results.    Medication Review:   Scheduled Meds:cefTRIAXone, 2,000 mg, Intravenous, Q24H  ipratropium-albuterol, 3 mL, Nebulization, 4x Daily - RT  midodrine, 5 mg, Oral, TID AC  pantoprazole, 40 mg, Intravenous, BID AC  sodium chloride, 10 mL, Intravenous, Q12H      Continuous Infusions:   PRN Meds:.   acetaminophen    albuterol    Calcium Replacement - Follow Nurse / BPA Driven Protocol    dextrose    Magnesium Standard Dose Replacement - Follow Nurse / BPA Driven Protocol    nitroglycerin    ondansetron    Phosphorus Replacement - Follow Nurse / BPA Driven Protocol    Potassium Replacement - Follow Nurse / BPA Driven Protocol    sodium chloride    sodium chloride    sodium chloride    sodium chloride     Assessment & Plan       Sepsis    Cholangitis    Choledocholithiasis      Sepsis  Patient afebrile today. WBC elevated, likely result of surgery and infection.  E.coli identified in 2/2 blood culture bottle.  E.coli confirmed with PCR. ID switched antibiotics to Rocephin.      Cholangitis, Choledocholithiasis   ERCP successful at removing several stones and stent placement. Cholecystectomy in near future overseen by general surgery. Continue to hold Eliquis. Patient complains of diffuse abdominal pain, denies NV, is having normal bowel movement, able to eat well.      Elevated troponin   Chronically elevated based on review of chart, no chest pain, no acute EKG changes- no ischemic workup planned      4. Shortness of breath   Patient on 2L of oxygen and 100 SpO2, turned down to 1L to improve breathing. Ordered CXR, respiratory panel negative.     5. L arm swelling   Swelling likely due to large amounts of fluids being given, ordered lasix to decrease edema.     6. PAF  Holding eliquis, currently in sinus rhythm      7. H/o ITP  Today down to 101, continue to monitor platelet count.      8. H/o PE   Resume anticoagulation when able      9. Ulcer prophylaxis   Continue PPI      10. Hypotension   Continue midodrine      11. COPD   Continue albuterol and oxygen.      12. VTE prophylaxis   SCD    Plan for disposition:home at discharge     MERCEDEZ Mehta Student  06/12/24  13:02 EDT

## 2024-06-12 NOTE — PLAN OF CARE
"Goal Outcome Evaluation:  Plan of Care Reviewed With: patient           Outcome Evaluation: Patient is a 84 y.o. male with a history of PE on Eliquis, ITP, COPD, CKD, diabetes, hypertension, CVA, and psoriasis who presents to [facility]/11[facility]24 with abdominal pain. CT abd/pelic w contrast on admission shows cholelithiasis and obstructive choledocholithiasis with mild biliary dilation. Surgery consulted, then performed ERCP, sphincterectomy, balloon clearance of commonn bile duct (9-12mm), stone extraction, pancreatic stent placement. At baseline, pt pt lives w/ wife (who has dementia) and 31 y/o grandaughter who is caregiver for pt and pt's spouse. Pt lives in a [facility] w/ 0 [facility] and was reports he was previously independent w/ household mobility w/ no AD (does have RW, SPC, and w/c), was independent w/ ADLs, reports 1 fall ~1 month ago getting \"tangled\" in 02 tubing L shoulder/L knee (awaiting results of imaging that were done ~1 week ago per pt report) and with reports of impaired ROM/use of LUE with L shoulder/L hip pain. This date, pt able to support hi,self on LLE, but with no AROM of the L shoulder. PROM WNL and pain free until attempting to actively hold left shoulder elevated. Mod A required for supine to sit EOB. At EOB, Max A required for lower body dressing tasks including socks and brief. Mod A to come to standing and Min A to ambulate in room with RW to the chair. Pt is far from baseline, thus OT will follow. Recommend SNF at [place].      Anticipated Discharge Disposition (OT): skilled nursing facility                        "

## 2024-06-13 LAB
GLUCOSE BLDC GLUCOMTR-MCNC: 129 MG/DL (ref 70–105)
GLUCOSE BLDC GLUCOMTR-MCNC: 140 MG/DL (ref 70–105)
GLUCOSE BLDC GLUCOMTR-MCNC: 88 MG/DL (ref 70–105)
GLUCOSE BLDC GLUCOMTR-MCNC: 98 MG/DL (ref 70–105)

## 2024-06-13 PROCEDURE — 94799 UNLISTED PULMONARY SVC/PX: CPT

## 2024-06-13 PROCEDURE — 97110 THERAPEUTIC EXERCISES: CPT

## 2024-06-13 PROCEDURE — 25010000002 ENOXAPARIN PER 10 MG: Performed by: INTERNAL MEDICINE

## 2024-06-13 PROCEDURE — 82948 REAGENT STRIP/BLOOD GLUCOSE: CPT

## 2024-06-13 PROCEDURE — 97530 THERAPEUTIC ACTIVITIES: CPT

## 2024-06-13 PROCEDURE — 82948 REAGENT STRIP/BLOOD GLUCOSE: CPT | Performed by: INTERNAL MEDICINE

## 2024-06-13 PROCEDURE — 94664 DEMO&/EVAL PT USE INHALER: CPT

## 2024-06-13 PROCEDURE — 97116 GAIT TRAINING THERAPY: CPT

## 2024-06-13 PROCEDURE — 94761 N-INVAS EAR/PLS OXIMETRY MLT: CPT

## 2024-06-13 PROCEDURE — 97535 SELF CARE MNGMENT TRAINING: CPT

## 2024-06-13 PROCEDURE — 25010000002 CEFTRIAXONE PER 250 MG: Performed by: NURSE PRACTITIONER

## 2024-06-13 RX ORDER — ENOXAPARIN SODIUM 100 MG/ML
40 INJECTION SUBCUTANEOUS EVERY 24 HOURS
Status: DISCONTINUED | OUTPATIENT
Start: 2024-06-13 | End: 2024-06-14

## 2024-06-13 RX ADMIN — PANTOPRAZOLE SODIUM 40 MG: 40 INJECTION, POWDER, FOR SOLUTION INTRAVENOUS at 05:16

## 2024-06-13 RX ADMIN — CEFTRIAXONE 2000 MG: 2 INJECTION, POWDER, FOR SOLUTION INTRAMUSCULAR; INTRAVENOUS at 19:38

## 2024-06-13 RX ADMIN — Medication 10 ML: at 19:38

## 2024-06-13 RX ADMIN — Medication 10 ML: at 09:32

## 2024-06-13 RX ADMIN — ENOXAPARIN SODIUM 40 MG: 100 INJECTION SUBCUTANEOUS at 17:42

## 2024-06-13 RX ADMIN — IPRATROPIUM BROMIDE AND ALBUTEROL SULFATE 3 ML: .5; 3 SOLUTION RESPIRATORY (INHALATION) at 11:18

## 2024-06-13 RX ADMIN — IPRATROPIUM BROMIDE AND ALBUTEROL SULFATE 3 ML: .5; 3 SOLUTION RESPIRATORY (INHALATION) at 19:29

## 2024-06-13 RX ADMIN — IPRATROPIUM BROMIDE AND ALBUTEROL SULFATE 3 ML: .5; 3 SOLUTION RESPIRATORY (INHALATION) at 15:37

## 2024-06-13 RX ADMIN — IPRATROPIUM BROMIDE AND ALBUTEROL SULFATE 3 ML: .5; 3 SOLUTION RESPIRATORY (INHALATION) at 07:26

## 2024-06-13 NOTE — THERAPY TREATMENT NOTE
"Subjective: Pt agreeable to therapeutic plan of care.    Objective:     Bed mobility - Supine to sitting SBA; min A for scooting towards EOB.     Transfers - STS from EOB CGA using RW. STS from BSC Min A using RW. Pt also requires some assistance to place L hand on RW handle prior to standing due to impaired L shoulder AROM.     Ambulation - 5 feet + 15 feet CGA and with rolling walker    ADLs - Max A for brief management  and pericare while standing using RW for support. Pt requires assistance for setting up breakfast tray.     Therapeutic Exercise - 15 Reps B LE AROM supported sitting / chair    Vitals: WNL    Pain: 4 VAS   Location: L hip, shoulder.   Intervention for pain: Repositioned, Increased Activity, and Therapeutic Presence    Education: Provided education on the importance of mobility in the acute care setting, Verbal/Tactile Cues, ADL training, Transfer Training, and Gait Training    Assessment: Praneeth Nam presents with functional mobility impairments which indicate the need for skilled intervention. Tolerating session today without incident. Pt with impaired L shoulder AROM. Pt requires CGA-min A for all functional mobility this date. Will benefit from subacute rehab placement at discharge. Will continue to follow and progress as tolerated.     Plan/Recommendations:   If medically appropriate, Moderate Intensity Therapy recommended post-acute care. This is recommended as therapy feels the patient would require 3-4 days per week and wouldn't tolerate \"3 hour daily\" rehab intensity. SNF would be the preferred choice. If the patient does not agree to SNF, arrange HH or OP depending on home bound status. If patient is medically complex, consider LTACH. Pt requires no DME at discharge.     Pt desires Skilled Rehab placement at discharge. Pt cooperative; agreeable to therapeutic recommendations and plan of care.         Basic Mobility 6-click:  Rollin = Total, A lot = 2, A little = 3; 4 = " None  Supine>Sit:   1 = Total, A lot = 2, A little = 3; 4 = None   Sit>Stand with arms:  1 = Total, A lot = 2, A little = 3; 4 = None  Bed>Chair:   1 = Total, A lot = 2, A little = 3; 4 = None  Ambulate in room:  1 = Total, A lot = 2, A little = 3; 4 = None  3-5 Steps with railin = Total, A lot = 2, A little = 3; 4 = None  Score: 17    Modified [place]: N/A = No pre-op stroke/TIA    Post-Tx Position: Up in Chair, Alarms activated, and Call light and personal items within reach  PPE: gloves

## 2024-06-13 NOTE — PLAN OF CARE
"Assessment: Praneeth Nam presents with functional mobility impairments which indicate the need for skilled intervention. Tolerating session today without incident. Pt with impaired L shoulder AROM. Pt requires CGA-min A for all functional mobility this date. Will benefit from subacute rehab placement at discharge. Will continue to follow and progress as tolerated.     Plan/Recommendations:   If medically appropriate, Moderate Intensity Therapy recommended post-acute care. This is recommended as therapy feels the patient would require 3-4 days per week and wouldn't tolerate \"3 hour daily\" rehab intensity. SNF would be the preferred choice. If the patient does not agree to SNF, arrange HH or OP depending on home bound status. If patient is medically complex, consider LTACH. Pt requires no DME at discharge.     Pt desires Skilled Rehab placement at discharge. Pt cooperative; agreeable to therapeutic recommendations and plan of care.                 "

## 2024-06-13 NOTE — CASE MANAGEMENT/SOCIAL WORK
Continued Stay Note   Zohaib     Patient Name: Praneeth Nam  MRN: [id number]  Today's Date: 6/13/2024    Admit Date: 6/10/2024    Plan: Plan to dc to [facility], [facility], pending acceptance. No Precert required.  PASRR approved.   Discharge Plan       Row Name 06/13/24 1505       Plan    Plan Plan to dc to [facility], [facility], pending acceptance. No Precert required.  PASRR approved.    Plan Comments DC Barriers:  IV Abxs, final bld cx pending, Gen Sx/ID/GI following.               Expected Discharge Date and Time       Expected Discharge Date Expected Discharge Time    Jun 14, 2024               MARTI Kent RN  SIPS/ICU   O: [phone]  C: [phone]  [email or web address]

## 2024-06-13 NOTE — PROGRESS NOTES
LOS: 3 days   Patient Care Team:  Guilherme Jason MD as PCP - General (Family Medicine)  Misty Dias MD as Consulting Physician (Cardiology)  Nora Kenyon, BRIAN, APRN as Nurse Practitioner (Thoracic Surgery)  Sussy Shane MD as Consulting Physician (Nephrology)    Subjective     Interval History: no significant changes     Patient Complaints: shoulder pain    History taken from: patient    Review of Systems   Constitutional:  Negative for fever.   HENT: Negative.     Respiratory:  Positive for cough. Negative for shortness of breath.    Gastrointestinal: Negative.  Negative for blood in stool, diarrhea, nausea and vomiting.        Epigastric   Genitourinary: Negative.    Musculoskeletal:  Positive for arthralgias.   Neurological:  Positive for weakness.   Psychiatric/Behavioral: Negative.             Objective     Vital Signs  Temp:  [98 °F (36.7 °C)-98.7 °F (37.1 °C)] 98.7 °F (37.1 °C)  Heart Rate:  [58-77] 65  Resp:  [13-28] 27  BP: (140-157)/(54-76) 157/71    Physical Exam:     General Appearance:    Alert, cooperative, in no acute distress,   Head:    Normocephalic, without obvious abnormality, atraumatic   Eyes:            Lids and lashes normal, conjunctivae and sclerae normal, no   icterus, no pallor, corneas clear, PERRLA   Ears:    Ears appear intact with no abnormalities noted   Throat:   No oral lesions, no thrush, oral mucosa moist   Neck:   No adenopathy, supple, trachea midline, no thyromegaly, no   carotid bruit, no JVD   Lungs:     Clear to auscultation,respirations regular, even and                  unlabored    Heart:    Regular rhythm and normal rate, normal S1 and S2, no            murmur, no gallop, no rub, no click   Chest Wall:    No abnormalities observed   Abdomen:     Normal bowel sounds, no masses, no organomegaly, soft        Epigastric tenderness, non-distended, no guarding,   Extremities:   Moves all extremities well, no edema, no cyanosis, no              Redness   Pulses:   Pulses palpable and equal bilaterally   Skin:   No bleeding, bruising or rash   Lymph nodes:   No palpable adenopathy   Neurologic:   No focal deficit         Results Review:    Lab Results (last 24 hours)       Procedure Component Value Units Date/Time    POC Glucose Finger 4x Daily Before Meals & at Bedtime [[id number]]  (Normal) Collected: 06/13/24 0715    Specimen: Blood from Finger Updated: 06/13/24 0717     Glucose 88 mg/dL      Comment: Serial Number: [id number]:  [id number]       CBC & Differential [[id number]]  (Abnormal) Collected: 06/12/24 2324    Specimen: Blood from Arm, Left Updated: 06/12/24 2357    Narrative:      The following orders were created for panel order CBC & Differential.  Procedure                               Abnormality         Status                     ---------                               -----------         ------                     CBC Auto Differential[[id number]]        Abnormal            Final result               Scan Slide[[id number]]                                       Final result                 Please view results for these tests on the individual orders.    Manual Differential [[id number]]  (Abnormal) Collected: 06/12/24 2324    Specimen: Blood from Arm, Left Updated: 06/12/24 2357     Neutrophil % 85.0 %      Lymphocyte % 12.0 %      Monocyte % 2.0 %      Basophil % 1.0 %      Neutrophils Absolute 7.57 10*3/mm3      Lymphocytes Absolute 1.07 10*3/mm3      Monocytes Absolute 0.18 10*3/mm3      Basophils Absolute 0.09 10*3/mm3      Anisocytosis Slight/1+     Chinmay Cells Slight/1+     Poikilocytes Slight/1+     Vacuolated Neutrophils Slight/1+     Platelet Estimate Decreased     Large Platelets Slight/1+    CBC Auto Differential [[id number]]  (Abnormal) Collected: 06/12/24 2324    Specimen: Blood from Arm, Left Updated: 06/12/24 2357     WBC 8.91 10*3/mm3      RBC 2.80 10*6/mm3      Hemoglobin 8.8 g/dL      Hematocrit 27.9 %      MCV 99.6 fL       MCH 31.4 pg      MCHC 31.5 g/dL      RDW 14.5 %      RDW-SD 53.0 fl      MPV 13.4 fL      Platelets 115 10*3/mm3      Comment: Result checked         Scan Slide [[id number]] Collected: 06/12/24 2324    Specimen: Blood from Arm, Left Updated: 06/12/24 2357     Scan Slide --     Comment: See Manual Differential Results       Comprehensive Metabolic Panel [[id number]]  (Abnormal) Collected: 06/12/24 2324    Specimen: Blood from Arm, Left Updated: 06/12/24 2355     Glucose 101 mg/dL      BUN 47 mg/dL      Creatinine 1.45 mg/dL      Sodium 142 mmol/L      Potassium 4.9 mmol/L      Chloride 113 mmol/L      CO2 20.1 mmol/L      Calcium 8.3 mg/dL      Total Protein 5.4 g/dL      Albumin 2.8 g/dL      ALT (SGPT) 46 U/L      AST (SGOT) 48 U/L      Alkaline Phosphatase 285 U/L      Total Bilirubin 0.8 mg/dL      Globulin 2.6 gm/dL      A/G Ratio 1.1 g/dL      BUN/Creatinine Ratio 32.4     Anion Gap 8.9 mmol/L      eGFR 47.8 mL/min/1.73     Narrative:      GFR Normal >60  Chronic Kidney Disease <60  Kidney Failure <15    The GFR formula is only valid for adults with stable renal function between ages 18 and 70.    POC Glucose Once [[id number]]  (Abnormal) Collected: 06/12/24 2038    Specimen: Blood Updated: 06/12/24 2040     Glucose 115 mg/dL      Comment: Serial Number: [id number]:  [id number]       POC Glucose Once [[id number]]  (Abnormal) Collected: 06/12/24 1607    Specimen: Blood Updated: 06/12/24 1609     Glucose 143 mg/dL      Comment: Serial Number: [id number]:  [id number]       POC Glucose Finger 4x Daily Before Meals & at Bedtime [[id number]]  (Normal) Collected: 06/12/24 1148    Specimen: Blood from Finger Updated: 06/12/24 1150     Glucose 87 mg/dL      Comment: Serial Number: [id number]:  [id number]                Imaging Results (Last 24 Hours)       Procedure Component Value Units Date/Time    XR Chest 1 View [[id number]] Collected: 06/12/24 1227     Updated: 06/12/24 1231    Narrative:      XR CHEST  1 VW    Date of Exam: 6/12/2024 11:53 AM EDT    Indication: Shortness of breath    Comparison: 6/10/2024.    Findings:  The heart is enlarged. There is stable small bilateral basilar pleural effusions with atelectasis and less likely pneumonia. The pulmonary vascular markings are normal. There are chronic age-related changes involving the bony thorax and thoracic aorta.      Impression:      Impression:  No interval change from the study performed 2 days ago with abnormalities as described.      Electronically Signed: Jayy Osborn MD    6/12/2024 12:29 PM EDT    Workstation ID: [id number]                 I reviewed the patient's new clinical results.    Medication Review:   Scheduled Meds:cefTRIAXone, 2,000 mg, Intravenous, Q24H  enoxaparin, 30 mg, Subcutaneous, Q24H  ipratropium-albuterol, 3 mL, Nebulization, 4x Daily - RT  midodrine, 5 mg, Oral, TID AC  pantoprazole, 40 mg, Intravenous, Q AM  sodium chloride, 10 mL, Intravenous, Q12H      Continuous Infusions:   PRN Meds:.  acetaminophen    albuterol    Calcium Replacement - Follow Nurse / BPA Driven Protocol    dextrose    Magnesium Standard Dose Replacement - Follow Nurse / BPA Driven Protocol    nitroglycerin    ondansetron    Phosphorus Replacement - Follow Nurse / BPA Driven Protocol    Potassium Replacement - Follow Nurse / BPA Driven Protocol    sodium chloride    sodium chloride    sodium chloride    sodium chloride     Assessment & Plan       Sepsis    Cholangitis    Choledocholithiasis    Sepsis  Patient afebrile today. WBC normal.  E.coli identified in 2/2 blood culture bottle.  E.coli confirmed with PCR. Continue rocephin per ID recommendation.      Cholangitis, Choledocholithiasis   ERCP successful at removing several stones and stent placement. Cholecystectomy in near future overseen by general surgery.  Patient complains of mld epigastric pain, denies NV, is having normal bowel movement, able to eat well.      Elevated troponin   Chronically elevated  based on review of chart, no chest pain, no acute EKG changes- no ischemic workup planned      4. Shortness of breath   Patient tolerating room air today, usually on 2L at home. Patient states SOB improved. CXR showed no interval changes between 6/10 and 6/12, respiratory panel negative.     5. R arm swelling   Swelling likely due to large amounts of fluids being given, ordered lasix to decrease edema. Swelling decreased today.    6. H/o recent fall  Patient had fallen prior to admission, states he was supposed to receive xray results from Dr. Jason. Patient continues to have L shoulder pain and knee pain. Will try to locate results.      6. PAF  Currently in sinus rhythm      7. H/o ITP  Today 115, continue to monitor platelet count.      8. H/o PE   Started enoxaparin     9. Ulcer prophylaxis   Continue PPI      10. Hypotension   Continue midodrine      11. COPD   Continue albuterol and oxygen.      12. VTE prophylaxis   Enoxaparin       Plan for disposition:skilled nursing     MERCEDEZ Mehta Student  06/13/24  10:39 EDT    I have reviewed, assessed and interviewed the patient. I agree with the above assessment and plan    Alondra TERRAZAS

## 2024-06-13 NOTE — PLAN OF CARE
"Assessment: Praneeth Nam presents with ADL impairments affecting function including endurance / activity tolerance, shortness of breath, and strength. Demonstrated functioning below baseline abilities indicate the need for continued skilled intervention while inpatient. PT ambulated to restroom with RW where he completed grooming/hygiene and toileting task. Setup for grooming, but Max A for toileting. On return to chair, patient with labored breathing but O2 saturation 95%. Pt very fatigued after treatment. Tolerating session today without incident. Will continue to follow and progress as tolerated.      Plan/Recommendations:   Moderate Intensity Therapy recommended post-acute care. This is recommended as therapy feels the patient would require 3-4 days per week and wouldn't tolerate \"3 hour daily\" rehab intensity. SNF would be the preferred choice. If the patient does not agree to SNF, arrange HH or OP depending on home bound status. If patient is medically complex, consider LTACH.. Pt requires no DME at discharge.      Pt desires Skilled Rehab placement at discharge. Pt cooperative; agreeable to therapeutic recommendations and plan of care.   "

## 2024-06-13 NOTE — THERAPY TREATMENT NOTE
"Subjective: Pt agreeable to therapeutic plan of care.  Cognition: oriented to Person, Place, Time, and Situation    Objective:     Bed Mobility: CGA with excess time  Functional Transfers: CGA and with rolling walker     Balance: supported, static, dynamic, and standing CGA and with rolling walker  Functional Ambulation: CGA and with rolling walker    Grooming: SBA  ADL Position: supported standing  ADL Comments: Oral care regimen completed at sink.    Toileting: Max-A  ADL Position: supported sitting and supported standing  ADL Comments: Pt sat on standard commode in his room for voiding bowls and bladder. Required Max A for hygiene, completed in standing. Mod A for lower body clothing management.       Vitals: WNL    Pain: 0 VAS  Location: NA  Interventions for pain: N/A  Education: Provided education on the importance of mobility in the acute care setting, Verbal/Tactile Cues, ADL training, and Transfer Training      Assessment: Praneeth Nam presents with ADL impairments affecting function including endurance / activity tolerance, shortness of breath, and strength. Demonstrated functioning below baseline abilities indicate the need for continued skilled intervention while inpatient. PT ambulated to restroom with RW where he completed grooming/hygiene and toileting task. Setup for grooming, but Max A for toileting. On return to chair, patient with labored breathing but O2 saturation 95%. Pt very fatigued after treatment. Tolerating session today without incident. Will continue to follow and progress as tolerated.     Plan/Recommendations:   Moderate Intensity Therapy recommended post-acute care. This is recommended as therapy feels the patient would require 3-4 days per week and wouldn't tolerate \"3 hour daily\" rehab intensity. SNF would be the preferred choice. If the patient does not agree to SNF, arrange HH or OP depending on home bound status. If patient is medically complex, consider LTACH.. Pt requires no " DME at discharge.     Pt desires Skilled Rehab placement at discharge. Pt cooperative; agreeable to therapeutic recommendations and plan of care.     Modified Douglas: N/A = No pre-op stroke/TIA    Post-Tx Position: Up in Chair, Alarms activated, and Call light and personal items within reach  PPE: gloves

## 2024-06-14 LAB
ALBUMIN SERPL-MCNC: 2.6 G/DL (ref 3.5–5.2)
ALBUMIN/GLOB SERPL: 1 G/DL
ALP SERPL-CCNC: 298 U/L (ref 39–117)
ALT SERPL W P-5'-P-CCNC: 33 U/L (ref 1–41)
ANION GAP SERPL CALCULATED.3IONS-SCNC: 9.1 MMOL/L (ref 5–15)
AST SERPL-CCNC: 31 U/L (ref 1–40)
BASOPHILS # BLD AUTO: 0.01 10*3/MM3 (ref 0–0.2)
BASOPHILS NFR BLD AUTO: 0.2 % (ref 0–1.5)
BILIRUB SERPL-MCNC: 0.7 MG/DL (ref 0–1.2)
BUN SERPL-MCNC: 36 MG/DL (ref 8–23)
BUN/CREAT SERPL: 35.6 (ref 7–25)
CALCIUM SPEC-SCNC: 8.2 MG/DL (ref 8.6–10.5)
CHLORIDE SERPL-SCNC: 111 MMOL/L (ref 98–107)
CO2 SERPL-SCNC: 20.9 MMOL/L (ref 22–29)
CREAT SERPL-MCNC: 1.01 MG/DL (ref 0.76–1.27)
DEPRECATED RDW RBC AUTO: 52.2 FL (ref 37–54)
EGFRCR SERPLBLD CKD-EPI 2021: 73.8 ML/MIN/1.73
EOSINOPHIL # BLD AUTO: 0.16 10*3/MM3 (ref 0–0.4)
EOSINOPHIL NFR BLD AUTO: 2.9 % (ref 0.3–6.2)
ERYTHROCYTE [DISTWIDTH] IN BLOOD BY AUTOMATED COUNT: 14.5 % (ref 12.3–15.4)
GLOBULIN UR ELPH-MCNC: 2.5 GM/DL
GLUCOSE BLDC GLUCOMTR-MCNC: 122 MG/DL (ref 70–105)
GLUCOSE BLDC GLUCOMTR-MCNC: 138 MG/DL (ref 70–105)
GLUCOSE BLDC GLUCOMTR-MCNC: 84 MG/DL (ref 70–105)
GLUCOSE BLDC GLUCOMTR-MCNC: 85 MG/DL (ref 70–105)
GLUCOSE SERPL-MCNC: 99 MG/DL (ref 65–99)
HCT VFR BLD AUTO: 26.5 % (ref 37.5–51)
HGB BLD-MCNC: 8.4 G/DL (ref 13–17.7)
IMM GRANULOCYTES # BLD AUTO: 0.03 10*3/MM3 (ref 0–0.05)
IMM GRANULOCYTES NFR BLD AUTO: 0.5 % (ref 0–0.5)
LYMPHOCYTES # BLD AUTO: 0.88 10*3/MM3 (ref 0.7–3.1)
LYMPHOCYTES NFR BLD AUTO: 16.1 % (ref 19.6–45.3)
MCH RBC QN AUTO: 31.2 PG (ref 26.6–33)
MCHC RBC AUTO-ENTMCNC: 31.7 G/DL (ref 31.5–35.7)
MCV RBC AUTO: 98.5 FL (ref 79–97)
MONOCYTES # BLD AUTO: 0.37 10*3/MM3 (ref 0.1–0.9)
MONOCYTES NFR BLD AUTO: 6.8 % (ref 5–12)
NEUTROPHILS NFR BLD AUTO: 4.02 10*3/MM3 (ref 1.7–7)
NEUTROPHILS NFR BLD AUTO: 73.5 % (ref 42.7–76)
NRBC BLD AUTO-RTO: 0 /100 WBC (ref 0–0.2)
PLATELET # BLD AUTO: 99 10*3/MM3 (ref 140–450)
PMV BLD AUTO: 13.3 FL (ref 6–12)
POTASSIUM SERPL-SCNC: 4.1 MMOL/L (ref 3.5–5.2)
PROT SERPL-MCNC: 5.1 G/DL (ref 6–8.5)
RBC # BLD AUTO: 2.69 10*6/MM3 (ref 4.14–5.8)
SODIUM SERPL-SCNC: 141 MMOL/L (ref 136–145)
WBC NRBC COR # BLD AUTO: 5.47 10*3/MM3 (ref 3.4–10.8)

## 2024-06-14 PROCEDURE — 97535 SELF CARE MNGMENT TRAINING: CPT

## 2024-06-14 PROCEDURE — 85025 COMPLETE CBC W/AUTO DIFF WBC: CPT | Performed by: INTERNAL MEDICINE

## 2024-06-14 PROCEDURE — 97116 GAIT TRAINING THERAPY: CPT

## 2024-06-14 PROCEDURE — 82948 REAGENT STRIP/BLOOD GLUCOSE: CPT

## 2024-06-14 PROCEDURE — 94799 UNLISTED PULMONARY SVC/PX: CPT

## 2024-06-14 PROCEDURE — 94664 DEMO&/EVAL PT USE INHALER: CPT

## 2024-06-14 PROCEDURE — 82948 REAGENT STRIP/BLOOD GLUCOSE: CPT | Performed by: INTERNAL MEDICINE

## 2024-06-14 PROCEDURE — 80053 COMPREHEN METABOLIC PANEL: CPT | Performed by: INTERNAL MEDICINE

## 2024-06-14 PROCEDURE — 25010000002 CEFTRIAXONE PER 250 MG: Performed by: NURSE PRACTITIONER

## 2024-06-14 PROCEDURE — 97530 THERAPEUTIC ACTIVITIES: CPT

## 2024-06-14 PROCEDURE — 97110 THERAPEUTIC EXERCISES: CPT

## 2024-06-14 PROCEDURE — 94761 N-INVAS EAR/PLS OXIMETRY MLT: CPT

## 2024-06-14 RX ORDER — ENOXAPARIN SODIUM 100 MG/ML
40 INJECTION SUBCUTANEOUS EVERY 24 HOURS
Status: DISCONTINUED | OUTPATIENT
Start: 2024-06-14 | End: 2024-06-15 | Stop reason: HOSPADM

## 2024-06-14 RX ORDER — PANTOPRAZOLE SODIUM 40 MG/1
40 TABLET, DELAYED RELEASE ORAL
Status: DISCONTINUED | OUTPATIENT
Start: 2024-06-15 | End: 2024-06-15 | Stop reason: HOSPADM

## 2024-06-14 RX ADMIN — CEFTRIAXONE 2000 MG: 2 INJECTION, POWDER, FOR SOLUTION INTRAMUSCULAR; INTRAVENOUS at 20:16

## 2024-06-14 RX ADMIN — Medication 10 ML: at 20:16

## 2024-06-14 RX ADMIN — IPRATROPIUM BROMIDE AND ALBUTEROL SULFATE 3 ML: .5; 3 SOLUTION RESPIRATORY (INHALATION) at 12:20

## 2024-06-14 RX ADMIN — IPRATROPIUM BROMIDE AND ALBUTEROL SULFATE 3 ML: .5; 3 SOLUTION RESPIRATORY (INHALATION) at 08:11

## 2024-06-14 RX ADMIN — IPRATROPIUM BROMIDE AND ALBUTEROL SULFATE 3 ML: .5; 3 SOLUTION RESPIRATORY (INHALATION) at 16:10

## 2024-06-14 RX ADMIN — PANTOPRAZOLE SODIUM 40 MG: 40 INJECTION, POWDER, FOR SOLUTION INTRAVENOUS at 06:30

## 2024-06-14 RX ADMIN — IPRATROPIUM BROMIDE AND ALBUTEROL SULFATE 3 ML: .5; 3 SOLUTION RESPIRATORY (INHALATION) at 19:37

## 2024-06-14 NOTE — PLAN OF CARE
Problem: Asthma Comorbidity  Goal: Maintenance of Asthma Control  Outcome: Ongoing, Progressing     Problem: Behavioral Health Comorbidity  Goal: Maintenance of Behavioral Health Symptom Control  Outcome: Ongoing, Progressing     Problem: COPD (Chronic Obstructive Pulmonary Disease) Comorbidity  Goal: Maintenance of COPD Symptom Control  Outcome: Ongoing, Progressing     Problem: Diabetes Comorbidity  Goal: Blood Glucose Level Within Targeted Range  Outcome: Ongoing, Progressing     Problem: Heart Failure Comorbidity  Goal: Maintenance of Heart Failure Symptom Control  Outcome: Ongoing, Progressing     Problem: Hypertension Comorbidity  Goal: Blood Pressure in Desired Range  Outcome: Ongoing, Progressing     Problem: Obstructive Sleep Apnea Risk or Actual Comorbidity Management  Goal: Unobstructed Breathing During Sleep  Outcome: Ongoing, Progressing     Problem: Osteoarthritis Comorbidity  Goal: Maintenance of Osteoarthritis Symptom Control  Outcome: Ongoing, Progressing     Problem: Pain Chronic (Persistent) (Comorbidity Management)  Goal: Acceptable Pain Control and Functional Ability  Outcome: Ongoing, Progressing     Problem: Seizure Disorder Comorbidity  Goal: Maintenance of Seizure Control  Outcome: Ongoing, Progressing     Problem: Skin Injury Risk Increased  Goal: Skin Health and Integrity  Outcome: Ongoing, Progressing     Problem: Fall Injury Risk  Goal: Absence of Fall and Fall-Related Injury  Outcome: Ongoing, Progressing  Intervention: Promote Injury-Free Environment   Goal Outcome Evaluation:      Pt resting in bed, has not complained of any pain on shift. On room air.

## 2024-06-14 NOTE — PROGRESS NOTES
LOS: 4 days   Patient Care Team:  Guilherme Jason MD as PCP - General (Family Medicine)  Misty Dias MD as Consulting Physician (Cardiology)  Nora Kenyon, BRIAN, APRN as Nurse Practitioner (Thoracic Surgery)  Sussy Shane MD as Consulting Physician (Nephrology)    Subjective     Interval History: no significant change    Patient Complaints: L shoulder pain     History taken from: patient    Review of Systems   Respiratory:  Negative for cough, choking, chest tightness and shortness of breath.    Cardiovascular:  Negative for chest pain.   Gastrointestinal:  Negative for abdominal pain, blood in stool, constipation, diarrhea, nausea and vomiting.   Musculoskeletal:  Positive for arthralgias.        Shoulder pain           Objective     Vital Signs  Temp:  [98.3 °F (36.8 °C)-98.7 °F (37.1 °C)] 98.3 °F (36.8 °C)  Heart Rate:  [57-85] 79  Resp:  [11-26] 22  BP: (141-154)/(58-69) 144/64    Physical Exam:     General Appearance:    Alert, cooperative, in no acute distress,   Head:    Normocephalic, without obvious abnormality, atraumatic   Eyes:            Lids and lashes normal, conjunctivae and sclerae normal, no   icterus, no pallor, corneas clear, PERRLA   Ears:    Ears appear intact with no abnormalities noted   Throat:   No oral lesions, no thrush, oral mucosa moist   Neck:   No adenopathy, supple, trachea midline, no thyromegaly, no   carotid bruit, no JVD   Lungs:     Clear to auscultation,respirations regular, even and                  unlabored    Heart:    Regular rhythm and normal rate, normal S1 and S2, no            murmur, no gallop, no rub, no click   Chest Wall:    No abnormalities observed   Abdomen:     Normal bowel sounds, no masses, no organomegaly, soft        Non-tender non-distended, no guarding,   Extremities:   Moves all extremities well, no edema, no cyanosis, no             Redness   Pulses:   Pulses palpable and equal bilaterally   Skin:   No bleeding, bruising or  rash   Lymph nodes:   No palpable adenopathy   Neurologic:   No focal deficit         Results Review:    Lab Results (last 24 hours)       Procedure Component Value Units Date/Time    POC Glucose Finger 4x Daily Before Meals & at Bedtime [[id number]]  (Normal) Collected: 06/14/24 0712    Specimen: Blood from Finger Updated: 06/14/24 0714     Glucose 84 mg/dL      Comment: Serial Number: [id number]:  [id number]       Comprehensive Metabolic Panel [[id number]]  (Abnormal) Collected: 06/14/24 0203    Specimen: Blood from Arm, Left Updated: 06/14/24 0235     Glucose 99 mg/dL      BUN 36 mg/dL      Creatinine 1.01 mg/dL      Sodium 141 mmol/L      Potassium 4.1 mmol/L      Chloride 111 mmol/L      CO2 20.9 mmol/L      Calcium 8.2 mg/dL      Total Protein 5.1 g/dL      Albumin 2.6 g/dL      ALT (SGPT) 33 U/L      AST (SGOT) 31 U/L      Alkaline Phosphatase 298 U/L      Total Bilirubin 0.7 mg/dL      Globulin 2.5 gm/dL      A/G Ratio 1.0 g/dL      BUN/Creatinine Ratio 35.6     Anion Gap 9.1 mmol/L      eGFR 73.8 mL/min/1.73     Narrative:      GFR Normal >60  Chronic Kidney Disease <60  Kidney Failure <15    The GFR formula is only valid for adults with stable renal function between ages 18 and 70.    CBC & Differential [[id number]]  (Abnormal) Collected: 06/14/24 0203    Specimen: Blood from Arm, Left Updated: 06/14/24 0216    Narrative:      The following orders were created for panel order CBC & Differential.  Procedure                               Abnormality         Status                     ---------                               -----------         ------                     CBC Auto Differential[[id number]]        Abnormal            Final result               Scan Slide[[id number]]                                                                    Please view results for these tests on the individual orders.    CBC Auto Differential [[id number]]  (Abnormal) Collected: 06/14/24 0203    Specimen: Blood from Arm,  Left Updated: 06/14/24 0216     WBC 5.47 10*3/mm3      RBC 2.69 10*6/mm3      Hemoglobin 8.4 g/dL      Hematocrit 26.5 %      MCV 98.5 fL      MCH 31.2 pg      MCHC 31.7 g/dL      RDW 14.5 %      RDW-SD 52.2 fl      MPV 13.3 fL      Platelets 99 10*3/mm3      Neutrophil % 73.5 %      Lymphocyte % 16.1 %      Monocyte % 6.8 %      Eosinophil % 2.9 %      Basophil % 0.2 %      Immature Grans % 0.5 %      Neutrophils, Absolute 4.02 10*3/mm3      Lymphocytes, Absolute 0.88 10*3/mm3      Monocytes, Absolute 0.37 10*3/mm3      Eosinophils, Absolute 0.16 10*3/mm3      Basophils, Absolute 0.01 10*3/mm3      Immature Grans, Absolute 0.03 10*3/mm3      nRBC 0.0 /100 WBC     POC Glucose Once [[id number]]  (Abnormal) Collected: 06/13/24 2023    Specimen: Blood Updated: 06/13/24 2025     Glucose 140 mg/dL      Comment: Serial Number: [id number]:  [id number]       POC Glucose Finger 4x Daily Before Meals & at Bedtime [[id number]]  (Normal) Collected: 06/13/24 1701    Specimen: Blood from Finger Updated: 06/13/24 1703     Glucose 98 mg/dL      Comment: Serial Number: [id number]:  [id number]       POC Glucose Finger 4x Daily Before Meals & at Bedtime [[id number]]  (Abnormal) Collected: 06/13/24 1105    Specimen: Blood from Finger Updated: 06/13/24 1112     Glucose 129 mg/dL      Comment: Serial Number: [id number]:  [id number]                Imaging Results (Last 24 Hours)       ** No results found for the last 24 hours. **                 I reviewed the patient's new clinical results.    Medication Review:   Scheduled Meds:cefTRIAXone, 2,000 mg, Intravenous, Q24H  enoxaparin, 40 mg, Subcutaneous, Q24H  ipratropium-albuterol, 3 mL, Nebulization, 4x Daily - RT  [START ON 6/15/2024] pantoprazole, 40 mg, Oral, Q AM  sodium chloride, 10 mL, Intravenous, Q12H      Continuous Infusions:   PRN Meds:.  acetaminophen    albuterol    Calcium Replacement - Follow Nurse / BPA Driven Protocol    dextrose    Magnesium Standard Dose  Replacement - Follow Nurse / BPA Driven Protocol    nitroglycerin    ondansetron    Phosphorus Replacement - Follow Nurse / BPA Driven Protocol    Potassium Replacement - Follow Nurse / BPA Driven Protocol    sodium chloride    sodium chloride    sodium chloride    sodium chloride     Assessment & Plan       Sepsis    Cholangitis    Choledocholithiasis      Sepsis  Patient afebrile today. WBC normal.  E.coli identified in 2/2 blood culture bottle.  E.coli confirmed with PCR. Continue rocephin per ID recommendation.      Cholangitis, Choledocholithiasis   ERCP successful at removing several stones and stent placement. No plans for cholecystectomy unless he has recurrent problems/ Patient complains of mld epigastric pain, denies NV, is having normal bowel movement, able to eat well.      Elevated troponin   Chronically elevated based on review of chart, no chest pain, no acute EKG changes- no ischemic workup planned      4. Shortness of breath   Patient tolerating room air today, usually on 2L at home. Patient states SOB improved. CXR showed no interval changes between 6/10 and 6/12, respiratory panel negative.     5. H/o recent fall  Patient had fallen prior to admission, states he was supposed to receive xray results from Dr. Jason. Patient continues to have L shoulder pain and knee pain. PT/OT able to locate xray results, they stated it showed soft tissue tearing in L shoulder supported by absent passive ROM and present active ROM. Plan for conservative management with PT.      6. PAF  Currently in sinus rhythm      7. H/o ITP  Today 115, continue to monitor platelet count.      8. H/o PE   Restarting Eliquis     9. Ulcer prophylaxis   Continue PPI      10. Hypotension - resolved.       11. COPD   Continue albuterol and oxygen.      12. VTE prophylaxis - Eliquis    Plan for disposition:skilled rehab at discharge - ready for discharge    MERCEDEZ Mehta Student  06/14/24  10:10 EDT    I have interviewed and  examined patient and provided 100% of medical decision-making.  Pat Napoles MD

## 2024-06-14 NOTE — PLAN OF CARE
"Assessment: Praneeth Nam presents with functional mobility impairments which indicate the need for skilled intervention. Tolerating session today without incident. Will continue to follow and progress as tolerated.     Plan/Recommendations:   If medically appropriate, Moderate Intensity Therapy recommended post-acute care. This is recommended as therapy feels the patient would require 3-4 days per week and wouldn't tolerate \"3 hour daily\" rehab intensity. SNF would be the preferred choice. If the patient does not agree to SNF, arrange HH or OP depending on home bound status. If patient is medically complex, consider LTACH. Pt requires no DME at discharge.     Pt desires Skilled Rehab placement at discharge. Pt cooperative; agreeable to therapeutic recommendations and plan of care.           "

## 2024-06-14 NOTE — CASE MANAGEMENT/SOCIAL WORK
Social Work Assessment  [facility]     Patient Name: Praneeth Nam  MRN: [id number]  Today's Date: 6/14/2024    Admit Date: 6/10/2024     Psychosocial       Row Name 06/14/24 1130       Values/Beliefs    Spiritual, Cultural Beliefs, Religion Practices, Values that Affect Care no    Values/Beliefs Comment Taoism       Behavior WDL    Behavior WDL WDL       Mental Health    Little Interest or Pleasure in Doing Things 0-->not at all    Feeling Down, Depressed or Hopeless 0-->not at all       Thought Process WDL    Thought Process WDL WDL       Intellectual Performance WDL    Intellectual Performance WDL WDL    Level of Consciousness Alert       Stress    Do you feel stress - tense, restless, nervous, or anxious, or unable to sleep at night because your mind is troubled all the time - these days? Not at all       Coping/Stress    Major Change/Loss/Stressor medical condition/diagnosis    Patient Personal Strengths expressive of emotions;expressive of needs    Sources of Support adult child(elio)    Reaction to Health Status adjusting    Understanding of Condition and Treatment adequate understanding of medical condition       Developmental Stage (Eriksson's)    Developmental Stage Stage 8 (66 years-death/Late Adulthood) Integrity vs. Despair       C-SSRS (Recent)    Q1 Wished to be Dead (Past Month) no    Q2 Suicidal Thoughts (Past Month) no    Q6 Suicide Behavior (Lifetime) no       Violence Risk    Feels Like Hurting Others no    Previous Attempt to Harm Others no                   Abuse/Neglect       Row Name 06/14/24 1131       Personal Safety    Feels Unsafe at Home or Work/School no    Feels Threatened by Someone no    Does Anyone Try to Keep You From Having Contact with Others or Doing Things Outside Your Home? no    Physical Signs of Abuse Present no    Personal Safety Comments Pt resides at home with spouse (who has dementia) and their granddtr resides with them to assist in spouse's care.                    Legal       Row Name 06/14/24 1131       Financial Resource Strain    How hard is it for you to pay for the very basics like food, housing, medical care, and heating? Not hard       Financial/Legal    Source of Income social security    Who Manages Finances if Patient Unable Nobody    Finance Comments Pt denies financial issues/concerns.       Legal    Criminal Activity/Legal Involvement none                   Substance Abuse       Row Name 06/14/24 1132       Substance Use    Substance Use Comment Pt denies tob, etoh or illicit substance use.       AUDIT-C (Alcohol Use Disorders ID Test)    Q1: How often do you have a drink containing alcohol? Never    Q2: How many drinks containing alcohol do you have on a typical day when you are drinking? None    Q3: How often do you have six or more drinks on one occasion? Never    Audit-C Score 0       Family Member Substance Use (#4)    Family History of Substance Use none             GENET Renee, LSW  Medical Social Worker  Ph [phone]  Fax [phone]  [email or web address]

## 2024-06-14 NOTE — THERAPY TREATMENT NOTE
"Subjective: Pt agreeable to therapeutic plan of care.    Objective:     Bed mobility - Supine to sitting Modified-Independent  Transfers - CGA and with rolling walker  Ambulation - 15 feet + 25 feet CGA and with rolling walker  ADLs - Max A for pericare while standing using handrail for support.    Therapeutic Exercise - 10 Reps B LE AROM supported sitting / chair    Vitals: WNL    Pain: Pt did not rate intensity of pain.     Education: Provided education on the importance of mobility in the acute care setting, Verbal/Tactile Cues, ADL training, Transfer Training, and Gait Training    Assessment: Praneeth Nam presents with functional mobility impairments which indicate the need for skilled intervention. Tolerating session today without incident. Will continue to follow and progress as tolerated.     Plan/Recommendations:   If medically appropriate, Moderate Intensity Therapy recommended post-acute care. This is recommended as therapy feels the patient would require 3-4 days per week and wouldn't tolerate \"3 hour daily\" rehab intensity. SNF would be the preferred choice. If the patient does not agree to SNF, arrange HH or OP depending on home bound status. If patient is medically complex, consider LTACH. Pt requires no DME at discharge.     Pt desires Skilled Rehab placement at discharge. Pt cooperative; agreeable to therapeutic recommendations and plan of care.         Basic Mobility 6-click:  Rollin = Total, A lot = 2, A little = 3; 4 = None  Supine>Sit:   1 = Total, A lot = 2, A little = 3; 4 = None   Sit>Stand with arms:  1 = Total, A lot = 2, A little = 3; 4 = None  Bed>Chair:   1 = Total, A lot = 2, A little = 3; 4 = None  Ambulate in room:  1 = Total, A lot = 2, A little = 3; 4 = None  3-5 Steps with railin = Total, A lot = 2, A little = 3; 4 = None  Score: 20    Modified Devang: N/A = No pre-op stroke/TIA    Post-Tx Position: Up in Chair, Alarms activated, and Call light and personal items " within reach  PPE: gloves

## 2024-06-15 VITALS
RESPIRATION RATE: 24 BRPM | DIASTOLIC BLOOD PRESSURE: 71 MMHG | TEMPERATURE: 98 F | SYSTOLIC BLOOD PRESSURE: 158 MMHG | BODY MASS INDEX: 31.89 KG/M2 | HEART RATE: 61 BPM | HEIGHT: 66 IN | WEIGHT: 198.41 LBS | OXYGEN SATURATION: 100 %

## 2024-06-15 LAB
ALBUMIN SERPL-MCNC: 2.6 G/DL (ref 3.5–5.2)
ALBUMIN/GLOB SERPL: 1 G/DL
ALP SERPL-CCNC: 317 U/L (ref 39–117)
ALT SERPL W P-5'-P-CCNC: 29 U/L (ref 1–41)
ANION GAP SERPL CALCULATED.3IONS-SCNC: 7.4 MMOL/L (ref 5–15)
AST SERPL-CCNC: 36 U/L (ref 1–40)
BASOPHILS # BLD AUTO: 0.02 10*3/MM3 (ref 0–0.2)
BASOPHILS NFR BLD AUTO: 0.3 % (ref 0–1.5)
BILIRUB SERPL-MCNC: 0.6 MG/DL (ref 0–1.2)
BUN SERPL-MCNC: 30 MG/DL (ref 8–23)
BUN/CREAT SERPL: 35.3 (ref 7–25)
CALCIUM SPEC-SCNC: 8.1 MG/DL (ref 8.6–10.5)
CHLORIDE SERPL-SCNC: 112 MMOL/L (ref 98–107)
CO2 SERPL-SCNC: 21.6 MMOL/L (ref 22–29)
CREAT SERPL-MCNC: 0.85 MG/DL (ref 0.76–1.27)
DEPRECATED RDW RBC AUTO: 51.4 FL (ref 37–54)
EGFRCR SERPLBLD CKD-EPI 2021: 86.2 ML/MIN/1.73
EOSINOPHIL # BLD AUTO: 0.35 10*3/MM3 (ref 0–0.4)
EOSINOPHIL NFR BLD AUTO: 5.7 % (ref 0.3–6.2)
ERYTHROCYTE [DISTWIDTH] IN BLOOD BY AUTOMATED COUNT: 14.2 % (ref 12.3–15.4)
GLOBULIN UR ELPH-MCNC: 2.5 GM/DL
GLUCOSE BLDC GLUCOMTR-MCNC: 114 MG/DL (ref 70–105)
GLUCOSE BLDC GLUCOMTR-MCNC: 87 MG/DL (ref 70–105)
GLUCOSE SERPL-MCNC: 94 MG/DL (ref 65–99)
HCT VFR BLD AUTO: 25.9 % (ref 37.5–51)
HGB BLD-MCNC: 8.3 G/DL (ref 13–17.7)
IMM GRANULOCYTES # BLD AUTO: 0.03 10*3/MM3 (ref 0–0.05)
IMM GRANULOCYTES NFR BLD AUTO: 0.5 % (ref 0–0.5)
LYMPHOCYTES # BLD AUTO: 0.81 10*3/MM3 (ref 0.7–3.1)
LYMPHOCYTES NFR BLD AUTO: 13.1 % (ref 19.6–45.3)
MCH RBC QN AUTO: 31.7 PG (ref 26.6–33)
MCHC RBC AUTO-ENTMCNC: 32 G/DL (ref 31.5–35.7)
MCV RBC AUTO: 98.9 FL (ref 79–97)
MONOCYTES # BLD AUTO: 0.49 10*3/MM3 (ref 0.1–0.9)
MONOCYTES NFR BLD AUTO: 7.9 % (ref 5–12)
NEUTROPHILS NFR BLD AUTO: 4.48 10*3/MM3 (ref 1.7–7)
NEUTROPHILS NFR BLD AUTO: 72.5 % (ref 42.7–76)
NRBC BLD AUTO-RTO: 0 /100 WBC (ref 0–0.2)
PLATELET # BLD AUTO: 109 10*3/MM3 (ref 140–450)
PMV BLD AUTO: 13.5 FL (ref 6–12)
POTASSIUM SERPL-SCNC: 4 MMOL/L (ref 3.5–5.2)
PROT SERPL-MCNC: 5.1 G/DL (ref 6–8.5)
RBC # BLD AUTO: 2.62 10*6/MM3 (ref 4.14–5.8)
SODIUM SERPL-SCNC: 141 MMOL/L (ref 136–145)
WBC NRBC COR # BLD AUTO: 6.18 10*3/MM3 (ref 3.4–10.8)

## 2024-06-15 PROCEDURE — 94664 DEMO&/EVAL PT USE INHALER: CPT

## 2024-06-15 PROCEDURE — 82948 REAGENT STRIP/BLOOD GLUCOSE: CPT

## 2024-06-15 PROCEDURE — 97116 GAIT TRAINING THERAPY: CPT

## 2024-06-15 PROCEDURE — 94799 UNLISTED PULMONARY SVC/PX: CPT

## 2024-06-15 PROCEDURE — 97530 THERAPEUTIC ACTIVITIES: CPT

## 2024-06-15 PROCEDURE — 94761 N-INVAS EAR/PLS OXIMETRY MLT: CPT

## 2024-06-15 PROCEDURE — 80053 COMPREHEN METABOLIC PANEL: CPT | Performed by: INTERNAL MEDICINE

## 2024-06-15 PROCEDURE — C1751 CATH, INF, PER/CENT/MIDLINE: HCPCS

## 2024-06-15 PROCEDURE — 85025 COMPLETE CBC W/AUTO DIFF WBC: CPT | Performed by: INTERNAL MEDICINE

## 2024-06-15 PROCEDURE — 36410 VNPNXR 3YR/> PHY/QHP DX/THER: CPT

## 2024-06-15 RX ORDER — SODIUM CHLORIDE 9 MG/ML
40 INJECTION, SOLUTION INTRAVENOUS AS NEEDED
Status: DISCONTINUED | OUTPATIENT
Start: 2024-06-15 | End: 2024-06-15 | Stop reason: HOSPADM

## 2024-06-15 RX ORDER — LOSARTAN POTASSIUM 100 MG/1
50 TABLET ORAL DAILY
Qty: 30 TABLET | Refills: 1 | Status: SHIPPED | OUTPATIENT
Start: 2024-06-15

## 2024-06-15 RX ORDER — SODIUM CHLORIDE 0.9 % (FLUSH) 0.9 %
10 SYRINGE (ML) INJECTION AS NEEDED
Status: DISCONTINUED | OUTPATIENT
Start: 2024-06-15 | End: 2024-06-15 | Stop reason: HOSPADM

## 2024-06-15 RX ORDER — PANTOPRAZOLE SODIUM 40 MG/1
40 TABLET, DELAYED RELEASE ORAL
Qty: 30 TABLET | Refills: 1 | Status: SHIPPED | OUTPATIENT
Start: 2024-06-16

## 2024-06-15 RX ORDER — SODIUM CHLORIDE 0.9 % (FLUSH) 0.9 %
10 SYRINGE (ML) INJECTION EVERY 12 HOURS SCHEDULED
Status: DISCONTINUED | OUTPATIENT
Start: 2024-06-15 | End: 2024-06-15 | Stop reason: HOSPADM

## 2024-06-15 RX ADMIN — IPRATROPIUM BROMIDE AND ALBUTEROL SULFATE 3 ML: .5; 3 SOLUTION RESPIRATORY (INHALATION) at 11:26

## 2024-06-15 RX ADMIN — PANTOPRAZOLE SODIUM 40 MG: 40 TABLET, DELAYED RELEASE ORAL at 06:38

## 2024-06-15 RX ADMIN — IPRATROPIUM BROMIDE AND ALBUTEROL SULFATE 3 ML: .5; 3 SOLUTION RESPIRATORY (INHALATION) at 07:49

## 2024-06-15 NOTE — PLAN OF CARE
Goal Outcome Evaluation: Patient resting in between care with not complaints this shift. Patient is to have a midline placed before d/c for long term IV antibiotics. Able to make needs known and care on going.

## 2024-06-15 NOTE — DISCHARGE SUMMARY
Date of Discharge:  6/15/2024    Discharge Diagnosis:   Choledocholithiasis  Cholangitis  Elevated troponin  Dyspnea  Paroxsymal atrial fibrillation  Hypotension  COPD  Hx ITP  Hx PE    Presenting Problem/History of Present Illness  Active Hospital Problems    Diagnosis  POA    **Sepsis [A41.9]  Yes    Cholangitis [K83.09]  Unknown    Choledocholithiasis [K80.50]  Unknown      Resolved Hospital Problems   No resolved problems to display.          Hospital Course    Patient is a 84 y.o. male presented with history of PE on Eliquis, ITP, COPD, CKD, diabetes, hypertension, CVA, and psoriasis who presents with complaints of abdominal pain. He had a fever of 101.4 and weight loss of 100 # over past 2 to 3 years. Patient had a ERCP on this admission with clearance of CBD stone and stent placement. He was seen by surgeon who recommends avoiding surgery unless necessary. He was also found to have E. Coli bacteremia and ID started him on IV rocephin 2 grams every 24 hrs for 2 weeks of treatment last day 6/23/24. Patient will need a midline before discharge-please remove when IV antibiotics are completed   Weekly labs CBC and creatinine x 2 weeks Patient has done well and is back to baseline. Please fax all post discharge lab results, imaging studies and correspondence to this fax number [phone]  For any question or concern please contact ID service number [phone].  He will go to rehab and follow up with PCP after and GI. GI wants a KUB in 7 to 10 days  if PD stent self migrated out no further intervention needed otherwise EGD will be performed to remove the stent.     Procedures Performed    Procedure(s):  ENDOSCOPIC RETROGRADE CHOLANGIOPANCREATOGRAPHY, sphincterotomy, balloon clearance of commonn bile duct (9-12mm), stone extraction, pancreatic stent placement  -------------------       Consults:   Consults       Date and Time Order Name Status Description    6/11/2024 12:59 PM Inpatient Infectious Diseases  Consult Completed     6/10/2024  7:55 AM Inpatient Gastroenterology Consult Completed     6/10/2024  7:48 AM IP Consult to Family Practice      6/10/2024  7:48 AM IP Consult to General Surgery Completed             Pertinent Test Results:    Lab Results (most recent)       Procedure Component Value Units Date/Time    POC Glucose Once [[id number]]  (Normal) Collected: 06/15/24 0726    Specimen: Blood Updated: 06/15/24 0728     Glucose 87 mg/dL      Comment: Serial Number: [id number]:  [id number]       Comprehensive Metabolic Panel [[id number]]  (Abnormal) Collected: 06/15/24 0249    Specimen: Blood from Hand, Right Updated: 06/15/24 0319     Glucose 94 mg/dL      BUN 30 mg/dL      Creatinine 0.85 mg/dL      Sodium 141 mmol/L      Potassium 4.0 mmol/L      Chloride 112 mmol/L      CO2 21.6 mmol/L      Calcium 8.1 mg/dL      Total Protein 5.1 g/dL      Albumin 2.6 g/dL      ALT (SGPT) 29 U/L      AST (SGOT) 36 U/L      Alkaline Phosphatase 317 U/L      Total Bilirubin 0.6 mg/dL      Globulin 2.5 gm/dL      A/G Ratio 1.0 g/dL      BUN/Creatinine Ratio 35.3     Anion Gap 7.4 mmol/L      eGFR 86.2 mL/min/1.73     Narrative:      GFR Normal >60  Chronic Kidney Disease <60  Kidney Failure <15    The GFR formula is only valid for adults with stable renal function between ages 18 and 70.    CBC & Differential [[id number]]  (Abnormal) Collected: 06/15/24 0249    Specimen: Blood from Hand, Right Updated: 06/15/24 0259    Narrative:      The following orders were created for panel order CBC & Differential.  Procedure                               Abnormality         Status                     ---------                               -----------         ------                     CBC Auto Differential[[id number]]        Abnormal            Final result               Scan Slide[[id number]]                                                                    Please view results for these tests on the individual orders.    CBC Auto  Differential [[id number]]  (Abnormal) Collected: 06/15/24 0249    Specimen: Blood from Hand, Right Updated: 06/15/24 0259     WBC 6.18 10*3/mm3      RBC 2.62 10*6/mm3      Hemoglobin 8.3 g/dL      Hematocrit 25.9 %      MCV 98.9 fL      MCH 31.7 pg      MCHC 32.0 g/dL      RDW 14.2 %      RDW-SD 51.4 fl      MPV 13.5 fL      Platelets 109 10*3/mm3      Comment: Result checked          Neutrophil % 72.5 %      Lymphocyte % 13.1 %      Monocyte % 7.9 %      Eosinophil % 5.7 %      Basophil % 0.3 %      Immature Grans % 0.5 %      Neutrophils, Absolute 4.48 10*3/mm3      Lymphocytes, Absolute 0.81 10*3/mm3      Monocytes, Absolute 0.49 10*3/mm3      Eosinophils, Absolute 0.35 10*3/mm3      Basophils, Absolute 0.02 10*3/mm3      Immature Grans, Absolute 0.03 10*3/mm3      nRBC 0.0 /100 WBC     POC Glucose Once [[id number]]  (Abnormal) Collected: 06/14/24 2005    Specimen: Blood Updated: 06/14/24 2007     Glucose 122 mg/dL      Comment: Serial Number: [id number]:  [id number]       Comprehensive Metabolic Panel [[id number]]  (Abnormal) Collected: 06/14/24 0203    Specimen: Blood from Arm, Left Updated: 06/14/24 0235     Glucose 99 mg/dL      BUN 36 mg/dL      Creatinine 1.01 mg/dL      Sodium 141 mmol/L      Potassium 4.1 mmol/L      Chloride 111 mmol/L      CO2 20.9 mmol/L      Calcium 8.2 mg/dL      Total Protein 5.1 g/dL      Albumin 2.6 g/dL      ALT (SGPT) 33 U/L      AST (SGOT) 31 U/L      Alkaline Phosphatase 298 U/L      Total Bilirubin 0.7 mg/dL      Globulin 2.5 gm/dL      A/G Ratio 1.0 g/dL      BUN/Creatinine Ratio 35.6     Anion Gap 9.1 mmol/L      eGFR 73.8 mL/min/1.73     Narrative:      GFR Normal >60  Chronic Kidney Disease <60  Kidney Failure <15    The GFR formula is only valid for adults with stable renal function between ages 18 and 70.    CBC & Differential [[id number]]  (Abnormal) Collected: 06/14/24 0203    Specimen: Blood from Arm, Left Updated: 06/14/24 0216    Narrative:      The following  orders were created for panel order CBC & Differential.  Procedure                               Abnormality         Status                     ---------                               -----------         ------                     CBC Auto Differential[[id number]]        Abnormal            Final result               Scan Slide[[id number]]                                                                    Please view results for these tests on the individual orders.    CBC Auto Differential [[id number]]  (Abnormal) Collected: 06/14/24 0203    Specimen: Blood from Arm, Left Updated: 06/14/24 0216     WBC 5.47 10*3/mm3      RBC 2.69 10*6/mm3      Hemoglobin 8.4 g/dL      Hematocrit 26.5 %      MCV 98.5 fL      MCH 31.2 pg      MCHC 31.7 g/dL      RDW 14.5 %      RDW-SD 52.2 fl      MPV 13.3 fL      Platelets 99 10*3/mm3      Neutrophil % 73.5 %      Lymphocyte % 16.1 %      Monocyte % 6.8 %      Eosinophil % 2.9 %      Basophil % 0.2 %      Immature Grans % 0.5 %      Neutrophils, Absolute 4.02 10*3/mm3      Lymphocytes, Absolute 0.88 10*3/mm3      Monocytes, Absolute 0.37 10*3/mm3      Eosinophils, Absolute 0.16 10*3/mm3      Basophils, Absolute 0.01 10*3/mm3      Immature Grans, Absolute 0.03 10*3/mm3      nRBC 0.0 /100 WBC     Manual Differential [[id number]]  (Abnormal) Collected: 06/12/24 2324    Specimen: Blood from Arm, Left Updated: 06/12/24 2357     Neutrophil % 85.0 %      Lymphocyte % 12.0 %      Monocyte % 2.0 %      Basophil % 1.0 %      Neutrophils Absolute 7.57 10*3/mm3      Lymphocytes Absolute 1.07 10*3/mm3      Monocytes Absolute 0.18 10*3/mm3      Basophils Absolute 0.09 10*3/mm3      Anisocytosis Slight/1+     Chinmay Cells Slight/1+     Poikilocytes Slight/1+     Vacuolated Neutrophils Slight/1+     Platelet Estimate Decreased     Large Platelets Slight/1+    Scan Slide [[id number]] Collected: 06/12/24 2324    Specimen: Blood from Arm, Left Updated: 06/12/24 2357     Scan Slide --     Comment: See Manual  Differential Results       Blood Culture - Blood, Arm, Left [[id number]]  (Abnormal) Collected: 06/10/24 0524    Specimen: Blood from Arm, Left Updated: 06/12/24 0715     Blood Culture Escherichia coli     Isolated from Aerobic Bottle     Gram Stain Aerobic Bottle Gram negative bacilli    Narrative:      Less than seven (7) mL's of blood was collected.  Insufficient quantity may yield false negative results.    Refer to previous blood culture collected on 06/10/2024 0524 for MICs      Blood Culture - Blood, Arm, Right [[id number]]  (Abnormal)  (Susceptibility) Collected: 06/10/24 0524    Specimen: Blood from Arm, Right Updated: 06/12/24 0713     Blood Culture Escherichia coli     Isolated from Aerobic and Anaerobic Bottles     Gram Stain Aerobic Bottle Gram negative bacilli      Anaerobic Bottle Gram negative bacilli    Susceptibility        Escherichia coli      LANCE      Amoxicillin + Clavulanate Susceptible      Ampicillin Intermediate      Ampicillin + Sulbactam Susceptible      Cefepime Susceptible      Ceftazidime Susceptible      Ceftriaxone Susceptible      Gentamicin Susceptible      Levofloxacin Susceptible      Piperacillin + Tazobactam Susceptible      Trimethoprim + Sulfamethoxazole Susceptible                       Susceptibility Comments       Escherichia coli    Cefazolin sensitivity will not be reported for Enterobacteriaceae in non-urine isolates. If cefazolin is preferred, please call the microbiology lab to request an E-test.  With the exception of urinary-sourced infections, aminoglycosides should not be used as monotherapy.               Scan Slide [[id number]] Collected: 06/11/24 2350    Specimen: Blood from Arm, Right Updated: 06/12/24 0048     Scan Slide --     Comment: See Manual Differential Results       Manual Differential [[id number]]  (Abnormal) Collected: 06/11/24 2350    Specimen: Blood from Arm, Right Updated: 06/12/24 0048     Neutrophil % 83.0 %      Lymphocyte % 5.0 %      Monocyte %  3.0 %      Bands %  9.0 %      Neutrophils Absolute 11.40 10*3/mm3      Lymphocytes Absolute 0.62 10*3/mm3      Monocytes Absolute 0.37 10*3/mm3      Anisocytosis Slight/1+     Macrocytes Slight/1+     Poikilocytes Slight/1+     WBC Morphology Normal     Platelet Estimate Decreased     Large Platelets Slight/1+    Lipase [[id number]]  (Normal) Collected: 06/11/24 2350    Specimen: Blood from Arm, Right Updated: 06/12/24 0025     Lipase 28 U/L     Folate [[id number]]  (Normal) Collected: 06/11/24 0110    Specimen: Blood Updated: 06/11/24 1059     Folate 19.40 ng/mL     Narrative:      Results may be falsely increased if patient taking Biotin.      Vitamin B12 [[id number]]  (Abnormal) Collected: 06/11/24 0110    Specimen: Blood Updated: 06/11/24 1059     Vitamin B-12 1,017 pg/mL     Narrative:      Results may be falsely increased if patient taking Biotin.      Haptoglobin [[id number]]  (Abnormal) Collected: 06/11/24 0110    Specimen: Blood Updated: 06/11/24 1048     Haptoglobin 208 mg/dL     Lipase [[id number]]  (Normal) Collected: 06/11/24 0110    Specimen: Blood Updated: 06/11/24 0143     Lipase 19 U/L     Blood Culture ID, PCR - Blood, Arm, Right [[id number]]  (Abnormal) Collected: 06/10/24 0524    Specimen: Blood from Arm, Right Updated: 06/10/24 1957     BCID, PCR Escherichia coli. Identification by BCID2 PCR.     BOTTLE TYPE Aerobic Bottle    Narrative:      No resistance genes detected.    Protime-INR [[id number]]  (Abnormal) Collected: 06/10/24 0524    Specimen: Blood Updated: 06/10/24 1008     Protime 12.4 Seconds      INR 1.15    High Sensitivity Troponin T 2Hr [[id number]]  (Abnormal) Collected: 06/10/24 0727    Specimen: Blood from Arm, Right Updated: 06/10/24 1007     HS Troponin T 107 ng/L      Troponin T Delta 18 ng/L     Narrative:      High Sensitive Troponin T Reference Range:  <14.0 ng/L- Negative Female for AMI  <22.0 ng/L- Negative Male for AMI  >=14 - Abnormal Female indicating possible myocardial  injury.  >=22 - Abnormal Male indicating possible myocardial injury.   Clinicians would have to utilize clinical acumen, EKG, Troponin, and serial changes to determine if it is an Acute Myocardial Infarction or myocardial injury due to an underlying chronic condition.         STAT Lactic Acid, Reflex [[id number]]  (Normal) Collected: 06/10/24 0855    Specimen: Blood Updated: 06/10/24 0921     Lactate 1.8 mmol/L     Respiratory Panel PCR w/COVID-19(SARS-CoV-2) KEN/HUBERT/CHIKA/PAD/COR/FLORECITA In-House, NP Swab in UTM/VTM, 2 HR TAT - Swab, Nasopharynx [[id number]]  (Normal) Collected: 06/10/24 0527    Specimen: Swab from Nasopharynx Updated: 06/10/24 0617     ADENOVIRUS, PCR Not Detected     Coronavirus 229E Not Detected     Coronavirus HKU1 Not Detected     Coronavirus NL63 Not Detected     Coronavirus OC43 Not Detected     COVID19 Not Detected     Human Metapneumovirus Not Detected     Human Rhinovirus/Enterovirus Not Detected     Influenza A PCR Not Detected     Influenza B PCR Not Detected     Parainfluenza Virus 1 Not Detected     Parainfluenza Virus 2 Not Detected     Parainfluenza Virus 3 Not Detected     Parainfluenza Virus 4 Not Detected     RSV, PCR Not Detected     Bordetella pertussis pcr Not Detected     Bordetella parapertussis PCR Not Detected     Chlamydophila pneumoniae PCR Not Detected     Mycoplasma pneumo by PCR Not Detected    Narrative:      In the setting of a positive respiratory panel with a viral infection PLUS a negative procalcitonin without other underlying concern for bacterial infection, consider observing off antibiotics or discontinuation of antibiotics and continue supportive care. If the respiratory panel is positive for atypical bacterial infection (Bordetella pertussis, Chlamydophila pneumoniae, or Mycoplasma pneumoniae), consider antibiotic de-escalation to target atypical bacterial infection.    BNP [[id number]]  (Abnormal) Collected: 06/10/24 0524    Specimen: Blood Updated: 06/10/24 0609  "    proBNP 4,712.0 pg/mL     Narrative:      This assay is used as an aid in the diagnosis of individuals suspected of having heart failure. It can be used as an aid in the diagnosis of acute decompensated heart failure (ADHF) in patients presenting with signs and symptoms of ADHF to the emergency department (ED). In addition, NT-proBNP of <300 pg/mL indicates ADHF is not likely.    Age Range Result Interpretation  NT-proBNP Concentration (pg/mL:      <50             Positive            >450                   Gray                 300-450                    Negative             <300    50-75           Positive            >900                  Gray                300-900                  Negative            <300      >75             Positive            >1800                  Gray                300-1800                  Negative            <300    Procalcitonin [[id number]]  (Abnormal) Collected: 06/10/24 0524    Specimen: Blood Updated: 06/10/24 0609     Procalcitonin 0.38 ng/mL     Narrative:      As a Marker for Sepsis (Non-Neonates):    1. <0.5 ng/mL represents a low risk of severe sepsis and/or septic shock.  2. >2 ng/mL represents a high risk of severe sepsis and/or septic shock.    As a Marker for Lower Respiratory Tract Infections that require antibiotic therapy:    PCT on Admission    Antibiotic Therapy       6-12 Hrs later    >0.5                Strongly Recommended  >0.25 - <0.5        Recommended   0.1 - 0.25          Discouraged              Remeasure/reassess PCT  <0.1                Strongly Discouraged     Remeasure/reassess PCT    As 28 day mortality risk marker: \"Change in Procalcitonin Result\" (>80% or <=80%) if Day 0 (or Day 1) and Day 4 values are available. Refer to http://www.[facility]-pct-calculator.com    Change in PCT <=80%  A decrease of PCT levels below or equal to 80% defines a positive change in PCT test result representing a higher risk for 28-day all-cause mortality of patients diagnosed " with severe sepsis for septic shock.    Change in PCT >80%  A decrease of PCT levels of more than 80% defines a negative change in PCT result representing a lower risk for 28-day all-cause mortality of patients diagnosed with severe sepsis or septic shock.       Single High Sensitivity Troponin T [[id number]]  (Abnormal) Collected: 06/10/24 0524    Specimen: Blood Updated: 06/10/24 0609     HS Troponin T 89 ng/L     Narrative:      High Sensitive Troponin T Reference Range:  <14.0 ng/L- Negative Female for AMI  <22.0 ng/L- Negative Male for AMI  >=14 - Abnormal Female indicating possible myocardial injury.  >=22 - Abnormal Male indicating possible myocardial injury.   Clinicians would have to utilize clinical acumen, EKG, Troponin, and serial changes to determine if it is an Acute Myocardial Infarction or myocardial injury due to an underlying chronic condition.         Urinalysis With Culture If Indicated - Urine, Clean Catch [[id number]]  (Abnormal) Collected: 06/10/24 0533    Specimen: Urine, Clean Catch Updated: 06/10/24 0549     Color, UA Dark Yellow     Appearance, UA Clear     pH, UA <=5.0     Specific Gravity, UA 1.019     Glucose, UA Negative     Ketones, UA Negative     Bilirubin, UA Negative     Blood, UA Moderate (2+)     Protein, UA Trace     Leuk Esterase, UA Trace     Nitrite, UA Negative     Urobilinogen, UA 1.0 E.U./dL    Narrative:      In absence of clinical symptoms, the presence of pyuria, bacteria, and/or nitrites on the urinalysis result does not correlate with infection.    Urinalysis, Microscopic Only - Urine, Clean Catch [[id number]]  (Abnormal) Collected: 06/10/24 0533    Specimen: Urine, Clean Catch Updated: 06/10/24 0549     RBC, UA 21-50 /HPF      WBC, UA 3-5 /HPF      Comment: Urine culture not indicated.        Bacteria, UA None Seen /HPF      Squamous Epithelial Cells, UA 3-6 /HPF      Hyaline Casts, UA 0-2 /LPF      Methodology Automated Microscopy    [place] Draw [[id number]]  Collected: 06/10/24 0524    Specimen: Blood Updated: 06/10/24 0545    Narrative:      The following orders were created for panel order [place] Draw.  Procedure                               Abnormality         Status                     ---------                               -----------         ------                     Green Top (Gel)[[id number]]                                  Final result               Lavender Top[[id number]]                                     Final result               Gold Top - SST[[id number]]                                   Final result               Light Blue Top[[id number]]                                   Final result                 Please view results for these tests on the individual orders.    Green Top (Gel) [[id number]] Collected: 06/10/24 0524    Specimen: Blood Updated: 06/10/24 0545     Extra Tube Hold for add-ons.     Comment: Auto resulted.       Lavender Top [[id number]] Collected: 06/10/24 0524    Specimen: Blood Updated: 06/10/24 0545     Extra Tube hold for add-on     Comment: Auto resulted       Gold Top - SST [[id number]] Collected: 06/10/24 0524    Specimen: Blood Updated: 06/10/24 0545     Extra Tube Hold for add-ons.     Comment: Auto resulted.       Light Blue Top [[id number]] Collected: 06/10/24 0524    Specimen: Blood Updated: 06/10/24 0545     Extra Tube Hold for add-ons.     Comment: Auto resulted       POC Lactate [[id number]]  (Abnormal) Collected: 06/10/24 0531    Specimen: Blood Updated: 06/10/24 0532     Lactate 2.3 mmol/L      Comment: Serial Number: [id number]:  [id number]                Results for orders placed during the hospital encounter of 04/23/24    Adult Transthoracic Echo Complete W/ Cont if Necessary Per Protocol    Interpretation Summary  Indications  Shortness of breath    Technically satisfactory study.  Mitral valve is thickened with adequate opening motion.  Moderate mitral regurgitation is present.  Tricuspid valve is structurally  normal.  Moderate tricuspid regurgitation is present.  Aortic valve is thickened with adequate opening motion.  Moderate aortic regurgitation is present.  Pulmonic valve opening motion is normal.  Moderate pulmonic regurgitation is present.  Mild pulmonary hypertension is present.  Left atrium is enlarged.  Right atrium is enlarged.  Left ventricle is normal in size and contractility with ejection fraction of 60%.  Grade 3 left ventricular diastolic dysfunction is present.  (MV E/8 ratio 2.8)  Left ventricular peak systolic longitudinal strain is abnormal with GL PS of -14%.  Concentric left ventricle hypertrophy is present.  Right ventricle enlarged with hypocontractility with a TAPSE of 1.46 cm..  Atrial septum is intact.  Aorta is dilated at 4.3 cm.  IVC is dilated with decreased respiratory variation.  Right atrial pressure 8 mmHg.  No pericardial effusion or intracardiac thrombus is seen.    Impression  Thickened mitral and aortic valves with adequate opening motion.  Moderate mitral tricuspid and aortic and pulmonic regurgitation.  Left atrial enlargement.  Left ventricular size and contractility is normal with ejection fraction of 60%.  Left ventricular peak systolic longitudinal strain is abnormal with GL PS of -14%.  Left ventricular grade 3 diastolic dysfunction is present.  Right atrium right ventricle enlargement is present.  Mild pulmonary hypertension is present.       Condition on Discharge:  Stable    Vital Signs  Temp:  [97.6 °F (36.4 °C)-98.4 °F (36.9 °C)] 98 °F (36.7 °C)  Heart Rate:  [55-66] 57  Resp:  [12-25] 25  BP: (140-158)/(60-93) 158/71      Physical Exam  Vitals reviewed.   Constitutional:       Appearance: He is not ill-appearing.   HENT:      Head: Normocephalic and atraumatic.      Right Ear: External ear normal.      Left Ear: External ear normal.      Nose: Nose normal.      Mouth/Throat:      Mouth: Mucous membranes are moist.   Eyes:      General:         Right eye: No discharge.          Left eye: No discharge.   Cardiovascular:      Rate and Rhythm: Normal rate and regular rhythm.      Pulses: Normal pulses.      Heart sounds: Normal heart sounds.   Pulmonary:      Effort: Pulmonary effort is normal.      Breath sounds: Normal breath sounds.   Abdominal:      General: Bowel sounds are normal.      Palpations: Abdomen is soft.   Musculoskeletal:         General: Normal range of motion.      Cervical back: Normal range of motion.   Skin:     General: Skin is warm and dry.   Neurological:      Mental Status: He is alert and oriented to person, place, and time.   Psychiatric:         Behavior: Behavior normal.              Discharge Disposition      Discharge Medications     Discharge Medications        New Medications        Instructions Start Date   cefTRIAXone 2,000 mg in sodium chloride 0.9 % 100 mL IVPB   2,000 mg, Intravenous, Every 24 Hours      pantoprazole 40 MG EC tablet  Commonly known as: PROTONIX   40 mg, Oral, Every Early Morning   Start Date: June 16, 2024            Changes to Medications        Instructions Start Date   losartan 100 MG tablet  Commonly known as: COZAAR  What changed: how much to take   50 mg, Oral, Daily             Continue These Medications        Instructions Start Date   acetaminophen 325 MG tablet  Commonly known as: TYLENOL   325 mg, Oral, Every 6 Hours PRN      apixaban 2.5 MG tablet tablet  Commonly known as: ELIQUIS   2.5 mg, Oral, 2 Times Daily      fenofibrate 160 MG tablet   160 mg, Oral, Daily      FeroSul 325 (65 Fe) MG tablet  Generic drug: ferrous sulfate   325 mg, Oral, Daily With Breakfast      hydroxychloroquine 200 MG tablet  Commonly known as: PLAQUENIL   Take 2 tablets by mouth Daily.      ipratropium-albuterol 0.5-2.5 mg/3 ml nebulizer  Commonly known as: DUO-NEB   3 mL, Nebulization, Every 4 Hours PRN      oxybutynin XL 10 MG 24 hr tablet  Commonly known as: DITROPAN-XL   1 tablet, Oral, Daily             Stop These Medications       midodrine 5 MG tablet  Commonly known as: PROAMATINE     multivitamin with minerals tablet tablet              Discharge Diet:   Diet Instructions       Diet: Regular/House Diet; Regular (IDDSI 7); Thin (IDDSI 0)      Discharge Diet: Regular/House Diet    Texture: Regular (IDDSI 7)    Fluid Consistency: Thin (IDDSI 0)            Activity at Discharge:   Activity Instructions       Gradually Increase Activity Until at Pre-Hospitalization Level              Follow-up Appointments  Future Appointments   Date Time Provider Department [place]   8/8/2024  1:10 PM [facility] Dias MD [facility]   5/13/2025  2:00 PM Nora Kenyon, DNP, APRN [facility]     Additional Instructions for the Follow-ups that You Need to Schedule       Discharge Follow-up with PCP   As directed       Currently Documented PCP:    Guilherme Jason MD    PCP Phone Number:    [phone]     Follow Up Details: after rehab                Test Results Pending at Discharge       AZEEM Rasmussen  06/15/24  09:52 EDT    Time: Discharge 25 min

## 2024-06-15 NOTE — PLAN OF CARE
Assessment: Praneeth Nam presents with functional mobility impairments which indicate the need for skilled intervention. Tolerating session today without incident. Pt was able to incr distance amb using rwx. Still unable to take on challenges. [facility]Will continue to follow and progress as tolerated.

## 2024-06-15 NOTE — THERAPY TREATMENT NOTE
"Subjective: Pt agreeable to therapeutic plan of care.    Objective:     Bed mobility - Modified-Independent  Transfers - CGA and with rolling walker  Ambulation - 40 feet CGA and with rolling walker    Vitals: WNL    Pain: 0 VAS       Education: Provided education on the importance of mobility in the acute care setting, Verbal/Tactile Cues, Transfer Training, and Gait Training    Assessment: Praneeth Nam presents with functional mobility impairments which indicate the need for skilled intervention. Tolerating session today without incident. Pt was able to incr distance amb using rwx. Still unable to take on challenges. Plans on rehab at [place].Will continue to follow and progress as tolerated.     Plan/Recommendations:   If medically appropriate, Moderate Intensity Therapy recommended post-acute care. This is recommended as therapy feels the patient would require 3-4 days per week and wouldn't tolerate \"3 hour daily\" rehab intensity. SNF would be the preferred choice. If the patient does not agree to SNF, arrange HH or OP depending on home bound status. If patient is medically complex, consider LTACH. Pt requires no DME at discharge.     Pt desires Skilled Rehab placement at discharge. Pt cooperative; agreeable to therapeutic recommendations and plan of care.         Basic Mobility 6-click:  Rollin = Total, A lot = 2, A little = 3; 4 = None  Supine>Sit:   1 = Total, A lot = 2, A little = 3; 4 = None   Sit>Stand with arms:  1 = Total, A lot = 2, A little = 3; 4 = None  Bed>Chair:   1 = Total, A lot = 2, A little = 3; 4 = None  Ambulate in room:  1 = Total, A lot = 2, A little = 3; 4 = None  3-5 Steps with railin = Total, A lot = 2, A little = 3; 4 = None  Score: 20    Post-Tx Position: Up in Chair, Alarms activated, and Call light and personal items within reach  PPE: gloves   "

## 2024-06-15 NOTE — CONSULTS
Midline Line Insertion Procedure Note    Procedure: Insertion of #18G/10CM PowerGlide    Indications:  Home IV therapy    Procedure Details:   Sterile technique was used including antiseptics, cap, gloves, gown, hand hygiene, mask, and sheet.    #18G/10CM PowerGlide inserted to the R Basilic vein per hospital protocol by GWEN Gan.     Non-pulsatile blood return: yes    Ultrasound Guidance: Yes    Lot #: [id number]  Expiration date: 4/30/2025    Complications:  No immediate complications noted     Findings:  Catheter inserted to 10 cm, with 0 cm exposed.   Mid upper arm circumference is 33 cm.  Catheter was flushed with 10 cc NS and sterile dressing applied.   Patient tolerated procedure well.    Recommendations:  Verbal and/or written Care/Maintenance instructions provided to patient.   Primary nurse notified that midline is okay to use at this time.     Ariel Gan RN  06/15/24  12:12 EDT

## 2024-06-15 NOTE — CASE MANAGEMENT/SOCIAL WORK
Continued Stay Note  SERGIO Zohaib     Patient Name: Praneeth Nam  MRN: [id number]  Today's Date: 6/15/2024    Admit Date: 6/10/2024    Plan: Plan to dc to [facility]. No Precert required. PASRR approved.   Discharge Plan       Row Name 06/15/24 1052       Plan    Plan Comments CM notified of discharge.  CM notified trilogy liasion of discharge orders.  per notes, wheelchair van already aware.  Request for  van entered and secure chat sent to bed control.  Requested nursing to update CM and bed control when ready for discharge.                    Expected Discharge Date and Time       Expected Discharge Date Expected Discharge Time    Jaxson 15, 2024               Monalisa Martinez RN

## 2024-06-16 NOTE — CASE MANAGEMENT/SOCIAL WORK
Case Management Discharge Note      Final Note: [facility].         Selected Continued Care - Discharged on 6/15/2024 Admission date: 6/10/2024 - Discharge disposition: Rehab Facility or Unit (DC - External)      Destination Coordination complete.      Service Provider Selected Services Address Phone Fax Patient Preferred    [facility] [address] [facility] [place] [address] [phone] --                 Selected Continued Care - Prior Encounters Includes continued care and service providers with selected services from prior encounters from 3/12/2024 to 6/15/2024      Discharged on 4/30/2024 Admission date: 4/23/2024 - Discharge disposition: Home-Health Care Svc      Home Medical Care       Service Provider Selected Services Address Phone Fax Patient Preferred    [facility] [facility] [address] [address] [phone] --                          Transportation Services  Private: Car (after extended wait for  van, family transported.)    Final Discharge Disposition Code: 03 - skilled nursing facility (SNF)

## 2024-08-06 PROBLEM — K57.30 DVRTCLOS OF LG INT W/O PERFORATION OR ABSCESS W/O BLEEDING: Status: ACTIVE | Noted: 2018-08-01

## 2024-08-06 PROBLEM — D12.2 BENIGN NEOPLASM OF ASCENDING COLON: Status: ACTIVE | Noted: 2018-08-01

## 2024-08-06 PROBLEM — D12.5 BENIGN NEOPLASM OF SIGMOID COLON: Status: ACTIVE | Noted: 2018-08-01

## 2024-09-04 ENCOUNTER — OFFICE (AMBULATORY)
Age: 84
End: 2024-09-04
Payer: MEDICARE

## 2024-09-04 ENCOUNTER — OFFICE (AMBULATORY)
Dept: URBAN - METROPOLITAN AREA CLINIC 64 | Facility: CLINIC | Age: 84
End: 2024-09-04
Payer: MEDICARE

## 2024-09-04 VITALS
HEART RATE: 64 BPM | SYSTOLIC BLOOD PRESSURE: 135 MMHG | WEIGHT: 187 LBS | DIASTOLIC BLOOD PRESSURE: 67 MMHG | WEIGHT: 187 LBS | HEIGHT: 66 IN | SYSTOLIC BLOOD PRESSURE: 135 MMHG | HEIGHT: 66 IN | SYSTOLIC BLOOD PRESSURE: 135 MMHG | DIASTOLIC BLOOD PRESSURE: 67 MMHG | HEART RATE: 64 BPM | WEIGHT: 187 LBS | HEART RATE: 64 BPM | SYSTOLIC BLOOD PRESSURE: 135 MMHG | HEART RATE: 64 BPM | HEIGHT: 66 IN | DIASTOLIC BLOOD PRESSURE: 67 MMHG | DIASTOLIC BLOOD PRESSURE: 67 MMHG | SYSTOLIC BLOOD PRESSURE: 135 MMHG | SYSTOLIC BLOOD PRESSURE: 135 MMHG | WEIGHT: 187 LBS | HEIGHT: 66 IN | HEART RATE: 64 BPM | WEIGHT: 187 LBS | WEIGHT: 187 LBS | SYSTOLIC BLOOD PRESSURE: 135 MMHG | HEIGHT: 66 IN | DIASTOLIC BLOOD PRESSURE: 67 MMHG | DIASTOLIC BLOOD PRESSURE: 67 MMHG | DIASTOLIC BLOOD PRESSURE: 67 MMHG | HEIGHT: 66 IN | WEIGHT: 187 LBS | HEIGHT: 66 IN | HEART RATE: 64 BPM | HEART RATE: 64 BPM

## 2024-09-04 DIAGNOSIS — K80.50 CALCULUS OF BILE DUCT WITHOUT CHOLANGITIS OR CHOLECYSTITIS W: ICD-10-CM

## 2024-09-04 DIAGNOSIS — K21.9 GASTRO-ESOPHAGEAL REFLUX DISEASE WITHOUT ESOPHAGITIS: ICD-10-CM

## 2024-09-04 PROCEDURE — 99214 OFFICE O/P EST MOD 30 MIN: CPT | Performed by: INTERNAL MEDICINE

## 2024-09-07 ENCOUNTER — APPOINTMENT (OUTPATIENT)
Dept: GENERAL RADIOLOGY | Facility: HOSPITAL | Age: 84
DRG: 683 | End: 2024-09-07
Payer: MEDICARE

## 2024-09-07 ENCOUNTER — HOSPITAL ENCOUNTER (INPATIENT)
Facility: HOSPITAL | Age: 84
LOS: 4 days | Discharge: HOME | DRG: 683 | End: 2024-09-12
Attending: EMERGENCY MEDICINE | Admitting: FAMILY MEDICINE
Payer: MEDICARE

## 2024-09-07 DIAGNOSIS — M25.511 ACUTE PAIN OF BOTH SHOULDERS: Primary | ICD-10-CM

## 2024-09-07 DIAGNOSIS — M25.512 ACUTE PAIN OF BOTH SHOULDERS: Primary | ICD-10-CM

## 2024-09-07 DIAGNOSIS — R60.9 EDEMA, UNSPECIFIED TYPE: ICD-10-CM

## 2024-09-07 DIAGNOSIS — N17.9 ACUTE KIDNEY INJURY: ICD-10-CM

## 2024-09-07 LAB
ALBUMIN SERPL-MCNC: 3.1 G/DL (ref 3.5–5.2)
ALBUMIN/GLOB SERPL: 1 G/DL
ALP SERPL-CCNC: 61 U/L (ref 39–117)
ALT SERPL W P-5'-P-CCNC: 12 U/L (ref 1–41)
ANION GAP SERPL CALCULATED.3IONS-SCNC: 10.5 MMOL/L (ref 5–15)
AST SERPL-CCNC: 37 U/L (ref 1–40)
BASOPHILS # BLD AUTO: 0.03 10*3/MM3 (ref 0–0.2)
BASOPHILS NFR BLD AUTO: 0.6 % (ref 0–1.5)
BILIRUB SERPL-MCNC: 0.6 MG/DL (ref 0–1.2)
BUN SERPL-MCNC: 27 MG/DL (ref 8–23)
BUN/CREAT SERPL: 20 (ref 7–25)
CALCIUM SPEC-SCNC: 8.8 MG/DL (ref 8.6–10.5)
CHLORIDE SERPL-SCNC: 110 MMOL/L (ref 98–107)
CO2 SERPL-SCNC: 22.5 MMOL/L (ref 22–29)
CREAT SERPL-MCNC: 1.35 MG/DL (ref 0.76–1.27)
DEPRECATED RDW RBC AUTO: 75.1 FL (ref 37–54)
EGFRCR SERPLBLD CKD-EPI 2021: 52.1 ML/MIN/1.73
EOSINOPHIL # BLD AUTO: 0.31 10*3/MM3 (ref 0–0.4)
EOSINOPHIL NFR BLD AUTO: 6 % (ref 0.3–6.2)
ERYTHROCYTE [DISTWIDTH] IN BLOOD BY AUTOMATED COUNT: 19.6 % (ref 12.3–15.4)
ERYTHROCYTE [SEDIMENTATION RATE] IN BLOOD: 16 MM/HR (ref 0–20)
GLOBULIN UR ELPH-MCNC: 3.1 GM/DL
GLUCOSE SERPL-MCNC: 100 MG/DL (ref 65–99)
HCT VFR BLD AUTO: 27 % (ref 37.5–51)
HGB BLD-MCNC: 8.2 G/DL (ref 13–17.7)
HOLD SPECIMEN: NORMAL
IMM GRANULOCYTES # BLD AUTO: 0.01 10*3/MM3 (ref 0–0.05)
IMM GRANULOCYTES NFR BLD AUTO: 0.2 % (ref 0–0.5)
LYMPHOCYTES # BLD AUTO: 0.79 10*3/MM3 (ref 0.7–3.1)
LYMPHOCYTES NFR BLD AUTO: 15.4 % (ref 19.6–45.3)
MCH RBC QN AUTO: 32.2 PG (ref 26.6–33)
MCHC RBC AUTO-ENTMCNC: 30.4 G/DL (ref 31.5–35.7)
MCV RBC AUTO: 105.9 FL (ref 79–97)
MONOCYTES # BLD AUTO: 0.44 10*3/MM3 (ref 0.1–0.9)
MONOCYTES NFR BLD AUTO: 8.6 % (ref 5–12)
NEUTROPHILS NFR BLD AUTO: 3.55 10*3/MM3 (ref 1.7–7)
NEUTROPHILS NFR BLD AUTO: 69.2 % (ref 42.7–76)
NRBC BLD AUTO-RTO: 0 /100 WBC (ref 0–0.2)
PLATELET # BLD AUTO: 155 10*3/MM3 (ref 140–450)
PMV BLD AUTO: 12.7 FL (ref 6–12)
POTASSIUM SERPL-SCNC: 4.3 MMOL/L (ref 3.5–5.2)
PROT SERPL-MCNC: 6.2 G/DL (ref 6–8.5)
RBC # BLD AUTO: 2.55 10*6/MM3 (ref 4.14–5.8)
SODIUM SERPL-SCNC: 143 MMOL/L (ref 136–145)
WBC NRBC COR # BLD AUTO: 5.13 10*3/MM3 (ref 3.4–10.8)
WHOLE BLOOD HOLD COAG: NORMAL

## 2024-09-07 PROCEDURE — 80053 COMPREHEN METABOLIC PANEL: CPT | Performed by: EMERGENCY MEDICINE

## 2024-09-07 PROCEDURE — 84443 ASSAY THYROID STIM HORMONE: CPT | Performed by: INTERNAL MEDICINE

## 2024-09-07 PROCEDURE — 99285 EMERGENCY DEPT VISIT HI MDM: CPT

## 2024-09-07 PROCEDURE — 83540 ASSAY OF IRON: CPT | Performed by: INTERNAL MEDICINE

## 2024-09-07 PROCEDURE — 84550 ASSAY OF BLOOD/URIC ACID: CPT | Performed by: INTERNAL MEDICINE

## 2024-09-07 PROCEDURE — 84466 ASSAY OF TRANSFERRIN: CPT | Performed by: INTERNAL MEDICINE

## 2024-09-07 PROCEDURE — 85025 COMPLETE CBC W/AUTO DIFF WBC: CPT | Performed by: EMERGENCY MEDICINE

## 2024-09-07 PROCEDURE — 82728 ASSAY OF FERRITIN: CPT | Performed by: INTERNAL MEDICINE

## 2024-09-07 PROCEDURE — 73030 X-RAY EXAM OF SHOULDER: CPT

## 2024-09-07 PROCEDURE — 85652 RBC SED RATE AUTOMATED: CPT | Performed by: EMERGENCY MEDICINE

## 2024-09-07 RX ORDER — HYDROCODONE BITARTRATE AND ACETAMINOPHEN 5; 325 MG/1; MG/1
1 TABLET ORAL ONCE AS NEEDED
Status: COMPLETED | OUTPATIENT
Start: 2024-09-07 | End: 2024-09-07

## 2024-09-07 RX ADMIN — HYDROCODONE BITARTRATE AND ACETAMINOPHEN 1 TABLET: 5; 325 TABLET ORAL at 21:24

## 2024-09-08 ENCOUNTER — APPOINTMENT (OUTPATIENT)
Dept: ULTRASOUND IMAGING | Facility: HOSPITAL | Age: 84
DRG: 683 | End: 2024-09-08
Payer: MEDICARE

## 2024-09-08 PROBLEM — G47.30 SLEEP APNEA: Status: ACTIVE | Noted: 2024-09-08

## 2024-09-08 PROBLEM — E78.00 PURE HYPERCHOLESTEROLEMIA: Status: ACTIVE | Noted: 2024-09-08

## 2024-09-08 PROBLEM — L40.9 PSORIASIS: Status: ACTIVE | Noted: 2024-09-08

## 2024-09-08 PROBLEM — J44.9 CHRONIC OBSTRUCTIVE PULMONARY DISEASE, UNSPECIFIED: Status: ACTIVE | Noted: 2024-09-08

## 2024-09-08 PROBLEM — J30.2 SEASONAL ALLERGIES: Status: ACTIVE | Noted: 2024-09-08

## 2024-09-08 PROBLEM — I10 HIGH BLOOD PRESSURE: Status: ACTIVE | Noted: 2024-09-08

## 2024-09-08 PROBLEM — N18.2 ANEMIA IN STAGE 2 CHRONIC KIDNEY DISEASE: Status: ACTIVE | Noted: 2024-08-06

## 2024-09-08 PROBLEM — Z96.89 PRESENCE OF PANCREATIC DUCT STENT: Status: ACTIVE | Noted: 2024-09-08

## 2024-09-08 PROBLEM — K21.9 GASTRO-ESOPHAGEAL REFLUX DISEASE WITHOUT ESOPHAGITIS: Status: ACTIVE | Noted: 2024-09-08

## 2024-09-08 PROBLEM — D63.1 ANEMIA IN STAGE 2 CHRONIC KIDNEY DISEASE: Status: ACTIVE | Noted: 2024-08-06

## 2024-09-08 PROBLEM — N17.9 ACUTE RENAL FAILURE: Status: ACTIVE | Noted: 2024-09-08

## 2024-09-08 LAB
ALBUMIN SERPL-MCNC: 2.7 G/DL (ref 3.5–5.2)
ALBUMIN/GLOB SERPL: 1 G/DL
ALP SERPL-CCNC: 58 U/L (ref 39–117)
ALT SERPL W P-5'-P-CCNC: 9 U/L (ref 1–41)
ANION GAP SERPL CALCULATED.3IONS-SCNC: 7.3 MMOL/L (ref 5–15)
AST SERPL-CCNC: 23 U/L (ref 1–40)
BACTERIA UR QL AUTO: ABNORMAL /HPF
BILIRUB SERPL-MCNC: 0.5 MG/DL (ref 0–1.2)
BILIRUB UR QL STRIP: NEGATIVE
BUN SERPL-MCNC: 27 MG/DL (ref 8–23)
BUN/CREAT SERPL: 20.9 (ref 7–25)
CA-I SERPL ISE-MCNC: 1.18 MMOL/L (ref 1.15–1.3)
CALCIUM SPEC-SCNC: 8.2 MG/DL (ref 8.6–10.5)
CHLORIDE SERPL-SCNC: 112 MMOL/L (ref 98–107)
CK SERPL-CCNC: 90 U/L (ref 20–200)
CLARITY UR: ABNORMAL
CO2 SERPL-SCNC: 23.7 MMOL/L (ref 22–29)
COLOR UR: ABNORMAL
CREAT SERPL-MCNC: 1.29 MG/DL (ref 0.76–1.27)
DEPRECATED RDW RBC AUTO: 75.1 FL (ref 37–54)
EGFRCR SERPLBLD CKD-EPI 2021: 55 ML/MIN/1.73
ERYTHROCYTE [DISTWIDTH] IN BLOOD BY AUTOMATED COUNT: 19.4 % (ref 12.3–15.4)
FERRITIN SERPL-MCNC: 202 NG/ML (ref 30–400)
FLUAV RNA RESP QL NAA+PROBE: NOT DETECTED
FLUBV RNA RESP QL NAA+PROBE: NOT DETECTED
FOLATE SERPL-MCNC: 12.9 NG/ML (ref 4.78–24.2)
GLOBULIN UR ELPH-MCNC: 2.6 GM/DL
GLUCOSE BLDC GLUCOMTR-MCNC: 104 MG/DL (ref 70–105)
GLUCOSE BLDC GLUCOMTR-MCNC: 115 MG/DL (ref 70–105)
GLUCOSE BLDC GLUCOMTR-MCNC: 147 MG/DL (ref 70–105)
GLUCOSE SERPL-MCNC: 127 MG/DL (ref 65–99)
GLUCOSE UR STRIP-MCNC: NEGATIVE MG/DL
HCT VFR BLD AUTO: 23.4 % (ref 37.5–51)
HGB BLD-MCNC: 7 G/DL (ref 13–17.7)
HGB UR QL STRIP.AUTO: ABNORMAL
HYALINE CASTS UR QL AUTO: ABNORMAL /LPF
IRON 24H UR-MRATE: 30 MCG/DL (ref 59–158)
IRON SATN MFR SERPL: 9 % (ref 20–50)
KETONES UR QL STRIP: NEGATIVE
LEUKOCYTE ESTERASE UR QL STRIP.AUTO: ABNORMAL
MAGNESIUM SERPL-MCNC: 1.4 MG/DL (ref 1.6–2.4)
MCH RBC QN AUTO: 32 PG (ref 26.6–33)
MCHC RBC AUTO-ENTMCNC: 29.9 G/DL (ref 31.5–35.7)
MCV RBC AUTO: 106.8 FL (ref 79–97)
NITRITE UR QL STRIP: NEGATIVE
PH UR STRIP.AUTO: <=5 [PH] (ref 5–8)
PHOSPHATE SERPL-MCNC: 2.9 MG/DL (ref 2.5–4.5)
PLATELET # BLD AUTO: 138 10*3/MM3 (ref 140–450)
PMV BLD AUTO: 13 FL (ref 6–12)
POTASSIUM SERPL-SCNC: 4.3 MMOL/L (ref 3.5–5.2)
PROT SERPL-MCNC: 5.3 G/DL (ref 6–8.5)
PROT UR QL STRIP: ABNORMAL
RBC # BLD AUTO: 2.19 10*6/MM3 (ref 4.14–5.8)
RBC # UR STRIP: ABNORMAL /HPF
REF LAB TEST METHOD: ABNORMAL
RETICS # AUTO: 0.08 10*6/MM3 (ref 0.02–0.13)
RETICS/RBC NFR AUTO: 3.49 % (ref 0.7–1.9)
RSV RNA RESP QL NAA+PROBE: NOT DETECTED
SARS-COV-2 RNA RESP QL NAA+PROBE: NOT DETECTED
SODIUM SERPL-SCNC: 143 MMOL/L (ref 136–145)
SP GR UR STRIP: 1.02 (ref 1–1.03)
SQUAMOUS #/AREA URNS HPF: ABNORMAL /HPF
TIBC SERPL-MCNC: 335 MCG/DL (ref 298–536)
TRANSFERRIN SERPL-MCNC: 225 MG/DL (ref 200–360)
TSH SERPL DL<=0.05 MIU/L-ACNC: 3.12 UIU/ML (ref 0.27–4.2)
URATE SERPL-MCNC: 7.4 MG/DL (ref 3.4–7)
UROBILINOGEN UR QL STRIP: ABNORMAL
VIT B12 BLD-MCNC: 501 PG/ML (ref 211–946)
WBC # UR STRIP: ABNORMAL /HPF
WBC NRBC COR # BLD AUTO: 4.37 10*3/MM3 (ref 3.4–10.8)

## 2024-09-08 PROCEDURE — 94799 UNLISTED PULMONARY SVC/PX: CPT

## 2024-09-08 PROCEDURE — 82550 ASSAY OF CK (CPK): CPT | Performed by: FAMILY MEDICINE

## 2024-09-08 PROCEDURE — 83735 ASSAY OF MAGNESIUM: CPT | Performed by: INTERNAL MEDICINE

## 2024-09-08 PROCEDURE — 94640 AIRWAY INHALATION TREATMENT: CPT

## 2024-09-08 PROCEDURE — 94664 DEMO&/EVAL PT USE INHALER: CPT

## 2024-09-08 PROCEDURE — 82948 REAGENT STRIP/BLOOD GLUCOSE: CPT

## 2024-09-08 PROCEDURE — 84100 ASSAY OF PHOSPHORUS: CPT | Performed by: INTERNAL MEDICINE

## 2024-09-08 PROCEDURE — 94761 N-INVAS EAR/PLS OXIMETRY MLT: CPT

## 2024-09-08 PROCEDURE — 82607 VITAMIN B-12: CPT | Performed by: FAMILY MEDICINE

## 2024-09-08 PROCEDURE — 82746 ASSAY OF FOLIC ACID SERUM: CPT | Performed by: FAMILY MEDICINE

## 2024-09-08 PROCEDURE — 85045 AUTOMATED RETICULOCYTE COUNT: CPT | Performed by: FAMILY MEDICINE

## 2024-09-08 PROCEDURE — 25810000003 SODIUM CHLORIDE 0.9 % SOLUTION: Performed by: FAMILY MEDICINE

## 2024-09-08 PROCEDURE — 87086 URINE CULTURE/COLONY COUNT: CPT | Performed by: INTERNAL MEDICINE

## 2024-09-08 PROCEDURE — 82330 ASSAY OF CALCIUM: CPT | Performed by: INTERNAL MEDICINE

## 2024-09-08 PROCEDURE — 80053 COMPREHEN METABOLIC PANEL: CPT | Performed by: INTERNAL MEDICINE

## 2024-09-08 PROCEDURE — 76775 US EXAM ABDO BACK WALL LIM: CPT

## 2024-09-08 PROCEDURE — 85027 COMPLETE CBC AUTOMATED: CPT | Performed by: FAMILY MEDICINE

## 2024-09-08 PROCEDURE — 82948 REAGENT STRIP/BLOOD GLUCOSE: CPT | Performed by: FAMILY MEDICINE

## 2024-09-08 PROCEDURE — 87637 SARSCOV2&INF A&B&RSV AMP PRB: CPT | Performed by: EMERGENCY MEDICINE

## 2024-09-08 PROCEDURE — 87481 CANDIDA DNA AMP PROBE: CPT | Performed by: FAMILY MEDICINE

## 2024-09-08 PROCEDURE — 81001 URINALYSIS AUTO W/SCOPE: CPT | Performed by: INTERNAL MEDICINE

## 2024-09-08 RX ORDER — INSULIN LISPRO 100 [IU]/ML
2-7 INJECTION, SOLUTION INTRAVENOUS; SUBCUTANEOUS
Status: DISCONTINUED | OUTPATIENT
Start: 2024-09-08 | End: 2024-09-12 | Stop reason: HOSPADM

## 2024-09-08 RX ORDER — CETIRIZINE HYDROCHLORIDE 10 MG/1
10 TABLET ORAL DAILY
Status: ON HOLD | COMMUNITY

## 2024-09-08 RX ORDER — TAMSULOSIN HYDROCHLORIDE 0.4 MG/1
1 CAPSULE ORAL DAILY
Status: ON HOLD | COMMUNITY
Start: 2024-08-13

## 2024-09-08 RX ORDER — SILVER SULFADIAZINE 10 MG/G
1 CREAM TOPICAL 2 TIMES DAILY
Status: ON HOLD | COMMUNITY
Start: 2024-08-14

## 2024-09-08 RX ORDER — DEXTROSE MONOHYDRATE 25 G/50ML
25 INJECTION, SOLUTION INTRAVENOUS
Status: DISCONTINUED | OUTPATIENT
Start: 2024-09-08 | End: 2024-09-12 | Stop reason: HOSPADM

## 2024-09-08 RX ORDER — ATORVASTATIN CALCIUM 20 MG/1
20 TABLET, FILM COATED ORAL NIGHTLY
Status: ON HOLD | COMMUNITY

## 2024-09-08 RX ORDER — CHLORAL HYDRATE 500 MG
1000 CAPSULE ORAL
Status: ON HOLD | COMMUNITY

## 2024-09-08 RX ORDER — ATORVASTATIN CALCIUM 20 MG/1
20 TABLET, FILM COATED ORAL NIGHTLY
Status: DISCONTINUED | OUTPATIENT
Start: 2024-09-08 | End: 2024-09-12 | Stop reason: HOSPADM

## 2024-09-08 RX ORDER — MIDODRINE HYDROCHLORIDE 5 MG/1
5 TABLET ORAL 2 TIMES DAILY
Status: ON HOLD | COMMUNITY

## 2024-09-08 RX ORDER — PETROLATUM,WHITE/LANOLIN
1 OINTMENT (GRAM) TOPICAL DAILY
Status: ON HOLD | COMMUNITY

## 2024-09-08 RX ORDER — SODIUM CHLORIDE 9 MG/ML
50 INJECTION, SOLUTION INTRAVENOUS CONTINUOUS
Status: DISCONTINUED | OUTPATIENT
Start: 2024-09-08 | End: 2024-09-09

## 2024-09-08 RX ORDER — MULTIVIT WITH IRON,MINERALS
1 TABLET ORAL DAILY
Status: ON HOLD | COMMUNITY

## 2024-09-08 RX ORDER — NICOTINE POLACRILEX 4 MG
15 LOZENGE BUCCAL
Status: DISCONTINUED | OUTPATIENT
Start: 2024-09-08 | End: 2024-09-12 | Stop reason: HOSPADM

## 2024-09-08 RX ORDER — IPRATROPIUM BROMIDE AND ALBUTEROL SULFATE 2.5; .5 MG/3ML; MG/3ML
3 SOLUTION RESPIRATORY (INHALATION)
Status: DISCONTINUED | OUTPATIENT
Start: 2024-09-08 | End: 2024-09-12 | Stop reason: HOSPADM

## 2024-09-08 RX ORDER — IBUPROFEN 600 MG/1
1 TABLET ORAL
Status: DISCONTINUED | OUTPATIENT
Start: 2024-09-08 | End: 2024-09-12 | Stop reason: HOSPADM

## 2024-09-08 RX ORDER — PANTOPRAZOLE SODIUM 40 MG/1
40 TABLET, DELAYED RELEASE ORAL
Status: DISCONTINUED | OUTPATIENT
Start: 2024-09-08 | End: 2024-09-12 | Stop reason: HOSPADM

## 2024-09-08 RX ORDER — FORMOTEROL FUMARATE DIHYDRATE 20 UG/2ML
20 SOLUTION RESPIRATORY (INHALATION)
Status: ON HOLD | COMMUNITY

## 2024-09-08 RX ORDER — BUDESONIDE 0.5 MG/2ML
0.5 INHALANT ORAL 2 TIMES DAILY
Status: ON HOLD | COMMUNITY

## 2024-09-08 RX ORDER — MIDODRINE HYDROCHLORIDE 5 MG/1
5 TABLET ORAL
Status: DISCONTINUED | OUTPATIENT
Start: 2024-09-08 | End: 2024-09-12

## 2024-09-08 RX ORDER — REVEFENACIN 175 UG/3ML
175 SOLUTION RESPIRATORY (INHALATION)
COMMUNITY
End: 2024-09-12 | Stop reason: HOSPADM

## 2024-09-08 RX ORDER — TAMSULOSIN HYDROCHLORIDE 0.4 MG/1
0.4 CAPSULE ORAL DAILY
Status: DISCONTINUED | OUTPATIENT
Start: 2024-09-08 | End: 2024-09-12 | Stop reason: HOSPADM

## 2024-09-08 RX ORDER — ACETAMINOPHEN 500 MG
1000 TABLET ORAL 2 TIMES DAILY
Status: DISCONTINUED | OUTPATIENT
Start: 2024-09-08 | End: 2024-09-08

## 2024-09-08 RX ORDER — ACETAMINOPHEN 500 MG
500 TABLET ORAL 4 TIMES DAILY PRN
Status: DISCONTINUED | OUTPATIENT
Start: 2024-09-08 | End: 2024-09-12 | Stop reason: HOSPADM

## 2024-09-08 RX ORDER — SPIRONOLACTONE 25 MG/1
25 TABLET ORAL DAILY
COMMUNITY
Start: 2024-08-13 | End: 2024-09-12 | Stop reason: HOSPADM

## 2024-09-08 RX ADMIN — PANTOPRAZOLE SODIUM 40 MG: 40 TABLET, DELAYED RELEASE ORAL at 09:39

## 2024-09-08 RX ADMIN — IPRATROPIUM BROMIDE AND ALBUTEROL SULFATE 3 ML: .5; 3 SOLUTION RESPIRATORY (INHALATION) at 10:45

## 2024-09-08 RX ADMIN — MIDODRINE HYDROCHLORIDE 5 MG: 5 TABLET ORAL at 09:39

## 2024-09-08 RX ADMIN — MIDODRINE HYDROCHLORIDE 5 MG: 5 TABLET ORAL at 18:34

## 2024-09-08 RX ADMIN — ACETAMINOPHEN 500 MG: 500 TABLET, FILM COATED ORAL at 19:51

## 2024-09-08 RX ADMIN — ATORVASTATIN CALCIUM 20 MG: 20 TABLET, FILM COATED ORAL at 21:10

## 2024-09-08 RX ADMIN — APIXABAN 2.5 MG: 2.5 TABLET, FILM COATED ORAL at 21:10

## 2024-09-08 RX ADMIN — IPRATROPIUM BROMIDE AND ALBUTEROL SULFATE 3 ML: .5; 3 SOLUTION RESPIRATORY (INHALATION) at 19:27

## 2024-09-08 RX ADMIN — SODIUM CHLORIDE 50 ML/HR: 9 INJECTION, SOLUTION INTRAVENOUS at 10:32

## 2024-09-08 RX ADMIN — APIXABAN 2.5 MG: 2.5 TABLET, FILM COATED ORAL at 09:39

## 2024-09-08 RX ADMIN — TAMSULOSIN HYDROCHLORIDE 0.4 MG: 0.4 CAPSULE ORAL at 09:39

## 2024-09-08 NOTE — CONSULTS
NEPHROLOGY CONSULTATION-----[facility]    [facility]  [phone]  Shawn Shane MD    Patient Care Team:  Guilherme Jason MD as PCP - General (Family Medicine)  Misty Dias MD as Consulting Physician (Cardiology)  Nora Kenyon, BRIAN, APRN as Nurse Practitioner (Thoracic Surgery)  Sussy Shane MD as Consulting Physician (Nephrology)    CC/REASON FOR CONSULTATION: RENAL FAILURE/ELEVATED GIULIANO CREATININE    PHYSICIAN REQUESTING CONSULTATION:     History of Present Illness    HPI    Patient is a 84 y.o. WM, very well known to me as I actively follow him as an outpatient, whom I was asked to see in consultation for evaluation and management of renal failure/elevated serum creatinine. Patient was admitted after presenting to ER with c/o bilateral arm pain and edema.   Patient with known CRF/CKD. No NSAIDs or recent IV dye exposure. No known h/o hepatitis, TB, rheumatic fever, jaundice, SLE. Does bleed/bruise easily as he is chronically anticoagulated on Eliquis.  No urinary sx. No edema or fluid retention.  +Compliance with home meds. Was on diuretics in the form of Aldactone  prior to admission. Was on ACE-I/ARB in the form of Losartan prior to admission. No herbal med use.    Review of Systems   Constitutional:  Positive for activity change, appetite change and fatigue. Negative for chills, diaphoresis, fever and unexpected weight change.   HENT:  Negative for congestion, dental problem, drooling, ear discharge, ear pain, facial swelling, hearing loss, mouth sores, nosebleeds, postnasal drip, rhinorrhea, sinus pressure, sinus pain, sneezing, sore throat, tinnitus, trouble swallowing and voice change.    Eyes:  Negative for photophobia, pain, discharge, redness, itching and visual disturbance.   Respiratory:  Negative for apnea, cough, choking, chest tightness, shortness of breath, wheezing and stridor.    Cardiovascular:   Negative for chest pain, palpitations and leg swelling.   Gastrointestinal:  Negative for abdominal distention, abdominal pain, anal bleeding, blood in stool, constipation, diarrhea, nausea, rectal pain and vomiting.   Endocrine: Negative for cold intolerance, heat intolerance, polydipsia, polyphagia and polyuria.   Genitourinary:  Negative for decreased urine volume, difficulty urinating, dysuria, enuresis, flank pain, frequency, genital sores, hematuria and urgency.   Musculoskeletal:  Positive for arthralgias, back pain and myalgias. Negative for gait problem, joint swelling, neck pain and neck stiffness.   Skin:  Negative for color change, pallor, rash and wound.   Allergic/Immunologic: Negative for environmental allergies, food allergies and immunocompromised state.   Neurological:  Positive for weakness. Negative for dizziness, tremors, seizures, syncope, facial asymmetry, speech difficulty, light-headedness, numbness and headaches.   Hematological:  Negative for adenopathy. Bruises/bleeds easily.   Psychiatric/Behavioral:  Negative for agitation, behavioral problems, confusion, decreased concentration, dysphoric mood, hallucinations, self-injury, sleep disturbance and suicidal ideas. The patient is not nervous/anxious and is not hyperactive.           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    Neutropenia 11/2003    MITZI (obstructive sleep apnea) 2018    Psoriasis 2000     Renal cyst, left     Rheumatoid arthritis     Stroke (cerebrum)     Thrombocytopenia 2005       Past Surgical History:   Procedure Laterality Date    ENDOSCOPY N/A 2024    Procedure: ESOPHAGOGASTRODUODENOSCOPY WITH DILATION (#46 BOUGIE);  Surgeon: Gamal Yancey MD;  Location: [facility] ENDOSCOPY;  Service: Gastroenterology;  Laterality: N/A;  DUODENAL DIVERTICULUM, HIATAL HERNIA, DISTAL ESOPHAGEAL STRICTURE    ERCP N/A 6/10/2024    Procedure: ENDOSCOPIC RETROGRADE CHOLANGIOPANCREATOGRAPHY, sphincterotomy, balloon clearance of commonn bile duct (9-12mm), stone extraction, pancreatic stent placement;  Surgeon: Mariaelena Snyder MD;  Location: [facility] ENDOSCOPY;  Service: Gastroenterology;  Laterality: N/A;  post: choledocholithiasis    SKIN LESION EXCISION      back and groin-benign       Family History   Problem Relation Age of Onset    Lung cancer Brother         age unknown    Heart disease Brother     Stroke Mother     Heart disease Father     Heart disease Sister        Social History     Tobacco Use    Smoking status: Former     Current packs/day: 0.00     Types: Cigarettes     Quit date:      Years since quittin.     Passive exposure: Never    Smokeless tobacco: Never    Tobacco comments:     stopped smoking in    Vaping Use    Vaping status: Never Used   Substance Use Topics    Alcohol use: No    Drug use: No       Home Meds:   Medications Prior to Admission   Medication Sig Dispense Refill Last Dose    acetaminophen (TYLENOL) 500 MG tablet Take 2 tablets by mouth 2 (Two) Times a Day.   2024 at 1900    apixaban (ELIQUIS) 2.5 MG tablet tablet Take 1 tablet by mouth 2 (Two) Times a Day. 60 tablet 11 2024 at 1900    budesonide (PULMICORT) 0.5 MG/2ML nebulizer solution Take 2 mL by nebulization 2 (Two) Times a Day.   2024    cetirizine (zyrTEC) 10 MG tablet Take 1 tablet by mouth Daily.   2024 at 1000    fenofibrate 160 MG tablet Take 1 tablet by mouth Daily.    9/7/2024 at 1200    ferrous sulfate 325 (65 FE) MG tablet Take 1 tablet by mouth Daily With Breakfast. 90 tablet 1 9/7/2024 at 1000    formoterol (PERFOROMIST) 20 MCG/2ML nebulizer solution Take 2 mL by nebulization 2 (Two) Times a Day.   9/7/2024    Glucosamine Sulfate 1000 MG capsule Take 1 capsule by mouth Daily.   9/7/2024 at 1000    hydroxychloroquine (PLAQUENIL) 200 MG tablet Take 2 tablets by mouth Daily.  0 9/7/2024 at 1200    ipratropium-albuterol (DUO-NEB) 0.5-2.5 mg/3 ml nebulizer Take 3 mL by nebulization Every 4 (Four) Hours As Needed for Wheezing.   9/7/2024    losartan (COZAAR) 100 MG tablet Take 0.5 tablets by mouth Daily. (Patient taking differently: Take 0.5 tablets by mouth Every Night.) 30 tablet 1 9/7/2024 at 1900    midodrine (PROAMATINE) 5 MG tablet Take 1 tablet by mouth 2 (Two) Times a Day.   9/7/2024 at 1900    multivitamin with minerals (V-R COMPLETE SENIOR) tablet tablet Take 1 tablet by mouth Daily.   9/7/2024 at 1000    Omega-3 Fatty Acids (fish oil) 1000 MG capsule capsule Take 1 capsule by mouth Daily With Breakfast.   9/7/2024 at 1000    oxybutynin XL (DITROPAN-XL) 10 MG 24 hr tablet Take 1 tablet by mouth Every Night.   9/7/2024 at 1900    pantoprazole (PROTONIX) 40 MG EC tablet Take 1 tablet by mouth Every Morning. 30 tablet 1 9/7/2024 at 1000    revefenacin (Yupelri) 175 MCG/3ML nebulizer solution Take 3 mL by nebulization Daily.   9/7/2024    silver sulfadiazine (SILVADENE, SSD) 1 % cream Apply 1 Application topically to the appropriate area as directed 2 (Two) Times a Day. Apply to wound on buttocks.   9/7/2024 at 1900    spironolactone (ALDACTONE) 25 MG tablet Take 1 tablet by mouth Daily.       tamsulosin (FLOMAX) 0.4 MG capsule 24 hr capsule Take 1 capsule by mouth Daily.   9/7/2024 at 1000    atorvastatin (LIPITOR) 20 MG tablet Take 1 tablet by mouth Every Night.          Scheduled Meds:  acetaminophen, 1,000 mg, Oral, BID  apixaban, 2.5 mg, Oral, BID  atorvastatin, 20 mg,  "Oral, Nightly  ipratropium-albuterol, 3 mL, Nebulization, 4x Daily - RT  midodrine, 5 mg, Oral, BID AC  pantoprazole, 40 mg, Oral, Q AM  tamsulosin, 0.4 mg, Oral, Daily        Continuous Infusions:  sodium chloride, 50 mL/hr        PRN Meds:      Allergies:  Penicillin v potassium and Latex    OBJECTIVE    Vital Signs  Temp:  [97.6 °F (36.4 °C)-97.8 °F (36.6 °C)] 97.6 °F (36.4 °C)  Heart Rate:  [46-90] 65  Resp:  [16-20] 16  BP: (123-161)/(48-66) 125/54    No intake/output data recorded.  I/O last 3 completed shifts:  In: 240 [P.O.:240]  Out: -     Physical Exam:  General Appearance: alert, appears stated age and cooperative  Head: normocephalic, without obvious abnormality and atraumatic  Eyes: conjunctivae and sclerae normal and no icterus  Neck: supple and no JVD  Lungs: clear to auscultation and respirations regular  Heart: regular rhythm & normal rate and normal S1, S2 +KATHLEEN  Chest Wall: no abnormalities observed  Abdomen: normal bowel sounds and soft, nontender  Extremities: moves extremities well, NO LE EDEMA, no cyanosis  Skin: no bleeding, bruising or rash  Neurologic: Alert, and oriented. No focal deficits    Results Review:    I reviewed the patient's new clinical results.    WBC WBC   Date Value Ref Range Status   09/07/2024 5.13 3.40 - 10.80 10*3/mm3 Final      HGB Hemoglobin   Date Value Ref Range Status   09/07/2024 8.2 (L) 13.0 - 17.7 g/dL Final      HCT Hematocrit   Date Value Ref Range Status   09/07/2024 27.0 (L) 37.5 - 51.0 % Final      Platelets No results found for: \"LABPLAT\"   MCV MCV   Date Value Ref Range Status   09/07/2024 105.9 (H) 79.0 - 97.0 fL Final          Sodium Sodium   Date Value Ref Range Status   09/07/2024 143 136 - 145 mmol/L Final      Potassium Potassium   Date Value Ref Range Status   09/07/2024 4.3 3.5 - 5.2 mmol/L Final     Comment:     Slight hemolysis detected by analyzer. Result may be falsely elevated.      Chloride Chloride   Date Value Ref Range Status   09/07/2024 " "110 (H) 98 - 107 mmol/L Final      CO2 CO2   Date Value Ref Range Status   09/07/2024 22.5 22.0 - 29.0 mmol/L Final      BUN BUN   Date Value Ref Range Status   09/07/2024 27 (H) 8 - 23 mg/dL Final      Creatinine Creatinine   Date Value Ref Range Status   09/07/2024 1.35 (H) 0.76 - 1.27 mg/dL Final      Calcium Calcium   Date Value Ref Range Status   09/07/2024 8.8 8.6 - 10.5 mg/dL Final      PO4 No results found for: \"CAPO4\"   Albumin Albumin   Date Value Ref Range Status   09/07/2024 3.1 (L) 3.5 - 5.2 g/dL Final      Magnesium No results found for: \"MG\"   Uric Acid No results found for: \"URICACID\"       Imaging Results (Last 72 Hours)       Procedure Component Value Units Date/Time    XR Shoulder 2+ View Left [[id number]] Collected: 09/07/24 2244     Updated: 09/07/24 2247    Narrative:      XR SHOULDER 2+ VW LEFT    Date of Exam: 9/7/2024 9:13 PM EDT    Indication: pain    Comparison: 6/7/2024.    Findings:  There is mild acromioclavicular and glenohumeral osteophyte formation. There is stable marked loss of acromiohumeral distance suggesting chronic rotator cuff pathology. There is maintenance of the coracoclavicular distance. No acute fracture or   dislocation. The adjacent lung and ribs appear intact.      Impression:      Impression:  1.No acute osseous abnormality.  2.Mild acromioclavicular and glenohumeral joint degeneration.  3.Loss of acromiohumeral distance suggesting chronic rotator cuff pathology.        Electronically Signed: Daniel Herman MD    9/7/2024 10:44 PM EDT    Workstation ID: [id number]    XR Shoulder 2+ View Right [[id number]] Collected: 09/07/24 2241     Updated: 09/07/24 2244    Narrative:      XR SHOULDER 2+ VW RIGHT    Date of Exam: 9/7/2024 9:12 PM EDT    Indication: pain    Comparison: None available.    Findings:  There is mild acromioclavicular and glenohumeral osteophyte formation. There is narrowing of the acromiohumeral interval suggesting chronic rotator cuff pathology. There is " maintenance of the coracoclavicular distance. The adjacent lung and ribs appear   intact. No evidence of fracture or dislocation.      Impression:      Impression:  1.Mild acromioclavicular and glenohumeral joint degeneration.  2.Narrowing of the acromiohumeral interval suggesting chronic rotator cuff pathology.        Electronically Signed: Daniel Herman MD    9/7/2024 10:42 PM EDT    Workstation ID: [id number]              Results for orders placed during the hospital encounter of 09/07/24    XR Shoulder 2+ View Left    Narrative  XR SHOULDER 2+ VW LEFT    Date of Exam: 9/7/2024 9:13 PM EDT    Indication: pain    Comparison: 6/7/2024.    Findings:  There is mild acromioclavicular and glenohumeral osteophyte formation. There is stable marked loss of acromiohumeral distance suggesting chronic rotator cuff pathology. There is maintenance of the coracoclavicular distance. No acute fracture or  dislocation. The adjacent lung and ribs appear intact.    Impression  Impression:  1.No acute osseous abnormality.  2.Mild acromioclavicular and glenohumeral joint degeneration.  3.Loss of acromiohumeral distance suggesting chronic rotator cuff pathology.        Electronically Signed: Daniel Herman MD  9/7/2024 10:44 PM EDT  Workstation ID: [id number]      XR Shoulder 2+ View Right    Narrative  XR SHOULDER 2+ VW RIGHT    Date of Exam: 9/7/2024 9:12 PM EDT    Indication: pain    Comparison: None available.    Findings:  There is mild acromioclavicular and glenohumeral osteophyte formation. There is narrowing of the acromiohumeral interval suggesting chronic rotator cuff pathology. There is maintenance of the coracoclavicular distance. The adjacent lung and ribs appear  intact. No evidence of fracture or dislocation.    Impression  Impression:  1.Mild acromioclavicular and glenohumeral joint degeneration.  2.Narrowing of the acromiohumeral interval suggesting chronic rotator cuff pathology.        Electronically Signed:   MD Virgil  9/7/2024 10:42 PM EDT  Workstation ID: [id number]      Results for orders placed during the hospital encounter of 06/10/24    XR Chest 1 View    Narrative  XR CHEST 1 VW    Date of Exam: 6/12/2024 11:53 AM EDT    Indication: Shortness of breath    Comparison: 6/10/2024.    Findings:  The heart is enlarged. There is stable small bilateral basilar pleural effusions with atelectasis and less likely pneumonia. The pulmonary vascular markings are normal. There are chronic age-related changes involving the bony thorax and thoracic aorta.    Impression  Impression:  No interval change from the study performed 2 days ago with abnormalities as described.      Electronically Signed: Jayy Osborn MD  6/12/2024 12:29 PM EDT  Workstation ID: [id number]            ASSESSMENT / PLAN      * No active hospital problems. *    CRF/CKD------Nonoliguric. BUN/Cr at baseline but patient clinically slightly prerenal. CRF/CKD STG 3A secondary to DGS/HTN NS. Temporarily hold Aldactone and ARB and give back little IVFs.  No NSAIDs or IV dye. Dose meds for CrCl 45 cc/min.      2. HTN WITH CKD----BP low. Temporarily holding ARB and Diuretic     3. DECONDITIONING-----PT/OT     4. ANEMIA OF CKD----Hemoccult pending. IV iron for ANDREA     5. COPD------Oxygen, nebs, pulmonary toilet     6. H/O CHF------Clinically prerenal at present. Diuretics on hold     7. HYPOALBUMINEMIA-----S/P IV Albumin to temporize     8. ELEVATED LFTS/TRANSAMINITIS------Better with diuresis     9. SEPSIS/UTI-----Abx per Intensivist     10. HYPOMAGNESEMIA-------Replaced     11. PAF--------Rate controlled. Anticoagulated     12. HYPOTENSION----ARB and diuretic on hold. Giving little IVFs     13. BPH-----On Flomax     14. GERD/PUD PROPHYLAXIS-----On PPI. Benefits outweigh risks despite renal dysfnction     15. DMII WITH RENAL MANIFESTATIONS-----BS ok. Glucometers, SSI       I discussed the patient's findings and my recommendations with patient and nursing staff    Will  follow along closely. Thank you for allowing us to see this patient in renal consultation.    [facility]  [phone]  MD Shawn Lindo MD  09/08/24  08:56 EDT

## 2024-09-08 NOTE — H&P
Patient Care Team:  Guilherme Jason MD as PCP - General (Family Medicine)  Misty Dias MD as Consulting Physician (Cardiology)  Nora Kenyon DNP, APRN as Nurse Practitioner (Thoracic Surgery)  Sussy Shane MD as Consulting Physician (Nephrology)    Chief Complaint  Subjective     The patient is a 84 y.o. male who presents with acute renal failure    HPI  The patient was in his usual state of health until the days prior to presentation when he began to experience some progressive bilateral arm pain arthralgias and fatigue.  Attempted to use some home medications but decompensated and presented to [facility] emergency room for evaluation where he was evaluated for his arm pain and shoulder discomfort but was found to have acute renal failure.  He does have a known history of chronic disease stage II as well as COPD ITP with history of pulmonary embolism and chronic oral anticoagulation.  He also has a history of large granular lymphocytic leukemia as well as prostate cancer and has undergone appropriate treatment.  At the time my evaluation he feels weak and has arthralgias but otherwise is in no apparent distress.  He does have a history of rheumatoid disease and cerebrovascular disease as well as diabetes with diabetic peripheral angiopathy  Review of Systems  Review of Systems   Constitutional:  Positive for activity change.   Musculoskeletal:  Positive for arthralgias. Negative for joint swelling.   Neurological:  Positive for weakness.       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    History of pneumonia 02/2015    hospitalized with community-acquired pneumonia, possibly viral     History of prostate cancer 10/2009     [place] 6, Stage II    Hypercholesterolemia     Hypertension     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    Neutropenia 2003    MITZI (obstructive sleep apnea) 2018    Psoriasis 2000    Renal cyst, left     Rheumatoid arthritis     Stroke (cerebrum)     Thrombocytopenia 2005     Past Surgical History:   Procedure Laterality Date    ENDOSCOPY N/A 2024    Procedure: ESOPHAGOGASTRODUODENOSCOPY WITH DILATION (#46 BOUGIE);  Surgeon: Gamal Yancey MD;  Location: [facility] ENDOSCOPY;  Service: Gastroenterology;  Laterality: N/A;  DUODENAL DIVERTICULUM, HIATAL HERNIA, DISTAL ESOPHAGEAL STRICTURE    ERCP N/A 6/10/2024    Procedure: ENDOSCOPIC RETROGRADE CHOLANGIOPANCREATOGRAPHY, sphincterotomy, balloon clearance of commonn bile duct (9-12mm), stone extraction, pancreatic stent placement;  Surgeon: Mariaelena Snyder MD;  Location: [facility] ENDOSCOPY;  Service: Gastroenterology;  Laterality: N/A;  post: choledocholithiasis    SKIN LESION EXCISION      back and groin-benign     Family History   Problem Relation Age of Onset    Lung cancer Brother         age unknown    Heart disease Brother     Stroke Mother     Heart disease Father     Heart disease Sister      Social History     Tobacco Use    Smoking status: Former     Current packs/day: 0.00     Types: Cigarettes     Quit date:      Years since quittin.7     Passive exposure: Never    Smokeless tobacco: Never    Tobacco comments:     stopped smoking in    Vaping Use    Vaping status: Never Used   Substance Use Topics    Alcohol use: No    Drug use: No     Allergies:  Penicillin v potassium and Latex    Objective     Vital Signs  Temp:  [97.6 °F (36.4 °C)-97.8 °F (36.6 °C)] 97.6 °F (36.4 °C)  Heart Rate:  [46-90] 65  Resp:  [16-20] 16  BP: (123-161)/(48-66) 125/54      Physical Exam:   Physical Exam  Vitals and nursing note reviewed.   Constitutional:       Appearance: Normal appearance.  "  Cardiovascular:      Rate and Rhythm: Regular rhythm.      Heart sounds: Normal heart sounds.   Pulmonary:      Breath sounds: Normal breath sounds.   Skin:     General: Skin is warm.   Neurological:      General: No focal deficit present.      Mental Status: He is alert.              Results Review:   CBC    Results from last 7 days   Lab Units 09/07/24  2101   WBC 10*3/mm3 5.13   HEMOGLOBIN g/dL 8.2*   PLATELETS 10*3/mm3 155     BMP   Results from last 7 days   Lab Units 09/07/24  2101   SODIUM mmol/L 143   POTASSIUM mmol/L 4.3   CHLORIDE mmol/L 110*   CO2 mmol/L 22.5   BUN mg/dL 27*   CREATININE mg/dL 1.35*   GLUCOSE mg/dL 100*     Cr Clearance Estimated Creatinine Clearance: 42.4 mL/min (A) (by C-G formula based on SCr of 1.35 mg/dL (H)).  Coag     HbA1C   Lab Results   Component Value Date    HGBA1C 4.70 (L) 04/23/2024    HGBA1C 6.1 (H) 09/11/2019    HGBA1C 5.9 (H) 09/10/2019     Blood Glucose No results found for: \"POCGLU\"  Infection     CMP   Results from last 7 days   Lab Units 09/07/24  2101   SODIUM mmol/L 143   POTASSIUM mmol/L 4.3   CHLORIDE mmol/L 110*   CO2 mmol/L 22.5   BUN mg/dL 27*   CREATININE mg/dL 1.35*   GLUCOSE mg/dL 100*   ALBUMIN g/dL 3.1*   BILIRUBIN mg/dL 0.6   ALK PHOS U/L 61   AST (SGOT) U/L 37   ALT (SGPT) U/L 12     UA      Radiology(recent) XR Shoulder 2+ View Left    Result Date: 9/7/2024  Impression: 1.No acute osseous abnormality. 2.Mild acromioclavicular and glenohumeral joint degeneration. 3.Loss of acromiohumeral distance suggesting chronic rotator cuff pathology. Electronically Signed: Daniel Herman MD  9/7/2024 10:44 PM EDT  Workstation ID: [id number]    XR Shoulder 2+ View Right    Result Date: 9/7/2024  Impression: 1.Mild acromioclavicular and glenohumeral joint degeneration. 2.Narrowing of the acromiohumeral interval suggesting chronic rotator cuff pathology. 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  Diabetes mellitus type 2 with renal manifestations  Diabetic dyslipidemia  Thrombophilia  History of pulmonary embolus  Chronic oral anticoagulation therapy  Degenerative joint disease  Chronic kidney disease stage II  Hypertension associate with chronic kidney disease stage II  History of prostate cancer  History of large granular lymphocytic leukemia  Exogenous obesity  Macrocytic anemia  Permanent atrial fibrillation  Hypercoagulable state secondary to atrial fibrillation  Bilateral rotator cuff arthropathy  Hypoventilation apnea syndrome with MITZI  Rheumatoid arthritis with positive rheumatoid factor of multiple joints  Immunocompromise state secondary to history of rheumatoid arthritis  Cerebrovascular disease with history of CVA  History of ITP  Overactive bladder  Seasonal allergic rhinitis  Gastroesophageal reflux disease with out esophagitis  Psoriasis  BPH with LUTS  Secondary hyperaldosteronism  Myelodysplastic syndrome  Panlobular COPD with emphysema  Chronic mucopurulent bronchitis      Plan:  Renal support//physical therapy//glycemic control//additional plans to follow  Expected Length of Stay 3 days    I discussed the patient's findings and my recommendations with patient and nursing staff.     Jayy Rust MD  09/08/24  09:21 EDT

## 2024-09-08 NOTE — PLAN OF CARE
Continue to monitor and assess pain.  Patient is alert and oriented but resting in bed with no current complaints.   Problem: Fall Injury Risk  Goal: Absence of Fall and Fall-Related Injury  Outcome: Ongoing, Progressing  Intervention: Identify and Manage Contributors  Recent Flowsheet Documentation  Taken 9/8/2024 0702 by Amy Gorman RN  Medication Review/Management: medications reviewed  Intervention: Promote Injury-Free Environment  Recent Flowsheet Documentation  Taken 9/8/2024 0702 by Amy oGrman RN  Safety Promotion/Fall Prevention:   clutter free environment maintained   assistive device/personal items within [facility]   fall prevention program maintained   safety round/check completed     Problem: Skin Injury Risk Increased  Goal: Skin Health and Integrity  Outcome: Ongoing, Progressing  Intervention: Promote and Optimize Oral Intake  Recent Flowsheet Documentation  Taken 9/8/2024 0702 by Amy Gorman RN  Oral Nutrition Promotion:   calorie-dense foods provided   calorie-dense liquids provided     Problem: Adult Inpatient Plan of Care  Goal: Plan of Care Review  Outcome: Ongoing, Progressing  Flowsheets (Taken 9/8/2024 0702)  Progress: improving  Plan of Care Reviewed With: patient  Goal: Patient-Specific Goal (Individualized)  Outcome: Ongoing, Progressing  Goal: Absence of Hospital-Acquired Illness or Injury  Outcome: Ongoing, Progressing  Intervention: Identify and Manage Fall Risk  Flowsheets (Taken 9/8/2024 0702)  Safety Promotion/Fall Prevention:   clutter free environment maintained   assistive device/personal items within [facility]   fall prevention program maintained   safety round/check completed  Intervention: Prevent Skin Injury  Flowsheets (Taken 9/8/2024 0308 by Eliane Gardner RN)  Skin Protection:   adhesive use limited   incontinence pads utilized   tubing/devices free from skin contact  Intervention: Prevent and Manage VTE (Venous Thromboembolism) Risk  Flowsheets  Taken  9/8/2024 0702 by Amy Gorman RN  Activity Management: activity encouraged  Taken 9/8/2024 0308 by Eliane Gardner RN  VTE Prevention/Management: sequential compression devices off  Intervention: Prevent Infection  Flowsheets (Taken 9/8/2024 0702)  Infection Prevention:   hand hygiene promoted   personal protective equipment utilized   rest/sleep promoted   single patient room provided  Goal: Optimal Comfort and Wellbeing  Outcome: Ongoing, Progressing  Intervention: Monitor Pain and Promote Comfort  Flowsheets (Taken 9/8/2024 0702)  Pain Management Interventions:   relaxation techniques promoted   see MAR   quiet environment facilitated  Intervention: Provide Person-Centered Care  Flowsheets (Taken 9/8/2024 0702)  Trust Relationship/Rapport:   care explained   questions encouraged  Goal: Readiness for Transition of Care  Outcome: Ongoing, Progressing  Intervention: Mutually Develop Transition Plan  Flowsheets  Taken 9/8/2024 0332 by Eliane Gardner RN  Transportation Anticipated: family or friend will provide  Patient/Family Anticipated Services at Transition: none  Patient/Family Anticipates Transition to: home with family  Taken 9/8/2024 0325 by Eliane Gardner RN  Equipment Currently Used at Home:   walker, standard   bipap   oxygen   nebulizer     Problem: COPD (Chronic Obstructive Pulmonary Disease) Comorbidity  Goal: Maintenance of COPD Symptom Control  Outcome: Ongoing, Progressing  Intervention: Maintain COPD-Symptom Control  Flowsheets (Taken 9/8/2024 0702)  Medication Review/Management: medications reviewed     Problem: Hypertension Comorbidity  Goal: Blood Pressure in Desired Range  Outcome: Ongoing, Progressing  Intervention: Maintain Blood Pressure Management  Flowsheets (Taken 9/8/2024 0702)  Medication Review/Management: medications reviewed     Problem: Obstructive Sleep Apnea Risk or Actual Comorbidity Management  Goal: Unobstructed Breathing During Sleep  Outcome: Ongoing,  Progressing     Problem: Pain Chronic (Persistent) (Comorbidity Management)  Goal: Acceptable Pain Control and Functional Ability  Outcome: Ongoing, Progressing  Intervention: Manage Persistent Pain  Flowsheets (Taken 9/8/2024 0702)  Medication Review/Management: medications reviewed  Intervention: Develop Pain Management Plan  Flowsheets (Taken 9/8/2024 0702)  Pain Management Interventions:   relaxation techniques promoted   see MAR   quiet environment facilitated     Problem: Pain Acute  Goal: Acceptable Pain Control and Functional Ability  Outcome: Ongoing, Progressing  Intervention: Prevent or Manage Pain  Flowsheets (Taken 9/8/2024 0702)  Medication Review/Management: medications reviewed  Intervention: Develop Pain Management Plan  Flowsheets (Taken 9/8/2024 0702)  Pain Management Interventions:   relaxation techniques promoted   see MAR   quiet environment facilitated     Problem: Impaired Wound Healing  Goal: Optimal Wound Healing  Outcome: Ongoing, Progressing  Intervention: Promote Wound Healing  Flowsheets (Taken 9/8/2024 0702)  Activity Management: activity encouraged  Oral Nutrition Promotion:   calorie-dense foods provided   calorie-dense liquids provided  Pain Management Interventions:   relaxation techniques promoted   see MAR   quiet environment facilitated   Goal Outcome Evaluation:  Plan of Care Reviewed With: patient        Progress: improving

## 2024-09-08 NOTE — ED PROVIDER NOTES
Subjective   History of Present Illness  84-year-old male presents with complaints of bilateral arm pain.  Reports right shoulder is worse.  He has had no recent fall.  He has sore on buttock that did have some bleeding.  He has had no fever.  He also complains of some pain to his feet he has dressing over right foot.  He has had no cough or shortness of breath.  No fevers.  No abdominal pain.  States still is eating.  No bowel or bladder complaints.  He is on anticoagulation with Eliquis.  Review of Systems    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    History of pneumonia 02/2015    hospitalized with community-acquired pneumonia, possibly viral     History of prostate cancer 10/2009    [place] 6, Stage II    Hypercholesterolemia     Hypertension     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    Neutropenia 11/2003    MITZI (obstructive sleep apnea) 2018    Psoriasis 2000    Renal cyst, left     Rheumatoid arthritis     Stroke (cerebrum)     Thrombocytopenia 08/23/2005       Allergies   Allergen Reactions    Penicillin V Potassium Swelling    Latex Hives       Past Surgical History:   Procedure Laterality Date    ENDOSCOPY N/A 4/26/2024    Procedure: ESOPHAGOGASTRODUODENOSCOPY WITH DILATION (#46 BOUGIE);  Surgeon: Gamal Yancey MD;  Location: [facility] ENDOSCOPY;  Service: Gastroenterology;  Laterality: N/A;  DUODENAL DIVERTICULUM, HIATAL HERNIA, DISTAL ESOPHAGEAL STRICTURE    ERCP N/A 6/10/2024    Procedure: ENDOSCOPIC RETROGRADE CHOLANGIOPANCREATOGRAPHY, sphincterotomy, balloon clearance of commonn bile duct (9-12mm), stone extraction, pancreatic stent placement;  Surgeon: Mariaelena Snyder MD;  Location:   [facility] ENDOSCOPY;  Service: Gastroenterology;  Laterality: N/A;  post: choledocholithiasis    SKIN LESION EXCISION      back and groin-benign       Family History   Problem Relation Age of Onset    Lung cancer Brother         age unknown    Heart disease Brother     Stroke Mother     Heart disease Father     Heart disease Sister        Social History     Socioeconomic History    Marital status:    Tobacco Use    Smoking status: Former     Current packs/day: 0.00     Types: Cigarettes     Quit date:      Years since quittin.7     Passive exposure: Never    Smokeless tobacco: Never    Tobacco comments:     stopped smoking in    Vaping Use    Vaping status: Never Used   Substance and Sexual Activity    Alcohol use: No    Drug use: No    Sexual activity: Defer     Prior to Admission medications    Medication Sig Start Date End Date Taking? Authorizing Provider   acetaminophen (TYLENOL) 325 MG tablet Take 1 tablet by mouth Every 6 (Six) Hours As Needed for Mild Pain.    Rey Myles MD   apixaban (ELIQUIS) 2.5 MG tablet tablet Take 1 tablet by mouth 2 (Two) Times a Day. 24   Misty Dias MD   fenofibrate 160 MG tablet Take 1 tablet by mouth Daily.    Rey Myles MD   ferrous sulfate 325 (65 FE) MG tablet Take 1 tablet by mouth Daily With Breakfast. 24   Pat Napoles MD   hydroxychloroquine (PLAQUENIL) 200 MG tablet Take 2 tablets by mouth Daily. 19   Rey Myles MD   ipratropium-albuterol (DUO-NEB) 0.5-2.5 mg/3 ml nebulizer Take 3 mL by nebulization Every 4 (Four) Hours As Needed for Wheezing.    Rey Myles MD   losartan (COZAAR) 100 MG tablet Take 0.5 tablets by mouth Daily. 6/15/24   Alondra Lundberg APRN   oxybutynin XL (DITROPAN-XL) 10 MG 24 hr tablet Take 1 tablet by mouth Daily. 3/29/23   Rey Myles MD   pantoprazole (PROTONIX) 40 MG EC tablet Take 1 tablet by mouth Every Morning. 24   Alondra Lundberg APRN              Objective   Physical Exam  84-year-old male awake alert.  Generally somewhat chronically on appearance.  He complains of pain with palpation or movement of both shoulders.  There is no erythema or warmth.  He has some healing abrasions where it appears he has been scratching at right upper arm.  There is no cellulitis.  He does have some bruising and soft tissue swelling to upper arm near elbow.  He has black discoloration that appears chronic to both forearms.  Chest clear equal breath sounds cardiovascular rate and rhythm abdomen soft nontender examination of legs he has some edema to left lower leg.  He has some bruising around knee noted.  Knee does not appear to have significant tenderness.  No significant hip tenderness.  He has black discoloration to skin which is chronic to both lower legs.  He has rather severe bunions and has Mepilex dressing over right foot and area of bunion.  Examination of buttocks reveals erythema and just very superficial skin breakdown.  There is no secondary infection.  Procedures           ED Course      Results for orders placed or performed during the hospital encounter of 09/07/24   Comprehensive Metabolic Panel    Specimen: Blood   Result Value Ref Range    Glucose 100 (H) 65 - 99 mg/dL    BUN 27 (H) 8 - 23 mg/dL    Creatinine 1.35 (H) 0.76 - 1.27 mg/dL    Sodium 143 136 - 145 mmol/L    Potassium 4.3 3.5 - 5.2 mmol/L    Chloride 110 (H) 98 - 107 mmol/L    CO2 22.5 22.0 - 29.0 mmol/L    Calcium 8.8 8.6 - 10.5 mg/dL    Total Protein 6.2 6.0 - 8.5 g/dL    Albumin 3.1 (L) 3.5 - 5.2 g/dL    ALT (SGPT) 12 1 - 41 U/L    AST (SGOT) 37 1 - 40 U/L    Alkaline Phosphatase 61 39 - 117 U/L    Total Bilirubin 0.6 0.0 - 1.2 mg/dL    Globulin 3.1 gm/dL    A/G Ratio 1.0 g/dL    BUN/Creatinine Ratio 20.0 7.0 - 25.0    Anion Gap 10.5 5.0 - 15.0 mmol/L    eGFR 52.1 (L) >60.0 mL/min/1.73   Sedimentation Rate    Specimen: Blood   Result Value Ref Range    Sed Rate 16 0 - 20 mm/hr   CBC  Auto Differential    Specimen: Blood   Result Value Ref Range    WBC 5.13 3.40 - 10.80 10*3/mm3    RBC 2.55 (L) 4.14 - 5.80 10*6/mm3    Hemoglobin 8.2 (L) 13.0 - 17.7 g/dL    Hematocrit 27.0 (L) 37.5 - 51.0 %    .9 (H) 79.0 - 97.0 fL    MCH 32.2 26.6 - 33.0 pg    MCHC 30.4 (L) 31.5 - 35.7 g/dL    RDW 19.6 (H) 12.3 - 15.4 %    RDW-SD 75.1 (H) 37.0 - 54.0 fl    MPV 12.7 (H) 6.0 - 12.0 fL    Platelets 155 140 - 450 10*3/mm3    Neutrophil % 69.2 42.7 - 76.0 %    Lymphocyte % 15.4 (L) 19.6 - 45.3 %    Monocyte % 8.6 5.0 - 12.0 %    Eosinophil % 6.0 0.3 - 6.2 %    Basophil % 0.6 0.0 - 1.5 %    Immature Grans % 0.2 0.0 - 0.5 %    Neutrophils, Absolute 3.55 1.70 - 7.00 10*3/mm3    Lymphocytes, Absolute 0.79 0.70 - 3.10 10*3/mm3    Monocytes, Absolute 0.44 0.10 - 0.90 10*3/mm3    Eosinophils, Absolute 0.31 0.00 - 0.40 10*3/mm3    Basophils, Absolute 0.03 0.00 - 0.20 10*3/mm3    Immature Grans, Absolute 0.01 0.00 - 0.05 10*3/mm3    nRBC 0.0 0.0 - 0.2 /100 WBC   Gold Top - SST   Result Value Ref Range    Extra Tube Hold for add-ons.    Light Blue Top   Result Value Ref Range    Extra Tube Hold for add-ons.      XR Shoulder 2+ View Left    Result Date: 9/7/2024  Impression: 1.No acute osseous abnormality. 2.Mild acromioclavicular and glenohumeral joint degeneration. 3.Loss of acromiohumeral distance suggesting chronic rotator cuff pathology. Electronically Signed: Daniel Herman MD  9/7/2024 10:44 PM EDT  Workstation ID: [id number]    XR Shoulder 2+ View Right    Result Date: 9/7/2024  Impression: 1.Mild acromioclavicular and glenohumeral joint degeneration. 2.Narrowing of the acromiohumeral interval suggesting chronic rotator cuff pathology. Electronically Signed: Daniel Herman MD  9/7/2024 10:42 PM EDT  Workstation ID: [id number]   Medications   HYDROcodone-acetaminophen (NORCO) 5-325 MG per tablet 1 tablet (1 tablet Oral Given 9/7/24 2124)     /53   Pulse 61   Temp 97.8 °F (36.6 °C) (Oral)   Resp 20   Ht 167.6  "cm (66\")   Wt 85.1 kg (187 lb 9.8 oz)   SpO2 92%   BMI 30.28 kg/m²                                          Medical Decision Making  Amount and/or Complexity of Data Reviewed  Labs: ordered.  Radiology: ordered.    Risk  Prescription drug management.    Chart review: Patient was noted to have had admission in June for choledocholithiasis cholangitis.  Patient had ERCP with Sphincterotomy and stent placement.  There was balloon clearance of common bile duct stone.  Comorbidity: As per past history   Differential: Degenerative joint disease, electrolyte abnormality, anemia, DVT possible will be less likely since he is on anticoagulation.  My EKG interpretation: Not indicated  Lab: CBC normal white count with hemoglobin 8.2 platelet count of 155 with MCV of 105.9.  He has 69 segs no bands on differential.  Sed rate normal at 16.  Comprehensive metabolic panel BUN 27 creatinine 1.35 albumin 3.1  My Radiology review and interpretation: X-rays of both shoulders reveal mild before meals and glenohumeral joint degeneration with narrowing of the acromiohumeral interval consistent with chronic rotator cuff pathology bilaterally.  Discussion/treatment: Patient had IV placed.  Review of patient's chart shows hemoglobin is at baseline had been 8.3 previously patient's previous renal function BUN was 30 but creatinine had been 0.85.  Patient was given a Norco for pain.  Findings were discussed with him and family.  Disposition was discussed with them.  Patient requests admission for observation.  Patient was discussed with Dr. Rust.  Patient is in hospice but daughter confirms that he does not have a DNR status.  Patient was evaluated using appropriate PPE      Final diagnoses:   Acute pain of both shoulders   Acute kidney injury   Edema, unspecified type       ED Disposition  ED Disposition       ED Disposition   Decision to Admit    Condition   --    Comment   Level of Care: Telemetry [5]   Admitting Physician: DONAVAN, " JASON BURCH [[id number]]                 No follow-up provider specified.       Medication List      No changes were made to your prescriptions during this visit.            Paul Vargas MD  09/07/24 [id number]

## 2024-09-09 LAB
ALBUMIN SERPL-MCNC: 2.9 G/DL (ref 3.5–5.2)
ANION GAP SERPL CALCULATED.3IONS-SCNC: 10.2 MMOL/L (ref 5–15)
BACTERIA SPEC AEROBE CULT: NO GROWTH
BUN SERPL-MCNC: 27 MG/DL (ref 8–23)
BUN/CREAT SERPL: 23.7 (ref 7–25)
C AURIS DNA SPEC QL NAA+NON-PROBE: NOT DETECTED
CALCIUM SPEC-SCNC: 8.3 MG/DL (ref 8.6–10.5)
CHLORIDE SERPL-SCNC: 111 MMOL/L (ref 98–107)
CO2 SERPL-SCNC: 20.8 MMOL/L (ref 22–29)
CREAT SERPL-MCNC: 1.14 MG/DL (ref 0.76–1.27)
EGFRCR SERPLBLD CKD-EPI 2021: 63.8 ML/MIN/1.73
GLUCOSE BLDC GLUCOMTR-MCNC: 106 MG/DL (ref 70–105)
GLUCOSE BLDC GLUCOMTR-MCNC: 75 MG/DL (ref 70–105)
GLUCOSE BLDC GLUCOMTR-MCNC: 98 MG/DL (ref 70–105)
GLUCOSE SERPL-MCNC: 119 MG/DL (ref 65–99)
PHOSPHATE SERPL-MCNC: 2.9 MG/DL (ref 2.5–4.5)
POTASSIUM SERPL-SCNC: 4 MMOL/L (ref 3.5–5.2)
SODIUM SERPL-SCNC: 142 MMOL/L (ref 136–145)

## 2024-09-09 PROCEDURE — 97116 GAIT TRAINING THERAPY: CPT

## 2024-09-09 PROCEDURE — 97110 THERAPEUTIC EXERCISES: CPT

## 2024-09-09 PROCEDURE — 82948 REAGENT STRIP/BLOOD GLUCOSE: CPT | Performed by: FAMILY MEDICINE

## 2024-09-09 PROCEDURE — 94761 N-INVAS EAR/PLS OXIMETRY MLT: CPT

## 2024-09-09 PROCEDURE — 97530 THERAPEUTIC ACTIVITIES: CPT

## 2024-09-09 PROCEDURE — 94799 UNLISTED PULMONARY SVC/PX: CPT

## 2024-09-09 PROCEDURE — 82948 REAGENT STRIP/BLOOD GLUCOSE: CPT

## 2024-09-09 PROCEDURE — 80069 RENAL FUNCTION PANEL: CPT | Performed by: INTERNAL MEDICINE

## 2024-09-09 PROCEDURE — 25810000003 SODIUM CHLORIDE 0.9 % SOLUTION: Performed by: FAMILY MEDICINE

## 2024-09-09 PROCEDURE — 84999 UNLISTED CHEMISTRY PROCEDURE: CPT | Performed by: FAMILY MEDICINE

## 2024-09-09 PROCEDURE — 97162 PT EVAL MOD COMPLEX 30 MIN: CPT

## 2024-09-09 PROCEDURE — 94664 DEMO&/EVAL PT USE INHALER: CPT

## 2024-09-09 RX ADMIN — APIXABAN 2.5 MG: 2.5 TABLET, FILM COATED ORAL at 20:17

## 2024-09-09 RX ADMIN — APIXABAN 2.5 MG: 2.5 TABLET, FILM COATED ORAL at 08:29

## 2024-09-09 RX ADMIN — IPRATROPIUM BROMIDE AND ALBUTEROL SULFATE 3 ML: .5; 3 SOLUTION RESPIRATORY (INHALATION) at 10:45

## 2024-09-09 RX ADMIN — TAMSULOSIN HYDROCHLORIDE 0.4 MG: 0.4 CAPSULE ORAL at 08:29

## 2024-09-09 RX ADMIN — MIDODRINE HYDROCHLORIDE 5 MG: 5 TABLET ORAL at 16:56

## 2024-09-09 RX ADMIN — IPRATROPIUM BROMIDE AND ALBUTEROL SULFATE 3 ML: .5; 3 SOLUTION RESPIRATORY (INHALATION) at 19:44

## 2024-09-09 RX ADMIN — SODIUM CHLORIDE 50 ML/HR: 9 INJECTION, SOLUTION INTRAVENOUS at 08:28

## 2024-09-09 RX ADMIN — MIDODRINE HYDROCHLORIDE 5 MG: 5 TABLET ORAL at 08:29

## 2024-09-09 RX ADMIN — PANTOPRAZOLE SODIUM 40 MG: 40 TABLET, DELAYED RELEASE ORAL at 04:57

## 2024-09-09 RX ADMIN — IPRATROPIUM BROMIDE AND ALBUTEROL SULFATE 3 ML: .5; 3 SOLUTION RESPIRATORY (INHALATION) at 07:00

## 2024-09-09 RX ADMIN — IPRATROPIUM BROMIDE AND ALBUTEROL SULFATE 3 ML: .5; 3 SOLUTION RESPIRATORY (INHALATION) at 14:45

## 2024-09-09 RX ADMIN — ATORVASTATIN CALCIUM 20 MG: 20 TABLET, FILM COATED ORAL at 20:18

## 2024-09-09 NOTE — PLAN OF CARE
Goal Outcome Evaluation:  Plan of Care Reviewed With: patient           Outcome Evaluation: The patient is a 84 y.o. male who presents with acute renal failure on 9/7. The patient was in his usual state of health until the days prior to presentation when he began to experience some progressive bilateral arm pain arthralgias and fatigue. Pt reports he is usually indep with amb and self care. His granddaughter lives with him and also cares for his wife who has dementia. Pt now presents well below his baseline and requires skilled PT in acute setting to increase functional mobility. Pt will most likely require SNF for rehab at [place].      Anticipated Discharge Disposition (PT): skilled nursing facility

## 2024-09-09 NOTE — PROGRESS NOTES
"NEPHROLOGY PROGRESS NOTE------[facility]    [facility]  [phone]  Sascha Dorado MD      Patient Care Team:  Guilherme Jason MD as PCP - General (Family Medicine)  Misty Dias MD as Consulting Physician (Cardiology)  Nora Kenyon, BRIAN, APRN as Nurse Practitioner (Thoracic Surgery)  Sussy Shane MD as Consulting Physician (Nephrology)      Provider:  Sascha Dorado MD  Patient Name: Praneeth Nam  :  1940    SUBJECTIVE:  Follow-up CKD  No chest pain or shortness of breath  Feels tired, unable to move right arm.  Due to pain    Medication:  apixaban, 2.5 mg, Oral, BID  atorvastatin, 20 mg, Oral, Nightly  insulin lispro, 2-7 Units, Subcutaneous, 4x Daily AC & at Bedtime  ipratropium-albuterol, 3 mL, Nebulization, 4x Daily - RT  midodrine, 5 mg, Oral, BID AC  pantoprazole, 40 mg, Oral, Q AM  tamsulosin, 0.4 mg, Oral, Daily      sodium chloride, 50 mL/hr, Last Rate: 50 mL/hr (24 0828)        OBJECTIVE    Vital Sign Min/Max for last 24 hours  Temp  Min: 98 °F (36.7 °C)  Max: 98.4 °F (36.9 °C)   BP  Min: 101/48  Max: 127/46   Pulse  Min: 55  Max: 66   Resp  Min: 20  Max: 28   SpO2  Min: 91 %  Max: 100 %   No data recorded   No data recorded     Flowsheet Rows      Flowsheet Row First Filed Value   Admission Height 167.6 cm (66\") Documented at 2024   Admission Weight 85.1 kg (187 lb 9.8 oz) Documented at 2024            I/O this shift:  In: 1088 [I.V.:1088]  Out: -   I/O last 3 completed shifts:  In: 1180 [P.O.:1180]  Out: 220 [Urine:220]    Physical Exam:  General Appearance: alert, appears stated age and cooperative  Head: normocephalic, without obvious abnormality and atraumatic  Eyes: conjunctivae and sclerae normal and no icterus  Neck: supple and no JVD  Lungs: clear to auscultation and respirations regular  Heart: regular rhythm & normal rate and normal S1, S2  Chest: Wall no abnormalities " "observed  Abdomen: normal bowel sounds and soft, nontender  Extremities: moves extremities well, no edema, no cyanosis and no redness  Skin: no bleeding, bruising or rash, turgor normal, color normal and no lesions noted  Neurologic: Alert, and oriented. No focal deficits    Labs:    WBC WBC   Date Value Ref Range Status   09/08/2024 4.37 3.40 - 10.80 10*3/mm3 Final   09/07/2024 5.13 3.40 - 10.80 10*3/mm3 Final      HGB Hemoglobin   Date Value Ref Range Status   09/08/2024 7.0 (L) 13.0 - 17.7 g/dL Final   09/07/2024 8.2 (L) 13.0 - 17.7 g/dL Final      HCT Hematocrit   Date Value Ref Range Status   09/08/2024 23.4 (L) 37.5 - 51.0 % Final   09/07/2024 27.0 (L) 37.5 - 51.0 % Final      Platelets No results found for: \"LABPLAT\"   MCV MCV   Date Value Ref Range Status   09/08/2024 106.8 (H) 79.0 - 97.0 fL Final   09/07/2024 105.9 (H) 79.0 - 97.0 fL Final          Sodium Sodium   Date Value Ref Range Status   09/08/2024 143 136 - 145 mmol/L Final   09/07/2024 143 136 - 145 mmol/L Final      Potassium Potassium   Date Value Ref Range Status   09/08/2024 4.3 3.5 - 5.2 mmol/L Final   09/07/2024 4.3 3.5 - 5.2 mmol/L Final     Comment:     Slight hemolysis detected by analyzer. Result may be falsely elevated.      Chloride Chloride   Date Value Ref Range Status   09/08/2024 112 (H) 98 - 107 mmol/L Final   09/07/2024 110 (H) 98 - 107 mmol/L Final      CO2 CO2   Date Value Ref Range Status   09/08/2024 23.7 22.0 - 29.0 mmol/L Final   09/07/2024 22.5 22.0 - 29.0 mmol/L Final      BUN BUN   Date Value Ref Range Status   09/08/2024 27 (H) 8 - 23 mg/dL Final   09/07/2024 27 (H) 8 - 23 mg/dL Final      Creatinine Creatinine   Date Value Ref Range Status   09/08/2024 1.29 (H) 0.76 - 1.27 mg/dL Final   09/07/2024 1.35 (H) 0.76 - 1.27 mg/dL Final      Calcium Calcium   Date Value Ref Range Status   09/08/2024 8.2 (L) 8.6 - 10.5 mg/dL Final   09/07/2024 8.8 8.6 - 10.5 mg/dL Final      PO4 No components found for: \"PO4\"   Albumin Albumin " "  Date Value Ref Range Status   09/08/2024 2.7 (L) 3.5 - 5.2 g/dL Final   09/07/2024 3.1 (L) 3.5 - 5.2 g/dL Final      Magnesium Magnesium   Date Value Ref Range Status   09/08/2024 1.4 (L) 1.6 - 2.4 mg/dL Final      Uric Acid No components found for: \"URIC ACID\"     Imaging Results (Last 72 Hours)       Procedure Component Value Units Date/Time    US Renal Bilateral [[id number]] Collected: 09/08/24 1729     Updated: 09/08/24 1735    Narrative:      US RENAL BILATERAL    Date of Exam: 9/8/2024 5:03 PM EDT    Indication: ELYSSA.    Comparison: Renal ultrasound dated 4/24/2024    Technique: Grayscale and color Doppler ultrasound evaluation of the kidneys and urinary bladder was performed.      Findings:  The right kidney measures 10.0 x 5.4 x 4.3 cm. There is normal renal cortical thickness. There is no hydronephrosis.    The left kidney is not visualized due to extensive bowel gas. The urinary bladder is collapsed due to recent voiding.      Impression:      Impression:  1. Normal appearance of the right kidney without hydronephrosis.  2. Nonvisualization of the left kidney secondary to bowel gas.        Electronically Signed: Flakito Islas    9/8/2024 5:32 PM EDT    Workstation ID: [id number]    XR Shoulder 2+ View Left [[id number]] Collected: 09/07/24 2244     Updated: 09/07/24 2247    Narrative:      XR SHOULDER 2+ VW LEFT    Date of Exam: 9/7/2024 9:13 PM EDT    Indication: pain    Comparison: 6/7/2024.    Findings:  There is mild acromioclavicular and glenohumeral osteophyte formation. There is stable marked loss of acromiohumeral distance suggesting chronic rotator cuff pathology. There is maintenance of the coracoclavicular distance. No acute fracture or   dislocation. The adjacent lung and ribs appear intact.      Impression:      Impression:  1.No acute osseous abnormality.  2.Mild acromioclavicular and glenohumeral joint degeneration.  3.Loss of acromiohumeral distance suggesting chronic rotator cuff " pathology.        Electronically Signed: Daniel Herman MD    9/7/2024 10:44 PM EDT    Workstation ID: [id number]    XR Shoulder 2+ View Right [[id number]] Collected: 09/07/24 2241     Updated: 09/07/24 2244    Narrative:      XR SHOULDER 2+ VW RIGHT    Date of Exam: 9/7/2024 9:12 PM EDT    Indication: pain    Comparison: None available.    Findings:  There is mild acromioclavicular and glenohumeral osteophyte formation. There is narrowing of the acromiohumeral interval suggesting chronic rotator cuff pathology. There is maintenance of the coracoclavicular distance. The adjacent lung and ribs appear   intact. No evidence of fracture or dislocation.      Impression:      Impression:  1.Mild acromioclavicular and glenohumeral joint degeneration.  2.Narrowing of the acromiohumeral interval suggesting chronic rotator cuff pathology.        Electronically Signed: Daniel Herman MD    9/7/2024 10:42 PM EDT    Workstation ID: [id number]            Results for orders placed during the hospital encounter of 09/07/24    XR Shoulder 2+ View Left    Narrative  XR SHOULDER 2+ VW LEFT    Date of Exam: 9/7/2024 9:13 PM EDT    Indication: pain    Comparison: 6/7/2024.    Findings:  There is mild acromioclavicular and glenohumeral osteophyte formation. There is stable marked loss of acromiohumeral distance suggesting chronic rotator cuff pathology. There is maintenance of the coracoclavicular distance. No acute fracture or  dislocation. The adjacent lung and ribs appear intact.    Impression  Impression:  1.No acute osseous abnormality.  2.Mild acromioclavicular and glenohumeral joint degeneration.  3.Loss of acromiohumeral distance suggesting chronic rotator cuff pathology.        Electronically Signed: Daniel Herman MD  9/7/2024 10:44 PM EDT  Workstation ID: [id number]      XR Shoulder 2+ View Right    Narrative  XR SHOULDER 2+ VW RIGHT    Date of Exam: 9/7/2024 9:12 PM EDT    Indication: pain    Comparison: None  available.    Findings:  There is mild acromioclavicular and glenohumeral osteophyte formation. There is narrowing of the acromiohumeral interval suggesting chronic rotator cuff pathology. There is maintenance of the coracoclavicular distance. The adjacent lung and ribs appear  intact. No evidence of fracture or dislocation.    Impression  Impression:  1.Mild acromioclavicular and glenohumeral joint degeneration.  2.Narrowing of the acromiohumeral interval suggesting chronic rotator cuff pathology.        Electronically Signed: Daniel Herman MD  9/7/2024 10:42 PM EDT  Workstation ID: [id number]      Results for orders placed during the hospital encounter of 06/10/24    XR Chest 1 View    Narrative  XR CHEST 1 VW    Date of Exam: 6/12/2024 11:53 AM EDT    Indication: Shortness of breath    Comparison: 6/10/2024.    Findings:  The heart is enlarged. There is stable small bilateral basilar pleural effusions with atelectasis and less likely pneumonia. The pulmonary vascular markings are normal. There are chronic age-related changes involving the bony thorax and thoracic aorta.    Impression  Impression:  No interval change from the study performed 2 days ago with abnormalities as described.      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 Temporarily hold Aldactone and ARB. No NSAIDs or IV dye. Dose meds for CrCl 45 cc/min.      2. HTN WITH CKD----BP low. Temporarily holding ARB and Diuretic     3. DECONDITIONING-----PT/OT     4. ANEMIA OF CKD----Hemoccult pending. IV iron for ANDREA     5. COPD------Oxygen, nebs, pulmonary toilet     6. H/O CHF------Clinically prerenal at present. Diuretics on hold     7. HYPOALBUMINEMIA-----S/P IV Albumin to temporize     8. ELEVATED LFTS/TRANSAMINITIS------Better with diuresis     9. SEPSIS/UTI-----Abx per Intensivist     10.  HYPOMAGNESEMIA-------Replaced     11. PAF--------Rate controlled. Anticoagulated     12. HYPOTENSION----ARB and diuretic on hold.      13. BPH-----On Flomax     14. GERD/PUD PROPHYLAXIS-----On PPI. Benefits outweigh risks despite renal dysfnction     Creatinine stable  Blood pressure okay  Holding antihypertensives  Continue midodrine  Add Nepro due to limited p.o. intake  Monitor renal function, fluid status and electrolytes      Sascha Dorado MD  [facility]  [phone]  09/09/24  10:02 EDT

## 2024-09-09 NOTE — PLAN OF CARE
Goal Outcome Evaluation:  Plan of Care Reviewed With: patient        Progress: improving  Outcome Evaluation: Pt resting in bed.  Pt remains a falls risk with bed alarm.  Pt is a q 2 turn r/t skin issues, No c/o of soa or difficulty breathing no s/s of repiratory distress.  Will continue with current orders care plans and monitoring

## 2024-09-09 NOTE — CASE MANAGEMENT/SOCIAL WORK
Discharge Planning Assessment   Zohaib     Patient Name: Praneeth Nam  MRN: [id number]  Today's Date: 9/9/2024    Admit Date: 9/7/2024    Plan: Return home with family and [facility] (current).   Discharge Needs Assessment       Row Name 09/09/24 1349       Living Environment    People in Home spouse;grandchild(elio)    Name(s) of People in Home Wife-[place]ittier, Granddaughter-Jyoti    Current Living Arrangements home;apartment    Potentially Unsafe Housing Conditions none    In the past 12 months has the electric, gas, oil, or water company threatened to shut off services in your home? No    Primary Care Provided by self;child(elio)    Provides Primary Care For no one, unable/limited ability to care for self    Family Caregiver if Needed child(elio), adult;grandchild(elio), adult    Family Caregiver Names Granddaughter-Jyoti; Children-Fadia Reyes Becky, Brent    Quality of Family Relationships helpful;involved;supportive    Able to Return to Prior Arrangements yes       Resource/Environmental Concerns    Resource/Environmental Concerns none    Transportation Concerns none       Transportation Needs    In the past 12 months, has lack of transportation kept you from medical appointments or from getting medications? no    In the past 12 months, has lack of transportation kept you from meetings, work, or from getting things needed for daily living? No       Food Insecurity    Within the past 12 months, you worried that your food would run out before you got the money to buy more. Never true    Within the past 12 months, the food you bought just didn't last and you didn't have money to get more. Never true       Transition Planning    Patient/Family Anticipates Transition to home with family;home with help/services    Patient/Family Anticipated Services at Transition none    Transportation Anticipated family or friend will provide       Discharge Needs Assessment    Readmission Within the Last 30 Days no previous  admission in last 30 days    Current Outpatient/Agency/Support Group home hospice    Equipment Currently Used at Home rollator;wheelchair;shower chair;commode;oxygen;noninvasive ventilator    Concerns to be Addressed denies needs/concerns at this time;no discharge needs identified    Anticipated Changes Related to Illness none    Equipment Needed After Discharge none    Provided Post Acute Provider List? N/A    Provided Post Acute Provider Quality & Resource List? N/A                   Discharge Plan       Row Name 09/09/24 8183       Plan    Plan Return home with family and [facility] (current).    Patient/Family in Agreement with Plan yes    Plan Comments CM met with patient at bedside. Pt reports he lives at home with wife Kenya and granddaughter Jyoti. He does not drive but usually independent with ADL's. PCP and pharmacy confirmed- declined use of [facility]. DME includes rollator, wheelchair, BSC/shower chair, 2L O2 and Trilogy HS. Pt reports he is current with [facility] but liaison Shreya reports pt is not current. CM contacted daughter Amy who reports pt is active with [facility]- added to basket and liaison Priya notified. Discussed with daughter that PT recommends SNF placement at discharge but daughter feels like he ends up doing worse when he goes to SNF. Plan is to return home and continue hospice care. IMM letter left with pt at bedside and awaiting signature.                  Continued Care and Services - Admitted Since 9/7/2024       Home Medical Care       Service Provider Request Status Selected Services Address Phone Fax Patient Preferred    [facility] [place] Accepted N/A [address] [place] [id number] [phone]     [facility] Declined  Not current N/A [address] [facility] [place] [address] [phone] --    [facility] Declined  Not current N/A [address],  [facility] [address] [phone] --              Demographic Summary       Row Name 09/09/24 1349       General Information    Admission Type inpatient    Arrived From emergency department    Required Notices Provided Important Message from Medicare    Referral Source admission list    Reason for Consult care coordination/care conference;discharge planning    Preferred Language English       Contact Information    Permission Granted to Share Info With                    Functional Status       Row Name 09/09/24 1349       Functional Status    Usual Activity Tolerance moderate    Current Activity Tolerance moderate       Functional Status, IADL    Medications assistive person    Meal Preparation assistive person    Housekeeping assistive person    Laundry assistive person    Shopping assistive person             Megan Naegele, RN     Office Phone: [phone]  Office Cell: [phone]

## 2024-09-09 NOTE — DISCHARGE PLACEMENT REQUEST
"Pili Nam (84 y.o. Male)       Date of Birth   1940    Social Security Number       Address   [address] [place] [address] [address] [place] [facility]    Home Phone   [phone]    MRN   [id number]       [employer]ism   [employer]    Marital Status                               Admission Date   9/7/24    Admission Type   Emergency    Admitting Provider   Jayy Rust MD    Attending Provider   Pat Napoles MD    Department, Room/Bed   [facility] 3C MEDICAL INPATIENT, 358/1       Discharge Date       Discharge Disposition       Discharge Destination                                 Attending Provider: Pat Napoles MD    Allergies: Penicillin V Potassium, Latex    Isolation: Contact   Infection: Candida Auris (rule out) (09/08/24)   Code Status: CPR    Ht: 167.6 cm (66\")   Wt: 85.1 kg (187 lb 9.8 oz)    Admission Cmt: None   Principal Problem: Acute renal failure [N17.9]                   Active Insurance as of 9/7/2024       Primary Coverage       Payor Plan Insurance Group Employer/Plan Group    MEDICARE MEDICARE A & B        Payor Plan Address Payor Plan Phone Number Payor Plan Fax Number Effective Dates    [address] [phone]  2/1/2002 - None Entered    [facility] [address]         Subscriber Name Subscriber Birth Date Member ID       PILI NAM 1940 [id number]               Secondary Coverage       Payor Plan Insurance Group Employer/Plan Group    [facility] SUPP [id number]       Payor Plan Address Payor Plan Phone Number Payor Plan Fax Number Effective Dates    [address]   12/1/2016 - None Entered    [facility] [id number]         Subscriber Name Subscriber Birth Date Member ID       PILI NAM 1940 [id number]                     Emergency Contacts        (Rel.) Home Phone Work Phone Mobile Phone    ABIMAELANA (Spouse) [phone] -- [phone]    Janeth Delaney (Daughter) -- -- [phone]    SHANNONPEYMANY (Daughter) -- -- " [phone]

## 2024-09-09 NOTE — PLAN OF CARE
Goal Outcome Evaluation:      Therapy is recommending SNF at discharge. Patient will discharge when medically stable.

## 2024-09-09 NOTE — NURSING NOTE
WOCN note:    84 yr old male admitted 9/7/24 with acute renal failure. Patient has a hx of CKD, DM, HTN, HLD, stroke and RA. WOCN consult received for wounds to right foot and sacrum.     Patient presents with three stage 2 pressure injuries to his right, left and mid sacrum that were noted upon admission. They are small in size approximately 0.3 x 0.3 cm. The lazaro-wound skin is blanchable. There was a silicone foam dressing in place that was removed d/t soiling. Patient is incontinent of urine and stool. Calazime zinc barrier paste was applied.     Patient also noted to have wounds to his right great toe and lateral malleolus. The great toe wound is located on a bunion and measures 0.5cm with a wound base covered in  yellow slough. The lateral ankle wound is dry and crusted and also measures about 0.5cm. Both wounds were cleansed with NS and covered with silicone foam dressings.     Recommend to continue pressure injury prevention measures and skin care with any episode of incontinence. We will continue to follow as needed.

## 2024-09-09 NOTE — THERAPY EVALUATION
Patient Name: Praneeth Nam  : 1940    MRN: [id number]                              Today's Date: 2024       Admit Date: 2024    Visit Dx:     ICD-10-CM ICD-9-CM   1. Acute pain of both shoulders  M25.511 719.41    M25.512    2. Acute kidney injury  N17.9 584.9   3. Edema, unspecified type  R60.9 782.3     Patient Active Problem List   Diagnosis    T-cell large granular lymphocytic leukemia     Chronic ITP (idiopathic thrombocytopenia)    Rheumatoid arthritis    CKD (chronic kidney disease), stage III    Bilateral pulmonary embolism    Elevated factor VIII level    Other myelodysplastic syndromes    Hypogammaglobulinemia    Bronchitis    Subdural hematoma, nontraumatic    Lung nodule    Abnormal cardiovascular stress test    AV block, 1st degree    COPD (chronic obstructive pulmonary disease)    Dizziness    Dyslipidemia    Exertional shortness of breath    Fatigue    History of pulmonary embolism    Hypertension    Keratoconjunctivitis sicca, in Sjogren's syndrome    MITZI (obstructive sleep apnea)    Small plaque parapsoriasis    Stroke-like symptoms    Type 2 diabetes mellitus    Bilateral pulmonary embolism    Elevated antinuclear antibody (NAIMA) level    Seropositive rheumatoid arthritis    RBBB    Anemia, chronic disease    Pleural effusion    Sepsis    Moderate malnutrition    Permanent atrial fibrillation    Secondary hypercoagulability disorder    Abnormal LFTs    UTI (urinary tract infection), bacterial    Cholangitis    Choledocholithiasis    Anemia in stage 2 chronic kidney disease    Gastro-esophageal reflux disease without esophagitis    Presence of pancreatic duct stent    Psoriasis    Pure hypercholesterolemia    Seasonal allergies    Chronic obstructive pulmonary disease, unspecified    High blood pressure    Sleep apnea    Acute renal failure     Past Medical History:   Diagnosis Date    Acute pulmonary embolism 2018    bilateral    Arthritis     bilateral knees and ankles    CKD  (chronic kidney disease) 03/02/2009    Coagulopathy 07/2008    COPD (chronic obstructive pulmonary disease)     Diabetes mellitus     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    History of pneumonia 02/2015    hospitalized with community-acquired pneumonia, possibly viral     History of prostate cancer 10/2009    Glen 6, Stage II    Hypercholesterolemia     Hypertension     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    Neutropenia 11/2003    MITZI (obstructive sleep apnea) 2018    Psoriasis 2000    Renal cyst, left     Rheumatoid arthritis     Stroke (cerebrum)     Thrombocytopenia 08/23/2005     Past Surgical History:   Procedure Laterality Date    ENDOSCOPY N/A 4/26/2024    Procedure: ESOPHAGOGASTRODUODENOSCOPY WITH DILATION (#46 BOUGIE);  Surgeon: Gamal Yancey MD;  Location: [facility] ENDOSCOPY;  Service: Gastroenterology;  Laterality: N/A;  DUODENAL DIVERTICULUM, HIATAL HERNIA, DISTAL ESOPHAGEAL STRICTURE    ERCP N/A 6/10/2024    Procedure: ENDOSCOPIC RETROGRADE CHOLANGIOPANCREATOGRAPHY, sphincterotomy, balloon clearance of commonn bile duct (9-12mm), stone extraction, pancreatic stent placement;  Surgeon: Mariaelena Snyder MD;  Location: [facility] ENDOSCOPY;  Service: Gastroenterology;  Laterality: N/A;  post: choledocholithiasis    SKIN LESION EXCISION      back and groin-benign      General Information       Row Name 09/09/24 1058          Physical Therapy Time and Intention    Document Type evaluation  -TELMA     Mode of Treatment physical therapy  -TELMA       Row Name 09/09/24 1058          General Information    Patient Profile Reviewed yes  -TELMA     Prior Level of Function independent:;min assist:;all household mobility;gait;transfer;bed mobility;dressing  Pt states he lives in a senior living apartment with his wife (has dementia and mostly bed bound) and granddaughter who assists with all cooking, cleaning, etc. Pt reports he is typically indep with all mobility. Also states  they have [facility] care 2x/wk  -TELMA     Existing Precautions/Restrictions oxygen therapy device and L/min  2L/min via [place]  -TELMA     Barriers to Rehab medically complex  -TELMA       Row Name 09/09/24 1058          Living Environment    People in Home spouse;grandchild(elio)  -TELMA     Name(s) of People in Home [place] (spouse), unsure of granddaughter's name  -TELMA       Row Name 09/09/24 1058          Home Main Entrance    Number of Stairs, Main Entrance none  -TELMA       Row Name 09/09/24 1058          Stairs Within Home, Primary    Number of Stairs, Within Home, Primary none  -TELMA       Row Name 09/09/24 1058          Cognition    Orientation Status (Cognition) oriented x 4  -TELMA       Row Name 09/09/24 1058          Safety Issues, Functional Mobility    Impairments Affecting Function (Mobility) balance;endurance/activity tolerance;grasp;strength  -TELMA     Comment, Safety Issues/Impairments (Mobility) Pt with new weakness in BUEs  -TELMA               User Key  (r) = Recorded By, (t) = Taken By, (c) = Cosigned By      Initials Name Provider Type    TELMA Cecilia Fair, PT Physical Therapist                   Mobility       Row Name 09/09/24 1104          Bed Mobility    Bed Mobility rolling left;rolling right  -TELMA     Rolling Left [place] (Bed Mobility) moderate assist (50% patient effort)  -TELMA     Rolling Right [place] (Bed Mobility) moderate assist (50% patient effort)  -TELMA     Assistive Device (Bed Mobility) bed rails;draw sheet  -TELMA     Comment, (Bed Mobility) Pt rolled B to allow wound care nurse to assess skin and assist with BM cleanup  -TELMA       Row Name 09/09/24 1104          Bed-Chair Transfer    Bed-Chair [place] (Transfers) minimum assist (75% patient effort);2 person assist  -TELMA     Assistive Device (Bed-Chair Transfers) walker, front-wheeled  -TELMA     Comment, (Bed-Chair Transfer) Pt transfers to bedside commode with MIN x 2 using RWx  -TELMA       Row Name 09/09/24 1104          Sit-Stand Transfer     Sit-Stand [place] (Transfers) minimum assist (75% patient effort);moderate assist (50% patient effort);2 person assist;verbal cues  -TELMA     Assistive Device (Sit-Stand Transfers) walker, front-wheeled  -TELMA     Comment, (Sit-Stand Transfer) Cues for hand placement and blocking of feet when standing.  -       Row Name 09/09/24 1104          Gait/Stairs (Locomotion)    [place] Level (Gait) contact guard;minimum assist (75% patient effort)  -TELMA     Assistive Device (Gait) walker, front-wheeled  -TELMA     Distance in Feet (Gait) 3  -TELMA     Deviations/Abnormal Patterns (Gait) festinating/shuffling  -TELMA     Comment, (Gait/Stairs) Pt amb 3 ft using RW and CGA. Pt with forward posture and shuffling gait pattern.  -TELMA               User Key  (r) = Recorded By, (t) = Taken By, (c) = Cosigned By      Initials Name Provider Type    Cecilia Patel, PT Physical Therapist                   Obj/Interventions       Row Name 09/09/24 1107          Strength Comprehensive (MMT)    Comment, General Manual Muscle Testing (MMT) Assessment Pt with profound muscle weakness of UEs- especially with hands and shoulders. Only able to flex/abd shoulders ~10% normal range.  -       Row Name 09/09/24 1107          Balance    Balance Assessment sitting static balance;sitting dynamic balance;standing static balance;standing dynamic balance  -TELMA     Static Sitting Balance standby assist  -TELMA     Dynamic Sitting Balance standby assist  -TELMA     Position, Sitting Balance unsupported  -TELMA     Static Standing Balance contact guard  -TELMA     Dynamic Standing Balance minimal assist;2-person assist  -TELMA     Position/Device Used, Standing Balance supported;walker, rolling  -TELMA               User Key  (r) = Recorded By, (t) = Taken By, (c) = Cosigned By      Initials Name Provider Type    Cecilia Patel, PT Physical Therapist                   Goals/Plan       Row Name 09/09/24 1114          Bed Mobility Goal 1 (PT)    Activity/Assistive  Device (Bed Mobility Goal 1, PT) bed mobility activities, all  -TELMA     [place] Level/Cues Needed (Bed Mobility Goal 1, PT) standby assist  -TELMA     Time Frame (Bed Mobility Goal 1, PT) 2 weeks  -TELMA       Row Name 09/09/24 1114          Transfer Goal 1 (PT)    Activity/Assistive Device (Transfer Goal 1, PT) sit-to-stand/stand-to-sit;walker, rolling  -TELMA     [place] Level/Cues Needed (Transfer Goal 1, PT) contact guard required  -TELMA     Time Frame (Transfer Goal 1, PT) 2 weeks  -TELMA       Row Name 09/09/24 1114          Gait Training Goal 1 (PT)    Activity/Assistive Device (Gait Training Goal 1, PT) gait (walking locomotion)  -TELMA     [place] Level (Gait Training Goal 1, PT) contact guard required  -TELMA     Distance (Gait Training Goal 1, PT) 100  -TELMA     Time Frame (Gait Training Goal 1, PT) 2 weeks  -TELMA       Row Name 09/09/24 1114          Therapy Assessment/Plan (PT)    Planned Therapy Interventions (PT) balance training;bed mobility training;gait training;patient/family education;strengthening;stretching;transfer training  -TELMA               User Key  (r) = Recorded By, (t) = Taken By, (c) = Cosigned By      Initials Name Provider Type    TELMA Cecilia Fair, PT Physical Therapist                   Clinical Impression       Row Name 09/09/24 1110          Pain    Pretreatment Pain Rating 0/10 - no pain  -TELMA     Posttreatment Pain Rating 0/10 - no pain  -TELMA       Row Name 09/09/24 1110          Plan of Care Review    Plan of Care Reviewed With patient  -TELMA     Outcome Evaluation The patient is a 84 y.o. male who presents with acute renal failure on 9/7. The patient was in his usual state of health until the days prior to presentation when he began to experience some progressive bilateral arm pain arthralgias and fatigue. Pt reports he is usually indep with amb and self care. His granddaughter lives with him and also cares for his wife who has dementia. Pt now presents well below his baseline and  requires skilled PT in acute setting to increase functional mobility. Pt will most likely require SNF for rehab at [place].  -TELMA       Row Name 09/09/24 1110          Therapy Assessment/Plan (PT)    Criteria for Skilled Interventions Met (PT) yes;meets criteria;skilled treatment is necessary  -TELMA     Therapy Frequency (PT) 5 times/wk  -TELMA       Row Name 09/09/24 1110          Positioning and Restraints    Pre-Treatment Position in bed  -TELMA     Post Treatment Position bed  -TELMA     In Bed notified nsg;fowlers;call light within reach;side rails up x2;encouraged to call for assist;exit alarm on  -TELMA               User Key  (r) = Recorded By, (t) = Taken By, (c) = Cosigned By      Initials Name Provider Type    Cecilia Patel PT Physical Therapist                   Outcome Measures       Row Name 09/09/24 1119 09/09/24 0831       How much help from another person do you currently need...    Turning from your back to your side while in flat bed without using bedrails? 2  -TELMA 4  -SC    Moving from lying on back to sitting on the side of a flat bed without bedrails? 2  -TELMA 3  -SC    Moving to and from a bed to a chair (including a wheelchair)? 3  -TELMA 3  -SC    Standing up from a chair using your arms (e.g., wheelchair, bedside chair)? 2  -TELMA 2  -SC    Climbing 3-5 steps with a railing? 1  -TELMA 1  -SC    To walk in hospital room? 3  -TELMA 2  -SC    AM-PAC 6 Clicks Score (PT) 13  -TELMA 15  -SC    Highest Level of Mobility Goal 4 --> Transfer to chair/commode  -TELMA 4 --> Transfer to chair/commode  -SC      Row Name 09/09/24 1119          Functional Assessment    Outcome Measure Options AM-PAC 6 Clicks Basic Mobility (PT)  -TELMA               User Key  (r) = Recorded By, (t) = Taken By, (c) = Cosigned By      Initials Name Provider Type    Cecilia Patel PT Physical Therapist    Jaycee Daugherty, RN Registered Nurse                                 Physical Therapy Education       Title: PT OT SLP Therapies (Done)       Topic:  Physical Therapy (Done)       Point: Mobility training (Done)       Learning Progress Summary             Patient Acceptance, E,TB, VU by  at 9/9/2024 1121                         Point: Home exercise program (Done)       Learning Progress Summary             Patient Acceptance, E,TB, VU by TELMA at 9/9/2024 1121                         Point: Body mechanics (Done)       Learning Progress Summary             Patient Acceptance, E,TB, VU by TELMA at 9/9/2024 1121                         Point: Precautions (Done)       Learning Progress Summary             Patient Acceptance, E,TB, VU by  at 9/9/2024 1121                                         User Key       Initials Effective Dates Name Provider Type Discipline     02/13/24 -  Cecilia Fair, PT Physical Therapist PT                  PT Recommendation and Plan  Planned Therapy Interventions (PT): balance training, bed mobility training, gait training, patient/family education, strengthening, stretching, transfer training  Plan of Care Reviewed With: patient  Outcome Evaluation: The patient is a 84 y.o. male who presents with acute renal failure on 9/7. The patient was in his usual state of health until the days prior to presentation when he began to experience some progressive bilateral arm pain arthralgias and fatigue. Pt reports he is usually indep with amb and self care. His granddaughter lives with him and also cares for his wife who has dementia. Pt now presents well below his baseline and requires skilled PT in acute setting to increase functional mobility. Pt will most likely require SNF for rehab at [place].     Time Calculation:         PT Charges       Row Name 09/09/24 1122             Time Calculation    Start Time 0940  -TELMA      Stop Time 1024  -      Time Calculation (min) 44 min  -      PT Received On 09/09/24  -TELMA      PT - Next Appointment 09/10/24  -      PT Goal Re-Cert Due Date 09/23/24  -         Time Calculation- PT    Total Timed Code  Minutes- PT 29 minute(s)  -TELMA                User Key  (r) = Recorded By, (t) = Taken By, (c) = Cosigned By      Initials Name Provider Type    Cecilia Patel, PT Physical Therapist                  Therapy Charges for Today       Code Description Service Date Service Provider Modifiers Qty    16558659179  PT EVAL MOD COMPLEXITY 4 9/9/2024 Cecilia Fair, PT GP 1    12505311489 HC PT THERAPEUTIC ACT EA 15 MIN 9/9/2024 Cecilia Fair, PT GP 1    60445880253 HC GAIT TRAINING EA 15 MIN 9/9/2024 Cecilia Fair, PT GP 1    44448788968 HC PT THER PROC EA 15 MIN 9/9/2024 Cecilia Fair, PT GP 1            PT G-Codes  Outcome Measure Options: AM-PAC 6 Clicks Basic Mobility (PT)  AM-PAC 6 Clicks Score (PT): 13  PT Discharge Summary  Anticipated Discharge Disposition (PT): skilled nursing facility    Cecilia Fair PT  9/9/2024

## 2024-09-09 NOTE — PROGRESS NOTES
LOS: 1 day   Patient Care Team:  Guilherme Jason MD as PCP - General (Family Medicine)  Misty Dias MD as Consulting Physician (Cardiology)  Nora Kenyon DNP, APRN as Nurse Practitioner (Thoracic Surgery)  Sussy Shane MD as Consulting Physician (Nephrology)    Subjective     Patient c/o of fatigue and mobility issues    Review of Systems   Constitutional:  Positive for activity change and fatigue.   HENT: Negative.     Respiratory: Negative.     Cardiovascular: Negative.    Gastrointestinal: Negative.    Genitourinary: Negative.    Musculoskeletal:  Positive for gait problem.   Neurological:  Positive for weakness.   Psychiatric/Behavioral: Negative.             Objective     Vital Signs  Temp:  [98 °F (36.7 °C)-98.4 °F (36.9 °C)] 98.4 °F (36.9 °C)  Heart Rate:  [55-66] 59  Resp:  [20-28] 20  BP: (101-127)/(46-52) 119/46      Physical Exam  Vitals reviewed.   Constitutional:       Appearance: He is not ill-appearing.   HENT:      Head: Normocephalic and atraumatic.      Right Ear: External ear normal.      Left Ear: External ear normal.      Nose: Nose normal.      Mouth/Throat:      Mouth: Mucous membranes are dry.   Eyes:      General:         Right eye: No discharge.         Left eye: No discharge.   Cardiovascular:      Rate and Rhythm: Normal rate and regular rhythm.      Pulses: Normal pulses.      Heart sounds: Normal heart sounds.   Pulmonary:      Effort: Pulmonary effort is normal.      Breath sounds: Normal breath sounds.   Abdominal:      General: Bowel sounds are normal.      Palpations: Abdomen is soft.   Musculoskeletal:         General: Normal range of motion.      Cervical back: Normal range of motion.   Skin:     General: Skin is warm and dry.      Coloration: Skin is pale.   Neurological:      Mental Status: He is alert and oriented to person, place, and time.   Psychiatric:         Behavior: Behavior normal.              Results Review:    Lab Results (last 24  hours)       Procedure Component Value Units Date/Time    POC Glucose 4x Daily Before Meals & at Bedtime [[id number]]  (Normal) Collected: 09/09/24 0740    Specimen: Blood Updated: 09/09/24 0741     Glucose 75 mg/dL      Comment: Serial Number: [id number]:  [id number]       Comprehensive Metabolic Panel [[id number]]  (Abnormal) Collected: 09/08/24 2249    Specimen: Blood from Hand, Right Updated: 09/08/24 2325     Glucose 127 mg/dL      BUN 27 mg/dL      Creatinine 1.29 mg/dL      Sodium 143 mmol/L      Potassium 4.3 mmol/L      Chloride 112 mmol/L      CO2 23.7 mmol/L      Calcium 8.2 mg/dL      Total Protein 5.3 g/dL      Albumin 2.7 g/dL      ALT (SGPT) 9 U/L      AST (SGOT) 23 U/L      Alkaline Phosphatase 58 U/L      Total Bilirubin 0.5 mg/dL      Globulin 2.6 gm/dL      A/G Ratio 1.0 g/dL      BUN/Creatinine Ratio 20.9     Anion Gap 7.3 mmol/L      eGFR 55.0 mL/min/1.73     Narrative:      GFR Normal >60  Chronic Kidney Disease <60  Kidney Failure <15    The GFR formula is only valid for adults with stable renal function between ages 18 and 70.    Magnesium [[id number]]  (Abnormal) Collected: 09/08/24 2249    Specimen: Blood from Hand, Right Updated: 09/08/24 2325     Magnesium 1.4 mg/dL     Phosphorus [[id number]]  (Normal) Collected: 09/08/24 2249    Specimen: Blood from Hand, Right Updated: 09/08/24 2325     Phosphorus 2.9 mg/dL     CBC (No Diff) [[id number]]  (Abnormal) Collected: 09/08/24 2249    Specimen: Blood from Hand, Left Updated: 09/08/24 2322     WBC 4.37 10*3/mm3      RBC 2.19 10*6/mm3      Hemoglobin 7.0 g/dL      Hematocrit 23.4 %      .8 fL      MCH 32.0 pg      MCHC 29.9 g/dL      RDW 19.4 %      RDW-SD 75.1 fl      MPV 13.0 fL      Platelets 138 10*3/mm3     Calcium, Ionized [[id number]]  (Normal) Collected: 09/08/24 2249    Specimen: Blood from Hand, Right Updated: 09/08/24 2321     Ionized Calcium 1.18 mmol/L     POC Glucose Once [[id number]]  (Abnormal) Collected: 09/08/24 1377     Specimen: Blood Updated: 09/08/24 2110     Glucose 147 mg/dL      Comment: Serial Number: [id number]:  [id number]       Vitamin B12 [[id number]]  (Normal) Collected: 09/08/24 0955    Specimen: Blood Updated: 09/08/24 1855     Vitamin B-12 501 pg/mL     Narrative:      Results may be falsely increased if patient taking Biotin.      Folate [[id number]]  (Normal) Collected: 09/08/24 0955    Specimen: Blood Updated: 09/08/24 1855     Folate 12.90 ng/mL     Narrative:      Results may be falsely increased if patient taking Biotin.      POC Glucose Once [[id number]]  (Abnormal) Collected: 09/08/24 1610    Specimen: Blood Updated: 09/08/24 1612     Glucose 115 mg/dL      Comment: Serial Number: [id number]:  [id number]       Urinalysis With Culture If Indicated - Urine, Clean Catch [[id number]]  (Abnormal) Collected: 09/08/24 1139    Specimen: Urine, Clean Catch Updated: 09/08/24 1232     Color, UA Dark Yellow     Comment: Result checked          Appearance, UA Cloudy     pH, UA <=5.0     Specific Gravity, UA 1.019     Glucose, UA Negative     Ketones, UA Negative     Bilirubin, UA Negative     Blood, UA Large (3+)     Protein, UA 30 mg/dL (1+)     Leuk Esterase, UA Small (1+)     Nitrite, UA Negative     Urobilinogen, UA 1.0 E.U./dL    Narrative:      In absence of clinical symptoms, the presence of pyuria, bacteria, and/or nitrites on the urinalysis result does not correlate with infection.    Urinalysis, Microscopic Only - Urine, Clean Catch [[id number]]  (Abnormal) Collected: 09/08/24 1139    Specimen: Urine, Clean Catch Updated: 09/08/24 1232     RBC, UA Too Numerous to Count /HPF      WBC, UA 6-10 /HPF      Bacteria, UA None Seen /HPF      Squamous Epithelial Cells, UA 0-2 /HPF      Hyaline Casts, UA 0-2 /LPF      Methodology Automated Microscopy    Urine Culture - Urine, Urine, Clean Catch [[id number]] Collected: 09/08/24 1139    Specimen: Urine, Clean Catch Updated: 09/08/24 1232    Uric Acid [[id number]]   (Abnormal) Collected: 09/07/24 2101    Specimen: Blood Updated: 09/08/24 1220     Uric Acid 7.4 mg/dL     POC Glucose 4x Daily Before Meals & at Bedtime [[id number]]  (Normal) Collected: 09/08/24 1154    Specimen: Blood Updated: 09/08/24 1203     Glucose 104 mg/dL      Comment: Serial Number: [id number]:  [id number]       CK [[id number]]  (Normal) Collected: 09/08/24 0955    Specimen: Blood Updated: 09/08/24 1026     Creatine Kinase 90 U/L              Imaging Results (Last 24 Hours)       Procedure Component Value Units Date/Time    US Renal Bilateral [[id number]] Collected: 09/08/24 1729     Updated: 09/08/24 1735    Narrative:      US RENAL BILATERAL    Date of Exam: 9/8/2024 5:03 PM EDT    Indication: ELYSSA.    Comparison: Renal ultrasound dated 4/24/2024    Technique: Grayscale and color Doppler ultrasound evaluation of the kidneys and urinary bladder was performed.      Findings:  The right kidney measures 10.0 x 5.4 x 4.3 cm. There is normal renal cortical thickness. There is no hydronephrosis.    The left kidney is not visualized due to extensive bowel gas. The urinary bladder is collapsed due to recent voiding.      Impression:      Impression:  1. Normal appearance of the right kidney without hydronephrosis.  2. Nonvisualization of the left kidney secondary to bowel gas.        Electronically Signed: Flakito Islas    9/8/2024 5:32 PM EDT    Workstation ID: [id number]                 I reviewed the patient's new clinical results.    Medication Review:   Scheduled Meds:apixaban, 2.5 mg, Oral, BID  atorvastatin, 20 mg, Oral, Nightly  insulin lispro, 2-7 Units, Subcutaneous, 4x Daily AC & at Bedtime  ipratropium-albuterol, 3 mL, Nebulization, 4x Daily - RT  midodrine, 5 mg, Oral, BID AC  pantoprazole, 40 mg, Oral, Q AM  tamsulosin, 0.4 mg, Oral, Daily      Continuous Infusions:sodium chloride, 50 mL/hr, Last Rate: 50 mL/hr (09/09/24 0828)      PRN Meds:.  acetaminophen    dextrose    dextrose    glucagon  (human recombinant)     Interval History:    Assessment & Plan     CRF  Anemia  Hypomagnesia  -nephrology following  -improving  -monitor HGB transfuse less than 7    UTI  -await culture  -rocephin    Hypertension  HX CHF  Hypotension   HDL  -midodrine  -statin  -holding aldactone/ARB    HX PE  HX afib currently SR  -continue eliquis    ITP  -monitor plts    DMII  -ss/    COPD-stable  BPH-flomax     Plan for disposition:TBD will await PT recommendations    AZEEM Rasmussen  09/09/24  10:17 EDT

## 2024-09-10 LAB
ANION GAP SERPL CALCULATED.3IONS-SCNC: 8.3 MMOL/L (ref 5–15)
ANISOCYTOSIS BLD QL: NORMAL
BASOPHILS # BLD AUTO: 0.02 10*3/MM3 (ref 0–0.2)
BASOPHILS NFR BLD AUTO: 0.4 % (ref 0–1.5)
BUN SERPL-MCNC: 29 MG/DL (ref 8–23)
BUN/CREAT SERPL: 22.8 (ref 7–25)
CALCIUM SPEC-SCNC: 8.2 MG/DL (ref 8.6–10.5)
CHLORIDE SERPL-SCNC: 111 MMOL/L (ref 98–107)
CO2 SERPL-SCNC: 22.7 MMOL/L (ref 22–29)
CREAT SERPL-MCNC: 1.27 MG/DL (ref 0.76–1.27)
DEPRECATED RDW RBC AUTO: 75.7 FL (ref 37–54)
EGFRCR SERPLBLD CKD-EPI 2021: 56.1 ML/MIN/1.73
EOSINOPHIL # BLD AUTO: 0.19 10*3/MM3 (ref 0–0.4)
EOSINOPHIL NFR BLD AUTO: 4.2 % (ref 0.3–6.2)
ERYTHROCYTE [DISTWIDTH] IN BLOOD BY AUTOMATED COUNT: 19.2 % (ref 12.3–15.4)
GLUCOSE BLDC GLUCOMTR-MCNC: 116 MG/DL (ref 70–105)
GLUCOSE BLDC GLUCOMTR-MCNC: 117 MG/DL (ref 70–105)
GLUCOSE BLDC GLUCOMTR-MCNC: 134 MG/DL (ref 70–105)
GLUCOSE BLDC GLUCOMTR-MCNC: 87 MG/DL (ref 70–105)
GLUCOSE SERPL-MCNC: 131 MG/DL (ref 65–99)
HCT VFR BLD AUTO: 24.1 % (ref 37.5–51)
HGB BLD-MCNC: 7.3 G/DL (ref 13–17.7)
IMM GRANULOCYTES # BLD AUTO: 0.02 10*3/MM3 (ref 0–0.05)
IMM GRANULOCYTES NFR BLD AUTO: 0.4 % (ref 0–0.5)
LYMPHOCYTES # BLD AUTO: 0.55 10*3/MM3 (ref 0.7–3.1)
LYMPHOCYTES NFR BLD AUTO: 12.2 % (ref 19.6–45.3)
MACROCYTES BLD QL SMEAR: NORMAL
MCH RBC QN AUTO: 32.4 PG (ref 26.6–33)
MCHC RBC AUTO-ENTMCNC: 30.3 G/DL (ref 31.5–35.7)
MCV RBC AUTO: 107.1 FL (ref 79–97)
MONOCYTES # BLD AUTO: 0.4 10*3/MM3 (ref 0.1–0.9)
MONOCYTES NFR BLD AUTO: 8.9 % (ref 5–12)
NEUTROPHILS NFR BLD AUTO: 3.32 10*3/MM3 (ref 1.7–7)
NEUTROPHILS NFR BLD AUTO: 73.9 % (ref 42.7–76)
NRBC BLD AUTO-RTO: 0 /100 WBC (ref 0–0.2)
PLAT MORPH BLD: NORMAL
PLATELET # BLD AUTO: 139 10*3/MM3 (ref 140–450)
PMV BLD AUTO: 12.5 FL (ref 6–12)
POTASSIUM SERPL-SCNC: 4.1 MMOL/L (ref 3.5–5.2)
RBC # BLD AUTO: 2.25 10*6/MM3 (ref 4.14–5.8)
SODIUM SERPL-SCNC: 142 MMOL/L (ref 136–145)
WBC MORPH BLD: NORMAL
WBC NRBC COR # BLD AUTO: 4.5 10*3/MM3 (ref 3.4–10.8)

## 2024-09-10 PROCEDURE — 97530 THERAPEUTIC ACTIVITIES: CPT

## 2024-09-10 PROCEDURE — 82948 REAGENT STRIP/BLOOD GLUCOSE: CPT | Performed by: FAMILY MEDICINE

## 2024-09-10 PROCEDURE — 94664 DEMO&/EVAL PT USE INHALER: CPT

## 2024-09-10 PROCEDURE — 97167 OT EVAL HIGH COMPLEX 60 MIN: CPT

## 2024-09-10 PROCEDURE — 85025 COMPLETE CBC W/AUTO DIFF WBC: CPT | Performed by: NURSE PRACTITIONER

## 2024-09-10 PROCEDURE — 94799 UNLISTED PULMONARY SVC/PX: CPT

## 2024-09-10 PROCEDURE — 82948 REAGENT STRIP/BLOOD GLUCOSE: CPT

## 2024-09-10 PROCEDURE — 85007 BL SMEAR W/DIFF WBC COUNT: CPT | Performed by: NURSE PRACTITIONER

## 2024-09-10 PROCEDURE — 80048 BASIC METABOLIC PNL TOTAL CA: CPT | Performed by: NURSE PRACTITIONER

## 2024-09-10 PROCEDURE — 97116 GAIT TRAINING THERAPY: CPT

## 2024-09-10 RX ADMIN — IPRATROPIUM BROMIDE AND ALBUTEROL SULFATE 3 ML: .5; 3 SOLUTION RESPIRATORY (INHALATION) at 06:55

## 2024-09-10 RX ADMIN — ATORVASTATIN CALCIUM 20 MG: 20 TABLET, FILM COATED ORAL at 20:37

## 2024-09-10 RX ADMIN — APIXABAN 2.5 MG: 2.5 TABLET, FILM COATED ORAL at 10:06

## 2024-09-10 RX ADMIN — PANTOPRAZOLE SODIUM 40 MG: 40 TABLET, DELAYED RELEASE ORAL at 05:37

## 2024-09-10 RX ADMIN — ACETAMINOPHEN 500 MG: 500 TABLET, FILM COATED ORAL at 10:45

## 2024-09-10 RX ADMIN — MIDODRINE HYDROCHLORIDE 5 MG: 5 TABLET ORAL at 17:17

## 2024-09-10 RX ADMIN — APIXABAN 2.5 MG: 2.5 TABLET, FILM COATED ORAL at 20:37

## 2024-09-10 RX ADMIN — IPRATROPIUM BROMIDE AND ALBUTEROL SULFATE 3 ML: .5; 3 SOLUTION RESPIRATORY (INHALATION) at 14:58

## 2024-09-10 RX ADMIN — IPRATROPIUM BROMIDE AND ALBUTEROL SULFATE 3 ML: .5; 3 SOLUTION RESPIRATORY (INHALATION) at 19:25

## 2024-09-10 RX ADMIN — IPRATROPIUM BROMIDE AND ALBUTEROL SULFATE 3 ML: .5; 3 SOLUTION RESPIRATORY (INHALATION) at 10:48

## 2024-09-10 RX ADMIN — TAMSULOSIN HYDROCHLORIDE 0.4 MG: 0.4 CAPSULE ORAL at 10:06

## 2024-09-10 NOTE — PROGRESS NOTES
LOS: 2 days   Patient Care Team:  Guilherme Jason MD as PCP - General (Family Medicine)  Misty Dias MD as Consulting Physician (Cardiology)  Nora Kenyon DNP, APRN as Nurse Practitioner (Thoracic Surgery)  Sussy Shane MD as Consulting Physician (Nephrology)    Subjective     Patient c/o of fatigue and mobility issues    Review of Systems   Constitutional:  Positive for activity change and fatigue.   HENT: Negative.     Respiratory: Negative.     Cardiovascular: Negative.    Gastrointestinal: Negative.    Genitourinary: Negative.    Musculoskeletal:  Positive for arthralgias and gait problem.   Neurological:  Positive for weakness.   Psychiatric/Behavioral: Negative.             Objective     Vital Signs  Temp:  [97.3 °F (36.3 °C)-98.1 °F (36.7 °C)] 98 °F (36.7 °C)  Heart Rate:  [52-69] 52  Resp:  [20-28] 20  BP: (121-142)/(48-61) 142/48      Physical Exam  Vitals reviewed.   Constitutional:       Appearance: He is not ill-appearing.   HENT:      Head: Normocephalic and atraumatic.      Right Ear: External ear normal.      Left Ear: External ear normal.      Nose: Nose normal.      Mouth/Throat:      Mouth: Mucous membranes are dry.   Eyes:      General:         Right eye: No discharge.         Left eye: No discharge.   Cardiovascular:      Rate and Rhythm: Normal rate and regular rhythm.      Pulses: Normal pulses.      Heart sounds: Normal heart sounds.   Pulmonary:      Effort: Pulmonary effort is normal.      Breath sounds: Normal breath sounds.   Abdominal:      General: Bowel sounds are normal.      Palpations: Abdomen is soft.   Musculoskeletal:         General: Swelling and tenderness present. Normal range of motion.      Cervical back: Normal range of motion.   Skin:     General: Skin is warm and dry.      Coloration: Skin is pale.   Neurological:      Mental Status: He is alert and oriented to person, place, and time.      Motor: Weakness present.   Psychiatric:          Behavior: Behavior normal.              Results Review:    Lab Results (last 24 hours)       Procedure Component Value Units Date/Time    POC Glucose 4x Daily Before Meals & at Bedtime [[id number]]  (Normal) Collected: 09/10/24 0753    Specimen: Blood Updated: 09/10/24 0756     Glucose 87 mg/dL      Comment: Serial Number: [id number]:  [id number]       Renal Function Panel [[id number]]  (Abnormal) Collected: 09/09/24 2209    Specimen: Blood from Arm, Left Updated: 09/09/24 2313     Glucose 119 mg/dL      BUN 27 mg/dL      Creatinine 1.14 mg/dL      Sodium 142 mmol/L      Potassium 4.0 mmol/L      Chloride 111 mmol/L      CO2 20.8 mmol/L      Calcium 8.3 mg/dL      Albumin 2.9 g/dL      Phosphorus 2.9 mg/dL      Anion Gap 10.2 mmol/L      BUN/Creatinine Ratio 23.7     eGFR 63.8 mL/min/1.73     Narrative:      GFR Normal >60  Chronic Kidney Disease <60  Kidney Failure <15    The GFR formula is only valid for adults with stable renal function between ages 18 and 70.    POC Glucose Once [[id number]]  (Normal) Collected: 09/09/24 2010    Specimen: Blood Updated: 09/09/24 2012     Glucose 98 mg/dL      Comment: Serial Number: [id number]:  [id number]       POC Glucose Once [[id number]]  (Abnormal) Collected: 09/09/24 1631    Specimen: Blood Updated: 09/09/24 1633     Glucose 106 mg/dL      Comment: Serial Number: [id number]:  [id number]       Urine Culture - Urine, Urine, Clean Catch [[id number]]  (Normal) Collected: 09/08/24 1139    Specimen: Urine, Clean Catch Updated: 09/09/24 1253     Urine Culture No growth             Imaging Results (Last 24 Hours)       ** No results found for the last 24 hours. **                 I reviewed the patient's new clinical results.    Medication Review:   Scheduled Meds:apixaban, 2.5 mg, Oral, BID  atorvastatin, 20 mg, Oral, Nightly  insulin lispro, 2-7 Units, Subcutaneous, 4x Daily AC & at Bedtime  ipratropium-albuterol, 3 mL, Nebulization, 4x Daily - RT  midodrine, 5 mg,  Oral, BID AC  pantoprazole, 40 mg, Oral, Q AM  tamsulosin, 0.4 mg, Oral, Daily      Continuous Infusions:     PRN Meds:.  acetaminophen    dextrose    dextrose    glucagon (human recombinant)     Interval History:    Assessment & Plan     CRF  Anemia  Hypomagnesia  -nephrology following  -improving  -monitor HGB transfuse less than 7  -CBC and BMP today    UTI  -culture negative    Hypertension  HX CHF  Hypotension   HDL  -midodrine  -statin  -holding aldactone/ARB    HX PE  HX afib currently SR  -continue eliquis    ITP  -monitor plts    DMII  -ss/    COPD-stable    BPH-flomax     Plan for disposition:TBD - home with family    Alondra GOFF Toño, AZEEM  09/10/24  10:54 EDT

## 2024-09-10 NOTE — CONSULTS
Nutrition Services  Patient Name: Praneeth Nam  YOB: 1940  MRN: [id number]  Admission date: 9/7/2024    Continue ONS, but will add alternative supplement, as Novasource Renal is on national shortage and may not always be available to serve.     Another option for those with need for reduced K+ and/or phosphorus is Boost Breeze.   Please note, Boost Breeze is low in potassium and phosphorus.  Free fluid per serving is less than total volume - see below:  Comparison Chart        Potassium per serving  Phosphorus per serving Free fluid per serving   Novasource Renal  230mg 200mg 168mL   Boost Breeze  0mg 170mg 194mL     Will add option for Boost Breeze BID (provides 500 kcals, 18 g protein if consumed) in addition to option for Novasource Renal BID (Provides 950 kcals, 43 g protein if consumed)     Continue current diet as tolerated.    NUTRITION SCREENING      Trending Narrative: 9/10: Pt assessed due to admission screening. So far, pt eating very well,with 75% or better intake at all meals - generally consuming 100% with good tolerance. Does have some areas of skin concern -- ONS already are ordered; RD to add secondary ONS to assure product availability. Weight remains in stable range over the last year -- small fluctuations likely R/t fluid shifts (noted Hx CKD,CHF). Will continue to monitor and follow up as clinically indicated.       PO Diet: Diet: Renal; Low Potassium, Low Sodium (2-3g); Fluid Consistency: Thin (IDDSI 0)   PO Supplements: Novasource Renal BID (Provides 950 kcals, 43 g protein if consumed)   On national shortage - see recommendations   Trending PO Intake:  % at majority of recent meals        Nutritionally-Pertinent Medications RDN Reviewed, C/W clinical course         Labs (reviewed below): Reviewed -- K+ well-controlled on current diet; phosphorus remains WNL      Results from last 7 days   Lab Units 09/10/24  1448 09/09/24  2209 09/08/24  2249 09/07/24  2101   SODIUM  "mmol/L 142 142 143 143   POTASSIUM mmol/L 4.1 4.0 4.3 4.3   CHLORIDE mmol/L 111* 111* 112* 110*   CO2 mmol/L 22.7 20.8* 23.7 22.5   BUN mg/dL 29* 27* 27* 27*   CREATININE mg/dL 1.27 1.14 1.29* 1.35*   CALCIUM mg/dL 8.2* 8.3* 8.2* 8.8   BILIRUBIN mg/dL  --   --  0.5 0.6   ALK PHOS U/L  --   --  58 61   ALT (SGPT) U/L  --   --  9 12   AST (SGOT) U/L  --   --  23 37   GLUCOSE mg/dL 131* 119* 127* 100*     Results from last 7 days   Lab Units 09/10/24  1448 09/09/24  2209 09/08/24  2249   MAGNESIUM mg/dL  --   --  1.4*   PHOSPHORUS mg/dL  --  2.9 2.9   HEMOGLOBIN g/dL 7.3*  --  7.0*   HEMATOCRIT % 24.1*  --  23.4*     Lab Results   Component Value Date    HGBA1C 4.70 (L) 04/23/2024          GI Function:  Stool Output  Stool Unmeasured Occurrence: 1 (09/10/24 0300)  Bowel Incontinence: Yes (09/10/24 0300)  Stool Amount: small (09/10/24 0800)          Skin: Per WOCN:   --three stage 2 pressure injuries to his right, left and mid sacrum  --wounds to his right great toe and lateral malleolus         Weight Review: Estimated body mass index is 30.28 kg/m² as calculated from the following:    Height as of this encounter: 167.6 cm (66\").    Weight as of this encounter: 85.1 kg (187 lb 9.8 oz).    Comment:   9/10: Scale weight 85.1 kg --- NET 3% gain in three months; 3.9% gain in 6 months; overall NET loss of 2.8% in one year -- overall fluctuations have been in fairly stable range over the last year; looking back further, pt's UBW in past years (2021 and previous was closer to 235 lb)    Wt Readings from Last 30 Encounters:   09/07/24 2034 85.1 kg (187 lb 9.8 oz)   06/10/24 1216 90 kg (198 lb 6.6 oz)   06/10/24 0441 82.6 kg (182 lb 1.6 oz)   05/14/24 1442 82.6 kg (182 lb)   04/30/24 0300 82.6 kg (182 lb 1.6 oz)   04/29/24 0433 83.1 kg (183 lb 3.2 oz)   04/28/24 0500 82.3 kg (181 lb 7 oz)   04/27/24 0416 85.1 kg (187 lb 9.8 oz)   04/26/24 0306 84.7 kg (186 lb 11.7 oz)   04/25/24 0300 86.6 kg (190 lb 14.7 oz)   04/23/24 1200 " 81.9 kg (180 lb 8.9 oz)   02/07/24 1525 83.5 kg (184 lb)   12/27/23 1330 85.7 kg (189 lb)   10/17/23 1406 87.5 kg (193 lb)   07/18/23 1356 94.4 kg (208 lb 3.2 oz)   06/22/23 1555 94.8 kg (209 lb)   04/18/23 1434 100 kg (221 lb)   12/22/22 1504 104 kg (230 lb)   06/22/22 1317 103 kg (226 lb)   04/14/22 1142 104 kg (230 lb)   04/05/22 1451 104 kg (230 lb)   01/13/22 1026 102 kg (224 lb)   12/15/21 1435 104 kg (229 lb 12 oz)   09/13/21 1420 108 kg (239 lb)   07/28/21 1234 107 kg (235 lb)   07/28/21 1418 107 kg (235 lb)   07/13/21 1424 107 kg (235 lb 8 oz)   04/13/21 1345 106 kg (233 lb)   07/09/20 1454 99.3 kg (219 lb)   07/09/20 1356 99.8 kg (220 lb)   03/12/20 1432 98.7 kg (217 lb 9.6 oz)   01/21/20 1327 98.9 kg (218 lb)   12/12/19 1333 98.9 kg (218 lb)   12/05/19 1433 99.8 kg (220 lb)   11/21/19 1321 99.4 kg (219 lb 3.2 oz)   10/30/19 1420 98.2 kg (216 lb 9.6 oz)   10/22/19 1402 96.5 kg (212 lb 12 oz)              Trending Physical   Appearance, NFPE 9/10: Does not meet AND/ASPEN criteria for malnutrition Dx at this time - will follow up for further exams as clinically indicated     Protein Requirements    EST Needs, Method, Wt used 85 g/day (1.3 g/kg IBW -- or 1 g/kg actual wt) -- monitor renal function            Nutrition Problem Statement: Increased nutrient needs (protein) R/t skin healing; as evidenced by pressure injuries.         Nutrition Intervention: Continue ONS, but will add alternative supplement, as Novasource Renal is on national shortage and may not always be available to serve.     Another option for those with need for reduced K+ and/or phosphorus is Boost Breeze.   Please note, Boost Breeze is low in potassium and phosphorus.  Free fluid per serving is less than total volume - see below:  Comparison Chart        Potassium per serving  Phosphorus per serving Free fluid per serving   Novasource Renal  230mg 200mg 168mL   Boost Breeze  0mg 170mg 194mL     Will add option for Boost Breeze BID  (provides 500 kcals, 18 g protein if consumed) in addition to option for Novasource Renal BID (Provides 950 kcals, 43 g protein if consumed)     Continue current diet as tolerated.          Monitoring/Evaluation PO intake, Pertinent labs, Weight, Skin status, GI status, POC/GOC        RD to follow up per protocol.    Electronically signed by:  Crystal Tirado RD  09/10/24 18:02 EDT

## 2024-09-10 NOTE — PLAN OF CARE
Goal Outcome Evaluation:         Praneeth Nam presents with functional mobility impairments which indicate the need for skilled intervention. Limited by dyspnea and fatigue. Desiring to eat his lunch that arrives at start of session. Assist required to don brief which he typically wears. SBA for transfers, CGA for ambulation 10' with RW.  Will continue to follow and progress as tolerated.

## 2024-09-10 NOTE — PLAN OF CARE
Goal Outcome Evaluation:      Patient remains on eliquis and midodrine. holding aldactone/ARB. ACHS. Aox4 Incont, Q2 turn. Remains on 3LNC. Nephro on board. Stage 2 on coccyx open to air. Pressure injury R foot and ankle Mepilex. Bilateral edema in upper and lower extremities.

## 2024-09-10 NOTE — THERAPY EVALUATION
Patient Name: Praneeth Nam  : 1940    MRN: [id number]                              Today's Date: 9/10/2024       Admit Date: 2024    Visit Dx:     ICD-10-CM ICD-9-CM   1. Acute pain of both shoulders  M25.511 719.41    M25.512    2. Acute kidney injury  N17.9 584.9   3. Edema, unspecified type  R60.9 782.3     Patient Active Problem List   Diagnosis    T-cell large granular lymphocytic leukemia     Chronic ITP (idiopathic thrombocytopenia)    Rheumatoid arthritis    CKD (chronic kidney disease), stage III    Bilateral pulmonary embolism    Elevated factor VIII level    Other myelodysplastic syndromes    Hypogammaglobulinemia    Bronchitis    Subdural hematoma, nontraumatic    Lung nodule    Abnormal cardiovascular stress test    AV block, 1st degree    COPD (chronic obstructive pulmonary disease)    Dizziness    Dyslipidemia    Exertional shortness of breath    Fatigue    History of pulmonary embolism    Hypertension    Keratoconjunctivitis sicca, in Sjogren's syndrome    MITZI (obstructive sleep apnea)    Small plaque parapsoriasis    Stroke-like symptoms    Type 2 diabetes mellitus    Bilateral pulmonary embolism    Elevated antinuclear antibody (NAIMA) level    Seropositive rheumatoid arthritis    RBBB    Anemia, chronic disease    Pleural effusion    Sepsis    Moderate malnutrition    Permanent atrial fibrillation    Secondary hypercoagulability disorder    Abnormal LFTs    UTI (urinary tract infection), bacterial    Cholangitis    Choledocholithiasis    Anemia in stage 2 chronic kidney disease    Gastro-esophageal reflux disease without esophagitis    Presence of pancreatic duct stent    Psoriasis    Pure hypercholesterolemia    Seasonal allergies    Chronic obstructive pulmonary disease, unspecified    High blood pressure    Sleep apnea    Acute renal failure     Past Medical History:   Diagnosis Date    Acute pulmonary embolism 2018    bilateral    Arthritis     bilateral knees and ankles    CKD  (chronic kidney disease) 03/02/2009    Coagulopathy 07/2008    COPD (chronic obstructive pulmonary disease)     Diabetes mellitus     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    History of pneumonia 02/2015    hospitalized with community-acquired pneumonia, possibly viral     History of prostate cancer 10/2009    Glen 6, Stage II    Hypercholesterolemia     Hypertension     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    Neutropenia 11/2003    MITZI (obstructive sleep apnea) 2018    Psoriasis 2000    Renal cyst, left     Rheumatoid arthritis     Stroke (cerebrum)     Thrombocytopenia 08/23/2005     Past Surgical History:   Procedure Laterality Date    ENDOSCOPY N/A 4/26/2024    Procedure: ESOPHAGOGASTRODUODENOSCOPY WITH DILATION (#46 BOUGIE);  Surgeon: Gamal Yancey MD;  Location: [facility] ENDOSCOPY;  Service: Gastroenterology;  Laterality: N/A;  DUODENAL DIVERTICULUM, HIATAL HERNIA, DISTAL ESOPHAGEAL STRICTURE    ERCP N/A 6/10/2024    Procedure: ENDOSCOPIC RETROGRADE CHOLANGIOPANCREATOGRAPHY, sphincterotomy, balloon clearance of commonn bile duct (9-12mm), stone extraction, pancreatic stent placement;  Surgeon: Mariaelena Snyder MD;  Location: [facility] ENDOSCOPY;  Service: Gastroenterology;  Laterality: N/A;  post: choledocholithiasis    SKIN LESION EXCISION      back and groin-benign      General Information       Row Name 09/10/24 1051          OT Time and Intention    Document Type evaluation  -KT     Mode of Treatment individual therapy  -KT       Row Name 09/10/24 1051          General Information    Patient Profile Reviewed yes  -KT     Prior Level of Function independent:;feeding;grooming;dressing;transfer;gait;mod assist:;bathing  Pt was independent with BADLs (grooming, dressing, feeding) but required assistance from caregivers for bathing 2x per week (hospice team).  -KT     Existing Precautions/Restrictions no known precautions/restrictions  -KT     Barriers to Rehab  medically complex  -KT       Row Name 09/10/24 1051          Occupational Profile    Reason for Services/Referral (Occupational Profile) Pt is a 84 y.o. year old male admitted for BUE pain and edema and subsequent acute renal failure on 9/7/24.    Pmhx significant for PE on Eliquis, ITP, COPD, CKD, diabetes, hypertension, CVA, and psoriasis who previously presented to [facility] 6/11/24 with abdominal pain resulting in abdominal sx.     At baseline, pt reports that he was independent with all BADLs but his granddaughter, who lives with him and his wife, was completing IADLs. Pt was not actively driving and was being followed by [facility]. Family wishes to take pt home with HH.  -KT       Row Name 09/10/24 1051          Living Environment    People in Home spouse;child(elio), adult  Pt lives at home with his wife and their granddaughter who provides care for both of them as needed. Granddaughter assist with IADLs (home management, cooking, etc) and provides ADL assistance to pt's wife.  -KT     Name(s) of People in Home Pt, wife, and granddaughter (caregiver)  -KT       Row Name 09/10/24 1051          Home Main Entrance    Number of Stairs, Main Entrance none  -KT     Stair Railings, Main Entrance none  -KT       Row Name 09/10/24 1051          Stairs Within Home, Primary    Stairs, Within Home, Primary Pt resides in a H with no steps to enter or inside of the home.  -KT     Number of Stairs, Within Home, Primary none  -KT       Row Name 09/10/24 1051          Cognition    Orientation Status (Cognition) oriented x 4  -KT       Row Name 09/10/24 1051          Safety Issues, Functional Mobility    Safety Issues Affecting Function (Mobility) friction/shear risk;judgment;safety precautions follow-through/compliance  -KT     Impairments Affecting Function (Mobility) balance;coordination;endurance/activity tolerance;grasp;pain;postural/trunk control;range of motion (ROM);strength;shortness of breath  -KT      Comment, Safety Issues/Impairments (Mobility) Pt reports increased difficulty with UE movement this hospitalization secondary to edema, particularly reach and grasping. OT educated pt on proximal positioning to optimize distal use of UEs.  -KT               User Key  (r) = Recorded By, (t) = Taken By, (c) = Cosigned By      Initials Name Provider Type    Tatiana Owens OT Occupational Therapist                     Mobility/ADL's       Row Name 09/10/24 1057          Bed Mobility    Bed Mobility supine-sit  -KT     Supine-Sit Jay (Bed Mobility) moderate assist (50% patient effort)  -KT     Bed Mobility, Safety Issues decreased use of arms for pushing/pulling;decreased use of legs for bridging/pushing;impaired trunk control for bed mobility  -KT     Assistive Device (Bed Mobility) bed rails;draw sheet;head of bed elevated  -KT       Row Name 09/10/24 1057          Transfers    Transfers bed-chair transfer;stand-sit transfer;toilet transfer  -KT       Row Name 09/10/24 1057          Bed-Chair Transfer    Bed-Chair Jay (Transfers) minimum assist (75% patient effort)  -KT     Assistive Device (Bed-Chair Transfers) walker, front-wheeled  -KT     Comment, (Bed-Chair Transfer) Completed sit to stand from EOB with use of bed rails and walker for balance in standing with min A to move from seated to standing  -KT       Row Name 09/10/24 1057          Sit-Stand Transfer    Sit-Stand Jay (Transfers) minimum assist (75% patient effort);verbal cues  -KT     Assistive Device (Sit-Stand Transfers) walker, front-wheeled  -KT     Comment, (Sit-Stand Transfer) Completed stand pivot sit transfer from EOB to BSC and BSC to recliner with min A and increased time due to crepetis and difficulty with movement due to stiffness. Pt demonstrated fair safety awarness with UE use for safe lowering/pushing to stand with cues required.  -KT       Row Name 09/10/24 1057          Stand-Sit Transfer    Stand-Sit  [place] (Transfers) verbal cues;minimum assist (75% patient effort)  -KT     Assistive Device (Stand-Sit Transfers) walker, front-wheeled  -KT       Row Name 09/10/24 1057          Toilet Transfer    Type (Toilet Transfer) sit-stand;stand-sit  -KT     [place] Level (Toilet Transfer) minimum assist (75% patient effort)  -KT     Assistive Device (Toilet Transfer) commode, bedside without drop arms;walker, front-wheeled  -KT       Row Name 09/10/24 1057          Activities of Daily Living    BADL Assessment/Intervention bathing;toileting;grooming  -KT       Row Name 09/10/24 1057          Bathing Assessment/Intervention    [place] Level (Bathing) bathing skills;maximum assist (25% patient effort)  -KT     Position (Bathing) edge of bed sitting  -KT     Comment, (Bathing) Completed sponge bathing with max A for all areas below chest with pt able to wash face and arms.  -KT       Row Name 09/10/24 1057          Toileting Assessment/Training    [place] Level (Toileting) toileting skills;maximum assist (25% patient effort)  -KT     Assistive Devices (Toileting) commode, 3-in-1;commode, bedside without drop arms  -KT     Position (Toileting) supported sitting  -KT     Comment, (Toileting) Pt attempted bowel hygiene but required assistance for thoroughness and assistance for clothing management - overall max A  -KT       Row Name 09/10/24 1057          Grooming Assessment/Training    [place] Level (Grooming) oral care regimen  -KT     Oral Care teeth brushed - regular toothbrush  -KT     Position (Grooming) edge of bed sitting  -KT     Comment, (Grooming) Pt able to engage in oral care with min A sitting EOB with assistance provided to prepare items, support UE due to weakness and positoin basin, etc.  -KT               User Key  (r) = Recorded By, (t) = Taken By, (c) = Cosigned By      Initials Name Provider Type    Tatiana Owens OT Occupational Therapist                    Obj/Interventions       [facility] Name 09/10/24 1104          Sensory Assessment (Somatosensory)    Sensory Assessment (Somatosensory) bilateral UE  -KT     Bilateral UE Sensory Assessment general sensation  -     Sensory Subjective Reports numbness  -     Sensory Assessment Some numbness in UE due to decreased ciruclation. Not new onset.  -KT       Row Name 09/10/24 1104          Range of Motion Comprehensive    General Range of Motion upper extremity range of motion deficits identified  -KT       Row Name 09/10/24 1104          Strength Comprehensive (MMT)    General Manual Muscle Testing (MMT) Assessment upper extremity strength deficits identified;hand strength deficits identified;neck/trunk strength deficits identified  -KT       Row Name 09/10/24 1104          Neck/Trunk (Manual Muscle Testing)    Comment, Neck/Trunk: Manual Muscle Testing (MMT) Poor postural control resulting in forward leaning and difficulty attaining fully upright posture with transfers and unsupported sitting.  -[facility] Name 09/10/24 1104          Upper Extremity (Manual Muscle Testing)    Comment, MMT: Upper Extremity grossly 2/5 for shoulder strength with pt struggling to reach arms to shoulder height due to pain and weakness. Decreased RUE ROM at elbow due to pain and edema with strength of grossly 3/5  -KT       Row Name 09/10/24 1104          Balance    Balance Assessment sitting static balance;standing static balance  -KT     Static Sitting Balance minimal assist;moderate assist  -KT     Position, Sitting Balance unsupported;sitting in chair  -KT     Static Standing Balance moderate assist  -KT       Row Name 09/10/24 1104          General Upper Extremity Assessment (Range of Motion)    Comment: Upper Extremity ROM UE ROM impaired due to pain and edema. Pt educated on use of movement to decrease edema with 3 sets of 5 reps of elbow flex/ext below shoulder level, 3 set 5 reps of wrist circles and 3 sets 5 reps of fist squeezes. Pt  encouraged to complete movements at each commercial break when watching TV or otherwise intermittently throughout the day.  -KT               User Key  (r) = Recorded By, (t) = Taken By, (c) = Cosigned By      Initials Name Provider Type    Tatiana Owens OT Occupational Therapist                   Goals/Plan       Row Name 09/10/24 1114          Transfer Goal 1 (OT)    Activity/Assistive Device (Transfer Goal 1, OT) toilet;sit-to-stand/stand-to-sit  -KT     [place] Level/Cues Needed (Transfer Goal 1, OT) contact guard required;verbal cues required  -KT     Time Frame (Transfer Goal 1, OT) long term goal (LTG)  -KT     Progress/Outcome (Transfer Goal 1, OT) good progress toward goal  -KT       Row Name 09/10/24 1114          Dressing Goal 1 (OT)    Activity/Device (Dressing Goal 1, OT) dressing skills, all  -KT     [place]/Cues Needed (Dressing Goal 1, OT) contact guard required;verbal cues required  with AE as needed  -KT     Time Frame (Dressing Goal 1, OT) long term goal (LTG)  -KT     Progress/Outcome (Dressing Goal 1, OT) good progress toward goal  -KT       Row Name 09/10/24 1114          Toileting Goal 1 (OT)    Activity/Device (Toileting Goal 1, OT) toileting skills, all  -KT     [place] Level/Cues Needed (Toileting Goal 1, OT) contact guard required  -KT     Time Frame (Toileting Goal 1, OT) long term goal (LTG)  -KT       Row Name 09/10/24 1114          Therapy Assessment/Plan (OT)    Planned Therapy Interventions (OT) activity tolerance training;adaptive equipment training;BADL retraining;edema control/reduction;functional balance retraining;occupation/activity based interventions;patient/caregiver education/training;ROM/therapeutic exercise;strengthening exercise;transfer/mobility retraining  -KT               User Key  (r) = Recorded By, (t) = Taken By, (c) = Cosigned By      Initials Name Provider Type    Tatiana Owens OT Occupational Therapist                    Clinical Impression       Row Name 09/10/24 1110          Pain Assessment    Pretreatment Pain Rating 2/10  -KT     Posttreatment Pain Rating 2/10  Pt reports at rest little to no pain (2/10) but could not/would not quantify pain with movement despite many attempts to encourage pt to rate pain on 0-10 scale. Per facial expressions and participation pain appears mod but subsides with rest.  -KT     Pre/Posttreatment Pain Comment Pt reports pain in BUEs from shoulders to elbows primarily. OT educated pt on positioning and movement to optimize comfort and promote decreased edema.  -KT     Pain Intervention(s) Medication (See MAR)  -KT       Row Name 09/10/24 1110          Plan of Care Review    Outcome Evaluation Pt is a 84 y.o. year old male admitted for BUE pain and edema and subsequent acute renal failure on 9/7/24.    Pmhx significant for PE on Eliquis, ITP, COPD, CKD, diabetes, hypertension, CVA, and psoriasis who previously presented to [facility] 6/11/24 with abdominal pain resulting in abdominal sx.     At baseline, pt reports that he was independent with all BADLs but his granddaughter, who lives with him and his wife, was completing IADLs. Pt was not actively driving and was being followed by [facility]. Family wishes to take pt home with , however OT feels SNF dc might be more appropriate to help pt increase independence before returning to home. Pt was able to transfer via stand pivot sit with FWW with min/mod A this date and engaged in toileting and brief bathing activities with mod/max A.  -KT       Row Name 09/10/24 1110          Therapy Assessment/Plan (OT)    Patient/Family Therapy Goal Statement (OT) Family/pt would like to return home with HH.  -KT     Rehab Potential (OT) good, to achieve stated therapy goals  -KT     Criteria for Skilled Therapeutic Interventions Met (OT) yes;skilled treatment is necessary  -KT     Therapy Frequency (OT) 5 times/wk  -KT     Predicted Duration of Therapy  Intervention (OT) Until time of d/c  -KT       Row Name 09/10/24 1110          Therapy Plan Review/Discharge Plan (OT)    Anticipated Discharge Disposition (OT) home with 24/7 care;home with home health;skilled nursing facility  -KT       Row Name 09/10/24 1110          Positioning and Restraints    Pre-Treatment Position in bed  -KT     Post Treatment Position chair  -KT     In Chair reclined;call light within reach;encouraged to call for assist;notified nsg;exit alarm on  -KT               User Key  (r) = Recorded By, (t) = Taken By, (c) = Cosigned By      Initials Name Provider Type    Tatiana Owens OT Occupational Therapist                   Outcome Measures       Row Name 09/10/24 1117          How much help from another is currently needed...    Putting on and taking off regular lower body clothing? 1  -KT     Bathing (including washing, rinsing, and drying) 2  -KT     Toileting (which includes using toilet bed pan or urinal) 1  -KT     Putting on and taking off regular upper body clothing 2  -KT     Taking care of personal grooming (such as brushing teeth) 3  -KT     Eating meals 3  -KT     AM-PAC 6 Clicks Score (OT) 12  -KT       Row Name 09/10/24 1117          Functional Assessment    Outcome Measure Options AM-PAC 6 Clicks Daily Activity (OT)  -KT               User Key  (r) = Recorded By, (t) = Taken By, (c) = Cosigned By      Initials Name Provider Type    Tatiana Owens OT Occupational Therapist                      OT Recommendation and Plan  Planned Therapy Interventions (OT): activity tolerance training, adaptive equipment training, BADL retraining, edema control/reduction, functional balance retraining, occupation/activity based interventions, patient/caregiver education/training, ROM/therapeutic exercise, strengthening exercise, transfer/mobility retraining  Therapy Frequency (OT): 5 times/wk  Plan of Care Review  Outcome Evaluation: Pt is a 84 y.o. year old male admitted for BUE  pain and edema and subsequent acute renal failure on 9/7/24.    Pmhx significant for PE on Eliquis, ITP, COPD, CKD, diabetes, hypertension, CVA, and psoriasis who previously presented to [facility] 6/11/24 with abdominal pain resulting in abdominal sx.     At baseline, pt reports that he was independent with all BADLs but his granddaughter, who lives with him and his wife, was completing IADLs. Pt was not actively driving and was being followed by [facility]. Family wishes to take pt home with HH, however OT feels SNF dc might be more appropriate to help pt increase independence before returning to home. Pt was able to transfer via stand pivot sit with FWW with min/mod A this date and engaged in toileting and brief bathing activities with mod/max A.     Time Calculation:   Evaluation Complexity (OT)  Review Occupational Profile/Medical/Therapy History Complexity: extensive/high complexity  Assessment, Occupational Performance/Identification of Deficit Complexity: 5 or more performance deficits  Clinical Decision Making Complexity (OT): detailed assessment/moderate complexity  Overall Complexity of Evaluation (OT): high complexity     Time Calculation- OT       Row Name 09/10/24 1119             Time Calculation- OT    OT Start Time 0948  -KT      OT Stop Time 1044  -KT      OT Time Calculation (min) 56 min  -KT      Total Timed Code Minutes- OT 10 minute(s)  -KT      OT Received On 09/10/24  -KT      OT - Next Appointment 09/11/24  -KT      OT Goal Re-Cert Due Date 09/24/24  -KT                User Key  (r) = Recorded By, (t) = Taken By, (c) = Cosigned By      Initials Name Provider Type    KT Tatiana Cantrell OT Occupational Therapist                  Therapy Charges for Today       Code Description Service Date Service Provider Modifiers Qty    [id number] HC-OT EVAL HIGH COMPLEXITY 5 9/10/2024 Tatiana Cantrell OT  1    [id number]  OT THERAPEUTIC ACT EA 15 MIN 9/10/2024 Tatiana Cantrell OT GO 1                  Tatiana Cantrell, OT  9/10/2024

## 2024-09-10 NOTE — THERAPY TREATMENT NOTE
"Subjective: Pt agreeable to therapeutic plan of care. Reports he is not sure if he is going to be able to get around at home.     Objective:         Bed mobility - N/A or Not attempted.  Transfers - SBA  Ambulation - 12 feet CGA and with rolling walker on 2L  Pt became dyspneic and fatigued and had to return to chair      Vitals: Desaturates on 2l    Pain: 0 VAS   Location: n/a  Intervention for pain: N/A    Education: Provided education on the importance of mobility in the acute care setting    Assessment: Praneeth Nam presents with functional mobility impairments which indicate the need for skilled intervention. Limited by dyspnea and fatigue. Desiring to eat his lunch that arrives at start of session. Assist required to don brief which he typically wears. SBA for transfers, CGA for ambulation 10' with RW.  Will continue to follow and progress as tolerated.     Plan/Recommendations:   If medically appropriate, Low Intensity Therapy recommended post-acute care - This is recommended as therapy feels this patient would require 2-3 visits per week. OP or HH would be the best option depending on patient's home bound status. Consider, if the patient has other  \"skilled\" needs such as wounds, IV antibiotics, etc. Combined with \"low intensity\" could also equate to a SNF. If patient is medically complex, consider LTAC. Pt requires no DME at discharge.     Pt desires Home with family assist at discharge. Patient is returning home with hospice. Pt cooperative; agreeable to therapeutic recommendations and plan of care.             Post-Tx Position: Up in Chair, Alarms activated, and Call light and personal items within reach  PPE: gloves    "

## 2024-09-10 NOTE — PROGRESS NOTES
"NEPHROLOGY PROGRESS NOTE------[facility]/OPT    [facility]  [phone]  Sascha Dorado MD      Patient Care Team:  Guilherme Jason MD as PCP - General (Family Medicine)  Misty Dias MD as Consulting Physician (Cardiology)  Nora Kenyon, BRIAN, APRN as Nurse Practitioner (Thoracic Surgery)  Sussy Shane MD as Consulting Physician (Nephrology)      Provider:  Sascha Dorado MD  Patient Name: Praneeth Nam  :  1940    SUBJECTIVE:  Follow-up CKD  No chest pain or shortness of breath  Feels tired      Medication:  apixaban, 2.5 mg, Oral, BID  atorvastatin, 20 mg, Oral, Nightly  insulin lispro, 2-7 Units, Subcutaneous, 4x Daily AC & at Bedtime  ipratropium-albuterol, 3 mL, Nebulization, 4x Daily - RT  midodrine, 5 mg, Oral, BID AC  pantoprazole, 40 mg, Oral, Q AM  tamsulosin, 0.4 mg, Oral, Daily             OBJECTIVE    Vital Sign Min/Max for last 24 hours  Temp  Min: 97.3 °F (36.3 °C)  Max: 98.1 °F (36.7 °C)   BP  Min: 121/55  Max: 142/48   Pulse  Min: 59  Max: 69   Resp  Min: 22  Max: 28   SpO2  Min: 94 %  Max: 100 %   No data recorded   No data recorded     Flowsheet Rows      Flowsheet Row First Filed Value   Admission Height 167.6 cm (66\") Documented at 2024   Admission Weight 85.1 kg (187 lb 9.8 oz) Documented at 2024            No intake/output data recorded.  I/O last 3 completed shifts:  In: 1890 [P.O.:802; I.V.:1088]  Out: 200 [Urine:200]    Physical Exam:  General Appearance: alert, appears stated age and cooperative  Head: normocephalic, without obvious abnormality and atraumatic  Eyes: conjunctivae and sclerae normal and no icterus  Neck: supple and no JVD  Lungs: clear to auscultation and respirations regular  Heart: regular rhythm & normal rate and normal S1, S2  Chest: Wall no abnormalities observed  Abdomen: normal bowel sounds and soft, nontender  Extremities: moves extremities well, no edema, " "no cyanosis and no redness  Skin: no bleeding, bruising or rash, turgor normal, color normal and no lesions noted  Neurologic: Alert, and oriented. No focal deficits    Labs:    WBC WBC   Date Value Ref Range Status   09/08/2024 4.37 3.40 - 10.80 10*3/mm3 Final   09/07/2024 5.13 3.40 - 10.80 10*3/mm3 Final      HGB Hemoglobin   Date Value Ref Range Status   09/08/2024 7.0 (L) 13.0 - 17.7 g/dL Final   09/07/2024 8.2 (L) 13.0 - 17.7 g/dL Final      HCT Hematocrit   Date Value Ref Range Status   09/08/2024 23.4 (L) 37.5 - 51.0 % Final   09/07/2024 27.0 (L) 37.5 - 51.0 % Final      Platelets No results found for: \"LABPLAT\"   MCV MCV   Date Value Ref Range Status   09/08/2024 106.8 (H) 79.0 - 97.0 fL Final   09/07/2024 105.9 (H) 79.0 - 97.0 fL Final          Sodium Sodium   Date Value Ref Range Status   09/09/2024 142 136 - 145 mmol/L Final   09/08/2024 143 136 - 145 mmol/L Final   09/07/2024 143 136 - 145 mmol/L Final      Potassium Potassium   Date Value Ref Range Status   09/09/2024 4.0 3.5 - 5.2 mmol/L Final   09/08/2024 4.3 3.5 - 5.2 mmol/L Final   09/07/2024 4.3 3.5 - 5.2 mmol/L Final     Comment:     Slight hemolysis detected by analyzer. Result may be falsely elevated.      Chloride Chloride   Date Value Ref Range Status   09/09/2024 111 (H) 98 - 107 mmol/L Final   09/08/2024 112 (H) 98 - 107 mmol/L Final   09/07/2024 110 (H) 98 - 107 mmol/L Final      CO2 CO2   Date Value Ref Range Status   09/09/2024 20.8 (L) 22.0 - 29.0 mmol/L Final   09/08/2024 23.7 22.0 - 29.0 mmol/L Final   09/07/2024 22.5 22.0 - 29.0 mmol/L Final      BUN BUN   Date Value Ref Range Status   09/09/2024 27 (H) 8 - 23 mg/dL Final   09/08/2024 27 (H) 8 - 23 mg/dL Final   09/07/2024 27 (H) 8 - 23 mg/dL Final      Creatinine Creatinine   Date Value Ref Range Status   09/09/2024 1.14 0.76 - 1.27 mg/dL Final   09/08/2024 1.29 (H) 0.76 - 1.27 mg/dL Final   09/07/2024 1.35 (H) 0.76 - 1.27 mg/dL Final      Calcium Calcium   Date Value Ref Range " "Status   09/09/2024 8.3 (L) 8.6 - 10.5 mg/dL Final   09/08/2024 8.2 (L) 8.6 - 10.5 mg/dL Final   09/07/2024 8.8 8.6 - 10.5 mg/dL Final      PO4 No components found for: \"PO4\"   Albumin Albumin   Date Value Ref Range Status   09/09/2024 2.9 (L) 3.5 - 5.2 g/dL Final   09/08/2024 2.7 (L) 3.5 - 5.2 g/dL Final   09/07/2024 3.1 (L) 3.5 - 5.2 g/dL Final      Magnesium Magnesium   Date Value Ref Range Status   09/08/2024 1.4 (L) 1.6 - 2.4 mg/dL Final      Uric Acid No components found for: \"URIC ACID\"     Imaging Results (Last 72 Hours)       Procedure Component Value Units Date/Time    US Renal Bilateral [[id number]] Collected: 09/08/24 1729     Updated: 09/08/24 1735    Narrative:      US RENAL BILATERAL    Date of Exam: 9/8/2024 5:03 PM EDT    Indication: ELYSSA.    Comparison: Renal ultrasound dated 4/24/2024    Technique: Grayscale and color Doppler ultrasound evaluation of the kidneys and urinary bladder was performed.      Findings:  The right kidney measures 10.0 x 5.4 x 4.3 cm. There is normal renal cortical thickness. There is no hydronephrosis.    The left kidney is not visualized due to extensive bowel gas. The urinary bladder is collapsed due to recent voiding.      Impression:      Impression:  1. Normal appearance of the right kidney without hydronephrosis.  2. Nonvisualization of the left kidney secondary to bowel gas.        Electronically Signed: Flakito Islas    9/8/2024 5:32 PM EDT    Workstation ID: [id number]    XR Shoulder 2+ View Left [[id number]] Collected: 09/07/24 2244     Updated: 09/07/24 2247    Narrative:      XR SHOULDER 2+ VW LEFT    Date of Exam: 9/7/2024 9:13 PM EDT    Indication: pain    Comparison: 6/7/2024.    Findings:  There is mild acromioclavicular and glenohumeral osteophyte formation. There is stable marked loss of acromiohumeral distance suggesting chronic rotator cuff pathology. There is maintenance of the coracoclavicular distance. No acute fracture or   dislocation. The adjacent " lung and ribs appear intact.      Impression:      Impression:  1.No acute osseous abnormality.  2.Mild acromioclavicular and glenohumeral joint degeneration.  3.Loss of acromiohumeral distance suggesting chronic rotator cuff pathology.        Electronically Signed: Daniel Herman MD    9/7/2024 10:44 PM EDT    Workstation ID: [id number]    XR Shoulder 2+ View Right [[id number]] Collected: 09/07/24 2241     Updated: 09/07/24 2244    Narrative:      XR SHOULDER 2+ VW RIGHT    Date of Exam: 9/7/2024 9:12 PM EDT    Indication: pain    Comparison: None available.    Findings:  There is mild acromioclavicular and glenohumeral osteophyte formation. There is narrowing of the acromiohumeral interval suggesting chronic rotator cuff pathology. There is maintenance of the coracoclavicular distance. The adjacent lung and ribs appear   intact. No evidence of fracture or dislocation.      Impression:      Impression:  1.Mild acromioclavicular and glenohumeral joint degeneration.  2.Narrowing of the acromiohumeral interval suggesting chronic rotator cuff pathology.        Electronically Signed: Daniel Herman MD    9/7/2024 10:42 PM EDT    Workstation ID: [id number]            Results for orders placed during the hospital encounter of 09/07/24    XR Shoulder 2+ View Left    Narrative  XR SHOULDER 2+ VW LEFT    Date of Exam: 9/7/2024 9:13 PM EDT    Indication: pain    Comparison: 6/7/2024.    Findings:  There is mild acromioclavicular and glenohumeral osteophyte formation. There is stable marked loss of acromiohumeral distance suggesting chronic rotator cuff pathology. There is maintenance of the coracoclavicular distance. No acute fracture or  dislocation. The adjacent lung and ribs appear intact.    Impression  Impression:  1.No acute osseous abnormality.  2.Mild acromioclavicular and glenohumeral joint degeneration.  3.Loss of acromiohumeral distance suggesting chronic rotator cuff pathology.        Electronically Signed:   MD Virgil  9/7/2024 10:44 PM EDT  Workstation ID: [id number]      XR Shoulder 2+ View Right    Narrative  XR SHOULDER 2+ VW RIGHT    Date of Exam: 9/7/2024 9:12 PM EDT    Indication: pain    Comparison: None available.    Findings:  There is mild acromioclavicular and glenohumeral osteophyte formation. There is narrowing of the acromiohumeral interval suggesting chronic rotator cuff pathology. There is maintenance of the coracoclavicular distance. The adjacent lung and ribs appear  intact. No evidence of fracture or dislocation.    Impression  Impression:  1.Mild acromioclavicular and glenohumeral joint degeneration.  2.Narrowing of the acromiohumeral interval suggesting chronic rotator cuff pathology.        Electronically Signed: Daniel Herman MD  9/7/2024 10:42 PM EDT  Workstation ID: [id number]      Results for orders placed during the hospital encounter of 06/10/24    XR Chest 1 View    Narrative  XR CHEST 1 VW    Date of Exam: 6/12/2024 11:53 AM EDT    Indication: Shortness of breath    Comparison: 6/10/2024.    Findings:  The heart is enlarged. There is stable small bilateral basilar pleural effusions with atelectasis and less likely pneumonia. The pulmonary vascular markings are normal. There are chronic age-related changes involving the bony thorax and thoracic aorta.    Impression  Impression:  No interval change from the study performed 2 days ago with abnormalities as described.      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 Temporarily hold Aldactone and ARB. No NSAIDs or IV dye. Dose meds for CrCl 45 cc/min.      2. HTN WITH CKD----BP low. Temporarily holding ARB and Diuretic     3. DECONDITIONING-----PT/OT     4. ANEMIA OF CKD----Hemoccult pending. IV iron for ANDREA     5. COPD------Oxygen, nebs, pulmonary toilet     6. H/O CHF------Clinically  prerenal at present. Diuretics on hold     7. HYPOALBUMINEMIA-----S/P IV Albumin to temporize     8. ELEVATED LFTS/TRANSAMINITIS------Better with diuresis     9. SEPSIS/UTI-----Abx per Intensivist     10. HYPOMAGNESEMIA-------Replaced     11. PAF--------Rate controlled. Anticoagulated     12. HYPOTENSION----ARB and diuretic on hold.      13. BPH-----On Flomax     14. GERD/PUD PROPHYLAXIS-----On PPI. Benefits outweigh risks despite renal dysfnction     Creatinine stable, off IV fluids  Blood pressure okay  Holding antihypertensives  Continue midodrine  May need PRBC, Hb 7  Monitor renal function, fluid status and electrolytes      Sascha Dorado MD  [facility] [place]  [phone]  09/10/24  10:02 EDT

## 2024-09-11 ENCOUNTER — APPOINTMENT (OUTPATIENT)
Dept: GENERAL RADIOLOGY | Facility: HOSPITAL | Age: 84
DRG: 683 | End: 2024-09-11
Payer: MEDICARE

## 2024-09-11 LAB
ALBUMIN SERPL-MCNC: 2.8 G/DL (ref 3.5–5.2)
ANION GAP SERPL CALCULATED.3IONS-SCNC: 7.7 MMOL/L (ref 5–15)
ANISOCYTOSIS BLD QL: NORMAL
BASOPHILS # BLD AUTO: 0.03 10*3/MM3 (ref 0–0.2)
BASOPHILS NFR BLD AUTO: 0.5 % (ref 0–1.5)
BUN SERPL-MCNC: 29 MG/DL (ref 8–23)
BUN/CREAT SERPL: 20.1 (ref 7–25)
BURR CELLS BLD QL SMEAR: NORMAL
CALCIUM SPEC-SCNC: 8.1 MG/DL (ref 8.6–10.5)
CHLORIDE SERPL-SCNC: 111 MMOL/L (ref 98–107)
CO2 SERPL-SCNC: 23.3 MMOL/L (ref 22–29)
CREAT SERPL-MCNC: 1.44 MG/DL (ref 0.76–1.27)
DEPRECATED RDW RBC AUTO: 71.5 FL (ref 37–54)
EGFRCR SERPLBLD CKD-EPI 2021: 48.2 ML/MIN/1.73
ELLIPTOCYTES BLD QL SMEAR: NORMAL
EOSINOPHIL # BLD AUTO: 0.52 10*3/MM3 (ref 0–0.4)
EOSINOPHIL NFR BLD AUTO: 9.5 % (ref 0.3–6.2)
ERYTHROCYTE [DISTWIDTH] IN BLOOD BY AUTOMATED COUNT: 18.6 % (ref 12.3–15.4)
GLUCOSE BLDC GLUCOMTR-MCNC: 107 MG/DL (ref 70–105)
GLUCOSE BLDC GLUCOMTR-MCNC: 112 MG/DL (ref 70–105)
GLUCOSE BLDC GLUCOMTR-MCNC: 114 MG/DL (ref 70–105)
GLUCOSE BLDC GLUCOMTR-MCNC: 89 MG/DL (ref 70–105)
GLUCOSE SERPL-MCNC: 93 MG/DL (ref 65–99)
HCT VFR BLD AUTO: 24 % (ref 37.5–51)
HEMOCCULT STL-MCNT: 1.5 MG HB/G
HGB BLD-MCNC: 7.4 G/DL (ref 13–17.7)
IMM GRANULOCYTES # BLD AUTO: 0.02 10*3/MM3 (ref 0–0.05)
IMM GRANULOCYTES NFR BLD AUTO: 0.4 % (ref 0–0.5)
LARGE PLATELETS: NORMAL
LYMPHOCYTES # BLD AUTO: 0.71 10*3/MM3 (ref 0.7–3.1)
LYMPHOCYTES NFR BLD AUTO: 12.9 % (ref 19.6–45.3)
MACROCYTES BLD QL SMEAR: NORMAL
MAGNESIUM SERPL-MCNC: 1.4 MG/DL (ref 1.6–2.4)
MCH RBC QN AUTO: 32.5 PG (ref 26.6–33)
MCHC RBC AUTO-ENTMCNC: 30.8 G/DL (ref 31.5–35.7)
MCV RBC AUTO: 105.3 FL (ref 79–97)
MONOCYTES # BLD AUTO: 0.39 10*3/MM3 (ref 0.1–0.9)
MONOCYTES NFR BLD AUTO: 7.1 % (ref 5–12)
NEUTROPHILS NFR BLD AUTO: 3.83 10*3/MM3 (ref 1.7–7)
NEUTROPHILS NFR BLD AUTO: 69.6 % (ref 42.7–76)
NRBC BLD AUTO-RTO: 0 /100 WBC (ref 0–0.2)
PHOSPHATE SERPL-MCNC: 3 MG/DL (ref 2.5–4.5)
PLATELET # BLD AUTO: 143 10*3/MM3 (ref 140–450)
PMV BLD AUTO: 12.6 FL (ref 6–12)
POIKILOCYTOSIS BLD QL SMEAR: NORMAL
POTASSIUM SERPL-SCNC: 4 MMOL/L (ref 3.5–5.2)
RBC # BLD AUTO: 2.28 10*6/MM3 (ref 4.14–5.8)
SODIUM SERPL-SCNC: 142 MMOL/L (ref 136–145)
WBC MORPH BLD: NORMAL
WBC NRBC COR # BLD AUTO: 5.5 10*3/MM3 (ref 3.4–10.8)

## 2024-09-11 PROCEDURE — 94799 UNLISTED PULMONARY SVC/PX: CPT

## 2024-09-11 PROCEDURE — 94664 DEMO&/EVAL PT USE INHALER: CPT

## 2024-09-11 PROCEDURE — 25010000002 NA FERRIC GLUC CPLX PER 12.5 MG: Performed by: INTERNAL MEDICINE

## 2024-09-11 PROCEDURE — 80069 RENAL FUNCTION PANEL: CPT | Performed by: INTERNAL MEDICINE

## 2024-09-11 PROCEDURE — 25010000002 FUROSEMIDE PER 20 MG: Performed by: INTERNAL MEDICINE

## 2024-09-11 PROCEDURE — 25810000003 SODIUM CHLORIDE 0.9 % SOLUTION: Performed by: INTERNAL MEDICINE

## 2024-09-11 PROCEDURE — 71046 X-RAY EXAM CHEST 2 VIEWS: CPT

## 2024-09-11 PROCEDURE — 85007 BL SMEAR W/DIFF WBC COUNT: CPT | Performed by: PHYSICIAN ASSISTANT

## 2024-09-11 PROCEDURE — 94761 N-INVAS EAR/PLS OXIMETRY MLT: CPT

## 2024-09-11 PROCEDURE — 83735 ASSAY OF MAGNESIUM: CPT | Performed by: INTERNAL MEDICINE

## 2024-09-11 PROCEDURE — 82948 REAGENT STRIP/BLOOD GLUCOSE: CPT | Performed by: FAMILY MEDICINE

## 2024-09-11 PROCEDURE — 85025 COMPLETE CBC W/AUTO DIFF WBC: CPT | Performed by: PHYSICIAN ASSISTANT

## 2024-09-11 PROCEDURE — 82948 REAGENT STRIP/BLOOD GLUCOSE: CPT

## 2024-09-11 RX ORDER — FUROSEMIDE 10 MG/ML
40 INJECTION INTRAMUSCULAR; INTRAVENOUS ONCE
Status: COMPLETED | OUTPATIENT
Start: 2024-09-11 | End: 2024-09-11

## 2024-09-11 RX ORDER — SPIRONOLACTONE 25 MG/1
25 TABLET ORAL DAILY
Status: DISCONTINUED | OUTPATIENT
Start: 2024-09-11 | End: 2024-09-12 | Stop reason: HOSPADM

## 2024-09-11 RX ADMIN — TAMSULOSIN HYDROCHLORIDE 0.4 MG: 0.4 CAPSULE ORAL at 09:42

## 2024-09-11 RX ADMIN — APIXABAN 2.5 MG: 2.5 TABLET, FILM COATED ORAL at 20:23

## 2024-09-11 RX ADMIN — SODIUM CHLORIDE 250 MG: 9 INJECTION, SOLUTION INTRAVENOUS at 12:46

## 2024-09-11 RX ADMIN — MIDODRINE HYDROCHLORIDE 5 MG: 5 TABLET ORAL at 09:42

## 2024-09-11 RX ADMIN — IPRATROPIUM BROMIDE AND ALBUTEROL SULFATE 3 ML: .5; 3 SOLUTION RESPIRATORY (INHALATION) at 12:04

## 2024-09-11 RX ADMIN — APIXABAN 2.5 MG: 2.5 TABLET, FILM COATED ORAL at 09:43

## 2024-09-11 RX ADMIN — IPRATROPIUM BROMIDE AND ALBUTEROL SULFATE 3 ML: .5; 3 SOLUTION RESPIRATORY (INHALATION) at 14:52

## 2024-09-11 RX ADMIN — PANTOPRAZOLE SODIUM 40 MG: 40 TABLET, DELAYED RELEASE ORAL at 05:00

## 2024-09-11 RX ADMIN — IPRATROPIUM BROMIDE AND ALBUTEROL SULFATE 3 ML: .5; 3 SOLUTION RESPIRATORY (INHALATION) at 07:12

## 2024-09-11 RX ADMIN — ATORVASTATIN CALCIUM 20 MG: 20 TABLET, FILM COATED ORAL at 20:23

## 2024-09-11 RX ADMIN — FUROSEMIDE 40 MG: 10 INJECTION, SOLUTION INTRAMUSCULAR; INTRAVENOUS at 16:27

## 2024-09-11 RX ADMIN — IPRATROPIUM BROMIDE AND ALBUTEROL SULFATE 3 ML: .5; 3 SOLUTION RESPIRATORY (INHALATION) at 19:09

## 2024-09-11 NOTE — CASE MANAGEMENT/SOCIAL WORK
Continued Stay Note  SERGIO Penny     Patient Name: Praneeth Nam  MRN: [id number]  Today's Date: 9/11/2024    Admit Date: 9/7/2024    Plan: Return home with family and [facility] (current).   Discharge Plan       Row Name 09/11/24 1412       Plan    Plan Return home with family and [facility] (current).    Patient/Family in Agreement with Plan yes    Plan Comments CM confirmed with pt at bedside that he would like to return home and continue hospice services even though therapy recommends SNF. Spoke to daughter Amy by phone who also confirms plan to take pt home with caregivers. Awaiting Renal clearance.           Megan Naegele, RN     Office Phone: [phone]  Office Cell: [phone]

## 2024-09-11 NOTE — SIGNIFICANT NOTE
09/11/24 1615   OTHER   Discipline occupational therapist   Rehab Time/Intention   Session Not Performed other (see comments)  (Not feeling well, refused therapy this pm. OT will follow-up as appropriate.)   Therapy Assessment/Plan (PT)   Criteria for Skilled Interventions Met (PT) yes;meets criteria;skilled treatment is necessary   Recommendation   OT - Next Appointment 09/12/24

## 2024-09-11 NOTE — SIGNIFICANT NOTE
09/11/24 1718   Rehab Time/Intention   Session Not Performed patient/family declined treatment  (refused PT treatment)   Recommendation   PT - Next Appointment 09/12/24     Patient reports he is not feeling well and declined out of bed activity

## 2024-09-11 NOTE — PROGRESS NOTES
"NEPHROLOGY PROGRESS NOTE------[facility]    [facility]  [phone]  Sascha Dorado MD      Patient Care Team:  Guilherme Jason MD as PCP - General (Family Medicine)  Misty Dias MD as Consulting Physician (Cardiology)  Nora Kenyon, BRIAN, APRN as Nurse Practitioner (Thoracic Surgery)  Sussy Shane MD as Consulting Physician (Nephrology)      Provider:  Sascha Dorado MD  Patient Name: Praneeth Nam  :  1940    SUBJECTIVE:  Follow-up CKD  No chest pain or shortness of breath      Medication:  apixaban, 2.5 mg, Oral, BID  atorvastatin, 20 mg, Oral, Nightly  insulin lispro, 2-7 Units, Subcutaneous, 4x Daily AC & at Bedtime  ipratropium-albuterol, 3 mL, Nebulization, 4x Daily - RT  midodrine, 5 mg, Oral, BID AC  pantoprazole, 40 mg, Oral, Q AM  tamsulosin, 0.4 mg, Oral, Daily             OBJECTIVE    Vital Sign Min/Max for last 24 hours  Temp  Min: 97.7 °F (36.5 °C)  Max: 97.9 °F (36.6 °C)   BP  Min: 133/56  Max: 153/50   Pulse  Min: 51  Max: 67   Resp  Min: 16  Max: 21   SpO2  Min: 89 %  Max: 100 %   No data recorded   No data recorded     Flowsheet Rows      Flowsheet Row First Filed Value   Admission Height 167.6 cm (66\") Documented at 2024   Admission Weight 85.1 kg (187 lb 9.8 oz) Documented at 2024            I/O this shift:  In: -   Out: 100 [Urine:100]  I/O last 3 completed shifts:  In: 924 [P.O.:924]  Out: 475 [Urine:475]    Physical Exam:  General Appearance: alert, appears stated age and cooperative  Head: normocephalic, without obvious abnormality and atraumatic  Eyes: conjunctivae and sclerae normal and no icterus  Neck: supple and no JVD  Lungs: clear to auscultation and respirations regular  Heart: regular rhythm & normal rate and normal S1, S2  Chest: Wall no abnormalities observed  Abdomen: normal bowel sounds and soft, nontender  Extremities: moves extremities well, no edema, no cyanosis " "and no redness  Skin: no bleeding, bruising or rash, turgor normal, color normal and no lesions noted  Neurologic: Alert, and oriented. No focal deficits    Labs:    WBC WBC   Date Value Ref Range Status   09/10/2024 4.50 3.40 - 10.80 10*3/mm3 Final   09/08/2024 4.37 3.40 - 10.80 10*3/mm3 Final      HGB Hemoglobin   Date Value Ref Range Status   09/10/2024 7.3 (L) 13.0 - 17.7 g/dL Final   09/08/2024 7.0 (L) 13.0 - 17.7 g/dL Final      HCT Hematocrit   Date Value Ref Range Status   09/10/2024 24.1 (L) 37.5 - 51.0 % Final   09/08/2024 23.4 (L) 37.5 - 51.0 % Final      Platelets No results found for: \"LABPLAT\"   MCV MCV   Date Value Ref Range Status   09/10/2024 107.1 (H) 79.0 - 97.0 fL Final   09/08/2024 106.8 (H) 79.0 - 97.0 fL Final          Sodium Sodium   Date Value Ref Range Status   09/11/2024 142 136 - 145 mmol/L Final   09/10/2024 142 136 - 145 mmol/L Final   09/09/2024 142 136 - 145 mmol/L Final   09/08/2024 143 136 - 145 mmol/L Final      Potassium Potassium   Date Value Ref Range Status   09/11/2024 4.0 3.5 - 5.2 mmol/L Final   09/10/2024 4.1 3.5 - 5.2 mmol/L Final   09/09/2024 4.0 3.5 - 5.2 mmol/L Final   09/08/2024 4.3 3.5 - 5.2 mmol/L Final      Chloride Chloride   Date Value Ref Range Status   09/11/2024 111 (H) 98 - 107 mmol/L Final   09/10/2024 111 (H) 98 - 107 mmol/L Final   09/09/2024 111 (H) 98 - 107 mmol/L Final   09/08/2024 112 (H) 98 - 107 mmol/L Final      CO2 CO2   Date Value Ref Range Status   09/11/2024 23.3 22.0 - 29.0 mmol/L Final   09/10/2024 22.7 22.0 - 29.0 mmol/L Final   09/09/2024 20.8 (L) 22.0 - 29.0 mmol/L Final   09/08/2024 23.7 22.0 - 29.0 mmol/L Final      BUN BUN   Date Value Ref Range Status   09/11/2024 29 (H) 8 - 23 mg/dL Final   09/10/2024 29 (H) 8 - 23 mg/dL Final   09/09/2024 27 (H) 8 - 23 mg/dL Final   09/08/2024 27 (H) 8 - 23 mg/dL Final      Creatinine Creatinine   Date Value Ref Range Status   09/11/2024 1.44 (H) 0.76 - 1.27 mg/dL Final   09/10/2024 1.27 0.76 - 1.27 " "mg/dL Final   09/09/2024 1.14 0.76 - 1.27 mg/dL Final   09/08/2024 1.29 (H) 0.76 - 1.27 mg/dL Final      Calcium Calcium   Date Value Ref Range Status   09/11/2024 8.1 (L) 8.6 - 10.5 mg/dL Final   09/10/2024 8.2 (L) 8.6 - 10.5 mg/dL Final   09/09/2024 8.3 (L) 8.6 - 10.5 mg/dL Final   09/08/2024 8.2 (L) 8.6 - 10.5 mg/dL Final      PO4 No components found for: \"PO4\"   Albumin Albumin   Date Value Ref Range Status   09/11/2024 2.8 (L) 3.5 - 5.2 g/dL Final   09/09/2024 2.9 (L) 3.5 - 5.2 g/dL Final   09/08/2024 2.7 (L) 3.5 - 5.2 g/dL Final      Magnesium Magnesium   Date Value Ref Range Status   09/11/2024 1.4 (L) 1.6 - 2.4 mg/dL Final   09/08/2024 1.4 (L) 1.6 - 2.4 mg/dL Final      Uric Acid No components found for: \"URIC ACID\"     Imaging Results (Last 72 Hours)       Procedure Component Value Units Date/Time    US Renal Bilateral [[id number]] Collected: 09/08/24 1729     Updated: 09/08/24 1735    Narrative:      US RENAL BILATERAL    Date of Exam: 9/8/2024 5:03 PM EDT    Indication: ELYSSA.    Comparison: Renal ultrasound dated 4/24/2024    Technique: Grayscale and color Doppler ultrasound evaluation of the kidneys and urinary bladder was performed.      Findings:  The right kidney measures 10.0 x 5.4 x 4.3 cm. There is normal renal cortical thickness. There is no hydronephrosis.    The left kidney is not visualized due to extensive bowel gas. The urinary bladder is collapsed due to recent voiding.      Impression:      Impression:  1. Normal appearance of the right kidney without hydronephrosis.  2. Nonvisualization of the left kidney secondary to bowel gas.        Electronically Signed: Flakito Islas    9/8/2024 5:32 PM EDT    Workstation ID: [id number]            Results for orders placed during the hospital encounter of 09/07/24    XR Shoulder 2+ View Left    Narrative  XR SHOULDER 2+ VW LEFT    Date of Exam: 9/7/2024 9:13 PM EDT    Indication: pain    Comparison: 6/7/2024.    Findings:  There is mild " acromioclavicular and glenohumeral osteophyte formation. There is stable marked loss of acromiohumeral distance suggesting chronic rotator cuff pathology. There is maintenance of the coracoclavicular distance. No acute fracture or  dislocation. The adjacent lung and ribs appear intact.    Impression  Impression:  1.No acute osseous abnormality.  2.Mild acromioclavicular and glenohumeral joint degeneration.  3.Loss of acromiohumeral distance suggesting chronic rotator cuff pathology.        Electronically Signed: Daniel Herman MD  9/7/2024 10:44 PM EDT  Workstation ID: [id number]      XR Shoulder 2+ View Right    Narrative  XR SHOULDER 2+ VW RIGHT    Date of Exam: 9/7/2024 9:12 PM EDT    Indication: pain    Comparison: None available.    Findings:  There is mild acromioclavicular and glenohumeral osteophyte formation. There is narrowing of the acromiohumeral interval suggesting chronic rotator cuff pathology. There is maintenance of the coracoclavicular distance. The adjacent lung and ribs appear  intact. No evidence of fracture or dislocation.    Impression  Impression:  1.Mild acromioclavicular and glenohumeral joint degeneration.  2.Narrowing of the acromiohumeral interval suggesting chronic rotator cuff pathology.        Electronically Signed: Daniel Herman MD  9/7/2024 10:42 PM EDT  Workstation ID: [id number]      Results for orders placed during the hospital encounter of 06/10/24    XR Chest 1 View    Narrative  XR CHEST 1 VW    Date of Exam: 6/12/2024 11:53 AM EDT    Indication: Shortness of breath    Comparison: 6/10/2024.    Findings:  The heart is enlarged. There is stable small bilateral basilar pleural effusions with atelectasis and less likely pneumonia. The pulmonary vascular markings are normal. There are chronic age-related changes involving the bony thorax and thoracic aorta.    Impression  Impression:  No interval change from the study performed 2 days ago with abnormalities as  described.      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 Temporarily hold Aldactone and ARB. No NSAIDs or IV dye. Dose meds for CrCl 45 cc/min.      2. HTN WITH CKD----BP low. Temporarily holding ARB and Diuretic     3. DECONDITIONING-----PT/OT     4. ANEMIA OF CKD----Hemoccult pending. IV iron for ANDREA     5. COPD------Oxygen, nebs, pulmonary toilet     6. H/O CHF------Clinically prerenal at present. Diuretics on hold     7. HYPOALBUMINEMIA-----S/P IV Albumin to temporize     8. ELEVATED LFTS/TRANSAMINITIS------Better with diuresis     9. SEPSIS/UTI-----Abx per Intensivist     10. HYPOMAGNESEMIA-------Replaced     11. PAF--------Rate controlled. Anticoagulated     12. HYPOTENSION----ARB and diuretic on hold.      13. BPH-----On Flomax     14. GERD/PUD PROPHYLAXIS-----On PPI. Benefits outweigh risks despite renal dysfnction     Creatinine mostly stable  Blood pressure okay  Holding antihypertensives  Continue midodrine  May need PRBC, Hb stable 7.3  Monitor renal function, fluid status and electrolytes      Sascha Dorado MD  [facility]  [phone]  09/11/24  09:59 EDT

## 2024-09-11 NOTE — PLAN OF CARE
Goal Outcome Evaluation:     Pt resting thru out shift, no issues thru out shift,

## 2024-09-11 NOTE — PROGRESS NOTES
LOS: 3 days   Patient Care Team:  Guilherme Jason MD as PCP - General (Family Medicine)  Misty Dias MD as Consulting Physician (Cardiology)  Nora Kenyon, BRIAN, APRN as Nurse Practitioner (Thoracic Surgery)  Sussy Shane MD as Consulting Physician (Nephrology)    Subjective     Interval History:    Patient Complaints: Patient sleeping in room, but able to be woken. Reports no change. He does state he continues to feel weak and not to baseline. He has fatigue and difficulty with mobility but working with OT/PT. Continues to have bilateral shoulder pain with movement but this is a chronic finding.    History taken from: patient    Review of Systems   Constitutional:  Positive for activity change and fatigue.   HENT:  Negative for trouble swallowing.    Respiratory:  Negative for cough and shortness of breath.    Cardiovascular:  Negative for chest pain and leg swelling.   Gastrointestinal:  Negative for constipation and diarrhea.   Genitourinary:  Negative for difficulty urinating.   Musculoskeletal:  Positive for arthralgias.   Skin:  Positive for wound.   Neurological:  Positive for weakness.   Psychiatric/Behavioral:  Negative for confusion.            Objective     Vital Signs  Temp:  [97.7 °F (36.5 °C)-97.9 °F (36.6 °C)] 97.9 °F (36.6 °C)  Heart Rate:  [51-67] 58  Resp:  [16-21] 16  BP: (133-153)/(50-58) 133/56    Physical Exam:     General Appearance:    Alert, cooperative, in no acute distress,   Head:    Normocephalic, without obvious abnormality, atraumatic   Eyes:            Lids and lashes normal, conjunctivae and sclerae normal, no   icterus, no pallor, corneas clear, PERRLA   Ears:    Ears appear intact with no abnormalities noted       Neck:   No adenopathy, supple, trachea midline, no thyromegaly, no   carotid bruit, no JVD   Lungs:     Clear to auscultation,respirations regular, even and              unlabored. Decreased breath sounds throughout    Heart:    Regular rhythm  and normal rate, normal S1 and S2, no            murmur, no gallop, no rub, no click   Chest Wall:    No abnormalities observed   Abdomen:     Normal bowel sounds, no masses, no organomegaly, soft        Non-tender non-distended, no guarding,   Extremities:   Moves all extremities well, no edema, no cyanosis, no             Redness   Pulses:   Pulses palpable and equal bilaterally   Skin:   Bandaged pressure injury right foot/ankle and right hand. No bleeding or rash                Results Review:    Lab Results (last 24 hours)       Procedure Component Value Units Date/Time    POC Glucose 4x Daily Before Meals & at Bedtime [[id number]]  (Normal) Collected: 09/11/24 0737    Specimen: Blood Updated: 09/11/24 0738     Glucose 89 mg/dL      Comment: Serial Number: [id number]:  [id number]       Renal Function Panel [[id number]]  (Abnormal) Collected: 09/11/24 0524    Specimen: Blood from Arm, Right Updated: 09/11/24 0637     Glucose 93 mg/dL      BUN 29 mg/dL      Creatinine 1.44 mg/dL      Sodium 142 mmol/L      Potassium 4.0 mmol/L      Chloride 111 mmol/L      CO2 23.3 mmol/L      Calcium 8.1 mg/dL      Albumin 2.8 g/dL      Phosphorus 3.0 mg/dL      Anion Gap 7.7 mmol/L      BUN/Creatinine Ratio 20.1     eGFR 48.2 mL/min/1.73     Narrative:      GFR Normal >60  Chronic Kidney Disease <60  Kidney Failure <15    The GFR formula is only valid for adults with stable renal function between ages 18 and 70.    Magnesium [[id number]]  (Abnormal) Collected: 09/11/24 0524    Specimen: Blood from Arm, Right Updated: 09/11/24 0637     Magnesium 1.4 mg/dL     POC Glucose Once [[id number]]  (Abnormal) Collected: 09/10/24 2019    Specimen: Blood Updated: 09/10/24 2020     Glucose 116 mg/dL      Comment: Serial Number: [id number]:  [id number]       POC Glucose Once [[id number]]  (Abnormal) Collected: 09/10/24 1634    Specimen: Blood Updated: 09/10/24 1636     Glucose 134 mg/dL      Comment: Serial Number:  [id number]:  [id number]       CBC & Differential [[id number]]  (Abnormal) Collected: 09/10/24 1448    Specimen: Blood Updated: 09/10/24 1533    Narrative:      The following orders were created for panel order CBC & Differential.  Procedure                               Abnormality         Status                     ---------                               -----------         ------                     CBC Auto Differential[[id number]]        Abnormal            Final result               Scan Slide[[id number]]                                       Final result                 Please view results for these tests on the individual orders.    CBC Auto Differential [[id number]]  (Abnormal) Collected: 09/10/24 1448    Specimen: Blood Updated: 09/10/24 1533     WBC 4.50 10*3/mm3      RBC 2.25 10*6/mm3      Hemoglobin 7.3 g/dL      Hematocrit 24.1 %      .1 fL      MCH 32.4 pg      MCHC 30.3 g/dL      RDW 19.2 %      RDW-SD 75.7 fl      MPV 12.5 fL      Platelets 139 10*3/mm3      Neutrophil % 73.9 %      Lymphocyte % 12.2 %      Monocyte % 8.9 %      Eosinophil % 4.2 %      Basophil % 0.4 %      Immature Grans % 0.4 %      Neutrophils, Absolute 3.32 10*3/mm3      Lymphocytes, Absolute 0.55 10*3/mm3      Monocytes, Absolute 0.40 10*3/mm3      Eosinophils, Absolute 0.19 10*3/mm3      Basophils, Absolute 0.02 10*3/mm3      Immature Grans, Absolute 0.02 10*3/mm3      nRBC 0.0 /100 WBC     Scan Slide [[id number]] Collected: 09/10/24 1448    Specimen: Blood Updated: 09/10/24 1533     Anisocytosis Mod/2+     Macrocytes Slight/1+     WBC Morphology Normal     Platelet Morphology Normal    Basic Metabolic Panel [[id number]]  (Abnormal) Collected: 09/10/24 1448    Specimen: Blood Updated: 09/10/24 1521     Glucose 131 mg/dL      BUN 29 mg/dL      Creatinine 1.27 mg/dL      Sodium 142 mmol/L      Potassium 4.1 mmol/L      Chloride 111 mmol/L      CO2 22.7 mmol/L      Calcium 8.2 mg/dL      BUN/Creatinine Ratio 22.8     Anion  Gap 8.3 mmol/L      eGFR 56.1 mL/min/1.73     Narrative:      GFR Normal >60  Chronic Kidney Disease <60  Kidney Failure <15    The GFR formula is only valid for adults with stable renal function between ages 18 and 70.    POC Glucose 4x Daily Before Meals & at Bedtime [[id number]]  (Abnormal) Collected: 09/10/24 1204    Specimen: Blood Updated: 09/10/24 1207     Glucose 117 mg/dL      Comment: Serial Number: [id number]:  [id number]                Imaging Results (Last 24 Hours)       ** No results found for the last 24 hours. **                 I reviewed the patient's new clinical results.    Medication Review:   Scheduled Meds:apixaban, 2.5 mg, Oral, BID  atorvastatin, 20 mg, Oral, Nightly  insulin lispro, 2-7 Units, Subcutaneous, 4x Daily AC & at Bedtime  ipratropium-albuterol, 3 mL, Nebulization, 4x Daily - RT  midodrine, 5 mg, Oral, BID AC  pantoprazole, 40 mg, Oral, Q AM  tamsulosin, 0.4 mg, Oral, Daily      Continuous Infusions:   PRN Meds:.  acetaminophen    dextrose    dextrose    glucagon (human recombinant)     Assessment & Plan       Acute renal failure  CKD stage 3a  Anemia  Hypomagnesia  --Nephrology following, will wait for consult today  --relatively stable  --monitor hemoglobin, transfuse if less than 7  --IV iron today    UTI  -- Culture negative    Hypertension  CHF  --holding aldactone/ARB    Hypotension  --midodrine    Hyperlipidemia  --continue statin    Hx Atrial fibrillation, currently SR  --continue Eliquis    ITP  --monitor platelets    Type 2 DM  --stable    COPD  --patient was noted to be SOB, check CXR  --continue breathing treatments    BPH  --continue Flomax    Plan for disposition:TBD --home with family and home health    Sonali Orozco PA-C  09/11/24  09:47 EDT

## 2024-09-12 ENCOUNTER — READMISSION MANAGEMENT (OUTPATIENT)
Dept: CALL CENTER | Facility: HOSPITAL | Age: 84
End: 2024-09-12
Payer: MEDICARE

## 2024-09-12 VITALS
HEART RATE: 64 BPM | HEIGHT: 66 IN | OXYGEN SATURATION: 94 % | BODY MASS INDEX: 30.15 KG/M2 | RESPIRATION RATE: 22 BRPM | DIASTOLIC BLOOD PRESSURE: 61 MMHG | SYSTOLIC BLOOD PRESSURE: 148 MMHG | WEIGHT: 187.61 LBS | TEMPERATURE: 97.8 F

## 2024-09-12 LAB
ALBUMIN SERPL-MCNC: 2.8 G/DL (ref 3.5–5.2)
ANION GAP SERPL CALCULATED.3IONS-SCNC: 9.3 MMOL/L (ref 5–15)
BASOPHILS # BLD AUTO: 0.03 10*3/MM3 (ref 0–0.2)
BASOPHILS NFR BLD AUTO: 0.6 % (ref 0–1.5)
BUN SERPL-MCNC: 33 MG/DL (ref 8–23)
BUN/CREAT SERPL: 28.7 (ref 7–25)
CALCIUM SPEC-SCNC: 8.5 MG/DL (ref 8.6–10.5)
CHLORIDE SERPL-SCNC: 109 MMOL/L (ref 98–107)
CO2 SERPL-SCNC: 24.7 MMOL/L (ref 22–29)
CREAT SERPL-MCNC: 1.15 MG/DL (ref 0.76–1.27)
DEPRECATED RDW RBC AUTO: 71.7 FL (ref 37–54)
EGFRCR SERPLBLD CKD-EPI 2021: 63.1 ML/MIN/1.73
EOSINOPHIL # BLD AUTO: 0.43 10*3/MM3 (ref 0–0.4)
EOSINOPHIL NFR BLD AUTO: 8.7 % (ref 0.3–6.2)
ERYTHROCYTE [DISTWIDTH] IN BLOOD BY AUTOMATED COUNT: 18.7 % (ref 12.3–15.4)
GLUCOSE BLDC GLUCOMTR-MCNC: 116 MG/DL (ref 70–105)
GLUCOSE BLDC GLUCOMTR-MCNC: 134 MG/DL (ref 70–105)
GLUCOSE BLDC GLUCOMTR-MCNC: 74 MG/DL (ref 70–105)
GLUCOSE SERPL-MCNC: 86 MG/DL (ref 65–99)
HCT VFR BLD AUTO: 23.7 % (ref 37.5–51)
HGB BLD-MCNC: 7.2 G/DL (ref 13–17.7)
IMM GRANULOCYTES # BLD AUTO: 0.02 10*3/MM3 (ref 0–0.05)
IMM GRANULOCYTES NFR BLD AUTO: 0.4 % (ref 0–0.5)
LYMPHOCYTES # BLD AUTO: 0.82 10*3/MM3 (ref 0.7–3.1)
LYMPHOCYTES NFR BLD AUTO: 16.5 % (ref 19.6–45.3)
MCH RBC QN AUTO: 31.9 PG (ref 26.6–33)
MCHC RBC AUTO-ENTMCNC: 30.4 G/DL (ref 31.5–35.7)
MCV RBC AUTO: 104.9 FL (ref 79–97)
MONOCYTES # BLD AUTO: 0.37 10*3/MM3 (ref 0.1–0.9)
MONOCYTES NFR BLD AUTO: 7.5 % (ref 5–12)
NEUTROPHILS NFR BLD AUTO: 3.29 10*3/MM3 (ref 1.7–7)
NEUTROPHILS NFR BLD AUTO: 66.3 % (ref 42.7–76)
NRBC BLD AUTO-RTO: 0 /100 WBC (ref 0–0.2)
PHOSPHATE SERPL-MCNC: 3.5 MG/DL (ref 2.5–4.5)
PLATELET # BLD AUTO: 153 10*3/MM3 (ref 140–450)
PMV BLD AUTO: 12.5 FL (ref 6–12)
POTASSIUM SERPL-SCNC: 3.9 MMOL/L (ref 3.5–5.2)
RBC # BLD AUTO: 2.26 10*6/MM3 (ref 4.14–5.8)
SODIUM SERPL-SCNC: 143 MMOL/L (ref 136–145)
WBC NRBC COR # BLD AUTO: 4.96 10*3/MM3 (ref 3.4–10.8)

## 2024-09-12 PROCEDURE — 94664 DEMO&/EVAL PT USE INHALER: CPT

## 2024-09-12 PROCEDURE — 94799 UNLISTED PULMONARY SVC/PX: CPT

## 2024-09-12 PROCEDURE — 97110 THERAPEUTIC EXERCISES: CPT

## 2024-09-12 PROCEDURE — 80069 RENAL FUNCTION PANEL: CPT | Performed by: INTERNAL MEDICINE

## 2024-09-12 PROCEDURE — 82948 REAGENT STRIP/BLOOD GLUCOSE: CPT | Performed by: FAMILY MEDICINE

## 2024-09-12 PROCEDURE — 97116 GAIT TRAINING THERAPY: CPT

## 2024-09-12 PROCEDURE — 97530 THERAPEUTIC ACTIVITIES: CPT

## 2024-09-12 PROCEDURE — 85025 COMPLETE CBC W/AUTO DIFF WBC: CPT | Performed by: PHYSICIAN ASSISTANT

## 2024-09-12 PROCEDURE — 82948 REAGENT STRIP/BLOOD GLUCOSE: CPT

## 2024-09-12 RX ORDER — IPRATROPIUM BROMIDE AND ALBUTEROL SULFATE 2.5; .5 MG/3ML; MG/3ML
3 SOLUTION RESPIRATORY (INHALATION)
Status: DISCONTINUED | OUTPATIENT
Start: 2024-09-12 | End: 2024-09-12 | Stop reason: SDUPTHER

## 2024-09-12 RX ORDER — FUROSEMIDE 20 MG
20 TABLET ORAL DAILY
Qty: 90 TABLET | Refills: 1 | Status: ON HOLD | OUTPATIENT
Start: 2024-09-12

## 2024-09-12 RX ORDER — SPIRONOLACTONE 25 MG/1
25 TABLET ORAL DAILY
Qty: 30 TABLET | Refills: 1 | Status: ON HOLD | OUTPATIENT
Start: 2024-09-12

## 2024-09-12 RX ADMIN — IPRATROPIUM BROMIDE AND ALBUTEROL SULFATE 3 ML: .5; 3 SOLUTION RESPIRATORY (INHALATION) at 11:28

## 2024-09-12 RX ADMIN — TAMSULOSIN HYDROCHLORIDE 0.4 MG: 0.4 CAPSULE ORAL at 09:10

## 2024-09-12 RX ADMIN — IPRATROPIUM BROMIDE AND ALBUTEROL SULFATE 3 ML: .5; 3 SOLUTION RESPIRATORY (INHALATION) at 06:52

## 2024-09-12 RX ADMIN — SPIRONOLACTONE 25 MG: 25 TABLET ORAL at 05:27

## 2024-09-12 RX ADMIN — APIXABAN 2.5 MG: 2.5 TABLET, FILM COATED ORAL at 09:10

## 2024-09-12 RX ADMIN — IPRATROPIUM BROMIDE AND ALBUTEROL SULFATE 3 ML: .5; 3 SOLUTION RESPIRATORY (INHALATION) at 15:28

## 2024-09-12 RX ADMIN — PANTOPRAZOLE SODIUM 40 MG: 40 TABLET, DELAYED RELEASE ORAL at 05:27

## 2024-09-12 NOTE — DISCHARGE SUMMARY
Date of Discharge:  9/12/2024    Discharge Diagnosis:   Acute renal failure/chronic kidney disease stage IIIa  Anemia  Hypomagnesia  Hypertension  Congestive heart failure  Hypotension  Hyperlipidemia  History of atrial fibs  ITP  Diabetes type 2  COPD  BPH  Large granular lymphocytic leukemia  Prostrate cancer    Presenting Problem/History of Present Illness  Active Hospital Problems    Diagnosis  POA    **Acute renal failure [N17.9]  Yes      Resolved Hospital Problems   No resolved problems to display.          Hospital Course    Patient is a 84 y.o. male presented with complaints of progressive bilateral arm pain and fatigue.  Patient was found to have an elevated creatinine and nephrology was consulted.  He was found to be on the hypotensive side and blood pressure medication and diuretics were held   Blood pressure has improved as well as creatinine.Will continue midodrine and diuretic.   Patient wishes to return home with family and hospice.  Need follow-up PCP in a couple weeks.    Procedures Performed         Consults:   Consults       Date and Time Order Name Status Description    9/8/2024  8:07 AM Inpatient Nephrology Consult Completed     9/7/2024 11:15 PM Family Medicine Consult              Pertinent Test Results:    Lab Results (most recent)       Procedure Component Value Units Date/Time    POC Glucose 4x Daily Before Meals & at Bedtime [[id number]]  (Abnormal) Collected: 09/12/24 1119    Specimen: Blood Updated: 09/12/24 1121     Glucose 116 mg/dL      Comment: Serial Number: [id number]:  [id number]       POC Glucose 4x Daily Before Meals & at Bedtime [[id number]]  (Normal) Collected: 09/12/24 0726    Specimen: Blood Updated: 09/12/24 0727     Glucose 74 mg/dL      Comment: Serial Number: [id number]:  [id number]       Renal Function Panel [[id number]]  (Abnormal) Collected: 09/12/24 0334    Specimen: Blood from Arm, Right Updated: 09/12/24 0431     Glucose 86 mg/dL      BUN 33 mg/dL       Creatinine 1.15 mg/dL      Sodium 143 mmol/L      Potassium 3.9 mmol/L      Chloride 109 mmol/L      CO2 24.7 mmol/L      Calcium 8.5 mg/dL      Albumin 2.8 g/dL      Phosphorus 3.5 mg/dL      Anion Gap 9.3 mmol/L      BUN/Creatinine Ratio 28.7     eGFR 63.1 mL/min/1.73     Narrative:      GFR Normal >60  Chronic Kidney Disease <60  Kidney Failure <15    The GFR formula is only valid for adults with stable renal function between ages 18 and 70.    CBC & Differential [[id number]]  (Abnormal) Collected: 09/12/24 0334    Specimen: Blood from Arm, Right Updated: 09/12/24 0402    Narrative:      The following orders were created for panel order CBC & Differential.  Procedure                               Abnormality         Status                     ---------                               -----------         ------                     CBC Auto Differential[[id number]]        Abnormal            Final result                 Please view results for these tests on the individual orders.    CBC Auto Differential [[id number]]  (Abnormal) Collected: 09/12/24 0334    Specimen: Blood from Arm, Right Updated: 09/12/24 0402     WBC 4.96 10*3/mm3      RBC 2.26 10*6/mm3      Hemoglobin 7.2 g/dL      Hematocrit 23.7 %      .9 fL      MCH 31.9 pg      MCHC 30.4 g/dL      RDW 18.7 %      RDW-SD 71.7 fl      MPV 12.5 fL      Platelets 153 10*3/mm3      Neutrophil % 66.3 %      Lymphocyte % 16.5 %      Monocyte % 7.5 %      Eosinophil % 8.7 %      Basophil % 0.6 %      Immature Grans % 0.4 %      Neutrophils, Absolute 3.29 10*3/mm3      Lymphocytes, Absolute 0.82 10*3/mm3      Monocytes, Absolute 0.37 10*3/mm3      Eosinophils, Absolute 0.43 10*3/mm3      Basophils, Absolute 0.03 10*3/mm3      Immature Grans, Absolute 0.02 10*3/mm3      nRBC 0.0 /100 WBC     Hemoglobin, Stool - Stool, Per Rectum [[id number]] Collected: 09/09/24 1033    Specimen: Stool from Per Rectum Updated: 09/11/24 1709     Fecal Hemoglobin 1.5 mg Hb/g       Comment: -------------------ADDITIONAL INFORMATION-------------------  This test was developed and its performance characteristics  determined by [facility] in a manner consistent with CLIA  requirements. This test has not been cleared or approved by  the U.S. Food and Drug Administration.       Narrative:      Performed at:  01 - [facility] [address]  [id number]  : Dany Nichols , Phone:  [phone]    CBC & Differential [[id number]]  (Abnormal) Collected: 09/11/24 1026    Specimen: Blood Updated: 09/11/24 1114    Narrative:      The following orders were created for panel order CBC & Differential.  Procedure                               Abnormality         Status                     ---------                               -----------         ------                     CBC Auto Differential[[id number]]        Abnormal            Final result               Scan Slide[[id number]]                                       Final result                 Please view results for these tests on the individual orders.    CBC Auto Differential [[id number]]  (Abnormal) Collected: 09/11/24 1026    Specimen: Blood Updated: 09/11/24 1114     WBC 5.50 10*3/mm3      RBC 2.28 10*6/mm3      Hemoglobin 7.4 g/dL      Hematocrit 24.0 %      .3 fL      MCH 32.5 pg      MCHC 30.8 g/dL      RDW 18.6 %      RDW-SD 71.5 fl      MPV 12.6 fL      Platelets 143 10*3/mm3      Neutrophil % 69.6 %      Lymphocyte % 12.9 %      Monocyte % 7.1 %      Eosinophil % 9.5 %      Basophil % 0.5 %      Immature Grans % 0.4 %      Neutrophils, Absolute 3.83 10*3/mm3      Lymphocytes, Absolute 0.71 10*3/mm3      Monocytes, Absolute 0.39 10*3/mm3      Eosinophils, Absolute 0.52 10*3/mm3      Basophils, Absolute 0.03 10*3/mm3      Immature Grans, Absolute 0.02 10*3/mm3      nRBC 0.0 /100 WBC     Scan Slide [[id number]] Collected: 09/11/24 1026    Specimen: Blood Updated: 09/11/24 1114     Anisocytosis  Slight/1+     [place] Cells Slight/1+     Elliptocytes Slight/1+     Macrocytes Slight/1+     Poikilocytes Slight/1+     WBC Morphology Normal     Large Platelets Slight/1+    Renal Function Panel [[id number]]  (Abnormal) Collected: 09/11/24 0524    Specimen: Blood from Arm, Right Updated: 09/11/24 0637     Glucose 93 mg/dL      BUN 29 mg/dL      Creatinine 1.44 mg/dL      Sodium 142 mmol/L      Potassium 4.0 mmol/L      Chloride 111 mmol/L      CO2 23.3 mmol/L      Calcium 8.1 mg/dL      Albumin 2.8 g/dL      Phosphorus 3.0 mg/dL      Anion Gap 7.7 mmol/L      BUN/Creatinine Ratio 20.1     eGFR 48.2 mL/min/1.73     Narrative:      GFR Normal >60  Chronic Kidney Disease <60  Kidney Failure <15    The GFR formula is only valid for adults with stable renal function between ages 18 and 70.    Magnesium [[id number]]  (Abnormal) Collected: 09/11/24 0524    Specimen: Blood from Arm, Right Updated: 09/11/24 0637     Magnesium 1.4 mg/dL     Scan Slide [[id number]] Collected: 09/10/24 1448    Specimen: Blood Updated: 09/10/24 1533     Anisocytosis Mod/2+     Macrocytes Slight/1+     WBC Morphology Normal     Platelet Morphology Normal    Basic Metabolic Panel [[id number]]  (Abnormal) Collected: 09/10/24 1448    Specimen: Blood Updated: 09/10/24 1521     Glucose 131 mg/dL      BUN 29 mg/dL      Creatinine 1.27 mg/dL      Sodium 142 mmol/L      Potassium 4.1 mmol/L      Chloride 111 mmol/L      CO2 22.7 mmol/L      Calcium 8.2 mg/dL      BUN/Creatinine Ratio 22.8     Anion Gap 8.3 mmol/L      eGFR 56.1 mL/min/1.73     Narrative:      GFR Normal >60  Chronic Kidney Disease <60  Kidney Failure <15    The GFR formula is only valid for adults with stable renal function between ages 18 and 70.    Urine Culture - Urine, Urine, Clean Catch [[id number]]  (Normal) Collected: 09/08/24 1139    Specimen: Urine, Clean Catch Updated: 09/09/24 1253     Urine Culture No growth    CANDIDA AURIS PCR - Swab, Axilla Right, Axilla Left and Groin  [[id number]]  (Normal) Collected: 09/08/24 0550    Specimen: Swab from Axilla Right, Axilla Left and Groin Updated: 09/09/24 1037     CANDIDA AURIS PCR Not Detected    Comprehensive Metabolic Panel [[id number]]  (Abnormal) Collected: 09/08/24 2249    Specimen: Blood from Hand, Right Updated: 09/08/24 2325     Glucose 127 mg/dL      BUN 27 mg/dL      Creatinine 1.29 mg/dL      Sodium 143 mmol/L      Potassium 4.3 mmol/L      Chloride 112 mmol/L      CO2 23.7 mmol/L      Calcium 8.2 mg/dL      Total Protein 5.3 g/dL      Albumin 2.7 g/dL      ALT (SGPT) 9 U/L      AST (SGOT) 23 U/L      Alkaline Phosphatase 58 U/L      Total Bilirubin 0.5 mg/dL      Globulin 2.6 gm/dL      A/G Ratio 1.0 g/dL      BUN/Creatinine Ratio 20.9     Anion Gap 7.3 mmol/L      eGFR 55.0 mL/min/1.73     Narrative:      GFR Normal >60  Chronic Kidney Disease <60  Kidney Failure <15    The GFR formula is only valid for adults with stable renal function between ages 18 and 70.    Magnesium [[id number]]  (Abnormal) Collected: 09/08/24 2249    Specimen: Blood from Hand, Right Updated: 09/08/24 2325     Magnesium 1.4 mg/dL     Phosphorus [[id number]]  (Normal) Collected: 09/08/24 2249    Specimen: Blood from Hand, Right Updated: 09/08/24 2325     Phosphorus 2.9 mg/dL     CBC (No Diff) [[id number]]  (Abnormal) Collected: 09/08/24 2249    Specimen: Blood from Hand, Left Updated: 09/08/24 2322     WBC 4.37 10*3/mm3      RBC 2.19 10*6/mm3      Hemoglobin 7.0 g/dL      Hematocrit 23.4 %      .8 fL      MCH 32.0 pg      MCHC 29.9 g/dL      RDW 19.4 %      RDW-SD 75.1 fl      MPV 13.0 fL      Platelets 138 10*3/mm3     Calcium, Ionized [[id number]]  (Normal) Collected: 09/08/24 2249    Specimen: Blood from Hand, Right Updated: 09/08/24 2321     Ionized Calcium 1.18 mmol/L     Vitamin B12 [[id number]]  (Normal) Collected: 09/08/24 0955    Specimen: Blood Updated: 09/08/24 1855     Vitamin B-12 501 pg/mL     Narrative:      Results may be falsely increased if  patient taking Biotin.      Folate [[id number]]  (Normal) Collected: 09/08/24 0955    Specimen: Blood Updated: 09/08/24 1855     Folate 12.90 ng/mL     Narrative:      Results may be falsely increased if patient taking Biotin.      Urinalysis With Culture If Indicated - Urine, Clean Catch [[id number]]  (Abnormal) Collected: 09/08/24 1139    Specimen: Urine, Clean Catch Updated: 09/08/24 1232     Color, UA Dark Yellow     Comment: Result checked          Appearance, UA Cloudy     pH, UA <=5.0     Specific Gravity, UA 1.019     Glucose, UA Negative     Ketones, UA Negative     Bilirubin, UA Negative     Blood, UA Large (3+)     Protein, UA 30 mg/dL (1+)     Leuk Esterase, UA Small (1+)     Nitrite, UA Negative     Urobilinogen, UA 1.0 E.U./dL    Narrative:      In absence of clinical symptoms, the presence of pyuria, bacteria, and/or nitrites on the urinalysis result does not correlate with infection.    Urinalysis, Microscopic Only - Urine, Clean Catch [[id number]]  (Abnormal) Collected: 09/08/24 1139    Specimen: Urine, Clean Catch Updated: 09/08/24 1232     RBC, UA Too Numerous to Count /HPF      WBC, UA 6-10 /HPF      Bacteria, UA None Seen /HPF      Squamous Epithelial Cells, UA 0-2 /HPF      Hyaline Casts, UA 0-2 /LPF      Methodology Automated Microscopy    Uric Acid [[id number]]  (Abnormal) Collected: 09/07/24 2101    Specimen: Blood Updated: 09/08/24 1220     Uric Acid 7.4 mg/dL     CK [[id number]]  (Normal) Collected: 09/08/24 0955    Specimen: Blood Updated: 09/08/24 1026     Creatine Kinase 90 U/L     Reticulocytes [[id number]]  (Abnormal) Collected: 09/08/24 0955    Specimen: Blood Updated: 09/08/24 1005     Reticulocyte % 3.49 %      Reticulocyte Absolute 0.0792 10*6/mm3     Iron Profile [[id number]]  (Abnormal) Collected: 09/07/24 2101    Specimen: Blood Updated: 09/08/24 0935     Iron 30 mcg/dL      Iron Saturation (TSAT) 9 %      Transferrin 225 mg/dL      TIBC 335 mcg/dL     Ferritin [[id number]]  (Normal)  Collected: 09/07/24 2101    Specimen: Blood Updated: 09/08/24 0935     Ferritin 202.00 ng/mL     Narrative:      Results may be falsely decreased if patient taking Biotin.      TSH [[id number]]  (Normal) Collected: 09/07/24 2101    Specimen: Blood Updated: 09/08/24 0935     TSH 3.120 uIU/mL     COVID-19, FLU A/B, RSV PCR 1 HR TAT - Swab, Nasopharynx [[id number]]  (Normal) Collected: 09/08/24 0108    Specimen: Swab from Nasopharynx Updated: 09/08/24 0203     COVID19 Not Detected     Influenza A PCR Not Detected     Influenza B PCR Not Detected     RSV, PCR Not Detected    Narrative:      Fact sheet for providers: https://www.fda.gov/media/236690/download    Fact sheet for patients: https://www.fda.gov/media/891346/download    Test performed by PCR.    Comprehensive Metabolic Panel [[id number]]  (Abnormal) Collected: 09/07/24 2101    Specimen: Blood Updated: 09/07/24 2128     Glucose 100 mg/dL      BUN 27 mg/dL      Creatinine 1.35 mg/dL      Sodium 143 mmol/L      Potassium 4.3 mmol/L      Comment: Slight hemolysis detected by analyzer. Result may be falsely elevated.        Chloride 110 mmol/L      CO2 22.5 mmol/L      Calcium 8.8 mg/dL      Total Protein 6.2 g/dL      Albumin 3.1 g/dL      ALT (SGPT) 12 U/L      AST (SGOT) 37 U/L      Comment: Slight hemolysis detected by analyzer. Result may be falsely elevated.        Alkaline Phosphatase 61 U/L      Total Bilirubin 0.6 mg/dL      Globulin 3.1 gm/dL      A/G Ratio 1.0 g/dL      BUN/Creatinine Ratio 20.0     Anion Gap 10.5 mmol/L      eGFR 52.1 mL/min/1.73     Narrative:      GFR Normal >60  Chronic Kidney Disease <60  Kidney Failure <15    The GFR formula is only valid for adults with stable renal function between ages 18 and 70.    Extra Tubes [[id number]] Collected: 09/07/24 2101    Specimen: Blood, Venous Line Updated: 09/07/24 2116    Narrative:      The following orders were created for panel order Extra Tubes.  Procedure                                Abnormality         Status                     ---------                               -----------         ------                     Gold Top - SST[[id number]]                                   Final result               Light Blue Top[[id number]]                                   Final result                 Please view results for these tests on the individual orders.    Gold Top - SST [[id number]] Collected: 09/07/24 2101    Specimen: Blood Updated: 09/07/24 2116     Extra Tube Hold for add-ons.     Comment: Auto resulted.       Light Blue Top [[id number]] Collected: 09/07/24 2101    Specimen: Blood Updated: 09/07/24 2116     Extra Tube Hold for add-ons.     Comment: Auto resulted       Sedimentation Rate [[id number]]  (Normal) Collected: 09/07/24 2101    Specimen: Blood Updated: 09/07/24 2114     Sed Rate 16 mm/hr              Results for orders placed during the hospital encounter of 04/23/24    Adult Transthoracic Echo Complete W/ Cont if Necessary Per Protocol    Interpretation Summary  Indications  Shortness of breath    Technically satisfactory study.  Mitral valve is thickened with adequate opening motion.  Moderate mitral regurgitation is present.  Tricuspid valve is structurally normal.  Moderate tricuspid regurgitation is present.  Aortic valve is thickened with adequate opening motion.  Moderate aortic regurgitation is present.  Pulmonic valve opening motion is normal.  Moderate pulmonic regurgitation is present.  Mild pulmonary hypertension is present.  Left atrium is enlarged.  Right atrium is enlarged.  Left ventricle is normal in size and contractility with ejection fraction of 60%.  Grade 3 left ventricular diastolic dysfunction is present.  (MV E/8 ratio 2.8)  Left ventricular peak systolic longitudinal strain is abnormal with GL PS of -14%.  Concentric left ventricle hypertrophy is present.  Right ventricle enlarged with hypocontractility with a TAPSE of 1.46 cm..  Atrial septum is intact.  Aorta is  dilated at 4.3 cm.  IVC is dilated with decreased respiratory variation.  Right atrial pressure 8 mmHg.  No pericardial effusion or intracardiac thrombus is seen.    Impression  Thickened mitral and aortic valves with adequate opening motion.  Moderate mitral tricuspid and aortic and pulmonic regurgitation.  Left atrial enlargement.  Left ventricular size and contractility is normal with ejection fraction of 60%.  Left ventricular peak systolic longitudinal strain is abnormal with GL PS of -14%.  Left ventricular grade 3 diastolic dysfunction is present.  Right atrium right ventricle enlargement is present.  Mild pulmonary hypertension is present.       Condition on Discharge:  Stable    Vital Signs  Temp:  [97.8 °F (36.6 °C)-98 °F (36.7 °C)] 97.8 °F (36.6 °C)  Heart Rate:  [64-73] 66  Resp:  [18-28] 26  BP: (137-150)/(56-85) 148/61      Physical Exam  Vitals reviewed.   Constitutional:       Appearance: He is not ill-appearing.   HENT:      Head: Normocephalic and atraumatic.      Right Ear: External ear normal.      Left Ear: External ear normal.      Nose: Nose normal.      Mouth/Throat:      Mouth: Mucous membranes are moist.   Eyes:      General:         Right eye: No discharge.         Left eye: No discharge.   Cardiovascular:      Rate and Rhythm: Normal rate and regular rhythm.      Pulses: Normal pulses.      Heart sounds: Normal heart sounds.   Pulmonary:      Effort: Pulmonary effort is normal.      Breath sounds: Normal breath sounds.   Abdominal:      General: Bowel sounds are normal.      Palpations: Abdomen is soft.   Musculoskeletal:         General: Normal range of motion.      Cervical back: Normal range of motion.   Skin:     General: Skin is warm.   Neurological:      Mental Status: He is alert and oriented to person, place, and time.   Psychiatric:         Behavior: Behavior normal.              Discharge Disposition  Home or Self Care    Discharge Medications     Discharge Medications         Continue These Medications        Instructions Start Date   acetaminophen 500 MG tablet  Commonly known as: TYLENOL   1,000 mg, Oral, 2 Times Daily      apixaban 2.5 MG tablet tablet  Commonly known as: ELIQUIS   2.5 mg, Oral, 2 Times Daily      atorvastatin 20 MG tablet  Commonly known as: LIPITOR   20 mg, Oral, Nightly      budesonide 0.5 MG/2ML nebulizer solution  Commonly known as: PULMICORT   0.5 mg, Nebulization, 2 Times Daily      cetirizine 10 MG tablet  Commonly known as: zyrTEC   10 mg, Oral, Daily      fenofibrate 160 MG tablet   160 mg, Oral, Daily      FeroSul 325 (65 Fe) MG tablet  Generic drug: ferrous sulfate   325 mg, Oral, Daily With Breakfast      fish oil 1000 MG capsule capsule   1,000 mg, Oral, Daily With Breakfast      formoterol 20 MCG/2ML nebulizer solution  Commonly known as: PERFOROMIST   20 mcg, Nebulization, 2 Times Daily - RT      Glucosamine Sulfate 1000 MG capsule   1 capsule, Oral, Daily      hydroxychloroquine 200 MG tablet  Commonly known as: PLAQUENIL   Take 2 tablets by mouth Daily.      ipratropium-albuterol 0.5-2.5 mg/3 ml nebulizer  Commonly known as: DUO-NEB   3 mL, Nebulization, Every 4 Hours PRN      midodrine 5 MG tablet  Commonly known as: PROAMATINE   5 mg, Oral, 2 Times Daily      pantoprazole 40 MG EC tablet  Commonly known as: PROTONIX   40 mg, Oral, Every Early Morning      silver sulfadiazine 1 % cream  Commonly known as: SILVADENE, SSD   1 Application, Topical, 2 Times Daily, Apply to wound on buttocks.      spironolactone 25 MG tablet  Commonly known as: ALDACTONE   25 mg, Oral, Daily      tamsulosin 0.4 MG capsule 24 hr capsule  Commonly known as: FLOMAX   1 capsule, Oral, Daily      V-R COMPLETE SENIOR tablet tablet  Generic drug: multivitamin with minerals   1 tablet, Oral, Daily             Stop These Medications      losartan 100 MG tablet  Commonly known as: COZAAR     oxybutynin XL 10 MG 24 hr tablet  Commonly known as: DITROPAN-XL     Yupelri 175  MCG/3ML nebulizer solution  Generic drug: revefenacin              Discharge Diet:   Diet Instructions       Diet: Regular/House Diet; Regular (IDDSI 7); Thin (IDDSI 0)      Discharge Diet: Regular/House Diet    Texture: Regular (IDDSI 7)    Fluid Consistency: Thin (IDDSI 0)            Activity at Discharge:   Activity Instructions       Activity as Tolerated              Follow-up Appointments  Future Appointments   Date Time Provider Department Center   5/13/2025  2:00 PM Nora Kenyon DNP, APRN [facility]     Additional Instructions for the Follow-ups that You Need to Schedule       Discharge Follow-up with PCP   As directed       Currently Documented PCP:    Guilherme Jason MD    PCP Phone Number:    [phone]     Follow Up Details: 2 weeks                Test Results Pending at Discharge       AZEEM Rasmussen  09/12/24  14:50 EDT    Time: Discharge 25 min

## 2024-09-12 NOTE — PLAN OF CARE
Goal Outcome Evaluation:     Bed mobility - Min-A  supine to sit  Transfers - Min-A and with rolling walker  Ambulation - 15 feet CGA, Min-A, and with rolling walker    Therapeutic Exercise - 10 Reps B LE AROM supported sitting / chair       Home with 24/7 assist.  Pt requires no DME at discharge.     Pt desires home with family and to continue with hospice care at discharge. Pt cooperative; agreeable to therapeutic recommendations and plan of care.

## 2024-09-12 NOTE — PROGRESS NOTES
"NEPHROLOGY PROGRESS NOTE------[facility]/OPT    [facility]  [phone]  Sascha Dorado MD      Patient Care Team:  Guilherme Jason MD as PCP - General (Family Medicine)  Misty Dias MD as Consulting Physician (Cardiology)  Nora Kenyon, BRIAN, APRN as Nurse Practitioner (Thoracic Surgery)  Sussy Shane MD as Consulting Physician (Nephrology)      Provider:  Sascha Dorado MD  Patient Name: Praneeth Nam  :  1940    SUBJECTIVE:  Follow-up CKD  No chest pain or shortness of breath      Medication:  apixaban, 2.5 mg, Oral, BID  atorvastatin, 20 mg, Oral, Nightly  insulin lispro, 2-7 Units, Subcutaneous, 4x Daily AC & at Bedtime  ipratropium-albuterol, 3 mL, Nebulization, 4x Daily - RT  midodrine, 5 mg, Oral, BID AC  pantoprazole, 40 mg, Oral, Q AM  spironolactone, 25 mg, Oral, Daily  tamsulosin, 0.4 mg, Oral, Daily             OBJECTIVE    Vital Sign Min/Max for last 24 hours  Temp  Min: 97.8 °F (36.6 °C)  Max: 98 °F (36.7 °C)   BP  Min: 137/56  Max: 150/63   Pulse  Min: 64  Max: 73   Resp  Min: 18  Max: 28   SpO2  Min: 96 %  Max: 100 %   No data recorded   No data recorded     Flowsheet Rows      Flowsheet Row First Filed Value   Admission Height 167.6 cm (66\") Documented at 2024   Admission Weight 85.1 kg (187 lb 9.8 oz) Documented at 2024            I/O this shift:  In: 240 [P.O.:240]  Out: -   I/O last 3 completed shifts:  In: 1164 [P.O.:1164]  Out: 375 [Urine:375]    Physical Exam:  General Appearance: alert, appears stated age and cooperative  Head: normocephalic, without obvious abnormality and atraumatic  Eyes: conjunctivae and sclerae normal and no icterus  Neck: supple and no JVD  Lungs: clear to auscultation and respirations regular  Heart: regular rhythm & normal rate and normal S1, S2  Chest: Wall no abnormalities observed  Abdomen: normal bowel sounds and soft, nontender  Extremities: moves " "extremities well, no edema, no cyanosis and no redness  Skin: no bleeding, bruising or rash, turgor normal, color normal and no lesions noted  Neurologic: Alert, and oriented. No focal deficits    Labs:    WBC WBC   Date Value Ref Range Status   09/12/2024 4.96 3.40 - 10.80 10*3/mm3 Final   09/11/2024 5.50 3.40 - 10.80 10*3/mm3 Final   09/10/2024 4.50 3.40 - 10.80 10*3/mm3 Final      HGB Hemoglobin   Date Value Ref Range Status   09/12/2024 7.2 (L) 13.0 - 17.7 g/dL Final   09/11/2024 7.4 (L) 13.0 - 17.7 g/dL Final   09/10/2024 7.3 (L) 13.0 - 17.7 g/dL Final      HCT Hematocrit   Date Value Ref Range Status   09/12/2024 23.7 (L) 37.5 - 51.0 % Final   09/11/2024 24.0 (L) 37.5 - 51.0 % Final   09/10/2024 24.1 (L) 37.5 - 51.0 % Final      Platelets No results found for: \"LABPLAT\"   MCV MCV   Date Value Ref Range Status   09/12/2024 104.9 (H) 79.0 - 97.0 fL Final   09/11/2024 105.3 (H) 79.0 - 97.0 fL Final   09/10/2024 107.1 (H) 79.0 - 97.0 fL Final          Sodium Sodium   Date Value Ref Range Status   09/12/2024 143 136 - 145 mmol/L Final   09/11/2024 142 136 - 145 mmol/L Final   09/10/2024 142 136 - 145 mmol/L Final   09/09/2024 142 136 - 145 mmol/L Final      Potassium Potassium   Date Value Ref Range Status   09/12/2024 3.9 3.5 - 5.2 mmol/L Final   09/11/2024 4.0 3.5 - 5.2 mmol/L Final   09/10/2024 4.1 3.5 - 5.2 mmol/L Final   09/09/2024 4.0 3.5 - 5.2 mmol/L Final      Chloride Chloride   Date Value Ref Range Status   09/12/2024 109 (H) 98 - 107 mmol/L Final   09/11/2024 111 (H) 98 - 107 mmol/L Final   09/10/2024 111 (H) 98 - 107 mmol/L Final   09/09/2024 111 (H) 98 - 107 mmol/L Final      CO2 CO2   Date Value Ref Range Status   09/12/2024 24.7 22.0 - 29.0 mmol/L Final   09/11/2024 23.3 22.0 - 29.0 mmol/L Final   09/10/2024 22.7 22.0 - 29.0 mmol/L Final   09/09/2024 20.8 (L) 22.0 - 29.0 mmol/L Final      BUN BUN   Date Value Ref Range Status   09/12/2024 33 (H) 8 - 23 mg/dL Final   09/11/2024 29 (H) 8 - 23 mg/dL " "Final   09/10/2024 29 (H) 8 - 23 mg/dL Final   09/09/2024 27 (H) 8 - 23 mg/dL Final      Creatinine Creatinine   Date Value Ref Range Status   09/12/2024 1.15 0.76 - 1.27 mg/dL Final   09/11/2024 1.44 (H) 0.76 - 1.27 mg/dL Final   09/10/2024 1.27 0.76 - 1.27 mg/dL Final   09/09/2024 1.14 0.76 - 1.27 mg/dL Final      Calcium Calcium   Date Value Ref Range Status   09/12/2024 8.5 (L) 8.6 - 10.5 mg/dL Final   09/11/2024 8.1 (L) 8.6 - 10.5 mg/dL Final   09/10/2024 8.2 (L) 8.6 - 10.5 mg/dL Final   09/09/2024 8.3 (L) 8.6 - 10.5 mg/dL Final      PO4 No components found for: \"PO4\"   Albumin Albumin   Date Value Ref Range Status   09/12/2024 2.8 (L) 3.5 - 5.2 g/dL Final   09/11/2024 2.8 (L) 3.5 - 5.2 g/dL Final   09/09/2024 2.9 (L) 3.5 - 5.2 g/dL Final      Magnesium Magnesium   Date Value Ref Range Status   09/11/2024 1.4 (L) 1.6 - 2.4 mg/dL Final      Uric Acid No components found for: \"URIC ACID\"     Imaging Results (Last 72 Hours)       Procedure Component Value Units Date/Time    XR Chest PA & Lateral [[id number]] Collected: 09/11/24 1333     Updated: 09/11/24 1337    Narrative:      XR CHEST PA AND LATERAL    Date of Exam: 9/11/2024 1:14 PM EDT    Indication: SOA    Comparison: 6/12/2024    Findings:  Cardiomediastinal silhouette is enlarged. There is pulmonary vascular congestion with interstitial thickening and patchy bibasilar airspace disease. Small bilateral pleural effusions. There are degenerative changes of the shoulders.      Impression:      Impression:  Cardiomegaly with interstitial and airspace disease and small bilateral pleural effusions. Findings likely represent CHF/volume overload.      Electronically Signed: Lexii Medrano MD    9/11/2024 1:35 PM EDT    Workstation ID: [id number]            Results for orders placed during the hospital encounter of 09/07/24    XR Chest PA & Lateral    Narrative  XR CHEST PA AND LATERAL    Date of Exam: 9/11/2024 1:14 PM EDT    Indication: SOA    Comparison: " 6/12/2024    Findings:  Cardiomediastinal silhouette is enlarged. There is pulmonary vascular congestion with interstitial thickening and patchy bibasilar airspace disease. Small bilateral pleural effusions. There are degenerative changes of the shoulders.    Impression  Impression:  Cardiomegaly with interstitial and airspace disease and small bilateral pleural effusions. Findings likely represent CHF/volume overload.      Electronically Signed: Lexii Medrano MD  9/11/2024 1:35 PM EDT  Workstation ID: [id number]      XR Shoulder 2+ View Left    Narrative  XR SHOULDER 2+ VW LEFT    Date of Exam: 9/7/2024 9:13 PM EDT    Indication: pain    Comparison: 6/7/2024.    Findings:  There is mild acromioclavicular and glenohumeral osteophyte formation. There is stable marked loss of acromiohumeral distance suggesting chronic rotator cuff pathology. There is maintenance of the coracoclavicular distance. No acute fracture or  dislocation. The adjacent lung and ribs appear intact.    Impression  Impression:  1.No acute osseous abnormality.  2.Mild acromioclavicular and glenohumeral joint degeneration.  3.Loss of acromiohumeral distance suggesting chronic rotator cuff pathology.        Electronically Signed: Daniel Herman MD  9/7/2024 10:44 PM EDT  Workstation ID: [id number]      XR Shoulder 2+ View Right    Narrative  XR SHOULDER 2+ VW RIGHT    Date of Exam: 9/7/2024 9:12 PM EDT    Indication: pain    Comparison: None available.    Findings:  There is mild acromioclavicular and glenohumeral osteophyte formation. There is narrowing of the acromiohumeral interval suggesting chronic rotator cuff pathology. There is maintenance of the coracoclavicular distance. The adjacent lung and ribs appear  intact. No evidence of fracture or dislocation.    Impression  Impression:  1.Mild acromioclavicular and glenohumeral joint degeneration.  2.Narrowing of the acromiohumeral interval suggesting chronic rotator cuff  pathology.        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 Temporarily hold Aldactone and ARB. No NSAIDs or IV dye. Dose meds for CrCl 45 cc/min.      2. HTN WITH CKD----BP low. Temporarily holding ARB and Diuretic     3. DECONDITIONING-----PT/OT     4. ANEMIA OF CKD----Hemoccult pending. IV iron for ANDREA     5. COPD------Oxygen, nebs, pulmonary toilet     6. H/O CHF------Clinically prerenal at present. Diuretics on hold     7. HYPOALBUMINEMIA-----S/P IV Albumin to temporize     8. ELEVATED LFTS/TRANSAMINITIS------Better with diuresis     9. SEPSIS/UTI-----Abx per Intensivist     10. HYPOMAGNESEMIA-------Replaced     11. PAF--------Rate controlled. Anticoagulated     12. HYPOTENSION----ARB and diuretic on hold.      13. BPH-----On Flomax     14. GERD/PUD PROPHYLAXIS-----On PPI. Benefits outweigh risks despite renal dysfnction     Creatinine mostly stable  Blood pressure okay  Off midodrine  Monitor renal function, fluid status and electrolytes      Sascha Dorado MD  [facility]  [phone]  09/12/24  12:39 EDT

## 2024-09-12 NOTE — CASE MANAGEMENT/SOCIAL WORK
LACE Assessment      Patient Name: Praneeth Nam  : 1940  MRN: [id number]  Address: [address] [place] [address]  [place] [address]    Risk for Readmission (LACE) Score: 14 (2024  6:00 AM)      Personal History     Past Medical History:   Diagnosis Date    Acute pulmonary embolism 2018    bilateral    Arthritis     bilateral knees and ankles    CKD (chronic kidney disease) 2009    Coagulopathy 2008    COPD (chronic obstructive pulmonary disease)     Diabetes mellitus     History of ITP 2019    History of pneumonia 2015    hospitalized with community-acquired pneumonia, possibly viral     History of prostate cancer 10/2009    [place] 6, Stage II    Hypercholesterolemia     Hypertension     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    Neutropenia 2003    MITZI (obstructive sleep apnea)     Psoriasis 2000    Renal cyst, left     Rheumatoid arthritis     Stroke (cerebrum)     Thrombocytopenia 2005       Past Surgical History:   Procedure Laterality Date    ENDOSCOPY N/A 2024    Procedure: ESOPHAGOGASTRODUODENOSCOPY WITH DILATION (#46 BOUGIE);  Surgeon: Gamal Yancey MD;  Location: [facility] ENDOSCOPY;  Service: Gastroenterology;  Laterality: N/A;  DUODENAL DIVERTICULUM, HIATAL HERNIA, DISTAL ESOPHAGEAL STRICTURE    ERCP N/A 6/10/2024    Procedure: ENDOSCOPIC RETROGRADE CHOLANGIOPANCREATOGRAPHY, sphincterotomy, balloon clearance of commonn bile duct (9-12mm), stone extraction, pancreatic stent placement;  Surgeon: Mariaelena Snyder MD;  Location: [facility] ENDOSCOPY;  Service: Gastroenterology;  Laterality: N/A;  post: choledocholithiasis    SKIN LESION EXCISION      back and groin-benign       Family History: family history includes Heart disease in his brother, father, and sister; Lung cancer in his brother; Stroke in his mother. Otherwise pertinent FHx was reviewed and not pertinent to current issue.    Social History:   reports that he quit smoking about 29 years ago. His smoking use included cigarettes. He has never been exposed to tobacco smoke. He has never used smokeless tobacco. He reports that he does not drink alcohol and does not use drugs.      Social Determinants of Health     Social Determinants of Health     Tobacco Use: Medium Risk (9/8/2024)    Patient History     Smoking Tobacco Use: Former     Smokeless Tobacco Use: Never     Passive Exposure: Never   Alcohol Use: Not At Risk (9/12/2024)    AUDIT-C     Frequency of Alcohol Consumption: Never     Average Number of Drinks: Patient does not drink     Frequency of Binge Drinking: Never   Financial Resource Strain: Low Risk  (9/12/2024)    Overall Financial Resource Strain (CARDIA)     Difficulty of Paying Living Expenses: Not hard at all   Food Insecurity: No Food Insecurity (9/9/2024)    Hunger Vital Sign     Worried About Running Out of Food in the Last Year: Never true     Ran Out of Food in the Last Year: Never true   Transportation Needs: No Transportation Needs (9/9/2024)    PRAPARE - Transportation     Lack of Transportation (Medical): No     Lack of Transportation (Non-Medical): No   Physical Activity: Inactive (4/24/2024)    Exercise Vital Sign     Days of Exercise per Week: 0 days     Minutes of Exercise per Session: 0 min   Stress: No Stress Concern Present (9/12/2024)    Citizen of [place] [employer] - Occupational Stress Questionnaire     Feeling of Stress : Not at all   Social Connections: Unknown (10/10/2023)    Family and Community Support     Help with Day-to-Day Activities: Not on file     Lonely or Isolated: Not on file   Interpersonal Safety: Not At Risk (9/12/2024)    Abuse Screen     Unsafe at Home or Work/School: no     Feels Threatened by Someone?: no     Does Anyone Keep You from Contacting Others or Doint Things Outside the Home?: no     Physical Sign of Abuse Present: no   Depression: Not at risk (9/12/2024)    PHQ-2     PHQ-2  Score: 0   Housing Stability: Not At Risk (9/9/2024)    Housing Stability     Current Living Arrangements: home;apartment     Potentially Unsafe Housing Conditions: none   Utilities: Not At Risk (9/9/2024)    [facility] Utilities     Threatened with loss of utilities: No   Health Literacy: Unknown (9/9/2024)    Education     Help with school or training?: Not on file     Preferred Language: English   Employment: Unknown (10/10/2023)    Employment     Do you want help finding or keeping work or a job?: Not on file   Disabilities: Not At Risk (9/8/2024)    Disabilities     Concentrating, Remembering, or Making Decisions Difficulty: no     Doing Errands Independently Difficulty: no       Impressions     SW consulted regarding LACE needs.  SW met with patient by the bedside. SW introduced self and explained role to patient. Patient said they understood.    Home Environment: Lives at home with caregivers    Income: SSI    Employment: Retired    Mental Health: None reported    [place] Plan: Return home w/ caregivers and [facility]    Substance Use: None reported    Hardships: medical issues    Assessment      Psychosocial       Row Name 09/12/24 0916       Values/Beliefs    Spiritual, Cultural Beliefs, Spiritism Practices, Values that Affect Care no       Behavior WDL    Behavior WDL WDL       Mental Health    Little Interest or Pleasure in Doing Things 0-->not at all    Feeling Down, Depressed or Hopeless 0-->not at all       Speech WDL    Speech WDL WDL       Perceptual State WDL    Perceptual State WDL WDL       Thought Process WDL    Thought Process WDL WDL       Intellectual Performance WDL    Intellectual Performance WDL WDL    Level of Consciousness Alert       Stress    Do you feel stress - tense, restless, nervous, or anxious, or unable to sleep at night because your mind is troubled all the time - these days? Not at all       C-SSRS (Recent)    Q1 Wished to be Dead (Past Month) no    Q2 Suicidal Thoughts (Past  Month) no    Q6 Suicide Behavior (Lifetime) no       Violence Risk    Feels Like Hurting Others no    Previous Attempt to Harm Others no                   Abuse/Neglect       Row Name 09/12/24 0917       Personal Safety    Feels Unsafe at Home or Work/School no    Feels Threatened by Someone no    Does Anyone Try to Keep You From Having Contact with Others or Doing Things Outside Your Home? no    Physical Signs of Abuse Present no         Legal       Row Name 09/12/24 0917       Financial Resource Strain    How hard is it for you to pay for the very basics like food, housing, medical care, and heating? Not hard       Financial/Legal    Source of Income social security       Legal    Criminal Activity/Legal Involvement none         Substance Abuse       Row Name 09/12/24 0917       Substance Use    Substance Use Status never used    Additional Documentation AUDIT-C (Alcohol Use Disorders ID Test) (Group)       AUDIT-C (Alcohol Use Disorders ID Test)    Q1: How often do you have a drink containing alcohol? Never    Q2: How many drinks containing alcohol do you have on a typical day when you are drinking? None    Q3: How often do you have six or more drinks on one occasion? Never    Audit-C Score 0        Mitali Dale LCSW, RUFINOW  [facility] Care Coordination     Ph: [phone]  F: [phone]

## 2024-09-12 NOTE — OUTREACH NOTE
Prep Survey      Flowsheet Row Responses   [facility] facility patient discharged from? [facility]   Is LACE score < 7 ? No   Eligibility Not Eligible   What are the reasons patient is not eligible? Hospice/Pallative Care   Does the patient have one of the following disease processes/diagnoses(primary or secondary)? Other   Prep survey completed? Yes            Felicitas SALAZAR - Registered Nurse

## 2024-09-12 NOTE — PLAN OF CARE
Goal Outcome Evaluation:              Outcome Evaluation: Pt. discharging home with hospice

## 2024-09-12 NOTE — PLAN OF CARE
Goal Outcome Evaluation:              Outcome Evaluation: Pt. has been resting throughout the shift. Pt. remains on 3LNC. Pt. has no new complaints at this time. Plan to DC bback home with hospice when medically stable.

## 2024-09-12 NOTE — CASE MANAGEMENT/SOCIAL WORK
Continued Stay Note  SERGIO Penny     Patient Name: Praneeth Nam  MRN: [id number]  Today's Date: 9/12/2024    Admit Date: 9/7/2024    Plan: Rtn home with family and [facility] (current). Family will transport at d/c.   Discharge Plan       Row Name 09/12/24 1233       Plan    Plan Rtn home with family and [facility] (current). Family will transport at d/c.    Patient/Family in Agreement with Plan yes    Plan Comments CM [facility] LiaisonPriya everything is ready for pt at home. CM notified MD that Hospice is ready for pt to d/c home when medically cleared. Family will trasnport at d/c. DC Barriers: Monitor labs and renal following.               Expected Discharge Date and Time       Expected Discharge Date Expected Discharge Time    Sep 12, 2024         Opal Pradhan RN    phone [phone]  fax [phone]

## 2024-09-12 NOTE — THERAPY TREATMENT NOTE
Subjective: Pt agreeable to therapeutic plan of care.    Objective:     Precautions -   02 dependent    Bed mobility - Min-A  supine to sit  Transfers - Min-A and with rolling walker  Ambulation - 15 feet CGA, Min-A, and with rolling walker    Therapeutic Exercise - 10 Reps B LE AROM supported sitting / chair    Vitals: WNL    Pain: 0 VAS      Education: Provided education on the importance of mobility in the acute care setting, Verbal/Tactile Cues, Transfer Training, and Gait Training    Assessment: Praneeth Nam presents with functional mobility impairments which indicate the need for skilled intervention. Tolerating session today without incident. Poor tolerance. Pt needed frequent rest breaks. Will continue to follow and progress as tolerated.     Plan/Recommendations:   Home with 24/7 assist.  Pt requires no DME at discharge.     Pt desires home with family and to continue with hospice care at discharge. Pt cooperative; agreeable to therapeutic recommendations and plan of care.     Basic Mobility 6-click:  Rollin = Total, A lot = 2, A little = 3; 4 = None  Supine>Sit:   1 = Total, A lot = 2, A little = 3; 4 = None   Sit>Stand with arms:  1 = Total, A lot = 2, A little = 3; 4 = None  Bed>Chair:   1 = Total, A lot = 2, A little = 3; 4 = None  Ambulate in room:  1 = Total, A lot = 2, A little = 3; 4 = None  3-5 Steps with railin = Total, A lot = 2, A little = 3; 4 = None  Score: 15    Post-Tx Position: Up in Chair, Alarms activated, and Call light and personal items within reach  waffle cushion given  PPE: gloves

## 2024-09-12 NOTE — SIGNIFICANT NOTE
09/12/24 1432   OTHER   Discipline occupational therapist   Rehab Time/Intention   Session Not Performed patient/family declined treatment;other (see comments)  (Pt reports he is too fatigued to work with OT this date. OT will follow-up as appropriate.)   Therapy Assessment/Plan (PT)   Criteria for Skilled Interventions Met (PT) yes;meets criteria;skilled treatment is necessary   Recommendation   OT - Next Appointment 09/13/24

## 2024-09-13 NOTE — CASE MANAGEMENT/SOCIAL WORK
Case Management Discharge Note      Final Note: Home w/ [facility]       Home Medical Care Coordination complete.      Service Provider Selected Services Address Phone Fax Patient Preferred    [facility] [place] [facility] [address] [place], [place] [address] [phone]                Transportation Services  Private: Car    Final Discharge Disposition Code: 50 - home with hospice

## 2024-09-21 ENCOUNTER — HOSPITAL ENCOUNTER (INPATIENT)
Facility: HOSPITAL | Age: 84
LOS: 4 days | Discharge: NURSING, REHAB OR LONG-TERM CARE | End: 2024-09-25
Attending: INTERNAL MEDICINE | Admitting: INTERNAL MEDICINE
Payer: MEDICARE

## 2024-09-21 ENCOUNTER — APPOINTMENT (OUTPATIENT)
Dept: GENERAL RADIOLOGY | Facility: HOSPITAL | Age: 84
End: 2024-09-21
Payer: MEDICARE

## 2024-09-21 ENCOUNTER — APPOINTMENT (OUTPATIENT)
Dept: CARDIOLOGY | Facility: HOSPITAL | Age: 84
End: 2024-09-21
Payer: MEDICARE

## 2024-09-21 DIAGNOSIS — R60.9 EDEMA, UNSPECIFIED TYPE: Primary | ICD-10-CM
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
## 2024-09-21 DIAGNOSIS — R53.1 WEAKNESS: ICD-10-CM

## 2024-09-21 PROBLEM — E87.70 VOLUME OVERLOAD: Status: ACTIVE | Noted: 2024-09-21

## 2024-09-21 LAB
ALBUMIN SERPL-MCNC: 2.5 G/DL (ref 3.5–5.2)
ALBUMIN/GLOB SERPL: 0.8 G/DL
ALP SERPL-CCNC: 65 U/L (ref 39–117)
ALT SERPL W P-5'-P-CCNC: 17 U/L (ref 1–41)
ANION GAP SERPL CALCULATED.3IONS-SCNC: 9.1 MMOL/L (ref 5–15)
AST SERPL-CCNC: 44 U/L (ref 1–40)
BASOPHILS # BLD AUTO: 0.03 10*3/MM3 (ref 0–0.2)
BASOPHILS NFR BLD AUTO: 0.4 % (ref 0–1.5)
BH CV UPPER VENOUS LEFT INTERNAL JUGULAR AUGMENT: NORMAL
BH CV UPPER VENOUS LEFT INTERNAL JUGULAR COMPRESS: NORMAL
BH CV UPPER VENOUS LEFT INTERNAL JUGULAR PHASIC: NORMAL
BH CV UPPER VENOUS LEFT INTERNAL JUGULAR SPONT: NORMAL
BH CV UPPER VENOUS LEFT SUBCLAVIAN AUGMENT: NORMAL
BH CV UPPER VENOUS LEFT SUBCLAVIAN COMPRESS: NORMAL
BH CV UPPER VENOUS LEFT SUBCLAVIAN PHASIC: NORMAL
BH CV UPPER VENOUS LEFT SUBCLAVIAN SPONT: NORMAL
BH CV UPPER VENOUS RIGHT AXILLARY AUGMENT: NORMAL
BH CV UPPER VENOUS RIGHT AXILLARY COMPRESS: NORMAL
BH CV UPPER VENOUS RIGHT AXILLARY PHASIC: NORMAL
BH CV UPPER VENOUS RIGHT AXILLARY SPONT: NORMAL
BH CV UPPER VENOUS RIGHT BASILIC FOREARM COMPRESS: NORMAL
BH CV UPPER VENOUS RIGHT BASILIC UPPER COMPRESS: NORMAL
BH CV UPPER VENOUS RIGHT BRACHIAL COMPRESS: NORMAL
BH CV UPPER VENOUS RIGHT CEPHALIC FOREARM COMPRESS: NORMAL
BH CV UPPER VENOUS RIGHT CEPHALIC UPPER COMPRESS: NORMAL
BH CV UPPER VENOUS RIGHT INTERNAL JUGULAR AUGMENT: NORMAL
BH CV UPPER VENOUS RIGHT INTERNAL JUGULAR COMPRESS: NORMAL
BH CV UPPER VENOUS RIGHT INTERNAL JUGULAR PHASIC: NORMAL
BH CV UPPER VENOUS RIGHT INTERNAL JUGULAR SPONT: NORMAL
BH CV UPPER VENOUS RIGHT RADIAL COMPRESS: NORMAL
BH CV UPPER VENOUS RIGHT SUBCLAVIAN AUGMENT: NORMAL
BH CV UPPER VENOUS RIGHT SUBCLAVIAN COMPRESS: NORMAL
BH CV UPPER VENOUS RIGHT SUBCLAVIAN PHASIC: NORMAL
BH CV UPPER VENOUS RIGHT SUBCLAVIAN SPONT: NORMAL
BH CV UPPER VENOUS RIGHT ULNAR COMPRESS: NORMAL
BH CV VAS PRELIMINARY FINDINGS SCRIPTING: 1
BILIRUB SERPL-MCNC: 0.8 MG/DL (ref 0–1.2)
BILIRUB UR QL STRIP: NEGATIVE
BUN SERPL-MCNC: 33 MG/DL (ref 8–23)
BUN/CREAT SERPL: 26.2 (ref 7–25)
CALCIUM SPEC-SCNC: 8.5 MG/DL (ref 8.6–10.5)
CHLORIDE SERPL-SCNC: 104 MMOL/L (ref 98–107)
CLARITY UR: CLEAR
CO2 SERPL-SCNC: 24.9 MMOL/L (ref 22–29)
COLOR UR: YELLOW
CREAT SERPL-MCNC: 1.26 MG/DL (ref 0.76–1.27)
DEPRECATED RDW RBC AUTO: 60.3 FL (ref 37–54)
EGFRCR SERPLBLD CKD-EPI 2021: 56.6 ML/MIN/1.73
EOSINOPHIL # BLD AUTO: 0.53 10*3/MM3 (ref 0–0.4)
EOSINOPHIL NFR BLD AUTO: 7.3 % (ref 0.3–6.2)
ERYTHROCYTE [DISTWIDTH] IN BLOOD BY AUTOMATED COUNT: 16.4 % (ref 12.3–15.4)
GLOBULIN UR ELPH-MCNC: 3.3 GM/DL
GLUCOSE SERPL-MCNC: 104 MG/DL (ref 65–99)
GLUCOSE UR STRIP-MCNC: NEGATIVE MG/DL
HCT VFR BLD AUTO: 25 % (ref 37.5–51)
HGB BLD-MCNC: 7.6 G/DL (ref 13–17.7)
HGB UR QL STRIP.AUTO: NEGATIVE
IMM GRANULOCYTES # BLD AUTO: 0.01 10*3/MM3 (ref 0–0.05)
IMM GRANULOCYTES NFR BLD AUTO: 0.1 % (ref 0–0.5)
KETONES UR QL STRIP: NEGATIVE
LEUKOCYTE ESTERASE UR QL STRIP.AUTO: NEGATIVE
LYMPHOCYTES # BLD AUTO: 1.29 10*3/MM3 (ref 0.7–3.1)
LYMPHOCYTES NFR BLD AUTO: 17.7 % (ref 19.6–45.3)
MCH RBC QN AUTO: 30.6 PG (ref 26.6–33)
MCHC RBC AUTO-ENTMCNC: 30.4 G/DL (ref 31.5–35.7)
MCV RBC AUTO: 100.8 FL (ref 79–97)
MONOCYTES # BLD AUTO: 0.5 10*3/MM3 (ref 0.1–0.9)
MONOCYTES NFR BLD AUTO: 6.8 % (ref 5–12)
NEUTROPHILS NFR BLD AUTO: 4.94 10*3/MM3 (ref 1.7–7)
NEUTROPHILS NFR BLD AUTO: 67.7 % (ref 42.7–76)
NITRITE UR QL STRIP: NEGATIVE
NRBC BLD AUTO-RTO: 0 /100 WBC (ref 0–0.2)
NT-PROBNP SERPL-MCNC: ABNORMAL PG/ML (ref 0–1800)
PH UR STRIP.AUTO: <=5 [PH] (ref 5–8)
PLATELET # BLD AUTO: 259 10*3/MM3 (ref 140–450)
PMV BLD AUTO: 11.8 FL (ref 6–12)
POTASSIUM SERPL-SCNC: 3.8 MMOL/L (ref 3.5–5.2)
PROT SERPL-MCNC: 5.8 G/DL (ref 6–8.5)
PROT UR QL STRIP: NEGATIVE
RBC # BLD AUTO: 2.48 10*6/MM3 (ref 4.14–5.8)
SODIUM SERPL-SCNC: 138 MMOL/L (ref 136–145)
SP GR UR STRIP: 1.01 (ref 1–1.03)
UROBILINOGEN UR QL STRIP: NORMAL
WBC NRBC COR # BLD AUTO: 7.3 10*3/MM3 (ref 3.4–10.8)

## 2024-09-21 PROCEDURE — 99285 EMERGENCY DEPT VISIT HI MDM: CPT

## 2024-09-21 PROCEDURE — 25010000002 FUROSEMIDE PER 20 MG: Performed by: INTERNAL MEDICINE

## 2024-09-21 PROCEDURE — 80053 COMPREHEN METABOLIC PANEL: CPT | Performed by: NURSE PRACTITIONER

## 2024-09-21 PROCEDURE — 73630 X-RAY EXAM OF FOOT: CPT

## 2024-09-21 PROCEDURE — 93971 EXTREMITY STUDY: CPT

## 2024-09-21 PROCEDURE — 71045 X-RAY EXAM CHEST 1 VIEW: CPT

## 2024-09-21 PROCEDURE — 93971 EXTREMITY STUDY: CPT | Performed by: SURGERY

## 2024-09-21 PROCEDURE — 81003 URINALYSIS AUTO W/O SCOPE: CPT | Performed by: INTERNAL MEDICINE

## 2024-09-21 PROCEDURE — 85025 COMPLETE CBC W/AUTO DIFF WBC: CPT | Performed by: NURSE PRACTITIONER

## 2024-09-21 PROCEDURE — 25010000002 FUROSEMIDE PER 20 MG: Performed by: NURSE PRACTITIONER

## 2024-09-21 PROCEDURE — 83880 ASSAY OF NATRIURETIC PEPTIDE: CPT | Performed by: NURSE PRACTITIONER

## 2024-09-21 RX ORDER — MIDODRINE HYDROCHLORIDE 5 MG/1
5 TABLET ORAL
Status: DISCONTINUED | OUTPATIENT
Start: 2024-09-21 | End: 2024-09-25

## 2024-09-21 RX ORDER — FERROUS SULFATE 324(65)MG
324 TABLET, DELAYED RELEASE (ENTERIC COATED) ORAL
Status: DISCONTINUED | OUTPATIENT
Start: 2024-09-22 | End: 2024-09-25 | Stop reason: HOSPADM

## 2024-09-21 RX ORDER — SODIUM CHLORIDE 0.9 % (FLUSH) 0.9 %
10 SYRINGE (ML) INJECTION AS NEEDED
Status: DISCONTINUED | OUTPATIENT
Start: 2024-09-21 | End: 2024-09-25 | Stop reason: HOSPADM

## 2024-09-21 RX ORDER — SODIUM CHLORIDE 9 MG/ML
40 INJECTION, SOLUTION INTRAVENOUS AS NEEDED
Status: DISCONTINUED | OUTPATIENT
Start: 2024-09-21 | End: 2024-09-25 | Stop reason: HOSPADM

## 2024-09-21 RX ORDER — POLYETHYLENE GLYCOL 3350 17 G/17G
17 POWDER, FOR SOLUTION ORAL DAILY PRN
Status: DISCONTINUED | OUTPATIENT
Start: 2024-09-21 | End: 2024-09-25 | Stop reason: HOSPADM

## 2024-09-21 RX ORDER — FUROSEMIDE 10 MG/ML
40 INJECTION INTRAMUSCULAR; INTRAVENOUS
Status: DISCONTINUED | OUTPATIENT
Start: 2024-09-21 | End: 2024-09-23

## 2024-09-21 RX ORDER — SULFAMETHOXAZOLE/TRIMETHOPRIM 800-160 MG
1 TABLET ORAL 2 TIMES DAILY
COMMUNITY
End: 2024-09-25 | Stop reason: HOSPADM

## 2024-09-21 RX ORDER — SODIUM CHLORIDE 0.9 % (FLUSH) 0.9 %
10 SYRINGE (ML) INJECTION EVERY 12 HOURS SCHEDULED
Status: DISCONTINUED | OUTPATIENT
Start: 2024-09-21 | End: 2024-09-25 | Stop reason: HOSPADM

## 2024-09-21 RX ORDER — IPRATROPIUM BROMIDE AND ALBUTEROL SULFATE 2.5; .5 MG/3ML; MG/3ML
3 SOLUTION RESPIRATORY (INHALATION) EVERY 4 HOURS PRN
Status: DISCONTINUED | OUTPATIENT
Start: 2024-09-21 | End: 2024-09-25 | Stop reason: HOSPADM

## 2024-09-21 RX ORDER — BISACODYL 5 MG/1
5 TABLET, DELAYED RELEASE ORAL DAILY PRN
Status: DISCONTINUED | OUTPATIENT
Start: 2024-09-21 | End: 2024-09-25 | Stop reason: HOSPADM

## 2024-09-21 RX ORDER — PANTOPRAZOLE SODIUM 40 MG/1
40 TABLET, DELAYED RELEASE ORAL
Status: DISCONTINUED | OUTPATIENT
Start: 2024-09-22 | End: 2024-09-25 | Stop reason: HOSPADM

## 2024-09-21 RX ORDER — CETIRIZINE HYDROCHLORIDE 10 MG/1
10 TABLET ORAL DAILY
Status: DISCONTINUED | OUTPATIENT
Start: 2024-09-21 | End: 2024-09-22

## 2024-09-21 RX ORDER — ONDANSETRON 2 MG/ML
4 INJECTION INTRAMUSCULAR; INTRAVENOUS EVERY 6 HOURS PRN
Status: DISCONTINUED | OUTPATIENT
Start: 2024-09-21 | End: 2024-09-25 | Stop reason: HOSPADM

## 2024-09-21 RX ORDER — ACETAMINOPHEN 650 MG/1
650 SUPPOSITORY RECTAL EVERY 4 HOURS PRN
Status: DISCONTINUED | OUTPATIENT
Start: 2024-09-21 | End: 2024-09-25 | Stop reason: HOSPADM

## 2024-09-21 RX ORDER — FUROSEMIDE 10 MG/ML
40 INJECTION INTRAMUSCULAR; INTRAVENOUS ONCE
Status: COMPLETED | OUTPATIENT
Start: 2024-09-21 | End: 2024-09-21

## 2024-09-21 RX ORDER — ATORVASTATIN CALCIUM 20 MG/1
20 TABLET, FILM COATED ORAL NIGHTLY
Status: DISCONTINUED | OUTPATIENT
Start: 2024-09-21 | End: 2024-09-25 | Stop reason: HOSPADM

## 2024-09-21 RX ORDER — AMOXICILLIN 250 MG
2 CAPSULE ORAL 2 TIMES DAILY PRN
Status: DISCONTINUED | OUTPATIENT
Start: 2024-09-21 | End: 2024-09-25 | Stop reason: HOSPADM

## 2024-09-21 RX ORDER — ACETAMINOPHEN 160 MG/5ML
650 SOLUTION ORAL EVERY 4 HOURS PRN
Status: DISCONTINUED | OUTPATIENT
Start: 2024-09-21 | End: 2024-09-25 | Stop reason: HOSPADM

## 2024-09-21 RX ORDER — TAMSULOSIN HYDROCHLORIDE 0.4 MG/1
0.4 CAPSULE ORAL DAILY
Status: DISCONTINUED | OUTPATIENT
Start: 2024-09-21 | End: 2024-09-25 | Stop reason: HOSPADM

## 2024-09-21 RX ORDER — HYDRALAZINE HYDROCHLORIDE 20 MG/ML
10 INJECTION INTRAMUSCULAR; INTRAVENOUS EVERY 6 HOURS PRN
Status: DISCONTINUED | OUTPATIENT
Start: 2024-09-21 | End: 2024-09-25 | Stop reason: HOSPADM

## 2024-09-21 RX ORDER — ACETAMINOPHEN 325 MG/1
650 TABLET ORAL EVERY 4 HOURS PRN
Status: DISCONTINUED | OUTPATIENT
Start: 2024-09-21 | End: 2024-09-25 | Stop reason: HOSPADM

## 2024-09-21 RX ORDER — NITROGLYCERIN 0.4 MG/1
0.4 TABLET SUBLINGUAL
Status: DISCONTINUED | OUTPATIENT
Start: 2024-09-21 | End: 2024-09-25 | Stop reason: HOSPADM

## 2024-09-21 RX ORDER — BISACODYL 10 MG
10 SUPPOSITORY, RECTAL RECTAL DAILY PRN
Status: DISCONTINUED | OUTPATIENT
Start: 2024-09-21 | End: 2024-09-25 | Stop reason: HOSPADM

## 2024-09-21 RX ORDER — BUDESONIDE 0.5 MG/2ML
0.5 INHALANT ORAL 2 TIMES DAILY
Status: DISCONTINUED | OUTPATIENT
Start: 2024-09-21 | End: 2024-09-25 | Stop reason: HOSPADM

## 2024-09-21 RX ADMIN — CETIRIZINE HYDROCHLORIDE 10 MG: 10 TABLET, FILM COATED ORAL at 20:09

## 2024-09-21 RX ADMIN — FUROSEMIDE 40 MG: 10 INJECTION, SOLUTION INTRAMUSCULAR; INTRAVENOUS at 17:43

## 2024-09-21 RX ADMIN — Medication 10 ML: at 20:09

## 2024-09-21 RX ADMIN — FUROSEMIDE 40 MG: 10 INJECTION, SOLUTION INTRAMUSCULAR; INTRAVENOUS at 10:15

## 2024-09-21 RX ADMIN — APIXABAN 2.5 MG: 2.5 TABLET, FILM COATED ORAL at 20:09

## 2024-09-21 RX ADMIN — TAMSULOSIN HYDROCHLORIDE 0.4 MG: 0.4 CAPSULE ORAL at 20:09

## 2024-09-21 RX ADMIN — ATORVASTATIN CALCIUM 20 MG: 20 TABLET, FILM COATED ORAL at 20:09

## 2024-09-21 RX ADMIN — MIDODRINE HYDROCHLORIDE 5 MG: 5 TABLET ORAL at 20:09

## 2024-09-21 NOTE — CONSULTS
NEPHROLOGY CONSULTATION-----[facility]    [facility]  [phone]  Sascha Dorado MD    Patient Care Team:  Guilherme Jason MD as PCP - General (Family Medicine)  Misty Dias MD as Consulting Physician (Cardiology)  Nora Kenyon, BRIAN, APRN as Nurse Practitioner (Thoracic Surgery)  Sussy Shane MD as Consulting Physician (Nephrology)    CC/REASON FOR CONSULTATION: CKD/fluid overload    PHYSICIAN REQUESTING CONSULTATION: Dr. Napoles    History of Present Illness  84-year-old male with past medical history CKD stage III, hypertension, COPD presents hospital complains of increased shortness of breath and swelling of his legs.  He does have a sore spot in the right foot.  His creatinine is 1.2.  Denies chest pain.  No dysuria or gross hematuria.  No vomiting or diarrhea.    Review of Systems   As noted above otherwise 10 system review negative.    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    Neutropenia 11/2003    MITZI (obstructive sleep apnea) 2018    Psoriasis 2000    Renal cyst, left     Rheumatoid arthritis     Stroke (cerebrum)     Thrombocytopenia 08/23/2005       Past Surgical History:   Procedure Laterality Date    ENDOSCOPY N/A 4/26/2024    Procedure: ESOPHAGOGASTRODUODENOSCOPY WITH DILATION (#46 BOUGIE);  Surgeon: Gamal Yancey MD;  Location: [facility] ENDOSCOPY;  Service:  Gastroenterology;  Laterality: N/A;  DUODENAL DIVERTICULUM, HIATAL HERNIA, DISTAL ESOPHAGEAL STRICTURE    ERCP N/A 6/10/2024    Procedure: ENDOSCOPIC RETROGRADE CHOLANGIOPANCREATOGRAPHY, sphincterotomy, balloon clearance of commonn bile duct (9-12mm), stone extraction, pancreatic stent placement;  Surgeon: Mariaelena Snyder MD;  Location: [facility] ENDOSCOPY;  Service: Gastroenterology;  Laterality: N/A;  post: choledocholithiasis    SKIN LESION EXCISION      back and groin-benign       Family History   Problem Relation Age of Onset    Lung cancer Brother         age unknown    Heart disease Brother     Stroke Mother     Heart disease Father     Heart disease Sister        Social History     Tobacco Use    Smoking status: Former     Current packs/day: 0.00     Types: Cigarettes     Quit date:      Years since quittin.     Passive exposure: Never    Smokeless tobacco: Never    Tobacco comments:     stopped smoking in    Vaping Use    Vaping status: Never Used   Substance Use Topics    Alcohol use: No    Drug use: No       Home Meds: (Not in a hospital admission)    Prior to Admission medications    Medication Sig Start Date End Date Taking? Authorizing Provider   apixaban (ELIQUIS) 2.5 MG tablet tablet Take 1 tablet by mouth 2 (Two) Times a Day. 24  Yes Misty Dias MD   atorvastatin (LIPITOR) 20 MG tablet Take 1 tablet by mouth Every Night.   Yes Rey Myles MD   budesonide (PULMICORT) 0.5 MG/2ML nebulizer solution Take 2 mL by nebulization 2 (Two) Times a Day.   Yes Rey Myles MD   cetirizine (zyrTEC) 10 MG tablet Take 1 tablet by mouth Daily.   Yes Rey Myles MD   fenofibrate 160 MG tablet Take 1 tablet by mouth Daily.   Yes Rey Myles MD   ferrous sulfate 325 (65 FE) MG tablet Take 1 tablet by mouth Daily With Breakfast. 24  Yes Pat Napoles MD   formoterol (PERFOROMIST) 20 MCG/2ML nebulizer solution Take 2 mL by nebulization 2 (Two) Times a Day.    Yes Rey Myles MD   furosemide (Lasix) 20 MG tablet Take 1 tablet by mouth Daily. 9/12/24  Yes Pat Napoles MD   Glucosamine Sulfate 1000 MG capsule Take 1 capsule by mouth Daily.   Yes Rey Myles MD   hydroxychloroquine (PLAQUENIL) 200 MG tablet Take 2 tablets by mouth Daily. 12/9/19  Yes Rey Myles MD   midodrine (PROAMATINE) 5 MG tablet Take 1 tablet by mouth 2 (Two) Times a Day.   Yes Rey Myles MD   multivitamin with minerals (V-R COMPLETE SENIOR) tablet tablet Take 1 tablet by mouth Daily.   Yes Rey Myles MD   Omega-3 Fatty Acids (fish oil) 1000 MG capsule capsule Take 1 capsule by mouth Daily With Breakfast.   Yes Rey Myles MD   pantoprazole (PROTONIX) 40 MG EC tablet Take 1 tablet by mouth Every Morning. 6/16/24  Yes Alondra Lundberg APRN   silver sulfadiazine (SILVADENE, SSD) 1 % cream Apply 1 Application topically to the appropriate area as directed 2 (Two) Times a Day. Apply to wound on buttocks. 8/14/24  Yes Rey Myles MD   spironolactone (ALDACTONE) 25 MG tablet Take 1 tablet by mouth Daily. 9/12/24  Yes Alondra Lundberg APRN   tamsulosin (FLOMAX) 0.4 MG capsule 24 hr capsule Take 1 capsule by mouth Daily. 8/13/24  Yes Rey Myles MD   acetaminophen (TYLENOL) 500 MG tablet Take 2 tablets by mouth 2 (Two) Times a Day.    Rey Myles MD   ipratropium-albuterol (DUO-NEB) 0.5-2.5 mg/3 ml nebulizer Take 3 mL by nebulization Every 4 (Four) Hours As Needed for Wheezing.    Rey Myles MD   sulfamethoxazole-trimethoprim (BACTRIM DS,SEPTRA DS) 800-160 MG per tablet Take 1 tablet by mouth 2 (Two) Times a Day.    Rey Myles MD        Scheduled Meds:  sodium chloride, 10 mL, Intravenous, Q12H        Continuous Infusions:       PRN Meds:    acetaminophen **OR** acetaminophen **OR** acetaminophen    senna-docusate sodium **AND** polyethylene glycol **AND** bisacodyl **AND** bisacodyl    Calcium  "Replacement - Follow Nurse / BPA Driven Protocol    hydrALAZINE    Magnesium Standard Dose Replacement - Follow Nurse / BPA Driven Protocol    nitroglycerin    ondansetron    Phosphorus Replacement - Follow Nurse / BPA Driven Protocol    Potassium Replacement - Follow Nurse / BPA Driven Protocol    [COMPLETED] Insert Peripheral IV **AND** sodium chloride    sodium chloride    sodium chloride    Allergies:  Penicillin v potassium and Latex    OBJECTIVE    Vital Signs  Temp:  [97.9 °F (36.6 °C)-98.3 °F (36.8 °C)] 97.9 °F (36.6 °C)  Heart Rate:  [59-71] 59  Resp:  [12-20] 20  BP: (129-156)/(52-75) 129/52    No intake/output data recorded.  No intake/output data recorded.    Physical Exam:  General Appearance: alert, appears stated age and cooperative  Head: normocephalic, without obvious abnormality and atraumatic  Eyes: conjunctivae and sclerae normal and no icterus  Neck: supple and no JVD  Lungs: clear to auscultation and respirations regular  Heart: regular rhythm & normal rate and normal S1, S2  Chest Wall: no abnormalities observed  Abdomen: normal bowel sounds and soft, nontender  Extremities: moves extremities well, 1+ edema, no cyanosis.  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  Skin: Seen and erythema right foot.  Neurologic: Alert, and oriented. No focal deficits    Results Review:    I reviewed the patient's new clinical results.    WBC WBC   Date Value Ref Range Status   09/21/2024 7.30 3.40 - 10.80 10*3/mm3 Final      HGB Hemoglobin   Date Value Ref Range Status   09/21/2024 7.6 (L) 13.0 - 17.7 g/dL Final      HCT Hematocrit   Date Value Ref Range Status   09/21/2024 25.0 (L) 37.5 - 51.0 % Final      Platelets No results found for: \"LABPLAT\"   MCV MCV   Date Value Ref Range Status   09/21/2024 100.8 (H) 79.0 - 97.0 fL Final          Sodium Sodium   Date Value Ref Range Status   09/21/2024 138 136 - 145 mmol/L Final      Potassium Potassium   Date Value Ref Range Status   09/21/2024 3.8 3.5 - 5.2 mmol/L " "Final      Chloride Chloride   Date Value Ref Range Status   09/21/2024 104 98 - 107 mmol/L Final      CO2 CO2   Date Value Ref Range Status   09/21/2024 24.9 22.0 - 29.0 mmol/L Final      BUN BUN   Date Value Ref Range Status   09/21/2024 33 (H) 8 - 23 mg/dL Final      Creatinine Creatinine   Date Value Ref Range Status   09/21/2024 1.26 0.76 - 1.27 mg/dL Final      Calcium Calcium   Date Value Ref Range Status   09/21/2024 8.5 (L) 8.6 - 10.5 mg/dL Final      PO4 No results found for: \"CAPO4\"   Albumin Albumin   Date Value Ref Range Status   09/21/2024 2.5 (L) 3.5 - 5.2 g/dL Final      Magnesium No results found for: \"MG\"   Uric Acid No results found for: \"URICACID\"       Imaging Results (Last 72 Hours)       Procedure Component Value Units Date/Time    XR Foot 3+ View Right [[id number]] Collected: 09/21/24 0751     Updated: 09/21/24 0757    Narrative:      XR FOOT 3+ VW RIGHT    Date of Exam: 9/21/2024 7:28 AM EDT    Indication: open draining wound    Comparison: X-ray September 16, 2024      Findings:     Plantar calcaneal spur. There is vascular calcification. There is lateral subluxation of the digits of the foot at the metatarsal phalangeal joint spaces. There is hallux valgus deformity. There are hammertoe deformities. No definitive changes of   osteomyelitis are appreciated.          Impression:      Impression:  1.No significant change in the appearance of the foot.  If there is concern for osteomyelitis MR may be of benefit to reassess this patient.    Electronically Signed: Corona Angulo MD    9/21/2024 7:55 AM EDT    Workstation ID: [id number]    XR Chest 1 View [[id number]] Collected: 09/21/24 0747     Updated: 09/21/24 0753    Narrative:      XR CHEST 1 VW    Date of Exam: 9/21/2024 7:28 AM EDT    Indication: edema    Comparison: September 16, 2024    Findings:  The heart is enlarged. There remains some blunting of the lateral costophrenic sulci which could reflect small effusions. There is slight " prominence of markings within the interstitium of the lungs that could reflect some interstitial edema. No   significant new findings are seen as compared to the prior study.      Impression:      Impression:  1.Cardiomegaly with some vascular congestion/edema suggested.  2.Small bibasilar effusions.      Electronically Signed: Corona Angulo MD    9/21/2024 7:50 AM EDT    Workstation ID: [id number]              Results for orders placed during the hospital encounter of 09/21/24    XR Foot 3+ View Right    Narrative  XR FOOT 3+ VW RIGHT    Date of Exam: 9/21/2024 7:28 AM EDT    Indication: open draining wound    Comparison: X-ray September 16, 2024      Findings:  Plantar calcaneal spur. There is vascular calcification. There is lateral subluxation of the digits of the foot at the metatarsal phalangeal joint spaces. There is hallux valgus deformity. There are hammertoe deformities. No definitive changes of  osteomyelitis are appreciated.    Impression  Impression:  1.No significant change in the appearance of the foot.  If there is concern for osteomyelitis MR may be of benefit to reassess this patient.    Electronically Signed: Corona Angulo MD  9/21/2024 7:55 AM EDT  Workstation ID: [id number]      XR Chest 1 View    Narrative  XR CHEST 1 VW    Date of Exam: 9/21/2024 7:28 AM EDT    Indication: edema    Comparison: September 16, 2024    Findings:  The heart is enlarged. There remains some blunting of the lateral costophrenic sulci which could reflect small effusions. There is slight prominence of markings within the interstitium of the lungs that could reflect some interstitial edema. No  significant new findings are seen as compared to the prior study.    Impression  Impression:  1.Cardiomegaly with some vascular congestion/edema suggested.  2.Small bibasilar effusions.      Electronically Signed: Corona Angulo MD  9/21/2024 7:50 AM EDT  Workstation ID: [id number]      Results for orders placed during the hospital  encounter of 09/07/24    XR Chest PA & Lateral    Narrative  XR CHEST PA AND LATERAL    Date of Exam: 9/11/2024 1:14 PM EDT    Indication: SOA    Comparison: 6/12/2024    Findings:  Cardiomediastinal silhouette is enlarged. There is pulmonary vascular congestion with interstitial thickening and patchy bibasilar airspace disease. Small bilateral pleural effusions. There are degenerative changes of the shoulders.    Impression  Impression:  Cardiomegaly with interstitial and airspace disease and small bilateral pleural effusions. Findings likely represent CHF/volume overload.      Electronically Signed: Lexii Medrano MD  9/11/2024 1:35 PM EDT  Workstation ID: [id number]        Results for orders placed during the hospital encounter of 09/21/24    Duplex Venous Upper Extremity - Right CAR    Interpretation Summary    Normal right upper extremity venous duplex scan.      ASSESSMENT / PLAN      Volume overload    CKD stage IIIa-secondary to hypertensive nephrosclerosis  Hypervolemia-IV diuretics  History CHF-diurese  Likely cellulitis right foot  History leukemia  History A-fib  History CVA  COPD  Recent UTI  Anemia-check iron/ferritin    Blood pressure okay, creatinine close to baseline  Start IV Lasix  UA  Antibiotics per primary  Check iron/ferritin    I discussed the patient's findings and my recommendations with patient and consulting provider    Will follow along closely. Thank you for allowing us to see this patient in renal consultation.    [facility]  [phone]  MD Sascha Gomez MD  09/21/24  11:02 EDT

## 2024-09-21 NOTE — ED NOTES
Chief Complaint   Patient presents with    Pain    Fall     Pt states that he's here r/t sore on his bottom, sore on both feet and left side swelling. Pt states that he fell two months ago. EMS reports pt fell on Monday (9/16). Pt denies chest pain. Pt states he's SOB - pt states he's always SOB and wears continuous oxygen. Pt states that he doesn't know if he's on blood thinners. RN notes swelling of the right arm/hand.

## 2024-09-21 NOTE — Clinical Note
Level of Care: Telemetry [5]   Diagnosis: Volume overload [607688]   Certification: I Certify That Inpatient Hospital Services Are Medically Necessary For Greater Than 2 Midnights

## 2024-09-21 NOTE — H&P
Patient Care Team:  Guilherme Jason MD as PCP - General (Family Medicine)  Misty Dias MD as Consulting Physician (Cardiology)  Nora Kenyon DNP, APRN as Nurse Practitioner (Thoracic Surgery)  Susys Shane MD as Consulting Physician (Nephrology)    Chief complaint Swelling    Subjective     Patient is a 84 y.o. male who presents with past medical history of chronic kidney disease stage IIIa, hypertension, congestive heart failure, hyperlipidemia, history of atrial fibs, ITP, diabetes type 2, COPD, BPH, large granular lymphocytic leukemia, anemia of prostate cancer.  Patient presented to the emergency department with complaints of swelling in legs.  Patient was found to be hypovolemia proBNP 19,000.  Patient denies any shortness of breath or chest pain.     Review of Systems   Constitutional:  Positive for activity change, appetite change and fatigue.   HENT: Negative.     Respiratory: Negative.     Cardiovascular:  Positive for leg swelling.   Gastrointestinal: Negative.    Genitourinary: Negative.    Musculoskeletal:  Positive for gait problem.   Neurological:  Positive for weakness.   Psychiatric/Behavioral: Negative.            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    Neutropenia 11/2003    MITZI (obstructive sleep apnea) 2018    Psoriasis 2000    Renal cyst, left     Rheumatoid arthritis     Stroke (cerebrum)     Thrombocytopenia  2005     Past Surgical History:   Procedure Laterality Date    ENDOSCOPY N/A 2024    Procedure: ESOPHAGOGASTRODUODENOSCOPY WITH DILATION (#46 BOUGIE);  Surgeon: Gamal Yancey MD;  Location: [facility] ENDOSCOPY;  Service: Gastroenterology;  Laterality: N/A;  DUODENAL DIVERTICULUM, HIATAL HERNIA, DISTAL ESOPHAGEAL STRICTURE    ERCP N/A 6/10/2024    Procedure: ENDOSCOPIC RETROGRADE CHOLANGIOPANCREATOGRAPHY, sphincterotomy, balloon clearance of commonn bile duct (9-12mm), stone extraction, pancreatic stent placement;  Surgeon: Mariaelena Snyder MD;  Location: [facility] ENDOSCOPY;  Service: Gastroenterology;  Laterality: N/A;  post: choledocholithiasis    SKIN LESION EXCISION      back and groin-benign     Family History   Problem Relation Age of Onset    Lung cancer Brother         age unknown    Heart disease Brother     Stroke Mother     Heart disease Father     Heart disease Sister      Social History     Tobacco Use    Smoking status: Former     Current packs/day: 0.00     Types: Cigarettes     Quit date:      Years since quittin.7     Passive exposure: Never    Smokeless tobacco: Never    Tobacco comments:     stopped smoking in    Vaping Use    Vaping status: Never Used   Substance Use Topics    Alcohol use: No    Drug use: No     (Not in a hospital admission)    Allergies:  Penicillin v potassium and Latex    Objective     Vital Signs  Temp:  [97.9 °F (36.6 °C)-98.3 °F (36.8 °C)] 97.9 °F (36.6 °C)  Heart Rate:  [55-71] 55  Resp:  [12-20] 20  BP: (129-156)/(52-75) 129/52       Physical Exam  Vitals reviewed.   Constitutional:       Appearance: He is ill-appearing.   HENT:      Head: Normocephalic and atraumatic.      Right Ear: External ear normal.      Left Ear: External ear normal.      Nose: Nose normal.      Mouth/Throat:      Mouth: Mucous membranes are moist.   Eyes:      General:         Right eye: No discharge.         Left eye: No discharge.   Cardiovascular:      Rate and  Rhythm: Normal rate and regular rhythm.      Pulses: Normal pulses.      Heart sounds: Normal heart sounds.   Pulmonary:      Effort: Pulmonary effort is normal.      Breath sounds: Normal breath sounds.   Abdominal:      General: Bowel sounds are normal.      Palpations: Abdomen is soft.   Musculoskeletal:         General: Swelling present. Normal range of motion.      Cervical back: Normal range of motion.      Right lower leg: Edema present.      Left lower leg: Edema present.   Skin:     General: Skin is warm and dry.   Neurological:      Mental Status: He is alert.   Psychiatric:         Behavior: Behavior normal.          Results Review:     Imaging Results (Last 24 Hours)       Procedure Component Value Units Date/Time    XR Foot 3+ View Right [[id number]] Collected: 09/21/24 0751     Updated: 09/21/24 0757    Narrative:      XR FOOT 3+ VW RIGHT    Date of Exam: 9/21/2024 7:28 AM EDT    Indication: open draining wound    Comparison: X-ray September 16, 2024      Findings:     Plantar calcaneal spur. There is vascular calcification. There is lateral subluxation of the digits of the foot at the metatarsal phalangeal joint spaces. There is hallux valgus deformity. There are hammertoe deformities. No definitive changes of   osteomyelitis are appreciated.          Impression:      Impression:  1.No significant change in the appearance of the foot.  If there is concern for osteomyelitis MR may be of benefit to reassess this patient.    Electronically Signed: Corona Angulo MD    9/21/2024 7:55 AM EDT    Workstation ID: [id number]    XR Chest 1 View [[id number]] Collected: 09/21/24 0747     Updated: 09/21/24 0753    Narrative:      XR CHEST 1 VW    Date of Exam: 9/21/2024 7:28 AM EDT    Indication: edema    Comparison: September 16, 2024    Findings:  The heart is enlarged. There remains some blunting of the lateral costophrenic sulci which could reflect small effusions. There is slight prominence of markings within the  interstitium of the lungs that could reflect some interstitial edema. No   significant new findings are seen as compared to the prior study.      Impression:      Impression:  1.Cardiomegaly with some vascular congestion/edema suggested.  2.Small bibasilar effusions.      Electronically Signed: Corona Angulo MD    9/21/2024 7:50 AM EDT    Workstation ID: [id number]             Lab Results (last 24 hours)       Procedure Component Value Units Date/Time    Comprehensive Metabolic Panel [[id number]]  (Abnormal) Collected: 09/21/24 0817    Specimen: Blood Updated: 09/21/24 0851     Glucose 104 mg/dL      BUN 33 mg/dL      Creatinine 1.26 mg/dL      Sodium 138 mmol/L      Potassium 3.8 mmol/L      Chloride 104 mmol/L      CO2 24.9 mmol/L      Calcium 8.5 mg/dL      Total Protein 5.8 g/dL      Albumin 2.5 g/dL      ALT (SGPT) 17 U/L      AST (SGOT) 44 U/L      Alkaline Phosphatase 65 U/L      Total Bilirubin 0.8 mg/dL      Globulin 3.3 gm/dL      A/G Ratio 0.8 g/dL      BUN/Creatinine Ratio 26.2     Anion Gap 9.1 mmol/L      eGFR 56.6 mL/min/1.73     Narrative:      GFR Normal >60  Chronic Kidney Disease <60  Kidney Failure <15    The GFR formula is only valid for adults with stable renal function between ages 18 and 70.    BNP [[id number]]  (Abnormal) Collected: 09/21/24 0817    Specimen: Blood Updated: 09/21/24 0851     proBNP 19,983.0 pg/mL     Narrative:      This assay is used as an aid in the diagnosis of individuals suspected of having heart failure. It can be used as an aid in the diagnosis of acute decompensated heart failure (ADHF) in patients presenting with signs and symptoms of ADHF to the emergency department (ED). In addition, NT-proBNP of <300 pg/mL indicates ADHF is not likely.    Age Range Result Interpretation  NT-proBNP Concentration (pg/mL:      <50             Positive            >450                   Gray                 300-450                    Negative             <300    50-75           Positive             >900                  Gray                300-900                  Negative            <300      >75             Positive            >1800                  Gray                300-1800                  Negative            <300    CBC & Differential [[id number]]  (Abnormal) Collected: 09/21/24 0817    Specimen: Blood Updated: 09/21/24 0827    Narrative:      The following orders were created for panel order CBC & Differential.  Procedure                               Abnormality         Status                     ---------                               -----------         ------                     CBC Auto Differential[[id number]]        Abnormal            Final result                 Please view results for these tests on the individual orders.    CBC Auto Differential [[id number]]  (Abnormal) Collected: 09/21/24 0817    Specimen: Blood Updated: 09/21/24 0827     WBC 7.30 10*3/mm3      RBC 2.48 10*6/mm3      Hemoglobin 7.6 g/dL      Hematocrit 25.0 %      .8 fL      MCH 30.6 pg      MCHC 30.4 g/dL      RDW 16.4 %      RDW-SD 60.3 fl      MPV 11.8 fL      Platelets 259 10*3/mm3      Neutrophil % 67.7 %      Lymphocyte % 17.7 %      Monocyte % 6.8 %      Eosinophil % 7.3 %      Basophil % 0.4 %      Immature Grans % 0.1 %      Neutrophils, Absolute 4.94 10*3/mm3      Lymphocytes, Absolute 1.29 10*3/mm3      Monocytes, Absolute 0.50 10*3/mm3      Eosinophils, Absolute 0.53 10*3/mm3      Basophils, Absolute 0.03 10*3/mm3      Immature Grans, Absolute 0.01 10*3/mm3      nRBC 0.0 /100 WBC              I reviewed the patient's new clinical results.    Assessment & Plan     CRF  Anemia  Volume overload  -nephrology following  -diuresis  -monitor HGB transfuse less than 7         Hypertension  HX CHF  Hypotension   HDL  -midodrine  -statin  -holding aldactone/ARB     HX PE  HX afib currently SR  -continue eliquis     ITP  -monitor plts     DMII  -ss/     COPD-stable     BPH-flomax      I discussed the patient's  findings and my recommendations with patient.     Alondra Lundberg, APRN  09/21/24  13:13 EDT

## 2024-09-21 NOTE — ED PROVIDER NOTES
Subjective   History of Present Illness  Patient is an 84-year-old white male with a history of prior blood clots currently on Eliquis.  He has a history of chronic kidney disease, oxygen dependent COPD, diabetes, hypertension, prior CVA.  Has a history of leukemia.  He presents today from home by EMS with complaints of pain and swelling to his right arm and swelling to his abdomen.  States this has been ongoing for the last couple of months but has recently gotten worse.  He states he has fallen but denies any injury from his falls.  He denies any fever.      Review of Systems   Constitutional:  Negative for fever.   Respiratory:  Negative for cough and shortness of breath.    Cardiovascular:  Positive for leg swelling. Negative for chest pain.   Gastrointestinal:  Positive for abdominal distention. Negative for blood in stool, diarrhea, nausea and vomiting.   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    Neutropenia 11/2003    MITZI (obstructive sleep apnea) 2018    Psoriasis 2000    Renal cyst, left     Rheumatoid arthritis     Stroke (cerebrum)     Thrombocytopenia 08/23/2005       Allergies   Allergen Reactions    Penicillin V Potassium Swelling    Latex Hives       Past Surgical History:   Procedure Laterality Date    ENDOSCOPY N/A 4/26/2024    Procedure: ESOPHAGOGASTRODUODENOSCOPY WITH DILATION (#46 BOUGIE);   Surgeon: Gamal Yancey MD;  Location: [facility] ENDOSCOPY;  Service: Gastroenterology;  Laterality: N/A;  DUODENAL DIVERTICULUM, HIATAL HERNIA, DISTAL ESOPHAGEAL STRICTURE    ERCP N/A 6/10/2024    Procedure: ENDOSCOPIC RETROGRADE CHOLANGIOPANCREATOGRAPHY, sphincterotomy, balloon clearance of commonn bile duct (9-12mm), stone extraction, pancreatic stent placement;  Surgeon: Mariaelena Snyder MD;  Location: [facility] ENDOSCOPY;  Service: Gastroenterology;  Laterality: N/A;  post: choledocholithiasis    SKIN LESION EXCISION      back and groin-benign       Family History   Problem Relation Age of Onset    Lung cancer Brother         age unknown    Heart disease Brother     Stroke Mother     Heart disease Father     Heart disease Sister        Social History     Socioeconomic History    Marital status:    Tobacco Use    Smoking status: Former     Current packs/day: 0.00     Types: Cigarettes     Quit date:      Years since quittin.7     Passive exposure: Never    Smokeless tobacco: Never    Tobacco comments:     stopped smoking in    Vaping Use    Vaping status: Never Used   Substance and Sexual Activity    Alcohol use: No    Drug use: No    Sexual activity: Defer           Objective   Physical Exam  Vital signs and triage nurse note reviewed.  Constitutional: Awake, alert; well-developed and well-nourished. No acute distress is noted.  Obese.  Chronically ill in appearance.  HEENT: Normocephalic, atraumatic; pupils are PERRL with intact EOM; oropharynx is pink and moist without exudate or erythema.  No drooling or pooling of oral secretions.  Neck: Supple  Cardiovascular: Regular rate and rhythm, normal S1-S2.  No murmur noted.  Pulmonary: Respiratory effort regular nonlabored, breath sounds bibasilar inspiratory crackles.  Abdomen: Soft, nontender, nondistended with normoactive bowel sounds; no rebound or guarding.  Musculoskeletal: Independent range of motion of all extremities .  Pitting  edema noted throughout the entire right arm and hand.  There are some bruising noted over the forearm.  There is tenderness throughout the right arm.  There is no erythema or warmth.  There is a strong radial pulse.  Good cap refill and sensation distally.  Additionally, there is pitting edema noted in both lower extremities that extends up through the abdomen.  No erythema.  Negative Rosanna.  Skin: Flesh tone, warm, dry.  2 small open wounds noted over the proximal right great toe with some scant purulent drainage.  No surrounding redness streaking or crepitus.    Procedures           ED Course      Labs Reviewed   COMPREHENSIVE METABOLIC PANEL - Abnormal; Notable for the following components:       Result Value    Glucose 104 (*)     BUN 33 (*)     Calcium 8.5 (*)     Total Protein 5.8 (*)     Albumin 2.5 (*)     AST (SGOT) 44 (*)     BUN/Creatinine Ratio 26.2 (*)     eGFR 56.6 (*)     All other components within normal limits    Narrative:     GFR Normal >60  Chronic Kidney Disease <60  Kidney Failure <15    The GFR formula is only valid for adults with stable renal function between ages 18 and 70.   BNP (IN-HOUSE) - Abnormal; Notable for the following components:    proBNP 19,983.0 (*)     All other components within normal limits    Narrative:     This assay is used as an aid in the diagnosis of individuals suspected of having heart failure. It can be used as an aid in the diagnosis of acute decompensated heart failure (ADHF) in patients presenting with signs and symptoms of ADHF to the emergency department (ED). In addition, NT-proBNP of <300 pg/mL indicates ADHF is not likely.    Age Range Result Interpretation  NT-proBNP Concentration (pg/mL:      <50             Positive            >450                   Gray                 300-450                    Negative             <300    50-75           Positive            >900                  Gray                300-900                  Negative             <300      >75             Positive            >1800                  Gray                300-1800                  Negative            <300   CBC WITH AUTO DIFFERENTIAL - Abnormal; Notable for the following components:    RBC 2.48 (*)     Hemoglobin 7.6 (*)     Hematocrit 25.0 (*)     .8 (*)     MCHC 30.4 (*)     RDW 16.4 (*)     RDW-SD 60.3 (*)     Lymphocyte % 17.7 (*)     Eosinophil % 7.3 (*)     Eosinophils, Absolute 0.53 (*)     All other components within normal limits   CBC AND DIFFERENTIAL    Narrative:     The following orders were created for panel order CBC & Differential.  Procedure                               Abnormality         Status                     ---------                               -----------         ------                     CBC Auto Differential[[id number]]        Abnormal            Final result                 Please view results for these tests on the individual orders.     XR Foot 3+ View Right    Result Date: 9/21/2024  Impression: 1.No significant change in the appearance of the foot. If there is concern for osteomyelitis MR may be of benefit to reassess this patient. Electronically Signed: Corona Angulo MD  9/21/2024 7:55 AM EDT  Workstation ID: [id number]    XR Chest 1 View    Result Date: 9/21/2024  Impression: 1.Cardiomegaly with some vascular congestion/edema suggested. 2.Small bibasilar effusions. Electronically Signed: Corona Angulo MD  9/21/2024 7:50 AM EDT  Workstation ID: [id number]   Medications   sodium chloride 0.9 % flush 10 mL (has no administration in time range)   sodium chloride 0.9 % flush 10 mL (has no administration in time range)   sodium chloride 0.9 % flush 10 mL (has no administration in time range)   sodium chloride 0.9 % infusion 40 mL (has no administration in time range)   nitroglycerin (NITROSTAT) SL tablet 0.4 mg (has no administration in time range)   Potassium Replacement - Follow Nurse / BPA Driven Protocol (has no administration in time range)    Magnesium Standard Dose Replacement - Follow Nurse / BPA Driven Protocol (has no administration in time range)   Phosphorus Replacement - Follow Nurse / BPA Driven Protocol (has no administration in time range)   Calcium Replacement - Follow Nurse / BPA Driven Protocol (has no administration in time range)   acetaminophen (TYLENOL) tablet 650 mg (has no administration in time range)     Or   acetaminophen (TYLENOL) 160 MG/5ML oral solution 650 mg (has no administration in time range)     Or   acetaminophen (TYLENOL) suppository 650 mg (has no administration in time range)   sennosides-docusate (PERICOLACE) 8.6-50 MG per tablet 2 tablet (has no administration in time range)     And   polyethylene glycol (MIRALAX) packet 17 g (has no administration in time range)     And   bisacodyl (DULCOLAX) EC tablet 5 mg (has no administration in time range)     And   bisacodyl (DULCOLAX) suppository 10 mg (has no administration in time range)   ondansetron (ZOFRAN) injection 4 mg (has no administration in time range)   hydrALAZINE (APRESOLINE) injection 10 mg (has no administration in time range)   furosemide (LASIX) injection 40 mg (40 mg Intravenous Given 9/21/24 1015)                                            Medical Decision Making  Patient presents today with the above complaint.    He had the above exam and evaluation.  IV was established.  Labs, chest x-ray and venous Doppler were obtained.    Workup: CBC reveals a normal white blood cell count of 7.3, hemoglobin appears stable at 7.6.  Metabolic panel is grossly unremarkable.  proBNP elevated at 19,983.  Chest x-ray interpreted by radiologist reviewed by myself shows cardiomegaly with some vascular congestion and edema suggested.  Small bibasilar effusions.  X-ray of the right foot shows no significant change.  Venous Doppler of the right arm is negative.    Patient is given dose of IV Lasix.    Findings were discussed with him.  He will be admitted to the hospital for  IV diuretics and further management.  Case discussed with Alondra nurse practitioner on-call for [employer].    Amount and/or Complexity of Data Reviewed  Labs: ordered.  Radiology: ordered.    Risk  Prescription drug management.        Final diagnoses:   Edema, unspecified type   Weakness   Fall, initial encounter       ED Disposition  ED Disposition       ED Disposition   Decision to Admit    Condition   --    Comment   Level of Care: Telemetry [5]   Admitting Physician: WILLOW FRIAS [[id number]]   Attending Physician: WILLOW FRIAS [[id number]]                 No follow-up provider specified.       Medication List      No changes were made to your prescriptions during this visit.            Deepa Molina, APRN  09/21/24 1026

## 2024-09-21 NOTE — LETTER
EMS Transport Request  For use at [facility], [place], [place], [place], and [place] only   Patient Name: Praneeth Nam : 1940   Weight:76.6 kg (168 lb 14 oz) Pick-up Location: [facility] BLS/ALS: BLS/ALS: BLS   Insurance: MEDICARE Auth End Date: 2024   Pre-Cert #: D/C Summary complete:    Destination: Other [facility]   Contact Precautions: None   Equipment (O2, Fluids, etc.): O2, settings 2L nc   Arrive By Date/Time: 2024 1430 Stretcher/WC: Stretcher   CM Requesting: Lexii Parada RN Ext: [phone]   Notes/Medical Necessity: 168lbs, confused, 2L NC, max assist x2 (needs stretcher)     ______________________________________________________________________    *Only 2 patient bags OR 1 carry-on size bag are permitted.  Wheelchairs and walkers CANNOT transported with the patient. Acknowledge: Yes

## 2024-09-21 NOTE — PLAN OF CARE
Problem: Adult Inpatient Plan of Care  Goal: Plan of Care Review  Outcome: Ongoing, Progressing  Flowsheets (Taken 9/21/2024 1808)  Progress: no change  Plan of Care Reviewed With: patient  Outcome Evaluation: Patient arrived to the unit. Patient is currently resting abed. Patient currently has no complaints at this time.  Goal: Patient-Specific Goal (Individualized)  Outcome: Ongoing, Progressing  Goal: Absence of Hospital-Acquired Illness or Injury  Outcome: Ongoing, Progressing  Intervention: Identify and Manage Fall Risk  Recent Flowsheet Documentation  Taken 9/21/2024 1739 by Jaime Le RN  Safety Promotion/Fall Prevention: safety round/check completed  Intervention: Prevent Skin Injury  Recent Flowsheet Documentation  Taken 9/21/2024 1739 by Jaime Le RN  Skin Protection:   adhesive use limited   tubing/devices free from skin contact  Intervention: Prevent and Manage VTE (Venous Thromboembolism) Risk  Recent Flowsheet Documentation  Taken 9/21/2024 1739 by Jaime Le RN  VTE Prevention/Management: sequential compression devices off  Range of Motion: active ROM (range of motion) encouraged  Intervention: Prevent Infection  Recent Flowsheet Documentation  Taken 9/21/2024 1739 by Jaime Le RN  Infection Prevention: single patient room provided  Goal: Optimal Comfort and Wellbeing  Outcome: Ongoing, Progressing  Intervention: Provide Person-Centered Care  Recent Flowsheet Documentation  Taken 9/21/2024 1739 by Jaime Le RN  Trust Relationship/Rapport:   care explained   choices provided   questions encouraged   questions answered  Goal: Readiness for Transition of Care  Outcome: Ongoing, Progressing  Intervention: Mutually Develop Transition Plan  Recent Flowsheet Documentation  Taken 9/21/2024 1737 by Jaime Le RN  Transportation Anticipated: family or friend will provide  Patient/Family Anticipated Services at Transition: none  Patient/Family Anticipates Transition to:  home with family  Taken 9/21/2024 1735 by Jaime Le, RN  Equipment Currently Used at Home: none     Problem: Diabetes Comorbidity  Goal: Blood Glucose Level Within Targeted Range  Outcome: Ongoing, Progressing     Problem: Hypertension Comorbidity  Goal: Blood Pressure in Desired Range  Outcome: Ongoing, Progressing  Intervention: Maintain Blood Pressure Management  Recent Flowsheet Documentation  Taken 9/21/2024 1739 by Jaime Le, RN  Medication Review/Management: medications reviewed     Problem: Skin Injury Risk Increased  Goal: Skin Health and Integrity  Outcome: Ongoing, Progressing  Intervention: Optimize Skin Protection  Recent Flowsheet Documentation  Taken 9/21/2024 1739 by Jaiem Le RN  Skin Protection:   adhesive use limited   tubing/devices free from skin contact   Goal Outcome Evaluation:  Plan of Care Reviewed With: patient        Progress: no change  Outcome Evaluation: Patient arrived to the unit. Patient is currently resting abed. Patient currently has no complaints at this time.

## 2024-09-21 NOTE — ED NOTES
"Patient a/ox4, RR even and unlabored , VSS. On cardiac monitor Patient c/o fall 2 months ago. Patient states that he got \"tripped up\" on his oxygen tubing. Patient denies hitting his head upon the fall. Patient has some pressure ulcer to the right great toe and the coccyx.   "

## 2024-09-22 PROBLEM — C61 PROSTATE CANCER: Status: ACTIVE | Noted: 2024-09-22

## 2024-09-22 PROBLEM — M06.9 RHEUMATOID ARTHRITIS: Status: ACTIVE | Noted: 2024-08-06

## 2024-09-22 PROBLEM — K21.9 GASTRO-ESOPHAGEAL REFLUX DISEASE WITHOUT ESOPHAGITIS: Status: ACTIVE | Noted: 2024-09-22

## 2024-09-22 PROBLEM — I12.9 HYPERTENSIVE CHRONIC KIDNEY DISEASE W STG 1-4/UNSP CHR KDNY: Status: ACTIVE | Noted: 2024-08-06

## 2024-09-22 PROBLEM — Z87.891 PERSONAL HISTORY OF NICOTINE DEPENDENCE: Status: ACTIVE | Noted: 2024-08-06

## 2024-09-22 PROBLEM — Z86.711 PERSONAL HISTORY OF PE (PULMONARY EMBOLISM): Status: ACTIVE | Noted: 2024-08-06

## 2024-09-22 PROBLEM — K80.30: Status: ACTIVE | Noted: 2024-08-06

## 2024-09-22 PROBLEM — Z85.46 PERSONAL HISTORY OF MALIGNANT NEOPLASM OF PROSTATE: Status: ACTIVE | Noted: 2024-08-06

## 2024-09-22 PROBLEM — L40.9 PSORIASIS, UNSPECIFIED: Status: ACTIVE | Noted: 2024-08-06

## 2024-09-22 PROBLEM — Z86.73 PRSNL HX OF TIA (TIA), AND CEREB INFRC W/O RESID DEFICITS: Status: ACTIVE | Noted: 2024-08-06

## 2024-09-22 PROBLEM — N32.81 OAB (OVERACTIVE BLADDER): Status: ACTIVE | Noted: 2024-08-06

## 2024-09-22 PROBLEM — D50.9 IRON DEFICIENCY ANEMIA: Status: ACTIVE | Noted: 2024-08-06

## 2024-09-22 PROBLEM — Z99.81 DEPENDENCE ON SUPPLEMENTAL OXYGEN: Status: ACTIVE | Noted: 2024-08-06

## 2024-09-22 PROBLEM — G47.33 OBSTRUCTIVE SLEEP APNEA (ADULT) (PEDIATRIC): Status: ACTIVE | Noted: 2024-08-06

## 2024-09-22 PROBLEM — E11.22 TYPE 2 DIABETES MELLITUS WITH DIABETIC CHRONIC KIDNEY DISEASE: Status: ACTIVE | Noted: 2024-08-06

## 2024-09-22 PROBLEM — Z79.01 LONG TERM (CURRENT) USE OF ANTICOAGULANTS: Status: ACTIVE | Noted: 2024-08-06

## 2024-09-22 PROBLEM — Z85.6 PERSONAL HISTORY OF LEUKEMIA: Status: ACTIVE | Noted: 2024-08-06

## 2024-09-22 LAB
ABO GROUP BLD: NORMAL
ALBUMIN SERPL-MCNC: 2.1 G/DL (ref 3.5–5.2)
ANION GAP SERPL CALCULATED.3IONS-SCNC: 7.7 MMOL/L (ref 5–15)
BASOPHILS # BLD AUTO: 0.03 10*3/MM3 (ref 0–0.2)
BASOPHILS NFR BLD AUTO: 0.5 % (ref 0–1.5)
BLD GP AB SCN SERPL QL: NEGATIVE
BUN SERPL-MCNC: 34 MG/DL (ref 8–23)
BUN/CREAT SERPL: 25.2 (ref 7–25)
CALCIUM SPEC-SCNC: 8.1 MG/DL (ref 8.6–10.5)
CHLORIDE SERPL-SCNC: 101 MMOL/L (ref 98–107)
CO2 SERPL-SCNC: 29.3 MMOL/L (ref 22–29)
CREAT SERPL-MCNC: 1.35 MG/DL (ref 0.76–1.27)
DEPRECATED RDW RBC AUTO: 59.4 FL (ref 37–54)
EGFRCR SERPLBLD CKD-EPI 2021: 52.1 ML/MIN/1.73
EOSINOPHIL # BLD AUTO: 0.5 10*3/MM3 (ref 0–0.4)
EOSINOPHIL NFR BLD AUTO: 9 % (ref 0.3–6.2)
ERYTHROCYTE [DISTWIDTH] IN BLOOD BY AUTOMATED COUNT: 16.1 % (ref 12.3–15.4)
FERRITIN SERPL-MCNC: 338 NG/ML (ref 30–400)
GLUCOSE SERPL-MCNC: 73 MG/DL (ref 65–99)
HCT VFR BLD AUTO: 20.3 % (ref 37.5–51)
HGB BLD-MCNC: 6.4 G/DL (ref 13–17.7)
IMM GRANULOCYTES # BLD AUTO: 0.01 10*3/MM3 (ref 0–0.05)
IMM GRANULOCYTES NFR BLD AUTO: 0.2 % (ref 0–0.5)
IRON 24H UR-MRATE: 13 MCG/DL (ref 59–158)
IRON SATN MFR SERPL: 7 % (ref 20–50)
LYMPHOCYTES # BLD AUTO: 0.84 10*3/MM3 (ref 0.7–3.1)
LYMPHOCYTES NFR BLD AUTO: 15.1 % (ref 19.6–45.3)
MAGNESIUM SERPL-MCNC: 1.5 MG/DL (ref 1.6–2.4)
MCH RBC QN AUTO: 31.4 PG (ref 26.6–33)
MCHC RBC AUTO-ENTMCNC: 31.5 G/DL (ref 31.5–35.7)
MCV RBC AUTO: 99.5 FL (ref 79–97)
MONOCYTES # BLD AUTO: 0.34 10*3/MM3 (ref 0.1–0.9)
MONOCYTES NFR BLD AUTO: 6.1 % (ref 5–12)
NEUTROPHILS NFR BLD AUTO: 3.85 10*3/MM3 (ref 1.7–7)
NEUTROPHILS NFR BLD AUTO: 69.1 % (ref 42.7–76)
NRBC BLD AUTO-RTO: 0 /100 WBC (ref 0–0.2)
PHOSPHATE SERPL-MCNC: 3 MG/DL (ref 2.5–4.5)
PLATELET # BLD AUTO: 212 10*3/MM3 (ref 140–450)
PMV BLD AUTO: 12 FL (ref 6–12)
POTASSIUM SERPL-SCNC: 3.6 MMOL/L (ref 3.5–5.2)
RBC # BLD AUTO: 2.04 10*6/MM3 (ref 4.14–5.8)
RH BLD: POSITIVE
SODIUM SERPL-SCNC: 138 MMOL/L (ref 136–145)
T&S EXPIRATION DATE: NORMAL
TIBC SERPL-MCNC: 174 MCG/DL (ref 298–536)
TRANSFERRIN SERPL-MCNC: 117 MG/DL (ref 200–360)
URATE SERPL-MCNC: 9 MG/DL (ref 3.4–7)
WBC NRBC COR # BLD AUTO: 5.57 10*3/MM3 (ref 3.4–10.8)

## 2024-09-22 PROCEDURE — 83735 ASSAY OF MAGNESIUM: CPT | Performed by: INTERNAL MEDICINE

## 2024-09-22 PROCEDURE — 94640 AIRWAY INHALATION TREATMENT: CPT

## 2024-09-22 PROCEDURE — P9016 RBC LEUKOCYTES REDUCED: HCPCS

## 2024-09-22 PROCEDURE — 86900 BLOOD TYPING SEROLOGIC ABO: CPT

## 2024-09-22 PROCEDURE — 86901 BLOOD TYPING SEROLOGIC RH(D): CPT | Performed by: NURSE PRACTITIONER

## 2024-09-22 PROCEDURE — 86850 RBC ANTIBODY SCREEN: CPT | Performed by: NURSE PRACTITIONER

## 2024-09-22 PROCEDURE — 82728 ASSAY OF FERRITIN: CPT | Performed by: INTERNAL MEDICINE

## 2024-09-22 PROCEDURE — 94799 UNLISTED PULMONARY SVC/PX: CPT

## 2024-09-22 PROCEDURE — 25010000002 MAGNESIUM SULFATE 2 GM/50ML SOLUTION: Performed by: INTERNAL MEDICINE

## 2024-09-22 PROCEDURE — 83540 ASSAY OF IRON: CPT | Performed by: INTERNAL MEDICINE

## 2024-09-22 PROCEDURE — 80069 RENAL FUNCTION PANEL: CPT | Performed by: INTERNAL MEDICINE

## 2024-09-22 PROCEDURE — 84466 ASSAY OF TRANSFERRIN: CPT | Performed by: INTERNAL MEDICINE

## 2024-09-22 PROCEDURE — 84550 ASSAY OF BLOOD/URIC ACID: CPT | Performed by: INTERNAL MEDICINE

## 2024-09-22 PROCEDURE — 36430 TRANSFUSION BLD/BLD COMPNT: CPT

## 2024-09-22 PROCEDURE — 85025 COMPLETE CBC W/AUTO DIFF WBC: CPT | Performed by: INTERNAL MEDICINE

## 2024-09-22 PROCEDURE — 25010000002 NA FERRIC GLUC CPLX PER 12.5 MG: Performed by: INTERNAL MEDICINE

## 2024-09-22 PROCEDURE — 86923 COMPATIBILITY TEST ELECTRIC: CPT

## 2024-09-22 PROCEDURE — 25810000003 SODIUM CHLORIDE 0.9 % SOLUTION: Performed by: INTERNAL MEDICINE

## 2024-09-22 PROCEDURE — 25010000002 EPOETIN ALFA-EPBX 20000 UNIT/ML SOLUTION: Performed by: INTERNAL MEDICINE

## 2024-09-22 PROCEDURE — 86900 BLOOD TYPING SEROLOGIC ABO: CPT | Performed by: NURSE PRACTITIONER

## 2024-09-22 PROCEDURE — 25010000002 FUROSEMIDE PER 20 MG: Performed by: INTERNAL MEDICINE

## 2024-09-22 RX ORDER — POTASSIUM CHLORIDE 1500 MG/1
40 TABLET, EXTENDED RELEASE ORAL EVERY 4 HOURS
Status: COMPLETED | OUTPATIENT
Start: 2024-09-22 | End: 2024-09-22

## 2024-09-22 RX ORDER — MAGNESIUM SULFATE HEPTAHYDRATE 40 MG/ML
2 INJECTION, SOLUTION INTRAVENOUS
Status: COMPLETED | OUTPATIENT
Start: 2024-09-22 | End: 2024-09-22

## 2024-09-22 RX ADMIN — POTASSIUM CHLORIDE 40 MEQ: 1500 TABLET, EXTENDED RELEASE ORAL at 12:52

## 2024-09-22 RX ADMIN — TAMSULOSIN HYDROCHLORIDE 0.4 MG: 0.4 CAPSULE ORAL at 09:37

## 2024-09-22 RX ADMIN — MIDODRINE HYDROCHLORIDE 5 MG: 5 TABLET ORAL at 12:52

## 2024-09-22 RX ADMIN — FUROSEMIDE 40 MG: 10 INJECTION, SOLUTION INTRAMUSCULAR; INTRAVENOUS at 16:52

## 2024-09-22 RX ADMIN — MIDODRINE HYDROCHLORIDE 5 MG: 5 TABLET ORAL at 09:37

## 2024-09-22 RX ADMIN — ATORVASTATIN CALCIUM 20 MG: 20 TABLET, FILM COATED ORAL at 20:35

## 2024-09-22 RX ADMIN — PANTOPRAZOLE SODIUM 40 MG: 40 TABLET, DELAYED RELEASE ORAL at 05:20

## 2024-09-22 RX ADMIN — BUDESONIDE 0.5 MG: 0.5 INHALANT RESPIRATORY (INHALATION) at 19:44

## 2024-09-22 RX ADMIN — APIXABAN 2.5 MG: 2.5 TABLET, FILM COATED ORAL at 09:37

## 2024-09-22 RX ADMIN — CETIRIZINE HYDROCHLORIDE 10 MG: 10 TABLET, FILM COATED ORAL at 09:37

## 2024-09-22 RX ADMIN — MAGNESIUM SULFATE HEPTAHYDRATE 2 G: 40 INJECTION, SOLUTION INTRAVENOUS at 17:01

## 2024-09-22 RX ADMIN — Medication 10 ML: at 09:37

## 2024-09-22 RX ADMIN — Medication 10 ML: at 20:34

## 2024-09-22 RX ADMIN — FERROUS SULFATE TAB EC 324 MG (65 MG FE EQUIVALENT) 324 MG: 324 (65 FE) TABLET DELAYED RESPONSE at 09:37

## 2024-09-22 RX ADMIN — SODIUM CHLORIDE 250 MG: 9 INJECTION, SOLUTION INTRAVENOUS at 12:51

## 2024-09-22 RX ADMIN — MAGNESIUM SULFATE HEPTAHYDRATE 2 G: 40 INJECTION, SOLUTION INTRAVENOUS at 16:51

## 2024-09-22 RX ADMIN — FUROSEMIDE 40 MG: 10 INJECTION, SOLUTION INTRAMUSCULAR; INTRAVENOUS at 09:37

## 2024-09-22 RX ADMIN — MIDODRINE HYDROCHLORIDE 5 MG: 5 TABLET ORAL at 16:52

## 2024-09-22 RX ADMIN — APIXABAN 2.5 MG: 2.5 TABLET, FILM COATED ORAL at 20:35

## 2024-09-22 RX ADMIN — POTASSIUM CHLORIDE 40 MEQ: 1500 TABLET, EXTENDED RELEASE ORAL at 16:52

## 2024-09-22 RX ADMIN — EPOETIN ALFA-EPBX 20000 UNITS: 20000 INJECTION, SOLUTION INTRAVENOUS; SUBCUTANEOUS at 12:53

## 2024-09-22 RX ADMIN — MAGNESIUM SULFATE HEPTAHYDRATE 2 G: 40 INJECTION, SOLUTION INTRAVENOUS at 14:31

## 2024-09-22 NOTE — PROGRESS NOTES
LOS: 1 day   Patient Care Team:  Guilherme Jason MD as PCP - General (Family Medicine)  Misty Dias MD as Consulting Physician (Cardiology)  Nora Kenyon DNP, APRN as Nurse Practitioner (Thoracic Surgery)  Sussy Shane MD as Consulting Physician (Nephrology)    Subjective:  Less short of breath    Objective:   Afebrile      Review of Systems:   Review of Systems   Respiratory:  Positive for shortness of breath.    Cardiovascular:  Negative for chest pain.   Neurological:  Positive for weakness.           Vital Signs  Temp:  [98.3 °F (36.8 °C)-99.5 °F (37.5 °C)] 99.5 °F (37.5 °C)  Heart Rate:  [55-70] 61  Resp:  [16-26] 26  BP: (119-141)/(46-53) 129/51    Physical Exam:  Physical Exam  Cardiovascular:      Rate and Rhythm: Rhythm irregular.      Heart sounds: Normal heart sounds.   Pulmonary:      Breath sounds: Normal breath sounds.   Neurological:      Mental Status: He is alert.          Radiology:  XR Foot 3+ View Right    Result Date: 9/21/2024  Impression: 1.No significant change in the appearance of the foot. If there is concern for osteomyelitis MR may be of benefit to reassess this patient. Electronically Signed: Corona Angulo MD  9/21/2024 7:55 AM EDT  Workstation ID: [id number]    XR Chest 1 View    Result Date: 9/21/2024  Impression: 1.Cardiomegaly with some vascular congestion/edema suggested. 2.Small bibasilar effusions. Electronically Signed: Corona Angulo MD  9/21/2024 7:50 AM EDT  Workstation ID: [id number]        Results Review:     I reviewed the patient's new clinical results.  I reviewed the patient's new imaging results and agree with the interpretation.    Medication Review:   Scheduled Meds:apixaban, 2.5 mg, Oral, BID  atorvastatin, 20 mg, Oral, Nightly  budesonide, 0.5 mg, Nebulization, BID  cetirizine, 10 mg, Oral, Daily  epoetin spencer/spencer-epbx, 20,000 Units, Subcutaneous, Once  ferric gluconate, 250 mg, Intravenous, Once  ferrous sulfate, 324 mg, Oral, Daily With  "Breakfast  furosemide, 40 mg, Intravenous, BID  midodrine, 5 mg, Oral, TID AC  pantoprazole, 40 mg, Oral, Q AM  sodium chloride, 10 mL, Intravenous, Q12H  tamsulosin, 0.4 mg, Oral, Daily      Continuous Infusions:   PRN Meds:.  acetaminophen **OR** acetaminophen **OR** acetaminophen    senna-docusate sodium **AND** polyethylene glycol **AND** bisacodyl **AND** bisacodyl    Calcium Replacement - Follow Nurse / BPA Driven Protocol    hydrALAZINE    ipratropium-albuterol    Magnesium Standard Dose Replacement - Follow Nurse / BPA Driven Protocol    nitroglycerin    ondansetron    Phosphorus Replacement - Follow Nurse / BPA Driven Protocol    Potassium Replacement - Follow Nurse / BPA Driven Protocol    [COMPLETED] Insert Peripheral IV **AND** sodium chloride    sodium chloride    sodium chloride    Labs:    CBC    Results from last 7 days   Lab Units 09/22/24 0528 09/21/24  0817   WBC 10*3/mm3 5.57 7.30   HEMOGLOBIN g/dL 6.4* 7.6*   PLATELETS 10*3/mm3 212 259     BMP   Results from last 7 days   Lab Units 09/22/24 0528 09/21/24  0817   SODIUM mmol/L 138 138   POTASSIUM mmol/L 3.6 3.8   CHLORIDE mmol/L 101 104   CO2 mmol/L 29.3* 24.9   BUN mg/dL 34* 33*   CREATININE mg/dL 1.35* 1.26   GLUCOSE mg/dL 73 104*   MAGNESIUM mg/dL 1.5*  --    PHOSPHORUS mg/dL 3.0  --      Cr Clearance Estimated Creatinine Clearance: 43.3 mL/min (A) (by C-G formula based on SCr of 1.35 mg/dL (H)).  Coag     HbA1C   Lab Results   Component Value Date    HGBA1C 4.70 (L) 04/23/2024    HGBA1C 6.1 (H) 09/11/2019    HGBA1C 5.9 (H) 09/10/2019     Blood Glucose No results found for: \"POCGLU\"  Infection     CMP   Results from last 7 days   Lab Units 09/22/24 0528 09/21/24  0817   SODIUM mmol/L 138 138   POTASSIUM mmol/L 3.6 3.8   CHLORIDE mmol/L 101 104   CO2 mmol/L 29.3* 24.9   BUN mg/dL 34* 33*   CREATININE mg/dL 1.35* 1.26   GLUCOSE mg/dL 73 104*   ALBUMIN g/dL 2.1* 2.5*   BILIRUBIN mg/dL  --  0.8   ALK PHOS U/L  --  65   AST (SGOT) U/L  --  44* "   ALT (SGPT) U/L  --  17     UA    Results from last 7 days   Lab Units 09/21/24  1747   NITRITE UA  Negative     Radiology(recent) XR Foot 3+ View Right    Result Date: 9/21/2024  Impression: 1.No significant change in the appearance of the foot. If there is concern for osteomyelitis MR may be of benefit to reassess this patient. Electronically Signed: Corona Angulo MD  9/21/2024 7:55 AM EDT  Workstation ID: [id number]    XR Chest 1 View    Result Date: 9/21/2024  Impression: 1.Cardiomegaly with some vascular congestion/edema suggested. 2.Small bibasilar effusions. 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  Paroxysmal atrial fibrillation  Hypercoagulable state secondary to atrial fibrillation  History of prostate cancer  BPH with LUTS  History of large granular lymphocytic leukemia  Lower extremity edema  Chronic hypoxic respiratory failure  Supplemental oxygen dependency  Chronic ITP  Rheumatoid arthritis positive rheumatoid factor  Elevated Factor VIII level  Hypogammaglobulinemia  History of pulmonary embolus  Exogenous obesity  Seasonal allergic rhinitis  Cerebrovascular disease with history of CVA and TIA  Gastroesophageal reflux disease without esophagitis      Plan:  Gentle diuresis, pulmonary toilet and renal support        Jayy Rust MD  09/22/24  10:39 EDT

## 2024-09-22 NOTE — CASE MANAGEMENT/SOCIAL WORK
Discharge Planning Assessment   [place]     Patient Name: Praneeth Nam  MRN: [id number]  Today's Date: 9/22/2024    Admit Date: 9/21/2024    Plan: from home with spouse + family wiht [facility]   Discharge Needs Assessment       Row Name 09/22/24 1443       Living Environment    People in Home spouse;grandchild(elio)    Current Living Arrangements home    Potentially Unsafe Housing Conditions none    In the past 12 months has the electric, gas, oil, or water company threatened to shut off services in your home? No    Primary Care Provided by self    Provides Primary Care For no one    Family Caregiver if Needed child(elio), adult;grandchild(elio), adult    Quality of Family Relationships helpful;involved;supportive    Able to Return to Prior Arrangements yes       Resource/Environmental Concerns    Resource/Environmental Concerns none    Transportation Concerns none       Transportation Needs    In the past 12 months, has lack of transportation kept you from medical appointments or from getting medications? no    In the past 12 months, has lack of transportation kept you from meetings, work, or from getting things needed for daily living? No       Food Insecurity    Within the past 12 months, you worried that your food would run out before you got the money to buy more. Never true    Within the past 12 months, the food you bought just didn't last and you didn't have money to get more. Never true       Transition Planning    Patient/Family Anticipates Transition to home with family    Patient/Family Anticipated Services at Transition none    Transportation Anticipated family or friend will provide       Discharge Needs Assessment    Readmission Within the Last 30 Days no previous admission in last 30 days    Equipment Currently Used at Home oxygen;wheelchair;rollator;hospital bed;shower chair;commode    Concerns to be Addressed denies needs/concerns at this time;discharge planning    Anticipated Changes  "Related to Illness none    Equipment Needed After Discharge none    Provided Post Acute Provider List? N/A                   Discharge Plan       Row Name 09/22/24 1445       Plan    Plan from home with spouse + family wiht [facility]    Plan Comments met with pt at bedside.  verified he is home with [facility]- notified liasion and added to inbasket.  reports spouse has dementia but granddaugther assist with adls and stays there \"most all the time\".  pcp and pharm verified.  has rollator, w/c, bsc/shower chair, 2L oxygen and trilogy at home. denies any financial or transportation concerns at this time.                  Continued Care and Services - Admitted Since 9/21/2024       Home Medical Care       Service Provider Request Status Selected Services Address Phone Fax Patient Preferred    [facility] Accepted N/A [address] [place] [address] [place] [address] [phone] --                  Selected Continued Care - Prior Encounters Includes continued care and service providers with selected services from prior encounters from 6/23/2024 to 9/22/2024      Discharged on 9/12/2024 Admission date: 9/7/2024 - Discharge disposition: Hospice/Home      Home Medical Care       Service Provider Selected Services Address Phone Fax Patient Preferred    [facility] [address] [place] [address] [place] [address] [phone]                           Expected Discharge Date and Time       Expected Discharge Date Expected Discharge Time    Sep 24, 2024            Demographic Summary       Row Name 09/22/24 1438       General Information    Admission Type inpatient    Arrived From emergency department    Referral Source admission list    Reason for Consult discharge planning    Preferred Language English                   Functional Status       Row Name 09/22/24 1438       Functional Status    Usual Activity Tolerance " fair    Current Activity Tolerance fair       Physical Activity    On average, how many days per week do you engage in moderate to strenuous exercise (like a brisk walk)? 0 days    On average, how many minutes do you engage in exercise at this level? 0 min    Number of minutes of exercise per week 0       Functional Status, IADL    Medications completely dependent    Meal Preparation completely dependent    Housekeeping completely dependent    Laundry completely dependent    Shopping completely dependent       Mental Status    General Appearance WDL WDL       Mental Status Summary    Recent Changes in Mental Status/Cognitive Functioning no changes                          GAGANDEEP Anne RN  Case Management  [phone] office  [phone] fax

## 2024-09-22 NOTE — PLAN OF CARE
Goal Outcome Evaluation:              Outcome Evaluation: Patient pleasant and cooperative. Currently resting. No complaints at this time.

## 2024-09-22 NOTE — PLAN OF CARE
Goal Outcome Evaluation:  Patient resting in bed with no current complaints.

## 2024-09-22 NOTE — PROGRESS NOTES
"NEPHROLOGY PROGRESS NOTE------[facility]    [facility]  [phone]  Sascha Dorado MD      Patient Care Team:  Guilherme Jason MD as PCP - General (Family Medicine)  Misty Dias MD as Consulting Physician (Cardiology)  Nora Kenyon, BRIAN, APRN as Nurse Practitioner (Thoracic Surgery)  Sussy Shane MD as Consulting Physician (Nephrology)      Provider:  Sascha Dorado MD  Patient Name: Praneeth Nam  :  1940    SUBJECTIVE:  Follow-up CKD  No chest pain shortness of breath  Hemoglobin low this morning, PRBC being given    Medication:  apixaban, 2.5 mg, Oral, BID  atorvastatin, 20 mg, Oral, Nightly  budesonide, 0.5 mg, Nebulization, BID  cetirizine, 10 mg, Oral, Daily  ferrous sulfate, 324 mg, Oral, Daily With Breakfast  furosemide, 40 mg, Intravenous, BID  midodrine, 5 mg, Oral, TID AC  pantoprazole, 40 mg, Oral, Q AM  sodium chloride, 10 mL, Intravenous, Q12H  tamsulosin, 0.4 mg, Oral, Daily           OBJECTIVE    Vital Sign Min/Max for last 24 hours  Temp  Min: 97.9 °F (36.6 °C)  Max: 98.8 °F (37.1 °C)   BP  Min: 119/46  Max: 141/53   Pulse  Min: 55  Max: 70   Resp  Min: 16  Max: 26   SpO2  Min: 66 %  Max: 99 %   No data recorded   Weight  Min: 88.7 kg (195 lb 8.8 oz)  Max: 88.7 kg (195 lb 8.8 oz)     Flowsheet Rows      Flowsheet Row First Filed Value   Admission Height 167.6 cm (66\") Documented at 2024 0626   Admission Weight 88 kg (194 lb 0.1 oz) Documented at 2024 0626            No intake/output data recorded.  I/O last 3 completed shifts:  In: -   Out: 1000 [Urine:1000]    Physical Exam:  General Appearance: alert, appears stated age and cooperative  Head: normocephalic, without obvious abnormality and atraumatic  Eyes: conjunctivae and sclerae normal and no icterus  Neck: supple and no JVD  Lungs: clear to auscultation and respirations regular  Heart: regular rhythm & normal rate and normal S1, S2  Chest: " "Wall no abnormalities observed  Abdomen: normal bowel sounds and soft, nontender  Extremities: moves extremities well, trace edema, no cyanosis and no redness.  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  Skin: Bleeding wound right great toe amputation site.  Currently wrapped.  Neurologic: Alert, and oriented. No focal deficits    Labs:    WBC WBC   Date Value Ref Range Status   09/22/2024 5.57 3.40 - 10.80 10*3/mm3 Final   09/21/2024 7.30 3.40 - 10.80 10*3/mm3 Final      HGB Hemoglobin   Date Value Ref Range Status   09/22/2024 6.4 (C) 13.0 - 17.7 g/dL Final   09/21/2024 7.6 (L) 13.0 - 17.7 g/dL Final      HCT Hematocrit   Date Value Ref Range Status   09/22/2024 20.3 (C) 37.5 - 51.0 % Final   09/21/2024 25.0 (L) 37.5 - 51.0 % Final      Platelets No results found for: \"LABPLAT\"   MCV MCV   Date Value Ref Range Status   09/22/2024 99.5 (H) 79.0 - 97.0 fL Final   09/21/2024 100.8 (H) 79.0 - 97.0 fL Final          Sodium Sodium   Date Value Ref Range Status   09/22/2024 138 136 - 145 mmol/L Final   09/21/2024 138 136 - 145 mmol/L Final      Potassium Potassium   Date Value Ref Range Status   09/22/2024 3.6 3.5 - 5.2 mmol/L Final   09/21/2024 3.8 3.5 - 5.2 mmol/L Final      Chloride Chloride   Date Value Ref Range Status   09/22/2024 101 98 - 107 mmol/L Final   09/21/2024 104 98 - 107 mmol/L Final      CO2 CO2   Date Value Ref Range Status   09/22/2024 29.3 (H) 22.0 - 29.0 mmol/L Final   09/21/2024 24.9 22.0 - 29.0 mmol/L Final      BUN BUN   Date Value Ref Range Status   09/22/2024 34 (H) 8 - 23 mg/dL Final   09/21/2024 33 (H) 8 - 23 mg/dL Final      Creatinine Creatinine   Date Value Ref Range Status   09/22/2024 1.35 (H) 0.76 - 1.27 mg/dL Final   09/21/2024 1.26 0.76 - 1.27 mg/dL Final      Calcium Calcium   Date Value Ref Range Status   09/22/2024 8.1 (L) 8.6 - 10.5 mg/dL Final   09/21/2024 8.5 (L) 8.6 - 10.5 mg/dL Final      PO4 No components found for: \"PO4\"   Albumin Albumin   Date Value Ref Range Status " "  09/22/2024 2.1 (L) 3.5 - 5.2 g/dL Final   09/21/2024 2.5 (L) 3.5 - 5.2 g/dL Final      Magnesium Magnesium   Date Value Ref Range Status   09/22/2024 1.5 (L) 1.6 - 2.4 mg/dL Final      Uric Acid No components found for: \"URIC ACID\"     Imaging Results (Last 72 Hours)       Procedure Component Value Units Date/Time    XR Foot 3+ View Right [[id number]] Collected: 09/21/24 0751     Updated: 09/21/24 0757    Narrative:      XR FOOT 3+ VW RIGHT    Date of Exam: 9/21/2024 7:28 AM EDT    Indication: open draining wound    Comparison: X-ray September 16, 2024      Findings:     Plantar calcaneal spur. There is vascular calcification. There is lateral subluxation of the digits of the foot at the metatarsal phalangeal joint spaces. There is hallux valgus deformity. There are hammertoe deformities. No definitive changes of   osteomyelitis are appreciated.          Impression:      Impression:  1.No significant change in the appearance of the foot.  If there is concern for osteomyelitis MR may be of benefit to reassess this patient.    Electronically Signed: Corona Angulo MD    9/21/2024 7:55 AM EDT    Workstation ID: [id number]    XR Chest 1 View [[id number]] Collected: 09/21/24 0747     Updated: 09/21/24 0753    Narrative:      XR CHEST 1 VW    Date of Exam: 9/21/2024 7:28 AM EDT    Indication: edema    Comparison: September 16, 2024    Findings:  The heart is enlarged. There remains some blunting of the lateral costophrenic sulci which could reflect small effusions. There is slight prominence of markings within the interstitium of the lungs that could reflect some interstitial edema. No   significant new findings are seen as compared to the prior study.      Impression:      Impression:  1.Cardiomegaly with some vascular congestion/edema suggested.  2.Small bibasilar effusions.      Electronically Signed: Corona Angulo MD    9/21/2024 7:50 AM EDT    Workstation ID: [id number]            Results for orders placed during the " hospital encounter of 09/21/24    XR Foot 3+ View Right    Narrative  XR FOOT 3+ VW RIGHT    Date of Exam: 9/21/2024 7:28 AM EDT    Indication: open draining wound    Comparison: X-ray September 16, 2024      Findings:  Plantar calcaneal spur. There is vascular calcification. There is lateral subluxation of the digits of the foot at the metatarsal phalangeal joint spaces. There is hallux valgus deformity. There are hammertoe deformities. No definitive changes of  osteomyelitis are appreciated.    Impression  Impression:  1.No significant change in the appearance of the foot.  If there is concern for osteomyelitis MR may be of benefit to reassess this patient.    Electronically Signed: Corona Angulo MD  9/21/2024 7:55 AM EDT  Workstation ID: [id number]      XR Chest 1 View    Narrative  XR CHEST 1 VW    Date of Exam: 9/21/2024 7:28 AM EDT    Indication: edema    Comparison: September 16, 2024    Findings:  The heart is enlarged. There remains some blunting of the lateral costophrenic sulci which could reflect small effusions. There is slight prominence of markings within the interstitium of the lungs that could reflect some interstitial edema. No  significant new findings are seen as compared to the prior study.    Impression  Impression:  1.Cardiomegaly with some vascular congestion/edema suggested.  2.Small bibasilar effusions.      Electronically Signed: Corona Angulo MD  9/21/2024 7:50 AM EDT  Workstation ID: [id number]      Results for orders placed during the hospital encounter of 09/07/24    XR Chest PA & Lateral    Narrative  XR CHEST PA AND LATERAL    Date of Exam: 9/11/2024 1:14 PM EDT    Indication: SOA    Comparison: 6/12/2024    Findings:  Cardiomediastinal silhouette is enlarged. There is pulmonary vascular congestion with interstitial thickening and patchy bibasilar airspace disease. Small bilateral pleural effusions. There are degenerative changes of the shoulders.    Impression  Impression:  Cardiomegaly  with interstitial and airspace disease and small bilateral pleural effusions. Findings likely represent CHF/volume overload.      Electronically Signed: Lexii Medrano MD  9/11/2024 1:35 PM EDT  Workstation ID: [id number]      Results for orders placed during the hospital encounter of 09/21/24    Duplex Venous Upper Extremity - Right CAR    Interpretation Summary    Normal right upper extremity venous duplex scan.        ASSESSMENT / PLAN      Volume overload       CKD stage IIIa-secondary to hypertensive nephrosclerosis  Hypervolemia-IV diuretics  History CHF-diurese  Likely cellulitis right foot  History leukemia  History A-fib  History CVA  COPD  Recent UTI-UA negative.  Anemia-acute on chronic.  On Eliquis.  May have chronic occult bleeding.  TSAT low.  Procrit/Ferrlecit.  9/22/2024.     CR stable, conitnue diuretics, will switch to oral  Hb dropped--status post PRBC  On Eliquis.  TSAT low.  IV iron/Procrit  Antibiotics per primary  UA negative.    Sascha Dorado MD  [facility]  [phone]  09/22/24  07:07 EDT

## 2024-09-23 LAB
ALBUMIN SERPL-MCNC: 2.2 G/DL (ref 3.5–5.2)
ANION GAP SERPL CALCULATED.3IONS-SCNC: 8.6 MMOL/L (ref 5–15)
BASOPHILS # BLD AUTO: 0.03 10*3/MM3 (ref 0–0.2)
BASOPHILS NFR BLD AUTO: 0.4 % (ref 0–1.5)
BH BB BLOOD EXPIRATION DATE: NORMAL
BH BB BLOOD TYPE BARCODE: 5100
BH BB DISPENSE STATUS: NORMAL
BH BB PRODUCT CODE: NORMAL
BH BB UNIT NUMBER: NORMAL
BUN SERPL-MCNC: 34 MG/DL (ref 8–23)
BUN/CREAT SERPL: 23.8 (ref 7–25)
CALCIUM SPEC-SCNC: 8.3 MG/DL (ref 8.6–10.5)
CHLORIDE SERPL-SCNC: 102 MMOL/L (ref 98–107)
CO2 SERPL-SCNC: 28.4 MMOL/L (ref 22–29)
CREAT SERPL-MCNC: 1.43 MG/DL (ref 0.76–1.27)
CROSSMATCH INTERPRETATION: NORMAL
DEPRECATED RDW RBC AUTO: 56.2 FL (ref 37–54)
EGFRCR SERPLBLD CKD-EPI 2021: 48.6 ML/MIN/1.73
EOSINOPHIL # BLD AUTO: 0.17 10*3/MM3 (ref 0–0.4)
EOSINOPHIL NFR BLD AUTO: 2.4 % (ref 0.3–6.2)
ERYTHROCYTE [DISTWIDTH] IN BLOOD BY AUTOMATED COUNT: 16.1 % (ref 12.3–15.4)
GLUCOSE SERPL-MCNC: 91 MG/DL (ref 65–99)
HCT VFR BLD AUTO: 25.1 % (ref 37.5–51)
HGB BLD-MCNC: 8.1 G/DL (ref 13–17.7)
IMM GRANULOCYTES # BLD AUTO: 0.03 10*3/MM3 (ref 0–0.05)
IMM GRANULOCYTES NFR BLD AUTO: 0.4 % (ref 0–0.5)
LYMPHOCYTES # BLD AUTO: 0.85 10*3/MM3 (ref 0.7–3.1)
LYMPHOCYTES NFR BLD AUTO: 12.1 % (ref 19.6–45.3)
MAGNESIUM SERPL-MCNC: 2.1 MG/DL (ref 1.6–2.4)
MCH RBC QN AUTO: 31.3 PG (ref 26.6–33)
MCHC RBC AUTO-ENTMCNC: 32.3 G/DL (ref 31.5–35.7)
MCV RBC AUTO: 96.9 FL (ref 79–97)
MONOCYTES # BLD AUTO: 0.38 10*3/MM3 (ref 0.1–0.9)
MONOCYTES NFR BLD AUTO: 5.4 % (ref 5–12)
NEUTROPHILS NFR BLD AUTO: 5.56 10*3/MM3 (ref 1.7–7)
NEUTROPHILS NFR BLD AUTO: 79.3 % (ref 42.7–76)
NRBC BLD AUTO-RTO: 0 /100 WBC (ref 0–0.2)
PHOSPHATE SERPL-MCNC: 2.8 MG/DL (ref 2.5–4.5)
PLATELET # BLD AUTO: 244 10*3/MM3 (ref 140–450)
PMV BLD AUTO: 11.7 FL (ref 6–12)
POTASSIUM SERPL-SCNC: 4.1 MMOL/L (ref 3.5–5.2)
RBC # BLD AUTO: 2.59 10*6/MM3 (ref 4.14–5.8)
SODIUM SERPL-SCNC: 139 MMOL/L (ref 136–145)
UNIT  ABO: NORMAL
UNIT  RH: NORMAL
WBC NRBC COR # BLD AUTO: 7.02 10*3/MM3 (ref 3.4–10.8)

## 2024-09-23 PROCEDURE — 94761 N-INVAS EAR/PLS OXIMETRY MLT: CPT

## 2024-09-23 PROCEDURE — 25010000002 FUROSEMIDE PER 20 MG: Performed by: INTERNAL MEDICINE

## 2024-09-23 PROCEDURE — 80069 RENAL FUNCTION PANEL: CPT | Performed by: INTERNAL MEDICINE

## 2024-09-23 PROCEDURE — 83735 ASSAY OF MAGNESIUM: CPT | Performed by: INTERNAL MEDICINE

## 2024-09-23 PROCEDURE — 94799 UNLISTED PULMONARY SVC/PX: CPT

## 2024-09-23 PROCEDURE — 85025 COMPLETE CBC W/AUTO DIFF WBC: CPT | Performed by: INTERNAL MEDICINE

## 2024-09-23 PROCEDURE — 94664 DEMO&/EVAL PT USE INHALER: CPT

## 2024-09-23 RX ORDER — FUROSEMIDE 40 MG
40 TABLET ORAL
Status: DISCONTINUED | OUTPATIENT
Start: 2024-09-23 | End: 2024-09-24

## 2024-09-23 RX ORDER — SILVER SULFADIAZINE 10 MG/G
1 CREAM TOPICAL 2 TIMES DAILY
Status: DISCONTINUED | OUTPATIENT
Start: 2024-09-23 | End: 2024-09-25 | Stop reason: HOSPADM

## 2024-09-23 RX ADMIN — PANTOPRAZOLE SODIUM 40 MG: 40 TABLET, DELAYED RELEASE ORAL at 05:42

## 2024-09-23 RX ADMIN — Medication 10 ML: at 08:42

## 2024-09-23 RX ADMIN — MIDODRINE HYDROCHLORIDE 5 MG: 5 TABLET ORAL at 18:23

## 2024-09-23 RX ADMIN — BUDESONIDE 0.5 MG: 0.5 INHALANT RESPIRATORY (INHALATION) at 07:36

## 2024-09-23 RX ADMIN — BUDESONIDE 0.5 MG: 0.5 INHALANT RESPIRATORY (INHALATION) at 20:52

## 2024-09-23 RX ADMIN — APIXABAN 2.5 MG: 2.5 TABLET, FILM COATED ORAL at 08:41

## 2024-09-23 RX ADMIN — MIDODRINE HYDROCHLORIDE 5 MG: 5 TABLET ORAL at 08:41

## 2024-09-23 RX ADMIN — TAMSULOSIN HYDROCHLORIDE 0.4 MG: 0.4 CAPSULE ORAL at 08:41

## 2024-09-23 RX ADMIN — FERROUS SULFATE TAB EC 324 MG (65 MG FE EQUIVALENT) 324 MG: 324 (65 FE) TABLET DELAYED RESPONSE at 08:41

## 2024-09-23 RX ADMIN — SILVER SULFADIAZINE 1 APPLICATION: 10 CREAM TOPICAL at 20:42

## 2024-09-23 RX ADMIN — FUROSEMIDE 40 MG: 40 TABLET ORAL at 18:23

## 2024-09-23 RX ADMIN — Medication 10 ML: at 20:41

## 2024-09-23 RX ADMIN — APIXABAN 2.5 MG: 2.5 TABLET, FILM COATED ORAL at 20:41

## 2024-09-23 RX ADMIN — ATORVASTATIN CALCIUM 20 MG: 20 TABLET, FILM COATED ORAL at 20:41

## 2024-09-23 RX ADMIN — MIDODRINE HYDROCHLORIDE 5 MG: 5 TABLET ORAL at 13:55

## 2024-09-23 RX ADMIN — SILVER SULFADIAZINE 1 APPLICATION: 10 CREAM TOPICAL at 13:55

## 2024-09-23 RX ADMIN — FUROSEMIDE 40 MG: 10 INJECTION, SOLUTION INTRAMUSCULAR; INTRAVENOUS at 08:41

## 2024-09-23 NOTE — PROGRESS NOTES
LOS: 2 days   Patient Care Team:  Guilherme Jason MD as PCP - General (Family Medicine)  Misty Dias MD as Consulting Physician (Cardiology)  Nora Kenyon DNP, APRN as Nurse Practitioner (Thoracic Surgery)  Sussy Shane MD as Consulting Physician (Nephrology)    Subjective     Patient is tired today and wants to be left alone    Review of Systems   Constitutional:  Positive for activity change and fatigue.   HENT: Negative.     Respiratory: Negative.     Cardiovascular: Negative.    Gastrointestinal: Negative.    Genitourinary: Negative.    Musculoskeletal:  Positive for gait problem.   Neurological:  Positive for weakness.   Psychiatric/Behavioral: Negative.             Objective     Vital Signs  Temp:  [97.8 °F (36.6 °C)-99.7 °F (37.6 °C)] 97.8 °F (36.6 °C)  Heart Rate:  [53-68] 53  Resp:  [14-31] 20  BP: (101-134)/(41-57) 120/44      Physical Exam  Vitals reviewed.   Constitutional:       Appearance: He is not ill-appearing.   HENT:      Head: Normocephalic and atraumatic.      Right Ear: External ear normal.      Left Ear: External ear normal.      Nose: Nose normal.      Mouth/Throat:      Mouth: Mucous membranes are moist.   Eyes:      General:         Right eye: No discharge.         Left eye: No discharge.   Cardiovascular:      Rate and Rhythm: Normal rate and regular rhythm.      Pulses: Normal pulses.      Heart sounds: Normal heart sounds.   Pulmonary:      Effort: Pulmonary effort is normal.      Breath sounds: Normal breath sounds.   Abdominal:      General: Bowel sounds are normal.      Palpations: Abdomen is soft.   Musculoskeletal:         General: Normal range of motion.      Cervical back: Normal range of motion.   Skin:     General: Skin is warm and dry.   Neurological:      Mental Status: He is alert and oriented to person, place, and time.   Psychiatric:         Behavior: Behavior normal.              Results Review:    Lab Results (last 24 hours)       Procedure  Component Value Units Date/Time    Magnesium [[id number]]  (Normal) Collected: 09/23/24 0209    Specimen: Blood from Arm, Left Updated: 09/23/24 0345     Magnesium 2.1 mg/dL     Renal Function Panel [[id number]]  (Abnormal) Collected: 09/23/24 0209    Specimen: Blood from Arm, Left Updated: 09/23/24 0331     Glucose 91 mg/dL      BUN 34 mg/dL      Creatinine 1.43 mg/dL      Sodium 139 mmol/L      Potassium 4.1 mmol/L      Chloride 102 mmol/L      CO2 28.4 mmol/L      Calcium 8.3 mg/dL      Albumin 2.2 g/dL      Phosphorus 2.8 mg/dL      Anion Gap 8.6 mmol/L      BUN/Creatinine Ratio 23.8     eGFR 48.6 mL/min/1.73     Narrative:      GFR Normal >60  Chronic Kidney Disease <60  Kidney Failure <15    The GFR formula is only valid for adults with stable renal function between ages 18 and 70.    CBC & Differential [[id number]]  (Abnormal) Collected: 09/23/24 0209    Specimen: Blood from Arm, Left Updated: 09/23/24 0308    Narrative:      The following orders were created for panel order CBC & Differential.  Procedure                               Abnormality         Status                     ---------                               -----------         ------                     CBC Auto Differential[[id number]]        Abnormal            Final result                 Please view results for these tests on the individual orders.    CBC Auto Differential [[id number]]  (Abnormal) Collected: 09/23/24 0209    Specimen: Blood from Arm, Left Updated: 09/23/24 0308     WBC 7.02 10*3/mm3      RBC 2.59 10*6/mm3      Hemoglobin 8.1 g/dL      Hematocrit 25.1 %      Comment: Result checked          MCV 96.9 fL      MCH 31.3 pg      MCHC 32.3 g/dL      RDW 16.1 %      RDW-SD 56.2 fl      MPV 11.7 fL      Platelets 244 10*3/mm3      Neutrophil % 79.3 %      Lymphocyte % 12.1 %      Monocyte % 5.4 %      Eosinophil % 2.4 %      Basophil % 0.4 %      Immature Grans % 0.4 %      Neutrophils, Absolute 5.56 10*3/mm3      Lymphocytes, Absolute  0.85 10*3/mm3      Monocytes, Absolute 0.38 10*3/mm3      Eosinophils, Absolute 0.17 10*3/mm3      Basophils, Absolute 0.03 10*3/mm3      Immature Grans, Absolute 0.03 10*3/mm3      nRBC 0.0 /100 WBC              Imaging Results (Last 24 Hours)       ** No results found for the last 24 hours. **                 I reviewed the patient's new clinical results.    Medication Review:   Scheduled Meds:apixaban, 2.5 mg, Oral, BID  atorvastatin, 20 mg, Oral, Nightly  budesonide, 0.5 mg, Nebulization, BID  ferrous sulfate, 324 mg, Oral, Daily With Breakfast  furosemide, 40 mg, Oral, BID  midodrine, 5 mg, Oral, TID AC  pantoprazole, 40 mg, Oral, Q AM  sodium chloride, 10 mL, Intravenous, Q12H  tamsulosin, 0.4 mg, Oral, Daily      Continuous Infusions:   PRN Meds:.  acetaminophen **OR** acetaminophen **OR** acetaminophen    senna-docusate sodium **AND** polyethylene glycol **AND** bisacodyl **AND** bisacodyl    Calcium Replacement - Follow Nurse / BPA Driven Protocol    hydrALAZINE    ipratropium-albuterol    Magnesium Standard Dose Replacement - Follow Nurse / BPA Driven Protocol    nitroglycerin    ondansetron    Phosphorus Replacement - Follow Nurse / BPA Driven Protocol    Potassium Replacement - Follow Nurse / BPA Driven Protocol    [COMPLETED] Insert Peripheral IV **AND** sodium chloride    sodium chloride    sodium chloride     Interval History:    Assessment & Plan     CRF  Anemia  Volume overload  -nephrology following  -diuresis  -monitor HGB transfuse less than 7  -required transfusion no overt bleeding  -hgb stable today      Hypertension  HX CHF  Hypotension   HDL  -midodrine  -statin  -holding aldactone/ARB     HX PE  HX afib currently SR  -continue eliquis     ITP  -monitor plts     DMII  -ss/     COPD-stable     BPH-flomax  Plan for disposition:Home with hospice and family    Alondra GOFF Toño, AZEEM  09/23/24  11:27 EDT

## 2024-09-23 NOTE — CASE MANAGEMENT/SOCIAL WORK
Case Management Readmission Assessment Note    Case Management Readmission Assessment (all recorded)       Readmission Interview       Row Name 09/23/24 133             Readmission Indications    Is the patient and/or family able to complete the readmission assessment questions? Yes  Reyna Bustamante via phone      Is this hospitalization related to the prior hospital diagnosis? Yes        Row Name 09/23/24 1332             Recommendation for rehospitalization    Did you speak with your physician prior to coming to the hospital No  Pt was current with [facility], and remains a full code with curative goals        Row Name 09/23/24 4376             Follow-up Appointments    Do you have a PCP? Yes      Did you have an appointment with PCP after your hospitalization? Yes      When was your appointment scheduled? 09/24/24      Did you go to appointment? No  pt in hospital      Did you have an appointment with a Specialist? Yes  Thoracic Surgery      When was your appointment scheduled? 05/13/25      Did you go to appointment? No  not yet time      Are you current with the Pulmonary Clinic? No  Pt was receiving some PT through hospice      Are you current with the CHF Clinic? No        Row Name 09/23/24 3907             Medications    Did you have newly prescribed medications at discharge? Yes  lasix 20mg BID; losartan, ditropan and Yupelin discontinued      Did you understand the reasons for your medications at discharge and how to take them? Yes  per reyna Bustamante      Did you understand the side effects of your medications? Yes  per reyna Bustamante      Are you taking all of you prescribed medications? Yes  per reyna Bustamante      What pharmacy was used to fill prescription(s)? [facility]      Were medications picked up? Yes        Row Name 09/23/24 5134             Discharge Instructions    Did you understand your discharge instructions? Yes  reyna Bustamante read through discharge instructions when pt arrived home       Did your family/caregiver hear your instructions? No      Were you told to eat a special diet? No      Did you adhere to the diet? Yes      Were you given a number of someone to call if you had questions or concerns? Yes        Row Name 09/23/24 1339             Index discharge location/services    Where did you go upon discharge? Home with services      Do you have supportive family or friends in the home? Yes      What services were arranged at discharge? Hospice      Home Health Service used? (none)      Have you had a Home Health visit since discharge? Yes        Row Name 09/23/24 1339             Discharge Readiness    On a scale of 1-5 (5 being well prepared), how ready were you for discharge 5      Recommendation based on interview Goals of care discussion/advanced care planning        Row Name 09/23/24 1339             Palliative Care/Hospice    Are you current with Palliative Care? No      Are you current with Hospice Care? Yes      Which Hospice Company? [facility]        Row Name 09/23/24 1339 09/21/24 1737          Advance Directives (For Healthcare)    Pre-existing AND/MOST/POLST Order Other (Comment)  not on file Yes, notify physician for order     Advance Directive Status Patient has advance directive, copy requested Patient has advance directive, copy requested     Type of Advance Directive Health care directive/Living will;Other (Comment)  not on file Health care directive/Living will     Have you reviewed your Advance Directive and is it valid for this stay? Not applicable No     Literature Provided on Advance Directives No No     Patient Requests Assistance on Advance Directives Patient Declined Patient Declined       Row Name 09/23/24 1339             Readmission Assessment Final Comments    Final Comments Admited 9/8-9/12 with dx ARF/CKD; hypotension (blood pressure medications and diuretics held while at hospital.) Lasix 20mg PO BID restarted at discharge and then increased per daughter  to TID by hospice while at home. Pt discharged home with [facility]/remains full code with frequent hospitalizations. Admitted 9/21/2024 with volume overload. Dx CHF, CRF. Renal following. Creatinine increasing. BNP 19,983. Hospice services revoked and pt to discharge to SNF at discharge.

## 2024-09-23 NOTE — PLAN OF CARE
Goal Outcome Evaluation:         Patient has been lethargic throughout the day. When he was awake he pleasant but confused at times. Cont plan of care.

## 2024-09-23 NOTE — DISCHARGE PLACEMENT REQUEST
"Pili Nam (84 y.o. Male)       Date of Birth   1940    Social Security Number       Address   [address] [place] [address] [address] [place] [facility]    Home Phone   [phone]    MRN   [id number]       Muslim   Jewish    Marital Status                               Admission Date   9/21/24    Admission Type   Emergency    Admitting Provider   Pat Napoles MD    Attending Provider   Pat Napoles MD    Department, Room/Bed   [facility] 3A MEDICAL INPATIENT, 345/1       Discharge Date       Discharge Disposition       Discharge Destination                                 Attending Provider: Pat Napoles MD    Allergies: Penicillin V Potassium, Latex    Isolation: None   Infection: None   Code Status: CPR    Ht: 167.6 cm (66\")   Wt: 88 kg (194 lb 0.1 oz)    Admission Cmt: None   Principal Problem: Volume overload [E87.70]                   Active Insurance as of 9/21/2024       Primary Coverage       Payor Plan Insurance Group Employer/Plan Group    MEDICARE MEDICARE A & B        Payor Plan Address Payor Plan Phone Number Payor Plan Fax Number Effective Dates    [address] [phone]  2/1/2002 - None Entered    [facility] [id number]         Subscriber Name Subscriber Birth Date Member ID       PILI NAM 1940 [id number]               Secondary Coverage       Payor Plan Insurance Group Employer/Plan Group    [facility] SUPP [id number]       Payor Plan Address Payor Plan Phone Number Payor Plan Fax Number Effective Dates    [address]   12/1/2016 - None Entered    [facility] [id number]         Subscriber Name Subscriber Birth Date Member ID       PILI NAM 1940 [id number]                     Emergency Contacts        (Rel.) Home Phone Work Phone Mobile Phone    ANA NAM (Spouse) [phone] -- [phone]    Amy Delaney (Daughter) -- -- [phone]    OUSMANE ORTEGA (Daughter) -- -- [phone]    Fadia Jauregui (Daughter) -- " -- [phone]

## 2024-09-23 NOTE — CASE MANAGEMENT/SOCIAL WORK
Continued Stay Note   Zohaib     Patient Name: Praneeth Nam  MRN: [id number]  Today's Date: 9/23/2024    Admit Date: 9/21/2024    Plan: [facility] (pending acceptance.) PASRR requested 9/23. No precert required.   Discharge Plan       Row Name 09/23/24 1617       Plan    Plan SNF [facility] (pending acceptance.) PASRR requested 9/23. No precert required.    Plan Comments CM called wife (no answer/ nonverbal,) called daughter Dianna to discuss discharge plans and readmit questions. Pt discharged fro, [facility] due to unrealistic goals of care and repeated hospitalizations. Daughter requesting SNF at discharge and choices obtained: 1)[facility] referral placed and liaison Diana contacted (pending acceptance) 2) [facility]. PIERCE barriers: [facility] 9/22, cardiac monitoring, rehab placement. Nephrology following.               Lexii Parada RN      Office phone: [phone]  Office fax: [phone]

## 2024-09-23 NOTE — PLAN OF CARE
Goal Outcome Evaluation:              Outcome Evaluation: Patient pleasant. Temp-99.7, but refused Tylenol. No complaints at this time.

## 2024-09-23 NOTE — CONSULTS
"Heart Failure Program  Nurse Navigator  Discharge Planning    Patient Name:Praneeth Nam  :1940  Cardiologist:Arun  Current Admission Date: 2024   Previous Admission: 2024  Admission frequency: 4 admissions in 6 months    Heart Failure history per record:    Symptoms on admission:c/o SOA. Lower legs and abd swelling per chart. Pt unsure of symptoms, asking how he got to the hospital and unsure who he lives with currently.      Admission Weight:  Flowsheet Rows      Flowsheet Row First Filed Value   Admission Height 167.6 cm (66\") Documented at 2024 0626   Admission Weight 88 kg (194 lb 0.1 oz) Documented at 2024 06              Current Home Medications:  Prior to Admission medications    Medication Sig Start Date End Date Taking? Authorizing Provider   apixaban (ELIQUIS) 2.5 MG tablet tablet Take 1 tablet by mouth 2 (Two) Times a Day. 24  Yes Misty Dias MD   atorvastatin (LIPITOR) 20 MG tablet Take 1 tablet by mouth Every Night.   Yes ProviderRey MD   budesonide (PULMICORT) 0.5 MG/2ML nebulizer solution Take 2 mL by nebulization 2 (Two) Times a Day.   Yes ProviderRey MD   cetirizine (zyrTEC) 10 MG tablet Take 1 tablet by mouth Daily.   Yes Rey Myles MD   fenofibrate 160 MG tablet Take 1 tablet by mouth Daily.   Yes Rey Myles MD   ferrous sulfate 325 (65 FE) MG tablet Take 1 tablet by mouth Daily With Breakfast. 24  Yes Pat Napoles MD   formoterol (PERFOROMIST) 20 MCG/2ML nebulizer solution Take 2 mL by nebulization 2 (Two) Times a Day.   Yes Rey Myles MD   furosemide (Lasix) 20 MG tablet Take 1 tablet by mouth Daily. 24  Yes Pat Napoles MD   Glucosamine Sulfate 1000 MG capsule Take 1 capsule by mouth Daily.   Yes Rey Myles MD   hydroxychloroquine (PLAQUENIL) 200 MG tablet Take 2 tablets by mouth Daily. 19  Yes Rey Myles MD   midodrine (PROAMATINE) 5 MG tablet Take 1 tablet by " mouth 2 (Two) Times a Day.   Yes Rey Myles MD   multivitamin with minerals (V-R COMPLETE [facility]) tablet tablet Take 1 tablet by mouth Daily.   Yes Rey Myles MD   Omega-3 Fatty Acids (fish oil) 1000 MG capsule capsule Take 1 capsule by mouth Daily With Breakfast.   Yes Rey Myles MD   pantoprazole (PROTONIX) 40 MG EC tablet Take 1 tablet by mouth Every Morning. 6/16/24  Yes Alondra Lundberg APRN   silver sulfadiazine (SILVADENE, SSD) 1 % cream Apply 1 Application topically to the appropriate area as directed 2 (Two) Times a Day. Apply to wound on buttocks. 8/14/24  Yes Rey Myles MD   spironolactone (ALDACTONE) 25 MG tablet Take 1 tablet by mouth Daily. 9/12/24  Yes Alondra Lundberg APRN   tamsulosin (FLOMAX) 0.4 MG capsule 24 hr capsule Take 1 capsule by mouth Daily. 8/13/24  Yes Rey Myles MD   acetaminophen (TYLENOL) 500 MG tablet Take 2 tablets by mouth 2 (Two) Times a Day.    Rey Myles MD   ipratropium-albuterol (DUO-NEB) 0.5-2.5 mg/3 ml nebulizer Take 3 mL by nebulization Every 4 (Four) Hours As Needed for Wheezing.    Rey Myles MD   sulfamethoxazole-trimethoprim (BACTRIM DS,SEPTRA DS) 800-160 MG per tablet Take 1 tablet by mouth 2 (Two) Times a Day.    Rey Myles MD       Social history:   Pt from senior living    Smoking status:     Diagnostics Testing:  proBNP level: 19983    Echocardiogram:Results for orders placed during the hospital encounter of 04/23/24    Adult Transthoracic Echo Complete W/ Cont if Necessary Per Protocol    Interpretation Summary  Indications  Shortness of breath    Technically satisfactory study.  Mitral valve is thickened with adequate opening motion.  Moderate mitral regurgitation is present.  Tricuspid valve is structurally normal.  Moderate tricuspid regurgitation is present.  Aortic valve is thickened with adequate opening motion.  Moderate aortic regurgitation is present.  Pulmonic valve  opening motion is normal.  Moderate pulmonic regurgitation is present.  Mild pulmonary hypertension is present.  Left atrium is enlarged.  Right atrium is enlarged.  Left ventricle is normal in size and contractility with ejection fraction of 60%.  Grade 3 left ventricular diastolic dysfunction is present.  (MV E/8 ratio 2.8)  Left ventricular peak systolic longitudinal strain is abnormal with GL PS of -14%.  Concentric left ventricle hypertrophy is present.  Right ventricle enlarged with hypocontractility with a TAPSE of 1.46 cm..  Atrial septum is intact.  Aorta is dilated at 4.3 cm.  IVC is dilated with decreased respiratory variation.  Right atrial pressure 8 mmHg.  No pericardial effusion or intracardiac thrombus is seen.    Impression  Thickened mitral and aortic valves with adequate opening motion.  Moderate mitral tricuspid and aortic and pulmonic regurgitation.  Left atrial enlargement.  Left ventricular size and contractility is normal with ejection fraction of 60%.  Left ventricular peak systolic longitudinal strain is abnormal with GL PS of -14%.  Left ventricular grade 3 diastolic dysfunction is present.  Right atrium right ventricle enlargement is present.  Mild pulmonary hypertension is present.        Patient Assessment:   Pt lying in bed, resp even and unlabored. No soa with conversation. Pt alert and orient x3 but unsure how he got to hospital. Trace edema lower legs.     Current O2: 2L NC  Home O2: 2L NC    Education provided to patient:  yes- Heart Failure disease education  yes -Symptom identification/management  yes -Daily Weights   - Diet education   - Fluid restriction (if ordered)  yes- Activity education   - Medication education  na- Smoking cessation  yes- Follow-up Appointments  yes-Provided information on how to access AHA My HF Guide/Heart Failure Interactive workbook    Acceptance of learning: limited understanding per conversation    Heart Failure education interactive teaching  session time: 20 minutes    GWTG: EF 60%    Identified needs/barriers:   Volume status, mobility-snf at dc, noted hospice at dc per chart, needs cardiology follow-up if chosen    Intervention:   Education attempt

## 2024-09-23 NOTE — PROGRESS NOTES
"NEPHROLOGY PROGRESS NOTE------[facility]    [facility]  [phone]  Sascha Dorado MD      Patient Care Team:  Guilherme Jason MD as PCP - General (Family Medicine)  Misty Dias MD as Consulting Physician (Cardiology)  Nora Kenyon, BRIAN, APRN as Nurse Practitioner (Thoracic Surgery)  Sussy Shane MD as Consulting Physician (Nephrology)      Provider:  Sascha Dorado MD  Patient Name: Praneeth Nam  :  1940    SUBJECTIVE:  Follow-up CKD  No chest pain shortness of breath    Medication:  apixaban, 2.5 mg, Oral, BID  atorvastatin, 20 mg, Oral, Nightly  budesonide, 0.5 mg, Nebulization, BID  ferrous sulfate, 324 mg, Oral, Daily With Breakfast  furosemide, 40 mg, Intravenous, BID  midodrine, 5 mg, Oral, TID AC  pantoprazole, 40 mg, Oral, Q AM  sodium chloride, 10 mL, Intravenous, Q12H  tamsulosin, 0.4 mg, Oral, Daily           OBJECTIVE    Vital Sign Min/Max for last 24 hours  Temp  Min: 98.6 °F (37 °C)  Max: 99.7 °F (37.6 °C)   BP  Min: 101/57  Max: 134/52   Pulse  Min: 53  Max: 68   Resp  Min: 14  Max: 31   SpO2  Min: 93 %  Max: 100 %   No data recorded   Weight  Min: 88 kg (194 lb 0.1 oz)  Max: 88 kg (194 lb 0.1 oz)     Flowsheet Rows      Flowsheet Row First Filed Value   Admission Height 167.6 cm (66\") Documented at 2024 0626   Admission Weight 88 kg (194 lb 0.1 oz) Documented at 2024 0626            No intake/output data recorded.  I/O last 3 completed shifts:  In: 406.3 [Blood:406.3]  Out: 2700 [Urine:2700]    Physical Exam:  General Appearance: alert, appears stated age and cooperative  Head: normocephalic, without obvious abnormality and atraumatic  Eyes: conjunctivae and sclerae normal and no icterus  Neck: supple and no JVD  Lungs: clear to auscultation and respirations regular  Heart: regular rhythm & normal rate and normal S1, S2  Chest: Wall no abnormalities observed  Abdomen: normal bowel sounds and " "soft, nontender  Extremities: moves extremities well, trace edema, no cyanosis and no redness.  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  Skin: Bleeding wound right great toe amputation site.  Currently wrapped.  Neurologic: Alert, and oriented. No focal deficits    Labs:    WBC WBC   Date Value Ref Range Status   09/23/2024 7.02 3.40 - 10.80 10*3/mm3 Final   09/22/2024 5.57 3.40 - 10.80 10*3/mm3 Final   09/21/2024 7.30 3.40 - 10.80 10*3/mm3 Final      HGB Hemoglobin   Date Value Ref Range Status   09/23/2024 8.1 (L) 13.0 - 17.7 g/dL Final   09/22/2024 6.4 (C) 13.0 - 17.7 g/dL Final   09/21/2024 7.6 (L) 13.0 - 17.7 g/dL Final      HCT Hematocrit   Date Value Ref Range Status   09/23/2024 25.1 (L) 37.5 - 51.0 % Final     Comment:     Result checked     09/22/2024 20.3 (C) 37.5 - 51.0 % Final   09/21/2024 25.0 (L) 37.5 - 51.0 % Final      Platelets No results found for: \"LABPLAT\"   MCV MCV   Date Value Ref Range Status   09/23/2024 96.9 79.0 - 97.0 fL Final   09/22/2024 99.5 (H) 79.0 - 97.0 fL Final   09/21/2024 100.8 (H) 79.0 - 97.0 fL Final          Sodium Sodium   Date Value Ref Range Status   09/23/2024 139 136 - 145 mmol/L Final   09/22/2024 138 136 - 145 mmol/L Final   09/21/2024 138 136 - 145 mmol/L Final      Potassium Potassium   Date Value Ref Range Status   09/23/2024 4.1 3.5 - 5.2 mmol/L Final   09/22/2024 3.6 3.5 - 5.2 mmol/L Final   09/21/2024 3.8 3.5 - 5.2 mmol/L Final      Chloride Chloride   Date Value Ref Range Status   09/23/2024 102 98 - 107 mmol/L Final   09/22/2024 101 98 - 107 mmol/L Final   09/21/2024 104 98 - 107 mmol/L Final      CO2 CO2   Date Value Ref Range Status   09/23/2024 28.4 22.0 - 29.0 mmol/L Final   09/22/2024 29.3 (H) 22.0 - 29.0 mmol/L Final   09/21/2024 24.9 22.0 - 29.0 mmol/L Final      BUN BUN   Date Value Ref Range Status   09/23/2024 34 (H) 8 - 23 mg/dL Final   09/22/2024 34 (H) 8 - 23 mg/dL Final   09/21/2024 33 (H) 8 - 23 mg/dL Final      Creatinine Creatinine   Date " "Value Ref Range Status   09/23/2024 1.43 (H) 0.76 - 1.27 mg/dL Final   09/22/2024 1.35 (H) 0.76 - 1.27 mg/dL Final   09/21/2024 1.26 0.76 - 1.27 mg/dL Final      Calcium Calcium   Date Value Ref Range Status   09/23/2024 8.3 (L) 8.6 - 10.5 mg/dL Final   09/22/2024 8.1 (L) 8.6 - 10.5 mg/dL Final   09/21/2024 8.5 (L) 8.6 - 10.5 mg/dL Final      PO4 No components found for: \"PO4\"   Albumin Albumin   Date Value Ref Range Status   09/23/2024 2.2 (L) 3.5 - 5.2 g/dL Final   09/22/2024 2.1 (L) 3.5 - 5.2 g/dL Final   09/21/2024 2.5 (L) 3.5 - 5.2 g/dL Final      Magnesium Magnesium   Date Value Ref Range Status   09/23/2024 2.1 1.6 - 2.4 mg/dL Final   09/22/2024 1.5 (L) 1.6 - 2.4 mg/dL Final      Uric Acid No components found for: \"URIC ACID\"     Imaging Results (Last 72 Hours)       Procedure Component Value Units Date/Time    XR Foot 3+ View Right [[id number]] Collected: 09/21/24 0751     Updated: 09/21/24 0757    Narrative:      XR FOOT 3+ VW RIGHT    Date of Exam: 9/21/2024 7:28 AM EDT    Indication: open draining wound    Comparison: X-ray September 16, 2024      Findings:     Plantar calcaneal spur. There is vascular calcification. There is lateral subluxation of the digits of the foot at the metatarsal phalangeal joint spaces. There is hallux valgus deformity. There are hammertoe deformities. No definitive changes of   osteomyelitis are appreciated.          Impression:      Impression:  1.No significant change in the appearance of the foot.  If there is concern for osteomyelitis MR may be of benefit to reassess this patient.    Electronically Signed: Corona Angulo MD    9/21/2024 7:55 AM EDT    Workstation ID: [id number]    XR Chest 1 View [[id number]] Collected: 09/21/24 0747     Updated: 09/21/24 0753    Narrative:      XR CHEST 1 VW    Date of Exam: 9/21/2024 7:28 AM EDT    Indication: edema    Comparison: September 16, 2024    Findings:  The heart is enlarged. There remains some blunting of the lateral costophrenic " sulci which could reflect small effusions. There is slight prominence of markings within the interstitium of the lungs that could reflect some interstitial edema. No   significant new findings are seen as compared to the prior study.      Impression:      Impression:  1.Cardiomegaly with some vascular congestion/edema suggested.  2.Small bibasilar effusions.      Electronically Signed: Corona Angulo MD    9/21/2024 7:50 AM EDT    Workstation ID: [id number]            Results for orders placed during the hospital encounter of 09/21/24    XR Foot 3+ View Right    Narrative  XR FOOT 3+ VW RIGHT    Date of Exam: 9/21/2024 7:28 AM EDT    Indication: open draining wound    Comparison: X-ray September 16, 2024      Findings:  Plantar calcaneal spur. There is vascular calcification. There is lateral subluxation of the digits of the foot at the metatarsal phalangeal joint spaces. There is hallux valgus deformity. There are hammertoe deformities. No definitive changes of  osteomyelitis are appreciated.    Impression  Impression:  1.No significant change in the appearance of the foot.  If there is concern for osteomyelitis MR may be of benefit to reassess this patient.    Electronically Signed: Corona Angulo MD  9/21/2024 7:55 AM EDT  Workstation ID: [id number]      XR Chest 1 View    Narrative  XR CHEST 1 VW    Date of Exam: 9/21/2024 7:28 AM EDT    Indication: edema    Comparison: September 16, 2024    Findings:  The heart is enlarged. There remains some blunting of the lateral costophrenic sulci which could reflect small effusions. There is slight prominence of markings within the interstitium of the lungs that could reflect some interstitial edema. No  significant new findings are seen as compared to the prior study.    Impression  Impression:  1.Cardiomegaly with some vascular congestion/edema suggested.  2.Small bibasilar effusions.      Electronically Signed: Corona Angulo MD  9/21/2024 7:50 AM EDT  Workstation ID:  [id number]      Results for orders placed during the hospital encounter of 09/07/24    XR Chest PA & Lateral    Narrative  XR CHEST PA AND LATERAL    Date of Exam: 9/11/2024 1:14 PM EDT    Indication: SOA    Comparison: 6/12/2024    Findings:  Cardiomediastinal silhouette is enlarged. There is pulmonary vascular congestion with interstitial thickening and patchy bibasilar airspace disease. Small bilateral pleural effusions. There are degenerative changes of the shoulders.    Impression  Impression:  Cardiomegaly with interstitial and airspace disease and small bilateral pleural effusions. Findings likely represent CHF/volume overload.      Electronically Signed: Lexii Medrano MD  9/11/2024 1:35 PM EDT  Workstation ID: [id number]      Results for orders placed during the hospital encounter of 09/21/24    Duplex Venous Upper Extremity - Right CAR    Interpretation Summary    Normal right upper extremity venous duplex scan.        ASSESSMENT / PLAN      Volume overload       CKD stage IIIa-secondary to hypertensive nephrosclerosis  Hypervolemia-IV diuretics  History CHF-diurese  Likely cellulitis right foot  History leukemia  History A-fib  History CVA  COPD  Recent UTI-UA negative.  Anemia-acute on chronic.  On Eliquis.  May have chronic occult bleeding.  TSAT low.  Procrit/Ferrlecit.  9/22/2024.     CR slightly up today  Hemoglobin stable post transfusion.  Antibiotics per primary  UA negative.  Switch to oral diuretics    Sascha Dorado MD  [facility]  [phone]  09/23/24  08:50 EDT

## 2024-09-24 LAB
ALBUMIN SERPL-MCNC: 2.1 G/DL (ref 3.5–5.2)
ANION GAP SERPL CALCULATED.3IONS-SCNC: 6.5 MMOL/L (ref 5–15)
BASOPHILS # BLD AUTO: 0.03 10*3/MM3 (ref 0–0.2)
BASOPHILS NFR BLD AUTO: 0.4 % (ref 0–1.5)
BUN SERPL-MCNC: 39 MG/DL (ref 8–23)
BUN/CREAT SERPL: 24.5 (ref 7–25)
CALCIUM SPEC-SCNC: 8.5 MG/DL (ref 8.6–10.5)
CHLORIDE SERPL-SCNC: 105 MMOL/L (ref 98–107)
CO2 SERPL-SCNC: 29.5 MMOL/L (ref 22–29)
CREAT SERPL-MCNC: 1.59 MG/DL (ref 0.76–1.27)
DEPRECATED RDW RBC AUTO: 58.6 FL (ref 37–54)
EGFRCR SERPLBLD CKD-EPI 2021: 42.8 ML/MIN/1.73
EOSINOPHIL # BLD AUTO: 0.52 10*3/MM3 (ref 0–0.4)
EOSINOPHIL NFR BLD AUTO: 7.8 % (ref 0.3–6.2)
ERYTHROCYTE [DISTWIDTH] IN BLOOD BY AUTOMATED COUNT: 15.9 % (ref 12.3–15.4)
GLUCOSE SERPL-MCNC: 97 MG/DL (ref 65–99)
HCT VFR BLD AUTO: 26.8 % (ref 37.5–51)
HGB BLD-MCNC: 8.5 G/DL (ref 13–17.7)
IMM GRANULOCYTES # BLD AUTO: 0.04 10*3/MM3 (ref 0–0.05)
IMM GRANULOCYTES NFR BLD AUTO: 0.6 % (ref 0–0.5)
LYMPHOCYTES # BLD AUTO: 0.82 10*3/MM3 (ref 0.7–3.1)
LYMPHOCYTES NFR BLD AUTO: 12.2 % (ref 19.6–45.3)
MCH RBC QN AUTO: 32.1 PG (ref 26.6–33)
MCHC RBC AUTO-ENTMCNC: 31.7 G/DL (ref 31.5–35.7)
MCV RBC AUTO: 101.1 FL (ref 79–97)
MONOCYTES # BLD AUTO: 0.5 10*3/MM3 (ref 0.1–0.9)
MONOCYTES NFR BLD AUTO: 7.5 % (ref 5–12)
NEUTROPHILS NFR BLD AUTO: 4.79 10*3/MM3 (ref 1.7–7)
NEUTROPHILS NFR BLD AUTO: 71.5 % (ref 42.7–76)
NRBC BLD AUTO-RTO: 0 /100 WBC (ref 0–0.2)
PHOSPHATE SERPL-MCNC: 3.2 MG/DL (ref 2.5–4.5)
PLATELET # BLD AUTO: 244 10*3/MM3 (ref 140–450)
PMV BLD AUTO: 11.5 FL (ref 6–12)
POTASSIUM SERPL-SCNC: 4.1 MMOL/L (ref 3.5–5.2)
RBC # BLD AUTO: 2.65 10*6/MM3 (ref 4.14–5.8)
SODIUM SERPL-SCNC: 141 MMOL/L (ref 136–145)
WBC NRBC COR # BLD AUTO: 6.7 10*3/MM3 (ref 3.4–10.8)

## 2024-09-24 PROCEDURE — 94761 N-INVAS EAR/PLS OXIMETRY MLT: CPT

## 2024-09-24 PROCEDURE — 80069 RENAL FUNCTION PANEL: CPT | Performed by: INTERNAL MEDICINE

## 2024-09-24 PROCEDURE — 97162 PT EVAL MOD COMPLEX 30 MIN: CPT

## 2024-09-24 PROCEDURE — 94799 UNLISTED PULMONARY SVC/PX: CPT

## 2024-09-24 PROCEDURE — 94664 DEMO&/EVAL PT USE INHALER: CPT

## 2024-09-24 PROCEDURE — 85025 COMPLETE CBC W/AUTO DIFF WBC: CPT | Performed by: INTERNAL MEDICINE

## 2024-09-24 RX ORDER — FUROSEMIDE 40 MG
40 TABLET ORAL DAILY
Status: DISCONTINUED | OUTPATIENT
Start: 2024-09-25 | End: 2024-09-25 | Stop reason: HOSPADM

## 2024-09-24 RX ADMIN — APIXABAN 2.5 MG: 2.5 TABLET, FILM COATED ORAL at 09:32

## 2024-09-24 RX ADMIN — SILVER SULFADIAZINE 1 APPLICATION: 10 CREAM TOPICAL at 09:32

## 2024-09-24 RX ADMIN — Medication 10 ML: at 20:35

## 2024-09-24 RX ADMIN — TAMSULOSIN HYDROCHLORIDE 0.4 MG: 0.4 CAPSULE ORAL at 09:32

## 2024-09-24 RX ADMIN — BUDESONIDE 0.5 MG: 0.5 INHALANT RESPIRATORY (INHALATION) at 19:40

## 2024-09-24 RX ADMIN — FERROUS SULFATE TAB EC 324 MG (65 MG FE EQUIVALENT) 324 MG: 324 (65 FE) TABLET DELAYED RESPONSE at 09:32

## 2024-09-24 RX ADMIN — PANTOPRAZOLE SODIUM 40 MG: 40 TABLET, DELAYED RELEASE ORAL at 05:51

## 2024-09-24 RX ADMIN — FUROSEMIDE 40 MG: 40 TABLET ORAL at 09:32

## 2024-09-24 RX ADMIN — SILVER SULFADIAZINE 1 APPLICATION: 10 CREAM TOPICAL at 20:38

## 2024-09-24 RX ADMIN — MIDODRINE HYDROCHLORIDE 5 MG: 5 TABLET ORAL at 12:52

## 2024-09-24 RX ADMIN — BUDESONIDE 0.5 MG: 0.5 INHALANT RESPIRATORY (INHALATION) at 08:26

## 2024-09-24 RX ADMIN — APIXABAN 2.5 MG: 2.5 TABLET, FILM COATED ORAL at 20:35

## 2024-09-24 RX ADMIN — MIDODRINE HYDROCHLORIDE 5 MG: 5 TABLET ORAL at 09:32

## 2024-09-24 RX ADMIN — ATORVASTATIN CALCIUM 20 MG: 20 TABLET, FILM COATED ORAL at 20:35

## 2024-09-24 RX ADMIN — Medication 10 ML: at 09:32

## 2024-09-24 RX ADMIN — MIDODRINE HYDROCHLORIDE 5 MG: 5 TABLET ORAL at 16:41

## 2024-09-24 NOTE — PLAN OF CARE
Goal Outcome Evaluation:  Plan of Care Reviewed With: patient           Outcome Evaluation: 85 yo male adm 9/21/24 for acute soa, weakness, falls at home. Pt dx w/ volume overload (proBNP 19,983) and anemia (HgB 7.6). PMH: prostate ca; sjogrens syndrome; MDS; copd; large granular lymphocytic leukemia; RA; ckd3; PE; dm2; cva. At baseline, pt lives w/ his wife (whom he reports has dementia) and granddtr, whom he states provides assist w/ himself and his wife. Pt reports he has a rollator which he uses most of the time around the home. Sleeps in a bed. Uses a w/c for mobility out of the home. Pt confirms he has had multiple falls recently. No stairs to enter/exit home, and functions on one level inside the home. Today, pt is alert and oriented to place and situation but not entirely to time. Comes to sit at eob w/ max assist of 1, and required mod to max assist of 2 to return to supine. Pt able to come to stand w/ mod to max assist, but he would not let go of bedrail for fear of falling, so was unable to transfer to chair. Pt is far below his prior level of function and will needs SNF at d/c. Will follow.

## 2024-09-24 NOTE — PROGRESS NOTES
"NEPHROLOGY PROGRESS NOTE------[facility]    [facility]  [phone]  Sascha Dorado MD      Patient Care Team:  Guilherme Jason MD as PCP - General (Family Medicine)  Misty Dias MD as Consulting Physician (Cardiology)  Nora Kenyon, BRIAN, APRN as Nurse Practitioner (Thoracic Surgery)  Sussy Shane MD as Consulting Physician (Nephrology)      Provider:  Sascha Dorado MD  Patient Name: Praneeth Nam  :  1940    SUBJECTIVE:  Follow-up CKD  No chest pain shortness of breath    Medication:  apixaban, 2.5 mg, Oral, BID  atorvastatin, 20 mg, Oral, Nightly  budesonide, 0.5 mg, Nebulization, BID  ferrous sulfate, 324 mg, Oral, Daily With Breakfast  furosemide, 40 mg, Oral, BID  midodrine, 5 mg, Oral, TID AC  pantoprazole, 40 mg, Oral, Q AM  silver sulfadiazine, 1 Application, Topical, BID  sodium chloride, 10 mL, Intravenous, Q12H  tamsulosin, 0.4 mg, Oral, Daily           OBJECTIVE    Vital Sign Min/Max for last 24 hours  Temp  Min: 98.2 °F (36.8 °C)  Max: 99.3 °F (37.4 °C)   BP  Min: 105/82  Max: 140/57   Pulse  Min: 49  Max: 76   Resp  Min: 16  Max: 27   SpO2  Min: 90 %  Max: 100 %   No data recorded   Weight  Min: 78.2 kg (172 lb 6.4 oz)  Max: 78.2 kg (172 lb 6.4 oz)     Flowsheet Rows      Flowsheet Row First Filed Value   Admission Height 167.6 cm (66\") Documented at 2024   Admission Weight 88 kg (194 lb 0.1 oz) Documented at 2024            I/O this shift:  In: 100 [P.O.:100]  Out: -   I/O last 3 completed shifts:  In: -   Out: 3150 [Urine:3150]    Physical Exam:  General Appearance: alert, appears stated age and cooperative  Head: normocephalic, without obvious abnormality and atraumatic  Eyes: conjunctivae and sclerae normal and no icterus  Neck: supple and no JVD  Lungs: clear to auscultation and respirations regular  Heart: regular rhythm & normal rate and normal S1, S2  Chest: Wall no abnormalities " "observed  Abdomen: normal bowel sounds and soft, nontender  Extremities: moves extremities well, trace edema, no cyanosis and no redness.  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  Skin: Bleeding wound right great toe amputation site.  Currently wrapped.  Neurologic: Alert, and oriented. No focal deficits    Labs:    WBC WBC   Date Value Ref Range Status   09/24/2024 6.70 3.40 - 10.80 10*3/mm3 Final   09/23/2024 7.02 3.40 - 10.80 10*3/mm3 Final   09/22/2024 5.57 3.40 - 10.80 10*3/mm3 Final      HGB Hemoglobin   Date Value Ref Range Status   09/24/2024 8.5 (L) 13.0 - 17.7 g/dL Final   09/23/2024 8.1 (L) 13.0 - 17.7 g/dL Final   09/22/2024 6.4 (C) 13.0 - 17.7 g/dL Final      HCT Hematocrit   Date Value Ref Range Status   09/24/2024 26.8 (L) 37.5 - 51.0 % Final   09/23/2024 25.1 (L) 37.5 - 51.0 % Final     Comment:     Result checked     09/22/2024 20.3 (C) 37.5 - 51.0 % Final      Platelets No results found for: \"LABPLAT\"   MCV MCV   Date Value Ref Range Status   09/24/2024 101.1 (H) 79.0 - 97.0 fL Final     Comment:     Result checked     09/23/2024 96.9 79.0 - 97.0 fL Final   09/22/2024 99.5 (H) 79.0 - 97.0 fL Final          Sodium Sodium   Date Value Ref Range Status   09/24/2024 141 136 - 145 mmol/L Final   09/23/2024 139 136 - 145 mmol/L Final   09/22/2024 138 136 - 145 mmol/L Final      Potassium Potassium   Date Value Ref Range Status   09/24/2024 4.1 3.5 - 5.2 mmol/L Final     Comment:     Specimen hemolyzed.  Result may be falsely elevated.   09/23/2024 4.1 3.5 - 5.2 mmol/L Final   09/22/2024 3.6 3.5 - 5.2 mmol/L Final      Chloride Chloride   Date Value Ref Range Status   09/24/2024 105 98 - 107 mmol/L Final   09/23/2024 102 98 - 107 mmol/L Final   09/22/2024 101 98 - 107 mmol/L Final      CO2 CO2   Date Value Ref Range Status   09/24/2024 29.5 (H) 22.0 - 29.0 mmol/L Final   09/23/2024 28.4 22.0 - 29.0 mmol/L Final   09/22/2024 29.3 (H) 22.0 - 29.0 mmol/L Final      BUN BUN   Date Value Ref Range Status " "  09/24/2024 39 (H) 8 - 23 mg/dL Final   09/23/2024 34 (H) 8 - 23 mg/dL Final   09/22/2024 34 (H) 8 - 23 mg/dL Final      Creatinine Creatinine   Date Value Ref Range Status   09/24/2024 1.59 (H) 0.76 - 1.27 mg/dL Final   09/23/2024 1.43 (H) 0.76 - 1.27 mg/dL Final   09/22/2024 1.35 (H) 0.76 - 1.27 mg/dL Final      Calcium Calcium   Date Value Ref Range Status   09/24/2024 8.5 (L) 8.6 - 10.5 mg/dL Final   09/23/2024 8.3 (L) 8.6 - 10.5 mg/dL Final   09/22/2024 8.1 (L) 8.6 - 10.5 mg/dL Final      PO4 No components found for: \"PO4\"   Albumin Albumin   Date Value Ref Range Status   09/24/2024 2.1 (L) 3.5 - 5.2 g/dL Final   09/23/2024 2.2 (L) 3.5 - 5.2 g/dL Final   09/22/2024 2.1 (L) 3.5 - 5.2 g/dL Final      Magnesium Magnesium   Date Value Ref Range Status   09/23/2024 2.1 1.6 - 2.4 mg/dL Final   09/22/2024 1.5 (L) 1.6 - 2.4 mg/dL Final      Uric Acid No components found for: \"URIC ACID\"     Imaging Results (Last 72 Hours)       ** No results found for the last 72 hours. **            Results for orders placed during the hospital encounter of 09/21/24    XR Foot 3+ View Right    Narrative  XR FOOT 3+ VW RIGHT    Date of Exam: 9/21/2024 7:28 AM EDT    Indication: open draining wound    Comparison: X-ray September 16, 2024      Findings:  Plantar calcaneal spur. There is vascular calcification. There is lateral subluxation of the digits of the foot at the metatarsal phalangeal joint spaces. There is hallux valgus deformity. There are hammertoe deformities. No definitive changes of  osteomyelitis are appreciated.    Impression  Impression:  1.No significant change in the appearance of the foot.  If there is concern for osteomyelitis MR may be of benefit to reassess this patient.    Electronically Signed: Corona Angulo MD  9/21/2024 7:55 AM EDT  Workstation ID: [id number]      XR Chest 1 View    Narrative  XR CHEST 1 VW    Date of Exam: 9/21/2024 7:28 AM EDT    Indication: edema    Comparison: September 16, " 2024    Findings:  The heart is enlarged. There remains some blunting of the lateral costophrenic sulci which could reflect small effusions. There is slight prominence of markings within the interstitium of the lungs that could reflect some interstitial edema. No  significant new findings are seen as compared to the prior study.    Impression  Impression:  1.Cardiomegaly with some vascular congestion/edema suggested.  2.Small bibasilar effusions.      Electronically Signed: Corona Angulo MD  9/21/2024 7:50 AM EDT  Workstation ID: [id number]      Results for orders placed during the hospital encounter of 09/07/24    XR Chest PA & Lateral    Narrative  XR CHEST PA AND LATERAL    Date of Exam: 9/11/2024 1:14 PM EDT    Indication: SOA    Comparison: 6/12/2024    Findings:  Cardiomediastinal silhouette is enlarged. There is pulmonary vascular congestion with interstitial thickening and patchy bibasilar airspace disease. Small bilateral pleural effusions. There are degenerative changes of the shoulders.    Impression  Impression:  Cardiomegaly with interstitial and airspace disease and small bilateral pleural effusions. Findings likely represent CHF/volume overload.      Electronically Signed: Lexii Medrano MD  9/11/2024 1:35 PM EDT  Workstation ID: [id number]      Results for orders placed during the hospital encounter of 09/21/24    Duplex Venous Upper Extremity - Right CAR    Interpretation Summary    Normal right upper extremity venous duplex scan.        ASSESSMENT / PLAN      Volume overload       CKD stage IIIa-secondary to hypertensive nephrosclerosis  Hypervolemia-IV diuretics  History CHF-diurese  Likely cellulitis right foot  History leukemia  History A-fib  History CVA  COPD  Recent UTI-UA negative.  Anemia-acute on chronic.  On Eliquis.  May have chronic occult bleeding.  TSAT low.  Procrit/Ferrlecit.  9/22/2024.     Creatinine trending up, reduce Lasix to once daily  Hemoglobin stable post  transfusion.  Antibiotics per primary  UA negative.  Monitor renal function fluid status electrolytes    Sascha Dorado MD  [facility]  [phone]  09/24/24  10:19 EDT

## 2024-09-24 NOTE — PROGRESS NOTES
LOS: 3 days   Patient Care Team:  Guilherme Jason MD as PCP - General (Family Medicine)  Misty Dias MD as Consulting Physician (Cardiology)  Nora Kenyon, BRIAN, APRN as Nurse Practitioner (Thoracic Surgery)  Sussy Shane MD as Consulting Physician (Nephrology)    Subjective     Interval History: Stable overnight    Patient Complaints: No specific complaints, good spirits today.  Specifically denies chest pain or shortness of breath    History taken from: patient    Review of Systems   Constitutional:  Positive for activity change. Negative for appetite change, chills, diaphoresis, fatigue, fever and unexpected weight change.   HENT:  Negative for facial swelling.    Eyes:  Negative for visual disturbance.   Respiratory:  Negative for cough, shortness of breath, wheezing and stridor.    Cardiovascular:  Negative for chest pain, palpitations and leg swelling.   Gastrointestinal:  Negative for abdominal pain, constipation and diarrhea.   Genitourinary:  Negative for flank pain.   Musculoskeletal:  Negative for back pain.   Neurological:  Negative for headaches.   Psychiatric/Behavioral:  Positive for confusion.            Objective     Vital Signs  Temp:  [98.3 °F (36.8 °C)-99.3 °F (37.4 °C)] 98.9 °F (37.2 °C)  Heart Rate:  [57-76] 62  Resp:  [16-27] 17  BP: (105-144)/(44-82) 144/49    Physical Exam:     General Appearance:    Alert, cooperative, in no acute distress, oriented x 2, baseline, pleasant   Head:    Normocephalic, without obvious abnormality, atraumatic   Eyes:            Lids and lashes normal, conjunctivae and sclerae normal, no   icterus, no pallor, corneas clear, PERRLA   Ears:    Ears appear intact with no abnormalities noted   Throat:   No oral lesions, no thrush, oral mucosa moist   Neck:   No adenopathy, supple, trachea midline, no thyromegaly, no   carotid bruit, no JVD   Lungs:     Clear to auscultation,respirations regular, even and                  unlabored     Heart:    Regular rhythm and normal rate, normal S1 and S2, no            murmur, no gallop, no rub, no click   Chest Wall:    No abnormalities observed   Abdomen:     Normal bowel sounds, no masses, no organomegaly, soft        Non-tender non-distended, no guarding,   Extremities:   Moves all extremities well, no edema, no cyanosis, no             Redness   Pulses:   Pulses palpable and equal bilaterally   Skin: Severe hemosiderin staining in both lower extremities   Lymph nodes:   No palpable adenopathy   Neurologic:   Cranial nerves 2 - 12 grossly intact, sensation intact, DTR       present and equal bilaterally; gait not assessed        Results Review:    Lab Results (last 24 hours)       Procedure Component Value Units Date/Time    Renal Function Panel [[id number]]  (Abnormal) Collected: 09/24/24 0556    Specimen: Blood from Arm, Left Updated: 09/24/24 0655     Glucose 97 mg/dL      BUN 39 mg/dL      Creatinine 1.59 mg/dL      Sodium 141 mmol/L      Potassium 4.1 mmol/L      Comment: Specimen hemolyzed.  Result may be falsely elevated.        Chloride 105 mmol/L      CO2 29.5 mmol/L      Calcium 8.5 mg/dL      Albumin 2.1 g/dL      Phosphorus 3.2 mg/dL      Anion Gap 6.5 mmol/L      BUN/Creatinine Ratio 24.5     eGFR 42.8 mL/min/1.73     Narrative:      GFR Normal >60  Chronic Kidney Disease <60  Kidney Failure <15    The GFR formula is only valid for adults with stable renal function between ages 18 and 70.    CBC & Differential [[id number]]  (Abnormal) Collected: 09/24/24 0556    Specimen: Blood from Arm, Left Updated: 09/24/24 0649    Narrative:      The following orders were created for panel order CBC & Differential.  Procedure                               Abnormality         Status                     ---------                               -----------         ------                     CBC Auto Differential[[id number]]        Abnormal            Final result                 Please view results for these  tests on the individual orders.    CBC Auto Differential [[id number]]  (Abnormal) Collected: 09/24/24 0556    Specimen: Blood from Arm, Left Updated: 09/24/24 0649     WBC 6.70 10*3/mm3      RBC 2.65 10*6/mm3      Hemoglobin 8.5 g/dL      Hematocrit 26.8 %      .1 fL      Comment: Result checked          MCH 32.1 pg      MCHC 31.7 g/dL      RDW 15.9 %      RDW-SD 58.6 fl      MPV 11.5 fL      Platelets 244 10*3/mm3      Neutrophil % 71.5 %      Lymphocyte % 12.2 %      Monocyte % 7.5 %      Eosinophil % 7.8 %      Basophil % 0.4 %      Immature Grans % 0.6 %      Neutrophils, Absolute 4.79 10*3/mm3      Lymphocytes, Absolute 0.82 10*3/mm3      Monocytes, Absolute 0.50 10*3/mm3      Eosinophils, Absolute 0.52 10*3/mm3      Basophils, Absolute 0.03 10*3/mm3      Immature Grans, Absolute 0.04 10*3/mm3      nRBC 0.0 /100 WBC              Imaging Results (Last 24 Hours)       ** No results found for the last 24 hours. **                 I reviewed the patient's new clinical results.    Medication Review:   Scheduled Meds:apixaban, 2.5 mg, Oral, BID  atorvastatin, 20 mg, Oral, Nightly  budesonide, 0.5 mg, Nebulization, BID  ferrous sulfate, 324 mg, Oral, Daily With Breakfast  [START ON 9/25/2024] furosemide, 40 mg, Oral, Daily  midodrine, 5 mg, Oral, TID AC  pantoprazole, 40 mg, Oral, Q AM  silver sulfadiazine, 1 Application, Topical, BID  sodium chloride, 10 mL, Intravenous, Q12H  tamsulosin, 0.4 mg, Oral, Daily      Continuous Infusions:   PRN Meds:.  acetaminophen **OR** acetaminophen **OR** acetaminophen    senna-docusate sodium **AND** polyethylene glycol **AND** bisacodyl **AND** bisacodyl    Calcium Replacement - Follow Nurse / BPA Driven Protocol    hydrALAZINE    ipratropium-albuterol    Magnesium Standard Dose Replacement - Follow Nurse / BPA Driven Protocol    nitroglycerin    ondansetron    Phosphorus Replacement - Follow Nurse / BPA Driven Protocol    Potassium Replacement - Follow Nurse / BPA Driven  "Protocol    [COMPLETED] Insert Peripheral IV **AND** sodium chloride    sodium chloride    sodium chloride     Assessment & Plan       Volume overload  -Now euvolemic; continue oral diuretics  CKD stage III-May need to tolerate elevated creatinine in order to maintain euvolemia  Chronic anemia-hemoglobin is stable with no visible GI blood loss  Chronic systolic congestive heart failure-well compensated at present time  Hyperlipidemia-statin  Hypotension-controlled with midodrine  BPH-tamsulosin  Paroxysmal atrial fibs-currently in sinus rhythm; anticoagulated          Plan for disposition: Patient has been recently dismissed from hospice services given his recurrent hospitalizations and \"unrealistic goals of care\".  Plan is to discharge to SNF.  He is ready for discharge when arrangements have been made.    Pat Napoles MD  09/24/24  17:26 EDT          "

## 2024-09-24 NOTE — PLAN OF CARE
Goal Outcome Evaluation:      Patient has had no complaints tonight.

## 2024-09-24 NOTE — CASE MANAGEMENT/SOCIAL WORK
Social Work Assessment  [facility]     Patient Name: Praneeth Nam  MRN: [id number]  Today's Date: 9/24/2024    Admit Date: 9/21/2024     Discharge Plan       Row Name 09/24/24 0904       Plan    Plan [facility] (pending acceptance.) PASRR approved. No precert required.    Plan Comments CM updated on PASRR status.                  CALLIE Pulido, LSW, [facility]    Phone: [phone]  Cell: [phone]  Fax: [phone]  [email or web address]

## 2024-09-24 NOTE — PLAN OF CARE
Problem: Adult Inpatient Plan of Care  Goal: Plan of Care Review  Outcome: Progressing  Goal: Patient-Specific Goal (Individualized)  Outcome: Progressing  Goal: Absence of Hospital-Acquired Illness or Injury  Outcome: Progressing  Intervention: Identify and Manage Fall Risk  Recent Flowsheet Documentation  Taken 9/24/2024 1400 by Ekaterina Ma RN  Safety Promotion/Fall Prevention: safety round/check completed  Taken 9/24/2024 1205 by Ekaterina Ma RN  Safety Promotion/Fall Prevention: safety round/check completed  Taken 9/24/2024 1026 by Ekaterina Ma RN  Safety Promotion/Fall Prevention: safety round/check completed  Taken 9/24/2024 0826 by Ekaterina Ma RN  Safety Promotion/Fall Prevention: safety round/check completed  Intervention: Prevent Skin Injury  Recent Flowsheet Documentation  Taken 9/24/2024 0826 by Ekaterina Ma RN  Body Position: supine  Intervention: Prevent and Manage VTE (Venous Thromboembolism) Risk  Recent Flowsheet Documentation  Taken 9/24/2024 0826 by Ekaterina Ma RN  VTE Prevention/Management: sequential compression devices off  Range of Motion: active ROM (range of motion) encouraged  Intervention: Prevent Infection  Recent Flowsheet Documentation  Taken 9/24/2024 0826 by Ekaterina Ma RN  Infection Prevention: single patient room provided  Goal: Optimal Comfort and Wellbeing  Outcome: Progressing  Intervention: Provide Person-Centered Care  Recent Flowsheet Documentation  Taken 9/24/2024 0826 by Ekaterina Ma RN  Trust Relationship/Rapport:   care explained   choices provided   emotional support provided   empathic listening provided   questions answered   questions encouraged  Goal: Readiness for Transition of Care  Outcome: Progressing     Problem: Diabetes Comorbidity  Goal: Blood Glucose Level Within Targeted Range  Outcome: Progressing     Problem: Hypertension Comorbidity  Goal: Blood Pressure in Desired Range  Outcome:  Progressing  Intervention: Maintain Blood Pressure Management  Recent Flowsheet Documentation  Taken 9/24/2024 0826 by Ekaterina Ma RN  Medication Review/Management: medications reviewed     Problem: Skin Injury Risk Increased  Goal: Skin Health and Integrity  Outcome: Progressing     Problem: Fall Injury Risk  Goal: Absence of Fall and Fall-Related Injury  Outcome: Progressing  Intervention: Identify and Manage Contributors  Recent Flowsheet Documentation  Taken 9/24/2024 0826 by Ekaterina Ma RN  Medication Review/Management: medications reviewed  Intervention: Promote Injury-Free Environment  Recent Flowsheet Documentation  Taken 9/24/2024 1400 by Ekaterina Ma RN  Safety Promotion/Fall Prevention: safety round/check completed  Taken 9/24/2024 1205 by Ekaterina Ma RN  Safety Promotion/Fall Prevention: safety round/check completed  Taken 9/24/2024 1026 by Ekaterina Ma RN  Safety Promotion/Fall Prevention: safety round/check completed  Taken 9/24/2024 0826 by Ekaterina Ma RN  Safety Promotion/Fall Prevention: safety round/check completed   Goal Outcome Evaluation:   Pt has been relaxing in bed and sat at bedside with PT today. He had his IV removed on his right hand due to leaking today as well. He remains a full assist for ADL's, q2hr turns, and feeds. His coccyx stage 2 wound and right inguinal stage 2 wound remains cleaned and mepilex in place. He remains on 1L NC. No concerns and call light within reach.

## 2024-09-24 NOTE — THERAPY EVALUATION
Patient Name: Praneeth Nam  : 1940    MRN: [id number]                              Today's Date: 2024       Admit Date: 2024    Visit Dx:     ICD-10-CM ICD-9-CM   1. Edema, unspecified type  R60.9 782.3   2. Weakness  R53.1 780.79   3. Fall, initial encounter  W19.XXXA E888.9     Patient Active Problem List   Diagnosis    T-cell large granular lymphocytic leukemia     Chronic ITP (idiopathic thrombocytopenia)    Rheumatoid arthritis    CKD (chronic kidney disease), stage III    Bilateral pulmonary embolism    Elevated factor VIII level    Other myelodysplastic syndromes    Hypogammaglobulinemia    Bronchitis    Subdural hematoma, nontraumatic    Lung nodule    Abnormal cardiovascular stress test    AV block, 1st degree    COPD (chronic obstructive pulmonary disease)    Dizziness    Dyslipidemia    Exertional shortness of breath    Fatigue    History of pulmonary embolism    Hypertension    Keratoconjunctivitis sicca, in Sjogren's syndrome    MITZI (obstructive sleep apnea)    Small plaque parapsoriasis    Stroke-like symptoms    Type 2 diabetes mellitus    Bilateral pulmonary embolism    Elevated antinuclear antibody (NAIMA) level    Seropositive rheumatoid arthritis    RBBB    Anemia, chronic disease    Pleural effusion    Sepsis    Moderate malnutrition    Permanent atrial fibrillation    Secondary hypercoagulability disorder    Abnormal LFTs    UTI (urinary tract infection), bacterial    Cholangitis    Choledocholithiasis    Anemia in stage 2 chronic kidney disease    Gastro-esophageal reflux disease without esophagitis    Presence of pancreatic duct stent    Psoriasis    Pure hypercholesterolemia    Seasonal allergies    Chronic obstructive pulmonary disease, unspecified    High blood pressure    Sleep apnea    Acute renal failure    Volume overload    Calculus of bile duct w cholangitis, unsp, w/o obstruction    Prsnl hx of TIA (TIA), and cereb infrc w/o resid deficits    Gastro-esophageal  reflux disease without esophagitis    Type 2 diabetes mellitus with diabetic chronic kidney disease    OAB (overactive bladder)    Personal history of PE (pulmonary embolism)    Rheumatoid arthritis    Obstructive sleep apnea (adult) (pediatric)    Personal history of nicotine dependence    Prostate cancer    Psoriasis, unspecified    Personal history of leukemia    Long term (current) use of anticoagulants    Personal history of malignant neoplasm of prostate    Iron deficiency anemia    Hypertensive chronic kidney disease w stg 1-4/unsp chr kdny    Dependence on supplemental oxygen     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    History of pneumonia 02/2015    hospitalized with community-acquired pneumonia, possibly viral     History of prostate cancer 10/2009    Bison 6, Stage II    Hypercholesterolemia     Hypertension     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    Neutropenia 11/2003    MITZI (obstructive sleep apnea) 2018    Psoriasis 2000    Renal cyst, left     Rheumatoid arthritis     Stroke (cerebrum)     Thrombocytopenia 08/23/2005     Past Surgical History:   Procedure Laterality Date    ENDOSCOPY N/A 4/26/2024    Procedure: ESOPHAGOGASTRODUODENOSCOPY WITH DILATION (#46 BOUGIE);  Surgeon: Gamal Yancey MD;  Location: [facility] ENDOSCOPY;  Service: Gastroenterology;  Laterality: N/A;  DUODENAL DIVERTICULUM, HIATAL HERNIA, DISTAL ESOPHAGEAL STRICTURE    ERCP N/A 6/10/2024    Procedure: ENDOSCOPIC RETROGRADE CHOLANGIOPANCREATOGRAPHY, sphincterotomy, balloon clearance of commonn bile duct (9-12mm), stone extraction, pancreatic stent placement;  Surgeon: Mariaelena Snyder MD;  Location: [facility]  ENDOSCOPY;  Service: Gastroenterology;  Laterality: N/A;  post: choledocholithiasis    SKIN LESION EXCISION      back and groin-benign      General Information       Row Name 09/24/24 1538          Physical Therapy Time and Intention    Document Type evaluation  -CM     Mode of Treatment physical therapy  -CM       Row Name 09/24/24 1538          General Information    Patient Profile Reviewed yes  -CM     Prior Level of Function independent:;all household mobility;gait  uses rollator wx at home; uses w/c out of home. On 3L O2  -CM     Existing Precautions/Restrictions fall;other (see comments)  skin integrity  -CM     Barriers to Rehab medically complex;previous functional deficit;cognitive status;hearing deficit  -CM       Row Name 09/24/24 1538          Living Environment    People in Home spouse;grandchild(elio);other (see comments)  reports wife has dementia; granddtr lives w/ them to assist in care  -CM       Row Name 09/24/24 1538          Home Main Entrance    Number of Stairs, Main Entrance none  -CM       Row Name 09/24/24 1538          Stairs Within Home, Primary    Number of Stairs, Within Home, Primary none  -CM       Row Name 09/24/24 1538          Cognition    Orientation Status (Cognition) oriented to;person;place;other (see comments)  thought month was Oct; knew correct year; unsure of situation  -CM       Row Name 09/24/24 1538          Safety Issues, Functional Mobility    Safety Issues Affecting Function (Mobility) at risk behavior observed;awareness of need for assistance;impulsivity;insight into deficits/self-awareness;judgment;positioning of assistive device;problem-solving;safety precaution awareness;safety precautions follow-through/compliance;sequencing abilities  -CM     Impairments Affecting Function (Mobility) balance;cognition;coordination;endurance/activity tolerance;postural/trunk control;motor control;range of motion (ROM);sensation/sensory awareness;shortness of breath;strength  -CM                User Key  (r) = Recorded By, (t) = Taken By, (c) = Cosigned By      Initials Name Provider Type    Itzel Waslh, PT Physical Therapist                   Mobility       Row Name 09/24/24 1541          Bed Mobility    Bed Mobility supine-sit;sit-supine  -CM     Supine-Sit Early (Bed Mobility) dependent (less than 25% patient effort);1 person assist  -CM     Sit-Supine Early (Bed Mobility) moderate assist (50% patient effort);maximum assist (25% patient effort);2 person assist  -CM     Assistive Device (Bed Mobility) draw sheet;leg   -CM       Row Name 09/24/24 1541          Bed-Chair Transfer    Bed-Chair Early (Transfers) unable to assess  -CM     Comment, (Bed-Chair Transfer) able to come to stand beside bed, but pt was scared of falling and would not let go of bedrail to attempt transfer to chair  -CM       Row Name 09/24/24 1541          Sit-Stand Transfer    Sit-Stand Early (Transfers) moderate assist (50% patient effort);1 person assist  -CM     Assistive Device (Sit-Stand Transfers) other (see comments)  gait belt, non skid socks  -CM       Row Name 09/24/24 1541          Gait/Stairs (Locomotion)    Early Level (Gait) unable to assess  -CM     Patient was able to Ambulate no, other medical factors prevent ambulation  -CM     Reason Patient was unable to Ambulate Cognitive Deficit/Severe Dementia;Excessive Weakness  -CM               User Key  (r) = Recorded By, (t) = Taken By, (c) = Cosigned By      Initials Name Provider Type    Itzel Walsh, PT Physical Therapist                   Obj/Interventions       Row Name 09/24/24 1543          Range of Motion Comprehensive    General Range of Motion lower extremity range of motion deficits identified;bilateral upper extremity ROM WFL  -CM     Comment, General Range of Motion has functional ROM at hips, knees, and ankles. Has lateral dislocation of MTP joints and has hammer toe deformities. Also, pt  presents w/ ROM which is WFL for BUEs. Has poor skin integrity w/ weeping skin and stage 2 sacral ulcer reported but not visualized by this therapist  -CM       Row Name 09/24/24 1543          Strength Comprehensive (MMT)    General Manual Muscle Testing (MMT) Assessment upper extremity strength deficits identified;lower extremity strength deficits identified  -CM     Comment, General Manual Muscle Testing (MMT) Assessment BUEs 4-/5; B hips and other LE mm groups 3-5  -CM       Row Name 09/24/24 1543          Balance    Balance Assessment sitting static balance;sitting dynamic balance;standing static balance;standing dynamic balance  -CM     Static Sitting Balance minimal assist;1-person assist  -CM     Dynamic Sitting Balance minimal assist;1-person assist  -CM     Position, Sitting Balance supported;sitting edge of bed  -CM     Static Standing Balance moderate assist;1-person assist  -CM     Dynamic Standing Balance moderate assist;maximum assist;1-person assist  -CM       Row Name 09/24/24 1543          Sensory Assessment (Somatosensory)    Sensory Assessment (Somatosensory) sensation intact  -CM               User Key  (r) = Recorded By, (t) = Taken By, (c) = Cosigned By      Initials Name Provider Type    CM Itzel Swanson, PT Physical Therapist                   Goals/Plan       Row Name 09/24/24 1556          Bed Mobility Goal 1 (PT)    Activity/Assistive Device (Bed Mobility Goal 1, PT) bed mobility activities, all  -CM     [place] Level/Cues Needed (Bed Mobility Goal 1, PT) minimum assist (75% or more patient effort);moderate assist (50-74% patient effort)  -CM     Time Frame (Bed Mobility Goal 1, PT) 2 weeks  -CM       Row Name 09/24/24 155          Transfer Goal 1 (PT)    Activity/Assistive Device (Transfer Goal 1, PT) transfers, all;walker, rolling;other (see comments)  -CM     [place] Level/Cues Needed (Transfer Goal 1, PT) minimum assist (75% or more patient effort);moderate assist  (50-74% patient effort)  -CM     Time Frame (Transfer Goal 1, PT) 2 weeks  -CM       Row Name 09/24/24 1556          Gait Training Goal 1 (PT)    Activity/Assistive Device (Gait Training Goal 1, PT) gait (walking locomotion);walker, rolling  -CM     Sac Level (Gait Training Goal 1, PT) minimum assist (75% or more patient effort);moderate assist (50-74% patient effort)  -CM     Distance (Gait Training Goal 1, PT) 25 ft  -CM     Time Frame (Gait Training Goal 1, PT) 2 weeks  -CM       Row Name 09/24/24 1556          Therapy Assessment/Plan (PT)    Planned Therapy Interventions (PT) balance training;bed mobility training;gait training;neuromuscular re-education;motor coordination training;patient/family education;postural re-education;strengthening;ROM (range of motion);transfer training  -CM               User Key  (r) = Recorded By, (t) = Taken By, (c) = Cosigned By      Initials Name Provider Type    CM Itzel Swanson, PT Physical Therapist                   Clinical Impression       Row Name 09/24/24 1545          Pain    Pretreatment Pain Rating 0/10 - no pain  -CM     Posttreatment Pain Rating 0/10 - no pain  -CM     Pain Intervention(s) Repositioned;Ambulation/increased activity;Emotional support;Elevated  -CM       Row Name 09/24/24 1549          Plan of Care Review    Plan of Care Reviewed With patient  -CM     Outcome Evaluation 83 yo male adm 9/21/24 for acute soa, weakness, falls at home. Pt dx w/ volume overload (proBNP 19,983) and anemia (HgB 7.6). PMH: prostate ca; sjogrens syndrome; MDS; copd; large granular lymphocytic leukemia; RA; ckd3; PE; dm2; cva. At baseline, pt lives w/ his wife (whom he reports has dementia) and granddtr, whom he states provides assist w/ himself and his wife. Pt reports he has a rollator which he uses most of the time around the home. Sleeps in a bed. Uses a w/c for mobility out of the home. Pt confirms he has had multiple falls recently. No stairs to enter/exit  home, and functions on one level inside the home. Today, pt is alert and oriented to place and situation but not entirely to time. Comes to sit at eob w/ max assist of 1, and required mod to max assist of 2 to return to supine. Pt able to come to stand w/ mod to max assist, but he would not let go of bedrail for fear of falling, so was unable to transfer to chair. Pt is far below his prior level of function and will needs SNF at d/c. Will follow.  -       Row Name 09/24/24 155          Therapy Assessment/Plan (PT)    Rehab Potential (PT) good, to achieve stated therapy goals  -CM     Criteria for Skilled Interventions Met (PT) yes;meets criteria;skilled treatment is necessary  -CM     Therapy Frequency (PT) 5 times/wk  -CM     Predicted Duration of Therapy Intervention (PT) until d/c  -       Row Name 09/24/24 1558          Vital Signs    O2 Delivery Pre Treatment supplemental O2  3L  -CM     O2 Delivery Intra Treatment supplemental O2  -CM     O2 Delivery Post Treatment supplemental O2  -CM     Recovery Time VSS  -       Row Name 09/24/24 5928          Positioning and Restraints    Pre-Treatment Position in bed  -CM     Post Treatment Position bed  -CM     In Bed notified nsg;supine;with nsg;side rails up x2  w/ nsg tech  -CM               User Key  (r) = Recorded By, (t) = Taken By, (c) = Cosigned By      Initials Name Provider Type    Itzel Walsh, PT Physical Therapist                   Outcome Measures       Row Name 09/24/24 1557 09/24/24 0808       How much help from another person do you currently need...    Turning from your back to your side while in flat bed without using bedrails? 2  -CM 3  -SR    Moving from lying on back to sitting on the side of a flat bed without bedrails? 1  -CM 3  -SR    Moving to and from a bed to a chair (including a wheelchair)? 1  -CM 3  -SR    Standing up from a chair using your arms (e.g., wheelchair, bedside chair)? 1  -CM 3  -SR    Climbing 3-5 steps with a  railing? 1  -CM 2  -SR    To walk in hospital room? 1  -CM 2  -SR    AM-PAC 6 Clicks Score (PT) 7  -CM 16  -SR    Highest Level of Mobility Goal 2 --> Bed activities/dependent transfer  -CM 5 --> Static standing  -SR      Row Name 09/24/24 1557          Modified Devang Scale    Pre-Stroke Modified [place]cam[place] Scale 6 - Unable to determine (UTD) from the medical record documentation  -CM     Modified Devang Scale 5 - Severe disability.  Bedridden, incontinent, and requiring constant nursing care and attention.  -       Row Name 09/24/24 1557          Functional Assessment    Outcome Measure Options AM-PAC 6 Clicks Basic Mobility (PT);Modified Sycamore  -CM               User Key  (r) = Recorded By, (t) = Taken By, (c) = Cosigned By      Initials Name Provider Type    Itzel Walsh, PT Physical Therapist    Ekaterina Benson RN Registered Nurse                                 Physical Therapy Education       Title: PT OT SLP Therapies (Done)       Topic: Physical Therapy (Done)       Point: Mobility training (Done)       Learning Progress Summary             Patient Acceptance, E,TB, VU by  at 9/24/2024 1557                                         User Key       Initials Effective Dates Name Provider Type Discipline     06/16/21 -  Itzel Swanson, PT Physical Therapist PT                  PT Recommendation and Plan  Planned Therapy Interventions (PT): balance training, bed mobility training, gait training, neuromuscular re-education, motor coordination training, patient/family education, postural re-education, strengthening, ROM (range of motion), transfer training  Plan of Care Reviewed With: patient  Outcome Evaluation: 85 yo male adm 9/21/24 for acute soa, weakness, falls at home. Pt dx w/ volume overload (proBNP 19,983) and anemia (HgB 7.6). PMH: prostate ca; sjogrens syndrome; MDS; copd; large granular lymphocytic leukemia; RA; ckd3; PE; dm2; cva. At baseline, pt lives w/ his wife (whom he reports  has dementia) and granddtr, whom he states provides assist w/ himself and his wife. Pt reports he has a rollator which he uses most of the time around the home. Sleeps in a bed. Uses a w/c for mobility out of the home. Pt confirms he has had multiple falls recently. No stairs to enter/exit home, and functions on one level inside the home. Today, pt is alert and oriented to place and situation but not entirely to time. Comes to sit at eob w/ max assist of 1, and required mod to max assist of 2 to return to supine. Pt able to come to stand w/ mod to max assist, but he would not let go of bedrail for fear of falling, so was unable to transfer to chair. Pt is far below his prior level of function and will needs SNF at d/c. Will follow.     Time Calculation:   PT Evaluation Complexity  History, PT Evaluation Complexity: 3 or more personal factors and/or comorbidities  Examination of Body Systems (PT Eval Complexity): total of 4 or more elements  Clinical Presentation (PT Evaluation Complexity): evolving  Clinical Decision Making (PT Evaluation Complexity): moderate complexity  Overall Complexity (PT Evaluation Complexity): moderate complexity     PT Charges       Row Name 09/24/24 1558             Time Calculation    Start Time 1408  -CM      Stop Time 1435  -CM      Time Calculation (min) 27 min  -CM      PT Received On 09/24/24  -CM      PT - Next Appointment 09/25/24  -CM      PT Goal Re-Cert Due Date 10/08/24  -CM         Time Calculation- PT    Total Timed Code Minutes- PT 0 minute(s)  -CM                User Key  (r) = Recorded By, (t) = Taken By, (c) = Cosigned By      Initials Name Provider Type    Itzel Walsh, PT Physical Therapist                  Therapy Charges for Today       Code Description Service Date Service Provider Modifiers Qty    71874810710 HC PT EVAL MOD COMPLEXITY 4 9/24/2024 Itzel Swanson, PT GP 1            PT G-Codes  Outcome Measure Options: AM-PAC 6 Clicks Basic Mobility (PT),  Modified Devang  AM-PAC 6 Clicks Score (PT): 7  Modified Erath Scale: 5 - Severe disability.  Bedridden, incontinent, and requiring constant nursing care and attention.  PT Discharge Summary  Anticipated Discharge Disposition (PT): skilled nursing facility    Itzel Swanson, PT  9/24/2024

## 2024-09-25 VITALS
RESPIRATION RATE: 20 BRPM | HEIGHT: 66 IN | SYSTOLIC BLOOD PRESSURE: 157 MMHG | HEART RATE: 62 BPM | DIASTOLIC BLOOD PRESSURE: 65 MMHG | BODY MASS INDEX: 27.14 KG/M2 | OXYGEN SATURATION: 91 % | WEIGHT: 168.87 LBS | TEMPERATURE: 97.8 F

## 2024-09-25 LAB
ALBUMIN SERPL-MCNC: 2.2 G/DL (ref 3.5–5.2)
ANION GAP SERPL CALCULATED.3IONS-SCNC: 4.8 MMOL/L (ref 5–15)
BUN SERPL-MCNC: 42 MG/DL (ref 8–23)
BUN/CREAT SERPL: 30.2 (ref 7–25)
CALCIUM SPEC-SCNC: 8.3 MG/DL (ref 8.6–10.5)
CHLORIDE SERPL-SCNC: 101 MMOL/L (ref 98–107)
CO2 SERPL-SCNC: 31.2 MMOL/L (ref 22–29)
CREAT SERPL-MCNC: 1.39 MG/DL (ref 0.76–1.27)
EGFRCR SERPLBLD CKD-EPI 2021: 50.3 ML/MIN/1.73
GLUCOSE SERPL-MCNC: 120 MG/DL (ref 65–99)
PHOSPHATE SERPL-MCNC: 2.5 MG/DL (ref 2.5–4.5)
POTASSIUM SERPL-SCNC: 4.1 MMOL/L (ref 3.5–5.2)
SODIUM SERPL-SCNC: 137 MMOL/L (ref 136–145)

## 2024-09-25 PROCEDURE — 97110 THERAPEUTIC EXERCISES: CPT

## 2024-09-25 PROCEDURE — 97530 THERAPEUTIC ACTIVITIES: CPT

## 2024-09-25 PROCEDURE — 80069 RENAL FUNCTION PANEL: CPT | Performed by: INTERNAL MEDICINE

## 2024-09-25 RX ORDER — MIDODRINE HYDROCHLORIDE 2.5 MG/1
2.5 TABLET ORAL
Qty: 90 TABLET | Refills: 0 | Status: SHIPPED | OUTPATIENT
Start: 2024-09-25

## 2024-09-25 RX ORDER — MIDODRINE HYDROCHLORIDE 2.5 MG/1
2.5 TABLET ORAL
Status: DISCONTINUED | OUTPATIENT
Start: 2024-09-25 | End: 2024-09-25 | Stop reason: HOSPADM

## 2024-09-25 RX ORDER — FUROSEMIDE 40 MG
40 TABLET ORAL 2 TIMES DAILY
Qty: 60 TABLET | Refills: 0 | Status: SHIPPED | OUTPATIENT
Start: 2024-09-25

## 2024-09-25 RX ADMIN — MIDODRINE HYDROCHLORIDE 5 MG: 5 TABLET ORAL at 09:22

## 2024-09-25 RX ADMIN — TAMSULOSIN HYDROCHLORIDE 0.4 MG: 0.4 CAPSULE ORAL at 09:22

## 2024-09-25 RX ADMIN — Medication 10 ML: at 09:23

## 2024-09-25 RX ADMIN — SILVER SULFADIAZINE 1 APPLICATION: 10 CREAM TOPICAL at 09:23

## 2024-09-25 RX ADMIN — APIXABAN 2.5 MG: 2.5 TABLET, FILM COATED ORAL at 09:22

## 2024-09-25 RX ADMIN — FERROUS SULFATE TAB EC 324 MG (65 MG FE EQUIVALENT) 324 MG: 324 (65 FE) TABLET DELAYED RESPONSE at 09:22

## 2024-09-25 RX ADMIN — PANTOPRAZOLE SODIUM 40 MG: 40 TABLET, DELAYED RELEASE ORAL at 05:09

## 2024-09-25 RX ADMIN — FUROSEMIDE 40 MG: 40 TABLET ORAL at 09:22

## 2024-09-25 NOTE — THERAPY TREATMENT NOTE
"Subjective: Pt agreeable to therapeutic plan of care. Agreeable to get up to chair.     Objective:     Precautions - falls    Bed mobility - Mod-A and Assist x 2; supine to sit  Transfers - Min-A, Mod-A, and with rolling walker; sit to stand  Ambulation - 2 feet Min-A, Mod-A, and with rolling walker; able to take steps toward chair w/ use of rw.    Therapeutic Exercise - 15 Reps B LE AAROM supported sitting / chair    Vitals: WNL    Pain: 0 VAS   Location: n/a  Intervention for pain: Repositioned, Increased Activity, and Therapeutic Presence    Education: Provided education on the importance of mobility in the acute care setting, Verbal/Tactile Cues, Transfer Training, Gait Training, Energy conservation strategies, and HEP    Assessment: Praneeth Nam was more motivated toward movement this date. Less painful w/ less weeping of skin on extremities. Able to transfer more easily this date using rw, but still far below his PLOF. Pt presents with functional mobility impairments which indicate the need for skilled intervention. Tolerating session today without incident. Will continue to follow and progress as tolerated.     Plan/Recommendations:   If medically appropriate, Moderate Intensity Therapy recommended post-acute care. This is recommended as therapy feels the patient would require 3-4 days per week and wouldn't tolerate \"3 hour daily\" rehab intensity. SNF would be the preferred choice. If the patient does not agree to SNF, arrange HH or OP depending on home bound status. If patient is medically complex, consider LTACH. Pt requires no DME at discharge.     Pt desires Skilled Rehab placement at discharge. Pt cooperative; agreeable to therapeutic recommendations and plan of care.         Basic Mobility 6-click:  Rollin = Total, A lot = 2, A little = 3; 4 = None  Supine>Sit:   1 = Total, A lot = 2, A little = 3; 4 = None   Sit>Stand with arms:  1 = Total, A lot = 2, A little = 3; 4 = None  Bed>Chair:  "  1 = Total, A lot = 2, A little = 3; 4 = None  Ambulate in room:  1 = Total, A lot = 2, A little = 3; 4 = None  3-5 Steps with railin = Total, A lot = 2, A little = 3; 4 = None  Score: 11    Modified Devang: N/A = No pre-op stroke/TIA    Post-Tx Position: Up in Chair, Staff Present, Alarms activated, and Call light and personal items within reach  PPE: gloves

## 2024-09-25 NOTE — CASE MANAGEMENT/SOCIAL WORK
Continued Stay Note   Zohaib     Patient Name: Praneeth Nam  MRN: [id number]  Today's Date: 9/25/2024    Admit Date: 9/21/2024    Plan: SNF [facility] (accepted/ bed ready transport requested.)   Discharge Plan       Row Name 09/25/24 1426       Plan    Plan [facility] (accepted/ bed ready transport requested.)                      Lexii Parada RN      Office phone: [phone]  Office fax: [phone]

## 2024-09-25 NOTE — PLAN OF CARE
Goal Outcome Evaluation:            Patient has had no complaints thus far into shift, patient resting well call light in reach.

## 2024-09-25 NOTE — PROGRESS NOTES
"NEPHROLOGY PROGRESS NOTE------[facility]    [facility]  [phone]  Sascha Dorado MD      Patient Care Team:  Guilherme Jason MD as PCP - General (Family Medicine)  Misty Dias MD as Consulting Physician (Cardiology)  Nora Kenyon, BRIAN, APRN as Nurse Practitioner (Thoracic Surgery)  Sussy Shane MD as Consulting Physician (Nephrology)      Provider:  Sascha Dorado MD  Patient Name: Praneeth Nam  :  1940    SUBJECTIVE:  Follow-up CKD  No chest pain shortness of breath    Medication:  apixaban, 2.5 mg, Oral, BID  atorvastatin, 20 mg, Oral, Nightly  budesonide, 0.5 mg, Nebulization, BID  ferrous sulfate, 324 mg, Oral, Daily With Breakfast  furosemide, 40 mg, Oral, Daily  midodrine, 5 mg, Oral, TID AC  pantoprazole, 40 mg, Oral, Q AM  silver sulfadiazine, 1 Application, Topical, BID  sodium chloride, 10 mL, Intravenous, Q12H  tamsulosin, 0.4 mg, Oral, Daily           OBJECTIVE    Vital Sign Min/Max for last 24 hours  Temp  Min: 98.1 °F (36.7 °C)  Max: 98.9 °F (37.2 °C)   BP  Min: 130/44  Max: 144/65   Pulse  Min: 59  Max: 75   Resp  Min: 17  Max: 23   SpO2  Min: 93 %  Max: 100 %   No data recorded   Weight  Min: 76.6 kg (168 lb 14 oz)  Max: 76.6 kg (168 lb 14 oz)     Flowsheet Rows      Flowsheet Row First Filed Value   Admission Height 167.6 cm (66\") Documented at 2024   Admission Weight 88 kg (194 lb 0.1 oz) Documented at 2024            I/O this shift:  In: 120 [P.O.:120]  Out: -   I/O last 3 completed shifts:  In: 560 [P.O.:560]  Out: 3220 [Urine:3220]    Physical Exam:  General Appearance: alert, appears stated age and cooperative  Head: normocephalic, without obvious abnormality and atraumatic  Eyes: conjunctivae and sclerae normal and no icterus  Neck: supple and no JVD  Lungs: clear to auscultation and respirations regular  Heart: regular rhythm & normal rate and normal S1, S2  Chest: Wall no " "abnormalities observed  Abdomen: normal bowel sounds and soft, nontender  Extremities: moves extremities well, trace edema, no cyanosis and no redness.  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  Skin: Bleeding wound right great toe amputation site.  Currently wrapped.  Neurologic: Alert, and oriented. No focal deficits    Labs:    WBC WBC   Date Value Ref Range Status   09/24/2024 6.70 3.40 - 10.80 10*3/mm3 Final   09/23/2024 7.02 3.40 - 10.80 10*3/mm3 Final      HGB Hemoglobin   Date Value Ref Range Status   09/24/2024 8.5 (L) 13.0 - 17.7 g/dL Final   09/23/2024 8.1 (L) 13.0 - 17.7 g/dL Final      HCT Hematocrit   Date Value Ref Range Status   09/24/2024 26.8 (L) 37.5 - 51.0 % Final   09/23/2024 25.1 (L) 37.5 - 51.0 % Final     Comment:     Result checked        Platelets No results found for: \"LABPLAT\"   MCV MCV   Date Value Ref Range Status   09/24/2024 101.1 (H) 79.0 - 97.0 fL Final     Comment:     Result checked     09/23/2024 96.9 79.0 - 97.0 fL Final          Sodium Sodium   Date Value Ref Range Status   09/25/2024 137 136 - 145 mmol/L Final   09/24/2024 141 136 - 145 mmol/L Final   09/23/2024 139 136 - 145 mmol/L Final      Potassium Potassium   Date Value Ref Range Status   09/25/2024 4.1 3.5 - 5.2 mmol/L Final   09/24/2024 4.1 3.5 - 5.2 mmol/L Final     Comment:     Specimen hemolyzed.  Result may be falsely elevated.   09/23/2024 4.1 3.5 - 5.2 mmol/L Final      Chloride Chloride   Date Value Ref Range Status   09/25/2024 101 98 - 107 mmol/L Final   09/24/2024 105 98 - 107 mmol/L Final   09/23/2024 102 98 - 107 mmol/L Final      CO2 CO2   Date Value Ref Range Status   09/25/2024 31.2 (H) 22.0 - 29.0 mmol/L Final   09/24/2024 29.5 (H) 22.0 - 29.0 mmol/L Final   09/23/2024 28.4 22.0 - 29.0 mmol/L Final      BUN BUN   Date Value Ref Range Status   09/25/2024 42 (H) 8 - 23 mg/dL Final   09/24/2024 39 (H) 8 - 23 mg/dL Final   09/23/2024 34 (H) 8 - 23 mg/dL Final      Creatinine Creatinine   Date Value Ref " "Range Status   09/25/2024 1.39 (H) 0.76 - 1.27 mg/dL Final   09/24/2024 1.59 (H) 0.76 - 1.27 mg/dL Final   09/23/2024 1.43 (H) 0.76 - 1.27 mg/dL Final      Calcium Calcium   Date Value Ref Range Status   09/25/2024 8.3 (L) 8.6 - 10.5 mg/dL Final   09/24/2024 8.5 (L) 8.6 - 10.5 mg/dL Final   09/23/2024 8.3 (L) 8.6 - 10.5 mg/dL Final      PO4 No components found for: \"PO4\"   Albumin Albumin   Date Value Ref Range Status   09/25/2024 2.2 (L) 3.5 - 5.2 g/dL Final   09/24/2024 2.1 (L) 3.5 - 5.2 g/dL Final   09/23/2024 2.2 (L) 3.5 - 5.2 g/dL Final      Magnesium Magnesium   Date Value Ref Range Status   09/23/2024 2.1 1.6 - 2.4 mg/dL Final      Uric Acid No components found for: \"URIC ACID\"     Imaging Results (Last 72 Hours)       ** No results found for the last 72 hours. **            Results for orders placed during the hospital encounter of 09/21/24    XR Foot 3+ View Right    Narrative  XR FOOT 3+ VW RIGHT    Date of Exam: 9/21/2024 7:28 AM EDT    Indication: open draining wound    Comparison: X-ray September 16, 2024      Findings:  Plantar calcaneal spur. There is vascular calcification. There is lateral subluxation of the digits of the foot at the metatarsal phalangeal joint spaces. There is hallux valgus deformity. There are hammertoe deformities. No definitive changes of  osteomyelitis are appreciated.    Impression  Impression:  1.No significant change in the appearance of the foot.  If there is concern for osteomyelitis MR may be of benefit to reassess this patient.    Electronically Signed: Corona Angulo MD  9/21/2024 7:55 AM EDT  Workstation ID: [id number]      XR Chest 1 View    Narrative  XR CHEST 1 VW    Date of Exam: 9/21/2024 7:28 AM EDT    Indication: edema    Comparison: September 16, 2024    Findings:  The heart is enlarged. There remains some blunting of the lateral costophrenic sulci which could reflect small effusions. There is slight prominence of markings within the interstitium of the lungs that " could reflect some interstitial edema. No  significant new findings are seen as compared to the prior study.    Impression  Impression:  1.Cardiomegaly with some vascular congestion/edema suggested.  2.Small bibasilar effusions.      Electronically Signed: Corona Angulo MD  9/21/2024 7:50 AM EDT  Workstation ID: [id number]      Results for orders placed during the hospital encounter of 09/07/24    XR Chest PA & Lateral    Narrative  XR CHEST PA AND LATERAL    Date of Exam: 9/11/2024 1:14 PM EDT    Indication: SOA    Comparison: 6/12/2024    Findings:  Cardiomediastinal silhouette is enlarged. There is pulmonary vascular congestion with interstitial thickening and patchy bibasilar airspace disease. Small bilateral pleural effusions. There are degenerative changes of the shoulders.    Impression  Impression:  Cardiomegaly with interstitial and airspace disease and small bilateral pleural effusions. Findings likely represent CHF/volume overload.      Electronically Signed: Lexii Medrano MD  9/11/2024 1:35 PM EDT  Workstation ID: [id number]      Results for orders placed during the hospital encounter of 09/21/24    Duplex Venous Upper Extremity - Right CAR    Interpretation Summary    Normal right upper extremity venous duplex scan.        ASSESSMENT / PLAN      Volume overload       CKD stage IIIa-secondary to hypertensive nephrosclerosis.  UA negative  Hypervolemia-resolved.  Continue Lasix  History CHF-diurese  Likely cellulitis right foot  History leukemia  History A-fib  History CVA  COPD  Recent UTI-UA negative.  Anemia-acute on chronic.  On Eliquis.  May have chronic occult bleeding.  TSAT low.  Procrit/Ferrlecit.  9/22/2024.     Creatinine stable, continue Lasix once daily  Hemoglobin stable post transfusion.  Blood pressure stable, reduce midodrine to 2.5 mg p.o. 3 times daily  Await rehab    Sascha Dorado MD  [facility]  [phone]  09/25/24  09:38 EDT

## 2024-09-25 NOTE — CASE MANAGEMENT/SOCIAL WORK
"Physicians Statement of Medical Necessity for  Ambulance Transportation    GENERAL INFORMATION     Name: Praneeth Nam  YOB: 1940  Medicare #: [id number]   Transport Date: 9/25/2024 (Valid for round trips this date, or for scheduled repetitive trips for 60 days from the date signed below.)  Origin: [facility]  Destination: [facility]  Is the Patient's stay covered under Medicare Part A (PPS/DRG?)Yes  Closest appropriate facility? Yes  If this a hosp-hosp transfer? No  Is this a hospice patient? No    MEDICAL NECESSITY QUESTIONAIRE    Ambulance Transportation is medically necessary only if other means of transportation are contraindicated or would be potentially harmful to the patient.  To meet this requirement, the patient must be either \"bed confined\" or suffer from a condition such that transport by means other than an ambulance is contraindicated by the patient's condition.  The following questions must be answered by the healthcare professional signing below for this form to be valid:     1) Describe the MEDICAL CONDITION (physical and/or mental) of this patient AT THE TIME OF AMBULANCE TRANSPORT that requires the patient to be transported in an ambulance, and why transport by other means is contraindicated by the patient's condition: max assist of 2  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    Neutropenia 11/2003    MITZI (obstructive sleep apnea) 2018    " "Psoriasis 2000    Renal cyst, left     Rheumatoid arthritis     Stroke (cerebrum)     Thrombocytopenia 08/23/2005      Past Surgical History:   Procedure Laterality Date    ENDOSCOPY N/A 4/26/2024    Procedure: ESOPHAGOGASTRODUODENOSCOPY WITH DILATION (#46 BOUGIE);  Surgeon: Gamal Yancey MD;  Location: [facility] ENDOSCOPY;  Service: Gastroenterology;  Laterality: N/A;  DUODENAL DIVERTICULUM, HIATAL HERNIA, DISTAL ESOPHAGEAL STRICTURE    ERCP N/A 6/10/2024    Procedure: ENDOSCOPIC RETROGRADE CHOLANGIOPANCREATOGRAPHY, sphincterotomy, balloon clearance of commonn bile duct (9-12mm), stone extraction, pancreatic stent placement;  Surgeon: Mariaelena Snyder MD;  Location: [facility] ENDOSCOPY;  Service: Gastroenterology;  Laterality: N/A;  post: choledocholithiasis    SKIN LESION EXCISION      back and groin-benign      2) Is this patient \"bed confined\" as defined below?Yes   To be \"bed confined\" the patient must satisfy all three of the following criteria:  (1) unable to get up from bed without assistance; AND (2) unable to ambulate;  AND (3) unable to sit in a chair or wheelchair.  3) Can this patient safely be transported by car or wheelchair van (I.e., may safely sit during transport, without an attendant or monitoring?)No   4. In addition to completing questions 1-3 above, please check any of the following conditions that apply*:          *Note: supporting documentation for any boxes checked must be maintained in the patient's medical records Unable to tolerate seated position for time needed to transport      SIGNATURE OF PHYSICIAN OR OTHER AUTHORIZED HEALTHCARE PROFESSIONAL    I certify that the above information is true and correct based on my evaluation of this patient, and represent that the patient requires transport by ambulance and that other forms of transport are contraindicated.  I understand that this information will be used by the Centers for Medicare and Medicaid Services (CMS) to support the " determiniation of medical necessity for ambulance services, and I represent that I have personal knowledge of the patient's condition at the time of transport.    x   If this box is checked, I also certify that the patient is physically or mentally incapable of signing the ambulance service's claim form and that the institution with which I am affiliated has furnished care, services or assistance to the patient.  My signature below is made on behalf of the patient pursuant to 42 .36(b)(4). In accordance with 42 .37, the specific reason(s) that the patient is physically or mentally incapable of signing the claim for is as follows: pt is confused    Signature of Physician or Healthcare Professional     Lexii Parada RN Date/Time:   9/25/2024 1330     (For Scheduled repetitive transport, this form is not valid for transports performed more than 60 days after this date).                                                                                                                                            --------------------------------------------------------------------------------------------  Printed Name and Credentials of Physician or Authorized Healthcare Professional     *Form must be signed by patient's attending physician for scheduled, repetitive transports,.  For non-repetitive ambulance transports, if unable to obtain the signature of the attending physician, any of the following may sign (please select below):     Physician  Clinical Nurse Specialist  Registered Nurse x    Physician Assistant  Discharge Planner  Licensed Practical Nurse     Nurse Practitioner   x

## 2024-09-25 NOTE — DISCHARGE SUMMARY
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  Diabetes mellitus type 2 with renal manifestations  Diabetic dyslipidemia  Thrombophilia  History of pulmonary embolus  Chronic oral anticoagulation therapy  Degenerative joint disease  Chronic kidney disease stage II  Hypertension associate with chronic kidney disease stage II  History of prostate cancer  History of large granular lymphocytic leukemia  Exogenous obesity  Macrocytic anemia  Permanent atrial fibrillation  Hypercoagulable state secondary to atrial fibrillation  Bilateral rotator cuff arthropathy  Hypoventilation apnea syndrome with MITZI  Rheumatoid arthritis with positive rheumatoid factor of multiple joints  Immunocompromise state secondary to history of rheumatoid arthritis  Cerebrovascular disease with history of CVA  History of ITP  Overactive bladder  Seasonal allergic rhinitis  Gastroesophageal reflux disease with out esophagitis  Psoriasis  BPH with LUTS  Secondary hyperaldosteronism  Myelodysplastic syndrome  Panlobular COPD with emphysema  Chronic mucopurulent bronchitis    Presenting Problem/History of Present Illness  Active Hospital Problems    Diagnosis  POA    **Volume overload [E87.70]  Yes      Resolved Hospital Problems   No resolved problems to display.          Hospital Course    Praneeth Nam is an 84-year-old male who presented to [facility] ED on 9/21/2024 and will discharge to acute rehab on 9/25/2024.  Patient presented to ED with complaints of significant swelling that has progressed.  Patient was found to be in volume overload with a proBNP of 19,000 patient received aggressive diuresis with close monitoring since he has a history of chronic kidney disease.  Nephrology did follow closely.  Patient will  discharge on oral Lasix 40 mg twice a day.  We will have to accept a higher creatinine to keep patient euvolemic.  Patient was also noted to be hypotensive.  He will discharge on midodrine 2.5 mg 3 times daily.  Patient has remained hemodynamically stable.  He has had significant improvement during his hospital stay.  Will discharge to [facility] for rehabilitation.    Procedures Performed         Consults:   Consults       Date and Time Order Name Status Description    9/21/2024 10:21 AM Inpatient Nephrology Consult Completed     9/21/2024 10:10 AM Family Medicine Consult      9/8/2024  8:07 AM Inpatient Nephrology Consult Completed             Pertinent Test Results:    Lab Results (most recent)       Procedure Component Value Units Date/Time    Renal Function Panel [[id number]]  (Abnormal) Collected: 09/25/24 0012    Specimen: Blood Updated: 09/25/24 0108     Glucose 120 mg/dL      BUN 42 mg/dL      Creatinine 1.39 mg/dL      Sodium 137 mmol/L      Potassium 4.1 mmol/L      Chloride 101 mmol/L      CO2 31.2 mmol/L      Calcium 8.3 mg/dL      Albumin 2.2 g/dL      Phosphorus 2.5 mg/dL      Anion Gap 4.8 mmol/L      BUN/Creatinine Ratio 30.2     eGFR 50.3 mL/min/1.73     Narrative:      GFR Normal >60  Chronic Kidney Disease <60  Kidney Failure <15    The GFR formula is only valid for adults with stable renal function between ages 18 and 70.    Renal Function Panel [[id number]]  (Abnormal) Collected: 09/24/24 0556    Specimen: Blood from Arm, Left Updated: 09/24/24 0655     Glucose 97 mg/dL      BUN 39 mg/dL      Creatinine 1.59 mg/dL      Sodium 141 mmol/L      Potassium 4.1 mmol/L      Comment: Specimen hemolyzed.  Result may be falsely elevated.        Chloride 105 mmol/L      CO2 29.5 mmol/L      Calcium 8.5 mg/dL      Albumin 2.1 g/dL      Phosphorus 3.2 mg/dL      Anion Gap 6.5 mmol/L      BUN/Creatinine Ratio 24.5     eGFR 42.8 mL/min/1.73     Narrative:      GFR Normal >60  Chronic Kidney Disease  <60  Kidney Failure <15    The GFR formula is only valid for adults with stable renal function between ages 18 and 70.    CBC & Differential [[id number]]  (Abnormal) Collected: 09/24/24 0556    Specimen: Blood from Arm, Left Updated: 09/24/24 0649    Narrative:      The following orders were created for panel order CBC & Differential.  Procedure                               Abnormality         Status                     ---------                               -----------         ------                     CBC Auto Differential[[id number]]        Abnormal            Final result                 Please view results for these tests on the individual orders.    CBC Auto Differential [[id number]]  (Abnormal) Collected: 09/24/24 0556    Specimen: Blood from Arm, Left Updated: 09/24/24 0649     WBC 6.70 10*3/mm3      RBC 2.65 10*6/mm3      Hemoglobin 8.5 g/dL      Hematocrit 26.8 %      .1 fL      Comment: Result checked          MCH 32.1 pg      MCHC 31.7 g/dL      RDW 15.9 %      RDW-SD 58.6 fl      MPV 11.5 fL      Platelets 244 10*3/mm3      Neutrophil % 71.5 %      Lymphocyte % 12.2 %      Monocyte % 7.5 %      Eosinophil % 7.8 %      Basophil % 0.4 %      Immature Grans % 0.6 %      Neutrophils, Absolute 4.79 10*3/mm3      Lymphocytes, Absolute 0.82 10*3/mm3      Monocytes, Absolute 0.50 10*3/mm3      Eosinophils, Absolute 0.52 10*3/mm3      Basophils, Absolute 0.03 10*3/mm3      Immature Grans, Absolute 0.04 10*3/mm3      nRBC 0.0 /100 WBC     Magnesium [[id number]]  (Normal) Collected: 09/23/24 0209    Specimen: Blood from Arm, Left Updated: 09/23/24 0345     Magnesium 2.1 mg/dL     CBC & Differential [[id number]]  (Abnormal) Collected: 09/23/24 0209    Specimen: Blood from Arm, Left Updated: 09/23/24 0308    Narrative:      The following orders were created for panel order CBC & Differential.  Procedure                               Abnormality         Status                     ---------                                -----------         ------                     CBC Auto Differential[[id number]]        Abnormal            Final result                 Please view results for these tests on the individual orders.    CBC Auto Differential [[id number]]  (Abnormal) Collected: 09/23/24 0209    Specimen: Blood from Arm, Left Updated: 09/23/24 0308     WBC 7.02 10*3/mm3      RBC 2.59 10*6/mm3      Hemoglobin 8.1 g/dL      Hematocrit 25.1 %      Comment: Result checked          MCV 96.9 fL      MCH 31.3 pg      MCHC 32.3 g/dL      RDW 16.1 %      RDW-SD 56.2 fl      MPV 11.7 fL      Platelets 244 10*3/mm3      Neutrophil % 79.3 %      Lymphocyte % 12.1 %      Monocyte % 5.4 %      Eosinophil % 2.4 %      Basophil % 0.4 %      Immature Grans % 0.4 %      Neutrophils, Absolute 5.56 10*3/mm3      Lymphocytes, Absolute 0.85 10*3/mm3      Monocytes, Absolute 0.38 10*3/mm3      Eosinophils, Absolute 0.17 10*3/mm3      Basophils, Absolute 0.03 10*3/mm3      Immature Grans, Absolute 0.03 10*3/mm3      nRBC 0.0 /100 WBC     Magnesium [[id number]]  (Abnormal) Collected: 09/22/24 0528    Specimen: Blood from Arm, Left Updated: 09/22/24 0637     Magnesium 1.5 mg/dL     Iron Profile [[id number]]  (Abnormal) Collected: 09/22/24 0528    Specimen: Blood from Arm, Left Updated: 09/22/24 0637     Iron 13 mcg/dL      Iron Saturation (TSAT) 7 %      Transferrin 117 mg/dL      TIBC 174 mcg/dL     Ferritin [[id number]]  (Normal) Collected: 09/22/24 0528    Specimen: Blood from Arm, Left Updated: 09/22/24 0637     Ferritin 338.00 ng/mL     Narrative:      Results may be falsely decreased if patient taking Biotin.      Uric Acid [[id number]]  (Abnormal) Collected: 09/22/24 0528    Specimen: Blood from Arm, Left Updated: 09/22/24 0637     Uric Acid 9.0 mg/dL     Urinalysis With Culture If Indicated - Indwelling Urethral Catheter [[id number]]  (Normal) Collected: 09/21/24 1747    Specimen: Urine from Indwelling Urethral Catheter Updated: 09/21/24 1819     Color,  UA Yellow     Appearance, UA Clear     pH, UA <=5.0     Specific Gravity, UA 1.009     Glucose, UA Negative     Ketones, UA Negative     Bilirubin, UA Negative     Blood, UA Negative     Protein, UA Negative     Leuk Esterase, UA Negative     Nitrite, UA Negative     Urobilinogen, UA 1.0 E.U./dL    Narrative:      In absence of clinical symptoms, the presence of pyuria, bacteria, and/or nitrites on the urinalysis result does not correlate with infection.  Urine microscopic not indicated.    Comprehensive Metabolic Panel [[id number]]  (Abnormal) Collected: 09/21/24 0817    Specimen: Blood Updated: 09/21/24 0851     Glucose 104 mg/dL      BUN 33 mg/dL      Creatinine 1.26 mg/dL      Sodium 138 mmol/L      Potassium 3.8 mmol/L      Chloride 104 mmol/L      CO2 24.9 mmol/L      Calcium 8.5 mg/dL      Total Protein 5.8 g/dL      Albumin 2.5 g/dL      ALT (SGPT) 17 U/L      AST (SGOT) 44 U/L      Alkaline Phosphatase 65 U/L      Total Bilirubin 0.8 mg/dL      Globulin 3.3 gm/dL      A/G Ratio 0.8 g/dL      BUN/Creatinine Ratio 26.2     Anion Gap 9.1 mmol/L      eGFR 56.6 mL/min/1.73     Narrative:      GFR Normal >60  Chronic Kidney Disease <60  Kidney Failure <15    The GFR formula is only valid for adults with stable renal function between ages 18 and 70.    BNP [[id number]]  (Abnormal) Collected: 09/21/24 0817    Specimen: Blood Updated: 09/21/24 0851     proBNP 19,983.0 pg/mL     Narrative:      This assay is used as an aid in the diagnosis of individuals suspected of having heart failure. It can be used as an aid in the diagnosis of acute decompensated heart failure (ADHF) in patients presenting with signs and symptoms of ADHF to the emergency department (ED). In addition, NT-proBNP of <300 pg/mL indicates ADHF is not likely.    Age Range Result Interpretation  NT-proBNP Concentration (pg/mL:      <50             Positive            >450                   Gray                 300-450                    Negative              <300    50-75           Positive            >900                  Gray                300-900                  Negative            <300      >75             Positive            >1800                  Gray                300-1800                  Negative            <300             Results for orders placed during the hospital encounter of 04/23/24    Adult Transthoracic Echo Complete W/ Cont if Necessary Per Protocol    Interpretation Summary  Indications  Shortness of breath    Technically satisfactory study.  Mitral valve is thickened with adequate opening motion.  Moderate mitral regurgitation is present.  Tricuspid valve is structurally normal.  Moderate tricuspid regurgitation is present.  Aortic valve is thickened with adequate opening motion.  Moderate aortic regurgitation is present.  Pulmonic valve opening motion is normal.  Moderate pulmonic regurgitation is present.  Mild pulmonary hypertension is present.  Left atrium is enlarged.  Right atrium is enlarged.  Left ventricle is normal in size and contractility with ejection fraction of 60%.  Grade 3 left ventricular diastolic dysfunction is present.  (MV E/8 ratio 2.8)  Left ventricular peak systolic longitudinal strain is abnormal with GL PS of -14%.  Concentric left ventricle hypertrophy is present.  Right ventricle enlarged with hypocontractility with a TAPSE of 1.46 cm..  Atrial septum is intact.  Aorta is dilated at 4.3 cm.  IVC is dilated with decreased respiratory variation.  Right atrial pressure 8 mmHg.  No pericardial effusion or intracardiac thrombus is seen.    Impression  Thickened mitral and aortic valves with adequate opening motion.  Moderate mitral tricuspid and aortic and pulmonic regurgitation.  Left atrial enlargement.  Left ventricular size and contractility is normal with ejection fraction of 60%.  Left ventricular peak systolic longitudinal strain is abnormal with GL PS of -14%.  Left ventricular grade 3 diastolic dysfunction is  present.  Right atrium right ventricle enlargement is present.  Mild pulmonary hypertension is present.            Condition on Discharge: Stable    Vital Signs  Temp:  [97.7 °F (36.5 °C)-98.9 °F (37.2 °C)] 97.7 °F (36.5 °C)  Heart Rate:  [61-75] 62  Resp:  [17-23] 23  BP: (139-156)/(49-65) 156/62      Physical Exam  Vitals reviewed.   HENT:      Head: Normocephalic and atraumatic.      Left Ear: External ear normal.      Nose: Nose normal.      Mouth/Throat:      Mouth: Mucous membranes are moist.   Eyes:      General:         Right eye: No discharge.         Left eye: No discharge.   Cardiovascular:      Rate and Rhythm: Normal rate.   Pulmonary:      Effort: Pulmonary effort is normal.   Abdominal:      General: Bowel sounds are normal.      Palpations: Abdomen is soft.   Musculoskeletal:         General: Normal range of motion.      Right lower leg: Edema present.      Left lower leg: Edema present.   Skin:     General: Skin is warm and dry.      Findings: Bruising present.   Neurological:      Mental Status: He is alert and oriented to person, place, and time.   Psychiatric:         Mood and Affect: Mood normal.         Behavior: Behavior normal.              Discharge Disposition  Rehab Facility or Unit (DC - External)    Discharge Medications     Discharge Medications        Changes to Medications        Instructions Start Date   furosemide 40 MG tablet  Commonly known as: Lasix  What changed:   medication strength  how much to take  when to take this   40 mg, Oral, 2 Times Daily      midodrine 2.5 MG tablet  Commonly known as: PROAMATINE  What changed:   medication strength  how much to take  when to take this   2.5 mg, Oral, 3 Times Daily Before Meals             Continue These Medications        Instructions Start Date   acetaminophen 500 MG tablet  Commonly known as: TYLENOL   1,000 mg, Oral, 2 Times Daily      apixaban 2.5 MG tablet tablet  Commonly known as: ELIQUIS   2.5 mg, Oral, 2 Times Daily       atorvastatin 20 MG tablet  Commonly known as: LIPITOR   20 mg, Oral, Nightly      budesonide 0.5 MG/2ML nebulizer solution  Commonly known as: PULMICORT   0.5 mg, Nebulization, 2 Times Daily      fenofibrate 160 MG tablet   160 mg, Oral, Daily      FeroSul 325 (65 Fe) MG tablet  Generic drug: ferrous sulfate   325 mg, Oral, Daily With Breakfast      fish oil 1000 MG capsule capsule   1,000 mg, Oral, Daily With Breakfast      ipratropium-albuterol 0.5-2.5 mg/3 ml nebulizer  Commonly known as: DUO-NEB   3 mL, Nebulization, Every 4 Hours PRN      pantoprazole 40 MG EC tablet  Commonly known as: PROTONIX   40 mg, Oral, Every Early Morning      silver sulfadiazine 1 % cream  Commonly known as: SILVADENE, SSD   1 Application, Topical, 2 Times Daily, Apply to wound on buttocks.      tamsulosin 0.4 MG capsule 24 hr capsule  Commonly known as: FLOMAX   1 capsule, Oral, Daily      V-R COMPLETE SENIOR tablet tablet  Generic drug: multivitamin with minerals   1 tablet, Oral, Daily             Stop These Medications      cetirizine 10 MG tablet  Commonly known as: zyrTEC     formoterol 20 MCG/2ML nebulizer solution  Commonly known as: PERFOROMIST     Glucosamine Sulfate 1000 MG capsule     hydroxychloroquine 200 MG tablet  Commonly known as: PLAQUENIL     spironolactone 25 MG tablet  Commonly known as: ALDACTONE     sulfamethoxazole-trimethoprim 800-160 MG per tablet  Commonly known as: BACTRIM DS,SEPTRA DS              Discharge Diet:   Diet Instructions       Diet: Cardiac Diets; Healthy Heart (2-3 Na+); Regular (IDDSI 7); Thin (IDDSI 0)      Discharge Diet: Cardiac Diets    Cardiac Diet: Healthy Heart (2-3 Na+)    Texture: Regular (IDDSI 7)    Fluid Consistency: Thin (IDDSI 0)            Activity at Discharge:   Activity Instructions       Activity as Tolerated              Follow-up Appointments  Future Appointments   Date Time Provider Department Center   5/13/2025  2:00 PM Nora Kenyon, BRIAN, APRN [facility]      Additional Instructions for the Follow-ups that You Need to Schedule       Discharge Follow-up with PCP   As directed       Currently Documented PCP:    Guilherme Jason MD    PCP Phone Number:    [phone]     Follow Up Details: 1-2 weeks                Test Results Pending at Discharge       AZEEM Singer  09/25/24  13:05 EDT    Time: Discharge 29 min         Resident

## 2024-09-25 NOTE — PLAN OF CARE
"Goal Outcome Evaluation:  Plan of Care Reviewed With: patient    Assessment: Praneeth Nam was more motivated toward movement this date. Less painful w/ less weeping of skin on extremities. Able to transfer more easily this date using rw, but still far below his PLOF. Pt presents with functional mobility impairments which indicate the need for skilled intervention. Tolerating session today without incident. Will continue to follow and progress as tolerated.     Plan/Recommendations:   If medically appropriate, Moderate Intensity Therapy recommended post-acute care. This is recommended as therapy feels the patient would require 3-4 days per week and wouldn't tolerate \"3 hour daily\" rehab intensity. SNF would be the preferred choice. If the patient does not agree to SNF, arrange HH or OP depending on home bound status. If patient is medically complex, consider LTACH. Pt requires no DME at discharge.     Pt desires Skilled Rehab placement at discharge. Pt cooperative; agreeable to therapeutic recommendations and plan of care.     Anticipated Discharge Disposition (PT): skilled nursing facility                        "

## 2024-09-25 NOTE — PLAN OF CARE
Problem: Adult Inpatient Plan of Care  Goal: Plan of Care Review  Outcome: Met  Goal: Patient-Specific Goal (Individualized)  Outcome: Met  Goal: Absence of Hospital-Acquired Illness or Injury  Outcome: Met  Intervention: Identify and Manage Fall Risk  Recent Flowsheet Documentation  Taken 9/25/2024 1204 by Ekaterina Ma RN  Safety Promotion/Fall Prevention: safety round/check completed  Taken 9/25/2024 1018 by Ekaterina Ma RN  Safety Promotion/Fall Prevention: safety round/check completed  Taken 9/25/2024 0859 by Ekaterina Ma RN  Safety Promotion/Fall Prevention: safety round/check completed  Intervention: Prevent and Manage VTE (Venous Thromboembolism) Risk  Recent Flowsheet Documentation  Taken 9/25/2024 0859 by Ekaterina Ma RN  VTE Prevention/Management:   bilateral   sequential compression devices off  Intervention: Prevent Infection  Recent Flowsheet Documentation  Taken 9/25/2024 0859 by Ekaterina Ma RN  Infection Prevention: single patient room provided  Goal: Optimal Comfort and Wellbeing  Outcome: Met  Intervention: Provide Person-Centered Care  Recent Flowsheet Documentation  Taken 9/25/2024 0859 by Ekaterina Ma RN  Trust Relationship/Rapport:   care explained   emotional support provided   choices provided   empathic listening provided   questions answered   questions encouraged  Goal: Readiness for Transition of Care  Outcome: Met     Problem: Diabetes Comorbidity  Goal: Blood Glucose Level Within Targeted Range  Outcome: Met     Problem: Hypertension Comorbidity  Goal: Blood Pressure in Desired Range  Outcome: Met  Intervention: Maintain Blood Pressure Management  Recent Flowsheet Documentation  Taken 9/25/2024 0859 by Ekaterina Ma RN  Medication Review/Management: medications reviewed     Problem: Skin Injury Risk Increased  Goal: Skin Health and Integrity  Outcome: Met     Problem: Fall Injury Risk  Goal: Absence of Fall and Fall-Related  Injury  Outcome: Met  Intervention: Identify and Manage Contributors  Recent Flowsheet Documentation  Taken 9/25/2024 0859 by Ekaterina Ma RN  Medication Review/Management: medications reviewed  Intervention: Promote Injury-Free Environment  Recent Flowsheet Documentation  Taken 9/25/2024 1204 by Ekaterina Ma RN  Safety Promotion/Fall Prevention: safety round/check completed  Taken 9/25/2024 1018 by Ekaterina Ma RN  Safety Promotion/Fall Prevention: safety round/check completed  Taken 9/25/2024 0859 by Ekaterina Ma RN  Safety Promotion/Fall Prevention: safety round/check completed   Goal Outcome Evaluation:   Pt has been relaxing in bed and his recliner throughout the day. He also worked with PT today and was a max assist of 2 with walker and gait belt with the nursing assistant. IV removed; intact and clean. Pt to transport to [facility] via EMS. Report called to [facility]. No concerns and call light within reach.

## 2024-09-26 NOTE — CASE MANAGEMENT/SOCIAL WORK
Case Management Discharge Note      Final Note: SNF [facility]    Provided Post Acute Provider List?: N/A    Selected Continued Care - Discharged on 9/25/2024 Admission date: 9/21/2024 - Discharge disposition: Rehab Facility or Unit (DC - External)      Destination Coordination complete.      Service Provider Selected Services Address Phone Fax Patient Preferred    [facility] [address] [place] [address] [phone] --                    Transportation Services  Ambulance: [facility]    Final Discharge Disposition Code: 03 - skilled nursing facility (SNF)

## 2025-03-14 NOTE — PROGRESS NOTES
GENERAL SURGERY PROGRESS NOTE  Chief Complaint:  Surgery Follow up   LOS: 1 day       Subjective     Interval History:     Overall feels okay today.  No family at bedside during my visit.    Objective     Vital Signs  Temp:  [97.1 °F (36.2 °C)-98.5 °F (36.9 °C)] 97.5 °F (36.4 °C)  Heart Rate:  [45-79] 54  Resp:  [16-30] 27  BP: (101-130)/(39-62) 120/46    Physical Exam:   Abdomen soft  Labs:  Lab Results (last 24 hours)       Procedure Component Value Units Date/Time    POC Glucose Once [[id number]]  (Abnormal) Collected: 06/11/24 1632    Specimen: Blood Updated: 06/11/24 1633     Glucose 138 mg/dL      Comment: Serial Number: [id number]:  [id number]       POC Glucose Finger 4x Daily Before Meals & at Bedtime [[id number]]  (Normal) Collected: 06/11/24 1200    Specimen: Blood from Finger Updated: 06/11/24 1201     Glucose 95 mg/dL      Comment: Serial Number: [id number]:  [id number]       Folate [[id number]]  (Normal) Collected: 06/11/24 0110    Specimen: Blood Updated: 06/11/24 1059     Folate 19.40 ng/mL     Narrative:      Results may be falsely increased if patient taking Biotin.      Vitamin B12 [[id number]]  (Abnormal) Collected: 06/11/24 0110    Specimen: Blood Updated: 06/11/24 1059     Vitamin B-12 1,017 pg/mL     Narrative:      Results may be falsely increased if patient taking Biotin.      Haptoglobin [[id number]]  (Abnormal) Collected: 06/11/24 0110    Specimen: Blood Updated: 06/11/24 1048     Haptoglobin 208 mg/dL     Blood Culture - Blood, Arm, Right [[id number]]  (Abnormal) Collected: 06/10/24 0524    Specimen: Blood from Arm, Right Updated: 06/11/24 0828     Blood Culture Escherichia coli     Isolated from Aerobic and Anaerobic Bottles     Gram Stain Aerobic Bottle Gram negative bacilli      Anaerobic Bottle Gram negative bacilli    Manual Differential [[id number]]  (Abnormal) Collected: 06/11/24 0110    Specimen: Blood Updated: 06/11/24 0259     Neutrophil % 79.0 %      Lymphocyte % 6.0 %       Your child is being treated for left ankle sprain.  No fracture noted on x-ray in clinic today.  Wear figure-of-eight ankle brace for the next 1 to 2 weeks.  May remove and apply ice 3-4 times per day for 20 to 30 minutes at a time.  Work on range of motion exercises as discussed in clinic by doing the ABCs    Avoid any strenuous activity or exercise for the next 1 to 2 weeks to allow the ankle to rest.  Elevate at night.    Take 600-800mg ibuprofen every 4-6 hours for the pain. May add on 500-1000mg of tylenol every 4-6 hours as well. Do not exceed 4000mg of tylenol in 24hr period.    Follow-up in 1 to 2 weeks if no improvement for reevaluation   Monocyte % 2.0 %      Bands %  13.0 %      Neutrophils Absolute 11.56 10*3/mm3      Lymphocytes Absolute 0.75 10*3/mm3      Monocytes Absolute 0.25 10*3/mm3      Anisocytosis Slight/1+     Macrocytes Slight/1+     Polychromasia Slight/1+     WBC Morphology Normal     Platelet Estimate Decreased    CBC & Differential [[id number]]  (Abnormal) Collected: 06/11/24 0110    Specimen: Blood Updated: 06/11/24 0259    Narrative:      The following orders were created for panel order CBC & Differential.  Procedure                               Abnormality         Status                     ---------                               -----------         ------                     CBC Auto Differential[[id number]]        Abnormal            Final result               Scan Slide[[id number]]                                       Final result                 Please view results for these tests on the individual orders.    CBC Auto Differential [[id number]]  (Abnormal) Collected: 06/11/24 0110    Specimen: Blood Updated: 06/11/24 0259     WBC 12.57 10*3/mm3      RBC 2.80 10*6/mm3      Hemoglobin 8.8 g/dL      Hematocrit 29.0 %      .6 fL      MCH 31.4 pg      MCHC 30.3 g/dL      RDW 14.5 %      RDW-SD 55.3 fl      MPV 12.1 fL      Platelets 101 10*3/mm3     Narrative:      The previously reported component NRBC is no longer being reported. Previous result was 0.0 /100 WBC (Reference Range: 0.0-0.2 /100 WBC) on 6/11/2024 at 0131 EDT.    Scan Slide [[id number]] Collected: 06/11/24 0110    Specimen: Blood Updated: 06/11/24 0259     Scan Slide --     Comment: See Manual Differential Results       Blood Culture - Blood, Arm, Left [[id number]]  (Abnormal) Collected: 06/10/24 0524    Specimen: Blood from Arm, Left Updated: 06/11/24 0236     Blood Culture Abnormal Stain     Gram Stain Aerobic Bottle Gram negative bacilli    Narrative:      Less than seven (7) mL's of blood was collected.  Insufficient quantity may yield false negative  results.    Comprehensive Metabolic Panel [[id number]]  (Abnormal) Collected: 06/11/24 0110    Specimen: Blood Updated: 06/11/24 0143     Glucose 105 mg/dL      BUN 32 mg/dL      Creatinine 1.65 mg/dL      Sodium 140 mmol/L      Potassium 5.1 mmol/L      Chloride 112 mmol/L      CO2 19.8 mmol/L      Calcium 8.1 mg/dL      Total Protein 5.4 g/dL      Albumin 2.8 g/dL      ALT (SGPT) 57 U/L      AST (SGOT) 101 U/L      Alkaline Phosphatase 222 U/L      Total Bilirubin 2.5 mg/dL      Globulin 2.6 gm/dL      A/G Ratio 1.1 g/dL      BUN/Creatinine Ratio 19.4     Anion Gap 8.2 mmol/L      eGFR 40.9 mL/min/1.73     Narrative:      GFR Normal >60  Chronic Kidney Disease <60  Kidney Failure <15    The GFR formula is only valid for adults with stable renal function between ages 18 and 70.    Lipase [[id number]]  (Normal) Collected: 06/11/24 0110    Specimen: Blood Updated: 06/11/24 0143     Lipase 19 U/L     Blood Culture ID, PCR - Blood, Arm, Right [[id number]]  (Abnormal) Collected: 06/10/24 0524    Specimen: Blood from Arm, Right Updated: 06/10/24 1957     BCID, PCR Escherichia coli. Identification by BCID2 PCR.     BOTTLE TYPE Aerobic Bottle    Narrative:      No resistance genes detected.             Results Review:     Labs and imaging for today were reviewed.    Assessment & Plan     Praneeth Nam is a 84 y.o. male who had cholangitis due to obstruction of the bile duct with choledocholithiasis, now status post ERCP      Continue antibiotics for positive blood cultures from recent cholangitis.  His bile duct has now been cleared.  Given his age and relative frailty I would not like to pursue cholecystectomy at this time however if he continues to clinically improve we can readdress this later.  Will continue to follow.          This document has been electronically signed by Greg Jensen MD on June 11, 2024 19:50 EDT        Greg Jensen MD  06/11/24  19:50 EDT

## 2025-04-16 NOTE — PROGRESS NOTES
"Chief Complaint  Follow up, lung nodules, right pleural effusion    Subjective        Praneeth Nam presents to [facility] THORACIC SURGERY  History of Present Illness    Mr. Nam is a pleasant 83-year-old gentleman who is seen today in follow-up for continued surveillance of bilateral lower lobe lung nodules.  A new right lower lobe lung nodule was seen on imaging performed in February 2023.  He is a former smoker with a greater than 40-pack-year history, quitting in 1995.  He also reports a history of prostate cancer status post radiation as well as leukemia status posttreatment with Dr. Helton.  He has significant rheumatoid arthritis and is on 4L supplemental oxygen at baseline.  He is able to care for himself but is not very active at baseline.  He continues to have persistent shortness of breath.  He presents today in his baseline state of health with CT chest.    Objective   Vital Signs:  /55 (BP Location: Left arm, Patient Position: Sitting, Cuff Size: Adult)   Pulse 53   Temp 98.4 °F (36.9 °C) (Infrared)   Ht 167.6 cm (66\")   Wt 87.5 kg (193 lb)   SpO2 99% Comment: 6 liters  BMI 31.15 kg/m²   Estimated body mass index is 31.15 kg/m² as calculated from the following:    Height as of this encounter: 167.6 cm (66\").    Weight as of this encounter: 87.5 kg (193 lb).         Physical Exam  Constitutional:       General: He is not in acute distress.     Appearance: Normal appearance. He is not ill-appearing.   HENT:      Head: Normocephalic and atraumatic.      Comments: Nasal cannula  Cardiovascular:      Rate and Rhythm: Normal rate.      Pulses: Normal pulses.   Pulmonary:      Effort: Pulmonary effort is normal.   Musculoskeletal:         General: Deformity (Consistent with rheumatoid arthritis) present. Normal range of motion.      Cervical back: Normal range of motion and neck supple.   Skin:     General: Skin is warm and dry.      Findings: Bruising present. " I will check thyroid functions     Neurological:      General: No focal deficit present.      Mental Status: He is alert. Mental status is at baseline.      Motor: Weakness present.   Psychiatric:         Mood and Affect: Mood normal.        Result Review :    CT chest 10/06/2023: Stable 9mm nodule in the right lower lobe. Small to moderate right pleural effusion. Emphysema. No new lung nodules.    CT chest 6/20/2023: new small right pleural effusion.  There is a 9 mm nodule in the right lower lobe that is unchanged since first seen on last exam in February 2023.  There is chronic emphysema.  No mediastinal or hilar lymphadenopathy.  No pleural effusion on the left or pericardial effusion.    CT chest 2/28/2023: a new pleural-based right lower lobe nodule measuring 13 x 10 mm.  There is stable scarring throughout the left lung.  No pleural or pericardial effusion.  No mediastinal or hilar lymphadenopathy     All imaging was independently reviewed.         Assessment and Plan   Diagnoses and all orders for this visit:    1. Lung nodules (Primary)  -     CT Chest Without Contrast; Future    2. Former smoker  -     CT Chest Without Contrast; Future    3. Pleural effusion, right  -     CT Chest Without Contrast; Future      Mr. Nam's most recent CT chest demonstrates stable appearance of a 9 mm nodule in the right lower lobe first seen on imaging in February 2023. This likely represents benign pleural thickening, but will continue surveillance for documented stability. For the right effusion, I advised a thoracentesis for increased shortness of breath. He will discuss with his family and call our office to schedule the thoracentesis if they decide to proceed. I will plan to see him in 6 months with CT chest.         I spent 32 minutes caring for Praneeth on this date of service. This time includes time spent by me in the following activities:preparing for the visit, reviewing tests, performing a medically appropriate examination and/or evaluation ,  counseling and educating the patient/family/caregiver, ordering medications, tests, or procedures, documenting information in the medical record, independently interpreting results and communicating that information with the patient/family/caregiver and care coordination     Follow Up   Return in about 6 months (around 4/17/2024) for Next scheduled follow up.  Patient was given instructions and counseling regarding his condition or for health maintenance advice. Please see specific information pulled into the AVS if appropriate.

## 2025-05-01 ENCOUNTER — TELEPHONE (OUTPATIENT)
Dept: SURGERY | Facility: CLINIC | Age: 85
End: 2025-05-01
Payer: MEDICARE

## 2025-05-01 NOTE — TELEPHONE ENCOUNTER
ATTEMPTED CONTACTING PT TO SEE IF HE HAS GOTTEN HIS CT SCAN DONE FOR HIS SCHEDULED FU APPT ON 05/13/2025. ALSO WANTED TO INFORM PT THAT HE WOULD BE SEEING [facility]. ALL NUMBERS THAT WE HAVE ON FILE FOR THE PT ARE OUT OF SERVICE.

## (undated) DEVICE — PK ENDO GI 50

## (undated) DEVICE — HIGH PERFORMANCE GUIDEWIRE: Brand: DREAMWIRE

## (undated) DEVICE — CATH PUSHING STNT PC 5 5F

## (undated) DEVICE — DEV POSITION LK AND BIOP CAP SYS

## (undated) DEVICE — SPHINCTEROTOME: Brand: HYDRATOME RX 44

## (undated) DEVICE — BITEBLOCK ENDO W/STRAP 60F A/ LF DISP

## (undated) DEVICE — RETRIEVAL BALLOON CATHETER: Brand: EXTRACTOR™ PRO RX